# Patient Record
Sex: FEMALE | Race: WHITE | NOT HISPANIC OR LATINO | Employment: OTHER | URBAN - METROPOLITAN AREA
[De-identification: names, ages, dates, MRNs, and addresses within clinical notes are randomized per-mention and may not be internally consistent; named-entity substitution may affect disease eponyms.]

---

## 2017-01-03 ENCOUNTER — LAB (OUTPATIENT)
Dept: LAB | Facility: HOSPITAL | Age: 77
End: 2017-01-03

## 2017-01-03 DIAGNOSIS — E83.51 HYPOCALCEMIA: ICD-10-CM

## 2017-01-03 LAB — CALCIUM SPEC-SCNC: 8.9 MG/DL (ref 8.6–10.5)

## 2017-01-03 PROCEDURE — 82310 ASSAY OF CALCIUM: CPT

## 2017-01-03 PROCEDURE — 36415 COLL VENOUS BLD VENIPUNCTURE: CPT

## 2017-01-04 ENCOUNTER — TELEPHONE (OUTPATIENT)
Dept: SURGERY | Facility: CLINIC | Age: 77
End: 2017-01-04

## 2017-01-04 NOTE — TELEPHONE ENCOUNTER
Patient informed to continue 1 calcium tab PO daily x 2 weeks then discontinue. I instructed her on signs of hypocalcemia. She will call with any signs of such. She reports her incision looks great. She denies any signs or symptoms of infection at her incision.    Solange Piedra, TAYO

## 2017-01-04 NOTE — TELEPHONE ENCOUNTER
----- Message from Tasneem Montelongo MD sent at 1/4/2017 12:54 PM EST -----  Continue present calcium for 2 more weeks, then dc.  No need to have labs repeated from our standpoint.  ----- Message -----     From: TAYO Zafar     Sent: 1/4/2017   9:31 AM       To: Tasneem Montelongo MD    Calcium is 8.9 on 1 os-jada tab daily. Let me know what you would recommend.

## 2017-01-30 ENCOUNTER — TELEPHONE (OUTPATIENT)
Dept: ENDOCRINOLOGY | Age: 77
End: 2017-01-30

## 2017-01-30 DIAGNOSIS — E55.9 VITAMIN D DEFICIENCY: ICD-10-CM

## 2017-01-30 DIAGNOSIS — E21.3 HYPERPARATHYROIDISM (HCC): ICD-10-CM

## 2017-01-30 DIAGNOSIS — E03.9 HYPOTHYROIDISM, UNSPECIFIED TYPE: Primary | ICD-10-CM

## 2017-01-30 NOTE — TELEPHONE ENCOUNTER
----- Message from Reagan Beltran MD sent at 1/30/2017  9:44 AM EST -----  -Placed orders for her laboratory evaluation between now and Thursday for her to come and have it done so we can figure out the reason for her cramps  ----- Message -----     From: Tuyet Mondragon MA     Sent: 1/27/2017   8:49 AM       To: Reagan Beltran MD    Patient called stating that she had surgery last Friday on her thyroid with Dr. Montelongo and is now experiencing charley horses and her hand is cramping up. She thought it might have been a calcium problem but  stated it was probably her potassium and she needed to consult you. Please advise.     LVM for patient to call back and schedule a lab appt.

## 2017-02-02 ENCOUNTER — RESULTS ENCOUNTER (OUTPATIENT)
Dept: ENDOCRINOLOGY | Age: 77
End: 2017-02-02

## 2017-02-02 DIAGNOSIS — E21.3 HYPERPARATHYROIDISM (HCC): ICD-10-CM

## 2017-02-02 DIAGNOSIS — E03.9 HYPOTHYROIDISM, UNSPECIFIED TYPE: ICD-10-CM

## 2017-02-02 DIAGNOSIS — E55.9 VITAMIN D DEFICIENCY: ICD-10-CM

## 2017-02-07 ENCOUNTER — LAB (OUTPATIENT)
Dept: ENDOCRINOLOGY | Age: 77
End: 2017-02-07

## 2017-02-07 DIAGNOSIS — E03.9 HYPOTHYROIDISM, UNSPECIFIED TYPE: ICD-10-CM

## 2017-02-07 DIAGNOSIS — E78.2 MIXED HYPERLIPIDEMIA: ICD-10-CM

## 2017-02-07 DIAGNOSIS — E11.9 TYPE 2 DIABETES MELLITUS WITHOUT COMPLICATION, WITH LONG-TERM CURRENT USE OF INSULIN (HCC): ICD-10-CM

## 2017-02-07 DIAGNOSIS — E04.1 SOLITARY THYROID NODULE: ICD-10-CM

## 2017-02-07 DIAGNOSIS — Z79.4 TYPE 2 DIABETES MELLITUS WITHOUT COMPLICATION, WITH LONG-TERM CURRENT USE OF INSULIN (HCC): ICD-10-CM

## 2017-02-07 DIAGNOSIS — E21.3 HYPERPARATHYROIDISM (HCC): ICD-10-CM

## 2017-02-07 DIAGNOSIS — E55.9 VITAMIN D DEFICIENCY: ICD-10-CM

## 2017-02-07 DIAGNOSIS — N18.30 ANEMIA IN STAGE 3 CHRONIC KIDNEY DISEASE (HCC): ICD-10-CM

## 2017-02-07 DIAGNOSIS — E55.9 VITAMIN D DEFICIENCY: Primary | ICD-10-CM

## 2017-02-07 DIAGNOSIS — I10 ESSENTIAL HYPERTENSION: ICD-10-CM

## 2017-02-07 DIAGNOSIS — D63.1 ANEMIA IN STAGE 3 CHRONIC KIDNEY DISEASE (HCC): ICD-10-CM

## 2017-02-08 LAB — 25(OH)D3+25(OH)D2 SERPL-MCNC: 33.5 NG/ML (ref 30–100)

## 2017-02-14 LAB
ALBUMIN SERPL-MCNC: 3.5 G/DL (ref 3.5–5.2)
ALBUMIN/GLOB SERPL: 1.3 G/DL
ALP SERPL-CCNC: 91 U/L (ref 39–117)
ALT SERPL-CCNC: 9 U/L (ref 1–33)
AST SERPL-CCNC: 10 U/L (ref 1–32)
BILIRUB SERPL-MCNC: 0.2 MG/DL (ref 0.1–1.2)
BUN SERPL-MCNC: 18 MG/DL (ref 8–23)
BUN/CREAT SERPL: 17.6 (ref 7–25)
C PEPTIDE SERPL-MCNC: 10.1 NG/ML (ref 1.1–4.4)
CA-I SERPL ISE-MCNC: 5.3 MG/DL (ref 4.5–5.6)
CALCIUM SERPL-MCNC: 9.6 MG/DL (ref 8.6–10.5)
CHLORIDE SERPL-SCNC: 99 MMOL/L (ref 98–107)
CHOLEST SERPL-MCNC: 334 MG/DL (ref 0–200)
CO2 SERPL-SCNC: 29.5 MMOL/L (ref 22–29)
CREAT SERPL-MCNC: 1.02 MG/DL (ref 0.57–1)
GLOBULIN SER CALC-MCNC: 2.7 GM/DL
GLUCOSE SERPL-MCNC: 216 MG/DL (ref 65–99)
HBA1C MFR BLD: 6.45 % (ref 4.8–5.6)
HDLC SERPL-MCNC: 63 MG/DL (ref 40–60)
LDLC SERPL CALC-MCNC: 243 MG/DL (ref 0–100)
MICROALBUMIN UR-MCNC: 1260.2 UG/ML
PHOSPHATE SERPL-MCNC: 4.2 MG/DL (ref 2.5–4.5)
POTASSIUM SERPL-SCNC: 4.4 MMOL/L (ref 3.5–5.2)
PROT SERPL-MCNC: 6.2 G/DL (ref 6–8.5)
PTH-INTACT SERPL-MCNC: 51 PG/ML (ref 15–65)
SODIUM SERPL-SCNC: 141 MMOL/L (ref 136–145)
T3FREE SERPL-MCNC: 1.9 PG/ML (ref 2–4.4)
T4 FREE SERPL-MCNC: 1.26 NG/DL (ref 0.93–1.7)
T4 SERPL-MCNC: 6.68 MCG/DL (ref 4.5–11.7)
THYROGLOB AB SERPL-ACNC: 10.3 IU/ML (ref 0–0.9)
THYROGLOB SERPL-MCNC: 6.7 NG/ML
TRIGL SERPL-MCNC: 139 MG/DL (ref 0–150)
TSH SERPL DL<=0.005 MIU/L-ACNC: 3.76 MIU/ML (ref 0.27–4.2)
URATE SERPL-MCNC: 6 MG/DL (ref 2.4–5.7)
VLDLC SERPL CALC-MCNC: 27.8 MG/DL (ref 5–40)

## 2017-02-16 ENCOUNTER — OFFICE VISIT (OUTPATIENT)
Dept: FAMILY MEDICINE CLINIC | Facility: CLINIC | Age: 77
End: 2017-02-16

## 2017-02-16 VITALS
RESPIRATION RATE: 16 BRPM | SYSTOLIC BLOOD PRESSURE: 180 MMHG | TEMPERATURE: 98.2 F | DIASTOLIC BLOOD PRESSURE: 70 MMHG | BODY MASS INDEX: 31.66 KG/M2 | HEIGHT: 66 IN | WEIGHT: 197 LBS | HEART RATE: 67 BPM

## 2017-02-16 DIAGNOSIS — E78.49 OTHER HYPERLIPIDEMIA: ICD-10-CM

## 2017-02-16 DIAGNOSIS — R01.1 CARDIAC MURMUR: ICD-10-CM

## 2017-02-16 DIAGNOSIS — M1A.9XX0 CHRONIC GOUT WITHOUT TOPHUS, UNSPECIFIED CAUSE, UNSPECIFIED SITE: Chronic | ICD-10-CM

## 2017-02-16 DIAGNOSIS — I10 ESSENTIAL HYPERTENSION: Primary | ICD-10-CM

## 2017-02-16 DIAGNOSIS — E78.2 MIXED HYPERLIPIDEMIA: ICD-10-CM

## 2017-02-16 PROCEDURE — 99214 OFFICE O/P EST MOD 30 MIN: CPT | Performed by: FAMILY MEDICINE

## 2017-02-16 RX ORDER — ALLOPURINOL 100 MG/1
100 TABLET ORAL DAILY
Qty: 30 TABLET | Refills: 5 | Status: SHIPPED | OUTPATIENT
Start: 2017-02-16 | End: 2017-02-21 | Stop reason: SDUPTHER

## 2017-02-16 RX ORDER — EZETIMIBE 10 MG/1
10 TABLET ORAL DAILY
Qty: 30 TABLET | Refills: 5 | Status: SHIPPED | OUTPATIENT
Start: 2017-02-16 | End: 2017-08-30 | Stop reason: SDUPTHER

## 2017-02-16 RX ORDER — AMLODIPINE BESYLATE 10 MG/1
10 TABLET ORAL DAILY
Qty: 30 TABLET | Refills: 5 | Status: SHIPPED | OUTPATIENT
Start: 2017-02-16 | End: 2017-08-30 | Stop reason: SDUPTHER

## 2017-02-16 RX ORDER — IRBESARTAN 300 MG/1
300 TABLET ORAL NIGHTLY
Qty: 30 TABLET | Refills: 5 | Status: SHIPPED | OUTPATIENT
Start: 2017-02-16 | End: 2017-02-21 | Stop reason: SDUPTHER

## 2017-02-16 NOTE — PROGRESS NOTES
"Chief Complaint   Patient presents with   • Hypertension   • Gout   • Hyperlipidemia       Subjective    This patient presents the office for medicine refill.  She had labs done per endocrinology and they have been reviewed during today's office visit.  Gout is controlled on current therapy.  Blood pressure is above goal and we will increase amlodipine to 10 mg daily.  She will continue her irbesartan.  Lipids are not controlled.  She has been not fully compliant with Zetia.  I encouraged her to take it daily.  She has been statin intolerant previously. Stress levels are high but she would not like medication treatment at this time.   Review of Systems   Constitutional: Negative for fatigue.   Cardiovascular: Negative for chest pain.   Psychiatric/Behavioral:        Home stress       Objective   Visit Vitals   • /70 (BP Location: Right arm, Patient Position: Sitting, Cuff Size: Adult)   • Pulse 67   • Temp 98.2 °F (36.8 °C) (Oral)   • Resp 16   • Ht 66\" (167.6 cm)   • Wt 197 lb (89.4 kg)   • BMI 31.8 kg/m2     Body mass index is 31.8 kg/(m^2).  Physical Exam   Constitutional: She is cooperative. No distress.   Eyes: Conjunctivae and lids are normal.   Neck: Carotid bruit is not present. No tracheal deviation present.   Cardiovascular: Normal rate and regular rhythm.    Murmur heard.   Systolic murmur is present with a grade of 2/6   Pulmonary/Chest: Effort normal and breath sounds normal.   Neurological: She is alert. She is not disoriented.   Skin: Skin is warm and dry.   Psychiatric: She has a normal mood and affect. Her speech is normal and behavior is normal.   Vitals reviewed.      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 HOLD PER MD MAY-12/1     • carBAMazepine (CARBATROL) 300 MG 12 hr capsule Take 1 capsule by mouth 2 (two) times a day. For 30 days 60 capsule 5   • ferrous sulfate 325 (65 FE) MG tablet Take 1 tablet by mouth daily.     • glucose blood (JOSÉ MIGUEL CONTOUR NEXT TEST) test strip Checking BG twice a day 200 each 2   • Insulin Glargine (LANTUS SOLOSTAR) 100 UNIT/ML solution pen-injector Inject 50 Units under the skin daily. 5 pen 5   • levothyroxine (SYNTHROID, LEVOTHROID) 125 MCG tablet Take 1 tablet (125 mcg total) by mouth daily. 90 tablet 3   • metFORMIN (GLUCOPHAGE) 1000 MG tablet Take 1 tablet (1,000 mg total) by mouth 2 (two) times a day. With morning and evening meals 60 tablet 5   • montelukast (SINGULAIR) 10 MG tablet 10 mg daily.     • saxagliptin (ONGLYZA) 5 MG tablet Take 1 tablet by mouth Daily. 30 tablet 11   • vitamin B-12 (CYANOCOBALAMIN) 1000 MCG tablet Take 1 tablet by mouth daily.     • vitamin D (ERGOCALCIFEROL) 21715 UNITS capsule capsule Take 1 capsule (50,000 Units total) by mouth once a week. 13 capsule 3   • allopurinol (ZYLOPRIM) 100 MG tablet Take 1 tablet by mouth Daily for 180 days. 30 tablet 5   • amLODIPine (NORVASC) 10 MG tablet Take 1 tablet by mouth Daily for 180 days. 30 tablet 5   • ezetimibe (ZETIA) 10 MG tablet Take 1 tablet by mouth Daily for 180 days. 30 tablet 5   • furosemide (LASIX) 40 MG tablet Take 1 tablet by mouth daily for 180 days. 30 tablet 5   • irbesartan (AVAPRO) 300 MG tablet Take 1 tablet by mouth Every Night for 180 days. 30 tablet 5   • [DISCONTINUED] allopurinol (ZYLOPRIM) 100 MG tablet Take 1 tablet by mouth daily for 180 days. 30 tablet 5   • [DISCONTINUED] amLODIPine (NORVASC) 5 MG tablet Take 1 tablet by mouth daily for 180 days. 30 tablet 5    • [DISCONTINUED] ezetimibe (ZETIA) 10 MG tablet Take 1 tablet by mouth daily for 180 days. (Patient taking differently: Take 10 mg by mouth As Needed.) 30 tablet 5   • [DISCONTINUED] irbesartan (AVAPRO) 300 MG tablet Take 1 tablet by mouth every night for 180 days. 30 tablet 5   • [DISCONTINUED] traMADol-acetaminophen (ULTRACET) 37.5-325 MG per tablet Take 1 tablet by mouth Every 8 (Eight) Hours As Needed for moderate pain (4-6) for up to 20 doses. 20 tablet 0     No facility-administered encounter medications on file as of 2/16/2017.        No orders of the defined types were placed in this encounter.      Continue with current treatment plan.         F/U in 6 weeks to recheck blood pressure

## 2017-02-21 ENCOUNTER — OFFICE VISIT (OUTPATIENT)
Dept: ENDOCRINOLOGY | Age: 77
End: 2017-02-21

## 2017-02-21 VITALS
BODY MASS INDEX: 31.79 KG/M2 | DIASTOLIC BLOOD PRESSURE: 92 MMHG | RESPIRATION RATE: 16 BRPM | SYSTOLIC BLOOD PRESSURE: 142 MMHG | HEIGHT: 66 IN | WEIGHT: 197.8 LBS

## 2017-02-21 DIAGNOSIS — I65.29 CAROTID ATHEROSCLEROSIS, UNSPECIFIED LATERALITY: ICD-10-CM

## 2017-02-21 DIAGNOSIS — E03.9 HYPOTHYROIDISM, UNSPECIFIED TYPE: ICD-10-CM

## 2017-02-21 DIAGNOSIS — E55.9 VITAMIN D DEFICIENCY: ICD-10-CM

## 2017-02-21 DIAGNOSIS — E11.9 TYPE 2 DIABETES MELLITUS WITHOUT COMPLICATION, WITH LONG-TERM CURRENT USE OF INSULIN (HCC): Primary | ICD-10-CM

## 2017-02-21 DIAGNOSIS — E78.2 MIXED HYPERLIPIDEMIA: ICD-10-CM

## 2017-02-21 DIAGNOSIS — I10 ESSENTIAL HYPERTENSION: ICD-10-CM

## 2017-02-21 DIAGNOSIS — R80.9 PROTEINURIA: ICD-10-CM

## 2017-02-21 DIAGNOSIS — Z79.4 TYPE 2 DIABETES MELLITUS WITHOUT COMPLICATION, WITH LONG-TERM CURRENT USE OF INSULIN (HCC): Primary | ICD-10-CM

## 2017-02-21 PROCEDURE — 99214 OFFICE O/P EST MOD 30 MIN: CPT | Performed by: INTERNAL MEDICINE

## 2017-02-21 RX ORDER — EVOLOCUMAB 420 MG/3.5
420 KIT SUBCUTANEOUS
Qty: 3 CARTRIDGE | Refills: 3 | Status: SHIPPED | OUTPATIENT
Start: 2017-02-21 | End: 2017-02-22 | Stop reason: SDUPTHER

## 2017-02-21 RX ORDER — ERGOCALCIFEROL 1.25 MG/1
50000 CAPSULE ORAL WEEKLY
Qty: 13 CAPSULE | Refills: 3 | Status: SHIPPED | OUTPATIENT
Start: 2017-02-21 | End: 2017-07-18 | Stop reason: SDUPTHER

## 2017-02-21 RX ORDER — ALLOPURINOL 100 MG/1
100 TABLET ORAL DAILY
Qty: 30 TABLET | Refills: 5 | Status: SHIPPED | OUTPATIENT
Start: 2017-02-21 | End: 2017-08-30 | Stop reason: SDUPTHER

## 2017-02-21 RX ORDER — IRBESARTAN 300 MG/1
300 TABLET ORAL NIGHTLY
Qty: 30 TABLET | Refills: 5 | Status: SHIPPED | OUTPATIENT
Start: 2017-02-21 | End: 2017-08-25 | Stop reason: SDUPTHER

## 2017-02-21 RX ORDER — LEVOTHYROXINE SODIUM 0.12 MG/1
125 TABLET ORAL DAILY
Qty: 90 TABLET | Refills: 3 | Status: SHIPPED | OUTPATIENT
Start: 2017-02-21 | End: 2018-03-19 | Stop reason: SDUPTHER

## 2017-02-21 NOTE — PROGRESS NOTES
Subjective   Ange Johnson is a 76 y.o. female seen for follow up for DM2, goiter, hyperparathyroidism, hypothyroidism, vit d deficiency, HTN, hyperlipidemia, lab review. Patient had a right thyroidectomy 12/08/2016. She is under a lot of stress and having problems with her BP. She is checking BG twice a day. Her BG is running 110-130.     History of Present Illness this is a 76-year-old female known patient with type II diabetes hypothyroidism and hypertension and dyslipidemia as well as vitamin D deficiency.  Over the course of last the 6 months she has had no significant health problems for which to go to emergency room or hospital.    Current Outpatient Prescriptions:   •  allopurinol (ZYLOPRIM) 100 MG tablet, Take 1 tablet by mouth Daily for 180 days., Disp: 30 tablet, Rfl: 5  •  amLODIPine (NORVASC) 10 MG tablet, Take 1 tablet by mouth Daily for 180 days., Disp: 30 tablet, Rfl: 5  •  aspirin 81 MG EC tablet, Take 1 tablet by mouth Daily. HOLD PER MD MAY-12/1, Disp: , Rfl:   •  carBAMazepine (CARBATROL) 300 MG 12 hr capsule, Take 1 capsule by mouth 2 (two) times a day. For 30 days, Disp: 60 capsule, Rfl: 5  •  ezetimibe (ZETIA) 10 MG tablet, Take 1 tablet by mouth Daily for 180 days., Disp: 30 tablet, Rfl: 5  •  ferrous sulfate 325 (65 FE) MG tablet, Take 1 tablet by mouth daily., Disp: , Rfl:   •  glucose blood (JOSÉ MIGUEL CONTOUR NEXT TEST) test strip, Checking BG twice a day, Disp: 200 each, Rfl: 2  •  Insulin Glargine (LANTUS SOLOSTAR) 100 UNIT/ML solution pen-injector, Inject 50 Units under the skin daily., Disp: 5 pen, Rfl: 5  •  irbesartan (AVAPRO) 300 MG tablet, Take 1 tablet by mouth Every Night for 180 days., Disp: 30 tablet, Rfl: 5  •  levothyroxine (SYNTHROID, LEVOTHROID) 125 MCG tablet, Take 1 tablet (125 mcg total) by mouth daily., Disp: 90 tablet, Rfl: 3  •  metFORMIN (GLUCOPHAGE) 1000 MG tablet, Take 1 tablet (1,000 mg total) by mouth 2 (two) times a day. With morning and evening meals, Disp: 60  "tablet, Rfl: 5  •  montelukast (SINGULAIR) 10 MG tablet, 10 mg daily., Disp: , Rfl:   •  saxagliptin (ONGLYZA) 5 MG tablet, Take 1 tablet by mouth Daily., Disp: 30 tablet, Rfl: 11  •  vitamin B-12 (CYANOCOBALAMIN) 1000 MCG tablet, Take 1 tablet by mouth daily., Disp: , Rfl:   •  vitamin D (ERGOCALCIFEROL) 48650 UNITS capsule capsule, Take 1 capsule (50,000 Units total) by mouth once a week., Disp: 13 capsule, Rfl: 3  •  furosemide (LASIX) 40 MG tablet, Take 1 tablet by mouth daily for 180 days., Disp: 30 tablet, Rfl: 5  Allergies   Allergen Reactions   • Codeine Nausea Only   • Hydrocodone-Acetaminophen Nausea Only and Other (See Comments)     Percocet and Vicodin; vertigo   • Meperidine Nausea Only   • Morphine And Related Nausea Only   • Oxycodone-Acetaminophen Nausea Only   • Oxycodone-Aspirin Nausea Only and Other (See Comments)     vertigo   • Penicillins Hives   • Sulfa Antibiotics Hives     Visit Vitals   • /92   • Resp 16   • Ht 66\" (167.6 cm)   • Wt 197 lb 12.8 oz (89.7 kg)   • BMI 31.93 kg/m2   .      The following portions of the patient's history were reviewed and updated as appropriate: allergies, current medications, past family history, past medical history, past social history, past surgical history and problem list.    Review of Systems   Constitutional: Negative.    HENT: Negative.    Eyes: Negative.    Respiratory: Negative.    Cardiovascular: Negative.    Gastrointestinal: Negative.    Endocrine: Negative.    Genitourinary: Negative.    Musculoskeletal: Negative.    Skin: Negative.    Allergic/Immunologic: Negative.    Neurological: Negative.    Hematological: Negative.    Psychiatric/Behavioral: Negative.        Objective   Physical Exam   Constitutional: She is oriented to person, place, and time. She appears well-developed and well-nourished. No distress.   HENT:   Head: Normocephalic and atraumatic.   Right Ear: External ear normal.   Left Ear: External ear normal.   Nose: Nose normal. "   Mouth/Throat: Oropharynx is clear and moist. No oropharyngeal exudate.   Eyes: Conjunctivae and EOM are normal. Pupils are equal, round, and reactive to light. Right eye exhibits no discharge. Left eye exhibits no discharge. No scleral icterus.   Neck: Normal range of motion. Neck supple. No JVD present. No tracheal deviation present. No thyromegaly present.   Healed the scar of the parathyroidectomy in lower neck.   Cardiovascular: Normal rate, regular rhythm, normal heart sounds and intact distal pulses.  Exam reveals no gallop and no friction rub.    No murmur heard.  Pulmonary/Chest: Effort normal and breath sounds normal. No stridor. No respiratory distress. She has no wheezes. She has no rales. She exhibits no tenderness.   Abdominal: Soft. Bowel sounds are normal. She exhibits no distension and no mass. There is no tenderness. There is no rebound and no guarding. No hernia.   Musculoskeletal: Normal range of motion. She exhibits no edema, tenderness or deformity.   Lymphadenopathy:     She has no cervical adenopathy.   Neurological: She is alert and oriented to person, place, and time. She has normal reflexes. She displays normal reflexes. No cranial nerve deficit. She exhibits normal muscle tone. Coordination normal.   Skin: Skin is warm and dry. No rash noted. She is not diaphoretic. No erythema. No pallor.   Psychiatric: She has a normal mood and affect. Her behavior is normal. Judgment and thought content normal.   Nursing note and vitals reviewed.     Results for orders placed or performed in visit on 02/07/17   T3, Free   Result Value Ref Range    T3, Free 1.9 (L) 2.0 - 4.4 pg/mL   T4 & TSH ([facility])   Result Value Ref Range    TSH 3.760 0.270 - 4.200 mIU/mL    T4, Total 6.68 4.50 - 11.70 mcg/dL   T4, Free   Result Value Ref Range    Free T4 1.26 0.93 - 1.70 ng/dL   Thyroglobulin With Anti-TG   Result Value Ref Range    Thyroglobulin Ab 10.3 (H) 0.0 - 0.9 IU/mL   Uric Acid   Result Value Ref Range     Uric Acid 6.0 (H) 2.4 - 5.7 mg/dL   Comprehensive Metabolic Panel   Result Value Ref Range    Glucose 216 (H) 65 - 99 mg/dL    BUN 18 8 - 23 mg/dL    Creatinine 1.02 (H) 0.57 - 1.00 mg/dL    eGFR Non African Am 53 (L) >60 mL/min/1.73    eGFR African Am 64 >60 mL/min/1.73    BUN/Creatinine Ratio 17.6 7.0 - 25.0    Sodium 141 136 - 145 mmol/L    Potassium 4.4 3.5 - 5.2 mmol/L    Chloride 99 98 - 107 mmol/L    Total CO2 29.5 (H) 22.0 - 29.0 mmol/L    Calcium 9.6 8.6 - 10.5 mg/dL    Total Protein 6.2 6.0 - 8.5 g/dL    Albumin 3.50 3.50 - 5.20 g/dL    Globulin 2.7 gm/dL    A/G Ratio 1.3 g/dL    Total Bilirubin 0.2 0.1 - 1.2 mg/dL    Alkaline Phosphatase 91 39 - 117 U/L    AST (SGOT) 10 1 - 32 U/L    ALT (SGPT) 9 1 - 33 U/L   C-Peptide   Result Value Ref Range    C-Peptide 10.1 (H) 1.1 - 4.4 ng/mL   Hemoglobin A1c   Result Value Ref Range    Hemoglobin A1C 6.45 (H) 4.80 - 5.60 %   Lipid Panel   Result Value Ref Range    Total Cholesterol 334 (H) 0 - 200 mg/dL    Triglycerides 139 0 - 150 mg/dL    HDL Cholesterol 63 (H) 40 - 60 mg/dL    VLDL Cholesterol 27.8 5 - 40 mg/dL    LDL Cholesterol  243 (H) 0 - 100 mg/dL   Calcium, Ionized   Result Value Ref Range    Ionized Calcium 5.3 4.5 - 5.6 mg/dL   Phosphorus   Result Value Ref Range    Phosphorus 4.2 2.5 - 4.5 mg/dL   PTH, Intact   Result Value Ref Range    PTH, Intact 51 15 - 65 pg/mL   Vitamin D 25 Hydroxy   Result Value Ref Range    25 Hydroxy, Vitamin D 33.5 30.0 - 100.0 ng/mL   MicroAlbumin, Urine, Random   Result Value Ref Range    Microalbumin, Urine 1260.2 Not Estab. ug/mL   Thyroglobulin By TERRIE   Result Value Ref Range    Thyroglobulin (TG-TERRIE) 6.7 ng/mL             Assessment/Plan   Diagnoses and all orders for this visit:    Type 2 diabetes mellitus without complication, with long-term current use of insulin  -     T3, Free; Future  -     T4 & TSH ([facility]); Future  -     T4, Free; Future  -     Thyroglobulin With Anti-TG; Future  -     Uric Acid; Future  -      Vitamin D 25 Hydroxy; Future  -     Comprehensive Metabolic Panel; Future  -     C-Peptide; Future  -     Lipid Panel; Future  -     Hemoglobin A1c; Future  -     MicroAlbumin, Urine, Random; Future  -     vitamin D (ERGOCALCIFEROL) 85126 UNITS capsule capsule; Take 1 capsule by mouth 1 (One) Time Per Week.  -     Insulin Glargine (LANTUS SOLOSTAR) 100 UNIT/ML injection pen; Inject 50 Units under the skin Daily.  -     levothyroxine (SYNTHROID, LEVOTHROID) 125 MCG tablet; Take 1 tablet by mouth Daily.  -     metFORMIN (GLUCOPHAGE) 1000 MG tablet; Take 1 tablet by mouth 2 (Two) Times a Day. With morning and evening meals    Essential hypertension  -     T3, Free; Future  -     T4 & TSH ([facility]); Future  -     T4, Free; Future  -     Thyroglobulin With Anti-TG; Future  -     Uric Acid; Future  -     Vitamin D 25 Hydroxy; Future  -     Comprehensive Metabolic Panel; Future  -     C-Peptide; Future  -     Lipid Panel; Future  -     Hemoglobin A1c; Future  -     MicroAlbumin, Urine, Random; Future  -     vitamin D (ERGOCALCIFEROL) 14644 UNITS capsule capsule; Take 1 capsule by mouth 1 (One) Time Per Week.  -     allopurinol (ZYLOPRIM) 100 MG tablet; Take 1 tablet by mouth Daily for 180 days.  -     Insulin Glargine (LANTUS SOLOSTAR) 100 UNIT/ML injection pen; Inject 50 Units under the skin Daily.  -     irbesartan (AVAPRO) 300 MG tablet; Take 1 tablet by mouth Every Night for 180 days.  -     levothyroxine (SYNTHROID, LEVOTHROID) 125 MCG tablet; Take 1 tablet by mouth Daily.  -     metFORMIN (GLUCOPHAGE) 1000 MG tablet; Take 1 tablet by mouth 2 (Two) Times a Day. With morning and evening meals    Mixed hyperlipidemia  -     T3, Free; Future  -     T4 & TSH ([facility]); Future  -     T4, Free; Future  -     Thyroglobulin With Anti-TG; Future  -     Uric Acid; Future  -     Vitamin D 25 Hydroxy; Future  -     Comprehensive Metabolic Panel; Future  -     C-Peptide; Future  -     Lipid Panel; Future  -     Hemoglobin A1c;  Future  -     MicroAlbumin, Urine, Random; Future  -     vitamin D (ERGOCALCIFEROL) 73002 UNITS capsule capsule; Take 1 capsule by mouth 1 (One) Time Per Week.  -     Insulin Glargine (LANTUS SOLOSTAR) 100 UNIT/ML injection pen; Inject 50 Units under the skin Daily.  -     levothyroxine (SYNTHROID, LEVOTHROID) 125 MCG tablet; Take 1 tablet by mouth Daily.  -     metFORMIN (GLUCOPHAGE) 1000 MG tablet; Take 1 tablet by mouth 2 (Two) Times a Day. With morning and evening meals    Carotid atherosclerosis, unspecified laterality  -     T3, Free; Future  -     T4 & TSH ([facility]); Future  -     T4, Free; Future  -     Thyroglobulin With Anti-TG; Future  -     Uric Acid; Future  -     Vitamin D 25 Hydroxy; Future  -     Comprehensive Metabolic Panel; Future  -     C-Peptide; Future  -     Lipid Panel; Future  -     Hemoglobin A1c; Future  -     MicroAlbumin, Urine, Random; Future    Hypothyroidism, unspecified type  -     T3, Free; Future  -     T4 & TSH ([facility]); Future  -     T4, Free; Future  -     Thyroglobulin With Anti-TG; Future  -     Uric Acid; Future  -     Vitamin D 25 Hydroxy; Future  -     Comprehensive Metabolic Panel; Future  -     C-Peptide; Future  -     Lipid Panel; Future  -     Hemoglobin A1c; Future  -     MicroAlbumin, Urine, Random; Future    Vitamin D deficiency  -     T3, Free; Future  -     T4 & TSH ([facility]); Future  -     T4, Free; Future  -     Thyroglobulin With Anti-TG; Future  -     Uric Acid; Future  -     Vitamin D 25 Hydroxy; Future  -     Comprehensive Metabolic Panel; Future  -     C-Peptide; Future  -     Lipid Panel; Future  -     Hemoglobin A1c; Future  -     MicroAlbumin, Urine, Random; Future  -     vitamin D (ERGOCALCIFEROL) 99356 UNITS capsule capsule; Take 1 capsule by mouth 1 (One) Time Per Week.  -     Insulin Glargine (LANTUS SOLOSTAR) 100 UNIT/ML injection pen; Inject 50 Units under the skin Daily.  -     levothyroxine (SYNTHROID, LEVOTHROID) 125 MCG tablet; Take 1 tablet by  mouth Daily.  -     metFORMIN (GLUCOPHAGE) 1000 MG tablet; Take 1 tablet by mouth 2 (Two) Times a Day. With morning and evening meals    Proteinuria  -     vitamin D (ERGOCALCIFEROL) 72608 UNITS capsule capsule; Take 1 capsule by mouth 1 (One) Time Per Week.  -     Insulin Glargine (LANTUS SOLOSTAR) 100 UNIT/ML injection pen; Inject 50 Units under the skin Daily.  -     levothyroxine (SYNTHROID, LEVOTHROID) 125 MCG tablet; Take 1 tablet by mouth Daily.  -     metFORMIN (GLUCOPHAGE) 1000 MG tablet; Take 1 tablet by mouth 2 (Two) Times a Day. With morning and evening meals    Other orders  -     SAXagliptin (ONGLYZA) 5 MG tablet; Take 1 tablet by mouth Daily.  -     REPATHA PUSHTRONEX SYSTEM 420 MG/3.5ML solution cartridge; Inject 420 mg under the skin Every 30 (Thirty) Days.               His summary I saw and examined this 76-year-old female for above-mentioned problems.  I reviewed her laboratory evaluation of 02/07/2017 and provided her a hard copy of it.  Aside from the fact that she has exceedingly high levels of total cholesterol and LDL she is clinically and metabolically stable.  For we will go ahead and continue all her current prescriptions however we will go ahead and is start her on Repatha 420 mg once monthly.  She will see Ms. Adalgisa Hernandez in 4 months and myself in 8 months or sooner if needed with laboratory evaluation prior to each office visit.

## 2017-02-22 RX ORDER — EVOLOCUMAB 420 MG/3.5
420 KIT SUBCUTANEOUS
Qty: 3 CARTRIDGE | Refills: 3 | Status: SHIPPED | OUTPATIENT
Start: 2017-02-22 | End: 2017-04-12 | Stop reason: SDUPTHER

## 2017-03-15 ENCOUNTER — TELEPHONE (OUTPATIENT)
Dept: ENDOCRINOLOGY | Age: 77
End: 2017-03-15

## 2017-03-21 ENCOUNTER — TELEPHONE (OUTPATIENT)
Dept: ENDOCRINOLOGY | Age: 77
End: 2017-03-21

## 2017-03-30 ENCOUNTER — OFFICE VISIT (OUTPATIENT)
Dept: FAMILY MEDICINE CLINIC | Facility: CLINIC | Age: 77
End: 2017-03-30

## 2017-03-30 VITALS
RESPIRATION RATE: 16 BRPM | HEART RATE: 68 BPM | SYSTOLIC BLOOD PRESSURE: 180 MMHG | HEIGHT: 66 IN | WEIGHT: 197 LBS | TEMPERATURE: 98.3 F | BODY MASS INDEX: 31.66 KG/M2 | DIASTOLIC BLOOD PRESSURE: 77 MMHG

## 2017-03-30 DIAGNOSIS — R60.1 GENERALIZED EDEMA: ICD-10-CM

## 2017-03-30 DIAGNOSIS — I10 ESSENTIAL HYPERTENSION: Primary | ICD-10-CM

## 2017-03-30 PROCEDURE — 99213 OFFICE O/P EST LOW 20 MIN: CPT | Performed by: FAMILY MEDICINE

## 2017-03-30 RX ORDER — FUROSEMIDE 40 MG/1
40 TABLET ORAL DAILY
Qty: 30 TABLET | Refills: 4 | Status: SHIPPED | OUTPATIENT
Start: 2017-03-30 | End: 2017-07-18 | Stop reason: DRUGHIGH

## 2017-03-30 RX ORDER — CLONIDINE HYDROCHLORIDE 0.1 MG/1
0.1 TABLET ORAL 2 TIMES DAILY
Qty: 60 TABLET | Refills: 4 | Status: SHIPPED | OUTPATIENT
Start: 2017-03-30 | End: 2017-08-30 | Stop reason: SDUPTHER

## 2017-03-30 NOTE — PROGRESS NOTES
"Chief Complaint   Patient presents with   • Hypertension       Subjective   Patient returns the office to recheck her arterial hypertension.  Blood pressure still above goal.  She is tolerating amlodipine.  We will add clonidine to her current regimen.  Her daughter is a nurse and she will obtain 14 blood pressure readings and send me those results through a my chart message.    Review of Systems   Constitutional: Negative for fatigue.   Cardiovascular: Negative for chest pain.       Objective   /77  Pulse 68  Temp 98.3 °F (36.8 °C) (Oral)   Resp 16  Ht 66\" (167.6 cm)  Wt 197 lb (89.4 kg)  BMI 31.8 kg/m2  Body mass index is 31.8 kg/(m^2).  Physical Exam   Constitutional: She is cooperative. No distress.   Eyes: Conjunctivae and lids are normal.   Neck: Carotid bruit is not present. No tracheal deviation present.   Cardiovascular: Normal rate and regular rhythm.    Murmur heard.   Systolic murmur is present with a grade of 2/6   Pulmonary/Chest: Effort normal and breath sounds normal.   Neurological: She is alert. She is not disoriented.   Skin: Skin is warm and dry.   Psychiatric: She has a normal mood and affect. Her speech is normal and behavior is normal.   Vitals reviewed.      Assessment/Plan     Problem List Items Addressed This Visit        Cardiovascular and Mediastinum    Hypertension - Primary    Relevant Medications    CloNIDine (CATAPRES) 0.1 MG tablet    furosemide (LASIX) 40 MG tablet       Other    Edema    Relevant Medications    furosemide (LASIX) 40 MG tablet          Outpatient Encounter Prescriptions as of 3/30/2017   Medication Sig Dispense Refill   • allopurinol (ZYLOPRIM) 100 MG tablet Take 1 tablet by mouth Daily for 180 days. 30 tablet 5   • amLODIPine (NORVASC) 10 MG tablet Take 1 tablet by mouth Daily for 180 days. 30 tablet 5   • aspirin 81 MG EC tablet Take 1 tablet by mouth Daily. HOLD PER MD MAY-12/1     • carBAMazepine (CARBATROL) 300 MG 12 hr capsule Take 1 capsule by " mouth 2 (two) times a day. For 30 days 60 capsule 5   • ezetimibe (ZETIA) 10 MG tablet Take 1 tablet by mouth Daily for 180 days. 30 tablet 5   • ferrous sulfate 325 (65 FE) MG tablet Take 1 tablet by mouth daily.     • glucose blood (JOSÉ MIGUEL CONTOUR NEXT TEST) test strip Checking BG twice a day 200 each 2   • Insulin Glargine (LANTUS SOLOSTAR) 100 UNIT/ML injection pen Inject 50 Units under the skin Daily. 5 pen 5   • irbesartan (AVAPRO) 300 MG tablet Take 1 tablet by mouth Every Night for 180 days. 30 tablet 5   • levothyroxine (SYNTHROID, LEVOTHROID) 125 MCG tablet Take 1 tablet by mouth Daily. 90 tablet 3   • metFORMIN (GLUCOPHAGE) 1000 MG tablet Take 1 tablet by mouth 2 (Two) Times a Day. With morning and evening meals 60 tablet 5   • montelukast (SINGULAIR) 10 MG tablet 10 mg daily.     • REPATHA PUSHTRONEX SYSTEM 420 MG/3.5ML solution cartridge Inject 420 mg under the skin Every 30 (Thirty) Days. 3 cartridge 3   • SAXagliptin (ONGLYZA) 5 MG tablet Take 1 tablet by mouth Daily. 30 tablet 11   • vitamin B-12 (CYANOCOBALAMIN) 1000 MCG tablet Take 1 tablet by mouth daily.     • vitamin D (ERGOCALCIFEROL) 01220 UNITS capsule capsule Take 1 capsule by mouth 1 (One) Time Per Week. 13 capsule 3   • CloNIDine (CATAPRES) 0.1 MG tablet Take 1 tablet by mouth 2 (Two) Times a Day for 150 days. 60 tablet 4   • furosemide (LASIX) 40 MG tablet Take 1 tablet by mouth Daily for 150 days. 30 tablet 4   • [DISCONTINUED] furosemide (LASIX) 40 MG tablet Take 1 tablet by mouth daily for 180 days. 30 tablet 5     No facility-administered encounter medications on file as of 3/30/2017.        No orders of the defined types were placed in this encounter.      Continue with current treatment plan.         F/U in 5 months

## 2017-04-12 RX ORDER — EVOLOCUMAB 420 MG/3.5
420 KIT SUBCUTANEOUS
Qty: 3 CARTRIDGE | Refills: 3 | Status: SHIPPED | OUTPATIENT
Start: 2017-04-12 | End: 2017-07-18

## 2017-06-14 ENCOUNTER — RESULTS ENCOUNTER (OUTPATIENT)
Dept: ENDOCRINOLOGY | Age: 77
End: 2017-06-14

## 2017-06-14 DIAGNOSIS — I65.29 CAROTID ATHEROSCLEROSIS, UNSPECIFIED LATERALITY: ICD-10-CM

## 2017-06-14 DIAGNOSIS — I10 ESSENTIAL HYPERTENSION: ICD-10-CM

## 2017-06-14 DIAGNOSIS — E03.9 HYPOTHYROIDISM, UNSPECIFIED TYPE: ICD-10-CM

## 2017-06-14 DIAGNOSIS — Z79.4 TYPE 2 DIABETES MELLITUS WITHOUT COMPLICATION, WITH LONG-TERM CURRENT USE OF INSULIN (HCC): ICD-10-CM

## 2017-06-14 DIAGNOSIS — E11.9 TYPE 2 DIABETES MELLITUS WITHOUT COMPLICATION, WITH LONG-TERM CURRENT USE OF INSULIN (HCC): ICD-10-CM

## 2017-06-14 DIAGNOSIS — E78.2 MIXED HYPERLIPIDEMIA: ICD-10-CM

## 2017-06-14 DIAGNOSIS — E55.9 VITAMIN D DEFICIENCY: ICD-10-CM

## 2017-06-30 ENCOUNTER — TELEPHONE (OUTPATIENT)
Dept: ENDOCRINOLOGY | Age: 77
End: 2017-06-30

## 2017-07-03 DIAGNOSIS — Z79.4 TYPE 2 DIABETES MELLITUS WITHOUT COMPLICATION, WITH LONG-TERM CURRENT USE OF INSULIN (HCC): ICD-10-CM

## 2017-07-03 DIAGNOSIS — E04.1 SOLITARY THYROID NODULE: Primary | ICD-10-CM

## 2017-07-03 DIAGNOSIS — E21.3 HYPERPARATHYROIDISM (HCC): ICD-10-CM

## 2017-07-03 DIAGNOSIS — E03.9 HYPOTHYROIDISM, UNSPECIFIED TYPE: ICD-10-CM

## 2017-07-03 DIAGNOSIS — E55.9 VITAMIN D DEFICIENCY: ICD-10-CM

## 2017-07-03 DIAGNOSIS — E11.9 TYPE 2 DIABETES MELLITUS WITHOUT COMPLICATION, WITH LONG-TERM CURRENT USE OF INSULIN (HCC): ICD-10-CM

## 2017-07-03 DIAGNOSIS — M1A.9XX0 CHRONIC GOUT WITHOUT TOPHUS, UNSPECIFIED CAUSE, UNSPECIFIED SITE: Chronic | ICD-10-CM

## 2017-07-03 DIAGNOSIS — E78.2 MIXED HYPERLIPIDEMIA: ICD-10-CM

## 2017-07-11 ENCOUNTER — LAB (OUTPATIENT)
Dept: ENDOCRINOLOGY | Age: 77
End: 2017-07-11

## 2017-07-11 DIAGNOSIS — E11.9 TYPE 2 DIABETES MELLITUS WITHOUT COMPLICATION, WITH LONG-TERM CURRENT USE OF INSULIN (HCC): ICD-10-CM

## 2017-07-11 DIAGNOSIS — E04.1 SOLITARY THYROID NODULE: ICD-10-CM

## 2017-07-11 DIAGNOSIS — M1A.9XX0 CHRONIC GOUT WITHOUT TOPHUS, UNSPECIFIED CAUSE, UNSPECIFIED SITE: Chronic | ICD-10-CM

## 2017-07-11 DIAGNOSIS — E21.3 HYPERPARATHYROIDISM (HCC): ICD-10-CM

## 2017-07-11 DIAGNOSIS — E78.2 MIXED HYPERLIPIDEMIA: ICD-10-CM

## 2017-07-11 DIAGNOSIS — E55.9 VITAMIN D DEFICIENCY: ICD-10-CM

## 2017-07-11 DIAGNOSIS — E03.9 HYPOTHYROIDISM, UNSPECIFIED TYPE: ICD-10-CM

## 2017-07-11 DIAGNOSIS — Z79.4 TYPE 2 DIABETES MELLITUS WITHOUT COMPLICATION, WITH LONG-TERM CURRENT USE OF INSULIN (HCC): ICD-10-CM

## 2017-07-18 ENCOUNTER — OFFICE VISIT (OUTPATIENT)
Dept: ENDOCRINOLOGY | Age: 77
End: 2017-07-18

## 2017-07-18 VITALS
WEIGHT: 198.4 LBS | BODY MASS INDEX: 31.88 KG/M2 | SYSTOLIC BLOOD PRESSURE: 130 MMHG | DIASTOLIC BLOOD PRESSURE: 68 MMHG | HEIGHT: 66 IN

## 2017-07-18 DIAGNOSIS — E11.9 TYPE 2 DIABETES MELLITUS WITHOUT COMPLICATION, WITH LONG-TERM CURRENT USE OF INSULIN (HCC): ICD-10-CM

## 2017-07-18 DIAGNOSIS — R80.9 PROTEINURIA: ICD-10-CM

## 2017-07-18 DIAGNOSIS — E21.3 HYPERPARATHYROIDISM (HCC): Primary | ICD-10-CM

## 2017-07-18 DIAGNOSIS — I10 ESSENTIAL HYPERTENSION: ICD-10-CM

## 2017-07-18 DIAGNOSIS — E03.9 HYPOTHYROIDISM, UNSPECIFIED TYPE: ICD-10-CM

## 2017-07-18 DIAGNOSIS — Z79.4 TYPE 2 DIABETES MELLITUS WITHOUT COMPLICATION, WITH LONG-TERM CURRENT USE OF INSULIN (HCC): ICD-10-CM

## 2017-07-18 DIAGNOSIS — E55.9 VITAMIN D DEFICIENCY: ICD-10-CM

## 2017-07-18 DIAGNOSIS — E78.2 MIXED HYPERLIPIDEMIA: ICD-10-CM

## 2017-07-18 PROCEDURE — 99214 OFFICE O/P EST MOD 30 MIN: CPT | Performed by: NURSE PRACTITIONER

## 2017-07-18 RX ORDER — ERGOCALCIFEROL 1.25 MG/1
CAPSULE ORAL
Qty: 24 CAPSULE | Refills: 3 | Status: SHIPPED | OUTPATIENT
Start: 2017-07-18 | End: 2017-11-18 | Stop reason: SDUPTHER

## 2017-07-18 RX ORDER — POTASSIUM CHLORIDE 750 MG/1
10 CAPSULE, EXTENDED RELEASE ORAL DAILY
COMMUNITY
Start: 2017-07-04

## 2017-07-18 RX ORDER — FUROSEMIDE 80 MG
80 TABLET ORAL AS NEEDED
COMMUNITY
Start: 2017-07-04 | End: 2018-12-20 | Stop reason: ALTCHOICE

## 2017-07-18 NOTE — PROGRESS NOTES
"Subjective   Ange Johnson is a 76 y.o. female is here today for follow-up.  Chief Complaint   Patient presents with   • Diabetes     recent labs, testing BG 3 times daily, pt did not bring meter   • Hypothyroidism     pt is not taking repatha due to cost. Patient is asking for onglyza samples.    • hyperparathyroidism   • Hyperlipidemia   • hyperuricemia   • Vitamin D Deficiency     /68  Ht 66\" (167.6 cm)  Wt 198 lb 6.4 oz (90 kg)  BMI 32.02 kg/m2  Current Outpatient Prescriptions on File Prior to Visit   Medication Sig   • allopurinol (ZYLOPRIM) 100 MG tablet Take 1 tablet by mouth Daily for 180 days.   • amLODIPine (NORVASC) 10 MG tablet Take 1 tablet by mouth Daily for 180 days.   • aspirin 81 MG EC tablet Take 1 tablet by mouth Daily. HOLD PER MD INSTR-12/1   • carBAMazepine (CARBATROL) 300 MG 12 hr capsule Take 1 capsule by mouth 2 (two) times a day. For 30 days   • CloNIDine (CATAPRES) 0.1 MG tablet Take 1 tablet by mouth 2 (Two) Times a Day for 150 days.   • ezetimibe (ZETIA) 10 MG tablet Take 1 tablet by mouth Daily for 180 days.   • ferrous sulfate 325 (65 FE) MG tablet Take 1 tablet by mouth daily.   • glucose blood (JOSÉ MIGUEL CONTOUR NEXT TEST) test strip Checking BG twice a day   • Insulin Glargine (LANTUS SOLOSTAR) 100 UNIT/ML injection pen Inject 50 Units under the skin Daily.   • irbesartan (AVAPRO) 300 MG tablet Take 1 tablet by mouth Every Night for 180 days.   • levothyroxine (SYNTHROID, LEVOTHROID) 125 MCG tablet Take 1 tablet by mouth Daily.   • metFORMIN (GLUCOPHAGE) 1000 MG tablet Take 1 tablet by mouth 2 (Two) Times a Day. With morning and evening meals   • montelukast (SINGULAIR) 10 MG tablet 10 mg daily.   • SAXagliptin (ONGLYZA) 5 MG tablet Take 1 tablet by mouth Daily.   • vitamin B-12 (CYANOCOBALAMIN) 1000 MCG tablet Take 1 tablet by mouth daily.   • [DISCONTINUED] vitamin D (ERGOCALCIFEROL) 29912 UNITS capsule capsule Take 1 capsule by mouth 1 (One) Time Per Week.   • " [DISCONTINUED] furosemide (LASIX) 40 MG tablet Take 1 tablet by mouth Daily for 150 days.   • [DISCONTINUED] REPATHA PUSHTRONEX SYSTEM 420 MG/3.5ML solution cartridge Inject 420 mg under the skin Every 30 (Thirty) Days.     No current facility-administered medications on file prior to visit.      Family History   Problem Relation Age of Onset   • Diabetes Sister    • Hypertension Sister    • Thyroid disease Sister    • Diabetes Brother    • Hypertension Brother    • Kidney disease Brother    • Cervical cancer Mother      Social History   Substance Use Topics   • Smoking status: Never Smoker   • Smokeless tobacco: None   • Alcohol use No     Allergies   Allergen Reactions   • Codeine Nausea Only   • Hydrocodone-Acetaminophen Nausea Only and Other (See Comments)     Percocet and Vicodin; vertigo   • Meperidine Nausea Only   • Morphine And Related Nausea Only   • Oxycodone-Acetaminophen Nausea Only   • Oxycodone-Acetaminophen Nausea Only and Other (See Comments)     dilzziness   • Oxycodone-Aspirin Nausea Only and Other (See Comments)     vertigo   • Penicillins Hives   • Sulfa Antibiotics Hives         History of Present Illness  Encounter Diagnoses   Name Primary?   • Essential hypertension    • Vitamin D deficiency    • Type 2 diabetes mellitus without complication, with long-term current use of insulin    • Proteinuria    • Mixed hyperlipidemia    • Hyperparathyroidism Yes   • Hypothyroidism, unspecified type      This is a 76-year-old female patient here today for routine follow-up visit for the above-mentioned problems.  She had recent labs which were reviewed and she was provided a copy.  Her parathyroid hormone is 71 was noted and discussed with Dr. Beltran.  Previously was 51.  She has had a parathyroidectomy as well as a partial thyroidectomy by Dr. Motnelongo last December.  Her thyroid nodule resection was nonmalignant.  She is taking her medications as prescribed.  She is complaining of cost of repatha so was  not able to start therapy.  She is requesting samples of any medications that we can provide her with today due to cost.  She states she is currently in a doughnut hole.  She is dealing with some stress in her family because her son-in-law is dying that she is helping her daughter.  The following portions of the patient's history were reviewed and updated as appropriate: allergies, current medications, past family history, past medical history, past social history, past surgical history and problem list.    Review of Systems   Constitutional: Negative for fatigue.   HENT: Negative for trouble swallowing.    Eyes: Negative for visual disturbance.   Respiratory: Negative for shortness of breath.    Cardiovascular: Negative for leg swelling.   Endocrine: Negative for polyphagia.   Skin: Negative for wound.   Neurological: Negative for numbness.       Objective   Physical Exam   Constitutional: She is oriented to person, place, and time. She appears well-developed and well-nourished. No distress.   HENT:   Head: Normocephalic and atraumatic.   Right Ear: External ear normal.   Left Ear: External ear normal.   Nose: Nose normal.   Mouth/Throat: Oropharynx is clear and moist. No oropharyngeal exudate.   Eyes: EOM are normal. Pupils are equal, round, and reactive to light. Right eye exhibits no discharge. Left eye exhibits no discharge.   Neck: Trachea normal, normal range of motion and full passive range of motion without pain. Neck supple. No tracheal tenderness present. Carotid bruit is not present. No tracheal deviation, no edema and no erythema present. No thyroid mass and no thyromegaly present.   Cardiovascular: Normal rate, regular rhythm, normal heart sounds and intact distal pulses.  Exam reveals no gallop and no friction rub.    No murmur heard.  Pulmonary/Chest: Effort normal and breath sounds normal. No stridor. No respiratory distress. She has no wheezes. She has no rales.   Abdominal: Soft. Bowel sounds are  normal. She exhibits no distension.   Musculoskeletal: Normal range of motion. She exhibits no edema or deformity.   Lymphadenopathy:     She has no cervical adenopathy.   Neurological: She is alert and oriented to person, place, and time.   Skin: Skin is warm and dry. No rash noted. She is not diaphoretic. No erythema. No pallor.   Psychiatric: She has a normal mood and affect. Her behavior is normal. Judgment and thought content normal.   Nursing note and vitals reviewed.      Results for orders placed or performed in visit on 02/07/17   T3, Free   Result Value Ref Range    T3, Free 1.9 (L) 2.0 - 4.4 pg/mL   T4 & TSH ([facility])   Result Value Ref Range    TSH 3.760 0.270 - 4.200 mIU/mL    T4, Total 6.68 4.50 - 11.70 mcg/dL   T4, Free   Result Value Ref Range    Free T4 1.26 0.93 - 1.70 ng/dL   Thyroglobulin With Anti-TG   Result Value Ref Range    Thyroglobulin Ab 10.3 (H) 0.0 - 0.9 IU/mL   Uric Acid   Result Value Ref Range    Uric Acid 6.0 (H) 2.4 - 5.7 mg/dL   Comprehensive Metabolic Panel   Result Value Ref Range    Glucose 216 (H) 65 - 99 mg/dL    BUN 18 8 - 23 mg/dL    Creatinine 1.02 (H) 0.57 - 1.00 mg/dL    eGFR Non African Am 53 (L) >60 mL/min/1.73    eGFR African Am 64 >60 mL/min/1.73    BUN/Creatinine Ratio 17.6 7.0 - 25.0    Sodium 141 136 - 145 mmol/L    Potassium 4.4 3.5 - 5.2 mmol/L    Chloride 99 98 - 107 mmol/L    Total CO2 29.5 (H) 22.0 - 29.0 mmol/L    Calcium 9.6 8.6 - 10.5 mg/dL    Total Protein 6.2 6.0 - 8.5 g/dL    Albumin 3.50 3.50 - 5.20 g/dL    Globulin 2.7 gm/dL    A/G Ratio 1.3 g/dL    Total Bilirubin 0.2 0.1 - 1.2 mg/dL    Alkaline Phosphatase 91 39 - 117 U/L    AST (SGOT) 10 1 - 32 U/L    ALT (SGPT) 9 1 - 33 U/L   C-Peptide   Result Value Ref Range    C-Peptide 10.1 (H) 1.1 - 4.4 ng/mL   Hemoglobin A1c   Result Value Ref Range    Hemoglobin A1C 6.45 (H) 4.80 - 5.60 %   Lipid Panel   Result Value Ref Range    Total Cholesterol 334 (H) 0 - 200 mg/dL    Triglycerides 139 0 - 150 mg/dL     HDL Cholesterol 63 (H) 40 - 60 mg/dL    VLDL Cholesterol 27.8 5 - 40 mg/dL    LDL Cholesterol  243 (H) 0 - 100 mg/dL   Calcium, Ionized   Result Value Ref Range    Ionized Calcium 5.3 4.5 - 5.6 mg/dL   Phosphorus   Result Value Ref Range    Phosphorus 4.2 2.5 - 4.5 mg/dL   PTH, Intact   Result Value Ref Range    PTH, Intact 51 15 - 65 pg/mL   Vitamin D 25 Hydroxy   Result Value Ref Range    25 Hydroxy, Vitamin D 33.5 30.0 - 100.0 ng/mL   MicroAlbumin, Urine, Random   Result Value Ref Range    Microalbumin, Urine 1260.2 Not Estab. ug/mL   Thyroglobulin By TERRIE   Result Value Ref Range    Thyroglobulin (TG-TERRIE) 6.7 ng/mL       Assessment/Plan   Ange was seen today for diabetes, hypothyroidism, hyperparathyroidism, hyperlipidemia, hyperuricemia and vitamin d deficiency.    Diagnoses and all orders for this visit:    Hyperparathyroidism    Essential hypertension  -     vitamin D (ERGOCALCIFEROL) 22678 UNITS capsule capsule; Take 1 capsule twice weekly    Vitamin D deficiency  -     vitamin D (ERGOCALCIFEROL) 70892 UNITS capsule capsule; Take 1 capsule twice weekly    Type 2 diabetes mellitus without complication, with long-term current use of insulin  -     vitamin D (ERGOCALCIFEROL) 91579 UNITS capsule capsule; Take 1 capsule twice weekly    Proteinuria  -     vitamin D (ERGOCALCIFEROL) 29841 UNITS capsule capsule; Take 1 capsule twice weekly    Mixed hyperlipidemia  -     vitamin D (ERGOCALCIFEROL) 19250 UNITS capsule capsule; Take 1 capsule twice weekly    Hypothyroidism, unspecified type        In summary, patient was seen and examined.  She will continue all her current medications as prescribed.  I discussed with Dr. Beltran her parathyroid hormone B and 71.  Were going to increase her vitamin D to 1 capsule twice weekly to see if this helps improve her parathyroid hormone.  Her calcium level is in acceptable range.  She will follow-up in 6 months with labs prior.  She was provided samples of Onglyza as well as  her basal insulin at today's visit.  She is to contact the office should she have any questions or concerns prior to her next visit.

## 2017-07-21 LAB
25(OH)D3+25(OH)D2 SERPL-MCNC: 34.8 NG/ML (ref 30–100)
ALBUMIN SERPL-MCNC: 3.9 G/DL (ref 3.5–5.2)
ALBUMIN/GLOB SERPL: 1.3 G/DL
ALP SERPL-CCNC: 86 U/L (ref 39–117)
ALT SERPL-CCNC: 13 U/L (ref 1–33)
AST SERPL-CCNC: 12 U/L (ref 1–32)
BILIRUB SERPL-MCNC: 0.2 MG/DL (ref 0.1–1.2)
BUN SERPL-MCNC: 18 MG/DL (ref 8–23)
BUN/CREAT SERPL: 14.5 (ref 7–25)
C PEPTIDE SERPL-MCNC: 10.2 NG/ML (ref 1.1–4.4)
CA-I SERPL ISE-MCNC: 5.1 MG/DL (ref 4.5–5.6)
CALCIUM SERPL-MCNC: 9.5 MG/DL (ref 8.6–10.5)
CHLORIDE SERPL-SCNC: 98 MMOL/L (ref 98–107)
CHOLEST SERPL-MCNC: 227 MG/DL (ref 0–200)
CO2 SERPL-SCNC: 30.6 MMOL/L (ref 22–29)
CREAT SERPL-MCNC: 1.24 MG/DL (ref 0.57–1)
FT4I SERPL CALC-MCNC: 2.4 (ref 1.2–4.9)
GLOBULIN SER CALC-MCNC: 2.9 GM/DL
GLUCOSE SERPL-MCNC: 188 MG/DL (ref 65–99)
HBA1C MFR BLD: 6.9 % (ref 4.8–5.6)
HDLC SERPL-MCNC: 56 MG/DL (ref 40–60)
LDLC SERPL CALC-MCNC: 134 MG/DL (ref 0–100)
MAGNESIUM SERPL-MCNC: 2 MG/DL (ref 1.6–2.4)
MICROALBUMIN UR-MCNC: 601.6 UG/ML
PHOSPHATE SERPL-MCNC: 3.7 MG/DL (ref 2.5–4.5)
POTASSIUM SERPL-SCNC: 4.4 MMOL/L (ref 3.5–5.2)
PROT SERPL-MCNC: 6.8 G/DL (ref 6–8.5)
PTH-INTACT SERPL-MCNC: 71 PG/ML (ref 15–65)
SODIUM SERPL-SCNC: 142 MMOL/L (ref 136–145)
T3FREE SERPL-MCNC: 2.1 PG/ML (ref 2–4.4)
T3RU NFR SERPL: 34 % (ref 24–39)
T4 FREE SERPL-MCNC: 1.45 NG/DL (ref 0.93–1.7)
T4 SERPL-MCNC: 7.2 UG/DL (ref 4.5–12)
THYROGLOB AB SERPL-ACNC: 3 IU/ML
THYROGLOB SERPL-MCNC: 11 NG/ML
THYROGLOB SERPL-MCNC: ABNORMAL NG/ML
TRIGL SERPL-MCNC: 184 MG/DL (ref 0–150)
TSH SERPL DL<=0.005 MIU/L-ACNC: 1.82 UIU/ML (ref 0.45–4.5)
URATE SERPL-MCNC: 7.6 MG/DL (ref 2.4–5.7)
VLDLC SERPL CALC-MCNC: 36.8 MG/DL (ref 5–40)

## 2017-07-26 DIAGNOSIS — Z79.4 TYPE 2 DIABETES MELLITUS WITHOUT COMPLICATION, WITH LONG-TERM CURRENT USE OF INSULIN (HCC): Primary | ICD-10-CM

## 2017-07-26 DIAGNOSIS — E11.9 TYPE 2 DIABETES MELLITUS WITHOUT COMPLICATION, WITH LONG-TERM CURRENT USE OF INSULIN (HCC): Primary | ICD-10-CM

## 2017-08-23 DIAGNOSIS — E11.9 TYPE 2 DIABETES MELLITUS WITHOUT COMPLICATION, WITH LONG-TERM CURRENT USE OF INSULIN (HCC): ICD-10-CM

## 2017-08-23 DIAGNOSIS — E55.9 VITAMIN D DEFICIENCY: ICD-10-CM

## 2017-08-23 DIAGNOSIS — E78.2 MIXED HYPERLIPIDEMIA: ICD-10-CM

## 2017-08-23 DIAGNOSIS — R80.9 PROTEINURIA: ICD-10-CM

## 2017-08-23 DIAGNOSIS — I10 ESSENTIAL HYPERTENSION: ICD-10-CM

## 2017-08-23 DIAGNOSIS — Z79.4 TYPE 2 DIABETES MELLITUS WITHOUT COMPLICATION, WITH LONG-TERM CURRENT USE OF INSULIN (HCC): ICD-10-CM

## 2017-08-24 DIAGNOSIS — I10 ESSENTIAL HYPERTENSION: ICD-10-CM

## 2017-08-25 RX ORDER — IRBESARTAN 300 MG/1
TABLET ORAL
Qty: 30 TABLET | Refills: 4 | OUTPATIENT
Start: 2017-08-25

## 2017-08-25 RX ORDER — IRBESARTAN 300 MG/1
300 TABLET ORAL NIGHTLY
Qty: 30 TABLET | Refills: 0 | Status: SHIPPED | OUTPATIENT
Start: 2017-08-25 | End: 2018-03-23 | Stop reason: SDUPTHER

## 2017-08-30 DIAGNOSIS — M1A.9XX0 CHRONIC GOUT WITHOUT TOPHUS, UNSPECIFIED CAUSE, UNSPECIFIED SITE: Chronic | ICD-10-CM

## 2017-08-30 DIAGNOSIS — E11.9 TYPE 2 DIABETES MELLITUS WITHOUT COMPLICATION, WITH LONG-TERM CURRENT USE OF INSULIN (HCC): ICD-10-CM

## 2017-08-30 DIAGNOSIS — E78.2 MIXED HYPERLIPIDEMIA: ICD-10-CM

## 2017-08-30 DIAGNOSIS — E03.9 HYPOTHYROIDISM, UNSPECIFIED TYPE: ICD-10-CM

## 2017-08-30 DIAGNOSIS — Z79.4 TYPE 2 DIABETES MELLITUS WITHOUT COMPLICATION, WITH LONG-TERM CURRENT USE OF INSULIN (HCC): ICD-10-CM

## 2017-08-30 DIAGNOSIS — M79.10 MYALGIA: ICD-10-CM

## 2017-08-30 DIAGNOSIS — E55.9 VITAMIN D DEFICIENCY: ICD-10-CM

## 2017-08-30 DIAGNOSIS — E78.49 OTHER HYPERLIPIDEMIA: ICD-10-CM

## 2017-08-30 DIAGNOSIS — I10 ESSENTIAL HYPERTENSION: Primary | ICD-10-CM

## 2017-08-30 RX ORDER — EZETIMIBE 10 MG/1
10 TABLET ORAL DAILY
Qty: 30 TABLET | Refills: 0 | Status: SHIPPED | OUTPATIENT
Start: 2017-08-30 | End: 2017-10-13 | Stop reason: SDUPTHER

## 2017-08-30 RX ORDER — AMLODIPINE BESYLATE 10 MG/1
10 TABLET ORAL DAILY
Qty: 30 TABLET | Refills: 0 | Status: SHIPPED | OUTPATIENT
Start: 2017-08-30 | End: 2017-09-21 | Stop reason: SDUPTHER

## 2017-08-30 RX ORDER — CARBAMAZEPINE 300 MG/1
300 CAPSULE, EXTENDED RELEASE ORAL 2 TIMES DAILY
Qty: 60 CAPSULE | Refills: 0 | Status: SHIPPED | OUTPATIENT
Start: 2017-08-30 | End: 2017-10-19 | Stop reason: SDUPTHER

## 2017-08-30 RX ORDER — CLONIDINE HYDROCHLORIDE 0.1 MG/1
0.1 TABLET ORAL 2 TIMES DAILY
Qty: 60 TABLET | Refills: 0 | Status: SHIPPED | OUTPATIENT
Start: 2017-08-30 | End: 2017-09-21

## 2017-08-30 RX ORDER — ALLOPURINOL 100 MG/1
100 TABLET ORAL DAILY
Qty: 30 TABLET | Refills: 0 | Status: SHIPPED | OUTPATIENT
Start: 2017-08-30 | End: 2017-09-21 | Stop reason: SDUPTHER

## 2017-09-02 DIAGNOSIS — I10 ESSENTIAL HYPERTENSION: ICD-10-CM

## 2017-09-05 RX ORDER — CLONIDINE HYDROCHLORIDE 0.1 MG/1
TABLET ORAL
Qty: 60 TABLET | Refills: 3 | OUTPATIENT
Start: 2017-09-05

## 2017-09-21 ENCOUNTER — OFFICE VISIT (OUTPATIENT)
Dept: FAMILY MEDICINE CLINIC | Facility: CLINIC | Age: 77
End: 2017-09-21

## 2017-09-21 VITALS
SYSTOLIC BLOOD PRESSURE: 205 MMHG | HEIGHT: 66 IN | TEMPERATURE: 97.7 F | WEIGHT: 200 LBS | DIASTOLIC BLOOD PRESSURE: 82 MMHG | BODY MASS INDEX: 32.14 KG/M2 | RESPIRATION RATE: 16 BRPM | HEART RATE: 72 BPM

## 2017-09-21 DIAGNOSIS — Z23 IMMUNIZATION DUE: Primary | ICD-10-CM

## 2017-09-21 DIAGNOSIS — I10 ESSENTIAL HYPERTENSION: ICD-10-CM

## 2017-09-21 DIAGNOSIS — M1A.9XX0 CHRONIC GOUT WITHOUT TOPHUS, UNSPECIFIED CAUSE, UNSPECIFIED SITE: Chronic | ICD-10-CM

## 2017-09-21 PROCEDURE — G0008 ADMIN INFLUENZA VIRUS VAC: HCPCS | Performed by: FAMILY MEDICINE

## 2017-09-21 PROCEDURE — 90686 IIV4 VACC NO PRSV 0.5 ML IM: CPT | Performed by: FAMILY MEDICINE

## 2017-09-21 PROCEDURE — 99214 OFFICE O/P EST MOD 30 MIN: CPT | Performed by: FAMILY MEDICINE

## 2017-09-21 RX ORDER — CLONIDINE HYDROCHLORIDE 0.1 MG/1
0.1 TABLET ORAL 2 TIMES DAILY
Qty: 60 TABLET | Refills: 1 | Status: SHIPPED | OUTPATIENT
Start: 2017-09-21 | End: 2017-10-19 | Stop reason: SDUPTHER

## 2017-09-21 RX ORDER — ALLOPURINOL 100 MG/1
100 TABLET ORAL DAILY
Qty: 30 TABLET | Refills: 1 | Status: SHIPPED | OUTPATIENT
Start: 2017-09-21 | End: 2017-10-19 | Stop reason: SDUPTHER

## 2017-09-21 RX ORDER — AMLODIPINE BESYLATE 10 MG/1
10 TABLET ORAL DAILY
Qty: 30 TABLET | Refills: 1 | Status: SHIPPED | OUTPATIENT
Start: 2017-09-21 | End: 2017-10-19 | Stop reason: SDUPTHER

## 2017-09-21 RX ORDER — HYDRALAZINE HYDROCHLORIDE 50 MG/1
TABLET, FILM COATED ORAL
COMMUNITY
Start: 2017-09-12 | End: 2019-05-30 | Stop reason: SDUPTHER

## 2017-09-21 NOTE — PROGRESS NOTES
"Chief Complaint   Patient presents with   • Hyperlipidemia   • Gout       Subjective   This patient presents the office for medicine refill.  Since last visit her blood pressure has elevated.  She was seeing her nephrologist and he stopped clonidine.  We will resume that medication with short-term follow-up. No recent gout attacks  I have reviewed and updated her medications, medical history and problem list during today's office visit.        Social History   Substance Use Topics   • Smoking status: Never Smoker   • Smokeless tobacco: Never Used   • Alcohol use No       Review of Systems   Constitutional: Negative for fatigue.   Cardiovascular: Negative for chest pain.       Objective   BP (!) 205/82  Pulse 72  Temp 97.7 °F (36.5 °C) (Oral)   Resp 16  Ht 66\" (167.6 cm)  Wt 200 lb (90.7 kg)  BMI 32.28 kg/m2  Body mass index is 32.28 kg/(m^2).  Physical Exam   Constitutional: She is cooperative. No distress.   Eyes: Conjunctivae and lids are normal.   Neck: Carotid bruit is not present. No tracheal deviation present.   Cardiovascular: Normal rate, regular rhythm and normal heart sounds.    No murmur heard.  Pulmonary/Chest: Effort normal and breath sounds normal.   Neurological: She is alert. She is not disoriented.   Skin: Skin is warm and dry.   Psychiatric: She has a normal mood and affect. Her speech is normal and behavior is normal.   Vitals reviewed.          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 30 tablet 1   • amLODIPine (NORVASC) 10 MG tablet Take 1 tablet by mouth Daily for 60 days. 30 tablet 1   • aspirin 81 MG EC tablet Take 1 tablet by mouth Daily. HOLD PER MD MAY-12/1     • carBAMazepine (CARBATROL) 300 MG 12 hr capsule Take 1 capsule by mouth 2 (Two) Times a Day. For 30 days 60 capsule 0   • ezetimibe (ZETIA) 10 MG tablet Take 1 tablet by mouth Daily. 30 tablet 0   • ferrous sulfate 325 (65 FE) MG tablet Take 1 tablet by mouth daily.     • glucose blood (JOSÉ MIGUEL CONTOUR NEXT TEST) test strip Checking BG twice a day 200 each 2   • hydrALAZINE (APRESOLINE) 50 MG tablet      • Insulin Glargine (LANTUS SOLOSTAR) 100 UNIT/ML injection pen Inject 50 Units under the skin Daily. 5 pen 5   • irbesartan (AVAPRO) 300 MG tablet Take 1 tablet by mouth Every Night for 180 days. 30 tablet 0   • levothyroxine (SYNTHROID, LEVOTHROID) 125 MCG tablet Take 1 tablet by mouth Daily. 90 tablet 3   • montelukast (SINGULAIR) 10 MG tablet 10 mg daily.     • potassium chloride (MICRO-K) 10 MEQ CR capsule Take 10 mEq by mouth Daily.     • SAXagliptin (ONGLYZA) 5 MG tablet Take 1 tablet by mouth Daily. 30 tablet 11   • vitamin B-12 (CYANOCOBALAMIN) 1000 MCG tablet Take 1 tablet by mouth daily.     • vitamin D (ERGOCALCIFEROL) 64349 UNITS capsule capsule Take 1 capsule twice weekly 24 capsule 3   • [DISCONTINUED] allopurinol (ZYLOPRIM) 100 MG tablet Take 1 tablet by mouth Daily. 30 tablet 0   • [DISCONTINUED] amLODIPine (NORVASC) 10 MG tablet Take 1 tablet by mouth Daily. 30 tablet 0   • CloNIDine (CATAPRES) 0.1 MG tablet Take 1 tablet by mouth 2 (Two) Times a Day. 60 tablet 1   • furosemide (LASIX) 80 MG tablet Take 80 mg by mouth 2 (Two) Times a Day. Take 1/2 tablets daily     • metFORMIN (GLUCOPHAGE) 1000 MG tablet Take 1 tablet by mouth 2 (Two) Times a  Day. With morning and evening meals (Patient taking differently: Take 500 mg by mouth 2 (Two) Times a Day. With morning and evening meals) 180 tablet 2   • [DISCONTINUED] CloNIDine (CATAPRES) 0.1 MG tablet Take 1 tablet by mouth 2 (Two) Times a Day. (Patient taking differently: Take 0.1 mg by mouth 3 (Three) Times a Day.) 60 tablet 0     No facility-administered encounter medications on file as of 9/21/2017.        Orders Placed This Encounter   Procedures   • Flu Vaccine Quad PF 3YR+ (FLUARIX/FLUZONE [id number])       Continue with current treatment plan.         F/U in one month

## 2017-10-13 DIAGNOSIS — E78.49 OTHER HYPERLIPIDEMIA: ICD-10-CM

## 2017-10-13 DIAGNOSIS — E78.2 MIXED HYPERLIPIDEMIA: ICD-10-CM

## 2017-10-13 RX ORDER — EZETIMIBE 10 MG/1
TABLET ORAL
Qty: 30 TABLET | Refills: 0 | Status: SHIPPED | OUTPATIENT
Start: 2017-10-13 | End: 2017-10-13 | Stop reason: SDUPTHER

## 2017-10-13 RX ORDER — EZETIMIBE 10 MG/1
10 TABLET ORAL DAILY
Qty: 30 TABLET | Refills: 1 | Status: SHIPPED | OUTPATIENT
Start: 2017-10-13 | End: 2017-10-19 | Stop reason: SDUPTHER

## 2017-10-19 ENCOUNTER — OFFICE VISIT (OUTPATIENT)
Dept: FAMILY MEDICINE CLINIC | Facility: CLINIC | Age: 77
End: 2017-10-19

## 2017-10-19 VITALS
HEIGHT: 66 IN | DIASTOLIC BLOOD PRESSURE: 74 MMHG | TEMPERATURE: 98.6 F | WEIGHT: 201 LBS | SYSTOLIC BLOOD PRESSURE: 140 MMHG | HEART RATE: 65 BPM | OXYGEN SATURATION: 98 % | BODY MASS INDEX: 32.3 KG/M2 | RESPIRATION RATE: 16 BRPM

## 2017-10-19 DIAGNOSIS — I10 ESSENTIAL HYPERTENSION: Primary | ICD-10-CM

## 2017-10-19 DIAGNOSIS — M79.10 MYALGIA: ICD-10-CM

## 2017-10-19 DIAGNOSIS — M10.9 GOUT WITHOUT TOPHUS: Chronic | ICD-10-CM

## 2017-10-19 DIAGNOSIS — E78.2 MIXED HYPERLIPIDEMIA: ICD-10-CM

## 2017-10-19 DIAGNOSIS — M1A.9XX0 CHRONIC GOUT WITHOUT TOPHUS, UNSPECIFIED CAUSE, UNSPECIFIED SITE: Chronic | ICD-10-CM

## 2017-10-19 DIAGNOSIS — E78.49 OTHER HYPERLIPIDEMIA: ICD-10-CM

## 2017-10-19 PROCEDURE — 99213 OFFICE O/P EST LOW 20 MIN: CPT | Performed by: FAMILY MEDICINE

## 2017-10-19 RX ORDER — CLONIDINE HYDROCHLORIDE 0.1 MG/1
0.1 TABLET ORAL 2 TIMES DAILY
Qty: 60 TABLET | Refills: 5 | Status: SHIPPED | OUTPATIENT
Start: 2017-10-19 | End: 2018-04-19 | Stop reason: SDUPTHER

## 2017-10-19 RX ORDER — ALLOPURINOL 100 MG/1
100 TABLET ORAL DAILY
Qty: 30 TABLET | Refills: 5 | Status: SHIPPED | OUTPATIENT
Start: 2017-10-19 | End: 2017-11-14

## 2017-10-19 RX ORDER — EZETIMIBE 10 MG/1
10 TABLET ORAL DAILY
Qty: 30 TABLET | Refills: 5 | Status: SHIPPED | OUTPATIENT
Start: 2017-10-19 | End: 2018-04-19 | Stop reason: SDUPTHER

## 2017-10-19 RX ORDER — CARBAMAZEPINE 300 MG/1
300 CAPSULE, EXTENDED RELEASE ORAL 2 TIMES DAILY
Qty: 60 CAPSULE | Refills: 5 | Status: SHIPPED | OUTPATIENT
Start: 2017-10-19 | End: 2018-04-19 | Stop reason: SDUPTHER

## 2017-10-19 RX ORDER — AMLODIPINE BESYLATE 10 MG/1
10 TABLET ORAL DAILY
Qty: 30 TABLET | Refills: 5 | Status: SHIPPED | OUTPATIENT
Start: 2017-10-19 | End: 2018-04-19 | Stop reason: SDUPTHER

## 2017-10-19 NOTE — PROGRESS NOTES
"Chief Complaint   Patient presents with   • Hypertension       Subjective     Ange presents to the office today to refill her medications. No medication side effects are reported. BP is controlled with addition of clonidine.   Other medical conditions are stable include gout and hyperlipidemia.    I have reviewed and updated her medications, medical history and problem list during today's office visit.        Social History   Substance Use Topics   • Smoking status: Never Smoker   • Smokeless tobacco: Never Used   • Alcohol use No       Review of Systems   Constitutional: Negative for fatigue.   Cardiovascular: Negative for chest pain.       Objective   /74 (BP Location: Left arm, Patient Position: Sitting, Cuff Size: Large Adult)  Pulse 65  Temp 98.6 °F (37 °C) (Oral)   Resp 16  Ht 66\" (167.6 cm)  Wt 201 lb (91.2 kg)  SpO2 98%  BMI 32.44 kg/m2  Body mass index is 32.44 kg/(m^2).  Physical Exam   Constitutional: She is cooperative. No distress.   Eyes: Conjunctivae and lids are normal.   Neck: Carotid bruit is not present. No tracheal deviation present.   Cardiovascular: Normal rate and regular rhythm.    Murmur heard.  Pulmonary/Chest: Effort normal and breath sounds normal.   Neurological: She is alert. She is not disoriented.   Skin: Skin is warm and dry.   Psychiatric: She has a normal mood and affect. Her speech is normal and behavior is normal.   Vitals reviewed.      Data Reviewed:        Assessment/Plan     Problem List Items Addressed This Visit        Cardiovascular and Mediastinum    Hypertension - Primary    Relevant Medications    allopurinol (ZYLOPRIM) 100 MG tablet    amLODIPine (NORVASC) 10 MG tablet    CloNIDine (CATAPRES) 0.1 MG tablet    Other Relevant Orders    Comprehensive metabolic panel    CBC and Differential    TSH    Hyperlipidemia    Relevant Medications    ezetimibe (ZETIA) 10 MG tablet    Other Relevant Orders    Lipid Panel With LDL/HDL Ratio       Nervous and Auditory "    Myalgia    Relevant Medications    carBAMazepine (CARBATROL) 300 MG 12 hr capsule       Other    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 30 tablet 5   • amLODIPine (NORVASC) 10 MG tablet Take 1 tablet by mouth Daily for 180 days. 30 tablet 5   • aspirin 81 MG EC tablet Take 1 tablet by mouth Daily. HOLD PER MD INSTR-12/1     • carBAMazepine (CARBATROL) 300 MG 12 hr capsule Take 1 capsule by mouth 2 (Two) Times a Day for 180 days. 60 capsule 5   • CloNIDine (CATAPRES) 0.1 MG tablet Take 1 tablet by mouth 2 (Two) Times a Day for 180 days. 60 tablet 5   • ezetimibe (ZETIA) 10 MG tablet Take 1 tablet by mouth Daily for 180 days. 30 tablet 5   • ferrous sulfate 325 (65 FE) MG tablet Take 1 tablet by mouth daily.     • furosemide (LASIX) 80 MG tablet Take 80 mg by mouth 2 (Two) Times a Day. Take 1/2 tablets daily     • glucose blood (JOSÉ MIGUEL CONTOUR NEXT TEST) test strip Checking BG twice a day 200 each 2   • hydrALAZINE (APRESOLINE) 50 MG tablet      • Insulin Glargine (LANTUS SOLOSTAR) 100 UNIT/ML injection pen Inject 50 Units under the skin Daily. 5 pen 5   • irbesartan (AVAPRO) 300 MG tablet Take 1 tablet by mouth Every Night for 180 days. 30 tablet 0   • levothyroxine (SYNTHROID, LEVOTHROID) 125 MCG tablet Take 1 tablet by mouth Daily. 90 tablet 3   • metFORMIN (GLUCOPHAGE) 1000 MG tablet Take 1 tablet by mouth 2 (Two) Times a Day. With morning and evening meals (Patient taking differently: Take 500 mg by mouth 2 (Two) Times a Day. With morning and evening meals) 180 tablet 2   •  montelukast (SINGULAIR) 10 MG tablet 10 mg daily.     • potassium chloride (MICRO-K) 10 MEQ CR capsule Take 10 mEq by mouth Daily.     • SAXagliptin (ONGLYZA) 5 MG tablet Take 1 tablet by mouth Daily. 30 tablet 11   • vitamin B-12 (CYANOCOBALAMIN) 1000 MCG tablet Take 1 tablet by mouth daily.     • vitamin D (ERGOCALCIFEROL) 36365 UNITS capsule capsule Take 1 capsule twice weekly 24 capsule 3   • [DISCONTINUED] allopurinol (ZYLOPRIM) 100 MG tablet Take 1 tablet by mouth Daily for 60 days. 30 tablet 1   • [DISCONTINUED] amLODIPine (NORVASC) 10 MG tablet Take 1 tablet by mouth Daily for 60 days. 30 tablet 1   • [DISCONTINUED] carBAMazepine (CARBATROL) 300 MG 12 hr capsule Take 1 capsule by mouth 2 (Two) Times a Day. For 30 days 60 capsule 0   • [DISCONTINUED] CloNIDine (CATAPRES) 0.1 MG tablet Take 1 tablet by mouth 2 (Two) Times a Day. 60 tablet 1   • [DISCONTINUED] ezetimibe (ZETIA) 10 MG tablet Take 1 tablet by mouth Daily. 30 tablet 1     No facility-administered encounter medications on file as of 10/19/2017.        Orders Placed This Encounter   Procedures   • Comprehensive metabolic panel     Standing Status:   Future     Standing Expiration Date:   7/16/2018   • Lipid Panel With LDL/HDL Ratio     Standing Status:   Future     Standing Expiration Date:   7/16/2018   • TSH     Standing Status:   Future     Standing Expiration Date:   7/16/2018   • Uric Acid     Standing Status:   Future     Standing Expiration Date:   7/16/2018   • CBC and Differential     Standing Status:   Future     Standing Expiration Date:   7/16/2018     Order Specific Question:   Manual Differential     Answer:   No       Continue with current treatment plan.         F/U in 6 months

## 2017-10-31 ENCOUNTER — TRANSCRIBE ORDERS (OUTPATIENT)
Dept: ADMINISTRATIVE | Facility: HOSPITAL | Age: 77
End: 2017-10-31

## 2017-10-31 DIAGNOSIS — Z12.31 VISIT FOR SCREENING MAMMOGRAM: Primary | ICD-10-CM

## 2017-11-07 ENCOUNTER — LAB (OUTPATIENT)
Dept: ENDOCRINOLOGY | Age: 77
End: 2017-11-07

## 2017-11-07 DIAGNOSIS — I65.29 CAROTID ATHEROSCLEROSIS, UNSPECIFIED LATERALITY: ICD-10-CM

## 2017-11-07 DIAGNOSIS — E55.9 VITAMIN D DEFICIENCY: ICD-10-CM

## 2017-11-07 DIAGNOSIS — I10 ESSENTIAL HYPERTENSION: ICD-10-CM

## 2017-11-07 DIAGNOSIS — E78.2 MIXED HYPERLIPIDEMIA: ICD-10-CM

## 2017-11-07 DIAGNOSIS — E11.9 TYPE 2 DIABETES MELLITUS WITHOUT COMPLICATION, WITH LONG-TERM CURRENT USE OF INSULIN (HCC): ICD-10-CM

## 2017-11-07 DIAGNOSIS — E03.9 HYPOTHYROIDISM, UNSPECIFIED TYPE: ICD-10-CM

## 2017-11-07 DIAGNOSIS — Z79.4 TYPE 2 DIABETES MELLITUS WITHOUT COMPLICATION, WITH LONG-TERM CURRENT USE OF INSULIN (HCC): ICD-10-CM

## 2017-11-14 ENCOUNTER — OFFICE VISIT (OUTPATIENT)
Dept: ENDOCRINOLOGY | Age: 77
End: 2017-11-14

## 2017-11-14 VITALS
SYSTOLIC BLOOD PRESSURE: 138 MMHG | BODY MASS INDEX: 32.83 KG/M2 | WEIGHT: 203.4 LBS | RESPIRATION RATE: 16 BRPM | DIASTOLIC BLOOD PRESSURE: 80 MMHG

## 2017-11-14 DIAGNOSIS — I10 ESSENTIAL HYPERTENSION: ICD-10-CM

## 2017-11-14 DIAGNOSIS — E03.9 HYPOTHYROIDISM, UNSPECIFIED TYPE: ICD-10-CM

## 2017-11-14 DIAGNOSIS — M1A.9XX0 CHRONIC GOUT WITHOUT TOPHUS, UNSPECIFIED CAUSE, UNSPECIFIED SITE: Chronic | ICD-10-CM

## 2017-11-14 DIAGNOSIS — E78.2 MIXED HYPERLIPIDEMIA: ICD-10-CM

## 2017-11-14 DIAGNOSIS — Z79.4 TYPE 2 DIABETES MELLITUS WITHOUT COMPLICATION, WITH LONG-TERM CURRENT USE OF INSULIN (HCC): Primary | ICD-10-CM

## 2017-11-14 DIAGNOSIS — E11.9 TYPE 2 DIABETES MELLITUS WITHOUT COMPLICATION, WITH LONG-TERM CURRENT USE OF INSULIN (HCC): Primary | ICD-10-CM

## 2017-11-14 DIAGNOSIS — E55.9 VITAMIN D DEFICIENCY: ICD-10-CM

## 2017-11-14 DIAGNOSIS — M10.9 GOUT WITHOUT TOPHUS: Chronic | ICD-10-CM

## 2017-11-14 LAB
25(OH)D3+25(OH)D2 SERPL-MCNC: 29.9 NG/ML (ref 30–100)
ALBUMIN SERPL-MCNC: 4 G/DL (ref 3.5–5.2)
ALBUMIN/GLOB SERPL: 1.4 G/DL
ALP SERPL-CCNC: 91 U/L (ref 39–117)
ALT SERPL-CCNC: 11 U/L (ref 1–33)
AST SERPL-CCNC: 11 U/L (ref 1–32)
BILIRUB SERPL-MCNC: 0.2 MG/DL (ref 0.1–1.2)
BUN SERPL-MCNC: 18 MG/DL (ref 8–23)
BUN/CREAT SERPL: 11.8 (ref 7–25)
C PEPTIDE SERPL-MCNC: 8.1 NG/ML (ref 1.1–4.4)
CALCIUM SERPL-MCNC: 9.2 MG/DL (ref 8.6–10.5)
CHLORIDE SERPL-SCNC: 102 MMOL/L (ref 98–107)
CHOLEST SERPL-MCNC: 267 MG/DL (ref 0–200)
CO2 SERPL-SCNC: 26.5 MMOL/L (ref 22–29)
CREAT SERPL-MCNC: 1.53 MG/DL (ref 0.57–1)
GFR SERPLBLD CREATININE-BSD FMLA CKD-EPI: 33 ML/MIN/1.73
GFR SERPLBLD CREATININE-BSD FMLA CKD-EPI: 40 ML/MIN/1.73
GLOBULIN SER CALC-MCNC: 2.8 GM/DL
GLUCOSE SERPL-MCNC: 129 MG/DL (ref 65–99)
HBA1C MFR BLD: 7.22 % (ref 4.8–5.6)
HDLC SERPL-MCNC: 68 MG/DL (ref 40–60)
LDLC SERPL CALC-MCNC: 167 MG/DL (ref 0–100)
MICROALBUMIN UR-MCNC: 1062.7 UG/ML
POTASSIUM SERPL-SCNC: 4.3 MMOL/L (ref 3.5–5.2)
PROT SERPL-MCNC: 6.8 G/DL (ref 6–8.5)
SODIUM SERPL-SCNC: 143 MMOL/L (ref 136–145)
T3FREE SERPL-MCNC: 2.1 PG/ML (ref 2–4.4)
T4 FREE SERPL-MCNC: 1.33 NG/DL (ref 0.93–1.7)
T4 SERPL-MCNC: 6.6 MCG/DL (ref 4.5–11.7)
THYROGLOB AB SERPL-ACNC: 2.6 IU/ML (ref 0–0.9)
THYROGLOB SERPL-MCNC: 9.1 NG/ML
TRIGL SERPL-MCNC: 160 MG/DL (ref 0–150)
TSH SERPL DL<=0.005 MIU/L-ACNC: 3.61 MIU/ML (ref 0.27–4.2)
URATE SERPL-MCNC: 8.1 MG/DL (ref 2.4–5.7)
VLDLC SERPL CALC-MCNC: 32 MG/DL (ref 5–40)

## 2017-11-14 PROCEDURE — 99214 OFFICE O/P EST MOD 30 MIN: CPT | Performed by: INTERNAL MEDICINE

## 2017-11-14 RX ORDER — ALLOPURINOL 100 MG/1
100 TABLET ORAL 2 TIMES DAILY
Qty: 60 TABLET | Refills: 5 | Status: SHIPPED | OUTPATIENT
Start: 2017-11-14 | End: 2018-04-19 | Stop reason: SDUPTHER

## 2017-11-14 RX ORDER — PITAVASTATIN CALCIUM 4.18 MG/1
TABLET, FILM COATED ORAL
Qty: 30 TABLET | Refills: 11 | Status: SHIPPED | OUTPATIENT
Start: 2017-11-14 | End: 2018-06-20 | Stop reason: SDUPTHER

## 2017-11-14 NOTE — PROGRESS NOTES
Subjective   Ange Johnson is a 77 y.o. female seen for follow up for DM2, hypothyroidism, vit d deficiency, hyperlipidemia, HTN, hyperparathyroidism, lab review. Patient is checking BG twice a day. She states that her son in law passed away 09/01/2017. She denies any problems or concerns.    History of Present Illness this is a 77-year-old female known patient with type II diabetes hypertension and dyslipidemia as well as hypothyroidism and hyperuricemia.  Over the course of the last 4 months she has had no significant health problems for which to go to the emergency room or hospital.    /80  Resp 16  Wt 203 lb 6.4 oz (92.3 kg)  BMI 32.83 kg/m2     Allergies   Allergen Reactions   • Codeine Nausea Only   • Hydrocodone-Acetaminophen Nausea Only and Other (See Comments)     Percocet and Vicodin; vertigo   • Meperidine Nausea Only   • Morphine And Related Nausea Only   • Oxycodone-Acetaminophen Nausea Only   • Oxycodone-Acetaminophen Nausea Only and Other (See Comments)     dilzziness   • Oxycodone-Aspirin Nausea Only and Other (See Comments)     vertigo   • Penicillins Hives   • Sulfa Antibiotics Hives       Current Outpatient Prescriptions:   •  allopurinol (ZYLOPRIM) 100 MG tablet, Take 1 tablet by mouth Daily for 180 days., Disp: 30 tablet, Rfl: 5  •  amLODIPine (NORVASC) 10 MG tablet, Take 1 tablet by mouth Daily for 180 days., Disp: 30 tablet, Rfl: 5  •  aspirin 81 MG EC tablet, Take 1 tablet by mouth Daily. HOLD PER MD INSTR-12/1, Disp: , Rfl:   •  carBAMazepine (CARBATROL) 300 MG 12 hr capsule, Take 1 capsule by mouth 2 (Two) Times a Day for 180 days., Disp: 60 capsule, Rfl: 5  •  CloNIDine (CATAPRES) 0.1 MG tablet, Take 1 tablet by mouth 2 (Two) Times a Day for 180 days., Disp: 60 tablet, Rfl: 5  •  ezetimibe (ZETIA) 10 MG tablet, Take 1 tablet by mouth Daily for 180 days., Disp: 30 tablet, Rfl: 5  •  ferrous sulfate 325 (65 FE) MG tablet, Take 1 tablet by mouth daily., Disp: , Rfl:   •  furosemide  (LASIX) 80 MG tablet, Take 80 mg by mouth 2 (Two) Times a Day. Take 1/2 tablets daily, Disp: , Rfl:   •  glucose blood (JOSÉ MIGUEL CONTOUR NEXT TEST) test strip, Checking BG twice a day, Disp: 200 each, Rfl: 2  •  hydrALAZINE (APRESOLINE) 50 MG tablet, , Disp: , Rfl:   •  Insulin Glargine (LANTUS SOLOSTAR) 100 UNIT/ML injection pen, Inject 50 Units under the skin Daily., Disp: 5 pen, Rfl: 5  •  irbesartan (AVAPRO) 300 MG tablet, Take 1 tablet by mouth Every Night for 180 days., Disp: 30 tablet, Rfl: 0  •  levothyroxine (SYNTHROID, LEVOTHROID) 125 MCG tablet, Take 1 tablet by mouth Daily., Disp: 90 tablet, Rfl: 3  •  metFORMIN (GLUCOPHAGE) 1000 MG tablet, Take 1 tablet by mouth 2 (Two) Times a Day. With morning and evening meals (Patient taking differently: Take 500 mg by mouth 2 (Two) Times a Day. With morning and evening meals), Disp: 180 tablet, Rfl: 2  •  montelukast (SINGULAIR) 10 MG tablet, 10 mg daily., Disp: , Rfl:   •  potassium chloride (MICRO-K) 10 MEQ CR capsule, Take 10 mEq by mouth Daily., Disp: , Rfl:   •  SAXagliptin (ONGLYZA) 5 MG tablet, Take 1 tablet by mouth Daily., Disp: 30 tablet, Rfl: 11  •  vitamin B-12 (CYANOCOBALAMIN) 1000 MCG tablet, Take 1 tablet by mouth daily., Disp: , Rfl:   •  vitamin D (ERGOCALCIFEROL) 30465 UNITS capsule capsule, Take 1 capsule twice weekly, Disp: 24 capsule, Rfl: 3      The following portions of the patient's history were reviewed and updated as appropriate: allergies, current medications, past family history, past medical history, past social history, past surgical history and problem list.    Review of Systems   Constitutional: Negative.    HENT: Negative.    Eyes: Negative.    Respiratory: Negative.    Cardiovascular: Negative.    Gastrointestinal: Negative.    Endocrine: Negative.    Genitourinary: Negative.    Musculoskeletal: Negative.    Skin: Negative.    Allergic/Immunologic: Negative.    Neurological: Negative.    Hematological: Negative.     Psychiatric/Behavioral: Negative.        Objective   Physical Exam   Constitutional: She is oriented to person, place, and time. She appears well-developed and well-nourished. No distress.   HENT:   Head: Normocephalic and atraumatic.   Right Ear: External ear normal.   Left Ear: External ear normal.   Nose: Nose normal.   Mouth/Throat: Oropharynx is clear and moist. No oropharyngeal exudate.   Eyes: Conjunctivae and EOM are normal. Pupils are equal, round, and reactive to light. Right eye exhibits no discharge. Left eye exhibits no discharge. No scleral icterus.   Neck: Normal range of motion. Neck supple. No JVD present. No tracheal deviation present. No thyromegaly present.   Healed the scar of the parathyroidectomy in lower neck.   Cardiovascular: Normal rate, regular rhythm, normal heart sounds and intact distal pulses.  Exam reveals no gallop and no friction rub.    No murmur heard.  Pulmonary/Chest: Effort normal and breath sounds normal. No stridor. No respiratory distress. She has no wheezes. She has no rales. She exhibits no tenderness.   Abdominal: Soft. Bowel sounds are normal. She exhibits no distension and no mass. There is no tenderness. There is no rebound and no guarding. No hernia.   Musculoskeletal: Normal range of motion. She exhibits no edema, tenderness or deformity.   Lymphadenopathy:     She has no cervical adenopathy.   Neurological: She is alert and oriented to person, place, and time. She has normal reflexes. She displays normal reflexes. No cranial nerve deficit. She exhibits normal muscle tone. Coordination normal.   Skin: Skin is warm and dry. No rash noted. She is not diaphoretic. No erythema. No pallor.   Psychiatric: She has a normal mood and affect. Her behavior is normal. Judgment and thought content normal.   Nursing note and vitals reviewed.    Results for orders placed or performed in visit on 07/11/17   Comprehensive Metabolic Panel   Result Value Ref Range    Glucose 188 (H)  65 - 99 mg/dL    BUN 18 8 - 23 mg/dL    Creatinine 1.24 (H) 0.57 - 1.00 mg/dL    eGFR Non African Am 42 (L) >60 mL/min/1.73    eGFR African Am 51 (L) >60 mL/min/1.73    BUN/Creatinine Ratio 14.5 7.0 - 25.0    Sodium 142 136 - 145 mmol/L    Potassium 4.4 3.5 - 5.2 mmol/L    Chloride 98 98 - 107 mmol/L    Total CO2 30.6 (H) 22.0 - 29.0 mmol/L    Calcium 9.5 8.6 - 10.5 mg/dL    Total Protein 6.8 6.0 - 8.5 g/dL    Albumin 3.90 3.50 - 5.20 g/dL    Globulin 2.9 gm/dL    A/G Ratio 1.3 g/dL    Total Bilirubin 0.2 0.1 - 1.2 mg/dL    Alkaline Phosphatase 86 39 - 117 U/L    AST (SGOT) 12 1 - 32 U/L    ALT (SGPT) 13 1 - 33 U/L   Lipid Panel   Result Value Ref Range    Total Cholesterol 227 (H) 0 - 200 mg/dL    Triglycerides 184 (H) 0 - 150 mg/dL    HDL Cholesterol 56 40 - 60 mg/dL    VLDL Cholesterol 36.8 5 - 40 mg/dL    LDL Cholesterol  134 (H) 0 - 100 mg/dL   Vitamin D 25 Hydroxy   Result Value Ref Range    25 Hydroxy, Vitamin D 34.8 30.0 - 100.0 ng/mL   Hemoglobin A1c   Result Value Ref Range    Hemoglobin A1C 6.90 (H) 4.80 - 5.60 %   C-Peptide   Result Value Ref Range    C-Peptide 10.2 (H) 1.1 - 4.4 ng/mL   Calcium, Ionized   Result Value Ref Range    Ionized Calcium 5.1 4.5 - 5.6 mg/dL   Phosphorus   Result Value Ref Range    Phosphorus 3.7 2.5 - 4.5 mg/dL   Magnesium   Result Value Ref Range    Magnesium 2.0 1.6 - 2.4 mg/dL   PTH, Intact   Result Value Ref Range    PTH, Intact 71 (H) 15 - 65 pg/mL   T3, Free   Result Value Ref Range    T3, Free 2.1 2.0 - 4.4 pg/mL   T4, Free   Result Value Ref Range    Free T4 1.45 0.93 - 1.70 ng/dL   Thyroid Panel With TSH   Result Value Ref Range    TSH 1.820 0.450 - 4.500 uIU/mL    T4, Total 7.2 4.5 - 12.0 ug/dL    T3 Uptake 34 24 - 39 %    Free Thyroxine Index 2.4 1.2 - 4.9   Comprehensive Thyroglobulin   Result Value Ref Range    Thyroglobulin Ab 3.0 (H) IU/mL    Thyroglobulin Comment ng/mL    Thyroglobulin (TG-TERRIE) 11 ng/mL   Uric Acid   Result Value Ref Range    Uric Acid 7.6 (H)  2.4 - 5.7 mg/dL   MicroAlbumin, Urine, Random   Result Value Ref Range    Microalbumin, Urine 601.6 Not Estab. ug/mL         Assessment/Plan   Diagnoses and all orders for this visit:    Type 2 diabetes mellitus without complication, with long-term current use of insulin  -     T3, Free; Future  -     T4 & TSH ([facility]); Future  -     T4, Free; Future  -     Uric Acid; Future  -     Vitamin D 25 Hydroxy; Future  -     Comprehensive Metabolic Panel; Future  -     C-Peptide; Future  -     Hemoglobin A1c; Future  -     Lipid Panel; Future  -     MicroAlbumin, Urine, Random - Urine, Clean Catch; Future  -     Calcium, Ionized; Future  -     Phosphorus; Future  -     PTH, Intact; Future    Hypothyroidism, unspecified type  -     T3, Free; Future  -     T4 & TSH ([facility]); Future  -     T4, Free; Future  -     Uric Acid; Future  -     Vitamin D 25 Hydroxy; Future  -     Comprehensive Metabolic Panel; Future  -     C-Peptide; Future  -     Hemoglobin A1c; Future  -     Lipid Panel; Future  -     MicroAlbumin, Urine, Random - Urine, Clean Catch; Future  -     Calcium, Ionized; Future  -     Phosphorus; Future  -     PTH, Intact; Future    Essential hypertension  -     T3, Free; Future  -     T4 & TSH ([facility]); Future  -     T4, Free; Future  -     Uric Acid; Future  -     Vitamin D 25 Hydroxy; Future  -     Comprehensive Metabolic Panel; Future  -     C-Peptide; Future  -     Hemoglobin A1c; Future  -     Lipid Panel; Future  -     MicroAlbumin, Urine, Random - Urine, Clean Catch; Future  -     Calcium, Ionized; Future  -     Phosphorus; Future  -     PTH, Intact; Future    Mixed hyperlipidemia  -     T3, Free; Future  -     T4 & TSH ([facility]); Future  -     T4, Free; Future  -     Uric Acid; Future  -     Vitamin D 25 Hydroxy; Future  -     Comprehensive Metabolic Panel; Future  -     C-Peptide; Future  -     Hemoglobin A1c; Future  -     Lipid Panel; Future  -     MicroAlbumin, Urine, Random - Urine, Clean Catch;  Future  -     Calcium, Ionized; Future  -     Phosphorus; Future  -     PTH, Intact; Future    Vitamin D deficiency  -     T3, Free; Future  -     T4 & TSH ([facility]); Future  -     T4, Free; Future  -     Uric Acid; Future  -     Vitamin D 25 Hydroxy; Future  -     Comprehensive Metabolic Panel; Future  -     C-Peptide; Future  -     Hemoglobin A1c; Future  -     Lipid Panel; Future  -     MicroAlbumin, Urine, Random - Urine, Clean Catch; Future  -     Calcium, Ionized; Future  -     Phosphorus; Future  -     PTH, Intact; Future    Gout without tophus  -     T3, Free; Future  -     T4 & TSH ([facility]); Future  -     T4, Free; Future  -     Uric Acid; Future  -     Vitamin D 25 Hydroxy; Future  -     Comprehensive Metabolic Panel; Future  -     C-Peptide; Future  -     Hemoglobin A1c; Future  -     Lipid Panel; Future  -     MicroAlbumin, Urine, Random - Urine, Clean Catch; Future  -     Calcium, Ionized; Future  -     Phosphorus; Future  -     PTH, Intact; Future    Chronic gout without tophus, unspecified cause, unspecified site    Other orders  -     LIVALO 4 MG tablet; 1 tablet at bedtime by mouth  -     allopurinol (ZYLOPRIM) 100 MG tablet; Take 1 tablet by mouth 2 (Two) Times a Day.               In summary I saw and examined this 77-year-old female for above-mentioned problems.  I reviewed his laboratory evaluation of 11/07/2017 and provided her with a hard copy of it.  She is in general and overall clinically and metabolically stable however her cholesterol and her LDL levels are elevated.  She has not been able to afford Repatha because of very high cost.  However I have provided her with some samples of Livalo 4 mg to be taken daily.  Her uric acid is is still high and therefore I am increasing her allopurinol to 200 mg daily.  She will continue rest of her prescriptions and will see Ms. Adalgisayvette Hernandez in 4 months or sooner if needed with laboratory evaluation prior to each office visit.

## 2017-11-18 DIAGNOSIS — Z79.4 TYPE 2 DIABETES MELLITUS WITHOUT COMPLICATION, WITH LONG-TERM CURRENT USE OF INSULIN (HCC): ICD-10-CM

## 2017-11-18 DIAGNOSIS — R80.9 PROTEINURIA, UNSPECIFIED TYPE: ICD-10-CM

## 2017-11-18 DIAGNOSIS — E11.9 TYPE 2 DIABETES MELLITUS WITHOUT COMPLICATION, WITH LONG-TERM CURRENT USE OF INSULIN (HCC): ICD-10-CM

## 2017-11-18 DIAGNOSIS — E78.2 MIXED HYPERLIPIDEMIA: ICD-10-CM

## 2017-11-18 DIAGNOSIS — I10 ESSENTIAL HYPERTENSION: ICD-10-CM

## 2017-11-18 DIAGNOSIS — E55.9 VITAMIN D DEFICIENCY: ICD-10-CM

## 2017-11-18 RX ORDER — ERGOCALCIFEROL 1.25 MG/1
CAPSULE ORAL
Qty: 39 CAPSULE | Refills: 3 | Status: SHIPPED | OUTPATIENT
Start: 2017-11-18 | End: 2018-04-02

## 2017-12-27 ENCOUNTER — APPOINTMENT (OUTPATIENT)
Dept: MAMMOGRAPHY | Facility: HOSPITAL | Age: 77
End: 2017-12-27
Attending: SPECIALIST

## 2018-01-10 ENCOUNTER — HOSPITAL ENCOUNTER (OUTPATIENT)
Dept: MAMMOGRAPHY | Facility: HOSPITAL | Age: 78
Discharge: HOME | End: 2018-01-10
Attending: SPECIALIST | Admitting: SPECIALIST

## 2018-01-10 DIAGNOSIS — Z12.31 VISIT FOR SCREENING MAMMOGRAM: ICD-10-CM

## 2018-01-10 PROCEDURE — 77067 SCR MAMMO BI INCL CAD: CPT

## 2018-02-22 DIAGNOSIS — Z79.4 TYPE 2 DIABETES MELLITUS WITHOUT COMPLICATION, WITH LONG-TERM CURRENT USE OF INSULIN (HCC): ICD-10-CM

## 2018-02-22 DIAGNOSIS — I10 ESSENTIAL HYPERTENSION: ICD-10-CM

## 2018-02-22 DIAGNOSIS — E55.9 VITAMIN D DEFICIENCY: ICD-10-CM

## 2018-02-22 DIAGNOSIS — E11.9 TYPE 2 DIABETES MELLITUS WITHOUT COMPLICATION, WITH LONG-TERM CURRENT USE OF INSULIN (HCC): ICD-10-CM

## 2018-02-22 DIAGNOSIS — E78.2 MIXED HYPERLIPIDEMIA: ICD-10-CM

## 2018-02-22 DIAGNOSIS — R80.9 PROTEINURIA: ICD-10-CM

## 2018-02-23 RX ORDER — INSULIN GLARGINE 100 [IU]/ML
INJECTION, SOLUTION SUBCUTANEOUS
Qty: 15 PEN | Refills: 1 | Status: SHIPPED | OUTPATIENT
Start: 2018-02-23 | End: 2018-03-28 | Stop reason: SDUPTHER

## 2018-03-05 ENCOUNTER — RESULTS ENCOUNTER (OUTPATIENT)
Dept: ENDOCRINOLOGY | Age: 78
End: 2018-03-05

## 2018-03-05 DIAGNOSIS — M10.9 GOUT WITHOUT TOPHUS: Chronic | ICD-10-CM

## 2018-03-05 DIAGNOSIS — E78.2 MIXED HYPERLIPIDEMIA: ICD-10-CM

## 2018-03-05 DIAGNOSIS — E11.9 TYPE 2 DIABETES MELLITUS WITHOUT COMPLICATION, WITH LONG-TERM CURRENT USE OF INSULIN (HCC): ICD-10-CM

## 2018-03-05 DIAGNOSIS — E55.9 VITAMIN D DEFICIENCY: ICD-10-CM

## 2018-03-05 DIAGNOSIS — E03.9 HYPOTHYROIDISM, UNSPECIFIED TYPE: ICD-10-CM

## 2018-03-05 DIAGNOSIS — I10 ESSENTIAL HYPERTENSION: ICD-10-CM

## 2018-03-05 DIAGNOSIS — Z79.4 TYPE 2 DIABETES MELLITUS WITHOUT COMPLICATION, WITH LONG-TERM CURRENT USE OF INSULIN (HCC): ICD-10-CM

## 2018-03-18 ENCOUNTER — RESULTS ENCOUNTER (OUTPATIENT)
Dept: FAMILY MEDICINE CLINIC | Facility: CLINIC | Age: 78
End: 2018-03-18

## 2018-03-18 DIAGNOSIS — E78.49 OTHER HYPERLIPIDEMIA: ICD-10-CM

## 2018-03-18 DIAGNOSIS — M10.9 GOUT WITHOUT TOPHUS: Chronic | ICD-10-CM

## 2018-03-18 DIAGNOSIS — I10 ESSENTIAL HYPERTENSION: ICD-10-CM

## 2018-03-19 DIAGNOSIS — E55.9 VITAMIN D DEFICIENCY: ICD-10-CM

## 2018-03-19 DIAGNOSIS — I10 ESSENTIAL HYPERTENSION: ICD-10-CM

## 2018-03-19 DIAGNOSIS — R80.9 PROTEINURIA: ICD-10-CM

## 2018-03-19 DIAGNOSIS — Z79.4 TYPE 2 DIABETES MELLITUS WITHOUT COMPLICATION, WITH LONG-TERM CURRENT USE OF INSULIN (HCC): ICD-10-CM

## 2018-03-19 DIAGNOSIS — E78.2 MIXED HYPERLIPIDEMIA: ICD-10-CM

## 2018-03-19 DIAGNOSIS — E11.9 TYPE 2 DIABETES MELLITUS WITHOUT COMPLICATION, WITH LONG-TERM CURRENT USE OF INSULIN (HCC): ICD-10-CM

## 2018-03-19 RX ORDER — LEVOTHYROXINE SODIUM 0.12 MG/1
TABLET ORAL
Qty: 90 TABLET | Refills: 2 | Status: SHIPPED | OUTPATIENT
Start: 2018-03-19 | End: 2018-04-02

## 2018-03-23 DIAGNOSIS — I10 ESSENTIAL HYPERTENSION: ICD-10-CM

## 2018-03-26 RX ORDER — IRBESARTAN 300 MG/1
TABLET ORAL
Qty: 30 TABLET | Refills: 0 | Status: SHIPPED | OUTPATIENT
Start: 2018-03-26 | End: 2018-04-19 | Stop reason: SDUPTHER

## 2018-03-28 ENCOUNTER — TELEPHONE (OUTPATIENT)
Dept: ENDOCRINOLOGY | Age: 78
End: 2018-03-28

## 2018-03-28 DIAGNOSIS — I10 ESSENTIAL HYPERTENSION: ICD-10-CM

## 2018-03-28 DIAGNOSIS — E55.9 VITAMIN D DEFICIENCY: ICD-10-CM

## 2018-03-28 DIAGNOSIS — Z79.4 TYPE 2 DIABETES MELLITUS WITHOUT COMPLICATION, WITH LONG-TERM CURRENT USE OF INSULIN (HCC): ICD-10-CM

## 2018-03-28 DIAGNOSIS — E11.9 TYPE 2 DIABETES MELLITUS WITHOUT COMPLICATION, WITH LONG-TERM CURRENT USE OF INSULIN (HCC): ICD-10-CM

## 2018-03-28 DIAGNOSIS — E78.2 MIXED HYPERLIPIDEMIA: ICD-10-CM

## 2018-03-28 RX ORDER — NATEGLINIDE 60 MG/1
60 TABLET ORAL
Qty: 90 TABLET | Refills: 11 | Status: SHIPPED | OUTPATIENT
Start: 2018-03-28 | End: 2018-06-20 | Stop reason: SDUPTHER

## 2018-03-28 NOTE — TELEPHONE ENCOUNTER
Spoke to patient's daughter and gave medication adjustments that were made by Dr. Beltran. Patient's daughter stated that she understood directions.

## 2018-03-30 ENCOUNTER — OFFICE VISIT (OUTPATIENT)
Dept: ENDOCRINOLOGY | Age: 78
End: 2018-03-30

## 2018-03-30 VITALS
WEIGHT: 204.8 LBS | DIASTOLIC BLOOD PRESSURE: 72 MMHG | BODY MASS INDEX: 32.92 KG/M2 | HEIGHT: 66 IN | SYSTOLIC BLOOD PRESSURE: 144 MMHG

## 2018-03-30 DIAGNOSIS — E04.1 SOLITARY THYROID NODULE: ICD-10-CM

## 2018-03-30 DIAGNOSIS — E03.9 HYPOTHYROIDISM, UNSPECIFIED TYPE: ICD-10-CM

## 2018-03-30 DIAGNOSIS — Z79.4 TYPE 2 DIABETES MELLITUS WITHOUT COMPLICATION, WITH LONG-TERM CURRENT USE OF INSULIN (HCC): Primary | ICD-10-CM

## 2018-03-30 DIAGNOSIS — E21.3 HYPERPARATHYROIDISM (HCC): ICD-10-CM

## 2018-03-30 DIAGNOSIS — I10 ESSENTIAL HYPERTENSION: ICD-10-CM

## 2018-03-30 DIAGNOSIS — E55.9 VITAMIN D DEFICIENCY: ICD-10-CM

## 2018-03-30 DIAGNOSIS — E11.9 TYPE 2 DIABETES MELLITUS WITHOUT COMPLICATION, WITH LONG-TERM CURRENT USE OF INSULIN (HCC): Primary | ICD-10-CM

## 2018-03-30 DIAGNOSIS — E78.2 MIXED HYPERLIPIDEMIA: ICD-10-CM

## 2018-03-30 PROCEDURE — 99214 OFFICE O/P EST MOD 30 MIN: CPT | Performed by: NURSE PRACTITIONER

## 2018-03-30 RX ORDER — CHLORTHALIDONE 25 MG/1
TABLET ORAL
COMMUNITY
Start: 2018-03-07 | End: 2020-01-20 | Stop reason: ALTCHOICE

## 2018-03-30 NOTE — PROGRESS NOTES
"Subjective   Ange Johnson is a 77 y.o. female is here today for follow-up.  Chief Complaint   Patient presents with   • Diabetes     no recent labs, pt tests BG 1x, pt did not bring meter   • Hypothyroidism     Dr. Caruso took her off of the metformin and her sugars are getting high   • Hyperlipidemia   • Hypertension   • Vitamin D Deficiency     /72   Ht 167.6 cm (66\")   Wt 92.9 kg (204 lb 12.8 oz)   BMI 33.06 kg/m²   Current Outpatient Prescriptions on File Prior to Visit   Medication Sig   • allopurinol (ZYLOPRIM) 100 MG tablet Take 1 tablet by mouth 2 (Two) Times a Day. (Patient taking differently: Take 100 mg by mouth Daily.)   • amLODIPine (NORVASC) 10 MG tablet Take 1 tablet by mouth Daily for 180 days.   • aspirin 81 MG EC tablet Take 1 tablet by mouth Daily. HOLD PER MD MAY-12/1   • carBAMazepine (CARBATROL) 300 MG 12 hr capsule Take 1 capsule by mouth 2 (Two) Times a Day for 180 days.   • CloNIDine (CATAPRES) 0.1 MG tablet Take 1 tablet by mouth 2 (Two) Times a Day for 180 days.   • ezetimibe (ZETIA) 10 MG tablet Take 1 tablet by mouth Daily for 180 days.   • ferrous sulfate 325 (65 FE) MG tablet Take 1 tablet by mouth daily.   • furosemide (LASIX) 80 MG tablet Take 80 mg by mouth As Needed.   • glucose blood (JOSÉ MIGUEL CONTOUR NEXT TEST) test strip Checking BG twice a day   • hydrALAZINE (APRESOLINE) 50 MG tablet    • Insulin Glargine (LANTUS SOLOSTAR) 100 UNIT/ML injection pen 75 units Q AM and every 3 days if fasting blood glucose is greater than 120 as 2 units up to 100 unit the day (Patient taking differently: 60 units Q AM and every 3 days if fasting blood glucose is greater than 120 as 2 units up to 100 unit the day)   • irbesartan (AVAPRO) 300 MG tablet TAKE ONE TABLET BY MOUTH ONCE NIGHTLY   • levothyroxine (SYNTHROID, LEVOTHROID) 125 MCG tablet TAKE ONE TABLET BY MOUTH DAILY   • LIVALO 4 MG tablet 1 tablet at bedtime by mouth   • montelukast (SINGULAIR) 10 MG tablet 10 mg daily.   • " nateglinide (STARLIX) 60 MG tablet Take 1 tablet by mouth 3 (Three) Times a Day Before Meals.   • potassium chloride (MICRO-K) 10 MEQ CR capsule Take 10 mEq by mouth Daily.   • SAXagliptin (ONGLYZA) 5 MG tablet Take 1 tablet by mouth Daily.   • vitamin B-12 (CYANOCOBALAMIN) 1000 MCG tablet Take 1 tablet by mouth daily.   • vitamin D (ERGOCALCIFEROL) 04304 units capsule capsule Take 1 capsule 3 times weekly     No current facility-administered medications on file prior to visit.      Family History   Problem Relation Age of Onset   • Diabetes Sister    • Hypertension Sister    • Thyroid disease Sister    • Diabetes Brother    • Hypertension Brother    • Kidney disease Brother    • Cervical cancer Mother      Social History   Substance Use Topics   • Smoking status: Never Smoker   • Smokeless tobacco: Never Used   • Alcohol use No     Allergies   Allergen Reactions   • Codeine Nausea Only   • Hydrocodone-Acetaminophen Nausea Only and Other (See Comments)     Percocet and Vicodin; vertigo   • Meperidine Nausea Only   • Morphine And Related Nausea Only   • Oxycodone-Acetaminophen Nausea Only   • Oxycodone-Acetaminophen Nausea Only and Other (See Comments)     dilzziness   • Oxycodone-Aspirin Nausea Only and Other (See Comments)     vertigo   • Penicillins Hives   • Sulfa Antibiotics Hives         History of Present Illness  Encounter Diagnoses   Name Primary?   • Vitamin D deficiency    • Mixed hyperlipidemia    • Essential hypertension    • Hyperparathyroidism    • Hypothyroidism, unspecified type    • Solitary thyroid nodule    • Type 2 diabetes mellitus without complication, with long-term current use of insulin Yes   This is a 77-year-old female patient here today for an acute follow up visit due to uncontrolled type 2 diabetes.  She did not bring her blood glucose meter to today's visit.  She is complaining that Dr. Caruso took her off metformin and since that her blood sugars have been stay consistently too high.   She states his highest blood sugar reading has been 560 mg/dL.  She typically is in the 200 range.  She is accompanied to today's visit by her daughter.  She does have increased thirst, increased urination, and increased fatigue.  She has had no hypoglycemic events.  She was recently started on nateglinide as of yesterday that was prescribed by Dr. Beltran.  She also had her insulin increased to 75 units however she was not comfortable with increasing it that much so would only increase it to 60 units from 50.  We discussed keeping our office informed of blood sugar readings so that we can make further adjustments.  She has not had recent labs done and will have labs done at today's visit.      The following portions of the patient's history were reviewed and updated as appropriate: allergies, current medications, past family history, past medical history, past social history, past surgical history and problem list.    Review of Systems   Constitutional: Negative for fatigue.   HENT: Negative for trouble swallowing.    Eyes: Negative for visual disturbance.   Respiratory: Negative for shortness of breath.    Cardiovascular: Negative for leg swelling.   Endocrine: Negative for polyuria.   Skin: Negative for wound.   Neurological: Negative for numbness.       Objective   Physical Exam   Constitutional: She is oriented to person, place, and time. She appears well-developed and well-nourished. No distress.   HENT:   Head: Normocephalic and atraumatic.   Right Ear: External ear normal.   Left Ear: External ear normal.   Nose: Nose normal.   Mouth/Throat: Oropharynx is clear and moist. No oropharyngeal exudate.   Eyes: EOM are normal. Pupils are equal, round, and reactive to light. Right eye exhibits no discharge. Left eye exhibits no discharge.   Neck: Trachea normal, normal range of motion and full passive range of motion without pain. Neck supple. No tracheal tenderness present. Carotid bruit is not present. No tracheal  deviation, no edema and no erythema present. No thyroid mass and no thyromegaly present.   Cardiovascular: Normal rate, regular rhythm, normal heart sounds and intact distal pulses.  Exam reveals no gallop and no friction rub.    No murmur heard.  Pulmonary/Chest: Effort normal and breath sounds normal. No stridor. No respiratory distress. She has no wheezes. She has no rales.   Abdominal: Soft. Bowel sounds are normal. She exhibits no distension.   Musculoskeletal: Normal range of motion. She exhibits no edema or deformity.   Lymphadenopathy:     She has no cervical adenopathy.   Neurological: She is alert and oriented to person, place, and time.   Skin: Skin is warm and dry. No rash noted. She is not diaphoretic. No erythema. No pallor.   Psychiatric: She has a normal mood and affect. Her behavior is normal. Judgment and thought content normal.   Nursing note and vitals reviewed.    Results for orders placed or performed in visit on 07/11/17   Comprehensive Metabolic Panel   Result Value Ref Range    Glucose 188 (H) 65 - 99 mg/dL    BUN 18 8 - 23 mg/dL    Creatinine 1.24 (H) 0.57 - 1.00 mg/dL    eGFR Non African Am 42 (L) >60 mL/min/1.73    eGFR African Am 51 (L) >60 mL/min/1.73    BUN/Creatinine Ratio 14.5 7.0 - 25.0    Sodium 142 136 - 145 mmol/L    Potassium 4.4 3.5 - 5.2 mmol/L    Chloride 98 98 - 107 mmol/L    Total CO2 30.6 (H) 22.0 - 29.0 mmol/L    Calcium 9.5 8.6 - 10.5 mg/dL    Total Protein 6.8 6.0 - 8.5 g/dL    Albumin 3.90 3.50 - 5.20 g/dL    Globulin 2.9 gm/dL    A/G Ratio 1.3 g/dL    Total Bilirubin 0.2 0.1 - 1.2 mg/dL    Alkaline Phosphatase 86 39 - 117 U/L    AST (SGOT) 12 1 - 32 U/L    ALT (SGPT) 13 1 - 33 U/L   Lipid Panel   Result Value Ref Range    Total Cholesterol 227 (H) 0 - 200 mg/dL    Triglycerides 184 (H) 0 - 150 mg/dL    HDL Cholesterol 56 40 - 60 mg/dL    VLDL Cholesterol 36.8 5 - 40 mg/dL    LDL Cholesterol  134 (H) 0 - 100 mg/dL   Vitamin D 25 Hydroxy   Result Value Ref Range    25  Hydroxy, Vitamin D 34.8 30.0 - 100.0 ng/mL   Hemoglobin A1c   Result Value Ref Range    Hemoglobin A1C 6.90 (H) 4.80 - 5.60 %   C-Peptide   Result Value Ref Range    C-Peptide 10.2 (H) 1.1 - 4.4 ng/mL   Calcium, Ionized   Result Value Ref Range    Ionized Calcium 5.1 4.5 - 5.6 mg/dL   Phosphorus   Result Value Ref Range    Phosphorus 3.7 2.5 - 4.5 mg/dL   Magnesium   Result Value Ref Range    Magnesium 2.0 1.6 - 2.4 mg/dL   PTH, Intact   Result Value Ref Range    PTH, Intact 71 (H) 15 - 65 pg/mL   T3, Free   Result Value Ref Range    T3, Free 2.1 2.0 - 4.4 pg/mL   T4, Free   Result Value Ref Range    Free T4 1.45 0.93 - 1.70 ng/dL   Thyroid Panel With TSH   Result Value Ref Range    TSH 1.820 0.450 - 4.500 uIU/mL    T4, Total 7.2 4.5 - 12.0 ug/dL    T3 Uptake 34 24 - 39 %    Free Thyroxine Index 2.4 1.2 - 4.9   Comprehensive Thyroglobulin   Result Value Ref Range    Thyroglobulin Ab 3.0 (H) IU/mL    Thyroglobulin Comment ng/mL    Thyroglobulin (TG-TERRIE) 11 ng/mL   Uric Acid   Result Value Ref Range    Uric Acid 7.6 (H) 2.4 - 5.7 mg/dL   MicroAlbumin, Urine, Random   Result Value Ref Range    Microalbumin, Urine 601.6 Not Estab. ug/mL         Assessment/Plan   Ange was seen today for diabetes, hypothyroidism, hyperlipidemia, hypertension and vitamin d deficiency.    Diagnoses and all orders for this visit:    Type 2 diabetes mellitus without complication, with long-term current use of insulin    Vitamin D deficiency    Mixed hyperlipidemia    Essential hypertension    Hyperparathyroidism    Hypothyroidism, unspecified type    Solitary thyroid nodule    In summary, patient was seen and examined.  She had recent medication changes as a result of uncontrolled type 2 diabetes.  Her blood sugars have been as high as over 500.  She has been started on Starlix 60 mg prior to each meal as of yesterday.  She feels more comfortable with only increase in her Lantus insulin to 60 units but we did discuss titrating it by 2  units every 3 days for blood sugars not at goal of 120 mg/dL.  Due to her renal insufficiency of decreased Onglyza to 2.5 mg.  She was given a sample of Humalog insulin in the event that we need to treat acute high blood sugars.  She is to the insulin in refrigerator to have on hand if needed.  She will follow-up in June email blood sugars   Continue starlix 60 mg prior to each meal. Skip dose if you skip meal  Keep humalog in refrig  Titrate lantus by 2 units every 3 days for bs's greater than 120. Start at 60 units  Decrease onglyza to 2.5 mg due to renal insufficiency

## 2018-03-30 NOTE — PATIENT INSTRUCTIONS
email blood sugars   Continue starlix 60 mg prior to each meal. Skip dose if you skip meal  Keep humalog in refrig  Titrate lantus by 2 units every 3 days for bs's greater than 120. Start at 60 units  Decrease onglyza to 2.5 mg due to renal insufficiency

## 2018-03-31 LAB
25(OH)D3+25(OH)D2 SERPL-MCNC: >100 NG/ML (ref 30–100)
ALBUMIN SERPL-MCNC: 3.8 G/DL (ref 3.5–5.2)
ALBUMIN/GLOB SERPL: 1.4 G/DL
ALP SERPL-CCNC: 113 U/L (ref 39–117)
ALT SERPL-CCNC: 13 U/L (ref 1–33)
AST SERPL-CCNC: 11 U/L (ref 1–32)
BILIRUB SERPL-MCNC: <0.2 MG/DL (ref 0.1–1.2)
BUN SERPL-MCNC: 26 MG/DL (ref 8–23)
BUN/CREAT SERPL: 18.1 (ref 7–25)
C PEPTIDE SERPL-MCNC: 7.3 NG/ML (ref 1.1–4.4)
CALCIUM SERPL-MCNC: 9 MG/DL (ref 8.6–10.5)
CHLORIDE SERPL-SCNC: 93 MMOL/L (ref 98–107)
CHOLEST SERPL-MCNC: 192 MG/DL (ref 0–200)
CO2 SERPL-SCNC: 31.1 MMOL/L (ref 22–29)
CREAT SERPL-MCNC: 1.44 MG/DL (ref 0.57–1)
FT4I SERPL CALC-MCNC: 1.8 (ref 1.2–4.9)
GFR SERPLBLD CREATININE-BSD FMLA CKD-EPI: 35 ML/MIN/1.73
GFR SERPLBLD CREATININE-BSD FMLA CKD-EPI: 43 ML/MIN/1.73
GLOBULIN SER CALC-MCNC: 2.8 GM/DL
GLUCOSE SERPL-MCNC: 350 MG/DL (ref 65–99)
HBA1C MFR BLD: 12.9 % (ref 4.8–5.6)
HDLC SERPL-MCNC: 59 MG/DL (ref 40–60)
INTERPRETATION: NORMAL
LDLC SERPL CALC-MCNC: 53 MG/DL (ref 0–100)
Lab: NORMAL
POTASSIUM SERPL-SCNC: 4.9 MMOL/L (ref 3.5–5.2)
PROT SERPL-MCNC: 6.6 G/DL (ref 6–8.5)
SODIUM SERPL-SCNC: 135 MMOL/L (ref 136–145)
T3FREE SERPL-MCNC: 2 PG/ML (ref 2–4.4)
T3RU NFR SERPL: 31 % (ref 24–39)
T4 FREE SERPL-MCNC: 1.18 NG/DL (ref 0.93–1.7)
T4 SERPL-MCNC: 5.9 UG/DL (ref 4.5–12)
TRIGL SERPL-MCNC: 399 MG/DL (ref 0–150)
TSH SERPL DL<=0.005 MIU/L-ACNC: 6.2 UIU/ML (ref 0.45–4.5)
VLDLC SERPL CALC-MCNC: 79.8 MG/DL (ref 5–40)

## 2018-04-02 ENCOUNTER — TELEPHONE (OUTPATIENT)
Dept: ENDOCRINOLOGY | Age: 78
End: 2018-04-02

## 2018-04-02 DIAGNOSIS — E78.2 MIXED HYPERLIPIDEMIA: ICD-10-CM

## 2018-04-02 DIAGNOSIS — E55.9 VITAMIN D DEFICIENCY: ICD-10-CM

## 2018-04-02 DIAGNOSIS — I10 ESSENTIAL HYPERTENSION: ICD-10-CM

## 2018-04-02 DIAGNOSIS — E11.9 TYPE 2 DIABETES MELLITUS WITHOUT COMPLICATION, WITH LONG-TERM CURRENT USE OF INSULIN (HCC): ICD-10-CM

## 2018-04-02 DIAGNOSIS — Z79.4 TYPE 2 DIABETES MELLITUS WITHOUT COMPLICATION, WITH LONG-TERM CURRENT USE OF INSULIN (HCC): ICD-10-CM

## 2018-04-02 RX ORDER — LEVOTHYROXINE SODIUM 0.15 MG/1
150 TABLET ORAL DAILY
Qty: 30 TABLET | Refills: 5 | Status: SHIPPED | OUTPATIENT
Start: 2018-04-02 | End: 2018-06-20 | Stop reason: SDUPTHER

## 2018-04-02 RX ORDER — ICOSAPENT ETHYL 1000 MG/1
2 CAPSULE ORAL 2 TIMES DAILY WITH MEALS
Qty: 120 CAPSULE | Refills: 5 | Status: SHIPPED | OUTPATIENT
Start: 2018-04-02 | End: 2018-04-19 | Stop reason: SDUPTHER

## 2018-04-02 NOTE — TELEPHONE ENCOUNTER
Patients daughter called and left message to call her @ [phone]. I spoke with her and informed her of the med changes and let ehr know to call the office if Ange's sugars do not improve so Adalgisa can make further changes.

## 2018-04-02 NOTE — TELEPHONE ENCOUNTER
----- Message from TAYO Larsen sent at 4/2/2018  1:23 PM EDT -----  Let pt know about med changes and stop vitamin d.       Spoke to patient and informed her of the medication changes. She asked that I contact her daughter Daphne @ [phone]. Daphne did not answer so I left a message to give me a call @ [phone]

## 2018-04-19 ENCOUNTER — OFFICE VISIT (OUTPATIENT)
Dept: FAMILY MEDICINE CLINIC | Facility: CLINIC | Age: 78
End: 2018-04-19

## 2018-04-19 VITALS
WEIGHT: 205 LBS | TEMPERATURE: 97.4 F | SYSTOLIC BLOOD PRESSURE: 138 MMHG | RESPIRATION RATE: 16 BRPM | DIASTOLIC BLOOD PRESSURE: 60 MMHG | HEART RATE: 64 BPM | HEIGHT: 66 IN | BODY MASS INDEX: 32.95 KG/M2

## 2018-04-19 DIAGNOSIS — M10.9 GOUT WITHOUT TOPHUS: ICD-10-CM

## 2018-04-19 DIAGNOSIS — I10 ESSENTIAL HYPERTENSION: Primary | ICD-10-CM

## 2018-04-19 DIAGNOSIS — E78.2 MIXED HYPERLIPIDEMIA: ICD-10-CM

## 2018-04-19 DIAGNOSIS — M24.20 DISORDER OF MUSCLE, LIGAMENT, AND FASCIA: ICD-10-CM

## 2018-04-19 DIAGNOSIS — E78.49 OTHER HYPERLIPIDEMIA: ICD-10-CM

## 2018-04-19 DIAGNOSIS — M62.9 DISORDER OF MUSCLE, LIGAMENT, AND FASCIA: ICD-10-CM

## 2018-04-19 DIAGNOSIS — M79.10 MYALGIA: ICD-10-CM

## 2018-04-19 PROBLEM — N18.30 STAGE 3 CHRONIC KIDNEY DISEASE (HCC): Status: ACTIVE | Noted: 2018-04-19

## 2018-04-19 PROCEDURE — 99214 OFFICE O/P EST MOD 30 MIN: CPT | Performed by: FAMILY MEDICINE

## 2018-04-19 RX ORDER — ALLOPURINOL 100 MG/1
100 TABLET ORAL DAILY
Qty: 30 TABLET | Refills: 5 | Status: SHIPPED | OUTPATIENT
Start: 2018-04-19 | End: 2018-10-18 | Stop reason: SDUPTHER

## 2018-04-19 RX ORDER — EZETIMIBE 10 MG/1
10 TABLET ORAL DAILY
Qty: 30 TABLET | Refills: 5 | Status: SHIPPED | OUTPATIENT
Start: 2018-04-19 | End: 2018-10-18 | Stop reason: SDUPTHER

## 2018-04-19 RX ORDER — CARBAMAZEPINE 300 MG/1
300 CAPSULE, EXTENDED RELEASE ORAL EVERY 12 HOURS SCHEDULED
Qty: 60 CAPSULE | Refills: 5 | Status: SHIPPED | OUTPATIENT
Start: 2018-04-19 | End: 2018-10-18 | Stop reason: SDUPTHER

## 2018-04-19 RX ORDER — CLONIDINE HYDROCHLORIDE 0.1 MG/1
0.1 TABLET ORAL EVERY 12 HOURS SCHEDULED
Qty: 60 TABLET | Refills: 5 | Status: SHIPPED | OUTPATIENT
Start: 2018-04-19 | End: 2018-10-18 | Stop reason: SDUPTHER

## 2018-04-19 RX ORDER — IRBESARTAN 300 MG/1
300 TABLET ORAL NIGHTLY
Qty: 30 TABLET | Refills: 5 | Status: SHIPPED | OUTPATIENT
Start: 2018-04-19 | End: 2018-10-18 | Stop reason: SDUPTHER

## 2018-04-19 RX ORDER — ICOSAPENT ETHYL 1000 MG/1
2 CAPSULE ORAL 2 TIMES DAILY WITH MEALS
Qty: 120 CAPSULE | Refills: 5 | Status: SHIPPED | OUTPATIENT
Start: 2018-04-19 | End: 2018-10-18 | Stop reason: SDUPTHER

## 2018-04-19 RX ORDER — AMLODIPINE BESYLATE 10 MG/1
10 TABLET ORAL DAILY
Qty: 30 TABLET | Refills: 5 | Status: SHIPPED | OUTPATIENT
Start: 2018-04-19 | End: 2018-10-18 | Stop reason: SDUPTHER

## 2018-04-19 NOTE — PROGRESS NOTES
"Chief Complaint   Patient presents with   • Hypertension   • Hyperlipidemia   • URI       Subjective   Ange Johnson presents to the office today to refill her medications and review recent labs. No medication side effects are reported.  Blood pressure is controlled.  Gout is stable with current therapy.  No recent gout attacks.  She continues to take her carbamazepine for her fibromyalgia and that condition is stable.  She also uses that medicine for type 2 diabetes mellitus with polyneuropathy.  She remains under the care of endocrinology for her thyroid and diabetic care.  She remains under the care of nephrology for her chronic kidney disease stage III.  That condition is stable.  She also has an upcoming appointment with her pulmonologist for yearly refill of singulair.     I have reviewed and updated her medications, medical history and problem list during today's office visit.     Social History   Substance Use Topics   • Smoking status: Never Smoker   • Smokeless tobacco: Never Used   • Alcohol use No     Review of Systems   Constitutional: Negative for fever.   Respiratory: Positive for cough and wheezing.         Recovering from recent bronchitis, mucous is clear     Objective   /60 (BP Location: Right arm, Patient Position: Sitting, Cuff Size: Large Adult)   Pulse 64   Temp 97.4 °F (36.3 °C) (Oral)   Resp 16   Ht 167.6 cm (66\")   Wt 93 kg (205 lb)   BMI 33.09 kg/m²   Body mass index is 33.09 kg/m².  Physical Exam   Constitutional: She is oriented to person, place, and time. She appears well-developed. No distress.   Some coarse coughing in office   Eyes: Conjunctivae and lids are normal.   Neck: Carotid bruit is not present.   Cardiovascular: Normal rate, regular rhythm and normal heart sounds.    Pulmonary/Chest: Effort normal. She has wheezes. She has rhonchi.   Neurological: She is alert and oriented to person, place, and time.   Skin: Skin is warm and dry.   Psychiatric: She has a normal " mood and affect. Her behavior is normal.   Vitals reviewed.      Data Reviewed:         Lab Results   Component Value Date     (H) 03/30/2018    BUN 26 (H) 03/30/2018    CREATININE 1.44 (H) 03/30/2018    EGFRIFNONA 35 (L) 03/30/2018    EGFRIFAFRI 43 (L) 03/30/2018     (L) 03/30/2018    K 4.9 03/30/2018    CL 93 (L) 03/30/2018    CALCIUM 9.0 03/30/2018    PROTENTOTREF 6.6 03/30/2018    ALBUMIN 3.80 03/30/2018    LABGLOBREF 2.8 03/30/2018    BILITOT <0.2 03/30/2018    ALKPHOS 113 03/30/2018    AST 11 03/30/2018    ALT 13 03/30/2018     CBC w/ diff:   Lab Results   Component Value Date    WBC 7.31 12/20/2016    RBC 3.80 (L) 12/20/2016    HGB 11.7 12/20/2016    HCT 37.9 12/20/2016    MCV 99.7 (H) 12/20/2016    MCH 30.8 12/20/2016    MCHC 30.9 (L) 12/20/2016    RDW 12.5 12/20/2016     12/20/2016    NEUTRORELPCT 72.4 12/20/2016    AUTOIGPER 0.3 12/20/2016    LYMPHORELPCT 18.6 (L) 12/20/2016    MONORELPCT 7.9 12/20/2016    EOSRELPCT 0.4 (L) 12/20/2016    BASORELPCT 0.4 12/20/2016     LIPID PANEL:  Lab Results   Component Value Date    CHLPL 192 03/30/2018    TRIG 399 (H) 03/30/2018    HDL 59 03/30/2018    VLDL 79.8 (H) 03/30/2018    LDL 53 03/30/2018     TSH:  Lab Results   Component Value Date    TSH 6.200 (H) 03/30/2018       Assessment/Plan   Problem List Items Addressed This Visit        Cardiovascular and Mediastinum    Essential hypertension - Primary    Relevant Medications    amLODIPine (NORVASC) 10 MG tablet    CloNIDine (CATAPRES) 0.1 MG tablet    irbesartan (AVAPRO) 300 MG tablet    Mixed hyperlipidemia    Relevant Medications    ezetimibe (ZETIA) 10 MG tablet    icosapent ethyl (VASCEPA) 1 g capsule capsule       Nervous and Auditory    RESOLVED: Myalgia    Relevant Medications    carBAMazepine (CARBATROL) 300 MG 12 hr capsule       Musculoskeletal and Integument    Disorder of muscle, ligament, and fascia       Other    Gout without tophus    Relevant Medications    allopurinol (ZYLOPRIM)  100 MG tablet      Other Visit Diagnoses    None.       F/U in 6 months for regular visit and Medicare Wellness Visit       No orders of the defined types were placed in this encounter.

## 2018-05-01 DIAGNOSIS — E55.9 VITAMIN D DEFICIENCY: ICD-10-CM

## 2018-05-01 DIAGNOSIS — E78.2 MIXED HYPERLIPIDEMIA: ICD-10-CM

## 2018-05-01 DIAGNOSIS — I10 ESSENTIAL HYPERTENSION: ICD-10-CM

## 2018-05-01 DIAGNOSIS — E11.9 TYPE 2 DIABETES MELLITUS WITHOUT COMPLICATION, WITH LONG-TERM CURRENT USE OF INSULIN (HCC): ICD-10-CM

## 2018-05-01 DIAGNOSIS — Z79.4 TYPE 2 DIABETES MELLITUS WITHOUT COMPLICATION, WITH LONG-TERM CURRENT USE OF INSULIN (HCC): ICD-10-CM

## 2018-05-02 ENCOUNTER — TELEPHONE (OUTPATIENT)
Dept: ENDOCRINOLOGY | Age: 78
End: 2018-05-02

## 2018-05-02 NOTE — TELEPHONE ENCOUNTER
----- Message from Christine Rainey sent at 5/1/2018  2:19 PM EDT -----  Contact: PATIENT DRISS / MAGNUS BECKMAN   PATIENT IS CALLING FOR SAMPLES     MEDICAITON:  Insulin Glargine (LANTUS SOLOSTAR) 100 UNIT/ML injection pen  OR TRESIBA ;   icosapent ethyl (VASCEPA) 1 g capsule capsule ;   SAXagliptin (ONGLYZA) 2.5 MG tablet        MESSAGE:   MRS BECKMAN WAS CALLING IN REGARDS TO GETTING SAMPLES OF THE ABOVE MEDICATION.  SHE IS STATING SHE CALLED 04/30/2018 IN REGARDS TO THIS AS WELL. SHE IS REQUESTING A CALL BACK     CALL BACK: [phone]      Patient samples have been put up, pt has been contacted.

## 2018-06-01 DIAGNOSIS — Z79.4 TYPE 2 DIABETES MELLITUS WITH DIABETIC POLYNEUROPATHY, WITH LONG-TERM CURRENT USE OF INSULIN (HCC): Primary | ICD-10-CM

## 2018-06-01 DIAGNOSIS — E21.3 HYPERPARATHYROIDISM (HCC): ICD-10-CM

## 2018-06-01 DIAGNOSIS — R60.1 GENERALIZED EDEMA: ICD-10-CM

## 2018-06-01 DIAGNOSIS — E11.42 TYPE 2 DIABETES MELLITUS WITH DIABETIC POLYNEUROPATHY, WITH LONG-TERM CURRENT USE OF INSULIN (HCC): Primary | ICD-10-CM

## 2018-06-01 DIAGNOSIS — E55.9 VITAMIN D DEFICIENCY: ICD-10-CM

## 2018-06-01 DIAGNOSIS — E89.0 POSTOPERATIVE HYPOTHYROIDISM: ICD-10-CM

## 2018-06-06 ENCOUNTER — LAB (OUTPATIENT)
Dept: ENDOCRINOLOGY | Age: 78
End: 2018-06-06

## 2018-06-06 DIAGNOSIS — E89.0 POSTOPERATIVE HYPOTHYROIDISM: ICD-10-CM

## 2018-06-06 DIAGNOSIS — E11.9 TYPE 2 DIABETES MELLITUS WITHOUT COMPLICATION, WITH LONG-TERM CURRENT USE OF INSULIN (HCC): ICD-10-CM

## 2018-06-06 DIAGNOSIS — Z79.4 TYPE 2 DIABETES MELLITUS WITHOUT COMPLICATION, WITH LONG-TERM CURRENT USE OF INSULIN (HCC): ICD-10-CM

## 2018-06-06 DIAGNOSIS — E55.9 VITAMIN D DEFICIENCY: ICD-10-CM

## 2018-06-06 DIAGNOSIS — Z79.4 TYPE 2 DIABETES MELLITUS WITH DIABETIC POLYNEUROPATHY, WITH LONG-TERM CURRENT USE OF INSULIN (HCC): ICD-10-CM

## 2018-06-06 DIAGNOSIS — E78.2 MIXED HYPERLIPIDEMIA: ICD-10-CM

## 2018-06-06 DIAGNOSIS — R60.1 GENERALIZED EDEMA: ICD-10-CM

## 2018-06-06 DIAGNOSIS — E11.42 TYPE 2 DIABETES MELLITUS WITH DIABETIC POLYNEUROPATHY, WITH LONG-TERM CURRENT USE OF INSULIN (HCC): ICD-10-CM

## 2018-06-06 DIAGNOSIS — E21.3 HYPERPARATHYROIDISM (HCC): ICD-10-CM

## 2018-06-06 DIAGNOSIS — M10.9 GOUT WITHOUT TOPHUS: Chronic | ICD-10-CM

## 2018-06-06 DIAGNOSIS — E03.9 HYPOTHYROIDISM, UNSPECIFIED TYPE: ICD-10-CM

## 2018-06-06 DIAGNOSIS — I10 ESSENTIAL HYPERTENSION: ICD-10-CM

## 2018-06-11 LAB
25(OH)D3+25(OH)D2 SERPL-MCNC: 44.6 NG/ML (ref 30–100)
ALBUMIN SERPL-MCNC: 3.8 G/DL (ref 3.5–5.2)
ALBUMIN/GLOB SERPL: 1.3 G/DL
ALP SERPL-CCNC: 98 U/L (ref 39–117)
ALT SERPL-CCNC: 10 U/L (ref 1–33)
AST SERPL-CCNC: 10 U/L (ref 1–32)
BILIRUB SERPL-MCNC: 0.3 MG/DL (ref 0.1–1.2)
BUN SERPL-MCNC: 22 MG/DL (ref 8–23)
BUN/CREAT SERPL: 12.9 (ref 7–25)
C PEPTIDE SERPL-MCNC: 5.8 NG/ML (ref 1.1–4.4)
CA-I SERPL ISE-MCNC: 5.3 MG/DL (ref 4.5–5.6)
CALCIUM SERPL-MCNC: 9.2 MG/DL (ref 8.6–10.5)
CHLORIDE SERPL-SCNC: 93 MMOL/L (ref 98–107)
CHOLEST SERPL-MCNC: 151 MG/DL (ref 0–200)
CO2 SERPL-SCNC: 28.7 MMOL/L (ref 22–29)
CREAT SERPL-MCNC: 1.7 MG/DL (ref 0.57–1)
FT4I SERPL CALC-MCNC: 2.3 (ref 1.2–4.9)
GFR SERPLBLD CREATININE-BSD FMLA CKD-EPI: 29 ML/MIN/1.73
GFR SERPLBLD CREATININE-BSD FMLA CKD-EPI: 35 ML/MIN/1.73
GLOBULIN SER CALC-MCNC: 2.9 GM/DL
GLUCOSE SERPL-MCNC: 119 MG/DL (ref 65–99)
HBA1C MFR BLD: 10.37 % (ref 4.8–5.6)
HDLC SERPL-MCNC: 66 MG/DL (ref 40–60)
INTERPRETATION: NORMAL
LDLC SERPL CALC-MCNC: 66 MG/DL (ref 0–100)
Lab: NORMAL
MICROALBUMIN UR-MCNC: 366.3 UG/ML
PHOSPHATE SERPL-MCNC: 4.1 MG/DL (ref 2.5–4.5)
POTASSIUM SERPL-SCNC: 4 MMOL/L (ref 3.5–5.2)
PROT SERPL-MCNC: 6.7 G/DL (ref 6–8.5)
PTH-INTACT SERPL-MCNC: 71 PG/ML (ref 15–65)
SODIUM SERPL-SCNC: 137 MMOL/L (ref 136–145)
T3FREE SERPL-MCNC: 1.9 PG/ML (ref 2–4.4)
T3RU NFR SERPL: 33 % (ref 24–39)
T4 FREE SERPL-MCNC: 1.33 NG/DL (ref 0.93–1.7)
T4 SERPL-MCNC: 7.1 UG/DL (ref 4.5–12)
THYROGLOB AB SERPL-ACNC: 4.3 IU/ML
THYROGLOB SERPL-MCNC: 44 NG/ML
THYROGLOB SERPL-MCNC: ABNORMAL NG/ML
TRIGL SERPL-MCNC: 96 MG/DL (ref 0–150)
TSH SERPL DL<=0.005 MIU/L-ACNC: 1.91 UIU/ML (ref 0.45–4.5)
VLDLC SERPL CALC-MCNC: 19.2 MG/DL (ref 5–40)

## 2018-06-20 ENCOUNTER — OFFICE VISIT (OUTPATIENT)
Dept: ENDOCRINOLOGY | Age: 78
End: 2018-06-20

## 2018-06-20 VITALS
DIASTOLIC BLOOD PRESSURE: 58 MMHG | HEIGHT: 66 IN | BODY MASS INDEX: 33.59 KG/M2 | WEIGHT: 209 LBS | SYSTOLIC BLOOD PRESSURE: 140 MMHG

## 2018-06-20 DIAGNOSIS — E11.9 TYPE 2 DIABETES MELLITUS WITHOUT COMPLICATION, WITH LONG-TERM CURRENT USE OF INSULIN (HCC): ICD-10-CM

## 2018-06-20 DIAGNOSIS — I10 ESSENTIAL HYPERTENSION: ICD-10-CM

## 2018-06-20 DIAGNOSIS — Z79.4 TYPE 2 DIABETES MELLITUS WITH DIABETIC POLYNEUROPATHY, WITH LONG-TERM CURRENT USE OF INSULIN (HCC): Primary | ICD-10-CM

## 2018-06-20 DIAGNOSIS — Z79.4 TYPE 2 DIABETES MELLITUS WITHOUT COMPLICATION, WITH LONG-TERM CURRENT USE OF INSULIN (HCC): ICD-10-CM

## 2018-06-20 DIAGNOSIS — E89.0 POSTOPERATIVE HYPOTHYROIDISM: ICD-10-CM

## 2018-06-20 DIAGNOSIS — E78.2 MIXED HYPERLIPIDEMIA: ICD-10-CM

## 2018-06-20 DIAGNOSIS — E11.42 TYPE 2 DIABETES MELLITUS WITH DIABETIC POLYNEUROPATHY, WITH LONG-TERM CURRENT USE OF INSULIN (HCC): Primary | ICD-10-CM

## 2018-06-20 DIAGNOSIS — E55.9 VITAMIN D DEFICIENCY: ICD-10-CM

## 2018-06-20 DIAGNOSIS — E21.3 HYPERPARATHYROIDISM (HCC): ICD-10-CM

## 2018-06-20 DIAGNOSIS — E04.1 SOLITARY THYROID NODULE: ICD-10-CM

## 2018-06-20 PROCEDURE — 99214 OFFICE O/P EST MOD 30 MIN: CPT | Performed by: NURSE PRACTITIONER

## 2018-06-20 RX ORDER — NATEGLINIDE 60 MG/1
60 TABLET ORAL
Qty: 90 TABLET | Refills: 5 | Status: SHIPPED | OUTPATIENT
Start: 2018-06-20 | End: 2018-12-20 | Stop reason: SDUPTHER

## 2018-06-20 RX ORDER — PITAVASTATIN CALCIUM 4.18 MG/1
TABLET, FILM COATED ORAL
Qty: 30 TABLET | Refills: 5 | Status: SHIPPED | OUTPATIENT
Start: 2018-06-20 | End: 2019-06-27 | Stop reason: SDUPTHER

## 2018-06-20 RX ORDER — LEVOTHYROXINE SODIUM 0.15 MG/1
150 TABLET ORAL DAILY
Qty: 30 TABLET | Refills: 5 | Status: SHIPPED | OUTPATIENT
Start: 2018-06-20 | End: 2018-12-20 | Stop reason: SDUPTHER

## 2018-06-20 NOTE — PROGRESS NOTES
"Stacey Johnson is a 77 y.o. female is here today for follow-up.  Chief Complaint   Patient presents with   • Diabetes     recent labs, pt tests BG 1x daily, pt brought BG log   • Hypothyroidism   • Hypertension   • Hyperlipidemia   • Vitamin D Deficiency   • HYPERPARATHYROID     /58   Ht 167.6 cm (66\")   Wt 94.8 kg (209 lb)   BMI 33.73 kg/m²   Current Outpatient Prescriptions on File Prior to Visit   Medication Sig   • allopurinol (ZYLOPRIM) 100 MG tablet Take 1 tablet by mouth Daily for 180 days.   • amLODIPine (NORVASC) 10 MG tablet Take 1 tablet by mouth Daily for 180 days.   • aspirin 81 MG EC tablet Take 1 tablet by mouth Daily. HOLD PER MD MAY-12/1   • carBAMazepine (CARBATROL) 300 MG 12 hr capsule Take 1 capsule by mouth Every 12 (Twelve) Hours for 180 days.   • chlorthalidone (HYGROTON) 25 MG tablet    • CloNIDine (CATAPRES) 0.1 MG tablet Take 1 tablet by mouth Every 12 (Twelve) Hours for 180 days.   • ezetimibe (ZETIA) 10 MG tablet Take 1 tablet by mouth Daily for 180 days.   • ferrous sulfate 325 (65 FE) MG tablet Take 1 tablet by mouth daily.   • furosemide (LASIX) 80 MG tablet Take 80 mg by mouth As Needed.   • glucose blood (JOSÉ MIGUEL CONTOUR NEXT TEST) test strip Checking BG twice a day   • hydrALAZINE (APRESOLINE) 50 MG tablet    • icosapent ethyl (VASCEPA) 1 g capsule capsule Take 2 g by mouth 2 (Two) Times a Day With Meals.   • Insulin Glargine (LANTUS SOLOSTAR) 100 UNIT/ML injection pen 75 units Q AM and every 3 days if fasting blood glucose is greater than 120 as 2 units up to 100 unit the day   • Insulin Pen Needle (BD PEN NEEDLE ISELA U/F) 32G X 4 MM misc Use to inject 1 time daily   • irbesartan (AVAPRO) 300 MG tablet Take 1 tablet by mouth Every Night for 180 days.   • levothyroxine (SYNTHROID) 150 MCG tablet Take 1 tablet by mouth Daily.   • LIVALO 4 MG tablet 1 tablet at bedtime by mouth   • montelukast (SINGULAIR) 10 MG tablet 10 mg daily.   • nateglinide (STARLIX) 60 MG " tablet Take 1 tablet by mouth 3 (Three) Times a Day Before Meals.   • potassium chloride (MICRO-K) 10 MEQ CR capsule Take 10 mEq by mouth Daily.   • SAXagliptin (ONGLYZA) 2.5 MG tablet Take 1 tablet by mouth Daily.   • vitamin B-12 (CYANOCOBALAMIN) 1000 MCG tablet Take 1 tablet by mouth daily.     No current facility-administered medications on file prior to visit.      Family History   Problem Relation Age of Onset   • Diabetes Sister    • Hypertension Sister    • Thyroid disease Sister    • Diabetes Brother    • Hypertension Brother    • Kidney disease Brother    • Cervical cancer Mother      Social History   Substance Use Topics   • Smoking status: Never Smoker   • Smokeless tobacco: Never Used   • Alcohol use No     Allergies   Allergen Reactions   • Codeine Nausea Only   • Hydrocodone-Acetaminophen Nausea Only and Other (See Comments)     Percocet and Vicodin; vertigo   • Meperidine Nausea Only   • Morphine And Related Nausea Only   • Oxycodone-Acetaminophen Nausea Only   • Oxycodone-Acetaminophen Nausea Only and Other (See Comments)     dilzziness   • Oxycodone-Aspirin Nausea Only and Other (See Comments)     vertigo   • Penicillins Hives   • Sulfa Antibiotics Hives         History of Present Illness  Encounter Diagnoses   Name Primary?   • Essential hypertension    • Mixed hyperlipidemia    • Vitamin D deficiency    • Hyperparathyroidism    • Postoperative hypothyroidism    • Solitary thyroid nodule    • Type 2 diabetes mellitus with diabetic polyneuropathy, with long-term current use of insulin Yes   77-year-old female patient here today for routine follow-up visit.  She is being seen for the above-mentioned problems.  She had recent labs which were reviewed and she was provided a copy.  She is requesting samples of her medications due to cost.  She has limited on what medications we can prescribe due to her insurance and her out-of-pocket cost.  She states she does feel a lot better since her blood sugars  are in better range.  She is checking her blood sugars daily in the morning with a range of 116-250.  She states she does feel better since her A1c has come down.  We discussed increasing her insulin to improve her overall glycemic control rather than change other medications.  She's got an eye exam 2 in August.  She states her vision was improve the last time she was seen by her ophthalmologist    The following portions of the patient's history were reviewed and updated as appropriate: allergies, current medications, past family history, past medical history, past social history, past surgical history and problem list.    Review of Systems   Constitutional: Negative for fatigue.   HENT: Negative for trouble swallowing.    Eyes: Negative for visual disturbance.   Respiratory: Negative for shortness of breath.    Cardiovascular: Negative for leg swelling.   Endocrine: Negative for polyuria.   Skin: Negative for wound.   Neurological: Negative for numbness.       Objective   Physical Exam   Constitutional: She is oriented to person, place, and time. She appears well-developed and well-nourished. No distress.   HENT:   Head: Normocephalic and atraumatic.   Right Ear: External ear normal.   Left Ear: External ear normal.   Nose: Nose normal.   Mouth/Throat: Oropharynx is clear and moist. No oropharyngeal exudate.   Eyes: EOM are normal. Pupils are equal, round, and reactive to light. Right eye exhibits no discharge. Left eye exhibits no discharge.   Neck: Trachea normal, normal range of motion and full passive range of motion without pain. Neck supple. No tracheal tenderness present. Carotid bruit is not present. No tracheal deviation, no edema and no erythema present. No thyroid mass and no thyromegaly present.   Cardiovascular: Normal rate, regular rhythm, normal heart sounds and intact distal pulses.  Exam reveals no gallop and no friction rub.    No murmur heard.  Pulmonary/Chest: Effort normal and breath sounds  normal. No stridor. No respiratory distress. She has no wheezes. She has no rales.   Abdominal: Soft. Bowel sounds are normal. She exhibits no distension.   Musculoskeletal: Normal range of motion. She exhibits no edema or deformity.   Lymphadenopathy:     She has no cervical adenopathy.   Neurological: She is alert and oriented to person, place, and time.   Skin: Skin is warm and dry. No rash noted. She is not diaphoretic. No erythema. No pallor.   Psychiatric: She has a normal mood and affect. Her behavior is normal. Judgment and thought content normal.   Nursing note and vitals reviewed.    Results for orders placed or performed in visit on 06/06/18   Calcium, Ionized   Result Value Ref Range    Ionized Calcium 5.3 4.5 - 5.6 mg/dL   Comprehensive Metabolic Panel   Result Value Ref Range    Glucose 119 (H) 65 - 99 mg/dL    BUN 22 8 - 23 mg/dL    Creatinine 1.70 (H) 0.57 - 1.00 mg/dL    eGFR Non African Am 29 (L) >60 mL/min/1.73    eGFR African Am 35 (L) >60 mL/min/1.73    BUN/Creatinine Ratio 12.9 7.0 - 25.0    Sodium 137 136 - 145 mmol/L    Potassium 4.0 3.5 - 5.2 mmol/L    Chloride 93 (L) 98 - 107 mmol/L    Total CO2 28.7 22.0 - 29.0 mmol/L    Calcium 9.2 8.6 - 10.5 mg/dL    Total Protein 6.7 6.0 - 8.5 g/dL    Albumin 3.80 3.50 - 5.20 g/dL    Globulin 2.9 gm/dL    A/G Ratio 1.3 g/dL    Total Bilirubin 0.3 0.1 - 1.2 mg/dL    Alkaline Phosphatase 98 39 - 117 U/L    AST (SGOT) 10 1 - 32 U/L    ALT (SGPT) 10 1 - 33 U/L   Comprehensive Thyroglobulin   Result Value Ref Range    Thyroglobulin Ab 4.3 (H) IU/mL    Thyroglobulin Comment ng/mL    Thyroglobulin (TG-TERRIE) 44 (H) ng/mL   C-Peptide   Result Value Ref Range    C-Peptide 5.8 (H) 1.1 - 4.4 ng/mL   Hemoglobin A1c   Result Value Ref Range    Hemoglobin A1C 10.37 (H) 4.80 - 5.60 %   Lipid Panel   Result Value Ref Range    Total Cholesterol 151 0 - 200 mg/dL    Triglycerides 96 0 - 150 mg/dL    HDL Cholesterol 66 (H) 40 - 60 mg/dL    VLDL Cholesterol 19.2 5 - 40 mg/dL     LDL Cholesterol  66 0 - 100 mg/dL   Phosphorus   Result Value Ref Range    Phosphorus 4.1 2.5 - 4.5 mg/dL   T3, Free   Result Value Ref Range    T3, Free 1.9 (L) 2.0 - 4.4 pg/mL   T4, Free   Result Value Ref Range    Free T4 1.33 0.93 - 1.70 ng/dL   Thyroid Panel With TSH   Result Value Ref Range    TSH 1.910 0.450 - 4.500 uIU/mL    T4, Total 7.1 4.5 - 12.0 ug/dL    T3 Uptake 33 24 - 39 %    Free Thyroxine Index 2.3 1.2 - 4.9   Vitamin D 25 Hydroxy   Result Value Ref Range    25 Hydroxy, Vitamin D 44.6 30.0 - 100.0 ng/ml   PTH, Intact   Result Value Ref Range    PTH, Intact 71 (H) 15 - 65 pg/mL   MicroAlbumin, Urine, Random   Result Value Ref Range    Microalbumin, Urine 366.3 Not Estab. ug/mL   Cardiovascular Risk Assessment   Result Value Ref Range    Interpretation Note    Diabetes Patient Education   Result Value Ref Range    PDF Image Not applicable          Assessment/Plan   Problems Addressed this Visit        Cardiovascular and Mediastinum    Essential hypertension    Mixed hyperlipidemia       Digestive    Vitamin D deficiency       Endocrine    Type 2 diabetes mellitus with diabetic polyneuropathy, with long-term current use of insulin - Primary    Postoperative hypothyroidism    Hyperparathyroidism    Solitary thyroid nodule          In summary, patient was seen and examined.  Her blood sugars were reviewed and she has had no hypoglycemic events.  She's had no reason which to go the emergency room.  Her glycemic control has improved significantly since her last visit however it is not at goal of less than 7.  Her A1c has come down over 2 points since her last check.  Her recent labs were reviewed and she was provided a copy.  We discussed options for therapy for diabetes management.  She is limited on finances so instead we will increase her Lantus insulin to 80 units.  She was given samples today of insulin and vascepa.  Metabolically she has improved.  She will follow-up with myself or Dr. Beltran her  next visit with labs

## 2018-09-12 ENCOUNTER — OFFICE VISIT (OUTPATIENT)
Dept: RETAIL CLINIC | Facility: CLINIC | Age: 78
End: 2018-09-12

## 2018-09-12 DIAGNOSIS — Z23 NEEDS FLU SHOT: Primary | ICD-10-CM

## 2018-09-20 ENCOUNTER — TELEPHONE (OUTPATIENT)
Dept: ENDOCRINOLOGY | Age: 78
End: 2018-09-20

## 2018-09-20 DIAGNOSIS — E78.2 MIXED HYPERLIPIDEMIA: ICD-10-CM

## 2018-09-20 DIAGNOSIS — I10 ESSENTIAL HYPERTENSION: ICD-10-CM

## 2018-09-20 DIAGNOSIS — E55.9 VITAMIN D DEFICIENCY: ICD-10-CM

## 2018-09-20 DIAGNOSIS — Z79.4 TYPE 2 DIABETES MELLITUS WITHOUT COMPLICATION, WITH LONG-TERM CURRENT USE OF INSULIN (HCC): ICD-10-CM

## 2018-09-20 DIAGNOSIS — E11.9 TYPE 2 DIABETES MELLITUS WITHOUT COMPLICATION, WITH LONG-TERM CURRENT USE OF INSULIN (HCC): ICD-10-CM

## 2018-09-20 NOTE — TELEPHONE ENCOUNTER
Spoke to pt's daughter and let her know that we did not have samples, I did send rx to [facility].     ----- Message from Saundra Cox sent at 9/20/2018  9:28 AM EDT -----  Contact: Daphne - daughter  Patient daughter called to get a refill Insulin Glargine (LANTUS SOLOSTAR) 100 UNIT/ML injection pen called into [address] AT [facility] - [phone]  - [phone] (Phone)  [phone] (Fax). Patient will be out of tomorrow.    Patient daughter wants to know if we have any samples.

## 2018-10-11 ENCOUNTER — TELEPHONE (OUTPATIENT)
Dept: ENDOCRINOLOGY | Age: 78
End: 2018-10-11

## 2018-10-11 NOTE — TELEPHONE ENCOUNTER
Spoke to the patients daughter and informed her samples were ready to be picked up. She expressed understanding.

## 2018-10-18 ENCOUNTER — OFFICE VISIT (OUTPATIENT)
Dept: FAMILY MEDICINE CLINIC | Facility: CLINIC | Age: 78
End: 2018-10-18

## 2018-10-18 VITALS
BODY MASS INDEX: 33.91 KG/M2 | RESPIRATION RATE: 16 BRPM | TEMPERATURE: 97.6 F | DIASTOLIC BLOOD PRESSURE: 69 MMHG | SYSTOLIC BLOOD PRESSURE: 138 MMHG | HEART RATE: 60 BPM | HEIGHT: 66 IN | WEIGHT: 211 LBS

## 2018-10-18 DIAGNOSIS — E78.2 MIXED HYPERLIPIDEMIA: ICD-10-CM

## 2018-10-18 DIAGNOSIS — I10 ESSENTIAL HYPERTENSION: ICD-10-CM

## 2018-10-18 DIAGNOSIS — E11.42 TYPE 2 DIABETES MELLITUS WITH DIABETIC POLYNEUROPATHY, WITH LONG-TERM CURRENT USE OF INSULIN (HCC): ICD-10-CM

## 2018-10-18 DIAGNOSIS — Z00.00 MEDICARE ANNUAL WELLNESS VISIT, SUBSEQUENT: Primary | ICD-10-CM

## 2018-10-18 DIAGNOSIS — E66.09 CLASS 1 OBESITY DUE TO EXCESS CALORIES WITH SERIOUS COMORBIDITY AND BODY MASS INDEX (BMI) OF 34.0 TO 34.9 IN ADULT: ICD-10-CM

## 2018-10-18 DIAGNOSIS — Z78.0 MENOPAUSE: ICD-10-CM

## 2018-10-18 DIAGNOSIS — M10.9 GOUT WITHOUT TOPHUS: ICD-10-CM

## 2018-10-18 DIAGNOSIS — M79.10 MYALGIA: ICD-10-CM

## 2018-10-18 DIAGNOSIS — Z79.4 TYPE 2 DIABETES MELLITUS WITH DIABETIC POLYNEUROPATHY, WITH LONG-TERM CURRENT USE OF INSULIN (HCC): ICD-10-CM

## 2018-10-18 PROCEDURE — G0439 PPPS, SUBSEQ VISIT: HCPCS | Performed by: FAMILY MEDICINE

## 2018-10-18 PROCEDURE — 99214 OFFICE O/P EST MOD 30 MIN: CPT | Performed by: FAMILY MEDICINE

## 2018-10-18 RX ORDER — CARBAMAZEPINE 300 MG/1
300 CAPSULE, EXTENDED RELEASE ORAL EVERY 12 HOURS SCHEDULED
Qty: 60 CAPSULE | Refills: 5 | Status: SHIPPED | OUTPATIENT
Start: 2018-10-18 | End: 2019-04-29 | Stop reason: SDUPTHER

## 2018-10-18 RX ORDER — IRBESARTAN 300 MG/1
300 TABLET ORAL NIGHTLY
Qty: 30 TABLET | Refills: 5 | Status: SHIPPED | OUTPATIENT
Start: 2018-10-18 | End: 2019-03-21 | Stop reason: RX

## 2018-10-18 RX ORDER — CLONIDINE HYDROCHLORIDE 0.1 MG/1
0.1 TABLET ORAL EVERY 12 HOURS SCHEDULED
Qty: 60 TABLET | Refills: 5 | Status: SHIPPED | OUTPATIENT
Start: 2018-10-18 | End: 2019-04-29 | Stop reason: SDUPTHER

## 2018-10-18 RX ORDER — EZETIMIBE 10 MG/1
10 TABLET ORAL DAILY
Qty: 30 TABLET | Refills: 5 | Status: SHIPPED | OUTPATIENT
Start: 2018-10-18 | End: 2019-04-29 | Stop reason: SDUPTHER

## 2018-10-18 RX ORDER — ALLOPURINOL 100 MG/1
100 TABLET ORAL DAILY
Qty: 30 TABLET | Refills: 5 | Status: SHIPPED | OUTPATIENT
Start: 2018-10-18 | End: 2019-04-29 | Stop reason: SDUPTHER

## 2018-10-18 RX ORDER — AMLODIPINE BESYLATE 10 MG/1
10 TABLET ORAL DAILY
Qty: 30 TABLET | Refills: 5 | Status: SHIPPED | OUTPATIENT
Start: 2018-10-18 | End: 2019-04-29 | Stop reason: SDUPTHER

## 2018-10-18 RX ORDER — ICOSAPENT ETHYL 1000 MG/1
2 CAPSULE ORAL 2 TIMES DAILY WITH MEALS
Qty: 120 CAPSULE | Refills: 5 | Status: SHIPPED | OUTPATIENT
Start: 2018-10-18 | End: 2019-04-29 | Stop reason: SDUPTHER

## 2018-10-18 NOTE — ASSESSMENT & PLAN NOTE
Obesity is unchanged.  Discussed the patient's BMI.  The BMI is above average; BMI management plan is completed.  Diet interventions: moderate (500 kCal/d) deficit diet.

## 2018-10-18 NOTE — PROGRESS NOTES
QUICK REFERENCE INFORMATION:  The ABCs of the Annual Wellness Visit    Subsequent Medicare Wellness Visit    HEALTH RISK ASSESSMENT    1940    Recent Hospitalizations:  No hospitalization(s) within the last year..        Current Medical Providers:  Patient Care Team:  Quinn Washington MD as PCP - General (Family Medicine)  Quinn Washington MD as PCP - Claims Attributed  Steve Simons Jr., MD as Consulting Physician (Hematology and Oncology)  Hugh Caruso MD as Consulting Physician (Nephrology)  Adalgisa Hernandez APRN as Nurse Practitioner (Endocrinology)  Reagan Beltran MD as Consulting Physician (Endocrinology)  Aguilar Goldman MD as Consulting Physician (Pulmonary Disease)  Mil Sr MD as Consulting Physician (Ophthalmology)  Tasneem Montelongo MD as Surgeon (General Surgery)  Victor Hugo Ng MD as Consulting Physician (Cardiology)        Smoking Status:  History   Smoking Status   • Never Smoker   Smokeless Tobacco   • Never Used       Alcohol Consumption:  History   Alcohol Use No       Depression Screen:   PHQ-2/PHQ-9 Depression Screening 10/18/2018   Little interest or pleasure in doing things 0   Feeling down, depressed, or hopeless 0   Total Score 0       Health Habits and Functional and Cognitive Screening:  Functional & Cognitive Status 10/18/2018   Do you have difficulty preparing food and eating? No   Do you have difficulty bathing yourself, getting dressed or grooming yourself? No   Do you have difficulty using the toilet? No   Do you have difficulty moving around from place to place? No   Do you have trouble with steps or getting out of a bed or a chair? No   In the past year have you fallen or experienced a near fall? No   Current Diet Well Balanced Diet   Dental Exam Up to date   Eye Exam Up to date   Exercise (times per week) 7 times per week   Current Exercise Activities Include Walking   Do you need help using the phone?  No   Are you deaf or do you have serious difficulty hearing?   No   Do you need help with transportation? No   Do you need help shopping? No   Do you need help preparing meals?  No   Do you need help with housework?  No   Do you need help with laundry? No   Do you need help taking your medications? No   Do you need help managing money? No   Do you ever drive or ride in a car without wearing a seat belt? No   Have you felt unusual stress, anger or loneliness in the last month? No   Who do you live with? Child   If you need help, do you have trouble finding someone available to you? No   Do you have difficulty concentrating, remembering or making decisions? No           Does the patient have evidence of cognitive impairment? No    Aspirin use counseling: Start ASA 81 mg daily       Recent Lab Results:  CMP:  Lab Results   Component Value Date     (H) 06/06/2018    BUN 22 06/06/2018    CREATININE 1.70 (H) 06/06/2018    EGFRIFNONA 29 (L) 06/06/2018    EGFRIFAFRI 35 (L) 06/06/2018    BCR 12.9 06/06/2018     06/06/2018    K 4.0 06/06/2018    CO2 28.7 06/06/2018    CALCIUM 9.2 06/06/2018    PROTENTOTREF 6.7 06/06/2018    ALBUMIN 3.80 06/06/2018    LABGLOBREF 2.9 06/06/2018    LABIL2 1.3 06/06/2018    BILITOT 0.3 06/06/2018    ALKPHOS 98 06/06/2018    AST 10 06/06/2018    ALT 10 06/06/2018     Lipid Panel:  Lab Results   Component Value Date    TRIG 96 06/06/2018    HDL 66 (H) 06/06/2018    VLDL 19.2 06/06/2018     HbA1c:  Lab Results   Component Value Date    HGBA1C 10.37 (H) 06/06/2018       Visual Acuity:  No exam data present    Age-appropriate Screening Schedule:  Refer to the list below for future screening recommendations based on patient's age, sex and/or medical conditions. Orders for these recommended tests are listed in the plan section. The patient has been provided with a written plan.    Health Maintenance   Topic Date Due   • TDAP/TD VACCINES (1 - Tdap) 10/08/1959   • DXA SCAN  10/13/2018   • ZOSTER VACCINE (2 of 3) 10/25/2019 (Originally 8/17/2010)   •  HEMOGLOBIN A1C  12/06/2018   • LIPID PANEL  06/06/2019   • URINE MICROALBUMIN  06/06/2019   • MAMMOGRAM  01/10/2020   • INFLUENZA VACCINE  Completed   • PNEUMOCOCCAL VACCINES (65+ LOW/MEDIUM RISK)  Completed        Chief Complaint   Patient presents with   • Medicare Wellness   • Hypertension   • Hypothyroidism   • Hyperlipidemia   • Labs under Media 09/12/2018       Subjective   History of Present Illness    Ange Johnson is a 78 y.o. female who presents for an Subsequent Wellness Visit.her arterial blood pressure is controlled on current therapy.  Gout is stable. She continues to take carbamazepine for her myalgia and that condition is stable.  She also takes that for her type 2 diabetes with polyneuropathy. Her most recent hemoglobin A1c is not controlled.  New medicines were added and she is due to see endocrinologist in December with repeat labs.  Lipids will also be re-assessed at that visit.  Medications for her lipids have been renewed.  Overall she feels fine.  She does remain under the care of nephrology for her kidney disorder.  That condition is nonprogressive.  Recent eye exam was favorable.    The following portions of the patient's history were reviewed and updated as appropriate: allergies, current medications, past family history, past medical history, past social history, past surgical history and problem list.    Outpatient Medications Prior to Visit   Medication Sig Dispense Refill   • aspirin 81 MG EC tablet Take 1 tablet by mouth Daily. HOLD PER MD MAY-12/1     • chlorthalidone (HYGROTON) 25 MG tablet      • ferrous sulfate 325 (65 FE) MG tablet Take 1 tablet by mouth daily.     • furosemide (LASIX) 80 MG tablet Take 80 mg by mouth As Needed.     • glucose blood (JOSÉ MIGUEL CONTOUR NEXT TEST) test strip Checking BG twice a day 200 each 1   • hydrALAZINE (APRESOLINE) 50 MG tablet      • Insulin Glargine (LANTUS SOLOSTAR) 100 UNIT/ML injection pen 80 units daily 30 mL 0   • Insulin Pen Needle (BD  PEN NEEDLE ISELA U/F) 32G X 4 MM misc Use to inject 1 time daily 100 each 1   • levothyroxine (SYNTHROID) 150 MCG tablet Take 1 tablet by mouth Daily. 30 tablet 5   • LIVALO 4 MG tablet 1 tablet at bedtime by mouth 30 tablet 5   • montelukast (SINGULAIR) 10 MG tablet 10 mg daily.     • nateglinide (STARLIX) 60 MG tablet Take 1 tablet by mouth 3 (Three) Times a Day Before Meals. 90 tablet 5   • potassium chloride (MICRO-K) 10 MEQ CR capsule Take 10 mEq by mouth Daily.     • SAXagliptin (ONGLYZA) 2.5 MG tablet Take 1 tablet by mouth Daily. 30 tablet 5   • vitamin B-12 (CYANOCOBALAMIN) 1000 MCG tablet Take 1 tablet by mouth daily.     • icosapent ethyl (VASCEPA) 1 g capsule capsule Take 2 g by mouth 2 (Two) Times a Day With Meals. 120 capsule 5   • allopurinol (ZYLOPRIM) 100 MG tablet Take 1 tablet by mouth Daily for 180 days. 30 tablet 5   • amLODIPine (NORVASC) 10 MG tablet Take 1 tablet by mouth Daily for 180 days. 30 tablet 5   • carBAMazepine (CARBATROL) 300 MG 12 hr capsule Take 1 capsule by mouth Every 12 (Twelve) Hours for 180 days. 60 capsule 5   • CloNIDine (CATAPRES) 0.1 MG tablet Take 1 tablet by mouth Every 12 (Twelve) Hours for 180 days. 60 tablet 5   • ezetimibe (ZETIA) 10 MG tablet Take 1 tablet by mouth Daily for 180 days. 30 tablet 5   • irbesartan (AVAPRO) 300 MG tablet Take 1 tablet by mouth Every Night for 180 days. 30 tablet 5     No facility-administered medications prior to visit.        Patient Active Problem List   Diagnosis   • Asthma   • Type 2 diabetes mellitus with diabetic polyneuropathy, with long-term current use of insulin (CMS/HCC)   • Edema   • Essential hypertension   • Mixed hyperlipidemia   • Disorder of muscle, ligament, and fascia   • Proteinuria   • Postoperative hypothyroidism   • Hyperparathyroidism (CMS/HCC)   • Vitamin D deficiency   • Gout without tophus   • Solitary thyroid nodule   • Anemia in stage 3 chronic kidney disease (CMS/HCC)   • Cardiac murmur   • Stage 3 chronic  "kidney disease (CMS/[facility])   • Class 1 obesity due to excess calories with serious comorbidity and body mass index (BMI) of 34.0 to 34.9 in adult       Advance Care Planning:  has an advance directive - a copy HAS NOT been provided. Have asked the patient to send this to us to add to record.    Identification of Risk Factors:  Risk factors include: weight , unhealthy diet, cardiovascular risk, increased fall risk, chronic pain and incontinence.    Review of Systems   Constitutional: Negative for fatigue.   Cardiovascular: Negative for chest pain.       Compared to one year ago, the patient feels her physical health is the same.  Compared to one year ago, the patient feels her mental health is the same.    Objective     Physical Exam   Constitutional: She is cooperative. No distress.   Eyes: Conjunctivae and lids are normal.   Neck: Carotid bruit is not present. No tracheal deviation present.   Cardiovascular: Normal rate and regular rhythm.    Murmur heard.   Systolic murmur is present with a grade of 1/6   Pulmonary/Chest: Effort normal and breath sounds normal.   Neurological: She is alert. She is not disoriented.   Skin: Skin is warm and dry.   Psychiatric: She has a normal mood and affect. Her speech is normal and behavior is normal.   Vitals reviewed.      Vitals:    10/18/18 1034   BP: 138/69   Pulse: 60   Resp: 16   Temp: 97.6 °F (36.4 °C)   TempSrc: Oral   Weight: 95.7 kg (211 lb)   Height: 167.6 cm (66\")   PainSc: 0-No pain       Patient's Body mass index is 34.06 kg/m². BMI is above normal parameters. Recommendations include: educational material, exercise counseling and nutrition counseling.      Assessment/Plan   Patient Self-Management and Personalized Health Advice  The patient has been provided with information about: diet, exercise, weight management, prevention of cardiac or vascular disease and fall prevention and preventive services including:   · Bone densitometry screening, Fall Risk assessment " done, Influenza vaccine, TdaP vaccine, shingrix.    Problem List Items Addressed This Visit     Essential hypertension     Hypertension is unchanged.  Continue current treatment regimen.  Blood pressure will be reassessed at the next regular appointment.         Relevant Medications    amLODIPine (NORVASC) 10 MG tablet    CloNIDine (CATAPRES) 0.1 MG tablet    irbesartan (AVAPRO) 300 MG tablet    Gout without tophus     The current medical regimen is effective;  continue present plan and medications.           Relevant Medications    allopurinol (ZYLOPRIM) 100 MG tablet    Class 1 obesity due to excess calories with serious comorbidity and body mass index (BMI) of 34.0 to 34.9 in adult     Obesity is unchanged.  Discussed the patient's BMI.  The BMI is above average; BMI management plan is completed.  Diet interventions: moderate (500 kCal/d) deficit diet.         Type 2 diabetes mellitus with diabetic polyneuropathy, with long-term current use of insulin (CMS/[facility])     Diabetes is not fully controlled. Further treatment as outlined by endocrinology         Mixed hyperlipidemia     Lipid abnormalities are unchanged.  Pharmacotherapy as ordered.  Lipids will be reassessed in 6 months.         Relevant Medications    icosapent ethyl (VASCEPA) 1 g capsule capsule    ezetimibe (ZETIA) 10 MG tablet    RESOLVED: Myalgia    Relevant Medications    carBAMazepine (CARBATROL) 300 MG 12 hr capsule      Other Visit Diagnoses     Medicare annual wellness visit, subsequent    -  Primary    fall prevention, diet, exercise, immunization information shared with patient.  She will bring in copy of advance directive for rescanning.    Menopause        Relevant Orders    DEXA Bone Density Axial            Orders Placed This Encounter   Procedures   • DEXA Bone Density Axial     Standing Status:   Future     Order Specific Question:   Reason for Exam:     Answer:   screen for osteoporosis, menopausal       Outpatient Encounter  Prescriptions as of 10/18/2018   Medication Sig Dispense Refill   • aspirin 81 MG EC tablet Take 1 tablet by mouth Daily. HOLD PER MD MAY-12/1     • chlorthalidone (HYGROTON) 25 MG tablet      • ferrous sulfate 325 (65 FE) MG tablet Take 1 tablet by mouth daily.     • furosemide (LASIX) 80 MG tablet Take 80 mg by mouth As Needed.     • glucose blood (JOSÉ MIGUEL CONTOUR NEXT TEST) test strip Checking BG twice a day 200 each 1   • hydrALAZINE (APRESOLINE) 50 MG tablet      • icosapent ethyl (VASCEPA) 1 g capsule capsule Take 2 g by mouth 2 (Two) Times a Day With Meals. 120 capsule 5   • Insulin Glargine (LANTUS SOLOSTAR) 100 UNIT/ML injection pen 80 units daily 30 mL 0   • Insulin Pen Needle (BD PEN NEEDLE ISELA U/F) 32G X 4 MM misc Use to inject 1 time daily 100 each 1   • levothyroxine (SYNTHROID) 150 MCG tablet Take 1 tablet by mouth Daily. 30 tablet 5   • LIVALO 4 MG tablet 1 tablet at bedtime by mouth 30 tablet 5   • montelukast (SINGULAIR) 10 MG tablet 10 mg daily.     • nateglinide (STARLIX) 60 MG tablet Take 1 tablet by mouth 3 (Three) Times a Day Before Meals. 90 tablet 5   • potassium chloride (MICRO-K) 10 MEQ CR capsule Take 10 mEq by mouth Daily.     • SAXagliptin (ONGLYZA) 2.5 MG tablet Take 1 tablet by mouth Daily. 30 tablet 5   • vitamin B-12 (CYANOCOBALAMIN) 1000 MCG tablet Take 1 tablet by mouth daily.     • [DISCONTINUED] icosapent ethyl (VASCEPA) 1 g capsule capsule Take 2 g by mouth 2 (Two) Times a Day With Meals. 120 capsule 5   • allopurinol (ZYLOPRIM) 100 MG tablet Take 1 tablet by mouth Daily for 180 days. 30 tablet 5   • amLODIPine (NORVASC) 10 MG tablet Take 1 tablet by mouth Daily for 180 days. 30 tablet 5   • carBAMazepine (CARBATROL) 300 MG 12 hr capsule Take 1 capsule by mouth Every 12 (Twelve) Hours for 180 days. 60 capsule 5   • CloNIDine (CATAPRES) 0.1 MG tablet Take 1 tablet by mouth Every 12 (Twelve) Hours for 180 days. 60 tablet 5   • ezetimibe (ZETIA) 10 MG tablet Take 1 tablet by  mouth Daily for 180 days. 30 tablet 5   • irbesartan (AVAPRO) 300 MG tablet Take 1 tablet by mouth Every Night for 180 days. 30 tablet 5   • [DISCONTINUED] allopurinol (ZYLOPRIM) 100 MG tablet Take 1 tablet by mouth Daily for 180 days. 30 tablet 5   • [DISCONTINUED] amLODIPine (NORVASC) 10 MG tablet Take 1 tablet by mouth Daily for 180 days. 30 tablet 5   • [DISCONTINUED] carBAMazepine (CARBATROL) 300 MG 12 hr capsule Take 1 capsule by mouth Every 12 (Twelve) Hours for 180 days. 60 capsule 5   • [DISCONTINUED] CloNIDine (CATAPRES) 0.1 MG tablet Take 1 tablet by mouth Every 12 (Twelve) Hours for 180 days. 60 tablet 5   • [DISCONTINUED] ezetimibe (ZETIA) 10 MG tablet Take 1 tablet by mouth Daily for 180 days. 30 tablet 5   • [DISCONTINUED] irbesartan (AVAPRO) 300 MG tablet Take 1 tablet by mouth Every Night for 180 days. 30 tablet 5     No facility-administered encounter medications on file as of 10/18/2018.        Reviewed use of high risk medication in the elderly: no  Reviewed for potential of harmful drug interactions in the elderly: no    Follow Up:  Return in about 6 months (around 4/18/2019) for Recheck.     An After Visit Summary and PPPS with all of these plans were given to the patient.

## 2018-10-18 NOTE — PATIENT INSTRUCTIONS
Check on insurance coverage and cost for Shingrix (newest shingles vaccine) and get the immunization at your local pharmacy. It is more effective than the old Zostavax vaccine and is recommended even if you have had the Zostavax vaccine in the past. For more information, please look at the website below:    https://www.cdc.gov/vaccines/vpd/shingles/public/shingrix/index.html    Check on insurance coverage and cost for adacel Tdap(tetanus plus whooping cough vaccine) and get the immunization at your local pharmacy    Medicare Wellness  Personal Prevention Plan of Service     Date of Office Visit:  10/18/2018  Encounter Provider:  Quinn Washington MD  Place of Service:  [facility]  Patient Name: Ange Johnson  :  1940    As part of the Medicare Wellness portion of your visit today, we are providing you with this personalized preventive plan of services (PPPS). This plan is based upon recommendations of the [place] Preventive Services Task Force (USPSTF) and the Advisory Committee on Immunization Practices (ACIP).    This lists the preventive care services that should be considered, and provides dates of when you are due. Items listed as completed are up-to-date and do not require any further intervention.    Health Maintenance   Topic Date Due   • TDAP/TD VACCINES (1 - Tdap) 10/08/1959   • MEDICARE ANNUAL WELLNESS  2016   • DXA SCAN  10/13/2018   • ZOSTER VACCINE (2 of 3) 10/25/2019 (Originally 2010)   • HEMOGLOBIN A1C  2018   • LIPID PANEL  2019   • URINE MICROALBUMIN  2019   • MAMMOGRAM  01/10/2020   • INFLUENZA VACCINE  Completed   • PNEUMOCOCCAL VACCINES (65+ LOW/MEDIUM RISK)  Completed       Orders Placed This Encounter   Procedures   • DEXA Bone Density Axial     Standing Status:   Future     Order Specific Question:   Reason for Exam:     Answer:   screen for osteoporosis, menopausal       Return in about 6 months (around 2019) for  Recheck.        Fall Prevention in the Home  Falls can cause injuries and can affect people from all age groups. There are many simple things that you can do to make your home safe and to help prevent falls.  What can I do on the outside of my home?  · Regularly repair the edges of walkways and driveways and fix any cracks.  · Remove high doorway thresholds.  · Trim any shrubbery on the main path into your home.  · Use bright outdoor lighting.  · Clear walkways of debris and clutter, including tools and rocks.  · Regularly check that handrails are securely fastened and in good repair. Both sides of any steps should have handrails.  · Install guardrails along the edges of any raised decks or porches.  · Have leaves, snow, and ice cleared regularly.  · Use sand or salt on walkways during winter months.  · In the garage, clean up any spills right away, including grease or oil spills.  What can I do in the bathroom?  · Use night lights.  · Install grab bars by the toilet and in the tub and shower. Do not use towel bars as grab bars.  · Use non-skid mats or decals on the floor of the tub or shower.  · If you need to sit down while you are in the shower, use a plastic, non-slip stool.  · Keep the floor dry. Immediately clean up any water that spills on the floor.  · Remove soap buildup in the tub or shower on a regular basis.  · Attach bath mats securely with double-sided non-slip rug tape.  · Remove throw rugs and other tripping hazards from the floor.  What can I do in the bedroom?  · Use night lights.  · Make sure that a bedside light is easy to reach.  · Do not use oversized bedding that drapes onto the floor.  · Have a firm chair that has side arms to use for getting dressed.  · Remove throw rugs and other tripping hazards from the floor.  What can I do in the kitchen?  · Clean up any spills right away.  · Avoid walking on wet floors.  · Place frequently used items in easy-to-reach places.  · If you need to reach for  something above you, use a sturdy step stool that has a grab bar.  · Keep electrical cables out of the way.  · Do not use floor polish or wax that makes floors slippery. If you have to use wax, make sure that it is non-skid floor wax.  · Remove throw rugs and other tripping hazards from the floor.  What can I do in the stairways?  · Do not leave any items on the stairs.  · Make sure that there are handrails on both sides of the stairs. Fix handrails that are broken or loose. Make sure that handrails are as long as the stairways.  · Check any carpeting to make sure that it is firmly attached to the stairs. Fix any carpet that is loose or worn.  · Avoid having throw rugs at the top or bottom of stairways, or secure the rugs with carpet tape to prevent them from moving.  · Make sure that you have a light switch at the top of the stairs and the bottom of the stairs. If you do not have them, have them installed.  What are some other fall prevention tips?  · Wear closed-toe shoes that fit well and support your feet. Wear shoes that have rubber soles or low heels.  · When you use a stepladder, make sure that it is completely opened and that the sides are firmly locked. Have someone hold the ladder while you are using it. Do not climb a closed stepladder.  · Add color or contrast paint or tape to grab bars and handrails in your home. Place contrasting color strips on the first and last steps.  · Use mobility aids as needed, such as canes, walkers, scooters, and crutches.  · Turn on lights if it is dark. Replace any light bulbs that burn out.  · Set up furniture so that there are clear paths. Keep the furniture in the same spot.  · Fix any uneven floor surfaces.  · Choose a carpet design that does not hide the edge of steps of a stairway.  · Be aware of any and all pets.  · Review your medicines with your healthcare provider. Some medicines can cause dizziness or changes in blood pressure, which increase your risk of  falling.  Talk with your health care provider about other ways that you can decrease your risk of falls. This may include working with a physical therapist or  to improve your strength, balance, and endurance.  This information is not intended to replace advice given to you by your health care provider. Make sure you discuss any questions you have with your health care provider.  Document Released: 12/08/2003 Document Revised: 05/16/2017 Document Reviewed: 01/22/2016  [employer] Interactive Patient Education © 2018 [employer]          Obesity, Adult  Obesity is the condition of having too much total body fat. Being overweight or obese means that your weight is greater than what is considered healthy for your body size. Obesity is determined by a measurement called BMI. BMI is an estimate of body fat and is calculated from height and weight. For adults, a BMI of 30 or higher is considered obese.  Obesity can eventually lead to other health concerns and major illnesses, including:  · Stroke.  · Coronary artery disease (CAD).  · Type 2 diabetes.  · Some types of cancer, including cancers of the colon, breast, uterus, and gallbladder.  · Osteoarthritis.  · High blood pressure (hypertension).  · High cholesterol.  · Sleep apnea.  · Gallbladder stones.  · Infertility problems.    What are the causes?  The main cause of obesity is taking in (consuming) more calories than your body uses for energy. Other factors that contribute to this condition may include:  · Being born with genes that make you more likely to become obese.  · Having a medical condition that causes obesity. These conditions include:  ? Hypothyroidism.  ? Polycystic ovarian syndrome (PCOS).  ? Binge-eating disorder.  ? Cushing syndrome.  · Taking certain medicines, such as steroids, antidepressants, and seizure medicines.  · Not being physically active (sedentary lifestyle).  · Living where there are limited places to exercise safely or buy healthy  foods.  · Not getting enough sleep.    What increases the risk?  The following factors may increase your risk of this condition:  · Having a family history of obesity.  · Being a woman of -American descent.  · Being a man of  descent.    What are the signs or symptoms?  Having excessive body fat is the main symptom of this condition.  How is this diagnosed?  This condition may be diagnosed based on:  · Your symptoms.  · Your medical history.  · A physical exam. Your health care provider may measure:  ? Your BMI. If you are an adult with a BMI between 25 and less than 30, you are considered overweight. If you are an adult with a BMI of 30 or higher, you are considered obese.  ? The distances around your hips and your waist (circumferences). These may be compared to each other to help diagnose your condition.  ? Your skinfold thickness. Your health care provider may gently pinch a fold of your skin and measure it.    How is this treated?  Treatment for this condition often includes changing your lifestyle. Treatment may include some or all of the following:  · Dietary changes. Work with your health care provider and a dietitian to set a weight-loss goal that is healthy and reasonable for you. Dietary changes may include eating:  ? Smaller portions. A portion size is the amount of a particular food that is healthy for you to eat at one time. This varies from person to person.  ? Low-calorie or low-fat options.  ? More whole grains, fruits, and vegetables.  · Regular physical activity. This may include aerobic activity (cardio) and strength training.  · Medicine to help you lose weight. Your health care provider may prescribe medicine if you are unable to lose 1 pound a week after 6 weeks of eating more healthily and doing more physical activity.  · Surgery. Surgical options may include gastric banding and gastric bypass. Surgery may be done if:  ? Other treatments have not helped to improve your  condition.  ? You have a BMI of 40 or higher.  ? You have life-threatening health problems related to obesity.    Follow these instructions at home:    Eating and drinking    · Follow recommendations from your health care provider about what you eat and drink. Your health care provider may advise you to:  ? Limit fast foods, sweets, and processed snack foods.  ? Choose low-fat options, such as low-fat milk instead of whole milk.  ? Eat 5 or more servings of fruits or vegetables every day.  ? Eat at home more often. This gives you more control over what you eat.  ? Choose healthy foods when you eat out.  ? Learn what a healthy portion size is.  ? Keep low-fat snacks on hand.  ? Avoid sugary drinks, such as soda, fruit juice, iced tea sweetened with sugar, and flavored milk.  ? Eat a healthy breakfast.  · Drink enough water to keep your urine clear or pale yellow.  · Do not go without eating for long periods of time (do not fast) or follow a fad diet. Fasting and fad diets can be unhealthy and even dangerous.  Physical Activity  · Exercise regularly, as told by your health care provider. Ask your health care provider what types of exercise are safe for you and how often you should exercise.  · Warm up and stretch before being active.  · Cool down and stretch after being active.  · Rest between periods of activity.  Lifestyle  · Limit the time that you spend in front of your TV, computer, or video game system.  · Find ways to reward yourself that do not involve food.  · Limit alcohol intake to no more than 1 drink a day for nonpregnant women and 2 drinks a day for men. One drink equals 12 oz of beer, 5 oz of wine, or 1½ oz of hard liquor.  General instructions  · Keep a weight loss journal to keep track of the food you eat and how much you exercise you get.  · Take over-the-counter and prescription medicines only as told by your health care provider.  · Take vitamins and supplements only as told by your health care  provider.  · Consider joining a support group. Your health care provider may be able to recommend a support group.  · Keep all follow-up visits as told by your health care provider. This is important.  Contact a health care provider if:  · You are unable to meet your weight loss goal after 6 weeks of dietary and lifestyle changes.  This information is not intended to replace advice given to you by your health care provider. Make sure you discuss any questions you have with your health care provider.  Document Released: 01/25/2006 Document Revised: 05/22/2017 Document Reviewed: 10/05/2016  [employer] Interactive Patient Education © 2018 [employer]      Exercising to Lose Weight  Exercising can help you to lose weight. In order to lose weight through exercise, you need to do vigorous-intensity exercise. You can tell that you are exercising with vigorous intensity if you are breathing very hard and fast and cannot hold a conversation while exercising.  Moderate-intensity exercise helps to maintain your current weight. You can tell that you are exercising at a moderate level if you have a higher heart rate and faster breathing, but you are still able to hold a conversation.  How often should I exercise?  Choose an activity that you enjoy and set realistic goals. Your health care provider can help you to make an activity plan that works for you. Exercise regularly as directed by your health care provider. This may include:  · Doing resistance training twice each week, such as:  ? Push-ups.  ? Sit-ups.  ? Lifting weights.  ? Using resistance bands.  · Doing a given intensity of exercise for a given amount of time. Choose from these options:  ? 150 minutes of moderate-intensity exercise every week.  ? 75 minutes of vigorous-intensity exercise every week.  ? A mix of moderate-intensity and vigorous-intensity exercise every week.    Children, pregnant women, people who are out of shape, people who are overweight, and older  adults may need to consult a health care provider for individual recommendations. If you have any sort of medical condition, be sure to consult your health care provider before starting a new exercise program.  What are some activities that can help me to lose weight?  · Walking at a rate of at least 4.5 miles an hour.  · Jogging or running at a rate of 5 miles per hour.  · Biking at a rate of at least 10 miles per hour.  · Lap swimming.  · Roller-skating or in-line skating.  · Cross-country skiing.  · Vigorous competitive sports, such as football, basketball, and soccer.  · Jumping rope.  · Aerobic dancing.  How can I be more active in my day-to-day activities?  · Use the stairs instead of the elevator.  · Take a walk during your lunch break.  · If you drive, park your car farther away from work or school.  · If you take public transportation, get off one stop early and walk the rest of the way.  · Make all of your phone calls while standing up and walking around.  · Get up, stretch, and walk around every 30 minutes throughout the day.  What guidelines should I follow while exercising?  · Do not exercise so much that you hurt yourself, feel dizzy, or get very short of breath.  · Consult your health care provider prior to starting a new exercise program.  · Wear comfortable clothes and shoes with good support.  · Drink plenty of water while you exercise to prevent dehydration or heat stroke. Body water is lost during exercise and must be replaced.  · Work out until you breathe faster and your heart beats faster.  This information is not intended to replace advice given to you by your health care provider. Make sure you discuss any questions you have with your health care provider.  Document Released: 01/20/2012 Document Revised: 05/25/2017 Document Reviewed: 05/21/2015  [employer] Interactive Patient Education © 2018 [employer]      Calorie Counting for Weight Loss  Calories are units of energy. Your body needs a  certain amount of calories from food to keep you going throughout the day. When you eat more calories than your body needs, your body stores the extra calories as fat. When you eat fewer calories than your body needs, your body burns fat to get the energy it needs.  Calorie counting means keeping track of how many calories you eat and drink each day. Calorie counting can be helpful if you need to lose weight. If you make sure to eat fewer calories than your body needs, you should lose weight. Ask your health care provider what a healthy weight is for you.  For calorie counting to work, you will need to eat the right number of calories in a day in order to lose a healthy amount of weight per week. A dietitian can help you determine how many calories you need in a day and will give you suggestions on how to reach your calorie goal.  · A healthy amount of weight to lose per week is usually 1-2 lb (0.5-0.9 kg). This usually means that your daily calorie intake should be reduced by 500-750 calories.  · Eating 1,200 - 1,500 calories per day can help most women lose weight.  · Eating 1,500 - 1,800 calories per day can help most men lose weight.    What do I need to know about calorie counting?  In order to meet your daily calorie goal, you will need to:  · Find out how many calories are in each food you would like to eat. Try to do this before you eat.  · Decide how much of the food you plan to eat.  · Write down what you ate and how many calories it had. Doing this is called keeping a food log.    To successfully lose weight, it is important to balance calorie counting with a healthy lifestyle that includes regular activity. Aim for 150 minutes of moderate exercise (such as walking) or 75 minutes of vigorous exercise (such as running) each week.  Where do I find calorie information?    The number of calories in a food can be found on a Nutrition Facts label. If a food does not have a Nutrition Facts label, try to look up  the calories online or ask your dietitian for help.  Remember that calories are listed per serving. If you choose to have more than one serving of a food, you will have to multiply the calories per serving by the amount of servings you plan to eat. For example, the label on a package of bread might say that a serving size is 1 slice and that there are 90 calories in a serving. If you eat 1 slice, you will have eaten 90 calories. If you eat 2 slices, you will have eaten 180 calories.  How do I keep a food log?  Immediately after each meal, record the following information in your food log:  · What you ate. Don't forget to include toppings, sauces, and other extras on the food.  · How much you ate. This can be measured in cups, ounces, or number of items.  · How many calories each food and drink had.  · The total number of calories in the meal.    Keep your food log near you, such as in a small notebook in your pocket, or use a mobile liborio or website. Some programs will calculate calories for you and show you how many calories you have left for the day to meet your goal.  What are some calorie counting tips?  · Use your calories on foods and drinks that will fill you up and not leave you hungry:  ? Some examples of foods that fill you up are nuts and nut butters, vegetables, lean proteins, and high-fiber foods like whole grains. High-fiber foods are foods with more than 5 g fiber per serving.  ? Drinks such as sodas, specialty coffee drinks, alcohol, and juices have a lot of calories, yet do not fill you up.  · Eat nutritious foods and avoid empty calories. Empty calories are calories you get from foods or beverages that do not have many vitamins or protein, such as candy, sweets, and soda. It is better to have a nutritious high-calorie food (such as an avocado) than a food with few nutrients (such as a bag of chips).  · Know how many calories are in the foods you eat most often. This will help you calculate calorie  "counts faster.  · Pay attention to calories in drinks. Low-calorie drinks include water and unsweetened drinks.  · Pay attention to nutrition labels for \"low fat\" or \"fat free\" foods. These foods sometimes have the same amount of calories or more calories than the full fat versions. They also often have added sugar, starch, or salt, to make up for flavor that was removed with the fat.  · Find a way of tracking calories that works for you. Get creative. Try different apps or programs if writing down calories does not work for you.  What are some portion control tips?  · Know how many calories are in a serving. This will help you know how many servings of a certain food you can have.  · Use a measuring cup to measure serving sizes. You could also try weighing out portions on a kitchen scale. With time, you will be able to estimate serving sizes for some foods.  · Take some time to put servings of different foods on your favorite plates, bowls, and cups so you know what a serving looks like.  · Try not to eat straight from a bag or box. Doing this can lead to overeating. Put the amount you would like to eat in a cup or on a plate to make sure you are eating the right portion.  · Use smaller plates, glasses, and bowls to prevent overeating.  · Try not to multitask (for example, watch TV or use your computer) while eating. If it is time to eat, sit down at a table and enjoy your food. This will help you to know when you are full. It will also help you to be aware of what you are eating and how much you are eating.  What are tips for following this plan?  Reading food labels  · Check the calorie count compared to the serving size. The serving size may be smaller than what you are used to eating.  · Check the source of the calories. Make sure the food you are eating is high in vitamins and protein and low in saturated and trans fats.  Shopping  · Read nutrition labels while you shop. This will help you make healthy " decisions before you decide to purchase your food.  · Make a grocery list and stick to it.  Cooking  · Try to cook your favorite foods in a healthier way. For example, try baking instead of frying.  · Use low-fat dairy products.  Meal planning  · Use more fruits and vegetables. Half of your plate should be fruits and vegetables.  · Include lean proteins like poultry and fish.  How do I count calories when eating out?  · Ask for smaller portion sizes.  · Consider sharing an entree and sides instead of getting your own entree.  · If you get your own entree, eat only half. Ask for a box at the beginning of your meal and put the rest of your entree in it so you are not tempted to eat it.  · If calories are listed on the menu, choose the lower calorie options.  · Choose dishes that include vegetables, fruits, whole grains, low-fat dairy products, and lean protein.  · Choose items that are boiled, broiled, grilled, or steamed. Stay away from items that are buttered, battered, fried, or served with cream sauce. Items labeled “crispy” are usually fried, unless stated otherwise.  · Choose water, low-fat milk, unsweetened iced tea, or other drinks without added sugar. If you want an alcoholic beverage, choose a lower calorie option such as a glass of wine or light beer.  · Ask for dressings, sauces, and syrups on the side. These are usually high in calories, so you should limit the amount you eat.  · If you want a salad, choose a garden salad and ask for grilled meats. Avoid extra toppings like [place], cheese, or fried items. Ask for the dressing on the side, or ask for olive oil and vinegar or lemon to use as dressing.  · Estimate how many servings of a food you are given. For example, a serving of cooked rice is ½ cup or about the size of half a baseball. Knowing serving sizes will help you be aware of how much food you are eating at restaurants. The list below tells you how big or small some common portion sizes are based  on everyday objects:  ? 1 oz--4 stacked dice.  ? 3 oz--1 deck of cards.  ? 1 tsp--1 die.  ? 1 Tbsp--½ a ping-pong ball.  ? 2 Tbsp--1 ping-pong ball.  ? ½ cup--½ baseball.  ? 1 cup--1 baseball.  Summary  · Calorie counting means keeping track of how many calories you eat and drink each day. If you eat fewer calories than your body needs, you should lose weight.  · A healthy amount of weight to lose per week is usually 1-2 lb (0.5-0.9 kg). This usually means reducing your daily calorie intake by 500-750 calories.  · The number of calories in a food can be found on a Nutrition Facts label. If a food does not have a Nutrition Facts label, try to look up the calories online or ask your dietitian for help.  · Use your calories on foods and drinks that will fill you up, and not on foods and drinks that will leave you hungry.  · Use smaller plates, glasses, and bowls to prevent overeating.  This information is not intended to replace advice given to you by your health care provider. Make sure you discuss any questions you have with your health care provider.  Document Released: 12/18/2006 Document Revised: 11/17/2017 Document Reviewed: 11/17/2017  [employer] Interactive Patient Education © 2018 [employer]

## 2018-11-09 ENCOUNTER — HOSPITAL ENCOUNTER (OUTPATIENT)
Dept: BONE DENSITY | Facility: HOSPITAL | Age: 78
Discharge: HOME | End: 2018-11-09
Attending: FAMILY MEDICINE | Admitting: FAMILY MEDICINE

## 2018-11-09 DIAGNOSIS — Z78.0 MENOPAUSE: ICD-10-CM

## 2018-11-09 PROCEDURE — 77080 DXA BONE DENSITY AXIAL: CPT

## 2018-12-06 ENCOUNTER — LAB (OUTPATIENT)
Dept: ENDOCRINOLOGY | Age: 78
End: 2018-12-06

## 2018-12-06 DIAGNOSIS — E89.0 POSTOPERATIVE HYPOTHYROIDISM: ICD-10-CM

## 2018-12-06 DIAGNOSIS — E11.42 TYPE 2 DIABETES MELLITUS WITH DIABETIC POLYNEUROPATHY, WITH LONG-TERM CURRENT USE OF INSULIN (HCC): ICD-10-CM

## 2018-12-06 DIAGNOSIS — E21.3 HYPERPARATHYROIDISM (HCC): ICD-10-CM

## 2018-12-06 DIAGNOSIS — Z79.4 TYPE 2 DIABETES MELLITUS WITH DIABETIC POLYNEUROPATHY, WITH LONG-TERM CURRENT USE OF INSULIN (HCC): ICD-10-CM

## 2018-12-06 DIAGNOSIS — E55.9 VITAMIN D DEFICIENCY: Primary | ICD-10-CM

## 2018-12-06 DIAGNOSIS — E55.9 VITAMIN D DEFICIENCY: ICD-10-CM

## 2018-12-07 ENCOUNTER — TRANSCRIBE ORDERS (OUTPATIENT)
Dept: ADMINISTRATIVE | Facility: HOSPITAL | Age: 78
End: 2018-12-07

## 2018-12-07 DIAGNOSIS — Z12.31 VISIT FOR SCREENING MAMMOGRAM: Primary | ICD-10-CM

## 2018-12-12 LAB
ALBUMIN SERPL-MCNC: 4.2 G/DL (ref 3.5–5.2)
ALBUMIN/GLOB SERPL: 1.6 G/DL
ALP SERPL-CCNC: 97 U/L (ref 39–117)
ALT SERPL-CCNC: 8 U/L (ref 1–33)
AST SERPL-CCNC: 11 U/L (ref 1–32)
BILIRUB SERPL-MCNC: 0.3 MG/DL (ref 0.1–1.2)
BUN SERPL-MCNC: 24 MG/DL (ref 8–23)
BUN/CREAT SERPL: 14.5 (ref 7–25)
C PEPTIDE SERPL-MCNC: 5.4 NG/ML (ref 1.1–4.4)
CA-I SERPL ISE-MCNC: 5.1 MG/DL (ref 4.5–5.6)
CALCIUM SERPL-MCNC: 9.5 MG/DL (ref 8.6–10.5)
CHLORIDE SERPL-SCNC: 98 MMOL/L (ref 98–107)
CHOLEST SERPL-MCNC: 181 MG/DL (ref 0–200)
CO2 SERPL-SCNC: 28.4 MMOL/L (ref 22–29)
CREAT SERPL-MCNC: 1.66 MG/DL (ref 0.57–1)
FT4I SERPL CALC-MCNC: 2.6 (ref 1.2–4.9)
GLOBULIN SER CALC-MCNC: 2.7 GM/DL
GLUCOSE SERPL-MCNC: 249 MG/DL (ref 65–99)
HBA1C MFR BLD: 9.96 % (ref 4.8–5.6)
HDLC SERPL-MCNC: 64 MG/DL (ref 40–60)
INTERPRETATION: NORMAL
LDLC SERPL CALC-MCNC: 92 MG/DL (ref 0–100)
Lab: NORMAL
MICROALBUMIN UR-MCNC: 573.2 UG/ML
PHOSPHATE SERPL-MCNC: 3.4 MG/DL (ref 2.5–4.5)
POTASSIUM SERPL-SCNC: 3.9 MMOL/L (ref 3.5–5.2)
PROT SERPL-MCNC: 6.9 G/DL (ref 6–8.5)
PTH-INTACT SERPL-MCNC: 80 PG/ML (ref 15–65)
SODIUM SERPL-SCNC: 139 MMOL/L (ref 136–145)
T3FREE SERPL-MCNC: 2.2 PG/ML (ref 2–4.4)
T3RU NFR SERPL: 35 % (ref 24–39)
T4 FREE SERPL-MCNC: 1.36 NG/DL (ref 0.93–1.7)
T4 SERPL-MCNC: 7.4 UG/DL (ref 4.5–12)
THYROGLOB AB SERPL-ACNC: 3.1 IU/ML
THYROGLOB SERPL-MCNC: 3.6 NG/ML
THYROGLOB SERPL-MCNC: ABNORMAL NG/ML
TRIGL SERPL-MCNC: 127 MG/DL (ref 0–150)
TSH SERPL DL<=0.005 MIU/L-ACNC: 4.22 UIU/ML (ref 0.45–4.5)
VLDLC SERPL CALC-MCNC: 25.4 MG/DL (ref 5–40)

## 2018-12-20 ENCOUNTER — OFFICE VISIT (OUTPATIENT)
Dept: ENDOCRINOLOGY | Age: 78
End: 2018-12-20

## 2018-12-20 VITALS
HEIGHT: 66 IN | WEIGHT: 212 LBS | DIASTOLIC BLOOD PRESSURE: 72 MMHG | BODY MASS INDEX: 34.07 KG/M2 | SYSTOLIC BLOOD PRESSURE: 152 MMHG

## 2018-12-20 DIAGNOSIS — E55.9 VITAMIN D DEFICIENCY: ICD-10-CM

## 2018-12-20 DIAGNOSIS — Z79.4 TYPE 2 DIABETES MELLITUS WITH DIABETIC POLYNEUROPATHY, WITH LONG-TERM CURRENT USE OF INSULIN (HCC): Primary | ICD-10-CM

## 2018-12-20 DIAGNOSIS — I10 ESSENTIAL HYPERTENSION: ICD-10-CM

## 2018-12-20 DIAGNOSIS — E78.2 MIXED HYPERLIPIDEMIA: ICD-10-CM

## 2018-12-20 DIAGNOSIS — E04.1 SOLITARY THYROID NODULE: ICD-10-CM

## 2018-12-20 DIAGNOSIS — E21.3 HYPERPARATHYROIDISM (HCC): ICD-10-CM

## 2018-12-20 DIAGNOSIS — E11.42 TYPE 2 DIABETES MELLITUS WITH DIABETIC POLYNEUROPATHY, WITH LONG-TERM CURRENT USE OF INSULIN (HCC): Primary | ICD-10-CM

## 2018-12-20 DIAGNOSIS — E89.0 POSTOPERATIVE HYPOTHYROIDISM: ICD-10-CM

## 2018-12-20 PROCEDURE — 99214 OFFICE O/P EST MOD 30 MIN: CPT | Performed by: NURSE PRACTITIONER

## 2018-12-20 RX ORDER — NATEGLINIDE 120 MG/1
120 TABLET ORAL
Qty: 90 TABLET | Refills: 5 | Status: SHIPPED | OUTPATIENT
Start: 2018-12-20 | End: 2019-11-22 | Stop reason: SDUPTHER

## 2018-12-20 RX ORDER — LEVOTHYROXINE SODIUM 0.15 MG/1
150 TABLET ORAL DAILY
Qty: 30 TABLET | Refills: 5 | Status: SHIPPED | OUTPATIENT
Start: 2018-12-20 | End: 2019-06-25 | Stop reason: SDUPTHER

## 2018-12-20 NOTE — PROGRESS NOTES
"Subjective   Ange Johnson is a 78 y.o. female is here today for follow-up.  Chief Complaint   Patient presents with   • Diabetes     Recent labs, pt tests BG 1-2x daily, pt did not bring meter   • Hypothyroidism     pt believes medications listed are correct   • Hypertension   • Hyperlipidemia   • Vitamin D Deficiency   • Hyperparathyroidism     /72   Ht 167.6 cm (66\")   Wt 96.2 kg (212 lb)   BMI 34.22 kg/m²   Current Outpatient Medications on File Prior to Visit   Medication Sig   • allopurinol (ZYLOPRIM) 100 MG tablet Take 1 tablet by mouth Daily for 180 days.   • amLODIPine (NORVASC) 10 MG tablet Take 1 tablet by mouth Daily for 180 days.   • aspirin 81 MG EC tablet Take 1 tablet by mouth Daily. HOLD PER MD MAY-12/1   • carBAMazepine (CARBATROL) 300 MG 12 hr capsule Take 1 capsule by mouth Every 12 (Twelve) Hours for 180 days.   • chlorthalidone (HYGROTON) 25 MG tablet    • CloNIDine (CATAPRES) 0.1 MG tablet Take 1 tablet by mouth Every 12 (Twelve) Hours for 180 days.   • ezetimibe (ZETIA) 10 MG tablet Take 1 tablet by mouth Daily for 180 days.   • ferrous sulfate 325 (65 FE) MG tablet Take 1 tablet by mouth daily.   • glucose blood (JOSÉ MIGUEL CONTOUR NEXT TEST) test strip Checking BG twice a day   • hydrALAZINE (APRESOLINE) 50 MG tablet    • icosapent ethyl (VASCEPA) 1 g capsule capsule Take 2 g by mouth 2 (Two) Times a Day With Meals.   • Insulin Glargine (LANTUS SOLOSTAR) 100 UNIT/ML injection pen 80 units daily   • Insulin Pen Needle (BD PEN NEEDLE ISELA U/F) 32G X 4 MM misc Use to inject 1 time daily   • irbesartan (AVAPRO) 300 MG tablet Take 1 tablet by mouth Every Night for 180 days.   • levoFLOXacin (LEVAQUIN) 500 MG tablet Take 1 tablet by mouth Daily.   • levothyroxine (SYNTHROID) 150 MCG tablet Take 1 tablet by mouth Daily.   • LIVALO 4 MG tablet 1 tablet at bedtime by mouth   • montelukast (SINGULAIR) 10 MG tablet 10 mg daily.   • nateglinide (STARLIX) 60 MG tablet Take 1 tablet by mouth 3 " (Three) Times a Day Before Meals.   • potassium chloride (MICRO-K) 10 MEQ CR capsule Take 10 mEq by mouth Daily.   • predniSONE (DELTASONE) 20 MG tablet Take 1 tablet by mouth 2 (Two) Times a Day.   • SAXagliptin (ONGLYZA) 2.5 MG tablet Take 1 tablet by mouth Daily.   • vitamin B-12 (CYANOCOBALAMIN) 1000 MCG tablet Take 1 tablet by mouth daily.   • [DISCONTINUED] furosemide (LASIX) 80 MG tablet Take 80 mg by mouth As Needed.     No current facility-administered medications on file prior to visit.      Family History   Problem Relation Age of Onset   • Diabetes Sister    • Diabetes Brother    • Hypertension Brother    • Kidney disease Brother    • Cervical cancer Mother    • Heart disease Sister    • Neuropathy Sister    • Diabetes Sister      Social History     Tobacco Use   • Smoking status: Never Smoker   • Smokeless tobacco: Never Used   Substance Use Topics   • Alcohol use: No   • Drug use: No     Allergies   Allergen Reactions   • Codeine Nausea Only   • Hydrocodone-Acetaminophen Nausea Only and Other (See Comments)     Percocet and Vicodin; vertigo   • Meperidine Nausea Only and Nausea And Vomiting   • Morphine And Related Nausea Only   • Oxycodone-Acetaminophen Nausea Only   • Oxycodone-Acetaminophen Nausea Only and Other (See Comments)     dilzziness   • Oxycodone-Aspirin Nausea Only and Other (See Comments)     vertigo   • Penicillins Hives   • Sulfa Antibiotics Hives         History of Present Illness  Encounter Diagnoses   Name Primary?   • Essential hypertension    • Mixed hyperlipidemia    • Hyperparathyroidism (CMS/HCC)    • Vitamin D deficiency    • Postoperative hypothyroidism    • Solitary thyroid nodule    • Type 2 diabetes mellitus with diabetic polyneuropathy, with long-term current use of insulin (CMS/HCC) Yes   78-year-old female patient here today for routine follow-up visit.  She is being seen for the above-mentioned problems.  She is taking her medications as prescribed.  We discussed  increasing nateglinide due to hemoglobin A1c is not at goal.  She denies any symptomatic hypoglycemic reactions.  She states her lowest blood sugar has been 89 and her highest is been over 300.  She is trying to sell her house is under a lot of stress.  She recently moved in with her daughter.  She is requesting samples and refills of her medications at today's visit.  The following portions of the patient's history were reviewed and updated as appropriate: allergies, current medications, past family history, past medical history, past social history, past surgical history and problem list.    Review of Systems   Constitutional: Negative for fatigue.   HENT: Negative for trouble swallowing.    Eyes: Negative for visual disturbance.   Respiratory: Negative for shortness of breath.    Cardiovascular: Negative for leg swelling.   Endocrine: Negative for polyuria.   Skin: Negative for wound.   Neurological: Negative for numbness.       Objective   Physical Exam   Constitutional: She is oriented to person, place, and time. She appears well-developed and well-nourished. No distress.   HENT:   Head: Normocephalic and atraumatic.   Right Ear: External ear normal.   Left Ear: External ear normal.   Nose: Nose normal.   Mouth/Throat: Oropharynx is clear and moist. No oropharyngeal exudate.   Eyes: EOM are normal. Pupils are equal, round, and reactive to light. Right eye exhibits no discharge. Left eye exhibits no discharge.   Neck: Trachea normal, normal range of motion and full passive range of motion without pain. Neck supple. No tracheal tenderness present. Carotid bruit is not present. No tracheal deviation, no edema and no erythema present. No thyroid mass and no thyromegaly present.   Cardiovascular: Normal rate, regular rhythm, normal heart sounds and intact distal pulses. Exam reveals no gallop and no friction rub.   No murmur heard.  Pulmonary/Chest: Effort normal and breath sounds normal. No stridor. No respiratory  distress. She has no wheezes. She has no rales.   Abdominal: Soft. Bowel sounds are normal. She exhibits no distension.   Musculoskeletal: Normal range of motion. She exhibits no edema or deformity.   Lymphadenopathy:     She has no cervical adenopathy.   Neurological: She is alert and oriented to person, place, and time.   Skin: Skin is warm and dry. No rash noted. She is not diaphoretic. No erythema. No pallor.   Psychiatric: She has a normal mood and affect. Her behavior is normal. Judgment and thought content normal.   Nursing note and vitals reviewed.    Results for orders placed or performed in visit on 12/06/18   Thyroid Panel With TSH   Result Value Ref Range    TSH 4.220 0.450 - 4.500 uIU/mL    T4, Total 7.4 4.5 - 12.0 ug/dL    T3 Uptake 35 24 - 39 %    Free Thyroxine Index 2.6 1.2 - 4.9   T4, Free   Result Value Ref Range    Free T4 1.36 0.93 - 1.70 ng/dL   T3, Free   Result Value Ref Range    T3, Free 2.2 2.0 - 4.4 pg/mL   PTH, Intact   Result Value Ref Range    PTH, Intact 80 (H) 15 - 65 pg/mL   Phosphorus   Result Value Ref Range    Phosphorus 3.4 2.5 - 4.5 mg/dL   MicroAlbumin, Urine, Random - Urine, Clean Catch   Result Value Ref Range    Microalbumin, Urine 573.2 Not Estab. ug/mL   Lipid Panel   Result Value Ref Range    Total Cholesterol 181 0 - 200 mg/dL    Triglycerides 127 0 - 150 mg/dL    HDL Cholesterol 64 (H) 40 - 60 mg/dL    VLDL Cholesterol 25.4 5 - 40 mg/dL    LDL Cholesterol  92 0 - 100 mg/dL   Hemoglobin A1c   Result Value Ref Range    Hemoglobin A1C 9.96 (H) 4.80 - 5.60 %   C-Peptide   Result Value Ref Range    C-Peptide 5.4 (H) 1.1 - 4.4 ng/mL   Comprehensive Thyroglobulin   Result Value Ref Range    Thyroglobulin Ab 3.1 (H) IU/mL    Thyroglobulin Comment ng/mL    Thyroglobulin (TG-TERRIE) 3.6 ng/mL   Comprehensive Metabolic Panel   Result Value Ref Range    Glucose 249 (H) 65 - 99 mg/dL    BUN 24 (H) 8 - 23 mg/dL    Creatinine 1.66 (H) 0.57 - 1.00 mg/dL    eGFR Non African Am 30 (L) >60  mL/min/1.73    eGFR African Am 36 (L) >60 mL/min/1.73    BUN/Creatinine Ratio 14.5 7.0 - 25.0    Sodium 139 136 - 145 mmol/L    Potassium 3.9 3.5 - 5.2 mmol/L    Chloride 98 98 - 107 mmol/L    Total CO2 28.4 22.0 - 29.0 mmol/L    Calcium 9.5 8.6 - 10.5 mg/dL    Total Protein 6.9 6.0 - 8.5 g/dL    Albumin 4.20 3.50 - 5.20 g/dL    Globulin 2.7 gm/dL    A/G Ratio 1.6 g/dL    Total Bilirubin 0.3 0.1 - 1.2 mg/dL    Alkaline Phosphatase 97 39 - 117 U/L    AST (SGOT) 11 1 - 32 U/L    ALT (SGPT) 8 1 - 33 U/L   Calcium, Ionized   Result Value Ref Range    Ionized Calcium 5.1 4.5 - 5.6 mg/dL   Cardiovascular Risk Assessment   Result Value Ref Range    Interpretation Note    Diabetes Patient Education   Result Value Ref Range    PDF Image Not applicable          Assessment/Plan   Problems Addressed this Visit        Cardiovascular and Mediastinum    Essential hypertension    Mixed hyperlipidemia       Digestive    Vitamin D deficiency       Endocrine    Type 2 diabetes mellitus with diabetic polyneuropathy, with long-term current use of insulin (CMS/[facility]) - Primary    Postoperative hypothyroidism    Hyperparathyroidism (CMS/[facility])    Solitary thyroid nodule      In summary, patient was seen and examined.  Metabolic issues stable and her hemoglobin A1c has improved however it is not at goal of less than 7.  We discussed increasing her nateglinide 220 mg prior to each meal.  I've asked that she bring her blood glucose meter with her to each visit.  She monitors her blood sugars closely and states her lowest blood sugar has been 89 and the highest over 300.  I've advised her to contact the office if her blood sugars failed to improve.  She will follow-up with me in 4 months with labs prior.  She has a history of having a parathyroidectomy.  Her calcium level is normal.  Her parathyroid hormone remains elevated.  Increase nateglinide to 120 mg prior to each meal  continue all other meds the same  Contact office if blood sugars fail  to improve or if you have low blood sugars

## 2019-01-06 ENCOUNTER — RESULTS ENCOUNTER (OUTPATIENT)
Dept: ENDOCRINOLOGY | Age: 79
End: 2019-01-06

## 2019-01-06 DIAGNOSIS — E55.9 VITAMIN D DEFICIENCY: ICD-10-CM

## 2019-01-06 DIAGNOSIS — E89.0 POSTOPERATIVE HYPOTHYROIDISM: ICD-10-CM

## 2019-01-06 DIAGNOSIS — E21.3 HYPERPARATHYROIDISM (HCC): ICD-10-CM

## 2019-01-06 DIAGNOSIS — E11.42 TYPE 2 DIABETES MELLITUS WITH DIABETIC POLYNEUROPATHY, WITH LONG-TERM CURRENT USE OF INSULIN (HCC): ICD-10-CM

## 2019-01-06 DIAGNOSIS — Z79.4 TYPE 2 DIABETES MELLITUS WITH DIABETIC POLYNEUROPATHY, WITH LONG-TERM CURRENT USE OF INSULIN (HCC): ICD-10-CM

## 2019-02-05 ENCOUNTER — TELEPHONE (OUTPATIENT)
Dept: ENDOCRINOLOGY | Age: 79
End: 2019-02-05

## 2019-02-05 NOTE — TELEPHONE ENCOUNTER
Pt has been called and informed we have samples for her    ----- Message from Mckenzie Harper sent at 2/4/2019  2:03 PM EST -----  lantus and toujeo    Looking for samples for both or one of them    Please call when ready to    [phone]

## 2019-02-19 ENCOUNTER — HOSPITAL ENCOUNTER (OUTPATIENT)
Dept: MAMMOGRAPHY | Facility: HOSPITAL | Age: 79
Discharge: HOME | End: 2019-02-19
Attending: SPECIALIST | Admitting: SPECIALIST

## 2019-02-19 DIAGNOSIS — Z12.31 VISIT FOR SCREENING MAMMOGRAM: ICD-10-CM

## 2019-02-19 PROCEDURE — 77067 SCR MAMMO BI INCL CAD: CPT

## 2019-02-19 PROCEDURE — 77063 BREAST TOMOSYNTHESIS BI: CPT

## 2019-03-12 ENCOUNTER — OFFICE VISIT (OUTPATIENT)
Dept: FAMILY MEDICINE CLINIC | Facility: CLINIC | Age: 79
End: 2019-03-12

## 2019-03-12 VITALS
RESPIRATION RATE: 20 BRPM | WEIGHT: 213 LBS | HEART RATE: 68 BPM | OXYGEN SATURATION: 98 % | SYSTOLIC BLOOD PRESSURE: 140 MMHG | BODY MASS INDEX: 34.23 KG/M2 | HEIGHT: 66 IN | DIASTOLIC BLOOD PRESSURE: 64 MMHG | TEMPERATURE: 97.8 F

## 2019-03-12 DIAGNOSIS — N64.59 INVERTED NIPPLE: Primary | ICD-10-CM

## 2019-03-12 PROCEDURE — 99213 OFFICE O/P EST LOW 20 MIN: CPT | Performed by: NURSE PRACTITIONER

## 2019-03-12 NOTE — PROGRESS NOTES
Subjective   Ange Johnson is a 78 y.o. female.     History of Present Illness   Patient presents to office to discuss new problem of inverted left nipple.  Patient noticed it on 3/6/19.  She has never had issue with this in the past.  She had screening mammogram last month that was normal.  She was not having issues at that time. She denies pain. Reports having a little itching in the area that improved with using lotion. She has not noticed any masses or tenderness.  Denies nipple discharge.   The following portions of the patient's history were reviewed and updated as appropriate: allergies, current medications, past family history, past medical history, past social history, past surgical history and problem list.    Review of Systems   Constitutional: Negative for unexpected weight change.   Skin: Negative for color change, pallor, rash and wound.       Objective   Physical Exam   Constitutional: She is oriented to person, place, and time. She appears well-developed and well-nourished.   Pulmonary/Chest: Effort normal. Right breast exhibits no inverted nipple, no mass, no nipple discharge, no skin change and no tenderness. Left breast exhibits inverted nipple. Left breast exhibits no mass, no nipple discharge, no skin change and no tenderness. Breasts are symmetrical. There is no breast swelling.   Genitourinary: No breast tenderness, discharge or bleeding.   Neurological: She is alert and oriented to person, place, and time.   Psychiatric: She has a normal mood and affect. Judgment normal.   Nursing note and vitals reviewed.      Assessment/Plan   Problems Addressed this Visit     None      Visit Diagnoses     Inverted nipple    -  Primary    Relevant Orders    Mammo diagnostic left w CAD    US breast left complete

## 2019-03-19 ENCOUNTER — HOSPITAL ENCOUNTER (OUTPATIENT)
Dept: ULTRASOUND IMAGING | Facility: HOSPITAL | Age: 79
Discharge: HOME | End: 2019-03-19
Admitting: NURSE PRACTITIONER

## 2019-03-19 ENCOUNTER — HOSPITAL ENCOUNTER (OUTPATIENT)
Dept: MAMMOGRAPHY | Facility: HOSPITAL | Age: 79
End: 2019-03-19

## 2019-03-19 PROCEDURE — 76642 ULTRASOUND BREAST LIMITED: CPT

## 2019-03-21 ENCOUNTER — TELEPHONE (OUTPATIENT)
Dept: FAMILY MEDICINE CLINIC | Facility: CLINIC | Age: 79
End: 2019-03-21

## 2019-03-22 RX ORDER — TELMISARTAN 80 MG/1
80 TABLET ORAL DAILY
Qty: 30 TABLET | Refills: 1 | Status: SHIPPED | OUTPATIENT
Start: 2019-03-22 | End: 2019-04-29 | Stop reason: SDUPTHER

## 2019-04-01 ENCOUNTER — TELEPHONE (OUTPATIENT)
Dept: ENDOCRINOLOGY | Age: 79
End: 2019-04-01

## 2019-04-01 NOTE — TELEPHONE ENCOUNTER
Called lynnette and let her know samples were ready    ----- Message from Cande Rodgers MA sent at 4/1/2019  3:26 PM EDT -----  Contact: LYNNETTE (daughter)  Pt daughter called wanting to know if pt can get samples of the following  ONGLYZA  2.5MG  LANTUS OR TOUJEO  VASCEPA 1GM

## 2019-04-29 ENCOUNTER — OFFICE VISIT (OUTPATIENT)
Dept: FAMILY MEDICINE CLINIC | Facility: CLINIC | Age: 79
End: 2019-04-29

## 2019-04-29 VITALS
DIASTOLIC BLOOD PRESSURE: 78 MMHG | WEIGHT: 215 LBS | HEIGHT: 66 IN | SYSTOLIC BLOOD PRESSURE: 180 MMHG | BODY MASS INDEX: 34.55 KG/M2 | TEMPERATURE: 97.3 F | RESPIRATION RATE: 16 BRPM | HEART RATE: 77 BPM

## 2019-04-29 DIAGNOSIS — M62.9 DISORDER OF MUSCLE, LIGAMENT, AND FASCIA: ICD-10-CM

## 2019-04-29 DIAGNOSIS — E78.2 MIXED HYPERLIPIDEMIA: ICD-10-CM

## 2019-04-29 DIAGNOSIS — M24.20 DISORDER OF MUSCLE, LIGAMENT, AND FASCIA: ICD-10-CM

## 2019-04-29 DIAGNOSIS — I10 ESSENTIAL HYPERTENSION: Primary | ICD-10-CM

## 2019-04-29 DIAGNOSIS — M10.9 GOUT WITHOUT TOPHUS: ICD-10-CM

## 2019-04-29 PROCEDURE — 99214 OFFICE O/P EST MOD 30 MIN: CPT | Performed by: FAMILY MEDICINE

## 2019-04-29 RX ORDER — CARBAMAZEPINE 300 MG/1
300 CAPSULE, EXTENDED RELEASE ORAL EVERY 12 HOURS SCHEDULED
Qty: 60 CAPSULE | Refills: 5 | Status: SHIPPED | OUTPATIENT
Start: 2019-04-29 | End: 2019-04-29

## 2019-04-29 RX ORDER — AMLODIPINE BESYLATE 10 MG/1
10 TABLET ORAL DAILY
Qty: 30 TABLET | Refills: 5 | Status: SHIPPED | OUTPATIENT
Start: 2019-04-29 | End: 2019-11-25 | Stop reason: ALTCHOICE

## 2019-04-29 RX ORDER — TELMISARTAN 80 MG/1
80 TABLET ORAL DAILY
Qty: 30 TABLET | Refills: 5 | Status: SHIPPED | OUTPATIENT
Start: 2019-04-29 | End: 2019-11-25 | Stop reason: ALTCHOICE

## 2019-04-29 RX ORDER — ALLOPURINOL 100 MG/1
100 TABLET ORAL DAILY
Qty: 30 TABLET | Refills: 5 | Status: SHIPPED | OUTPATIENT
Start: 2019-04-29 | End: 2019-11-25 | Stop reason: ALTCHOICE

## 2019-04-29 RX ORDER — EZETIMIBE 10 MG/1
10 TABLET ORAL DAILY
Qty: 30 TABLET | Refills: 5 | Status: SHIPPED | OUTPATIENT
Start: 2019-04-29 | End: 2019-11-25 | Stop reason: SDUPTHER

## 2019-04-29 RX ORDER — ICOSAPENT ETHYL 1000 MG/1
2 CAPSULE ORAL 2 TIMES DAILY WITH MEALS
Qty: 120 CAPSULE | Refills: 5 | Status: SHIPPED | OUTPATIENT
Start: 2019-04-29 | End: 2019-05-30 | Stop reason: SDUPTHER

## 2019-04-29 RX ORDER — CLONIDINE HYDROCHLORIDE 0.2 MG/1
0.2 TABLET ORAL EVERY 12 HOURS SCHEDULED
Qty: 60 TABLET | Refills: 5 | Status: SHIPPED | OUTPATIENT
Start: 2019-04-29 | End: 2019-11-25 | Stop reason: SDUPTHER

## 2019-04-29 RX ORDER — CARBAMAZEPINE 300 MG/1
300 CAPSULE, EXTENDED RELEASE ORAL EVERY 12 HOURS SCHEDULED
Qty: 60 CAPSULE | Refills: 5 | Status: SHIPPED | OUTPATIENT
Start: 2019-04-29 | End: 2019-11-25 | Stop reason: SDUPTHER

## 2019-04-29 NOTE — PROGRESS NOTES
"Chief Complaint   Patient presents with   • Hypertension   • Gout       Subjective     Ange Johnson presents to the office today to refill her medications. No medication side effects are reported.  Today her systolic blood pressure is elevated.  She is tolerating the new medicine and has been on it for 1 week now.  Recent labs been have been reviewed.  She remains under the care of endocrinology for her diabetes and also her thyroid.  She also remains under the care of nephrology for her chronic kidney disease.  No recent gout attacks.  The medication for fibromyalgia has been effective.  Overall she feels well.    I have reviewed and updated her medications, medical history and problem list during today's office visit.     Patient Care Team:  Quinn Washington MD as PCP - General (Family Medicine)  Hugh Caruso MD as PCP - Claims Attributed  Steve Simons Jr., MD as Consulting Physician (Hematology and Oncology)  Hugh Caruso MD as Consulting Physician (Nephrology)  Adalgisa Hernandez APRN as Nurse Practitioner (Endocrinology)  Reagan Beltran MD as Consulting Physician (Endocrinology)  Aguilar Goldman MD as Consulting Physician (Pulmonary Disease)  Mil Sr MD as Consulting Physician (Ophthalmology)  Tasneem Montelongo MD as Surgeon (General Surgery)  Victor Hugo Ng MD as Consulting Physician (Cardiology)    Social History     Tobacco Use   • Smoking status: Never Smoker   • Smokeless tobacco: Never Used   Substance Use Topics   • Alcohol use: No       Review of Systems   Constitutional: Positive for fatigue.   Cardiovascular: Negative for chest pain.   Musculoskeletal: Positive for myalgias.   Neurological: Negative for headaches.       Objective     /78 (BP Location: Right arm, Patient Position: Sitting, Cuff Size: Large Adult)   Pulse 77   Temp 97.3 °F (36.3 °C) (Oral)   Resp 16   Ht 167.6 cm (66\")   Wt 97.5 kg (215 lb)   BMI 34.70 kg/m²     Body mass index is 34.7 kg/m².    Physical Exam "   Constitutional: She is oriented to person, place, and time. She appears well-developed. No distress.   Eyes: Conjunctivae and lids are normal.   Neck: Carotid bruit is not present.   Cardiovascular: Normal rate and regular rhythm.   Murmur heard.  Pulmonary/Chest: Effort normal and breath sounds normal.   Neurological: She is alert and oriented to person, place, and time.   Skin: Skin is warm and dry.   Psychiatric: She has a normal mood and affect. Her behavior is normal.   Vitals reviewed.      Data Reviewed:                    Assessment/Plan     Diagnoses and all orders for this visit:    1. Essential hypertension (Primary)  Assessment & Plan:  Hypertension is worsening.  Medication changes per orders.  Blood pressure will be reassessed in 4 weeks.    Orders:  -     CloNIDine (CATAPRES) 0.2 MG tablet; Take 1 tablet by mouth Every 12 (Twelve) Hours for 180 days.  Dispense: 60 tablet; Refill: 5  -     amLODIPine (NORVASC) 10 MG tablet; Take 1 tablet by mouth Daily for 180 days.  Dispense: 30 tablet; Refill: 5  -     telmisartan (MICARDIS) 80 MG tablet; Take 1 tablet by mouth Daily for 180 days.  Dispense: 30 tablet; Refill: 5    2. Gout without tophus  Assessment & Plan:  The current medical regimen is effective;  continue present plan and medications.      Orders:  -     allopurinol (ZYLOPRIM) 100 MG tablet; Take 1 tablet by mouth Daily for 180 days.  Dispense: 30 tablet; Refill: 5    3. Mixed hyperlipidemia  Assessment & Plan:  Lipid abnormalities are unchanged.  Pharmacotherapy as ordered.  Lipids will be reassessed in 6 months.    Orders:  -     ezetimibe (ZETIA) 10 MG tablet; Take 1 tablet by mouth Daily for 180 days.  Dispense: 30 tablet; Refill: 5  -     icosapent ethyl (VASCEPA) 1 g capsule capsule; Take 2 g by mouth 2 (Two) Times a Day With Meals.  Dispense: 120 capsule; Refill: 5    4. Disorder of muscle, ligament, and fascia  Assessment & Plan:  The current medical regimen is effective;  continue  present plan and medications.      Orders:  -     carBAMazepine (CARBATROL) 300 MG 12 hr capsule; Take 1 capsule by mouth Every 12 (Twelve) Hours for 180 days.  Dispense: 60 capsule; Refill: 5    Other orders  -     Discontinue: carBAMazepine (CARBATROL) 300 MG 12 hr capsule; Take 1 capsule by mouth Every 12 (Twelve) Hours for 180 days.  Dispense: 60 capsule; Refill: 5      Return in about 1 month (around 5/29/2019) for BP Check.

## 2019-05-07 DIAGNOSIS — Z79.4 TYPE 2 DIABETES MELLITUS WITH DIABETIC POLYNEUROPATHY, WITH LONG-TERM CURRENT USE OF INSULIN (HCC): ICD-10-CM

## 2019-05-07 DIAGNOSIS — E11.42 TYPE 2 DIABETES MELLITUS WITH DIABETIC POLYNEUROPATHY, WITH LONG-TERM CURRENT USE OF INSULIN (HCC): ICD-10-CM

## 2019-05-07 DIAGNOSIS — E11.9 TYPE 2 DIABETES MELLITUS WITHOUT COMPLICATION, WITH LONG-TERM CURRENT USE OF INSULIN (HCC): ICD-10-CM

## 2019-05-07 DIAGNOSIS — E89.0 POSTOPERATIVE HYPOTHYROIDISM: ICD-10-CM

## 2019-05-07 DIAGNOSIS — E21.3 HYPERPARATHYROIDISM (HCC): ICD-10-CM

## 2019-05-07 DIAGNOSIS — Z79.4 TYPE 2 DIABETES MELLITUS WITHOUT COMPLICATION, WITH LONG-TERM CURRENT USE OF INSULIN (HCC): ICD-10-CM

## 2019-05-07 DIAGNOSIS — M1A.9XX0 CHRONIC GOUT WITHOUT TOPHUS, UNSPECIFIED CAUSE, UNSPECIFIED SITE: ICD-10-CM

## 2019-05-07 DIAGNOSIS — E55.9 VITAMIN D DEFICIENCY: Primary | ICD-10-CM

## 2019-05-09 ENCOUNTER — LAB (OUTPATIENT)
Dept: ENDOCRINOLOGY | Age: 79
End: 2019-05-09

## 2019-05-09 DIAGNOSIS — E11.42 TYPE 2 DIABETES MELLITUS WITH DIABETIC POLYNEUROPATHY, WITH LONG-TERM CURRENT USE OF INSULIN (HCC): ICD-10-CM

## 2019-05-09 DIAGNOSIS — Z79.4 TYPE 2 DIABETES MELLITUS WITH DIABETIC POLYNEUROPATHY, WITH LONG-TERM CURRENT USE OF INSULIN (HCC): ICD-10-CM

## 2019-05-09 DIAGNOSIS — E21.3 HYPERPARATHYROIDISM (HCC): ICD-10-CM

## 2019-05-09 DIAGNOSIS — E89.0 POSTOPERATIVE HYPOTHYROIDISM: ICD-10-CM

## 2019-05-09 DIAGNOSIS — E55.9 VITAMIN D DEFICIENCY: ICD-10-CM

## 2019-05-09 DIAGNOSIS — E11.9 TYPE 2 DIABETES MELLITUS WITHOUT COMPLICATION, WITH LONG-TERM CURRENT USE OF INSULIN (HCC): ICD-10-CM

## 2019-05-09 DIAGNOSIS — Z79.4 TYPE 2 DIABETES MELLITUS WITHOUT COMPLICATION, WITH LONG-TERM CURRENT USE OF INSULIN (HCC): ICD-10-CM

## 2019-05-09 DIAGNOSIS — M1A.9XX0 CHRONIC GOUT WITHOUT TOPHUS, UNSPECIFIED CAUSE, UNSPECIFIED SITE: ICD-10-CM

## 2019-05-17 LAB
25(OH)D3+25(OH)D2 SERPL-MCNC: 18.9 NG/ML (ref 30–100)
ALBUMIN SERPL-MCNC: 3.7 G/DL (ref 3.5–5.2)
ALBUMIN/GLOB SERPL: 1.3 G/DL
ALP SERPL-CCNC: 104 U/L (ref 39–117)
ALT SERPL-CCNC: 10 U/L (ref 1–33)
AST SERPL-CCNC: 10 U/L (ref 1–32)
BILIRUB SERPL-MCNC: 0.2 MG/DL (ref 0.2–1.2)
BUN SERPL-MCNC: 24 MG/DL (ref 8–23)
BUN/CREAT SERPL: 14.8 (ref 7–25)
C PEPTIDE SERPL-MCNC: 5.4 NG/ML (ref 1.1–4.4)
CA-I SERPL ISE-MCNC: 5.2 MG/DL (ref 4.5–5.6)
CALCIUM SERPL-MCNC: 9.6 MG/DL (ref 8.6–10.5)
CHLORIDE SERPL-SCNC: 94 MMOL/L (ref 98–107)
CHOLEST SERPL-MCNC: 165 MG/DL (ref 0–200)
CO2 SERPL-SCNC: 26.2 MMOL/L (ref 22–29)
CREAT SERPL-MCNC: 1.62 MG/DL (ref 0.57–1)
FT4I SERPL CALC-MCNC: 2.2 (ref 1.2–4.9)
GLOBULIN SER CALC-MCNC: 2.8 GM/DL
GLUCOSE SERPL-MCNC: 320 MG/DL (ref 65–99)
HBA1C MFR BLD: 10.1 % (ref 4.8–5.6)
HDLC SERPL-MCNC: 60 MG/DL (ref 40–60)
INTERPRETATION: NORMAL
LDLC SERPL CALC-MCNC: 72 MG/DL (ref 0–100)
Lab: NORMAL
MICROALBUMIN UR-MCNC: 431.2 UG/ML
PHOSPHATE SERPL-MCNC: 3.7 MG/DL (ref 2.5–4.5)
POTASSIUM SERPL-SCNC: 4.4 MMOL/L (ref 3.5–5.2)
PROT SERPL-MCNC: 6.5 G/DL (ref 6–8.5)
PTH-INTACT SERPL-MCNC: 65 PG/ML (ref 15–65)
SODIUM SERPL-SCNC: 136 MMOL/L (ref 136–145)
T3FREE SERPL-MCNC: 2.1 PG/ML (ref 2–4.4)
T3RU NFR SERPL: 30 % (ref 24–39)
T4 FREE SERPL-MCNC: 1.27 NG/DL (ref 0.93–1.7)
T4 SERPL-MCNC: 7.2 UG/DL (ref 4.5–12)
THYROGLOB AB SERPL-ACNC: 2.7 IU/ML
THYROGLOB SERPL-MCNC: <2 NG/ML
THYROGLOB SERPL-MCNC: ABNORMAL NG/ML
TRIGL SERPL-MCNC: 163 MG/DL (ref 0–150)
TSH SERPL DL<=0.005 MIU/L-ACNC: 2.37 UIU/ML (ref 0.45–4.5)
URATE SERPL-MCNC: 7.2 MG/DL (ref 2.4–5.7)
VLDLC SERPL CALC-MCNC: 32.6 MG/DL

## 2019-05-23 ENCOUNTER — TELEPHONE (OUTPATIENT)
Dept: ENDOCRINOLOGY | Age: 79
End: 2019-05-23

## 2019-05-23 ENCOUNTER — OFFICE VISIT (OUTPATIENT)
Dept: ENDOCRINOLOGY | Age: 79
End: 2019-05-23

## 2019-05-23 VITALS
DIASTOLIC BLOOD PRESSURE: 80 MMHG | WEIGHT: 215 LBS | SYSTOLIC BLOOD PRESSURE: 134 MMHG | BODY MASS INDEX: 34.55 KG/M2 | HEIGHT: 66 IN

## 2019-05-23 DIAGNOSIS — E21.3 HYPERPARATHYROIDISM (HCC): ICD-10-CM

## 2019-05-23 DIAGNOSIS — E04.1 SOLITARY THYROID NODULE: ICD-10-CM

## 2019-05-23 DIAGNOSIS — E55.9 VITAMIN D DEFICIENCY DISEASE: ICD-10-CM

## 2019-05-23 DIAGNOSIS — E89.0 POSTOPERATIVE HYPOTHYROIDISM: Primary | ICD-10-CM

## 2019-05-23 DIAGNOSIS — I10 ESSENTIAL HYPERTENSION: ICD-10-CM

## 2019-05-23 DIAGNOSIS — R80.9 PROTEINURIA, UNSPECIFIED TYPE: ICD-10-CM

## 2019-05-23 PROCEDURE — 99214 OFFICE O/P EST MOD 30 MIN: CPT | Performed by: NURSE PRACTITIONER

## 2019-05-23 RX ORDER — ERGOCALCIFEROL 1.25 MG/1
CAPSULE ORAL
Qty: 12 CAPSULE | Refills: 1 | Status: SHIPPED | OUTPATIENT
Start: 2019-05-23 | End: 2019-11-21 | Stop reason: SDUPTHER

## 2019-05-23 NOTE — PROGRESS NOTES
"Subjective   Ange Johnson is a 78 y.o. female is here today for follow-up.  Chief Complaint   Patient presents with   • Diabetes     recent labs, testing BG 2 times, pt did not bring meter   • Hyperlipidemia   • Hypertension   • Hypothyroidism   • hyperparathyroidism   • Vitamin D Deficiency     /80   Ht 167.6 cm (66\")   Wt 97.5 kg (215 lb)   BMI 34.70 kg/m²   Current Outpatient Medications on File Prior to Visit   Medication Sig   • allopurinol (ZYLOPRIM) 100 MG tablet Take 1 tablet by mouth Daily for 180 days.   • amLODIPine (NORVASC) 10 MG tablet Take 1 tablet by mouth Daily for 180 days.   • aspirin 81 MG EC tablet Take 1 tablet by mouth Daily. HOLD PER MD INSTR-12/1   • carBAMazepine (CARBATROL) 300 MG 12 hr capsule Take 1 capsule by mouth Every 12 (Twelve) Hours for 180 days.   • chlorthalidone (HYGROTON) 25 MG tablet    • CloNIDine (CATAPRES) 0.2 MG tablet Take 1 tablet by mouth Every 12 (Twelve) Hours for 180 days.   • ezetimibe (ZETIA) 10 MG tablet Take 1 tablet by mouth Daily for 180 days.   • ferrous sulfate 325 (65 FE) MG tablet Take 1 tablet by mouth daily.   • glucose blood (JOSÉ MIGUEL CONTOUR NEXT TEST) test strip Checking BG twice a day   • hydrALAZINE (APRESOLINE) 50 MG tablet    • icosapent ethyl (VASCEPA) 1 g capsule capsule Take 2 g by mouth 2 (Two) Times a Day With Meals.   • Insulin Pen Needle (BD PEN NEEDLE ISELA U/F) 32G X 4 MM misc Use to inject 1 time daily   • levothyroxine (SYNTHROID) 150 MCG tablet Take 1 tablet by mouth Daily.   • LIVALO 4 MG tablet 1 tablet at bedtime by mouth   • montelukast (SINGULAIR) 10 MG tablet 10 mg daily.   • nateglinide (STARLIX) 120 MG tablet Take 1 tablet by mouth 3 (Three) Times a Day Before Meals.   • potassium chloride (MICRO-K) 10 MEQ CR capsule Take 10 mEq by mouth Daily.   • SAXagliptin (ONGLYZA) 2.5 MG tablet Take 1 tablet by mouth Daily.   • telmisartan (MICARDIS) 80 MG tablet Take 1 tablet by mouth Daily for 180 days.   • vitamin B-12 " (CYANOCOBALAMIN) 1000 MCG tablet Take 1 tablet by mouth daily.   • [DISCONTINUED] Insulin Glargine (LANTUS SOLOSTAR) 100 UNIT/ML injection pen 80 units daily     No current facility-administered medications on file prior to visit.      Family History   Problem Relation Age of Onset   • Diabetes Sister    • Diabetes Brother    • Hypertension Brother    • Kidney disease Brother    • Cervical cancer Mother    • Heart disease Sister    • Neuropathy Sister    • Diabetes Sister      Social History     Tobacco Use   • Smoking status: Never Smoker   • Smokeless tobacco: Never Used   Substance Use Topics   • Alcohol use: No   • Drug use: No     Allergies   Allergen Reactions   • Codeine Nausea Only   • Hydrocodone-Acetaminophen Nausea Only and Other (See Comments)     Percocet and Vicodin; vertigo   • Meperidine Nausea Only and Nausea And Vomiting   • Morphine And Related Nausea Only   • Oxycodone-Acetaminophen Nausea Only   • Oxycodone-Acetaminophen Nausea Only and Other (See Comments)     dilzziness   • Oxycodone-Aspirin Nausea Only and Other (See Comments)     vertigo   • Penicillins Hives   • Sulfa Antibiotics Hives         History of Present Illness  Encounter Diagnoses   Name Primary?   • Essential hypertension    • Hyperparathyroidism (CMS/HCC)    • Postoperative hypothyroidism Yes   • Solitary thyroid nodule    • Proteinuria, unspecified type    • Vitamin D deficiency disease      78-year-old male patient here today for routine follow-up visit.  She has been seen for the above-mentioned problems.  Her medication list was reviewed and updated.  She is checking her blood sugars twice daily.  She did not bring her blood glucose meter.  She denies any hypoglycemic events.  She does have symptoms of hyperglycemia with dry mouth.  She has been advised that to improve this she would need to improve her glycemic control.  Her hemoglobin A1c has gone up since the last visit and is now greater than 10.  Her medication list was  reviewed and updated.  She is taking nateglinide daily and consistently as prescribed.  She follows with a nephrologist.  She has hyperparathyroidism and is noted to be vitamin D deficient.  The following portions of the patient's history were reviewed and updated as appropriate: allergies, current medications, past family history, past medical history, past social history, past surgical history and problem list.    Review of Systems   Constitutional: Negative for fatigue.   HENT: Negative for trouble swallowing.    Eyes: Negative for visual disturbance.   Cardiovascular: Negative for leg swelling.   Endocrine: Negative for polyuria.   Skin: Negative for wound.   Neurological: Negative for numbness.       Objective   Physical Exam   Constitutional: She is oriented to person, place, and time. She appears well-developed and well-nourished. No distress.   HENT:   Head: Normocephalic and atraumatic.   Right Ear: External ear normal.   Left Ear: External ear normal.   Nose: Nose normal.   Mouth/Throat: Oropharynx is clear and moist. No oropharyngeal exudate.   Eyes: EOM are normal. Pupils are equal, round, and reactive to light. Right eye exhibits no discharge. Left eye exhibits no discharge.   Neck: Trachea normal, normal range of motion and full passive range of motion without pain. Neck supple. No tracheal tenderness present. Carotid bruit is not present. No tracheal deviation, no edema and no erythema present. No thyroid mass and no thyromegaly present.   Cardiovascular: Normal rate, regular rhythm, normal heart sounds and intact distal pulses. Exam reveals no gallop and no friction rub.   No murmur heard.  Pulmonary/Chest: Effort normal and breath sounds normal. No stridor. No respiratory distress. She has no wheezes. She has no rales.   Abdominal: Soft. Bowel sounds are normal. She exhibits no distension.   Musculoskeletal: Normal range of motion. She exhibits no edema or deformity.   Lymphadenopathy:     She has  no cervical adenopathy.   Neurological: She is alert and oriented to person, place, and time.   Skin: Skin is warm and dry. No rash noted. She is not diaphoretic. No erythema. No pallor.   Psychiatric: She has a normal mood and affect. Her behavior is normal. Judgment and thought content normal.   Nursing note and vitals reviewed.    Results for orders placed or performed in visit on 05/09/19   PTH, Intact   Result Value Ref Range    PTH, Intact 65 15 - 65 pg/mL   Uric Acid   Result Value Ref Range    Uric Acid 7.2 (H) 2.4 - 5.7 mg/dL   Phosphorus   Result Value Ref Range    Phosphorus 3.7 2.5 - 4.5 mg/dL   Calcium, Ionized   Result Value Ref Range    Ionized Calcium 5.2 4.5 - 5.6 mg/dL   Comprehensive Thyroglobulin   Result Value Ref Range    Thyroglobulin Ab 2.7 (H) IU/mL    Thyroglobulin Comment ng/mL    Thyroglobulin (TG-TERRIE) <2.0 ng/mL   Thyroid Panel With TSH   Result Value Ref Range    TSH 2.370 0.450 - 4.500 uIU/mL    T4, Total 7.2 4.5 - 12.0 ug/dL    T3 Uptake 30 24 - 39 %    Free Thyroxine Index 2.2 1.2 - 4.9   T4, Free   Result Value Ref Range    Free T4 1.27 0.93 - 1.70 ng/dL   T3, Free   Result Value Ref Range    T3, Free 2.1 2.0 - 4.4 pg/mL   C-Peptide   Result Value Ref Range    C-Peptide 5.4 (H) 1.1 - 4.4 ng/mL   Hemoglobin A1c   Result Value Ref Range    Hemoglobin A1C 10.10 (H) 4.80 - 5.60 %   Vitamin D 25 Hydroxy   Result Value Ref Range    25 Hydroxy, Vitamin D 18.9 (L) 30.0 - 100.0 ng/ml   Lipid Panel   Result Value Ref Range    Total Cholesterol 165 0 - 200 mg/dL    Triglycerides 163 (H) 0 - 150 mg/dL    HDL Cholesterol 60 40 - 60 mg/dL    VLDL Cholesterol 32.6 mg/dL    LDL Cholesterol  72 0 - 100 mg/dL   Comprehensive Metabolic Panel   Result Value Ref Range    Glucose 320 (H) 65 - 99 mg/dL    BUN 24 (H) 8 - 23 mg/dL    Creatinine 1.62 (H) 0.57 - 1.00 mg/dL    eGFR Non African Am 31 (L) >60 mL/min/1.73    eGFR African Am 37 (L) >60 mL/min/1.73    BUN/Creatinine Ratio 14.8 7.0 - 25.0    Sodium  136 136 - 145 mmol/L    Potassium 4.4 3.5 - 5.2 mmol/L    Chloride 94 (L) 98 - 107 mmol/L    Total CO2 26.2 22.0 - 29.0 mmol/L    Calcium 9.6 8.6 - 10.5 mg/dL    Total Protein 6.5 6.0 - 8.5 g/dL    Albumin 3.70 3.50 - 5.20 g/dL    Globulin 2.8 gm/dL    A/G Ratio 1.3 g/dL    Total Bilirubin 0.2 0.2 - 1.2 mg/dL    Alkaline Phosphatase 104 39 - 117 U/L    AST (SGOT) 10 1 - 32 U/L    ALT (SGPT) 10 1 - 33 U/L   MicroAlbumin, Urine, Random -   Result Value Ref Range    Microalbumin, Urine 431.2 Not Estab. ug/mL   Cardiovascular Risk Assessment   Result Value Ref Range    Interpretation Note    Diabetes Patient Education   Result Value Ref Range    PDF Image Not applicable          Assessment/Plan   Problems Addressed this Visit        Cardiovascular and Mediastinum    Essential hypertension       Endocrine    Postoperative hypothyroidism - Primary    Hyperparathyroidism (CMS/HCC)    Solitary thyroid nodule       Genitourinary    Proteinuria      Other Visit Diagnoses     Vitamin D deficiency disease            In summary, Patient was seen and examined.  She has hyperparathyroid and vitamin D deficient.  She has been prescribed vitamin D 50,000 units once weekly.  She has been given samples of Toujeo with instructions to increase to 90 units.  She is also been educated to increase by 2 units every 4 days of her morning blood sugars are staying greater than 110 mg/dL however she is not possibly comfortable with titrating her own insulin so I have asked that she contact the office with her blood sugar readings.  She does have some trace edema in her lower extremities and can wear compression socks to aid in that.  Her blood sugars were not available at today's visit.  She is currently checking her blood sugars twice daily.  Her blood pressure is in satisfactory range.  She will follow-up in 6 labs.  She has been encouraged to contact the office should she have any questions or concerns prior to her next visit  Stop  lantus  Start 90 units of toujeo max once daily in morning and we can increase this by 2 units every 4 days if morning blood sugars are staying greater than 110 mg/dl . If not comfortable adjusting send office the blood sugars so we can adjust  Wear compression socks for ankle edema  Start vit d 50 k once weekly for vit d deficiency  Thyroid ultrasound to be scheduled

## 2019-05-23 NOTE — TELEPHONE ENCOUNTER
Spoke with pt's daughter and informed her of thyroid u/s and adding vit d. She expressed understanding    ----- Message from TAYO Larsen sent at 5/23/2019 11:27 AM EDT -----  I want pt to have a thyroid u/s also. I put orders in so when you call her on vit d let her know about that also

## 2019-05-23 NOTE — PATIENT INSTRUCTIONS
Stop lantus  Start 90 units of toujeo max once daily in morning and we can increase this by 2 units every 4 days if morning blood sugars are staying greater than 110 mg/dl . If not comfortable adjusting send office the blood sugars so we can adjust  Wear compression socks for ankle edema  Start vit d 50 k once weekly for vit d deficiency

## 2019-05-23 NOTE — TELEPHONE ENCOUNTER
I have called and confirmed rx for pharmacy.    ----- Message from Tati Adhikari sent at 5/23/2019 11:51 AM EDT -----  Contact: Ivana - Pharmacy danita Heath with [facility] ph. [phone], fax [phone] asked for call back regards to script for Toujeo instructions are for regular Toujeo and the code is for Toujeo Max.

## 2019-05-30 ENCOUNTER — OFFICE VISIT (OUTPATIENT)
Dept: FAMILY MEDICINE CLINIC | Facility: CLINIC | Age: 79
End: 2019-05-30

## 2019-05-30 VITALS
HEIGHT: 66 IN | DIASTOLIC BLOOD PRESSURE: 74 MMHG | BODY MASS INDEX: 34.23 KG/M2 | SYSTOLIC BLOOD PRESSURE: 168 MMHG | RESPIRATION RATE: 16 BRPM | HEART RATE: 64 BPM | WEIGHT: 213 LBS | OXYGEN SATURATION: 99 % | TEMPERATURE: 97.8 F

## 2019-05-30 DIAGNOSIS — I10 ESSENTIAL HYPERTENSION: Primary | ICD-10-CM

## 2019-05-30 DIAGNOSIS — E78.2 MIXED HYPERLIPIDEMIA: ICD-10-CM

## 2019-05-30 DIAGNOSIS — N18.30 STAGE 3 CHRONIC KIDNEY DISEASE (HCC): ICD-10-CM

## 2019-05-30 PROCEDURE — 99214 OFFICE O/P EST MOD 30 MIN: CPT | Performed by: FAMILY MEDICINE

## 2019-05-30 RX ORDER — ICOSAPENT ETHYL 1000 MG/1
2 CAPSULE ORAL 2 TIMES DAILY WITH MEALS
Qty: 120 CAPSULE | Refills: 4 | Status: SHIPPED | OUTPATIENT
Start: 2019-05-30 | End: 2019-11-25 | Stop reason: SDUPTHER

## 2019-05-30 RX ORDER — HYDRALAZINE HYDROCHLORIDE 50 MG/1
50 TABLET, FILM COATED ORAL 3 TIMES DAILY
Qty: 90 TABLET | Refills: 1 | Status: SHIPPED | OUTPATIENT
Start: 2019-05-30 | End: 2019-11-25 | Stop reason: SDUPTHER

## 2019-05-30 NOTE — ASSESSMENT & PLAN NOTE
Lipid abnormalities are unchanged.  Pharmacotherapy as ordered.  Lipids will be reassessed in 5 months.

## 2019-05-30 NOTE — ASSESSMENT & PLAN NOTE
Hypertension is worsening.  Medication changes per orders.  Will increase hydralazine to 50 mg 3 times daily.  Note has been sent to the nephrologist regarding this change.  She has appointment with nephrologist next month.  Blood pressure will be reassessed at the next regular appointment.

## 2019-05-30 NOTE — PROGRESS NOTES
"Chief Complaint   Patient presents with   • Hypertension     1month follow up        Subjective     Ange Johnson presents to the office today to refill her medications. No medication side effects are reported.  Today her blood pressure is elevated.  We will make adjustments with regular follow-up.  Lipids are stable.  She is having problems of mild edema of the ankles.  No gout attacks. Labs reviewed.   I have reviewed and updated her medications, medical history and problem list during today's office visit.     Patient Care Team:  Quinn Washington MD as PCP - General (Family Medicine)  Hugh Caruso MD as PCP - Claims Attributed  Steve Simons Jr., MD as Consulting Physician (Hematology and Oncology)  Hugh Caruso MD as Consulting Physician (Nephrology)  Adalgisa Hernandez APRN as Nurse Practitioner (Endocrinology)  Reagan Beltran MD as Consulting Physician (Endocrinology)  Aguilar Goldman MD as Consulting Physician (Pulmonary Disease)  Mil Sr MD as Consulting Physician (Ophthalmology)  Tasneem Montelongo MD as Surgeon (General Surgery)  Victor Hugo Ng MD as Consulting Physician (Cardiology)    Social History     Tobacco Use   • Smoking status: Never Smoker   • Smokeless tobacco: Never Used   Substance Use Topics   • Alcohol use: No       Review of Systems   Constitutional: Negative for fatigue.   Cardiovascular: Positive for leg swelling.       Objective     /74   Pulse 64   Temp 97.8 °F (36.6 °C) (Oral)   Resp 16   Ht 167.6 cm (66\")   Wt 96.6 kg (213 lb)   SpO2 99%   BMI 34.38 kg/m²     Body mass index is 34.38 kg/m².    Physical Exam   Constitutional: She is oriented to person, place, and time. She appears well-developed. No distress.   Eyes: Conjunctivae and lids are normal.   Neck: Carotid bruit is not present.   Cardiovascular: Normal rate, regular rhythm and normal heart sounds.   Pulmonary/Chest: Effort normal and breath sounds normal.   Neurological: She is alert and oriented to " person, place, and time.   Skin: Skin is warm and dry.   Psychiatric: She has a normal mood and affect. Her behavior is normal.   Vitals reviewed.      Data Reviewed:             Lab Results   Component Value Date     (H) 05/09/2019    BUN 24 (H) 05/09/2019    CREATININE 1.62 (H) 05/09/2019    EGFRIFNONA 31 (L) 05/09/2019    EGFRIFAFRI 37 (L) 05/09/2019     05/09/2019    K 4.4 05/09/2019    CL 94 (L) 05/09/2019    CALCIUM 9.6 05/09/2019    ALBUMIN 3.70 05/09/2019    BILITOT 0.2 05/09/2019    ALKPHOS 104 05/09/2019    AST 10 05/09/2019    ALT 10 05/09/2019    CHLPL 165 05/09/2019    TRIG 163 (H) 05/09/2019    HDL 60 05/09/2019    VLDL 32.6 05/09/2019    LDL 72 05/09/2019    MICROALBUR 431.2 05/09/2019    TSH 2.370 05/09/2019    FREET4 1.27 05/09/2019    THNL12HH 18.9 (L) 05/09/2019    URICACID 7.2 (H) 05/09/2019        Assessment/Plan     Diagnoses and all orders for this visit:    1. Essential hypertension (Primary)  Assessment & Plan:  Hypertension is worsening.  Medication changes per orders.  Will increase hydralazine to 50 mg 3 times daily.  Note has been sent to the nephrologist regarding this change.  She has appointment with nephrologist next month.  Blood pressure will be reassessed at the next regular appointment.    Orders:  -     hydrALAZINE (APRESOLINE) 50 MG tablet; Take 1 tablet by mouth 3 (Three) Times a Day for 60 days.  Dispense: 90 tablet; Refill: 1    2. Stage 3 chronic kidney disease (CMS/HCC)  -     hydrALAZINE (APRESOLINE) 50 MG tablet; Take 1 tablet by mouth 3 (Three) Times a Day for 60 days.  Dispense: 90 tablet; Refill: 1    3. Mixed hyperlipidemia  Assessment & Plan:  Lipid abnormalities are unchanged.  Pharmacotherapy as ordered.  Lipids will be reassessed in 5 months.    Orders:  -     icosapent ethyl (VASCEPA) 1 g capsule capsule; Take 2 g by mouth 2 (Two) Times a Day With Meals for 150 days.  Dispense: 120 capsule; Refill: 4      Return in about 5 months (around  10/24/2019).

## 2019-06-11 ENCOUNTER — APPOINTMENT (OUTPATIENT)
Dept: ULTRASOUND IMAGING | Facility: HOSPITAL | Age: 79
End: 2019-06-11

## 2019-06-26 RX ORDER — LEVOTHYROXINE SODIUM 0.15 MG/1
TABLET ORAL
Qty: 30 TABLET | Refills: 4 | Status: SHIPPED | OUTPATIENT
Start: 2019-06-26 | End: 2019-11-26 | Stop reason: SDUPTHER

## 2019-06-28 RX ORDER — PITAVASTATIN CALCIUM 4.18 MG/1
TABLET, FILM COATED ORAL
Qty: 30 TABLET | Refills: 4 | Status: SHIPPED | OUTPATIENT
Start: 2019-06-28 | End: 2019-12-05 | Stop reason: SDUPTHER

## 2019-07-06 DIAGNOSIS — E11.9 TYPE 2 DIABETES MELLITUS WITHOUT COMPLICATION, WITH LONG-TERM CURRENT USE OF INSULIN (HCC): ICD-10-CM

## 2019-07-06 DIAGNOSIS — Z79.4 TYPE 2 DIABETES MELLITUS WITHOUT COMPLICATION, WITH LONG-TERM CURRENT USE OF INSULIN (HCC): ICD-10-CM

## 2019-07-08 RX ORDER — PERPHENAZINE 16 MG/1
TABLET, FILM COATED ORAL
Qty: 100 EACH | Refills: 0 | Status: SHIPPED | OUTPATIENT
Start: 2019-07-08 | End: 2019-09-26 | Stop reason: SDUPTHER

## 2019-07-16 ENCOUNTER — HOSPITAL ENCOUNTER (OUTPATIENT)
Dept: ULTRASOUND IMAGING | Facility: HOSPITAL | Age: 79
Discharge: HOME | End: 2019-07-16
Admitting: NURSE PRACTITIONER

## 2019-07-16 DIAGNOSIS — E04.1 SOLITARY THYROID NODULE: ICD-10-CM

## 2019-07-16 DIAGNOSIS — E89.0 POSTOPERATIVE HYPOTHYROIDISM: ICD-10-CM

## 2019-07-16 DIAGNOSIS — R80.9 PROTEINURIA, UNSPECIFIED TYPE: ICD-10-CM

## 2019-07-16 DIAGNOSIS — I10 ESSENTIAL HYPERTENSION: ICD-10-CM

## 2019-07-16 DIAGNOSIS — E21.3 HYPERPARATHYROIDISM (HCC): ICD-10-CM

## 2019-07-16 DIAGNOSIS — E55.9 VITAMIN D DEFICIENCY DISEASE: ICD-10-CM

## 2019-07-16 PROCEDURE — 76536 US EXAM OF HEAD AND NECK: CPT

## 2019-09-11 RX ORDER — SAXAGLIPTIN 2.5 MG/1
TABLET, FILM COATED ORAL
Qty: 90 TABLET | Refills: 4 | Status: CANCELLED | OUTPATIENT
Start: 2019-09-11

## 2019-09-26 DIAGNOSIS — Z79.4 TYPE 2 DIABETES MELLITUS WITHOUT COMPLICATION, WITH LONG-TERM CURRENT USE OF INSULIN (HCC): ICD-10-CM

## 2019-09-26 DIAGNOSIS — E11.9 TYPE 2 DIABETES MELLITUS WITHOUT COMPLICATION, WITH LONG-TERM CURRENT USE OF INSULIN (HCC): ICD-10-CM

## 2019-09-26 RX ORDER — PERPHENAZINE 16 MG/1
TABLET, FILM COATED ORAL
Qty: 200 EACH | Refills: 0 | Status: SHIPPED | OUTPATIENT
Start: 2019-09-26 | End: 2020-03-02

## 2019-10-16 ENCOUNTER — OFFICE VISIT (OUTPATIENT)
Dept: RETAIL CLINIC | Facility: CLINIC | Age: 79
End: 2019-10-16

## 2019-10-16 DIAGNOSIS — Z23 NEEDS FLU SHOT: Primary | ICD-10-CM

## 2019-10-16 PROCEDURE — 90653 IIV ADJUVANT VACCINE IM: CPT | Performed by: NURSE PRACTITIONER

## 2019-10-16 PROCEDURE — G0008 ADMIN INFLUENZA VIRUS VAC: HCPCS | Performed by: NURSE PRACTITIONER

## 2019-11-12 DIAGNOSIS — E21.3 HYPERPARATHYROIDISM (HCC): ICD-10-CM

## 2019-11-12 DIAGNOSIS — E78.2 MIXED HYPERLIPIDEMIA: Primary | ICD-10-CM

## 2019-11-12 DIAGNOSIS — E11.42 TYPE 2 DIABETES MELLITUS WITH DIABETIC POLYNEUROPATHY, WITH LONG-TERM CURRENT USE OF INSULIN (HCC): ICD-10-CM

## 2019-11-12 DIAGNOSIS — E55.9 VITAMIN D DEFICIENCY: ICD-10-CM

## 2019-11-12 DIAGNOSIS — Z79.4 TYPE 2 DIABETES MELLITUS WITH DIABETIC POLYNEUROPATHY, WITH LONG-TERM CURRENT USE OF INSULIN (HCC): ICD-10-CM

## 2019-11-12 DIAGNOSIS — E04.1 SOLITARY THYROID NODULE: ICD-10-CM

## 2019-11-13 ENCOUNTER — LAB (OUTPATIENT)
Dept: ENDOCRINOLOGY | Age: 79
End: 2019-11-13

## 2019-11-13 DIAGNOSIS — E55.9 VITAMIN D DEFICIENCY: ICD-10-CM

## 2019-11-13 DIAGNOSIS — E21.3 HYPERPARATHYROIDISM (HCC): ICD-10-CM

## 2019-11-13 DIAGNOSIS — E78.2 MIXED HYPERLIPIDEMIA: ICD-10-CM

## 2019-11-13 DIAGNOSIS — Z79.4 TYPE 2 DIABETES MELLITUS WITH DIABETIC POLYNEUROPATHY, WITH LONG-TERM CURRENT USE OF INSULIN (HCC): ICD-10-CM

## 2019-11-13 DIAGNOSIS — E04.1 SOLITARY THYROID NODULE: ICD-10-CM

## 2019-11-13 DIAGNOSIS — E11.42 TYPE 2 DIABETES MELLITUS WITH DIABETIC POLYNEUROPATHY, WITH LONG-TERM CURRENT USE OF INSULIN (HCC): ICD-10-CM

## 2019-11-19 LAB
25(OH)D3+25(OH)D2 SERPL-MCNC: 32.8 NG/ML (ref 30–100)
ALBUMIN SERPL-MCNC: 4.1 G/DL (ref 3.5–5.2)
ALBUMIN/GLOB SERPL: 1.5 G/DL
ALP SERPL-CCNC: 97 U/L (ref 39–117)
ALT SERPL-CCNC: 10 U/L (ref 1–33)
AST SERPL-CCNC: 10 U/L (ref 1–32)
BILIRUB SERPL-MCNC: 0.2 MG/DL (ref 0.2–1.2)
BUN SERPL-MCNC: 30 MG/DL (ref 8–23)
BUN/CREAT SERPL: 17 (ref 7–25)
C PEPTIDE SERPL-MCNC: 6.6 NG/ML (ref 1.1–4.4)
CA-I SERPL ISE-MCNC: 5.3 MG/DL (ref 4.5–5.6)
CALCIUM SERPL-MCNC: 9.3 MG/DL (ref 8.6–10.5)
CHLORIDE SERPL-SCNC: 98 MMOL/L (ref 98–107)
CHOLEST SERPL-MCNC: 188 MG/DL (ref 0–200)
CO2 SERPL-SCNC: 29 MMOL/L (ref 22–29)
CREAT SERPL-MCNC: 1.76 MG/DL (ref 0.57–1)
FT4I SERPL CALC-MCNC: 2.1 (ref 1.2–4.9)
GLOBULIN SER CALC-MCNC: 2.8 GM/DL
GLUCOSE SERPL-MCNC: 203 MG/DL (ref 65–99)
HBA1C MFR BLD: 9.5 % (ref 4.8–5.6)
HDLC SERPL-MCNC: 65 MG/DL (ref 40–60)
INTERPRETATION: NORMAL
LDLC SERPL CALC-MCNC: 95 MG/DL (ref 0–100)
Lab: NORMAL
MICROALBUMIN UR-MCNC: 1540.1 UG/ML
PHOSPHATE SERPL-MCNC: 3.3 MG/DL (ref 2.5–4.5)
POTASSIUM SERPL-SCNC: 3.8 MMOL/L (ref 3.5–5.2)
PROT SERPL-MCNC: 6.9 G/DL (ref 6–8.5)
PTH-INTACT SERPL-MCNC: 86 PG/ML (ref 15–65)
SODIUM SERPL-SCNC: 141 MMOL/L (ref 136–145)
T3FREE SERPL-MCNC: 1.7 PG/ML (ref 2–4.4)
T3RU NFR SERPL: 31 % (ref 24–39)
T4 FREE SERPL-MCNC: 1.09 NG/DL (ref 0.93–1.7)
T4 SERPL-MCNC: 6.7 UG/DL (ref 4.5–12)
THYROGLOB AB SERPL-ACNC: 3 IU/ML (ref 0–0.9)
THYROGLOB SERPL-MCNC: 12 NG/ML
TRIGL SERPL-MCNC: 138 MG/DL (ref 0–150)
TSH SERPL DL<=0.005 MIU/L-ACNC: 6.36 UIU/ML (ref 0.45–4.5)
URATE SERPL-MCNC: 7.3 MG/DL (ref 2.4–5.7)
VLDLC SERPL CALC-MCNC: 27.6 MG/DL

## 2019-11-21 DIAGNOSIS — I10 ESSENTIAL HYPERTENSION: ICD-10-CM

## 2019-11-21 DIAGNOSIS — E78.2 MIXED HYPERLIPIDEMIA: ICD-10-CM

## 2019-11-21 RX ORDER — ICOSAPENT ETHYL 1000 MG/1
CAPSULE ORAL
Qty: 120 CAPSULE | Refills: 3 | OUTPATIENT
Start: 2019-11-21

## 2019-11-21 RX ORDER — CLONIDINE HYDROCHLORIDE 0.2 MG/1
TABLET ORAL
Qty: 180 TABLET | Refills: 4 | OUTPATIENT
Start: 2019-11-21

## 2019-11-21 RX ORDER — AMLODIPINE BESYLATE 10 MG/1
TABLET ORAL
Qty: 30 TABLET | Refills: 4 | OUTPATIENT
Start: 2019-11-21

## 2019-11-21 RX ORDER — ERGOCALCIFEROL 1.25 MG/1
CAPSULE ORAL
Qty: 12 CAPSULE | Refills: 0 | Status: SHIPPED | OUTPATIENT
Start: 2019-11-21 | End: 2020-03-16

## 2019-11-22 RX ORDER — NATEGLINIDE 120 MG/1
TABLET ORAL
Qty: 90 TABLET | Refills: 4 | Status: SHIPPED | OUTPATIENT
Start: 2019-11-22 | End: 2020-02-18 | Stop reason: HOSPADM

## 2019-11-25 ENCOUNTER — OFFICE VISIT (OUTPATIENT)
Dept: FAMILY MEDICINE CLINIC | Facility: CLINIC | Age: 79
End: 2019-11-25

## 2019-11-25 VITALS
SYSTOLIC BLOOD PRESSURE: 194 MMHG | BODY MASS INDEX: 35.52 KG/M2 | OXYGEN SATURATION: 98 % | WEIGHT: 221 LBS | HEART RATE: 67 BPM | RESPIRATION RATE: 16 BRPM | HEIGHT: 66 IN | DIASTOLIC BLOOD PRESSURE: 81 MMHG

## 2019-11-25 DIAGNOSIS — M62.9 DISORDER OF MUSCLE, LIGAMENT, AND FASCIA: ICD-10-CM

## 2019-11-25 DIAGNOSIS — E78.2 MIXED HYPERLIPIDEMIA: ICD-10-CM

## 2019-11-25 DIAGNOSIS — M10.9 GOUT WITHOUT TOPHUS: ICD-10-CM

## 2019-11-25 DIAGNOSIS — I10 ESSENTIAL HYPERTENSION: Primary | ICD-10-CM

## 2019-11-25 DIAGNOSIS — N18.30 STAGE 3 CHRONIC KIDNEY DISEASE (HCC): ICD-10-CM

## 2019-11-25 DIAGNOSIS — M24.20 DISORDER OF MUSCLE, LIGAMENT, AND FASCIA: ICD-10-CM

## 2019-11-25 PROCEDURE — 99214 OFFICE O/P EST MOD 30 MIN: CPT | Performed by: FAMILY MEDICINE

## 2019-11-25 RX ORDER — FEBUXOSTAT 40 MG/1
40 TABLET, FILM COATED ORAL DAILY
Qty: 90 TABLET | Refills: 1 | Status: SHIPPED | OUTPATIENT
Start: 2019-11-25 | End: 2020-04-20 | Stop reason: SDUPTHER

## 2019-11-25 RX ORDER — EZETIMIBE 10 MG/1
10 TABLET ORAL DAILY
Qty: 90 TABLET | Refills: 1 | Status: SHIPPED | OUTPATIENT
Start: 2019-11-25 | End: 2020-02-18 | Stop reason: HOSPADM

## 2019-11-25 RX ORDER — HYDRALAZINE HYDROCHLORIDE 50 MG/1
50 TABLET, FILM COATED ORAL 3 TIMES DAILY
Qty: 270 TABLET | Refills: 1 | Status: ON HOLD | OUTPATIENT
Start: 2019-11-25 | End: 2020-02-18 | Stop reason: SDUPTHER

## 2019-11-25 RX ORDER — CLONIDINE HYDROCHLORIDE 0.2 MG/1
0.2 TABLET ORAL EVERY 12 HOURS SCHEDULED
Qty: 180 TABLET | Refills: 1 | Status: ON HOLD | OUTPATIENT
Start: 2019-11-25 | End: 2020-02-18 | Stop reason: SDUPTHER

## 2019-11-25 RX ORDER — ICOSAPENT ETHYL 1000 MG/1
2 CAPSULE ORAL 2 TIMES DAILY WITH MEALS
Qty: 360 CAPSULE | Refills: 1 | Status: SHIPPED | OUTPATIENT
Start: 2019-11-25 | End: 2020-02-18 | Stop reason: HOSPADM

## 2019-11-25 RX ORDER — CARBAMAZEPINE 300 MG/1
300 CAPSULE, EXTENDED RELEASE ORAL EVERY 12 HOURS SCHEDULED
Qty: 180 CAPSULE | Refills: 1 | Status: SHIPPED | OUTPATIENT
Start: 2019-11-25 | End: 2020-04-20 | Stop reason: SDUPTHER

## 2019-11-25 RX ORDER — AMLODIPINE BESYLATE 10 MG/1
10 TABLET ORAL DAILY
Qty: 90 TABLET | Refills: 1 | Status: SHIPPED | OUTPATIENT
Start: 2019-11-25 | End: 2020-01-20 | Stop reason: ALTCHOICE

## 2019-11-25 RX ORDER — OLMESARTAN MEDOXOMIL 40 MG/1
40 TABLET ORAL DAILY
Qty: 90 TABLET | Refills: 1 | Status: SHIPPED | OUTPATIENT
Start: 2019-11-25 | End: 2020-01-20 | Stop reason: ALTCHOICE

## 2019-11-25 NOTE — ASSESSMENT & PLAN NOTE
Hypertension is worsening.  Medication changes per orders.  Blood pressure will be reassessed at the next regular appointment.

## 2019-11-25 NOTE — PROGRESS NOTES
"   Subjective       Chief Complaint   Patient presents with   • Hypertension     6 mo follow up / med refill    • Hyperlipidemia         History of Present Illness   Ange Johnson is a 79 y.o. female who presents to the office today for medication refill.  Blood pressure not controlled.  We will change therapy to olmesartan along with other medications.  Recent labs have shown interval worsening of kidney function.  She has follow-up appointment with specialty in the near future.  Diabetes not fully controlled.  Once again she will follow-up with endocrinology in the near future.  Has been some interval improvement in long-term hemoglobin A1c but it is still far from controlled.  Lipids stable.  Gout is not controlled with allopurinol.  Will change from allopurinol to Uloric due to lack of control and also better kidney safety with Uloric.  Pros and cons of medicines discussed.  Her daughter Henna is here in the office today and states that patient has had 2-3 episodes over the past 6 months of some nighttime delusional episodes.    I have reviewed and updated her medications, medical history and problem list during today's office visit.     Social History     Tobacco Use   • Smoking status: Never Smoker   • Smokeless tobacco: Never Used   Substance Use Topics   • Alcohol use: No       Review of Systems   Constitutional: Negative for fatigue.   Cardiovascular: Negative for chest pain.         Physical Examination:   Objective   BP (!) 194/81   Pulse 67   Resp 16   Ht 167.6 cm (66\")   Wt 100 kg (221 lb)   SpO2 98%   BMI 35.67 kg/m²     Body mass index is 35.67 kg/m².    Physical Exam   Constitutional: She is oriented to person, place, and time. She appears well-developed. No distress.   Eyes: Conjunctivae and lids are normal.   Neck: Carotid bruit is not present.   Cardiovascular: Normal rate, regular rhythm and normal heart sounds.   Pulmonary/Chest: Effort normal and breath sounds normal.   Neurological: " She is alert and oriented to person, place, and time.   Skin: Skin is warm and dry.   Psychiatric: She has a normal mood and affect. Her behavior is normal.   Vitals reviewed.       Data Reviewed:              Lab Results   Component Value Date     (H) 11/13/2019    BUN 30 (H) 11/13/2019    CREATININE 1.76 (H) 11/13/2019    EGFRIFNONA 28 (L) 11/13/2019    EGFRIFAFRI 34 (L) 11/13/2019     11/13/2019    K 3.8 11/13/2019    CL 98 11/13/2019    CALCIUM 9.3 11/13/2019    ALBUMIN 4.10 11/13/2019    BILITOT 0.2 11/13/2019    ALKPHOS 97 11/13/2019    AST 10 11/13/2019    ALT 10 11/13/2019    CHLPL 188 11/13/2019    TRIG 138 11/13/2019    HDL 65 (H) 11/13/2019    VLDL 27.6 11/13/2019    LDL 95 11/13/2019    MICROALBUR 1,540.1 11/13/2019    TSH 6.360 (H) 11/13/2019    FREET4 1.09 11/13/2019    NLPV53QR 32.8 11/13/2019    URICACID 7.3 (H) 11/13/2019          Assessment/Plan:   Assessment/Plan   Diagnoses and all orders for this visit:    1. Essential hypertension (Primary)  Assessment & Plan:  Hypertension is worsening.  Medication changes per orders.  Blood pressure will be reassessed at the next regular appointment.    Orders:  -     hydrALAZINE (APRESOLINE) 50 MG tablet; Take 1 tablet by mouth 3 (Three) Times a Day for 180 days.  Dispense: 270 tablet; Refill: 1  -     cloNIDine (CATAPRES) 0.2 MG tablet; Take 1 tablet by mouth Every 12 (Twelve) Hours for 180 days.  Dispense: 180 tablet; Refill: 1  -     olmesartan (BENICAR) 40 MG tablet; Take 1 tablet by mouth Daily for 180 days.  Dispense: 90 tablet; Refill: 1  -     amLODIPine (NORVASC) 10 MG tablet; Take 1 tablet by mouth Daily for 180 days.  Dispense: 90 tablet; Refill: 1    2. Mixed hyperlipidemia  -     icosapent ethyl (VASCEPA) 1 g capsule capsule; Take 2 g by mouth 2 (Two) Times a Day With Meals for 180 days.  Dispense: 360 capsule; Refill: 1  -     ezetimibe (ZETIA) 10 MG tablet; Take 1 tablet by mouth Daily for 180 days.  Dispense: 90 tablet; Refill:  1    3. Stage 3 chronic kidney disease (CMS/HCC)  -     hydrALAZINE (APRESOLINE) 50 MG tablet; Take 1 tablet by mouth 3 (Three) Times a Day for 180 days.  Dispense: 270 tablet; Refill: 1    4. Disorder of muscle, ligament, and fascia  -     carBAMazepine (CARBATROL) 300 MG 12 hr capsule; Take 1 capsule by mouth Every 12 (Twelve) Hours for 180 days.  Dispense: 180 capsule; Refill: 1    5. Gout without tophus  Assessment & Plan:  Change therapy to Uloric.    Orders:  -     febuxostat (ULORIC) 40 MG tablet; Take 1 tablet by mouth Daily for 180 days.  Dispense: 90 tablet; Refill: 1    Patient Instructions         Follow up:   Return in about 2 months (around 1/25/2020) for Recheck.

## 2019-11-26 DIAGNOSIS — I10 ESSENTIAL HYPERTENSION: ICD-10-CM

## 2019-11-26 RX ORDER — CLONIDINE HYDROCHLORIDE 0.2 MG/1
TABLET ORAL
Qty: 180 TABLET | Refills: 4 | OUTPATIENT
Start: 2019-11-26

## 2019-11-26 RX ORDER — LEVOTHYROXINE SODIUM 0.15 MG/1
TABLET ORAL
Qty: 90 TABLET | Refills: 3 | Status: SHIPPED | OUTPATIENT
Start: 2019-11-26 | End: 2019-11-27

## 2019-11-27 ENCOUNTER — OFFICE VISIT (OUTPATIENT)
Dept: ENDOCRINOLOGY | Age: 79
End: 2019-11-27

## 2019-11-27 VITALS
SYSTOLIC BLOOD PRESSURE: 146 MMHG | DIASTOLIC BLOOD PRESSURE: 78 MMHG | WEIGHT: 221.4 LBS | BODY MASS INDEX: 35.58 KG/M2 | HEIGHT: 66 IN

## 2019-11-27 DIAGNOSIS — E79.0 HYPERURICEMIA: ICD-10-CM

## 2019-11-27 DIAGNOSIS — E21.3 HYPERPARATHYROIDISM (HCC): ICD-10-CM

## 2019-11-27 DIAGNOSIS — I10 ESSENTIAL HYPERTENSION: ICD-10-CM

## 2019-11-27 DIAGNOSIS — E89.0 POSTOPERATIVE HYPOTHYROIDISM: ICD-10-CM

## 2019-11-27 DIAGNOSIS — Z79.4 TYPE 2 DIABETES MELLITUS WITH DIABETIC POLYNEUROPATHY, WITH LONG-TERM CURRENT USE OF INSULIN (HCC): Primary | ICD-10-CM

## 2019-11-27 DIAGNOSIS — E55.9 VITAMIN D DEFICIENCY: ICD-10-CM

## 2019-11-27 DIAGNOSIS — E11.42 TYPE 2 DIABETES MELLITUS WITH DIABETIC POLYNEUROPATHY, WITH LONG-TERM CURRENT USE OF INSULIN (HCC): Primary | ICD-10-CM

## 2019-11-27 PROCEDURE — 99214 OFFICE O/P EST MOD 30 MIN: CPT | Performed by: NURSE PRACTITIONER

## 2019-11-27 RX ORDER — PROCHLORPERAZINE 25 MG/1
1 SUPPOSITORY RECTAL DAILY
Qty: 1 EACH | Refills: 2 | Status: ON HOLD | OUTPATIENT
Start: 2019-11-27 | End: 2020-02-09

## 2019-11-27 RX ORDER — LEVOTHYROXINE SODIUM 175 MCG
175 TABLET ORAL DAILY
Qty: 90 TABLET | Refills: 2 | Status: SHIPPED | OUTPATIENT
Start: 2019-11-27 | End: 2020-03-23

## 2019-11-27 RX ORDER — PROCHLORPERAZINE 25 MG/1
SUPPOSITORY RECTAL
Qty: 3 EACH | Refills: 11 | Status: ON HOLD | OUTPATIENT
Start: 2019-11-27 | End: 2020-02-09

## 2019-11-27 RX ORDER — INSULIN LISPRO 100 [IU]/ML
5 INJECTION, SOLUTION INTRAVENOUS; SUBCUTANEOUS
Qty: 15 ML | Refills: 2 | Status: SHIPPED | OUTPATIENT
Start: 2019-11-27 | End: 2021-10-28 | Stop reason: SDUPTHER

## 2019-11-27 RX ORDER — PROCHLORPERAZINE 25 MG/1
1 SUPPOSITORY RECTAL DAILY
Qty: 1 DEVICE | Refills: 0 | Status: ON HOLD | OUTPATIENT
Start: 2019-11-27 | End: 2020-02-09

## 2019-11-27 NOTE — PROGRESS NOTES
"Subjective   Ange Johnson is a 79 y.o. female is here today for follow-up.  Chief Complaint   Patient presents with   • Diabetes     recent labs, pt is testing once daily, pt did not bring meter   • Hyperlipidemia   • Hypertension   • Hypothyroidism   • Vitamin D Deficiency   • hyperparathyroidism     /78   Ht 167.6 cm (65.98\")   Wt 100 kg (221 lb 6.4 oz)   BMI 35.75 kg/m²   Current Outpatient Medications on File Prior to Visit   Medication Sig   • amLODIPine (NORVASC) 10 MG tablet Take 1 tablet by mouth Daily for 180 days.   • aspirin 81 MG EC tablet Take 1 tablet by mouth Daily. HOLD PER MD INSTR-12/1   • carBAMazepine (CARBATROL) 300 MG 12 hr capsule Take 1 capsule by mouth Every 12 (Twelve) Hours for 180 days.   • chlorthalidone (HYGROTON) 25 MG tablet    • cloNIDine (CATAPRES) 0.2 MG tablet Take 1 tablet by mouth Every 12 (Twelve) Hours for 180 days.   • CONTOUR NEXT TEST test strip USE ONE STRIP TO TEST TWICE A DAY   • ezetimibe (ZETIA) 10 MG tablet Take 1 tablet by mouth Daily for 180 days.   • febuxostat (ULORIC) 40 MG tablet Take 1 tablet by mouth Daily for 180 days.   • ferrous sulfate 325 (65 FE) MG tablet Take 1 tablet by mouth daily.   • hydrALAZINE (APRESOLINE) 50 MG tablet Take 1 tablet by mouth 3 (Three) Times a Day for 180 days.   • icosapent ethyl (VASCEPA) 1 g capsule capsule Take 2 g by mouth 2 (Two) Times a Day With Meals for 180 days.   • Insulin Glargine (TOUJEO MAX SOLOSTAR) 300 UNIT/ML solution pen-injector Inject 100 Units under the skin into the appropriate area as directed Daily.   • LIVALO 4 MG tablet TAKE ONE TABLET BY MOUTH EVERY NIGHT AT BEDTIME   • montelukast (SINGULAIR) 10 MG tablet 10 mg daily.   • nateglinide (STARLIX) 120 MG tablet TAKE ONE TABLET BY MOUTH THREE TIMES A DAY BEFORE MEALS   • olmesartan (BENICAR) 40 MG tablet Take 1 tablet by mouth Daily for 180 days.   • potassium chloride (MICRO-K) 10 MEQ CR capsule Take 10 mEq by mouth Daily.   • SAXagliptin " (ONGLYZA) 2.5 MG tablet Take 1 tablet by mouth Daily.   • vitamin B-12 (CYANOCOBALAMIN) 1000 MCG tablet Take 1 tablet by mouth daily.   • vitamin D (ERGOCALCIFEROL) 1.25 MG (13904 UT) capsule capsule TAKE ONE CAPSULE BY MOUTH ONCE WEEKLY   • [DISCONTINUED] Insulin Pen Needle (BD PEN NEEDLE ISELA U/F) 32G X 4 MM misc Use to inject 1 time daily   • [DISCONTINUED] levothyroxine (SYNTHROID, LEVOTHROID) 150 MCG tablet TAKE ONE TABLET BY MOUTH DAILY     No current facility-administered medications on file prior to visit.      Family History   Problem Relation Age of Onset   • Diabetes Sister    • Diabetes Brother    • Hypertension Brother    • Kidney disease Brother    • Cervical cancer Mother    • Heart disease Sister    • Neuropathy Sister    • Diabetes Sister      Social History     Tobacco Use   • Smoking status: Never Smoker   • Smokeless tobacco: Never Used   Substance Use Topics   • Alcohol use: No   • Drug use: No     Allergies   Allergen Reactions   • Codeine Nausea Only   • Hydrocodone-Acetaminophen Nausea Only and Other (See Comments)     Percocet and Vicodin; vertigo   • Meperidine Nausea Only and Nausea And Vomiting   • Morphine And Related Nausea Only   • Oxycodone-Acetaminophen Nausea Only   • Oxycodone-Acetaminophen Nausea Only and Other (See Comments)     dilzziness   • Oxycodone-Aspirin Nausea Only and Other (See Comments)     vertigo   • Penicillins Hives   • Sulfa Antibiotics Hives         History of Present Illness   Encounter Diagnoses   Name Primary?   • Hyperparathyroidism (CMS/HCC)    • Hyperuricemia    • Vitamin D deficiency    • Essential hypertension    • Postoperative hypothyroidism    • Type 2 diabetes mellitus with diabetic polyneuropathy, with long-term current use of insulin (CMS/HCC) Yes     79-year-old female patient here today for follow-up visit.  She has been seen for the above-mentioned problems.  She was recently seen by her primary care provider and started on another medication  olmesartan for hypertension.  Her blood pressure has improved since that office visit.  She did not bring her blood glucose meter to today's visit.  She states that her blood glucose readings have been good so she has decreased the amount of Toujeo insulin she was taking.  She is supposed to be taking 100 units daily but if her blood sugars in good range she is only been taking 90 units.  We discussed being consistent with her basal insulin in order to improve her overall glycemic control.  Her A1c is not at goal of less than 8.  Her blood sugars have been as high as in the 200s according to patient.  We discussed increasing how she is checking her blood sugars to 4 times daily and also getting a continuous glucose monitoring sensor.  She is allergic to sulfa so is not a candidate for sulfonylurea.  She has renal insufficiency so is not a candidate for SGLT2 or metformin.  She is currently on Onglyza and Toujeo.  We discussed adding mealtime insulin Humalog 5 units prior to each meal.  She also will be sent a continuous glucose monitoring sensor the Dexcom 6 to [facility].  I spoke with her daughter on the phone to let her know of all the medication changes we made.  She has hypothyroid and her Synthroid has been increased to 175 mcg.  She was given samples today of Synthroid, Humalog, and Toujeo.  With the addition of mealtime insulin patient has been advised that she needs to check her blood sugars 4 times daily.  She is currently checking them twice daily and did not bring her blood glucose meter to today's visit.  She is at risk for hypoglycemia on insulin.      The following portions of the patient's history were reviewed and updated as appropriate: allergies, current medications, past family history, past medical history, past social history, past surgical history and problem list.    Review of Systems   Constitutional: Negative.    HENT: Negative.    Eyes: Negative.    Respiratory: Negative.    Cardiovascular:  Negative.    Gastrointestinal: Negative.    Endocrine: Negative.    Genitourinary: Negative.    Musculoskeletal: Negative.    Skin: Negative.    Allergic/Immunologic: Negative.    Neurological: Negative.    Hematological: Negative.    Psychiatric/Behavioral: Negative.        Objective   Physical Exam   Constitutional: She is oriented to person, place, and time. She appears well-developed and well-nourished. No distress.   HENT:   Head: Normocephalic and atraumatic.   Right Ear: External ear normal.   Left Ear: External ear normal.   Nose: Nose normal.   Mouth/Throat: Oropharynx is clear and moist. No oropharyngeal exudate.   Eyes: EOM are normal. Pupils are equal, round, and reactive to light. Right eye exhibits no discharge. Left eye exhibits no discharge.   Neck: Trachea normal, normal range of motion and full passive range of motion without pain. Neck supple. No tracheal tenderness present. Carotid bruit is not present. No tracheal deviation, no edema and no erythema present. No thyroid mass and no thyromegaly present.   Cardiovascular: Normal rate, regular rhythm and intact distal pulses. Exam reveals no gallop and no friction rub.   Murmur heard.  Pulmonary/Chest: Effort normal and breath sounds normal. No stridor. No respiratory distress. She has no wheezes. She has no rales.   Abdominal: Soft. Bowel sounds are normal. She exhibits no distension.   Musculoskeletal: Normal range of motion. She exhibits no edema or deformity.   Lymphadenopathy:     She has no cervical adenopathy.   Neurological: She is alert and oriented to person, place, and time.   Skin: Skin is warm and dry. No rash noted. She is not diaphoretic. No erythema. No pallor.   Psychiatric: She has a normal mood and affect. Her behavior is normal. Judgment and thought content normal.   Nursing note and vitals reviewed.      Assessment/Plan   Problems Addressed this Visit        Cardiovascular and Mediastinum    Essential hypertension        Digestive    Vitamin D deficiency       Endocrine    Type 2 diabetes mellitus with diabetic polyneuropathy, with long-term current use of insulin (CMS/[facility]) - Primary    Relevant Medications    Insulin Lispro, 1 Unit Dial, (HUMALOG KWIKPEN) 100 UNIT/ML solution pen-injector    Postoperative hypothyroidism    Relevant Medications    SYNTHROID 175 MCG tablet    Hyperparathyroidism (CMS/HCC)       Other    RESOLVED: Hyperuricemia        Patient was seen and examined.  Her medications have been changed as listed below.  She will start mealtime insulin.  Her daughter has been informed of her medication changes.  Have increased her Synthroid to 175 mcg daily.  A continuous glucose monitoring sensor Dexcom G6 will be sent to [facility] for prior authorization.  She needs to monitor blood sugars closely.  We need to improve her overall glycemic control to at least an A1c of less than 8.  She does not live alone and lives with her daughter currently.  They are to contact the office via my chart should they have any questions or concerns    Any blood sugar less than 70 please let office know  Be sure to take toujeo 100 units daily and be consistent with dose  humalog 5 units with meal 3 times daily if you skip meal please skip dose  Goal for morning bs's is less than 120 mg/dl  Goal for premeal blood sugar is less than 100 mg/dl  dexcom rx will be sent to [facility]  Check bs's morning and before each meal  If unsure to take mealtime insulin just hold that dose and send me a [place] message. She is safer being too high on blood sugars than too low I just don't want her too high all the time   Bring meter each visit  Increase synthroid to 175 mcg daily on empty stomach  Continue to take toujeo 100 units daily

## 2019-11-27 NOTE — PATIENT INSTRUCTIONS
Any blood sugar less than 70 please let office know  Be sure to take toujeo 100 units daily and be consistent with dose  humalog 5 units with meal 3 times daily if you skip meal please skip dose  Goal for morning bs's is less than 120 mg/dl  Goal for premeal blood sugar is less than 100 mg/dl  dexcom rx will be sent to [facility] per pt request and for prior auth with insurance    Check bs's morning and before each meal  If unsure to take mealtime insulin just hold that dose and send me a BISON message. She is safer being too high on blood sugars than too low I just don't want her too high all the time   Bring meter each visit  Increase synthroid to 175 mcg brandon on empty stomach  Continue to take toujeo 100 units daily

## 2019-12-05 RX ORDER — PITAVASTATIN CALCIUM 4.18 MG/1
TABLET, FILM COATED ORAL
Qty: 90 TABLET | Refills: 0 | Status: SHIPPED | OUTPATIENT
Start: 2019-12-05 | End: 2020-02-18 | Stop reason: HOSPADM

## 2019-12-26 NOTE — TELEPHONE ENCOUNTER
Spoke with [facility] pharmacy  They stated patient had no pending prescription

## 2020-01-03 ENCOUNTER — TRANSCRIBE ORDERS (OUTPATIENT)
Dept: ADMINISTRATIVE | Facility: HOSPITAL | Age: 80
End: 2020-01-03

## 2020-01-03 DIAGNOSIS — Z12.31 VISIT FOR SCREENING MAMMOGRAM: Primary | ICD-10-CM

## 2020-01-20 ENCOUNTER — OFFICE VISIT (OUTPATIENT)
Dept: FAMILY MEDICINE CLINIC | Facility: CLINIC | Age: 80
End: 2020-01-20

## 2020-01-20 VITALS
HEIGHT: 66 IN | WEIGHT: 230 LBS | TEMPERATURE: 98.6 F | BODY MASS INDEX: 36.96 KG/M2 | OXYGEN SATURATION: 98 % | HEART RATE: 71 BPM | SYSTOLIC BLOOD PRESSURE: 178 MMHG | DIASTOLIC BLOOD PRESSURE: 70 MMHG | RESPIRATION RATE: 16 BRPM

## 2020-01-20 DIAGNOSIS — E78.2 MIXED HYPERLIPIDEMIA: ICD-10-CM

## 2020-01-20 DIAGNOSIS — E11.42 TYPE 2 DIABETES MELLITUS WITH DIABETIC POLYNEUROPATHY, WITH LONG-TERM CURRENT USE OF INSULIN (HCC): ICD-10-CM

## 2020-01-20 DIAGNOSIS — R12 HEARTBURN: ICD-10-CM

## 2020-01-20 DIAGNOSIS — R06.02 EXERTIONAL SHORTNESS OF BREATH: ICD-10-CM

## 2020-01-20 DIAGNOSIS — Z79.4 TYPE 2 DIABETES MELLITUS WITH DIABETIC POLYNEUROPATHY, WITH LONG-TERM CURRENT USE OF INSULIN (HCC): ICD-10-CM

## 2020-01-20 DIAGNOSIS — I10 ESSENTIAL HYPERTENSION: Primary | ICD-10-CM

## 2020-01-20 PROCEDURE — 93000 ELECTROCARDIOGRAM COMPLETE: CPT | Performed by: FAMILY MEDICINE

## 2020-01-20 PROCEDURE — 99215 OFFICE O/P EST HI 40 MIN: CPT | Performed by: FAMILY MEDICINE

## 2020-01-20 RX ORDER — OLMESARTAN MEDOXOMIL, AMLODIPINE AND HYDROCHLOROTHIAZIDE TABLET 40/10/25 MG 40; 10; 25 MG/1; MG/1; MG/1
1 TABLET ORAL DAILY
Qty: 90 TABLET | Refills: 0 | Status: SHIPPED | OUTPATIENT
Start: 2020-01-20 | End: 2020-02-18 | Stop reason: HOSPADM

## 2020-01-20 NOTE — ASSESSMENT & PLAN NOTE
Diabetes is improving with treatment.   Continue current treatment regimen.  Diabetes will be reassessed At our next visit..

## 2020-01-20 NOTE — ASSESSMENT & PLAN NOTE
Hypertension is worsening.  Medication changes per orders.  Will take amlodipine, chlorthalidone and olmesartan and switch to Tribenzor.  Blood pressure will be reassessed at the next regular appointment.  Suspect blood pressure to be rechecked by cardiologist in the near future.

## 2020-01-20 NOTE — ASSESSMENT & PLAN NOTE
Exertional dyspnea is a new and worsening problem.  See discussion on heartburn.  Referral to cardiology

## 2020-01-20 NOTE — ASSESSMENT & PLAN NOTE
Heartburn is a new condition and not always associated with eating.  Will need to rule out cardiovascular cause.  This condition is intermittent and patient advised to go to emergency room if her symptoms become more regular or worsen before she sees the cardiologist.

## 2020-01-20 NOTE — PROGRESS NOTES
"   Subjective       Chief Complaint   Patient presents with   • Hypertension     follow up    • Med Management         HPI:       Ange Johnson is a 79 y.o. female who presents to the office today to recheck blood pressure.  She is not fully controlled today.  She has had a cold over the past week and is taking mucus ER as well as Delsym product.  This may be a contributor.  She also has had some unexplained intermittent heartburn issues.  One time she had heartburn issues after walking from her car to an event at the [place] [employer].  The walk was about 1 block in length.  She has had some increased exertional dyspnea.  Currently she is not having symptoms.  She states she does not feel quite right.  She has increased fatigue.    I have reviewed and updated her medications, medical history and problem list during today's office visit.     Social History     Tobacco Use   • Smoking status: Never Smoker   • Smokeless tobacco: Never Used   Substance Use Topics   • Alcohol use: No       Review of Systems   Constitutional: Positive for fatigue.   Respiratory: Positive for shortness of breath.    Cardiovascular: Negative for chest pain.   Gastrointestinal: Positive for indigestion. Negative for nausea.   Neurological: Negative for dizziness and headache.   All other systems reviewed and are negative.        PE:   Objective   /70 (BP Location: Right arm, Patient Position: Sitting, Cuff Size: Large Adult)   Pulse 71   Temp 98.6 °F (37 °C) (Oral)   Resp 16   Ht 167.6 cm (65.98\")   Wt 104 kg (230 lb)   SpO2 98%   BMI 37.15 kg/m²     Body mass index is 37.15 kg/m².    Physical Exam   Constitutional: She is oriented to person, place, and time. She appears well-developed. No distress.   HENT:   Mouth/Throat: Uvula is midline, oropharynx is clear and moist and mucous membranes are normal.   Eyes: Conjunctivae and lids are normal.   Neck: Carotid bruit is not present.   Cardiovascular: Normal rate, " regular rhythm and normal heart sounds.   Pulmonary/Chest: Effort normal and breath sounds normal.   Neurological: She is alert and oriented to person, place, and time.   Skin: Skin is warm and dry.   Psychiatric: She has a normal mood and affect. Her behavior is normal.   Vitals reviewed.       Data Reviewed:        ECG 12 Lead  Date/Time: 1/20/2020 4:08 PM  Performed by: Quinn Washington MD  Authorized by: Quinn Washington MD   Comparison: not compared with previous ECG   Previous ECG: no previous ECG available  Rhythm: sinus rhythm  Conduction: non-specific intraventricular conduction delay  T inversion: I and aVL    Clinical impression: abnormal EKG                         A/P:     Assessment/Plan   Diagnoses and all orders for this visit:    1. Essential hypertension (Primary)  Assessment & Plan:  Hypertension is worsening.  Medication changes per orders.  Will take amlodipine, chlorthalidone and olmesartan and switch to Tribenzor.  Blood pressure will be reassessed at the next regular appointment.  Suspect blood pressure to be rechecked by cardiologist in the near future.    Orders:  -     Ambulatory Referral to Cardiology  -     Olmesartan-amLODIPine-HCTZ 40-10-25 MG tablet; Take 1 tablet by mouth Daily for 90 days.  Dispense: 90 tablet; Refill: 0    2. Exertional shortness of breath  Assessment & Plan:  Exertional dyspnea is a new and worsening problem.  See discussion on heartburn.  Referral to cardiology    Orders:  -     ECG 12 Lead  -     Ambulatory Referral to Cardiology    3. Heartburn  Assessment & Plan:  Heartburn is a new condition and not always associated with eating.  Will need to rule out cardiovascular cause.  This condition is intermittent and patient advised to go to emergency room if her symptoms become more regular or worsen before she sees the cardiologist.    Orders:  -     ECG 12 Lead  -     Ambulatory Referral to Cardiology    4. Type 2 diabetes mellitus with diabetic polyneuropathy, with  long-term current use of insulin (CMS/[facility])  Assessment & Plan:  Diabetes is improving with treatment.   Continue current treatment regimen.  Diabetes will be reassessed At our next visit..    Orders:  -     Ambulatory Referral to Cardiology    5. Mixed hyperlipidemia  Assessment & Plan:  Lipid abnormalities are unchanged.  Pharmacotherapy as ordered.  Lipids will be reassessed in 3 months.    Orders:  -     Ambulatory Referral to Cardiology    Patient Instructions           Follow up:    Return in about 3 months (around 4/20/2020), or if symptoms worsen or fail to improve, for Recheck.

## 2020-01-22 ENCOUNTER — OFFICE VISIT (OUTPATIENT)
Dept: CARDIOLOGY | Facility: CLINIC | Age: 80
End: 2020-01-22

## 2020-01-22 VITALS
DIASTOLIC BLOOD PRESSURE: 70 MMHG | OXYGEN SATURATION: 96 % | HEIGHT: 65 IN | WEIGHT: 222.6 LBS | SYSTOLIC BLOOD PRESSURE: 174 MMHG | BODY MASS INDEX: 37.09 KG/M2 | HEART RATE: 63 BPM

## 2020-01-22 DIAGNOSIS — R06.09 DYSPNEA ON EXERTION: ICD-10-CM

## 2020-01-22 DIAGNOSIS — E11.42 TYPE 2 DIABETES MELLITUS WITH DIABETIC POLYNEUROPATHY, WITH LONG-TERM CURRENT USE OF INSULIN (HCC): ICD-10-CM

## 2020-01-22 DIAGNOSIS — I10 ESSENTIAL HYPERTENSION: ICD-10-CM

## 2020-01-22 DIAGNOSIS — N18.30 STAGE 3 CHRONIC KIDNEY DISEASE (HCC): ICD-10-CM

## 2020-01-22 DIAGNOSIS — E66.9 OBESITY (BMI 30-39.9): ICD-10-CM

## 2020-01-22 DIAGNOSIS — Z79.4 TYPE 2 DIABETES MELLITUS WITH DIABETIC POLYNEUROPATHY, WITH LONG-TERM CURRENT USE OF INSULIN (HCC): ICD-10-CM

## 2020-01-22 DIAGNOSIS — I87.2 CHRONIC VENOUS INSUFFICIENCY: ICD-10-CM

## 2020-01-22 DIAGNOSIS — R94.31 ABNORMAL EKG: ICD-10-CM

## 2020-01-22 DIAGNOSIS — R07.89 CHEST DISCOMFORT: Primary | ICD-10-CM

## 2020-01-22 PROCEDURE — 99204 OFFICE O/P NEW MOD 45 MIN: CPT | Performed by: INTERNAL MEDICINE

## 2020-01-22 NOTE — PROGRESS NOTES
"Date of Office Visit: 20  Encounter Provider: Victor Hugo Ng MD  Place of Service: [facility] CARDIOLOGY  Patient Name: Ange Johnson  :1940    Chief Complaint   Patient presents with   • Abnormal ECG   • Shortness of Breath   :     HPI:     Ms. Johnson is 79 y.o. and presents today at the request of Dr Washington for chest discomfort, dyspnea, and an abnormal EKG.     I saw her in 2016 for preoperative risk assessment prior to thyroid surgery.  She reported chronic exertional dyspnea at that time.  She had a soft systolic murmur.  An echo showed normal LVSF and aortic sclerosis without stenosis.  I did not feel that she needed stress testing as her dyspnea was stable and long-standing.  Her EKG showed diffuse nonspecific ST abnormalities which were old.    She presents now with poor blood pressure control, weight gain, increasing fatigue, increasing dyspnea, and \"heartburn.\"  She denies orthopnea or PND.  Her leg swelling is stable. She denies any shortness of breath at rest.  She describes a \"burning\" feeling in her chest that mostly occurs after drinking ice water and carbonated beverages, but she has noted it as well after walking.  It always resolves with antacids.  She does feel that her exertional dyspnea is progressively worsening.     Her blood pressure has been getting worse as well.  Dr Washington just adjusted her medications, but she hasn't started this new regimen yet, as she wanted to finish out the week with her prepared pills in her organizer (she was afraid she would get confused if she changed mid-week).    She sees Dr Caruso for her CKD.    Past Medical History:   Diagnosis Date   • Anemia in stage 3 chronic kidney disease (CMS/HCC) 2016   • Arthritis    • Asthma    • Bone spur of acromioclavicular joint    • Carotid atherosclerosis     <50% bilaterally   • Chronic venous insufficiency    • CKD (chronic kidney disease) stage 3, GFR 30-59 ml/min " (CMS/[facility])    • Essential hypertension    • Fibromyalgia    • Gout    • Grief reaction       2010 after 15 foot fall off ladder   • H/O cataract    • Heel spur    • History of tendinitis    • Hyperlipidemia    • Hyperparathyroidism (CMS/HCC)    • Hyperthyroidism    • Hypothyroidism, acquired    • Non-toxic goiter    • Pneumonia    • Proteinuria    • RA (rheumatoid arthritis) (CMS/HCC)    • Type 2 diabetes mellitus with neurological manifestations (CMS/HCC)    • Vitamin D deficiency        Past Surgical History:   Procedure Laterality Date   • BREAST EXCISIONAL BIOPSY Left     Dr. Ybarra benign   • BREAST EXCISIONAL BIOPSY Bilateral     benign   • CATARACT EXTRACTION Left 2010   • CATARACT EXTRACTION Right 2010   • HYSTERECTOMY      Dr. Quinn Peña   • OOPHORECTOMY      late 40's   • THYROIDECTOMY Right 2016    Procedure: right PARATHYROID EXCISION OF NODULE and right thyroid lobectomy and bilateral exploration.;  Surgeon: Tasneem Montelongo MD;  Location[place] [facility] [place] [facility] [place]  Service:        Social History     Socioeconomic History   • Marital status:      Spouse name: Not on file   • Number of children: 4   • Years of education: Not on file   • Highest education level: Not on file   Occupational History   • Occupation: Retired   Tobacco Use   • Smoking status: Never Smoker   • Smokeless tobacco: Never Used   Substance and Sexual Activity   • Alcohol use: No   • Drug use: No   • Sexual activity: Defer   Social History Narrative    4 children; one child .     Caffeine Use: decaf.        Family History   Problem Relation Age of Onset   • Diabetes Sister    • Diabetes Brother    • Hypertension Brother    • Kidney disease Brother    • Cervical cancer Mother    • Heart disease Sister    • Neuropathy Sister    • Diabetes Sister        Review of Systems   Constitution: Positive for malaise/fatigue and weight gain.   Cardiovascular: Positive for dyspnea on exertion and leg  swelling.   Respiratory: Positive for shortness of breath.    Endocrine: Positive for cold intolerance.   Musculoskeletal: Positive for joint pain and myalgias.   Gastrointestinal: Positive for heartburn.   Neurological: Positive for excessive daytime sleepiness.   All other systems reviewed and are negative.      Allergies   Allergen Reactions   • Codeine Nausea Only   • Hydrocodone-Acetaminophen Nausea Only and Other (See Comments)     Percocet and Vicodin; vertigo   • Meperidine Nausea Only and Nausea And Vomiting   • Morphine And Related Nausea Only   • Oxycodone-Acetaminophen Nausea Only   • Oxycodone-Acetaminophen Nausea Only and Other (See Comments)     dilzziness   • Oxycodone-Aspirin Nausea Only and Other (See Comments)     vertigo   • Penicillins Hives   • Shrimp Rash   • Sulfa Antibiotics Hives         Current Outpatient Medications:   •  carBAMazepine (CARBATROL) 300 MG 12 hr capsule, Take 1 capsule by mouth Every 12 (Twelve) Hours for 180 days., Disp: 180 capsule, Rfl: 1  •  cloNIDine (CATAPRES) 0.2 MG tablet, Take 1 tablet by mouth Every 12 (Twelve) Hours for 180 days., Disp: 180 tablet, Rfl: 1  •  Continuous Blood Gluc  (DEXCOM G6 ) device, 1 each Daily., Disp: 1 Device, Rfl: 0  •  Continuous Blood Gluc Sensor (DEXCOM G6 SENSOR), Every 10 (Ten) Days., Disp: 3 each, Rfl: 11  •  Continuous Blood Gluc Transmit (DEXCOM G6 TRANSMITTER) misc, 1 each Daily., Disp: 1 each, Rfl: 2  •  CONTOUR NEXT TEST test strip, USE ONE STRIP TO TEST TWICE A DAY, Disp: 200 each, Rfl: 0  •  ezetimibe (ZETIA) 10 MG tablet, Take 1 tablet by mouth Daily for 180 days., Disp: 90 tablet, Rfl: 1  •  febuxostat (ULORIC) 40 MG tablet, Take 1 tablet by mouth Daily for 180 days., Disp: 90 tablet, Rfl: 1  •  ferrous sulfate 325 (65 FE) MG tablet, Take 1 tablet by mouth daily., Disp: , Rfl:   •  hydrALAZINE (APRESOLINE) 50 MG tablet, Take 1 tablet by mouth 3 (Three) Times a Day for 180 days., Disp: 270 tablet, Rfl: 1  •   "icosapent ethyl (VASCEPA) 1 g capsule capsule, Take 2 g by mouth 2 (Two) Times a Day With Meals for 180 days., Disp: 360 capsule, Rfl: 1  •  Insulin Glargine (TOUJEO MAX SOLOSTAR) 300 UNIT/ML solution pen-injector, Inject 100 Units under the skin into the appropriate area as directed Daily., Disp: 4 pen, Rfl: 5  •  Insulin Lispro, 1 Unit Dial, (HUMALOG KWIKPEN) 100 UNIT/ML solution pen-injector, Inject 5 Units under the skin into the appropriate area as directed 3 (Three) Times a Day With Meals., Disp: 15 mL, Rfl: 2  •  Insulin Pen Needle (BD PEN NEEDLE ISELA U/F) 32G X 4 MM misc, Use to inject 4 time daily, Disp: 200 each, Rfl: 5  •  LIVALO 4 MG tablet, TAKE ONE TABLET BY MOUTH EVERY NIGHT AT BEDTIME, Disp: 90 tablet, Rfl: 0  •  montelukast (SINGULAIR) 10 MG tablet, 10 mg daily., Disp: , Rfl:   •  nateglinide (STARLIX) 120 MG tablet, TAKE ONE TABLET BY MOUTH THREE TIMES A DAY BEFORE MEALS, Disp: 90 tablet, Rfl: 4  •  Olmesartan-amLODIPine-HCTZ 40-10-25 MG tablet, Take 1 tablet by mouth Daily for 90 days., Disp: 90 tablet, Rfl: 0  •  potassium chloride (MICRO-K) 10 MEQ CR capsule, Take 10 mEq by mouth Daily., Disp: , Rfl:   •  SAXagliptin (ONGLYZA) 2.5 MG tablet, Take 1 tablet by mouth Daily., Disp: 90 tablet, Rfl: 0  •  SYNTHROID 175 MCG tablet, Take 1 tablet by mouth Daily., Disp: 90 tablet, Rfl: 2  •  vitamin B-12 (CYANOCOBALAMIN) 1000 MCG tablet, Take 1 tablet by mouth daily., Disp: , Rfl:   •  vitamin D (ERGOCALCIFEROL) 1.25 MG (83427 UT) capsule capsule, TAKE ONE CAPSULE BY MOUTH ONCE WEEKLY, Disp: 12 capsule, Rfl: 0  •  aspirin 81 MG EC tablet, Take 1 tablet by mouth Daily. HOLD PER MD MAY-12/1, Disp: , Rfl:       Objective:     Vitals:    01/22/20 0922   BP: 174/70   BP Location: Left arm   Pulse: 63   SpO2: 96%   Weight: 101 kg (222 lb 9.6 oz)   Height: 165.1 cm (65\")     Body mass index is 37.04 kg/m².    Physical Exam   Constitutional: She is oriented to person, place, and time.   Obese   HENT:   Head: " Normocephalic.   Nose: Nose normal.   Mouth/Throat: Oropharynx is clear and moist.   Eyes: Pupils are equal, round, and reactive to light. Conjunctivae and EOM are normal.   Neck: Normal range of motion. No JVD present.   Cardiovascular: Normal rate, regular rhythm, normal heart sounds and intact distal pulses.   No murmur heard.  Pulmonary/Chest: Effort normal.   Abdominal: Soft. There is no tenderness.   Obesity limits abdominal exam   Musculoskeletal: Normal range of motion. She exhibits edema (mild doughiness of the legs without pitting).   Neurological: She is alert and oriented to person, place, and time. No cranial nerve deficit.   Skin: Skin is warm and dry. No rash noted.   Psychiatric: She has a normal mood and affect. Her behavior is normal. Judgment and thought content normal.   Vitals reviewed.      Procedures     EKG --   I have personally reviewed EKG on 01/20/2020 and my interpretation of the tracing is as follows: NSR, diffuse nonspecific ST/T abnormality, no change from 10/10/2016        Assessment:       Diagnosis Plan   1. Chest discomfort     2. Dyspnea on exertion     3. Abnormal EKG     4. Essential hypertension     5. Type 2 diabetes mellitus with diabetic polyneuropathy, with long-term current use of insulin (CMS/[facility])     6. Stage 3 chronic kidney disease (CMS/[facility])     7. Chronic venous insufficiency     8. Obesity (BMI 30-39.9)            Plan:       Ms. Johnson has very poorly controlled hypertension, significant chronic renal insufficiency, diabetes, obesity, and chronic leg swelling due to obesity and chronic venous insufficiency.  She has always had exertional dyspnea but it is worsening.  Thankfully, it is not associated with orthopnea.  She also reports chest discomfort that has typical and atypical features.  She describes it as heartburn, but it does sometimes occur with exertion, as well as with drinking fluids.    I have recommended an echocardiogram as well as a perfusion stress  test.  I have recommended a renal artery duplex since she has poor blood pressure control despite being on numerous medications and because she has chronic renal insufficiency.  While stenting renal artery stenosis has fallen out of favor, it may still have some role in this patient if it is present.  I will not adjust her BP medications at this time as a change was just made and she plans to start the new medication this weekend.  Also, given her CKD, I would want Dr Caruso's assistance in making any changes.    She is at very high risk of obstructive CAD; if her stress is abnormal she would need to be admitted for hydration and renal consultation prior to coronary angiography.     Sincerely,       Victor Hugo Ng MD

## 2020-01-23 RX ORDER — SAXAGLIPTIN 2.5 MG/1
TABLET, FILM COATED ORAL
Qty: 90 TABLET | Refills: 0 | Status: SHIPPED | OUTPATIENT
Start: 2020-01-23 | End: 2020-02-18 | Stop reason: HOSPADM

## 2020-02-03 ENCOUNTER — HOSPITAL ENCOUNTER (OUTPATIENT)
Dept: CARDIOLOGY | Facility: HOSPITAL | Age: 80
Discharge: HOME | End: 2020-02-03
Admitting: INTERNAL MEDICINE

## 2020-02-03 DIAGNOSIS — I10 ESSENTIAL HYPERTENSION: ICD-10-CM

## 2020-02-03 DIAGNOSIS — N18.30 STAGE 3 CHRONIC KIDNEY DISEASE (HCC): ICD-10-CM

## 2020-02-03 LAB
BH CV ECHO MEAS - DIST REN A EDV LEFT: -18.9 CM/SEC
BH CV ECHO MEAS - DIST REN A PSV LEFT: -70.5 CM/SEC
BH CV ECHO MEAS - DIST REN A RI LEFT: 0.73
BH CV ECHO MEAS - MID REN A EDV LEFT: -12.9 CM/SEC
BH CV ECHO MEAS - MID REN A PSV LEFT: -62.8 CM/SEC
BH CV ECHO MEAS - MID REN A RI LEFT: 0.79
BH CV ECHO MEAS - PROX REN A EDV LEFT: -18.1 CM/SEC
BH CV ECHO MEAS - PROX REN A PSV LEFT: -77.4 CM/SEC
BH CV ECHO MEAS - PROX REN A RI LEFT: 0.77
BH CV VAS KIDNEY HEIGHT LEFT: 5.1 CM
BH CV VAS RENAL AORTIC MID PSV: 99 CM/S
BH CV XLRA MEAS - KID L LEFT: 13 CM
BH CV XLRA MEAS DIST REN A EDV RIGHT: -15.4 CM/SEC
BH CV XLRA MEAS DIST REN A PSV RIGHT: 110.1 CM/SEC
BH CV XLRA MEAS DIST REN A RI RIGHT: 1.1
BH CV XLRA MEAS KID H RIGHT: 4.5 CM
BH CV XLRA MEAS KID L RIGHT: 11.8 CM
BH CV XLRA MEAS MID REN A EDV RIGHT: -14.6 CM/SEC
BH CV XLRA MEAS MID REN A PSV RIGHT: -74.8 CM/SEC
BH CV XLRA MEAS MID REN A RI RIGHT: 0.8
BH CV XLRA MEAS PROX REN A EDV RIGHT: -17.2 CM/SEC
BH CV XLRA MEAS PROX REN A PSV RIGHT: -73.1 CM/SEC
BH CV XLRA MEAS PROX REN A RI RIGHT: 0.76
BH CV XLRA MEAS RAR LEFT: 0.78
BH CV XLRA MEAS RAR RIGHT: 1.1

## 2020-02-03 PROCEDURE — 93975 VASCULAR STUDY: CPT | Performed by: INTERNAL MEDICINE

## 2020-02-03 PROCEDURE — 93975 VASCULAR STUDY: CPT

## 2020-02-06 ENCOUNTER — HOSPITAL ENCOUNTER (OUTPATIENT)
Dept: CARDIOLOGY | Facility: HOSPITAL | Age: 80
Discharge: HOME | End: 2020-02-06

## 2020-02-06 ENCOUNTER — HOSPITAL ENCOUNTER (OUTPATIENT)
Dept: CARDIOLOGY | Facility: HOSPITAL | Age: 80
Discharge: HOME | End: 2020-02-06
Admitting: INTERNAL MEDICINE

## 2020-02-06 VITALS
HEART RATE: 73 BPM | SYSTOLIC BLOOD PRESSURE: 170 MMHG | BODY MASS INDEX: 36.99 KG/M2 | DIASTOLIC BLOOD PRESSURE: 70 MMHG | OXYGEN SATURATION: 95 % | WEIGHT: 222 LBS | HEIGHT: 65 IN

## 2020-02-06 VITALS — HEIGHT: 65 IN | WEIGHT: 222.66 LBS | BODY MASS INDEX: 37.1 KG/M2

## 2020-02-06 DIAGNOSIS — R94.31 ABNORMAL EKG: ICD-10-CM

## 2020-02-06 DIAGNOSIS — R07.89 CHEST DISCOMFORT: ICD-10-CM

## 2020-02-06 DIAGNOSIS — R06.09 DYSPNEA ON EXERTION: ICD-10-CM

## 2020-02-06 DIAGNOSIS — I20.8 STABLE ANGINA (HCC): Primary | ICD-10-CM

## 2020-02-06 LAB
AORTIC ARCH: 2.8 CM
AORTIC ROOT ANNULUS: 2 CM
ASCENDING AORTA: 3.3 CM
BH CV ECHO MEAS - ACS: 1.8 CM
BH CV ECHO MEAS - AO MAX PG (FULL): 7.2 MMHG
BH CV ECHO MEAS - AO MAX PG: 11.2 MMHG
BH CV ECHO MEAS - AO MEAN PG (FULL): 3.2 MMHG
BH CV ECHO MEAS - AO MEAN PG: 5.7 MMHG
BH CV ECHO MEAS - AO ROOT AREA (BSA CORRECTED): 1.5
BH CV ECHO MEAS - AO ROOT AREA: 7.8 CM^2
BH CV ECHO MEAS - AO ROOT DIAM: 3.2 CM
BH CV ECHO MEAS - AO V2 MAX: 167.4 CM/SEC
BH CV ECHO MEAS - AO V2 MEAN: 110.6 CM/SEC
BH CV ECHO MEAS - AO V2 VTI: 35.3 CM
BH CV ECHO MEAS - ASC AORTA: 3.3 CM
BH CV ECHO MEAS - AVA(I,A): 2.1 CM^2
BH CV ECHO MEAS - AVA(I,D): 2.1 CM^2
BH CV ECHO MEAS - AVA(V,A): 1.9 CM^2
BH CV ECHO MEAS - AVA(V,D): 1.9 CM^2
BH CV ECHO MEAS - BSA(HAYCOCK): 2.2 M^2
BH CV ECHO MEAS - BSA: 2.1 M^2
BH CV ECHO MEAS - BZI_BMI: 36.9 KILOGRAMS/M^2
BH CV ECHO MEAS - BZI_METRIC_HEIGHT: 165.1 CM
BH CV ECHO MEAS - BZI_METRIC_WEIGHT: 100.7 KG
BH CV ECHO MEAS - EDV(MOD-SP2): 74 ML
BH CV ECHO MEAS - EDV(MOD-SP4): 35 ML
BH CV ECHO MEAS - EDV(TEICH): 138.1 ML
BH CV ECHO MEAS - EF(CUBED): 63 %
BH CV ECHO MEAS - EF(MOD-BP): 66 %
BH CV ECHO MEAS - EF(MOD-SP2): 67.6 %
BH CV ECHO MEAS - EF(MOD-SP4): 62.9 %
BH CV ECHO MEAS - EF(TEICH): 54.1 %
BH CV ECHO MEAS - ESV(MOD-SP2): 24 ML
BH CV ECHO MEAS - ESV(MOD-SP4): 13 ML
BH CV ECHO MEAS - ESV(TEICH): 63.4 ML
BH CV ECHO MEAS - FS: 28.2 %
BH CV ECHO MEAS - IVS/LVPW: 0.92
BH CV ECHO MEAS - IVSD: 1.1 CM
BH CV ECHO MEAS - LAT PEAK E' VEL: 7 CM/SEC
BH CV ECHO MEAS - LV DIASTOLIC VOL/BSA (35-75): 16.9 ML/M^2
BH CV ECHO MEAS - LV MASS(C)D: 246.9 GRAMS
BH CV ECHO MEAS - LV MASS(C)DI: 119.4 GRAMS/M^2
BH CV ECHO MEAS - LV MAX PG: 4 MMHG
BH CV ECHO MEAS - LV MEAN PG: 2.5 MMHG
BH CV ECHO MEAS - LV SYSTOLIC VOL/BSA (12-30): 6.3 ML/M^2
BH CV ECHO MEAS - LV V1 MAX: 99.9 CM/SEC
BH CV ECHO MEAS - LV V1 MEAN: 73.7 CM/SEC
BH CV ECHO MEAS - LV V1 VTI: 22.6 CM
BH CV ECHO MEAS - LVIDD: 5.3 CM
BH CV ECHO MEAS - LVIDS: 3.8 CM
BH CV ECHO MEAS - LVLD AP2: 7.3 CM
BH CV ECHO MEAS - LVLD AP4: 6.3 CM
BH CV ECHO MEAS - LVLS AP2: 6.4 CM
BH CV ECHO MEAS - LVLS AP4: 5.7 CM
BH CV ECHO MEAS - LVOT AREA (M): 3.1 CM^2
BH CV ECHO MEAS - LVOT AREA: 3.2 CM^2
BH CV ECHO MEAS - LVOT DIAM: 2 CM
BH CV ECHO MEAS - LVPWD: 1.2 CM
BH CV ECHO MEAS - MED PEAK E' VEL: 4 CM/SEC
BH CV ECHO MEAS - MV A DUR: 0.1 SEC
BH CV ECHO MEAS - MV A MAX VEL: 112.4 CM/SEC
BH CV ECHO MEAS - MV DEC SLOPE: 509.4 CM/SEC^2
BH CV ECHO MEAS - MV DEC TIME: 0.16 SEC
BH CV ECHO MEAS - MV E MAX VEL: 90.3 CM/SEC
BH CV ECHO MEAS - MV E/A: 0.8
BH CV ECHO MEAS - MV MAX PG: 4.8 MMHG
BH CV ECHO MEAS - MV MEAN PG: 2.1 MMHG
BH CV ECHO MEAS - MV P1/2T MAX VEL: 104.1 CM/SEC
BH CV ECHO MEAS - MV P1/2T: 59.8 MSEC
BH CV ECHO MEAS - MV V2 MAX: 110.1 CM/SEC
BH CV ECHO MEAS - MV V2 MEAN: 66.6 CM/SEC
BH CV ECHO MEAS - MV V2 VTI: 38.7 CM
BH CV ECHO MEAS - MVA P1/2T LCG: 2.1 CM^2
BH CV ECHO MEAS - MVA(P1/2T): 3.7 CM^2
BH CV ECHO MEAS - MVA(VTI): 1.9 CM^2
BH CV ECHO MEAS - PA ACC TIME: 0.08 SEC
BH CV ECHO MEAS - PA MAX PG (FULL): 3.8 MMHG
BH CV ECHO MEAS - PA MAX PG: 8.2 MMHG
BH CV ECHO MEAS - PA PR(ACCEL): 44.5 MMHG
BH CV ECHO MEAS - PA V2 MAX: 143.3 CM/SEC
BH CV ECHO MEAS - PULM A REVS DUR: 0.1 SEC
BH CV ECHO MEAS - PULM A REVS VEL: 25.2 CM/SEC
BH CV ECHO MEAS - PULM DIAS VEL: 39.5 CM/SEC
BH CV ECHO MEAS - PULM S/D: 1.3
BH CV ECHO MEAS - PULM SYS VEL: 51.9 CM/SEC
BH CV ECHO MEAS - PVA(V,A): 2.9 CM^2
BH CV ECHO MEAS - PVA(V,D): 2.9 CM^2
BH CV ECHO MEAS - QP/QS: 1
BH CV ECHO MEAS - RAP SYSTOLE: 3 MMHG
BH CV ECHO MEAS - RV MAX PG: 4.4 MMHG
BH CV ECHO MEAS - RV MEAN PG: 2.5 MMHG
BH CV ECHO MEAS - RV V1 MAX: 105.1 CM/SEC
BH CV ECHO MEAS - RV V1 MEAN: 76.3 CM/SEC
BH CV ECHO MEAS - RV V1 VTI: 18.5 CM
BH CV ECHO MEAS - RVOT AREA: 4 CM^2
BH CV ECHO MEAS - RVOT DIAM: 2.3 CM
BH CV ECHO MEAS - RVSP: 38 MMHG
BH CV ECHO MEAS - SI(AO): 133.9 ML/M^2
BH CV ECHO MEAS - SI(CUBED): 46.6 ML/M^2
BH CV ECHO MEAS - SI(LVOT): 35.5 ML/M^2
BH CV ECHO MEAS - SI(MOD-SP2): 24.2 ML/M^2
BH CV ECHO MEAS - SI(MOD-SP4): 10.6 ML/M^2
BH CV ECHO MEAS - SI(TEICH): 36.1 ML/M^2
BH CV ECHO MEAS - SUP REN AO DIAM: 1.6 CM
BH CV ECHO MEAS - SV(AO): 276.8 ML
BH CV ECHO MEAS - SV(CUBED): 96.3 ML
BH CV ECHO MEAS - SV(LVOT): 73.3 ML
BH CV ECHO MEAS - SV(MOD-SP2): 50 ML
BH CV ECHO MEAS - SV(MOD-SP4): 22 ML
BH CV ECHO MEAS - SV(RVOT): 73.9 ML
BH CV ECHO MEAS - SV(TEICH): 74.7 ML
BH CV ECHO MEAS - TAPSE (>1.6): 2.5 CM2
BH CV ECHO MEAS - TR MAX VEL: 297.5 CM/SEC
BH CV ECHO MEASUREMENTS AVERAGE E/E' RATIO: 16.42
BH CV NUCLEAR PRIOR STUDY: 2
BH CV STRESS BP STAGE 1: NORMAL
BH CV STRESS COMMENTS STAGE 1: NORMAL
BH CV STRESS DOSE REGADENOSON STAGE 1: 0.4
BH CV STRESS DURATION MIN STAGE 1: 0
BH CV STRESS DURATION SEC STAGE 1: 10
BH CV STRESS HR STAGE 1: 73
BH CV STRESS PROTOCOL 1: NORMAL
BH CV STRESS RECOVERY BP: NORMAL MMHG
BH CV STRESS RECOVERY HR: 77 BPM
BH CV STRESS STAGE 1: 1
BH CV VAS BP RIGHT ARM: NORMAL MMHG
BH CV XLRA - RV BASE: 2.9 CM
BH CV XLRA - RV LENGTH: 6.3 CM
BH CV XLRA - RV MID: 3.2 CM
BH CV XLRA - TDI S': 12 CM/SEC
LEFT ATRIUM VOLUME INDEX: 26 ML/M2
LV EF NUC BP: 66 %
MAXIMAL PREDICTED HEART RATE: 140 BPM
MAXIMAL PREDICTED HEART RATE: 140 BPM
PERCENT MAX PREDICTED HR: 52.14 %
SINUS: 3.3 CM
STJ: 3.3 CM
STRESS BASELINE BP: NORMAL MMHG
STRESS BASELINE HR: 66 BPM
STRESS PERCENT HR: 61 %
STRESS POST EXERCISE DUR SEC: 10 SEC
STRESS POST PEAK BP: NORMAL MMHG
STRESS POST PEAK HR: 73 BPM
STRESS TARGET HR: 119 BPM
STRESS TARGET HR: 119 BPM

## 2020-02-06 PROCEDURE — 78492 MYOCRD IMG PET MLT RST&STRS: CPT

## 2020-02-06 PROCEDURE — 93016 CV STRESS TEST SUPVJ ONLY: CPT | Performed by: INTERNAL MEDICINE

## 2020-02-06 PROCEDURE — 93306 TTE W/DOPPLER COMPLETE: CPT

## 2020-02-06 PROCEDURE — 25010000002 PERFLUTREN (DEFINITY) 8.476 MG IN SODIUM CHLORIDE (PF) 0.9 % 10 ML INJECTION: Performed by: INTERNAL MEDICINE

## 2020-02-06 PROCEDURE — 93306 TTE W/DOPPLER COMPLETE: CPT | Performed by: INTERNAL MEDICINE

## 2020-02-06 PROCEDURE — 93018 CV STRESS TEST I&R ONLY: CPT | Performed by: INTERNAL MEDICINE

## 2020-02-06 PROCEDURE — 78492 MYOCRD IMG PET MLT RST&STRS: CPT | Performed by: INTERNAL MEDICINE

## 2020-02-06 PROCEDURE — 25010000002 REGADENOSON 0.4 MG/5ML SOLUTION: Performed by: INTERNAL MEDICINE

## 2020-02-06 PROCEDURE — 93017 CV STRESS TEST TRACING ONLY: CPT

## 2020-02-06 PROCEDURE — 0 RUBIDIUM CHLORIDE: Performed by: INTERNAL MEDICINE

## 2020-02-06 PROCEDURE — A9555 RB82 RUBIDIUM: HCPCS | Performed by: INTERNAL MEDICINE

## 2020-02-06 RX ORDER — ATENOLOL 25 MG/1
12.5 TABLET ORAL DAILY
Qty: 30 TABLET | Refills: 11 | Status: SHIPPED | OUTPATIENT
Start: 2020-02-06 | End: 2020-02-18 | Stop reason: HOSPADM

## 2020-02-06 RX ADMIN — PERFLUTREN 2 ML: 6.52 INJECTION, SUSPENSION INTRAVENOUS at 11:18

## 2020-02-06 RX ADMIN — RUBIDIUM CHLORIDE RB-82 1 DOSE: 150 INJECTION, SOLUTION INTRAVENOUS at 11:55

## 2020-02-06 RX ADMIN — RUBIDIUM CHLORIDE RB-82 1 DOSE: 150 INJECTION, SOLUTION INTRAVENOUS at 12:05

## 2020-02-06 RX ADMIN — REGADENOSON 0.4 MG: 0.08 INJECTION, SOLUTION INTRAVENOUS at 12:05

## 2020-02-07 ENCOUNTER — HOSPITAL ENCOUNTER (OUTPATIENT)
Facility: HOSPITAL | Age: 80
Setting detail: HOSPITAL OUTPATIENT SURGERY
End: 2020-02-07
Attending: INTERNAL MEDICINE | Admitting: INTERNAL MEDICINE

## 2020-02-07 DIAGNOSIS — I20.8 STABLE ANGINA (HCC): ICD-10-CM

## 2020-02-07 PROBLEM — I20.89 STABLE ANGINA: Status: ACTIVE | Noted: 2020-02-07

## 2020-02-09 ENCOUNTER — HOSPITAL ENCOUNTER (INPATIENT)
Facility: HOSPITAL | Age: 80
LOS: 8 days | Discharge: HOME HEALTH | End: 2020-02-18
Attending: INTERNAL MEDICINE | Admitting: INTERNAL MEDICINE

## 2020-02-09 DIAGNOSIS — I20.8 STABLE ANGINA (HCC): ICD-10-CM

## 2020-02-09 DIAGNOSIS — N18.30 STAGE 3 CHRONIC KIDNEY DISEASE (HCC): ICD-10-CM

## 2020-02-09 DIAGNOSIS — I25.118 CORONARY ARTERY DISEASE OF NATIVE HEART WITH STABLE ANGINA PECTORIS, UNSPECIFIED VESSEL OR LESION TYPE (HCC): Primary | ICD-10-CM

## 2020-02-09 DIAGNOSIS — I48.0 PAROXYSMAL ATRIAL FIBRILLATION (HCC): ICD-10-CM

## 2020-02-09 DIAGNOSIS — I10 ESSENTIAL HYPERTENSION: ICD-10-CM

## 2020-02-09 DIAGNOSIS — Z95.1 S/P CABG (CORONARY ARTERY BYPASS GRAFT): ICD-10-CM

## 2020-02-09 LAB
ANION GAP SERPL CALCULATED.3IONS-SCNC: 14.2 MMOL/L (ref 5–15)
BASOPHILS # BLD AUTO: 0.05 10*3/MM3 (ref 0–0.2)
BASOPHILS NFR BLD AUTO: 0.5 % (ref 0–1.5)
BUN BLD-MCNC: 38 MG/DL (ref 8–23)
BUN/CREAT SERPL: 18.1 (ref 7–25)
CALCIUM SPEC-SCNC: 8.8 MG/DL (ref 8.6–10.5)
CHLORIDE SERPL-SCNC: 101 MMOL/L (ref 98–107)
CO2 SERPL-SCNC: 26.8 MMOL/L (ref 22–29)
CREAT BLD-MCNC: 2.1 MG/DL (ref 0.57–1)
DEPRECATED RDW RBC AUTO: 42.1 FL (ref 37–54)
EOSINOPHIL # BLD AUTO: 0.14 10*3/MM3 (ref 0–0.4)
EOSINOPHIL NFR BLD AUTO: 1.4 % (ref 0.3–6.2)
ERYTHROCYTE [DISTWIDTH] IN BLOOD BY AUTOMATED COUNT: 12.1 % (ref 12.3–15.4)
GFR SERPL CREATININE-BSD FRML MDRD: 23 ML/MIN/1.73
GLUCOSE BLD-MCNC: 188 MG/DL (ref 65–99)
GLUCOSE BLDC GLUCOMTR-MCNC: 220 MG/DL (ref 70–130)
HCT VFR BLD AUTO: 34.1 % (ref 34–46.6)
HGB BLD-MCNC: 11.3 G/DL (ref 12–15.9)
IMM GRANULOCYTES # BLD AUTO: 0.06 10*3/MM3 (ref 0–0.05)
IMM GRANULOCYTES NFR BLD AUTO: 0.6 % (ref 0–0.5)
LYMPHOCYTES # BLD AUTO: 2.08 10*3/MM3 (ref 0.7–3.1)
LYMPHOCYTES NFR BLD AUTO: 20.4 % (ref 19.6–45.3)
MCH RBC QN AUTO: 31.8 PG (ref 26.6–33)
MCHC RBC AUTO-ENTMCNC: 33.1 G/DL (ref 31.5–35.7)
MCV RBC AUTO: 96.1 FL (ref 79–97)
MONOCYTES # BLD AUTO: 1.04 10*3/MM3 (ref 0.1–0.9)
MONOCYTES NFR BLD AUTO: 10.2 % (ref 5–12)
NEUTROPHILS # BLD AUTO: 6.82 10*3/MM3 (ref 1.7–7)
NEUTROPHILS NFR BLD AUTO: 66.9 % (ref 42.7–76)
NRBC BLD AUTO-RTO: 0 /100 WBC (ref 0–0.2)
PLATELET # BLD AUTO: 240 10*3/MM3 (ref 140–450)
PMV BLD AUTO: 10.2 FL (ref 6–12)
POTASSIUM BLD-SCNC: 3.7 MMOL/L (ref 3.5–5.2)
RBC # BLD AUTO: 3.55 10*6/MM3 (ref 3.77–5.28)
SODIUM BLD-SCNC: 142 MMOL/L (ref 136–145)
WBC NRBC COR # BLD: 10.19 10*3/MM3 (ref 3.4–10.8)

## 2020-02-09 PROCEDURE — 82962 GLUCOSE BLOOD TEST: CPT

## 2020-02-09 PROCEDURE — 80048 BASIC METABOLIC PNL TOTAL CA: CPT | Performed by: INTERNAL MEDICINE

## 2020-02-09 PROCEDURE — G0378 HOSPITAL OBSERVATION PER HR: HCPCS

## 2020-02-09 PROCEDURE — 85025 COMPLETE CBC W/AUTO DIFF WBC: CPT | Performed by: INTERNAL MEDICINE

## 2020-02-09 PROCEDURE — 99220 PR INITIAL OBSERVATION CARE/DAY 70 MINUTES: CPT | Performed by: INTERNAL MEDICINE

## 2020-02-09 PROCEDURE — 63710000001 INSULIN GLARGINE PER 5 UNITS: Performed by: INTERNAL MEDICINE

## 2020-02-09 PROCEDURE — 25010000002 ENOXAPARIN PER 10 MG: Performed by: INTERNAL MEDICINE

## 2020-02-09 RX ORDER — ONDANSETRON 2 MG/ML
4 INJECTION INTRAMUSCULAR; INTRAVENOUS EVERY 6 HOURS PRN
Status: DISCONTINUED | OUTPATIENT
Start: 2020-02-09 | End: 2020-02-12

## 2020-02-09 RX ORDER — SODIUM CHLORIDE 9 MG/ML
75 INJECTION, SOLUTION INTRAVENOUS CONTINUOUS
Status: DISCONTINUED | OUTPATIENT
Start: 2020-02-10 | End: 2020-02-09

## 2020-02-09 RX ORDER — INSULIN GLARGINE 100 [IU]/ML
50 INJECTION, SOLUTION SUBCUTANEOUS ONCE
Status: COMPLETED | OUTPATIENT
Start: 2020-02-09 | End: 2020-02-09

## 2020-02-09 RX ORDER — MONTELUKAST SODIUM 10 MG/1
10 TABLET ORAL DAILY
Status: DISCONTINUED | OUTPATIENT
Start: 2020-02-09 | End: 2020-02-12

## 2020-02-09 RX ORDER — NITROGLYCERIN 0.4 MG/1
0.4 TABLET SUBLINGUAL
Status: DISCONTINUED | OUTPATIENT
Start: 2020-02-09 | End: 2020-02-12

## 2020-02-09 RX ORDER — DIPHENHYDRAMINE HCL 25 MG
25 CAPSULE ORAL NIGHTLY PRN
COMMUNITY
End: 2020-02-18 | Stop reason: HOSPADM

## 2020-02-09 RX ORDER — CLONIDINE HYDROCHLORIDE 0.1 MG/1
0.2 TABLET ORAL EVERY 12 HOURS SCHEDULED
Status: DISCONTINUED | OUTPATIENT
Start: 2020-02-09 | End: 2020-02-12

## 2020-02-09 RX ORDER — ACETAMINOPHEN 325 MG/1
650 TABLET ORAL EVERY 4 HOURS PRN
Status: DISCONTINUED | OUTPATIENT
Start: 2020-02-09 | End: 2020-02-12

## 2020-02-09 RX ORDER — POTASSIUM CHLORIDE 750 MG/1
10 CAPSULE, EXTENDED RELEASE ORAL DAILY
Status: DISCONTINUED | OUTPATIENT
Start: 2020-02-09 | End: 2020-02-12

## 2020-02-09 RX ORDER — ASPIRIN 81 MG/1
81 TABLET, CHEWABLE ORAL DAILY
Status: DISCONTINUED | OUTPATIENT
Start: 2020-02-09 | End: 2020-02-12

## 2020-02-09 RX ORDER — SODIUM CHLORIDE 9 MG/ML
100 INJECTION, SOLUTION INTRAVENOUS CONTINUOUS
Status: DISCONTINUED | OUTPATIENT
Start: 2020-02-09 | End: 2020-02-09

## 2020-02-09 RX ORDER — HYDRALAZINE HYDROCHLORIDE 50 MG/1
100 TABLET, FILM COATED ORAL 3 TIMES DAILY
Status: DISCONTINUED | OUTPATIENT
Start: 2020-02-09 | End: 2020-02-12

## 2020-02-09 RX ORDER — HYDRALAZINE HYDROCHLORIDE 50 MG/1
50 TABLET, FILM COATED ORAL 3 TIMES DAILY
Status: DISCONTINUED | OUTPATIENT
Start: 2020-02-09 | End: 2020-02-09

## 2020-02-09 RX ORDER — DIPHENHYDRAMINE HCL 25 MG
25 CAPSULE ORAL NIGHTLY PRN
Status: DISCONTINUED | OUTPATIENT
Start: 2020-02-09 | End: 2020-02-12

## 2020-02-09 RX ORDER — LEVOTHYROXINE SODIUM 175 UG/1
175 TABLET ORAL DAILY
Status: DISCONTINUED | OUTPATIENT
Start: 2020-02-09 | End: 2020-02-12

## 2020-02-09 RX ORDER — REPAGLINIDE 2 MG/1
2 TABLET ORAL
Status: DISCONTINUED | OUTPATIENT
Start: 2020-02-09 | End: 2020-02-11

## 2020-02-09 RX ORDER — ATENOLOL 25 MG/1
12.5 TABLET ORAL DAILY
Status: DISCONTINUED | OUTPATIENT
Start: 2020-02-09 | End: 2020-02-12

## 2020-02-09 RX ORDER — CETIRIZINE HYDROCHLORIDE 10 MG/1
10 TABLET ORAL DAILY
Status: DISCONTINUED | OUTPATIENT
Start: 2020-02-09 | End: 2020-02-12

## 2020-02-09 RX ORDER — FEBUXOSTAT 40 MG/1
40 TABLET, FILM COATED ORAL DAILY
Status: DISCONTINUED | OUTPATIENT
Start: 2020-02-09 | End: 2020-02-12

## 2020-02-09 RX ORDER — CETIRIZINE HYDROCHLORIDE 10 MG/1
10 TABLET ORAL DAILY
COMMUNITY
End: 2020-02-18 | Stop reason: HOSPADM

## 2020-02-09 RX ORDER — INSULIN LISPRO 100 [IU]/ML
5 INJECTION, SOLUTION INTRAVENOUS; SUBCUTANEOUS
Status: DISCONTINUED | OUTPATIENT
Start: 2020-02-09 | End: 2020-02-09 | Stop reason: CLARIF

## 2020-02-09 RX ORDER — CARBAMAZEPINE 300 MG/1
300 CAPSULE, EXTENDED RELEASE ORAL EVERY 12 HOURS SCHEDULED
Status: DISCONTINUED | OUTPATIENT
Start: 2020-02-09 | End: 2020-02-12

## 2020-02-09 RX ORDER — FERROUS SULFATE 325(65) MG
325 TABLET ORAL DAILY
Status: DISCONTINUED | OUTPATIENT
Start: 2020-02-09 | End: 2020-02-12

## 2020-02-09 RX ORDER — SODIUM CHLORIDE 0.9 % (FLUSH) 0.9 %
10 SYRINGE (ML) INJECTION AS NEEDED
Status: DISCONTINUED | OUTPATIENT
Start: 2020-02-09 | End: 2020-02-12

## 2020-02-09 RX ORDER — SODIUM CHLORIDE 9 MG/ML
100 INJECTION, SOLUTION INTRAVENOUS CONTINUOUS
Status: DISCONTINUED | OUTPATIENT
Start: 2020-02-09 | End: 2020-02-11

## 2020-02-09 RX ADMIN — ENOXAPARIN SODIUM 30 MG: 30 INJECTION SUBCUTANEOUS at 21:07

## 2020-02-09 RX ADMIN — SODIUM CHLORIDE 100 ML/HR: 9 INJECTION, SOLUTION INTRAVENOUS at 19:58

## 2020-02-09 RX ADMIN — CARBAMAZEPINE 300 MG: 300 CAPSULE, EXTENDED RELEASE ORAL at 21:07

## 2020-02-09 RX ADMIN — CLONIDINE HYDROCHLORIDE 0.2 MG: 0.1 TABLET ORAL at 21:07

## 2020-02-09 RX ADMIN — INSULIN GLARGINE 50 UNITS: 100 INJECTION, SOLUTION SUBCUTANEOUS at 21:14

## 2020-02-09 RX ADMIN — HYDRALAZINE HYDROCHLORIDE 100 MG: 50 TABLET, FILM COATED ORAL at 21:07

## 2020-02-09 NOTE — PLAN OF CARE
Pt direct admit for cardiac cath tomorrow. Hydrating prior to cath to protect kidneys. NSR, RA, SBP elevated 160's. Med admission complete. Waiting for orders.

## 2020-02-09 NOTE — H&P
"Date of Hospital Visit: 02/10/20  Encounter Provider: Victor Hugo Ng MD  Place of Service: [facility] CARDIOLOGY  Patient Name: Ange Johnson  :1940  Referral Provider: Victor Hugo Ng MD    Chief complaint: chest pain, abnormal stress, CKD    History of Present Illness  Ms. Johnson is 78 yo woman who I recently evaluated in the office at the request of Dr Washington for chest discomfort, dyspnea, and an abnormal EKG.      I saw her in 2016 for preoperative risk assessment prior to thyroid surgery.  She reported chronic exertional dyspnea at that time.  She had a soft systolic murmur.  An echo showed normal LVSF and aortic sclerosis without stenosis.  I did not feel that she needed stress testing as her dyspnea was stable and long-standing.  Her EKG showed diffuse nonspecific ST abnormalities which were old.    She presents now with poor blood pressure control, weight gain, increasing fatigue, increasing dyspnea, and \"heartburn.\"  She denies orthopnea or PND.  Her leg swelling is stable. She denies any shortness of breath at rest.  She describes a \"burning\" feeling in her chest that mostly occurs after drinking ice water and carbonated beverages, but she has noted it as well after walking.  It always resolves with antacids.  She does feel that her exertional dyspnea is progressively worsening.      Her blood pressure has been getting worse as well. She sees Dr Caruso for her CKD.    I ordered an echo, which was relatively normal for age, and a perfusion stress, which was very abnormal, with anterior ischemia (a large amount).  Due to her CKD, I have admitted her for IVF before coronary angiography tomorrow.     Past Medical History:   Diagnosis Date   • Anemia in stage 3 chronic kidney disease (CMS/[facility]) 2016   • Arthritis    • Asthma    • Bone spur of acromioclavicular joint    • Carotid atherosclerosis     <50% bilaterally   • Chronic venous insufficiency    • CKD (chronic " kidney disease) stage 3, GFR 30-59 ml/min (CMS/HCC)    • Essential hypertension    • Gout    • Grief reaction       2010 after 15 foot fall off ladder   • H/O cataract    • Heel spur    • History of tendinitis    • Hyperlipidemia    • Hyperparathyroidism (CMS/HCC)    • Hyperthyroidism    • Hypothyroidism, acquired    • Non-toxic goiter    • Pneumonia    • Proteinuria    • Type 2 diabetes mellitus with neurological manifestations (CMS/HCC)    • Vitamin D deficiency        Past Surgical History:   Procedure Laterality Date   • BREAST EXCISIONAL BIOPSY Left     Dr. Ybarra benign   • BREAST EXCISIONAL BIOPSY Bilateral     benign   • CATARACT EXTRACTION Left 2010   • CATARACT EXTRACTION Right 2010   • HYSTERECTOMY      Dr. Quinn Peña   • OOPHORECTOMY      late 40's   • THYROIDECTOMY Right 2016    Procedure: right PARATHYROID EXCISION OF NODULE and right thyroid lobectomy and bilateral exploration.;  Surgeon: Tasneem Montelongo MD;  [place] [facility] [place] [facility] [place]:        Prior to Admission medications    Medication Sig Start Date End Date Taking? Authorizing Provider   atenolol (TENORMIN) 25 MG tablet Take 0.5 tablets by mouth Daily. 20  Yes Victor Hugo Ng MD   carBAMazepine (CARBATROL) 300 MG 12 hr capsule Take 1 capsule by mouth Every 12 (Twelve) Hours for 180 days. 19 Yes Quinn Washington MD   cetirizine (zyrTEC) 10 MG tablet Take 10 mg by mouth Daily.   Yes ProviderFredy MD   cloNIDine (CATAPRES) 0.2 MG tablet Take 1 tablet by mouth Every 12 (Twelve) Hours for 180 days. 19 Yes Quinn Washington MD   CONTOUR NEXT TEST test strip USE ONE STRIP TO TEST TWICE A DAY 19  Yes Adalgisa Hernandez APRN   diphenhydrAMINE (BENADRYL) 25 mg capsule Take 25 mg by mouth At Night As Needed for Itching or Sleep.   Yes Fredy Sy MD   ezetimibe (ZETIA) 10 MG tablet Take 1 tablet by mouth Daily for 180 days. 19 Yes Quinn Washington MD    febuxostat (ULORIC) 40 MG tablet Take 1 tablet by mouth Daily for 180 days. 11/25/19 5/23/20 Yes Quinn Washington MD   ferrous sulfate 325 (65 FE) MG tablet Take 1 tablet by mouth daily. 6/11/15  Yes Fredy Sy MD   hydrALAZINE (APRESOLINE) 50 MG tablet Take 1 tablet by mouth 3 (Three) Times a Day for 180 days. 11/25/19 5/23/20 Yes Quinn Washington MD   icosapent ethyl (VASCEPA) 1 g capsule capsule Take 2 g by mouth 2 (Two) Times a Day With Meals for 180 days. 11/25/19 5/23/20 Yes Quinn Washington MD   Insulin Glargine (TOUJEO MAX SOLOSTAR) 300 UNIT/ML solution pen-injector Inject 100 Units under the skin into the appropriate area as directed Daily. 5/23/19  Yes Adalgisa Hernandez APRN   Insulin Lispro, 1 Unit Dial, (HUMALOG KWIKPEN) 100 UNIT/ML solution pen-injector Inject 5 Units under the skin into the appropriate area as directed 3 (Three) Times a Day With Meals. 11/27/19  Yes Adalgisa Hernandez APRN   Insulin Pen Needle (BD PEN NEEDLE ISELA U/F) 32G X 4 MM misc Use to inject 4 time daily 11/27/19  Yes Adalgisa Hernandez APRN   LIVALO 4 MG tablet TAKE ONE TABLET BY MOUTH EVERY NIGHT AT BEDTIME 12/5/19  Yes Adalgisa Hernandez APRN   montelukast (SINGULAIR) 10 MG tablet 10 mg daily. 12/13/15  Yes Fredy Sy MD   nateglinide (STARLIX) 120 MG tablet TAKE ONE TABLET BY MOUTH THREE TIMES A DAY BEFORE MEALS 11/22/19  Yes Adalgisa Hernandez APRN   Olmesartan-amLODIPine-HCTZ 40-10-25 MG tablet Take 1 tablet by mouth Daily for 90 days. 1/20/20 4/19/20 Yes Quinn Washington MD   ONGLYZA 2.5 MG tablet TAKE ONE TABLET BY MOUTH DAILY 1/23/20  Yes Adalgisa Hernandez APRN   potassium chloride (MICRO-K) 10 MEQ CR capsule Take 10 mEq by mouth Daily. 7/4/17  Yes Fredy Sy MD   SYNTHROID 175 MCG tablet Take 1 tablet by mouth Daily. 11/27/19  Yes Adalgisa Hernandez APRN   vitamin B-12 (CYANOCOBALAMIN) 1000 MCG tablet Take 1 tablet by mouth daily. 11/6/14  Yes Provider, MD Fredy   vitamin D (ERGOCALCIFEROL) 1.25 MG (89457  UT) capsule capsule TAKE ONE CAPSULE BY MOUTH ONCE WEEKLY 19  Yes Adalgisa Hernandez APRN   aspirin 81 MG EC tablet Take 1 tablet by mouth Daily. HOLD PER MD MAY-12/1 12/15/16 2/9/20  Tasneem Montelongo MD   Continuous Blood Gluc  (DEXCOM G6 ) device 1 each Daily. 19  Adalgisa Hernandez APRN   Continuous Blood Gluc Sensor (DEXCOM G6 SENSOR) Every 10 (Ten) Days. 19  Adalgisa Hernandez APRN   Continuous Blood Gluc Transmit (DEXCOM G6 TRANSMITTER) misc 1 each Daily. 19  Adalgisa Hernandez APRN       Social History     Socioeconomic History   • Marital status:      Spouse name: Not on file   • Number of children: 4   • Years of education: Not on file   • Highest education level: Not on file   Occupational History   • Occupation: Retired   Tobacco Use   • Smoking status: Never Smoker   • Smokeless tobacco: Never Used   Substance and Sexual Activity   • Alcohol use: No   • Drug use: No   • Sexual activity: Defer   Social History Narrative    4 children; one child .     Caffeine Use: decaf.        Family History   Problem Relation Age of Onset   • Diabetes Sister    • Diabetes Brother    • Hypertension Brother    • Kidney disease Brother    • Cervical cancer Mother    • Heart disease Sister    • Neuropathy Sister    • Diabetes Sister        Review of Systems   Respiratory: Positive for chest tightness.    Cardiovascular: Positive for chest pain.   All other systems reviewed and are negative.       Objective:     Vitals:    02/10/20 0929 02/10/20 0934 02/10/20 0939 02/10/20 0939   BP:       BP Location:       Patient Position:       Pulse:       Resp: 20 20 18    Temp:       TempSrc:       SpO2:    (!) 87%   Weight:       Height:         Body mass index is 36.44 kg/m².    Last Weight and Admission Weight        20  1651   Weight: 99.3 kg (219 lb)           Intake/Output Summary (Last 24 hours) at 2/10/2020 0941  Last data filed at 2020 2107  Gross per  24 hour   Intake 240 ml   Output --   Net 240 ml         Physical Exam   Constitutional: She is oriented to person, place, and time.   obese   HENT:   Head: Normocephalic.   Nose: Nose normal.   Mouth/Throat: Oropharynx is clear and moist.   Eyes: Pupils are equal, round, and reactive to light. Conjunctivae and EOM are normal.   Neck: Normal range of motion. No JVD present.   Cardiovascular: Normal rate, regular rhythm, normal heart sounds and intact distal pulses.   Pulmonary/Chest: Effort normal and breath sounds normal.   Abdominal: Soft. There is no tenderness.   Cannot feel organs or aorta   Musculoskeletal: Normal range of motion. She exhibits no edema.   Neurological: She is alert and oriented to person, place, and time. No cranial nerve deficit.   Skin: Skin is warm and dry. No erythema.   Psychiatric: She has a normal mood and affect. Her behavior is normal. Judgment and thought content normal.   Vitals reviewed.              Lab Review:                Results from last 7 days   Lab Units 02/10/20  0443   SODIUM mmol/L 140   POTASSIUM mmol/L 3.7   CHLORIDE mmol/L 103   CO2 mmol/L 25.1   BUN mg/dL 34*   CREATININE mg/dL 1.85*   GLUCOSE mg/dL 185*   CALCIUM mg/dL 8.4*         Results from last 7 days   Lab Units 02/09/20  1715   WBC 10*3/mm3 10.19   HEMOGLOBIN g/dL 11.3*   HEMATOCRIT % 34.1   PLATELETS 10*3/mm3 240                           Assessment/Plan:         Stable angina (CMS/HCC)    Type 2 diabetes mellitus with diabetic polyneuropathy, with long-term current use of insulin (CMS/[facility])    Stage 3 chronic kidney disease (CMS/HCC)    We have brought her in for IVF and nephrology consultation prior to coronary angiography (without LV-gram).   Her Cr was up a bit upon arrival but after IVF, she's back at her baseline.  Appreciate Dr Urbano's attention to patient. Discussed with patient's daughters.

## 2020-02-10 ENCOUNTER — APPOINTMENT (OUTPATIENT)
Dept: CARDIOLOGY | Facility: HOSPITAL | Age: 80
End: 2020-02-10

## 2020-02-10 ENCOUNTER — APPOINTMENT (OUTPATIENT)
Dept: GENERAL RADIOLOGY | Facility: HOSPITAL | Age: 80
End: 2020-02-10

## 2020-02-10 PROBLEM — I25.118 CORONARY ARTERY DISEASE OF NATIVE HEART WITH STABLE ANGINA PECTORIS (HCC): Status: ACTIVE | Noted: 2020-02-07

## 2020-02-10 LAB
ALBUMIN SERPL-MCNC: 3 G/DL (ref 3.5–5.2)
ALBUMIN SERPL-MCNC: 3.2 G/DL (ref 3.5–5.2)
ALBUMIN/GLOB SERPL: 1.1 G/DL
ALBUMIN/GLOB SERPL: 1.2 G/DL
ALP SERPL-CCNC: 67 U/L (ref 39–117)
ALP SERPL-CCNC: 68 U/L (ref 39–117)
ALT SERPL W P-5'-P-CCNC: 10 U/L (ref 1–33)
ALT SERPL W P-5'-P-CCNC: 13 U/L (ref 1–33)
ANION GAP SERPL CALCULATED.3IONS-SCNC: 11 MMOL/L (ref 5–15)
ANION GAP SERPL CALCULATED.3IONS-SCNC: 11.9 MMOL/L (ref 5–15)
APTT PPP: 33.2 SECONDS (ref 22.7–35.4)
ARTERIAL PATENCY WRIST A: POSITIVE
AST SERPL-CCNC: 10 U/L (ref 1–32)
AST SERPL-CCNC: 9 U/L (ref 1–32)
ATMOSPHERIC PRESS: 752.3 MMHG
BACTERIA UR QL AUTO: ABNORMAL /HPF
BASE EXCESS BLDA CALC-SCNC: 1.2 MMOL/L (ref 0–2)
BASOPHILS # BLD AUTO: 0.02 10*3/MM3 (ref 0–0.2)
BASOPHILS NFR BLD AUTO: 0.2 % (ref 0–1.5)
BDY SITE: ABNORMAL
BH CV XLRA MEAS - DIST GSV CALF DIST LEFT: 0.31 CM
BH CV XLRA MEAS - DIST GSV CALF DIST RIGHT: 0.3 CM
BH CV XLRA MEAS - DIST GSV THIGH DIST LEFT: 0.48 CM
BH CV XLRA MEAS - DIST GSV THIGH DIST RIGHT: 0.4 CM
BH CV XLRA MEAS - GSV ANKLE DIST LEFT: 0.29 CM
BH CV XLRA MEAS - GSV ANKLE DIST RIGHT: 0.31 CM
BH CV XLRA MEAS - GSV KNEE DIST LEFT: 0.49 CM
BH CV XLRA MEAS - GSV KNEE DIST RIGHT: 0.35 CM
BH CV XLRA MEAS - GSV ORIGIN DIST LEFT: 0.67 CM
BH CV XLRA MEAS - GSV ORIGIN DIST RIGHT: 0.67 CM
BH CV XLRA MEAS - MID GSV CALF LEFT: 0.31 CM
BH CV XLRA MEAS - MID GSV CALF RIGHT: 0.31 CM
BH CV XLRA MEAS - MID GSV THIGH  LEFT: 0.62 CM
BH CV XLRA MEAS - MID GSV THIGH  RIGHT: 0.6 CM
BH CV XLRA MEAS - PROX GSV CALF DIST LEFT: 0.51 CM
BH CV XLRA MEAS - PROX GSV CALF DIST RIGHT: 0.45 CM
BH CV XLRA MEAS - PROX GSV THIGH  LEFT: 0.76 CM
BH CV XLRA MEAS - PROX GSV THIGH  RIGHT: 0.64 CM
BH CV XLRA MEAS LEFT CCA RATIO VEL: -92.5 CM/SEC
BH CV XLRA MEAS LEFT DIST CCA EDV: -16.1 CM/SEC
BH CV XLRA MEAS LEFT DIST CCA PSV: -92.5 CM/SEC
BH CV XLRA MEAS LEFT DIST ICA EDV: 14.1 CM/SEC
BH CV XLRA MEAS LEFT DIST ICA PSV: 53.3 CM/SEC
BH CV XLRA MEAS LEFT ICA RATIO VEL: 120 CM/SEC
BH CV XLRA MEAS LEFT ICA/CCA RATIO: -1.3
BH CV XLRA MEAS LEFT MID ICA EDV: -11.6 CM/SEC
BH CV XLRA MEAS LEFT MID ICA PSV: -60.4 CM/SEC
BH CV XLRA MEAS LEFT PROX CCA EDV: 11.9 CM/SEC
BH CV XLRA MEAS LEFT PROX CCA PSV: 89 CM/SEC
BH CV XLRA MEAS LEFT PROX ECA EDV: -14.5 CM/SEC
BH CV XLRA MEAS LEFT PROX ECA PSV: -161.7 CM/SEC
BH CV XLRA MEAS LEFT PROX ICA EDV: 15.5 CM/SEC
BH CV XLRA MEAS LEFT PROX ICA PSV: 120.1 CM/SEC
BH CV XLRA MEAS LEFT PROX SCLA PSV: 130.7 CM/SEC
BH CV XLRA MEAS LEFT VERTEBRAL A EDV: -9 CM/SEC
BH CV XLRA MEAS LEFT VERTEBRAL A PSV: -47.8 CM/SEC
BH CV XLRA MEAS RIGHT CCA RATIO VEL: 80.1 CM/SEC
BH CV XLRA MEAS RIGHT DIST CCA EDV: 13.7 CM/SEC
BH CV XLRA MEAS RIGHT DIST CCA PSV: 80.1 CM/SEC
BH CV XLRA MEAS RIGHT DIST ICA EDV: -20.5 CM/SEC
BH CV XLRA MEAS RIGHT DIST ICA PSV: -109 CM/SEC
BH CV XLRA MEAS RIGHT ICA RATIO VEL: -109 CM/SEC
BH CV XLRA MEAS RIGHT ICA/CCA RATIO: -1.4
BH CV XLRA MEAS RIGHT MID ICA EDV: 19.9 CM/SEC
BH CV XLRA MEAS RIGHT MID ICA PSV: 102.5 CM/SEC
BH CV XLRA MEAS RIGHT PROX CCA EDV: 13 CM/SEC
BH CV XLRA MEAS RIGHT PROX CCA PSV: 83.2 CM/SEC
BH CV XLRA MEAS RIGHT PROX ECA EDV: -12.1 CM/SEC
BH CV XLRA MEAS RIGHT PROX ECA PSV: -152.6 CM/SEC
BH CV XLRA MEAS RIGHT PROX ICA EDV: -13.3 CM/SEC
BH CV XLRA MEAS RIGHT PROX ICA PSV: -95.6 CM/SEC
BH CV XLRA MEAS RIGHT PROX SCLA PSV: 133.6 CM/SEC
BH CV XLRA MEAS RIGHT VERTEBRAL A EDV: -9.7 CM/SEC
BH CV XLRA MEAS RIGHT VERTEBRAL A PSV: -41.7 CM/SEC
BILIRUB SERPL-MCNC: 0.2 MG/DL (ref 0.2–1.2)
BILIRUB SERPL-MCNC: <0.2 MG/DL (ref 0.2–1.2)
BILIRUB UR QL STRIP: NEGATIVE
BUN BLD-MCNC: 31 MG/DL (ref 8–23)
BUN BLD-MCNC: 34 MG/DL (ref 8–23)
BUN/CREAT SERPL: 18.4 (ref 7–25)
BUN/CREAT SERPL: 19.4 (ref 7–25)
CALCIUM SPEC-SCNC: 7.6 MG/DL (ref 8.6–10.5)
CALCIUM SPEC-SCNC: 8.4 MG/DL (ref 8.6–10.5)
CHLORIDE SERPL-SCNC: 103 MMOL/L (ref 98–107)
CHLORIDE SERPL-SCNC: 106 MMOL/L (ref 98–107)
CHOLEST SERPL-MCNC: 131 MG/DL (ref 0–200)
CLARITY UR: CLEAR
CLOSE TME COLL+ADP + EPINEP PNL BLD: 77 %
CO2 SERPL-SCNC: 23 MMOL/L (ref 22–29)
CO2 SERPL-SCNC: 25.1 MMOL/L (ref 22–29)
COLOR UR: YELLOW
CREAT BLD-MCNC: 1.6 MG/DL (ref 0.57–1)
CREAT BLD-MCNC: 1.85 MG/DL (ref 0.57–1)
DEPRECATED RDW RBC AUTO: 41.8 FL (ref 37–54)
EOSINOPHIL # BLD AUTO: 0.03 10*3/MM3 (ref 0–0.4)
EOSINOPHIL NFR BLD AUTO: 0.3 % (ref 0.3–6.2)
ERYTHROCYTE [DISTWIDTH] IN BLOOD BY AUTOMATED COUNT: 12.1 % (ref 12.3–15.4)
GFR SERPL CREATININE-BSD FRML MDRD: 26 ML/MIN/1.73
GFR SERPL CREATININE-BSD FRML MDRD: 31 ML/MIN/1.73
GLOBULIN UR ELPH-MCNC: 2.6 GM/DL
GLOBULIN UR ELPH-MCNC: 2.8 GM/DL
GLUCOSE BLD-MCNC: 185 MG/DL (ref 65–99)
GLUCOSE BLD-MCNC: 83 MG/DL (ref 65–99)
GLUCOSE BLDC GLUCOMTR-MCNC: 242 MG/DL (ref 70–130)
GLUCOSE BLDC GLUCOMTR-MCNC: 332 MG/DL (ref 70–130)
GLUCOSE BLDC GLUCOMTR-MCNC: 87 MG/DL (ref 70–130)
GLUCOSE UR STRIP-MCNC: ABNORMAL MG/DL
HBA1C MFR BLD: 6.81 % (ref 4.8–5.6)
HCO3 BLDA-SCNC: 26.5 MMOL/L (ref 22–28)
HCT VFR BLD AUTO: 29.7 % (ref 34–46.6)
HDLC SERPL-MCNC: 49 MG/DL (ref 40–60)
HGB BLD-MCNC: 10 G/DL (ref 12–15.9)
HGB UR QL STRIP.AUTO: NEGATIVE
HYALINE CASTS UR QL AUTO: ABNORMAL /LPF
IMM GRANULOCYTES # BLD AUTO: 0.03 10*3/MM3 (ref 0–0.05)
IMM GRANULOCYTES NFR BLD AUTO: 0.3 % (ref 0–0.5)
INR PPP: 1.03 (ref 0.9–1.1)
KETONES UR QL STRIP: NEGATIVE
LDLC SERPL CALC-MCNC: 68 MG/DL (ref 0–100)
LDLC/HDLC SERPL: 1.39 {RATIO}
LEFT ARM BP: NORMAL MMHG
LEUKOCYTE ESTERASE UR QL STRIP.AUTO: NEGATIVE
LYMPHOCYTES # BLD AUTO: 0.86 10*3/MM3 (ref 0.7–3.1)
LYMPHOCYTES NFR BLD AUTO: 9.9 % (ref 19.6–45.3)
MCH RBC QN AUTO: 32.1 PG (ref 26.6–33)
MCHC RBC AUTO-ENTMCNC: 33.7 G/DL (ref 31.5–35.7)
MCV RBC AUTO: 95.2 FL (ref 79–97)
MODALITY: ABNORMAL
MONOCYTES # BLD AUTO: 0.23 10*3/MM3 (ref 0.1–0.9)
MONOCYTES NFR BLD AUTO: 2.6 % (ref 5–12)
NEUTROPHILS # BLD AUTO: 7.56 10*3/MM3 (ref 1.7–7)
NEUTROPHILS NFR BLD AUTO: 86.7 % (ref 42.7–76)
NITRITE UR QL STRIP: NEGATIVE
NRBC BLD AUTO-RTO: 0 /100 WBC (ref 0–0.2)
NT-PROBNP SERPL-MCNC: 631.9 PG/ML (ref 5–1800)
PCO2 BLDA: 43.8 MM HG (ref 35–45)
PH BLDA: 7.39 PH UNITS (ref 7.35–7.45)
PH UR STRIP.AUTO: 7 [PH] (ref 5–8)
PLATELET # BLD AUTO: 184 10*3/MM3 (ref 140–450)
PMV BLD AUTO: 9.8 FL (ref 6–12)
PO2 BLDA: 66.4 MM HG (ref 80–100)
POTASSIUM BLD-SCNC: 3.5 MMOL/L (ref 3.5–5.2)
POTASSIUM BLD-SCNC: 3.7 MMOL/L (ref 3.5–5.2)
PROT SERPL-MCNC: 5.6 G/DL (ref 6–8.5)
PROT SERPL-MCNC: 6 G/DL (ref 6–8.5)
PROT UR QL STRIP: ABNORMAL
PROTHROMBIN TIME: 13.2 SECONDS (ref 11.7–14.2)
RBC # BLD AUTO: 3.12 10*6/MM3 (ref 3.77–5.28)
RBC # UR: ABNORMAL /HPF
REF LAB TEST METHOD: ABNORMAL
SAO2 % BLDCOA: 92.5 % (ref 92–99)
SODIUM BLD-SCNC: 140 MMOL/L (ref 136–145)
SODIUM BLD-SCNC: 140 MMOL/L (ref 136–145)
SP GR UR STRIP: 1.02 (ref 1–1.03)
SQUAMOUS #/AREA URNS HPF: ABNORMAL /HPF
TOTAL RATE: 20 BREATHS/MINUTE
TRIGL SERPL-MCNC: 70 MG/DL (ref 0–150)
UROBILINOGEN UR QL STRIP: ABNORMAL
VLDLC SERPL-MCNC: 14 MG/DL (ref 5–40)
WBC NRBC COR # BLD: 8.73 10*3/MM3 (ref 3.4–10.8)
WBC UR QL AUTO: ABNORMAL /HPF

## 2020-02-10 PROCEDURE — 25010000002 HEPARIN (PORCINE) PER 1000 UNITS: Performed by: INTERNAL MEDICINE

## 2020-02-10 PROCEDURE — 25010000002 MIDAZOLAM PER 1 MG: Performed by: INTERNAL MEDICINE

## 2020-02-10 PROCEDURE — 63710000001 INSULIN LISPRO (HUMAN) PER 5 UNITS: Performed by: INTERNAL MEDICINE

## 2020-02-10 PROCEDURE — 80061 LIPID PANEL: CPT | Performed by: NURSE PRACTITIONER

## 2020-02-10 PROCEDURE — 93880 EXTRACRANIAL BILAT STUDY: CPT

## 2020-02-10 PROCEDURE — B2011ZZ PLAIN RADIOGRAPHY OF MULTIPLE CORONARY ARTERIES USING LOW OSMOLAR CONTRAST: ICD-10-PCS | Performed by: INTERNAL MEDICINE

## 2020-02-10 PROCEDURE — C1769 GUIDE WIRE: HCPCS | Performed by: INTERNAL MEDICINE

## 2020-02-10 PROCEDURE — 25010000002 METHYLPREDNISOLONE PER 125 MG: Performed by: INTERNAL MEDICINE

## 2020-02-10 PROCEDURE — 36600 WITHDRAWAL OF ARTERIAL BLOOD: CPT

## 2020-02-10 PROCEDURE — 93970 EXTREMITY STUDY: CPT

## 2020-02-10 PROCEDURE — 4A023N7 MEASUREMENT OF CARDIAC SAMPLING AND PRESSURE, LEFT HEART, PERCUTANEOUS APPROACH: ICD-10-PCS | Performed by: INTERNAL MEDICINE

## 2020-02-10 PROCEDURE — 81001 URINALYSIS AUTO W/SCOPE: CPT | Performed by: NURSE PRACTITIONER

## 2020-02-10 PROCEDURE — 93458 L HRT ARTERY/VENTRICLE ANGIO: CPT | Performed by: INTERNAL MEDICINE

## 2020-02-10 PROCEDURE — C1894 INTRO/SHEATH, NON-LASER: HCPCS | Performed by: INTERNAL MEDICINE

## 2020-02-10 PROCEDURE — 82962 GLUCOSE BLOOD TEST: CPT

## 2020-02-10 PROCEDURE — 85576 BLOOD PLATELET AGGREGATION: CPT | Performed by: NURSE PRACTITIONER

## 2020-02-10 PROCEDURE — 25010000002 FENTANYL CITRATE (PF) 100 MCG/2ML SOLUTION: Performed by: INTERNAL MEDICINE

## 2020-02-10 PROCEDURE — 63710000001 DIPHENHYDRAMINE PER 50 MG: Performed by: INTERNAL MEDICINE

## 2020-02-10 PROCEDURE — 80053 COMPREHEN METABOLIC PANEL: CPT | Performed by: INTERNAL MEDICINE

## 2020-02-10 PROCEDURE — 85730 THROMBOPLASTIN TIME PARTIAL: CPT | Performed by: NURSE PRACTITIONER

## 2020-02-10 PROCEDURE — 85610 PROTHROMBIN TIME: CPT | Performed by: NURSE PRACTITIONER

## 2020-02-10 PROCEDURE — 83880 ASSAY OF NATRIURETIC PEPTIDE: CPT | Performed by: NURSE PRACTITIONER

## 2020-02-10 PROCEDURE — 85025 COMPLETE CBC W/AUTO DIFF WBC: CPT | Performed by: NURSE PRACTITIONER

## 2020-02-10 PROCEDURE — 71046 X-RAY EXAM CHEST 2 VIEWS: CPT

## 2020-02-10 PROCEDURE — 82803 BLOOD GASES ANY COMBINATION: CPT

## 2020-02-10 PROCEDURE — 87086 URINE CULTURE/COLONY COUNT: CPT | Performed by: NURSE PRACTITIONER

## 2020-02-10 PROCEDURE — 0 IOPAMIDOL PER 1 ML: Performed by: INTERNAL MEDICINE

## 2020-02-10 PROCEDURE — 83036 HEMOGLOBIN GLYCOSYLATED A1C: CPT | Performed by: NURSE PRACTITIONER

## 2020-02-10 PROCEDURE — 80053 COMPREHEN METABOLIC PANEL: CPT | Performed by: NURSE PRACTITIONER

## 2020-02-10 RX ORDER — METHYLPREDNISOLONE SODIUM SUCCINATE 125 MG/2ML
100 INJECTION, POWDER, LYOPHILIZED, FOR SOLUTION INTRAMUSCULAR; INTRAVENOUS ONCE
Status: COMPLETED | OUTPATIENT
Start: 2020-02-10 | End: 2020-02-10

## 2020-02-10 RX ORDER — LIDOCAINE HYDROCHLORIDE 20 MG/ML
INJECTION, SOLUTION INFILTRATION; PERINEURAL AS NEEDED
Status: DISCONTINUED | OUTPATIENT
Start: 2020-02-10 | End: 2020-02-10 | Stop reason: HOSPADM

## 2020-02-10 RX ORDER — BISACODYL 10 MG
10 SUPPOSITORY, RECTAL RECTAL DAILY PRN
Status: DISCONTINUED | OUTPATIENT
Start: 2020-02-10 | End: 2020-02-12

## 2020-02-10 RX ORDER — FENTANYL CITRATE 50 UG/ML
INJECTION, SOLUTION INTRAMUSCULAR; INTRAVENOUS AS NEEDED
Status: DISCONTINUED | OUTPATIENT
Start: 2020-02-10 | End: 2020-02-10 | Stop reason: HOSPADM

## 2020-02-10 RX ORDER — BISACODYL 5 MG/1
5 TABLET, DELAYED RELEASE ORAL DAILY PRN
Status: DISCONTINUED | OUTPATIENT
Start: 2020-02-10 | End: 2020-02-12

## 2020-02-10 RX ORDER — MIDAZOLAM HYDROCHLORIDE 1 MG/ML
INJECTION INTRAMUSCULAR; INTRAVENOUS AS NEEDED
Status: DISCONTINUED | OUTPATIENT
Start: 2020-02-10 | End: 2020-02-10 | Stop reason: HOSPADM

## 2020-02-10 RX ORDER — SODIUM CHLORIDE 9 MG/ML
1 INJECTION, SOLUTION INTRAVENOUS CONTINUOUS
Status: ACTIVE | OUTPATIENT
Start: 2020-02-10 | End: 2020-02-10

## 2020-02-10 RX ORDER — SENNA AND DOCUSATE SODIUM 50; 8.6 MG/1; MG/1
2 TABLET, FILM COATED ORAL 2 TIMES DAILY
Status: DISCONTINUED | OUTPATIENT
Start: 2020-02-10 | End: 2020-02-12

## 2020-02-10 RX ORDER — DIPHENHYDRAMINE HCL 50 MG
50 CAPSULE ORAL ONCE
Status: COMPLETED | OUTPATIENT
Start: 2020-02-10 | End: 2020-02-10

## 2020-02-10 RX ADMIN — HYDRALAZINE HYDROCHLORIDE 100 MG: 50 TABLET, FILM COATED ORAL at 15:49

## 2020-02-10 RX ADMIN — FERROUS SULFATE TAB 325 MG (65 MG ELEMENTAL FE) 325 MG: 325 (65 FE) TAB at 08:44

## 2020-02-10 RX ADMIN — INSULIN LISPRO 5 UNITS: 100 INJECTION, SOLUTION INTRAVENOUS; SUBCUTANEOUS at 17:44

## 2020-02-10 RX ADMIN — METHYLPREDNISOLONE SODIUM SUCCINATE 100 MG: 125 INJECTION, POWDER, FOR SOLUTION INTRAMUSCULAR; INTRAVENOUS at 08:53

## 2020-02-10 RX ADMIN — CARBAMAZEPINE 300 MG: 300 CAPSULE, EXTENDED RELEASE ORAL at 08:44

## 2020-02-10 RX ADMIN — ACETAMINOPHEN 650 MG: 325 TABLET, FILM COATED ORAL at 21:16

## 2020-02-10 RX ADMIN — FEBUXOSTAT 40 MG: 40 TABLET, FILM COATED ORAL at 08:44

## 2020-02-10 RX ADMIN — POTASSIUM CHLORIDE 10 MEQ: 750 CAPSULE, EXTENDED RELEASE ORAL at 08:44

## 2020-02-10 RX ADMIN — CETIRIZINE HYDROCHLORIDE 10 MG: 10 TABLET, FILM COATED ORAL at 08:44

## 2020-02-10 RX ADMIN — HYDRALAZINE HYDROCHLORIDE 100 MG: 50 TABLET, FILM COATED ORAL at 08:44

## 2020-02-10 RX ADMIN — DIPHENHYDRAMINE HYDROCHLORIDE 50 MG: 25 CAPSULE ORAL at 08:52

## 2020-02-10 RX ADMIN — LINAGLIPTIN 5 MG: 5 TABLET, FILM COATED ORAL at 08:44

## 2020-02-10 RX ADMIN — ASPIRIN 81 MG: 81 TABLET, CHEWABLE ORAL at 08:44

## 2020-02-10 RX ADMIN — REPAGLINIDE 2 MG: 2 TABLET ORAL at 17:52

## 2020-02-10 RX ADMIN — DIPHENHYDRAMINE HYDROCHLORIDE 25 MG: 25 CAPSULE ORAL at 00:14

## 2020-02-10 RX ADMIN — SODIUM CHLORIDE 100 ML/HR: 9 INJECTION, SOLUTION INTRAVENOUS at 05:43

## 2020-02-10 RX ADMIN — CARBAMAZEPINE 300 MG: 300 CAPSULE, EXTENDED RELEASE ORAL at 21:07

## 2020-02-10 RX ADMIN — CLONIDINE HYDROCHLORIDE 0.2 MG: 0.1 TABLET ORAL at 21:08

## 2020-02-10 RX ADMIN — HYDRALAZINE HYDROCHLORIDE 100 MG: 50 TABLET, FILM COATED ORAL at 21:07

## 2020-02-10 RX ADMIN — MONTELUKAST SODIUM 10 MG: 10 TABLET, FILM COATED ORAL at 08:44

## 2020-02-10 RX ADMIN — LEVOTHYROXINE SODIUM 175 MCG: 175 TABLET ORAL at 08:44

## 2020-02-10 NOTE — CONSULTS
Referring Provider: Victor Hugo Ng MD   Reason for Consultation: NUHA    Subjective     Chief complaint No chief complaint on file.      History of present illness:          78 Y/O white female with a past medical history of hypertension, diabetes mellitus type 2 and chronic kidney disease stage III who follows Dr. Jhaveri.  Patient has been having increasing shortness of air and chest tightness and her last stress test has been positive.  Patient has been admitted for cardiac catheter tomorrow morning we were consulted to help with management of her kidney dysfunction.    Past Medical History:   Diagnosis Date   • Anemia in stage 3 chronic kidney disease (CMS/HCC) 2016   • Arthritis    • Asthma    • Bone spur of acromioclavicular joint    • Carotid atherosclerosis     <50% bilaterally   • Chronic venous insufficiency    • CKD (chronic kidney disease) stage 3, GFR 30-59 ml/min (CMS/[facility])    • Essential hypertension    • Gout    • Grief reaction       2010 after 15 foot fall off ladder   • H/O cataract    • Heel spur    • History of tendinitis    • Hyperlipidemia    • Hyperparathyroidism (CMS/[facility])    • Hyperthyroidism    • Hypothyroidism, acquired    • Non-toxic goiter    • Pneumonia    • Proteinuria    • Type 2 diabetes mellitus with neurological manifestations (CMS/[facility])    • Vitamin D deficiency      Past Surgical History:   Procedure Laterality Date   • BREAST EXCISIONAL BIOPSY Left     Dr. Ybarra benign   • BREAST EXCISIONAL BIOPSY Bilateral     benign   • CATARACT EXTRACTION Left 2010   • CATARACT EXTRACTION Right 2010   • HYSTERECTOMY      Dr. Quinn Peña   • OOPHORECTOMY      late 40's   • THYROIDECTOMY Right 2016    Procedure: right PARATHYROID EXCISION OF NODULE and right thyroid lobectomy and bilateral exploration.;  Surgeon: Tasneem Montelongo MD;  Location: [facility];  Service:      Family History   Problem Relation Age of Onset   • Diabetes Sister    •  Diabetes Brother    • Hypertension Brother    • Kidney disease Brother    • Cervical cancer Mother    • Heart disease Sister    • Neuropathy Sister    • Diabetes Sister      Social History     Tobacco Use   • Smoking status: Never Smoker   • Smokeless tobacco: Never Used   Substance Use Topics   • Alcohol use: No   • Drug use: No     Medications Prior to Admission   Medication Sig Dispense Refill Last Dose   • atenolol (TENORMIN) 25 MG tablet Take 0.5 tablets by mouth Daily. 30 tablet 11 2/9/2020 at Unknown time   • carBAMazepine (CARBATROL) 300 MG 12 hr capsule Take 1 capsule by mouth Every 12 (Twelve) Hours for 180 days. 180 capsule 1 2/9/2020 at Unknown time   • cetirizine (zyrTEC) 10 MG tablet Take 10 mg by mouth Daily.   2/9/2020 at Unknown time   • cloNIDine (CATAPRES) 0.2 MG tablet Take 1 tablet by mouth Every 12 (Twelve) Hours for 180 days. 180 tablet 1 2/9/2020 at Unknown time   • CONTOUR NEXT TEST test strip USE ONE STRIP TO TEST TWICE A  each 0 2/9/2020 at Unknown time   • diphenhydrAMINE (BENADRYL) 25 mg capsule Take 25 mg by mouth At Night As Needed for Itching or Sleep.   2/8/2020 at Unknown time   • ezetimibe (ZETIA) 10 MG tablet Take 1 tablet by mouth Daily for 180 days. 90 tablet 1 2/9/2020 at Unknown time   • febuxostat (ULORIC) 40 MG tablet Take 1 tablet by mouth Daily for 180 days. 90 tablet 1 2/9/2020 at Unknown time   • ferrous sulfate 325 (65 FE) MG tablet Take 1 tablet by mouth daily.   2/9/2020 at Unknown time   • hydrALAZINE (APRESOLINE) 50 MG tablet Take 1 tablet by mouth 3 (Three) Times a Day for 180 days. 270 tablet 1 2/9/2020 at Unknown time   • icosapent ethyl (VASCEPA) 1 g capsule capsule Take 2 g by mouth 2 (Two) Times a Day With Meals for 180 days. 360 capsule 1 2/9/2020 at Unknown time   • Insulin Glargine (TOUJEO MAX SOLOSTAR) 300 UNIT/ML solution pen-injector Inject 100 Units under the skin into the appropriate area as directed Daily. 4 pen 5 2/9/2020 at Unknown time   •  "Insulin Lispro, 1 Unit Dial, (HUMALOG KWIKPEN) 100 UNIT/ML solution pen-injector Inject 5 Units under the skin into the appropriate area as directed 3 (Three) Times a Day With Meals. 15 mL 2 2/9/2020 at Unknown time   • Insulin Pen Needle (BD PEN NEEDLE ISELA U/F) 32G X 4 MM misc Use to inject 4 time daily 200 each 5 2/9/2020 at Unknown time   • LIVALO 4 MG tablet TAKE ONE TABLET BY MOUTH EVERY NIGHT AT BEDTIME 90 tablet 0 2/8/2020 at Unknown time   • montelukast (SINGULAIR) 10 MG tablet 10 mg daily.   2/9/2020 at Unknown time   • nateglinide (STARLIX) 120 MG tablet TAKE ONE TABLET BY MOUTH THREE TIMES A DAY BEFORE MEALS 90 tablet 4 2/9/2020 at Unknown time   • Olmesartan-amLODIPine-HCTZ 40-10-25 MG tablet Take 1 tablet by mouth Daily for 90 days. 90 tablet 0 2/9/2020 at Unknown time   • ONGLYZA 2.5 MG tablet TAKE ONE TABLET BY MOUTH DAILY 90 tablet 0 2/9/2020 at Unknown time   • potassium chloride (MICRO-K) 10 MEQ CR capsule Take 10 mEq by mouth Daily.   2/9/2020 at Unknown time   • SYNTHROID 175 MCG tablet Take 1 tablet by mouth Daily. 90 tablet 2 2/9/2020 at Unknown time   • vitamin B-12 (CYANOCOBALAMIN) 1000 MCG tablet Take 1 tablet by mouth daily.   2/9/2020 at Unknown time   • vitamin D (ERGOCALCIFEROL) 1.25 MG (14500 UT) capsule capsule TAKE ONE CAPSULE BY MOUTH ONCE WEEKLY 12 capsule 0 Past Week at Unknown time     Allergies:  Codeine; Hydrocodone-acetaminophen; Meperidine; Morphine and related; Oxycodone-acetaminophen; Oxycodone-acetaminophen; Oxycodone-aspirin; Nickel; Penicillins; Shrimp; and Sulfa antibiotics    Review of Systems  Pertinent items are noted in HPI.    Objective     Vital Signs  Temp:  [98 °F (36.7 °C)] 98 °F (36.7 °C)  Heart Rate:  [52] 52  Resp:  [18] 18  BP: (168)/(72) 168/72    Flowsheet Rows      First Filed Value   Admission Height  165.1 cm (65\") Documented at 02/09/2020 1646   Admission Weight  99.3 kg (219 lb) Documented at 02/09/2020 1646           No intake/output data " recorded.  No intake/output data recorded.  No intake or output data in the 24 hours ending 02/09/20 1920    Physical Exam:     General Appearance:    Alert, cooperative, in no acute distress   Head:    Normocephalic, without obvious abnormality, atraumatic   Eyes:            Lids and lashes normal, conjunctivae and sclerae normal, no   icterus, no pallor, corneas clear, PERRLA   Ears:    Ears appear intact with no abnormalities noted   Throat:   No oral lesions, no thrush, oral mucosa moist   Neck:   No adenopathy, supple, trachea midline, no thyromegaly, no     carotid bruit, no JVD   Back:     No kyphosis present, no scoliosis present, no skin lesions,       erythema or scars, no tenderness to percussion or                   palpation,   range of motion normal   Lungs:     Clear to auscultation,respirations regular, even and                   unlabored    Heart:    Regular rhythm and normal rate, normal S1 and S2, no            murmur, no gallop, no rub, no click   Breast Exam:    Deferred   Abdomen:     Normal bowel sounds, no masses, no organomegaly, soft        non-tender, non-distended, no guarding, no rebound                 tenderness   Genitalia:    Deferred   Extremities:   Moves all extremities well, + edema, no cyanosis, no              redness   Pulses:   Pulses palpable and equal bilaterally   Skin:   No bleeding, bruising or rash   Lymph nodes:   No palpable adenopathy   Neurologic:   Cranial nerves 2 - 12 grossly intact, sensation intact, DTR        present and equal bilaterally       Results Review:  Results from last 7 days   Lab Units 02/09/20  1715   SODIUM mmol/L 142   POTASSIUM mmol/L 3.7   CHLORIDE mmol/L 101   CO2 mmol/L 26.8   BUN mg/dL 38*   CREATININE mg/dL 2.10*   CALCIUM mg/dL 8.8   GLUCOSE mg/dL 188*       Estimated Creatinine Clearance: 24.9 mL/min (A) (by C-G formula based on SCr of 2.1 mg/dL (H)).          Results from last 7 days   Lab Units 02/09/20  1715   WBC 10*3/mm3 10.19    HEMOGLOBIN g/dL 11.3*   PLATELETS 10*3/mm3 240             Active Medications    aspirin 81 mg Oral Daily   atenolol 12.5 mg Oral Daily   carBAMazepine 300 mg Oral Q12H   cetirizine 10 mg Oral Daily   cloNIDine 0.2 mg Oral Q12H   enoxaparin 30 mg Subcutaneous Q24H   febuxostat 40 mg Oral Daily   ferrous sulfate 325 mg Oral Daily   hydrALAZINE 50 mg Oral TID   insulin glargine 50 Units Subcutaneous Once   insulin lispro 5 Units Subcutaneous TID With Meals   levothyroxine 175 mcg Oral Daily   linagliptin 5 mg Oral Daily   montelukast 10 mg Oral Daily   potassium chloride 10 mEq Oral Daily   repaglinide 2 mg Oral TID With Meals       [START ON 2/10/2020] sodium chloride 75 mL/hr       Assessment/Plan       -NUHA on Chronic kidney disease stage III: Creatinine is slightly above her baseline 1.7 mg/dL.  I would hold, diuretic and other nephrotoxic medications.  Start her on gentle hydration.  Discussed with her risk of contrast-induced nephropathy.  Will obtain labs in the morning for further clarification of her kidney function.  Patient agreed to proceed with a cardiac cath.    -Chronic kidney disease stage III: Follows Dr. Caruso  -Coronary artery disease and positive stress test: For cardiac catheter tomorrow if kidney function stable  -Hypertension: Hydralazine and hold nephrotoxic medications  -Diabetes mellitus type 2            Dyana Urbano MD  02/09/20  7:20 PM

## 2020-02-10 NOTE — PROGRESS NOTES
NEPHROLOGY PROGRESS NOTE    PATIENT IDENTIFICATION:   Name:  Ange Johnson      MRN:  [id number]     79 y.o.  female             Reason for visit: NUHA    SUBJECTIVE:   And examined.  No shortness of air or chest pain.  Was heading to the cardiac cath.  OBJECTIVE:  Vitals:    02/09/20 1651 02/09/20 2005 02/10/20 0009 02/10/20 0700   BP:  165/68 155/67 160/68   BP Location:  Left arm Left arm Left arm   Patient Position:  Sitting Lying Lying   Pulse:  55  54   Resp:  18 18 18   Temp:  98 °F (36.7 °C) 97.5 °F (36.4 °C) 97.9 °F (36.6 °C)   TempSrc:  Oral Oral Oral   SpO2:    95%   Weight: 99.3 kg (219 lb)      Height:               Body mass index is 36.44 kg/m².    Intake/Output Summary (Last 24 hours) at 2/10/2020 0813  Last data filed at 2/9/2020 2107  Gross per 24 hour   Intake 240 ml   Output --   Net 240 ml         Exam:  GEN:  No distress, appears stated age  EYES:   Anicteric sclera  ENT:    External ears/nose normal, MM are moist  NECK:  No adenopathy, JVP none  LUNGS: Normal chest on inspection; not labored  CV:  Normal S1S2, without murmur  ABD:  Non-tender, non-distended, no hepatosplenomegaly, +BS  EXT:  No edema; no cyanosis; clubbing    Scheduled meds:    aspirin 81 mg Oral Daily   atenolol 12.5 mg Oral Daily   carBAMazepine 300 mg Oral Q12H   cetirizine 10 mg Oral Daily   cloNIDine 0.2 mg Oral Q12H   enoxaparin 30 mg Subcutaneous Q24H   febuxostat 40 mg Oral Daily   ferrous sulfate 325 mg Oral Daily   hydrALAZINE 100 mg Oral TID   insulin lispro 5 Units Subcutaneous TID With Meals   levothyroxine 175 mcg Oral Daily   linagliptin 5 mg Oral Daily   montelukast 10 mg Oral Daily   potassium chloride 10 mEq Oral Daily   repaglinide 2 mg Oral TID With Meals     IV meds:                        sodium chloride 100 mL/hr Last Rate: 100 mL/hr (02/10/20 0543)       Data Review:    Results from last 7 days   Lab Units 02/10/20  0443 02/09/20  1715   SODIUM mmol/L 140 142   POTASSIUM mmol/L 3.7 3.7   CHLORIDE  mmol/L 103 101   CO2 mmol/L 25.1 26.8   BUN mg/dL 34* 38*   CREATININE mg/dL 1.85* 2.10*   CALCIUM mg/dL 8.4* 8.8   BILIRUBIN mg/dL <0.2*  --    ALK PHOS U/L 68  --    ALT (SGPT) U/L 10  --    AST (SGOT) U/L 9  --    GLUCOSE mg/dL 185* 188*       Estimated Creatinine Clearance: 28.3 mL/min (A) (by C-G formula based on SCr of 1.85 mg/dL (H)).          Results from last 7 days   Lab Units 02/09/20  1715   WBC 10*3/mm3 10.19   HEMOGLOBIN g/dL 11.3*   PLATELETS 10*3/mm3 240                   ASSESSMENT:     Stable angina (CMS/HCC)        -NUHA on Chronic kidney disease stage III: Creatinine is slightly above her baseline 1.7 mg/dL.  I would hold, diuretic and other nephrotoxic medications.     -Chronic kidney disease stage III: Follows Dr. Caruso  -Coronary artery disease and positive stress test: For cardiac catheter tomorrow if kidney function stable  -Hypertension: Hydralazine and hold nephrotoxic medications  -Diabetes mellitus type 2    PLAN:      Continue IV fluids for 6 hours post cardiac cath.  Continue to hold oral and diuretic for now  Surveillance labs    Dyana Urbano MD  2/10/2020    8:13 AM

## 2020-02-10 NOTE — PLAN OF CARE
Problem: Patient Care Overview  Goal: Plan of Care Review  Outcome: Ongoing (interventions implemented as appropriate)  Flowsheets (Taken 2/10/2020 0602)  Progress: no change  Outcome Summary: HR in 50's with low 48 this am, -160's/60's, , no c/o CP, NPO for cardiac cath today, daughter at bedside  Goal: Individualization and Mutuality  Outcome: Ongoing (interventions implemented as appropriate)  Goal: Discharge Needs Assessment  Outcome: Ongoing (interventions implemented as appropriate)  Goal: Interprofessional Rounds/Family Conf  Outcome: Ongoing (interventions implemented as appropriate)     Problem: Cardiac: ACS (Acute Coronary Syndrome) (Adult)  Goal: Signs and Symptoms of Listed Potential Problems Will be Absent, Minimized or Managed (Cardiac: ACS)  Outcome: Ongoing (interventions implemented as appropriate)     Problem: Cardiac Catheterization (Diagnostic/Interventional) (Adult)  Goal: Signs and Symptoms of Listed Potential Problems Will be Absent, Minimized or Managed (Cardiac Catheterization)  Outcome: Ongoing (interventions implemented as appropriate)  Goal: Anesthesia/Sedation Recovery  Outcome: Ongoing (interventions implemented as appropriate)

## 2020-02-10 NOTE — PROGRESS NOTES
Cath noted - critical LM disease.  Will require CABG.  Discussed with Braeden Hickman, Sundeep, and with patient's family.  Would wait at least 24 hours given CKD.

## 2020-02-10 NOTE — PLAN OF CARE
Pt R radial cath site, CDI, soft. Gauze/tag in place. No interventions. 1st Case with Dr. Rai Wednesday. SBP elevated, meds given per MAR. SB, RA, A/Ox4. Being worked up for sx. Will continue to monitor closely.

## 2020-02-10 NOTE — CONSULTS
"  Patient Care Team:  Quinn Washington MD as PCP - General (Family Medicine)  Hugh Caruso MD as PCP - Claims Attributed  Steve Simons Jr., MD as Consulting Physician (Hematology and Oncology)  Hugh Caruso MD as Consulting Physician (Nephrology)  Adalgisa Hernandez APRN as Nurse Practitioner (Endocrinology)  Reagan Beltran MD as Consulting Physician (Endocrinology)  Aguilar Goldman MD as Consulting Physician (Pulmonary Disease)  Mil Sr MD as Consulting Physician (Ophthalmology)  Tasneem Montelongo MD as Surgeon (General Surgery)  Victor Hugo Ng MD as Consulting Physician (Cardiology)    Chief complaint: chest pain  Subjective     History of Present Illness  Ms. Johnson is a pleasant 79 year old female we have been asked to see in consultation after a cardiac catheterization revealed severe multivessel CAD with left main stenosis.  She states that she has been experiencing pain which she describes as \"heart burn\" for the past few months and has noticed progressively worsening dyspnea.      Review of Systems   Constitutional: Positive for activity change and fatigue.   HENT: Negative.    Eyes: Negative.    Respiratory: Positive for shortness of breath.    Cardiovascular: Positive for chest pain.   Gastrointestinal: Negative.    Endocrine: Negative.    Genitourinary: Negative.    Musculoskeletal: Negative.    Skin: Negative.    Allergic/Immunologic: Negative.    Neurological: Negative.    Hematological: Negative.    Psychiatric/Behavioral: Negative.         Past Medical History:   Diagnosis Date   • Anemia in stage 3 chronic kidney disease (CMS/[facility]) 2016   • Arthritis    • Asthma    • Bone spur of acromioclavicular joint    • Carotid atherosclerosis     <50% bilaterally   • Chronic venous insufficiency    • CKD (chronic kidney disease) stage 3, GFR 30-59 ml/min (CMS/[facility])    • Essential hypertension    • Gout    • Grief reaction       2010 after 15 foot fall off ladder   • H/O cataract    • " Heel spur    • History of tendinitis    • Hyperlipidemia    • Hyperparathyroidism (CMS/HCC)    • Hyperthyroidism    • Hypothyroidism, acquired    • Non-toxic goiter    • Pneumonia    • Proteinuria    • Type 2 diabetes mellitus with neurological manifestations (CMS/HCC)    • Vitamin D deficiency      Past Surgical History:   Procedure Laterality Date   • BREAST EXCISIONAL BIOPSY Left 1965    Dr. Ybarra benign   • BREAST EXCISIONAL BIOPSY Bilateral 1965    benign   • CATARACT EXTRACTION Left 02/03/2010   • CATARACT EXTRACTION Right 04/21/2010   • HYSTERECTOMY  1988    Dr. Quinn Peña   • OOPHORECTOMY      late 40's   • THYROIDECTOMY Right 12/8/2016    Procedure: right PARATHYROID EXCISION OF NODULE and right thyroid lobectomy and bilateral exploration.;  Surgeon: Tasneem Montelongo MD;  [place] [facility] [place] [facility]  Service:      Family History   Problem Relation Age of Onset   • Diabetes Sister    • Diabetes Brother    • Hypertension Brother    • Kidney disease Brother    • Cervical cancer Mother    • Heart disease Sister    • Neuropathy Sister    • Diabetes Sister      Social History     Tobacco Use   • Smoking status: Never Smoker   • Smokeless tobacco: Never Used   Substance Use Topics   • Alcohol use: No   • Drug use: No     Medications Prior to Admission   Medication Sig Dispense Refill Last Dose   • atenolol (TENORMIN) 25 MG tablet Take 0.5 tablets by mouth Daily. 30 tablet 11 2/9/2020 at Unknown time   • carBAMazepine (CARBATROL) 300 MG 12 hr capsule Take 1 capsule by mouth Every 12 (Twelve) Hours for 180 days. 180 capsule 1 2/9/2020 at Unknown time   • cetirizine (zyrTEC) 10 MG tablet Take 10 mg by mouth Daily.   2/9/2020 at Unknown time   • cloNIDine (CATAPRES) 0.2 MG tablet Take 1 tablet by mouth Every 12 (Twelve) Hours for 180 days. 180 tablet 1 2/9/2020 at Unknown time   • CONTOUR NEXT TEST test strip USE ONE STRIP TO TEST TWICE A  each 0 2/9/2020 at Unknown time   • diphenhydrAMINE (BENADRYL) 25 mg  capsule Take 25 mg by mouth At Night As Needed for Itching or Sleep.   2/8/2020 at Unknown time   • ezetimibe (ZETIA) 10 MG tablet Take 1 tablet by mouth Daily for 180 days. 90 tablet 1 2/9/2020 at Unknown time   • febuxostat (ULORIC) 40 MG tablet Take 1 tablet by mouth Daily for 180 days. 90 tablet 1 2/9/2020 at Unknown time   • ferrous sulfate 325 (65 FE) MG tablet Take 1 tablet by mouth daily.   2/9/2020 at Unknown time   • hydrALAZINE (APRESOLINE) 50 MG tablet Take 1 tablet by mouth 3 (Three) Times a Day for 180 days. 270 tablet 1 2/9/2020 at Unknown time   • icosapent ethyl (VASCEPA) 1 g capsule capsule Take 2 g by mouth 2 (Two) Times a Day With Meals for 180 days. 360 capsule 1 2/9/2020 at Unknown time   • Insulin Glargine (TOUJEO MAX SOLOSTAR) 300 UNIT/ML solution pen-injector Inject 100 Units under the skin into the appropriate area as directed Daily. 4 pen 5 2/9/2020 at Unknown time   • Insulin Lispro, 1 Unit Dial, (HUMALOG KWIKPEN) 100 UNIT/ML solution pen-injector Inject 5 Units under the skin into the appropriate area as directed 3 (Three) Times a Day With Meals. 15 mL 2 2/9/2020 at Unknown time   • Insulin Pen Needle (BD PEN NEEDLE ISELA U/F) 32G X 4 MM misc Use to inject 4 time daily 200 each 5 2/9/2020 at Unknown time   • LIVALO 4 MG tablet TAKE ONE TABLET BY MOUTH EVERY NIGHT AT BEDTIME 90 tablet 0 2/8/2020 at Unknown time   • montelukast (SINGULAIR) 10 MG tablet 10 mg daily.   2/9/2020 at Unknown time   • nateglinide (STARLIX) 120 MG tablet TAKE ONE TABLET BY MOUTH THREE TIMES A DAY BEFORE MEALS 90 tablet 4 2/9/2020 at Unknown time   • Olmesartan-amLODIPine-HCTZ 40-10-25 MG tablet Take 1 tablet by mouth Daily for 90 days. 90 tablet 0 2/9/2020 at Unknown time   • ONGLYZA 2.5 MG tablet TAKE ONE TABLET BY MOUTH DAILY 90 tablet 0 2/9/2020 at Unknown time   • potassium chloride (MICRO-K) 10 MEQ CR capsule Take 10 mEq by mouth Daily.   2/9/2020 at Unknown time   • SYNTHROID 175 MCG tablet Take 1 tablet by  "mouth Daily. 90 tablet 2 2/9/2020 at Unknown time   • vitamin B-12 (CYANOCOBALAMIN) 1000 MCG tablet Take 1 tablet by mouth daily.   2/9/2020 at Unknown time   • vitamin D (ERGOCALCIFEROL) 1.25 MG (62238 UT) capsule capsule TAKE ONE CAPSULE BY MOUTH ONCE WEEKLY 12 capsule 0 Past Week at Unknown time       [MAR Hold] aspirin 81 mg Oral Daily   atenolol 12.5 mg Oral Daily   carBAMazepine 300 mg Oral Q12H   [MAR Hold] cetirizine 10 mg Oral Daily   cloNIDine 0.2 mg Oral Q12H   [MAR Hold] enoxaparin 30 mg Subcutaneous Q24H   [MAR Hold] febuxostat 40 mg Oral Daily   [MAR Hold] ferrous sulfate 325 mg Oral Daily   hydrALAZINE 100 mg Oral TID   [MAR Hold] insulin lispro 5 Units Subcutaneous TID With Meals   [MAR Hold] levothyroxine 175 mcg Oral Daily   [MAR Hold] linagliptin 5 mg Oral Daily   [MAR Hold] montelukast 10 mg Oral Daily   potassium chloride 10 mEq Oral Daily   [MAR Hold] repaglinide 2 mg Oral TID With Meals     Allergies:  Codeine; Hydrocodone-acetaminophen; Meperidine; Morphine and related; Oxycodone-acetaminophen; Oxycodone-acetaminophen; Oxycodone-aspirin; Nickel; Penicillins; Shrimp; and Sulfa antibiotics    Objective      Vital Signs  Temp:  [97.5 °F (36.4 °C)-98 °F (36.7 °C)] 97.9 °F (36.6 °C)  Heart Rate:  [52-55] 54  Resp:  [18-20] 18  BP: (155-168)/(67-72) 160/68    Flowsheet Rows      First Filed Value   Admission Height  165.1 cm (65\") Documented at 02/09/2020 1646   Admission Weight  99.3 kg (219 lb) Documented at 02/09/2020 1646        165.1 cm (65\")    Physical Exam   Constitutional: She is oriented to person, place, and time. She appears well-developed and well-nourished. No distress.   HENT:   Head: Normocephalic and atraumatic.   Nose: Nose normal.   Mouth/Throat: Oropharynx is clear and moist. No oropharyngeal exudate.   Eyes: Pupils are equal, round, and reactive to light. Conjunctivae and EOM are normal. No scleral icterus.   Neck: Normal range of motion. Neck supple. No JVD present. "   Cardiovascular: Normal rate and regular rhythm.   Pulmonary/Chest: Effort normal and breath sounds normal. No respiratory distress.   Abdominal: Soft. Bowel sounds are normal. She exhibits no distension.   Musculoskeletal: Normal range of motion. She exhibits no edema.   Neurological: She is alert and oriented to person, place, and time. No cranial nerve deficit.   Skin: Skin is warm and dry. She is not diaphoretic. No erythema.   Psychiatric: She has a normal mood and affect. Her behavior is normal. Judgment and thought content normal.       Results Review:   Lab Results (last 24 hours)     Procedure Component Value Units Date/Time    Comprehensive Metabolic Panel [[id number]]  (Abnormal) Collected:  02/10/20 0443    Specimen:  Blood Updated:  02/10/20 0531     Glucose 185 mg/dL      BUN 34 mg/dL      Creatinine 1.85 mg/dL      Sodium 140 mmol/L      Potassium 3.7 mmol/L      Chloride 103 mmol/L      CO2 25.1 mmol/L      Calcium 8.4 mg/dL      Total Protein 6.0 g/dL      Albumin 3.20 g/dL      ALT (SGPT) 10 U/L      AST (SGOT) 9 U/L      Alkaline Phosphatase 68 U/L      Total Bilirubin <0.2 mg/dL      eGFR Non African Amer 26 mL/min/1.73      Globulin 2.8 gm/dL      A/G Ratio 1.1 g/dL      BUN/Creatinine Ratio 18.4     Anion Gap 11.9 mmol/L     Narrative:       GFR Normal >60  Chronic Kidney Disease <60  Kidney Failure <15      POC Glucose Once [[id number]]  (Abnormal) Collected:  02/09/20 2056    Specimen:  Blood Updated:  02/09/20 2101     Glucose 220 mg/dL     Basic Metabolic Panel [[id number]]  (Abnormal) Collected:  02/09/20 1715    Specimen:  Blood Updated:  02/09/20 1747     Glucose 188 mg/dL      BUN 38 mg/dL      Creatinine 2.10 mg/dL      Sodium 142 mmol/L      Potassium 3.7 mmol/L      Chloride 101 mmol/L      CO2 26.8 mmol/L      Calcium 8.8 mg/dL      eGFR Non African Amer 23 mL/min/1.73      BUN/Creatinine Ratio 18.1     Anion Gap 14.2 mmol/L     Narrative:       GFR Normal >60  Chronic Kidney Disease  <60  Kidney Failure <15      CBC Auto Differential [[id number]]  (Abnormal) Collected:  02/09/20 1715    Specimen:  Blood Updated:  02/09/20 1728     WBC 10.19 10*3/mm3      RBC 3.55 10*6/mm3      Hemoglobin 11.3 g/dL      Hematocrit 34.1 %      MCV 96.1 fL      MCH 31.8 pg      MCHC 33.1 g/dL      RDW 12.1 %      RDW-SD 42.1 fl      MPV 10.2 fL      Platelets 240 10*3/mm3      Neutrophil % 66.9 %      Lymphocyte % 20.4 %      Monocyte % 10.2 %      Eosinophil % 1.4 %      Basophil % 0.5 %      Immature Grans % 0.6 %      Neutrophils, Absolute 6.82 10*3/mm3      Lymphocytes, Absolute 2.08 10*3/mm3      Monocytes, Absolute 1.04 10*3/mm3      Eosinophils, Absolute 0.14 10*3/mm3      Basophils, Absolute 0.05 10*3/mm3      Immature Grans, Absolute 0.06 10*3/mm3      nRBC 0.0 /100 WBC               Assessment/Plan       Type 2 diabetes mellitus with diabetic polyneuropathy, with long-term current use of insulin (CMS/[facility])    Stage 3 chronic kidney disease (CMS/[facility])    Stable angina (CMS/[facility])      Assessment & Plan    -Severe CAD with left main stenosis  -Diabetes type 2  -CKD stage III, baseline 1.6-1.8  -hypertension  -hypothyroidism- on synthroid  -hyperlipidemia   *all new to this examiner    Dr. Rai reviewed films and recommends cardiac surgery revascularization   Will order preoperative studies   With her renal function and receiving dye during the cath we will wait until Wednesday    Thank you for the opportunity to participate in this patient's care.    TAYO Welch  02/10/20  9:48 AM

## 2020-02-11 ENCOUNTER — ANESTHESIA EVENT (OUTPATIENT)
Dept: PERIOP | Facility: HOSPITAL | Age: 80
End: 2020-02-11

## 2020-02-11 LAB
ABO GROUP BLD: NORMAL
ALBUMIN SERPL-MCNC: 3.3 G/DL (ref 3.5–5.2)
ALBUMIN/GLOB SERPL: 1.2 G/DL
ALP SERPL-CCNC: 73 U/L (ref 39–117)
ALT SERPL W P-5'-P-CCNC: 11 U/L (ref 1–33)
ANION GAP SERPL CALCULATED.3IONS-SCNC: 11.8 MMOL/L (ref 5–15)
AST SERPL-CCNC: 11 U/L (ref 1–32)
BILIRUB SERPL-MCNC: <0.2 MG/DL (ref 0.2–1.2)
BLD GP AB SCN SERPL QL: NEGATIVE
BUN BLD-MCNC: 34 MG/DL (ref 8–23)
BUN/CREAT SERPL: 20.2 (ref 7–25)
CALCIUM SPEC-SCNC: 9 MG/DL (ref 8.6–10.5)
CHLORIDE SERPL-SCNC: 103 MMOL/L (ref 98–107)
CO2 SERPL-SCNC: 24.2 MMOL/L (ref 22–29)
CREAT BLD-MCNC: 1.68 MG/DL (ref 0.57–1)
DEPRECATED RDW RBC AUTO: 40.7 FL (ref 37–54)
ERYTHROCYTE [DISTWIDTH] IN BLOOD BY AUTOMATED COUNT: 11.9 % (ref 12.3–15.4)
GFR SERPL CREATININE-BSD FRML MDRD: 29 ML/MIN/1.73
GLOBULIN UR ELPH-MCNC: 2.8 GM/DL
GLUCOSE BLD-MCNC: 186 MG/DL (ref 65–99)
GLUCOSE BLDC GLUCOMTR-MCNC: 118 MG/DL (ref 70–130)
GLUCOSE BLDC GLUCOMTR-MCNC: 121 MG/DL (ref 70–130)
GLUCOSE BLDC GLUCOMTR-MCNC: 167 MG/DL (ref 70–130)
GLUCOSE BLDC GLUCOMTR-MCNC: 196 MG/DL (ref 70–130)
GLUCOSE BLDC GLUCOMTR-MCNC: 275 MG/DL (ref 70–130)
HCT VFR BLD AUTO: 30.6 % (ref 34–46.6)
HGB BLD-MCNC: 10.6 G/DL (ref 12–15.9)
INR PPP: 1.01 (ref 0.9–1.1)
MCH RBC QN AUTO: 32.3 PG (ref 26.6–33)
MCHC RBC AUTO-ENTMCNC: 34.6 G/DL (ref 31.5–35.7)
MCV RBC AUTO: 93.3 FL (ref 79–97)
PLATELET # BLD AUTO: 218 10*3/MM3 (ref 140–450)
PMV BLD AUTO: 10.2 FL (ref 6–12)
POTASSIUM BLD-SCNC: 3.5 MMOL/L (ref 3.5–5.2)
PROT SERPL-MCNC: 6.1 G/DL (ref 6–8.5)
PROTHROMBIN TIME: 13 SECONDS (ref 11.7–14.2)
RBC # BLD AUTO: 3.28 10*6/MM3 (ref 3.77–5.28)
RH BLD: POSITIVE
SODIUM BLD-SCNC: 139 MMOL/L (ref 136–145)
T&S EXPIRATION DATE: NORMAL
WBC NRBC COR # BLD: 9.55 10*3/MM3 (ref 3.4–10.8)

## 2020-02-11 PROCEDURE — 86900 BLOOD TYPING SEROLOGIC ABO: CPT | Performed by: NURSE PRACTITIONER

## 2020-02-11 PROCEDURE — 93005 ELECTROCARDIOGRAM TRACING: CPT | Performed by: THORACIC SURGERY (CARDIOTHORACIC VASCULAR SURGERY)

## 2020-02-11 PROCEDURE — 93010 ELECTROCARDIOGRAM REPORT: CPT | Performed by: INTERNAL MEDICINE

## 2020-02-11 PROCEDURE — 85610 PROTHROMBIN TIME: CPT | Performed by: NURSE PRACTITIONER

## 2020-02-11 PROCEDURE — 63710000001 INSULIN LISPRO (HUMAN) PER 5 UNITS: Performed by: INTERNAL MEDICINE

## 2020-02-11 PROCEDURE — 86923 COMPATIBILITY TEST ELECTRIC: CPT

## 2020-02-11 PROCEDURE — 99223 1ST HOSP IP/OBS HIGH 75: CPT | Performed by: INTERNAL MEDICINE

## 2020-02-11 PROCEDURE — 86850 RBC ANTIBODY SCREEN: CPT | Performed by: NURSE PRACTITIONER

## 2020-02-11 PROCEDURE — 86901 BLOOD TYPING SEROLOGIC RH(D): CPT

## 2020-02-11 PROCEDURE — 86900 BLOOD TYPING SEROLOGIC ABO: CPT

## 2020-02-11 PROCEDURE — 85027 COMPLETE CBC AUTOMATED: CPT | Performed by: NURSE PRACTITIONER

## 2020-02-11 PROCEDURE — 99232 SBSQ HOSP IP/OBS MODERATE 35: CPT | Performed by: NURSE PRACTITIONER

## 2020-02-11 PROCEDURE — 80053 COMPREHEN METABOLIC PANEL: CPT | Performed by: NURSE PRACTITIONER

## 2020-02-11 PROCEDURE — 86901 BLOOD TYPING SEROLOGIC RH(D): CPT | Performed by: NURSE PRACTITIONER

## 2020-02-11 PROCEDURE — 63710000001 INSULIN GLARGINE PER 5 UNITS: Performed by: NURSE PRACTITIONER

## 2020-02-11 PROCEDURE — 63710000001 INSULIN LISPRO (HUMAN) PER 5 UNITS: Performed by: HOSPITALIST

## 2020-02-11 PROCEDURE — 82962 GLUCOSE BLOOD TEST: CPT

## 2020-02-11 RX ORDER — CHLORHEXIDINE GLUCONATE 500 MG/1
1 CLOTH TOPICAL EVERY 12 HOURS
Status: DISCONTINUED | OUTPATIENT
Start: 2020-02-11 | End: 2020-02-12

## 2020-02-11 RX ORDER — AMLODIPINE BESYLATE 5 MG/1
5 TABLET ORAL
Status: DISCONTINUED | OUTPATIENT
Start: 2020-02-11 | End: 2020-02-12

## 2020-02-11 RX ORDER — INSULIN GLARGINE 100 [IU]/ML
100 INJECTION, SOLUTION SUBCUTANEOUS NIGHTLY
Status: DISCONTINUED | OUTPATIENT
Start: 2020-02-11 | End: 2020-02-11

## 2020-02-11 RX ORDER — CHLORHEXIDINE GLUCONATE 0.12 MG/ML
15 RINSE ORAL EVERY 12 HOURS SCHEDULED
Status: COMPLETED | OUTPATIENT
Start: 2020-02-11 | End: 2020-02-12

## 2020-02-11 RX ORDER — FAMOTIDINE 20 MG/1
20 TABLET, FILM COATED ORAL 2 TIMES DAILY
Status: DISCONTINUED | OUTPATIENT
Start: 2020-02-11 | End: 2020-02-12

## 2020-02-11 RX ADMIN — INSULIN LISPRO 2 UNITS: 100 INJECTION, SOLUTION INTRAVENOUS; SUBCUTANEOUS at 16:40

## 2020-02-11 RX ADMIN — LEVOTHYROXINE SODIUM 175 MCG: 175 TABLET ORAL at 09:57

## 2020-02-11 RX ADMIN — CLONIDINE HYDROCHLORIDE 0.2 MG: 0.1 TABLET ORAL at 20:48

## 2020-02-11 RX ADMIN — CLONIDINE HYDROCHLORIDE 0.2 MG: 0.1 TABLET ORAL at 09:53

## 2020-02-11 RX ADMIN — INSULIN LISPRO 5 UNITS: 100 INJECTION, SOLUTION INTRAVENOUS; SUBCUTANEOUS at 07:08

## 2020-02-11 RX ADMIN — MONTELUKAST SODIUM 10 MG: 10 TABLET, FILM COATED ORAL at 09:51

## 2020-02-11 RX ADMIN — FAMOTIDINE 20 MG: 20 TABLET, FILM COATED ORAL at 21:24

## 2020-02-11 RX ADMIN — HYDRALAZINE HYDROCHLORIDE 100 MG: 50 TABLET, FILM COATED ORAL at 16:39

## 2020-02-11 RX ADMIN — POTASSIUM CHLORIDE 10 MEQ: 750 CAPSULE, EXTENDED RELEASE ORAL at 09:53

## 2020-02-11 RX ADMIN — ASPIRIN 81 MG: 81 TABLET, CHEWABLE ORAL at 09:52

## 2020-02-11 RX ADMIN — INSULIN GLARGINE 50 UNITS: 100 INJECTION, SOLUTION SUBCUTANEOUS at 00:30

## 2020-02-11 RX ADMIN — AMLODIPINE BESYLATE 5 MG: 5 TABLET ORAL at 21:23

## 2020-02-11 RX ADMIN — MUPIROCIN 1 APPLICATION: 20 OINTMENT TOPICAL at 21:24

## 2020-02-11 RX ADMIN — LINAGLIPTIN 5 MG: 5 TABLET, FILM COATED ORAL at 09:53

## 2020-02-11 RX ADMIN — FEBUXOSTAT 40 MG: 40 TABLET, FILM COATED ORAL at 09:51

## 2020-02-11 RX ADMIN — ACETAMINOPHEN 650 MG: 325 TABLET, FILM COATED ORAL at 01:16

## 2020-02-11 RX ADMIN — SENNOSIDES AND DOCUSATE SODIUM 1 TABLET: 8.6; 5 TABLET ORAL at 20:48

## 2020-02-11 RX ADMIN — ATENOLOL 12.5 MG: 25 TABLET ORAL at 09:52

## 2020-02-11 RX ADMIN — CETIRIZINE HYDROCHLORIDE 10 MG: 10 TABLET, FILM COATED ORAL at 09:53

## 2020-02-11 RX ADMIN — REPAGLINIDE 2 MG: 2 TABLET ORAL at 07:08

## 2020-02-11 RX ADMIN — FERROUS SULFATE TAB 325 MG (65 MG ELEMENTAL FE) 325 MG: 325 (65 FE) TAB at 09:52

## 2020-02-11 RX ADMIN — INSULIN LISPRO 7 UNITS: 100 INJECTION, SOLUTION INTRAVENOUS; SUBCUTANEOUS at 16:39

## 2020-02-11 RX ADMIN — CHLORHEXIDINE GLUCONATE 15 ML: 1.2 RINSE ORAL at 21:24

## 2020-02-11 RX ADMIN — CARBAMAZEPINE 300 MG: 300 CAPSULE, EXTENDED RELEASE ORAL at 20:48

## 2020-02-11 RX ADMIN — REPAGLINIDE 2 MG: 2 TABLET ORAL at 12:47

## 2020-02-11 RX ADMIN — METOPROLOL TARTRATE 5 MG: 5 INJECTION, SOLUTION INTRAVENOUS at 04:58

## 2020-02-11 RX ADMIN — HYDRALAZINE HYDROCHLORIDE 100 MG: 50 TABLET, FILM COATED ORAL at 09:53

## 2020-02-11 RX ADMIN — CARBAMAZEPINE 300 MG: 300 CAPSULE, EXTENDED RELEASE ORAL at 09:53

## 2020-02-11 RX ADMIN — HYDRALAZINE HYDROCHLORIDE 100 MG: 50 TABLET, FILM COATED ORAL at 20:48

## 2020-02-11 NOTE — PROGRESS NOTES
" LOS: 1 day   Patient Care Team:  Quinn Washington MD as PCP - General (Family Medicine)  Hugh Caruso MD as PCP - Claims Attributed  Steve Simons Jr., MD as Consulting Physician (Hematology and Oncology)  Hugh Caruso MD as Consulting Physician (Nephrology)  Adalgisa Hernandez APRN as Nurse Practitioner (Endocrinology)  Reagan Beltran MD as Consulting Physician (Endocrinology)  Aguialr Goldman MD as Consulting Physician (Pulmonary Disease)  Mil Sr MD as Consulting Physician (Ophthalmology)  Tasneem Montelongo MD as Surgeon (General Surgery)  Victor Hugo Ng MD as Consulting Physician (Cardiology)    Chief Complaint: CAD    Subjective:  Symptoms:  No shortness of breath or chest pain.    Diet:  No nausea.    Activity level: Normal.          Vital Signs  Temp:  [98 °F (36.7 °C)-99 °F (37.2 °C)] 98.1 °F (36.7 °C)  Heart Rate:  [47-78] 48  Resp:  [16-18] 18  BP: (146-184)/(62-87) 153/64  Body mass index is 36.44 kg/m².    Intake/Output Summary (Last 24 hours) at 2/11/2020 1034  Last data filed at 2/11/2020 0758  Gross per 24 hour   Intake 810 ml   Output --   Net 810 ml     I/O this shift:  In: 210 [P.O.:210]  Out: -           02/09/20  1646 02/09/20  1651   Weight: 99.3 kg (219 lb) 99.3 kg (219 lb)         Objective:  General Appearance:  In no acute distress.    Vital signs: (most recent): Blood pressure 153/64, pulse (!) 48, temperature 98.1 °F (36.7 °C), temperature source Oral, resp. rate 18, height 165.1 cm (65\"), weight 99.3 kg (219 lb), SpO2 100 %, not currently breastfeeding.  Vital signs are normal.    Output: Producing urine and producing stool.    HEENT: Normal HEENT exam.    Lungs:  Normal effort and normal respiratory rate.    Heart: Bradycardia.    Abdomen: Abdomen is soft.  Bowel sounds are normal.               Results Review:        WBC WBC   Date Value Ref Range Status   02/11/2020 9.55 3.40 - 10.80 10*3/mm3 Final   02/10/2020 8.73 3.40 - 10.80 10*3/mm3 Final   02/09/2020 10.19 3.40 - " 10.80 10*3/mm3 Final      HGB Hemoglobin   Date Value Ref Range Status   02/11/2020 10.6 (L) 12.0 - 15.9 g/dL Final   02/10/2020 10.0 (L) 12.0 - 15.9 g/dL Final   02/09/2020 11.3 (L) 12.0 - 15.9 g/dL Final      HCT Hematocrit   Date Value Ref Range Status   02/11/2020 30.6 (L) 34.0 - 46.6 % Final   02/10/2020 29.7 (L) 34.0 - 46.6 % Final   02/09/2020 34.1 34.0 - 46.6 % Final      Platelets Platelets   Date Value Ref Range Status   02/11/2020 218 140 - 450 10*3/mm3 Final   02/10/2020 184 140 - 450 10*3/mm3 Final   02/09/2020 240 140 - 450 10*3/mm3 Final        PT/INR:    Protime   Date Value Ref Range Status   02/11/2020 13.0 11.7 - 14.2 Seconds Final   02/10/2020 13.2 11.7 - 14.2 Seconds Final   /  INR   Date Value Ref Range Status   02/11/2020 1.01 0.90 - 1.10 Final   02/10/2020 1.03 0.90 - 1.10 Final       Sodium Sodium   Date Value Ref Range Status   02/11/2020 139 136 - 145 mmol/L Final   02/10/2020 140 136 - 145 mmol/L Final   02/10/2020 140 136 - 145 mmol/L Final   02/09/2020 142 136 - 145 mmol/L Final      Potassium Potassium   Date Value Ref Range Status   02/11/2020 3.5 3.5 - 5.2 mmol/L Final   02/10/2020 3.5 3.5 - 5.2 mmol/L Final   02/10/2020 3.7 3.5 - 5.2 mmol/L Final   02/09/2020 3.7 3.5 - 5.2 mmol/L Final      Chloride Chloride   Date Value Ref Range Status   02/11/2020 103 98 - 107 mmol/L Final   02/10/2020 106 98 - 107 mmol/L Final   02/10/2020 103 98 - 107 mmol/L Final   02/09/2020 101 98 - 107 mmol/L Final      Bicarbonate CO2   Date Value Ref Range Status   02/11/2020 24.2 22.0 - 29.0 mmol/L Final   02/10/2020 23.0 22.0 - 29.0 mmol/L Final   02/10/2020 25.1 22.0 - 29.0 mmol/L Final   02/09/2020 26.8 22.0 - 29.0 mmol/L Final      BUN BUN   Date Value Ref Range Status   02/11/2020 34 (H) 8 - 23 mg/dL Final   02/10/2020 31 (H) 8 - 23 mg/dL Final   02/10/2020 34 (H) 8 - 23 mg/dL Final   02/09/2020 38 (H) 8 - 23 mg/dL Final      Creatinine Creatinine   Date Value Ref Range Status   02/11/2020 1.68 (H)  0.57 - 1.00 mg/dL Final   02/10/2020 1.60 (H) 0.57 - 1.00 mg/dL Final   02/10/2020 1.85 (H) 0.57 - 1.00 mg/dL Final   02/09/2020 2.10 (H) 0.57 - 1.00 mg/dL Final      Calcium Calcium   Date Value Ref Range Status   02/11/2020 9.0 8.6 - 10.5 mg/dL Final   02/10/2020 7.6 (L) 8.6 - 10.5 mg/dL Final   02/10/2020 8.4 (L) 8.6 - 10.5 mg/dL Final   02/09/2020 8.8 8.6 - 10.5 mg/dL Final      Magnesium No results found for: MG         amLODIPine 5 mg Oral Q24H   aspirin 81 mg Oral Daily   atenolol 12.5 mg Oral Daily   carBAMazepine 300 mg Oral Q12H   cetirizine 10 mg Oral Daily   chlorhexidine 15 mL Mouth/Throat Q12H   Chlorhexidine Gluconate Cloth 1 application Topical Q12H   cloNIDine 0.2 mg Oral Q12H   febuxostat 40 mg Oral Daily   ferrous sulfate 325 mg Oral Daily   hydrALAZINE 100 mg Oral TID   insulin glargine 100 Units Subcutaneous Nightly   insulin lispro 5 Units Subcutaneous TID With Meals   levothyroxine 175 mcg Oral Daily   linagliptin 5 mg Oral Daily   [START ON 2/12/2020] metoprolol tartrate 12.5 mg Oral On Call to OR   montelukast 10 mg Oral Daily   mupirocin 1 application Each Nare Q12H   potassium chloride 10 mEq Oral Daily   repaglinide 2 mg Oral TID With Meals   senna-docusate sodium 2 tablet Oral BID   [START ON 2/12/2020] vancomycin 15 mg/kg Intravenous On Call to OR       sodium chloride 100 mL/hr Last Rate: Stopped (02/10/20 1600)           Patient Active Problem List   Diagnosis Code   • Asthma J45.909   • Type 2 diabetes mellitus with diabetic polyneuropathy, with long-term current use of insulin (CMS/[facility]) E11.42, Z79.4   • Essential hypertension I10   • Mixed hyperlipidemia E78.2   • Disorder of muscle, ligament, and fascia M62.9   • Proteinuria R80.9   • Postoperative hypothyroidism E89.0   • Hyperparathyroidism (CMS/[facility]) E21.3   • Vitamin D deficiency E55.9   • 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   • Solitary thyroid nodule E04.1   • Anemia in stage 3 chronic kidney disease (CMS/HCC) N18.3, D63.1   • Stage 3  chronic kidney disease (CMS/HCC) N18.3   • Class 1 obesity due to excess calories with serious comorbidity and body mass index (BMI) of 34.0 to 34.9 in adult E66.09, Z68.34   • Heartburn R12   • Stable angina (CMS/[facility]) I20.8   • 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 pectoris (CMS/[facility]) I25.118       Assessment & Plan    -Severe CAD with left main stenosis  -Diabetes type 2  -CKD stage III, baseline 1.6-1.8  -hypertension  -hypothyroidism- on synthroid  -hyperlipidemia     Creatinine back to baseline 1.68  Adequate vein  Carotid duplex without stenosis  Plans for OR tomorrow with Dr. Cecelia Peters, APRN  02/11/20  10:34 AM

## 2020-02-11 NOTE — PROGRESS NOTES
Discharge Planning Assessment  [facility]     Patient Name: Ange Johnson  MRN: [id number]  Today's Date: 2/11/2020    Admit Date: 2/9/2020    Discharge Needs Assessment     Row Name 02/11/20 1402       Living Environment    Lives With  child(zaynab), adult    Name(s) of Who Lives With Patient  Daughter Daphne Dasilva [phone]    Current Living Arrangements  home/apartment/condo    Potentially Unsafe Housing Conditions  other (see comments) No concerns    Primary Care Provided by  self    Provides Primary Care For  no one    Family Caregiver if Needed  child(zaynab), adult    Quality of Family Relationships  helpful;involved;supportive    Able to Return to Prior Arrangements  yes       Resource/Environmental Concerns    Resource/Environmental Concerns  home accessibility    Home Accessibility Concerns  stairs to enter home    Transportation Concerns  car, none       Transition Planning    Patient/Family Anticipates Transition to  home with help/services    Patient/Family Anticipated Services at Transition  home health care    Transportation Anticipated  family or friend will provide       Discharge Needs Assessment    Readmission Within the Last 30 Days  no previous admission in last 30 days    Concerns to be Addressed  discharge planning    Equipment Currently Used at Home  none    Anticipated Changes Related to Illness  none    Equipment Needed After Discharge  none    Outpatient/Agency/Support Group Needs  homecare agency    Discharge Facility/Level of Care Needs  home with home health    Provided post acute provider list?  Yes    Post Acute Provider List  Nursing Home;Home Health    Post Acute Provider Quality & Resource List  N/A    Delivered To  Patient    Method of Delivery  In person    N/A Quality & Resource List Comment  Daughter Daphne Dasilva stated she wanted to use [facility] if HH was needed    Patient's Choice of Community Agency(s)  [facility]    Current Discharge Risk  physical  impairment        Discharge Plan     Row Name 02/11/20 1405       Plan    Plan  Discharge home with [facility]; denies needs    Patient/Family in Agreement with Plan  yes    Plan Comments  CCP met with patient at bedside. Patient's daughter Daphne Dasilva ([phone]) stayed present and answered questions with the patient's permission. CCP role explained and discharge planning discussed. Face sheet verified and IMM noted. Patient's PCP is Dr. Quinn Washington. Patient lives with her daughter Daphne Dasilva in a single level home with 3 steps at the entrance. Patient does not use DME at home and is independent with her ADLs. Patient denies past home health or subacute rehab stays. Patient plans to discharge home with home health. CCP provided HH/SNF list; patient's daughter stated they would use [facility]. CCP placed referral in [facility]. Patient confirms pharmacy is [facility] on [address] and declines meds to beds. CCP will follow to assist with discharge home with [facility]. Daniela Devine [facility]        Destination      Coordination has not been started for this encounter.      Durable Medical Equipment      Coordination has not been started for this encounter.      Dialysis/Infusion      Coordination has not been started for this encounter.      Home Medical Care      Service Provider Request Status Selected Services Address Phone Number Fax Number    [facility] [place] Pending - Request Sent N/A [phone] [address] [phone]      Therapy      Coordination has not been started for this encounter.      Community Resources      Coordination has not been started for this encounter.          Demographic Summary     Row Name 02/11/20 1401       General Information    Admission Type  inpatient    Arrived From  home    Required Notices Provided  Important Message from Medicare    Referral Source  admission list    Reason for Consult   discharge planning    Preferred Language  English     Used During This Interaction  no       Contact Information    Permission Granted to Share Info With  family/designee Daughter Daphne Dasilva [phone]        Functional Status     Row Name 02/11/20 1401       Functional Status    Usual Activity Tolerance  good    Current Activity Tolerance  good       Functional Status, IADL    Medications  independent    Meal Preparation  independent    Housekeeping  independent    Laundry  independent    Shopping  independent       Mental Status    General Appearance WDL  WDL       Mental Status Summary    Recent Changes in Mental Status/Cognitive Functioning  no changes        Psychosocial    No documentation.       Abuse/Neglect    No documentation.       Legal    No documentation.       Substance Abuse    No documentation.       Patient Forms    No documentation.           Daniela Devine

## 2020-02-11 NOTE — PROGRESS NOTES
"    Patient Name: Ange Johnson  :1940  79 y.o.      Patient Care Team:  Quinn Washington MD as PCP - General (Family Medicine)  Hugh Caruso MD as PCP - Claims Attributed  Steve Simons Jr., MD as Consulting Physician (Hematology and Oncology)  Hugh Caruso MD as Consulting Physician (Nephrology)  Adalgisa Hernandez APRN as Nurse Practitioner (Endocrinology)  Reagan Beltran MD as Consulting Physician (Endocrinology)  Aguilar Goldman MD as Consulting Physician (Pulmonary Disease)  Mil Sr MD as Consulting Physician (Ophthalmology)  Tasneem Montelongo MD as Surgeon (General Surgery)  Victor Hugo Ng MD as Consulting Physician (Cardiology)    Chief Complaint: follow up coronary artery disease    Interval History: she is feeling well. She is sitting up in the chair. She denies chest pain, dyspnea.       Objective   Vital Signs  Temp:  [98 °F (36.7 °C)-99 °F (37.2 °C)] 98.1 °F (36.7 °C)  Heart Rate:  [47-78] 48  Resp:  [16-18] 18  BP: (146-184)/(62-87) 153/64    Intake/Output Summary (Last 24 hours) at 2020 1008  Last data filed at 2020 0758  Gross per 24 hour   Intake 810 ml   Output --   Net 810 ml     Flowsheet Rows      First Filed Value   Admission Height  165.1 cm (65\") Documented at 2020 1646   Admission Weight  99.3 kg (219 lb) Documented at 2020 1646          Physical Exam:   General Appearance:    Alert, cooperative, in no acute distress   Lungs:     Clear to auscultation.  Normal respiratory effort and rate.      Heart:    Regular rhythm and normal rate, normal S1 and S2, no murmurs, gallops or rubs.     Chest Wall:    No abnormalities observed   Abdomen:     Soft, nontender, positive bowel sounds.     Extremities:   no cyanosis, clubbing or edema.  No marked joint deformities.  Adequate musculoskeletal strength.    Right radial cath site, soft, no hematoma. Pulse intact.      Results Review:    Results from last 7 days   Lab Units 20  0438   SODIUM mmol/L 139 "   POTASSIUM mmol/L 3.5   CHLORIDE mmol/L 103   CO2 mmol/L 24.2   BUN mg/dL 34*   CREATININE mg/dL 1.68*   GLUCOSE mg/dL 186*   CALCIUM mg/dL 9.0         Results from last 7 days   Lab Units 02/11/20  0438   WBC 10*3/mm3 9.55   HEMOGLOBIN g/dL 10.6*   HEMATOCRIT % 30.6*   PLATELETS 10*3/mm3 218     Results from last 7 days   Lab Units 02/11/20  0438 02/10/20  1058   INR  1.01 1.03   APTT seconds  --  33.2         Results from last 7 days   Lab Units 02/10/20  1058   CHOLESTEROL mg/dL 131   TRIGLYCERIDES mg/dL 70   HDL CHOL mg/dL 49   LDL CHOL mg/dL 68               Medication Review:     aspirin 81 mg Oral Daily   atenolol 12.5 mg Oral Daily   carBAMazepine 300 mg Oral Q12H   cetirizine 10 mg Oral Daily   cloNIDine 0.2 mg Oral Q12H   febuxostat 40 mg Oral Daily   ferrous sulfate 325 mg Oral Daily   hydrALAZINE 100 mg Oral TID   insulin glargine 100 Units Subcutaneous Nightly   insulin lispro 5 Units Subcutaneous TID With Meals   levothyroxine 175 mcg Oral Daily   linagliptin 5 mg Oral Daily   montelukast 10 mg Oral Daily   potassium chloride 10 mEq Oral Daily   repaglinide 2 mg Oral TID With Meals   senna-docusate sodium 2 tablet Oral BID          sodium chloride 100 mL/hr Last Rate: Stopped (02/10/20 1600)       Assessment/Plan   1. Multivessel coronary artery disease with critical left main stenosis - planning for bypass grafting tomorrow   2. HTN - significantly elevated despite being on 3 antihypertensives. Unable to increase beta blocker given baseline bradycardia. Normal renal artery doppler 2/3/20.  She is on a combination pill with ARB + amlodipine. Will resume amlodipine alone and follow BP.   3. Chronic kidney disease - relatively stable following angiogram. nephology is following.   4. 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 Hgb A1c 6.81. We will ask internal medicine to follow along.     TAYO Cardoza  [facility]  02/11/20  10:08 AM

## 2020-02-11 NOTE — DISCHARGE PLACEMENT REQUEST
"Kerry Johnson (79 y.o. Female)     Date of Birth Social Security Number Address Home Phone MRN    1940  [id number] [facility] [address] [phone] [id number]    Hindu Marital Status          Scientology        Admission Date Admission Type Admitting Provider Attending Provider Department, Room/Bed    2/9/20 Elective Victor Hugo Ng MD Kemp, Jamie D, MD [address] CVI, 2206/1    Discharge Date Discharge Disposition Discharge Destination                       Attending Provider:  Victor Hugo Ng MD    Allergies:  Codeine, Hydrocodone-acetaminophen, Meperidine, Morphine And Related, Oxycodone-acetaminophen, Oxycodone-acetaminophen, Oxycodone-aspirin, Nickel, Penicillins, Shrimp, Sulfa Antibiotics    Isolation:  None   Infection:  None   Code Status:  CPR    Ht:  165.1 cm (65\")   Wt:  99.3 kg (219 lb)    Admission Cmt:  None   Principal Problem:  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 pectoris (CMS/Formerly [facility]) [I25.118] More...                 Active Insurance as of 2/9/2020     Primary Coverage     Payor Plan Insurance Group Employer/Plan Group    MEDICARE MEDICARE A & B      Payor Plan Address Payor Plan Phone Number Payor Plan Fax Number Effective Dates    [address] [phone]  6/1/2003 - None Entered    [facility] [id number]       Subscriber Name Subscriber Birth Date Member ID       KERRY JOHNSON 1940 [id number]           Secondary Coverage     Payor Plan Insurance Group Employer/Plan Group    [facility] SUPP [id number]     Payor Plan Address Payor Plan Phone Number Payor Plan Fax Number Effective Dates    [address]   12/1/2016 - None Entered    [facility] [id number]       Subscriber Name Subscriber Birth Date Member ID       KERRY JOHNSON 1940 [id number]                 Emergency Contacts      (Rel.) Home Phone Work Phone Mobile Phone    Daphne Dasilva (Daughter) [phone] -- --              "

## 2020-02-11 NOTE — PROGRESS NOTES
"   LOS: 1 day    Patient Care Team:  Quinn Washington MD as PCP - General (Family Medicine)  Hugh Caruso MD as PCP - Claims Attributed  Steve Simons Jr., MD as Consulting Physician (Hematology and Oncology)  Hugh Caruso MD as Consulting Physician (Nephrology)  Adalgisa Hernandez APRN as Nurse Practitioner (Endocrinology)  Reagan Beltran MD as Consulting Physician (Endocrinology)  Aguilar Goldman MD as Consulting Physician (Pulmonary Disease)  Mil Sr MD as Consulting Physician (Ophthalmology)  Tasneem Montelongo MD as Surgeon (General Surgery)  Victor Hugo Ng MD as Consulting Physician (Cardiology)    Chief Complaint:  No chief complaint on file.    Follow UP CKD3  Subjective     Interval History:     Feels fine. Not soa. Eating. Urinating. Bowels moving. Not weighed today. Not soa.     Review of Systems:   See above.    Objective     Vital Signs  Temp:  [98.1 °F (36.7 °C)-99 °F (37.2 °C)] 98.1 °F (36.7 °C)  Heart Rate:  [47-78] 48  Resp:  [16-18] 18  BP: (146-184)/(62-87) 153/64    Flowsheet Rows      First Filed Value   Admission Height  165.1 cm (65\") Documented at 02/09/2020 1646   Admission Weight  99.3 kg (219 lb) Documented at 02/09/2020 1646          I/O this shift:  In: 420 [P.O.:420]  Out: -   I/O last 3 completed shifts:  In: 840 [P.O.:840]  Out: -     Intake/Output Summary (Last 24 hours) at 2/11/2020 1443  Last data filed at 2/11/2020 1158  Gross per 24 hour   Intake 1020 ml   Output --   Net 1020 ml       Physical Exam:  Elderly female. Sitting in chair.  Conversant  Oral mucosa moist  Neck no jvd  Heart RRR no s3 or rub  Lungs clear to auscultation  Abd + bs soft, nontender  Ext no edema  SKin no rash  Neuro oriented x 3, moves all 4 ext.       Results Review:    Results from last 7 days   Lab Units 02/11/20  0438 02/10/20  1058 02/10/20  0443   SODIUM mmol/L 139 140 140   POTASSIUM mmol/L 3.5 3.5 3.7   CHLORIDE mmol/L 103 106 103   CO2 mmol/L 24.2 23.0 25.1   BUN mg/dL 34* 31* 34* "   CREATININE mg/dL 1.68* 1.60* 1.85*   CALCIUM mg/dL 9.0 7.6* 8.4*   BILIRUBIN mg/dL <0.2* 0.2 <0.2*   ALK PHOS U/L 73 67 68   ALT (SGPT) U/L 11 13 10   AST (SGOT) U/L 11 10 9   GLUCOSE mg/dL 186* 83 185*       Estimated Creatinine Clearance: 31.2 mL/min (A) (by C-G formula based on SCr of 1.68 mg/dL (H)).                Results from last 7 days   Lab Units 02/11/20  0438 02/10/20  1058 02/09/20  1715   WBC 10*3/mm3 9.55 8.73 10.19   HEMOGLOBIN g/dL 10.6* 10.0* 11.3*   PLATELETS 10*3/mm3 218 184 240       Results from last 7 days   Lab Units 02/11/20  0438 02/10/20  1058   INR  1.01 1.03         Imaging Results (Last 24 Hours)     ** No results found for the last 24 hours. **          amLODIPine 5 mg Oral Q24H   aspirin 81 mg Oral Daily   atenolol 12.5 mg Oral Daily   carBAMazepine 300 mg Oral Q12H   cetirizine 10 mg Oral Daily   chlorhexidine 15 mL Mouth/Throat Q12H   Chlorhexidine Gluconate Cloth 1 application Topical Q12H   cloNIDine 0.2 mg Oral Q12H   febuxostat 40 mg Oral Daily   ferrous sulfate 325 mg Oral Daily   hydrALAZINE 100 mg Oral TID   insulin glargine 100 Units Subcutaneous Nightly   insulin lispro 5 Units Subcutaneous TID With Meals   levothyroxine 175 mcg Oral Daily   linagliptin 5 mg Oral Daily   [START ON 2/12/2020] metoprolol tartrate 12.5 mg Oral On Call to OR   montelukast 10 mg Oral Daily   mupirocin 1 application Each Nare Q12H   potassium chloride 10 mEq Oral Daily   repaglinide 2 mg Oral TID With Meals   senna-docusate sodium 2 tablet Oral BID   [START ON 2/12/2020] vancomycin 15 mg/kg Intravenous On Call to OR       sodium chloride 100 mL/hr Last Rate: Stopped (02/10/20 1600)       Medication Review:   Current Facility-Administered Medications   Medication Dose Route Frequency Provider Last Rate Last Dose   • acetaminophen (TYLENOL) tablet 650 mg  650 mg Oral Q4H PRN Licandro, Emerson F, MD   650 mg at 02/11/20 0116   • amLODIPine (NORVASC) tablet 5 mg  5 mg Oral Q24H Sonia Daugherty,  APRN       • aspirin chewable tablet 81 mg  81 mg Oral Daily Emerson Deras MD   81 mg at 02/11/20 0952   • atenolol (TENORMIN) tablet 12.5 mg  12.5 mg Oral Daily Emerson Deras MD   12.5 mg at 02/11/20 0952   • senna-docusate sodium (SENOKOT-S) 8.6-50 MG tablet 2 tablet  2 tablet Oral BID Emerson Deras MD   Stopped at 02/11/20 0957    And   • polyethylene glycol 3350 powder (packet)  17 g Oral Daily PRN Emerson Deras MD        And   • bisacodyl (DULCOLAX) EC tablet 5 mg  5 mg Oral Daily PRN Emerson Deras MD        And   • bisacodyl (DULCOLAX) suppository 10 mg  10 mg Rectal Daily PRN Emerson Deras MD       • carBAMazepine (CARBATROL) 12 hr capsule 300 mg  300 mg Oral Q12H Emerson Deras MD   300 mg at 02/11/20 0953   • cetirizine (zyrTEC) tablet 10 mg  10 mg Oral Daily Emerson Deras MD   10 mg at 02/11/20 0953   • chlorhexidine (PERIDEX) 0.12 % solution 15 mL  15 mL Mouth/Throat Q12H Miladys Ramirez APRN       • Chlorhexidine Gluconate Cloth 2 % pads 1 application  1 application Topical Q12H Miladys Ramirez APRN       • cloNIDine (CATAPRES) tablet 0.2 mg  0.2 mg Oral Q12H Emerson Deras MD   0.2 mg at 02/11/20 0953   • diphenhydrAMINE (BENADRYL) capsule 25 mg  25 mg Oral Nightly PRN Emerson Deras MD   25 mg at 02/10/20 0014   • febuxostat (ULORIC) tablet 40 mg  40 mg Oral Daily Emerson Deras MD   40 mg at 02/11/20 0951   • ferrous sulfate tablet 325 mg  325 mg Oral Daily Emerson Deras MD   325 mg at 02/11/20 0952   • hydrALAZINE (APRESOLINE) tablet 100 mg  100 mg Oral TID Emerson Deras MD   100 mg at 02/11/20 0953   • insulin glargine (LANTUS) injection 100 Units  100 Units Subcutaneous Nightly Sonia Daugherty APRN   50 Units at 02/11/20 0030   • insulin lispro (humaLOG) injection 5 Units  5 Units Subcutaneous TID With Meals Emerson Deras MD   5 Units at 02/11/20 0708   • levothyroxine (SYNTHROID, LEVOTHROID) tablet  175 mcg  175 mcg Oral Daily Emerson Deras MD   175 mcg at 02/11/20 0957   • linagliptin (TRADJENTA) tablet 5 mg  5 mg Oral Daily Emerson Deras MD   5 mg at 02/11/20 0953   • metoprolol tartrate (LOPRESSOR) injection 5 mg  5 mg Intravenous Q6H PRN Sonia Daugherty APRN   5 mg at 02/11/20 0458   • [START ON 2/12/2020] metoprolol tartrate (LOPRESSOR) tablet 12.5 mg  12.5 mg Oral On Call to OR Miladys Ramirez APRN       • montelukast (SINGULAIR) tablet 10 mg  10 mg Oral Daily Emerson Deras MD   10 mg at 02/11/20 0951   • mupirocin (BACTROBAN) 2 % nasal ointment 1 application  1 application Each Nare Q12H Miladys Ramirez APRN       • nitroglycerin (NITROSTAT) SL tablet 0.4 mg  0.4 mg Sublingual Q5 Min PRN Emerson Deras MD       • ondansetron (ZOFRAN) injection 4 mg  4 mg Intravenous Q6H PRN Emerson Deras MD       • potassium chloride (MICRO-K) CR capsule 10 mEq  10 mEq Oral Daily Emerson Deras MD   10 mEq at 02/11/20 0953   • repaglinide (PRANDIN) tablet 2 mg  2 mg Oral TID With Meals Emerson Deras MD   2 mg at 02/11/20 1247   • sodium chloride 0.9 % flush 10 mL  10 mL Intravenous PRN Emerson Deras MD       • sodium chloride 0.9 % infusion  100 mL/hr Intravenous Continuous Emerson Deras MD   Stopped at 02/10/20 1600   • [START ON 2/12/2020] vancomycin 1500 mg/500 mL 0.9% NS IVPB ([facility])  15 mg/kg Intravenous On Call to OR Miladys Ramirez APRN           Assessment/Plan   1. CKD 3.  Stable after cardiac cath yesterday.  Plan for OR tomorrow. Euvolemic.   2. CAD. Critical left main stenosis, LAD disease 90%. CABG tomorrow. Diastolic heart failure. Compensated.   3. DM2  4. HTN.  Norvasc added back this am.  Off ARB.  Bradycardic on combination of beta blocker and clonidine .  5. Hypothyroid on replacement .   DW daughter and patient.     Christine Hernandez MD  02/11/20  2:43 PM

## 2020-02-11 NOTE — CONSULTS
Internal medicine consult    Referring physician  Dr. Espinal    Chief complaint  Coronary artery disease    Reason for consult  Follow medical problem specially diabetes mellitus    History of present illness  79-year-old white female with history of diabetes mellitus hypertension hyperlipidemia and chronic kidney disease stage III admitted to cardiology service with chest discomfort shortness of breath and abnormal EKG and she has been work-up with a stress Cardiolite followed by cardiac catheterization which revealed severe two-vessel coronary artery disease including left main stenosis and further evaluated by cardiothoracic surgery and going for CABG in the morning and I am asked to follow the patient for medical problem specially diabetes as it has been poorly controlled.  Patient awake and alert no specific complaint family at bedside.  Patient does have polyuria polydipsia but denies any fever chills cough nausea vomiting diarrhea.       Past Medical History:   Diagnosis Date   • Anemia in stage 3 chronic kidney disease (CMS/HCC) 2016   • Arthritis     • Asthma     • Bone spur of acromioclavicular joint     • Carotid atherosclerosis       <50% bilaterally   • Chronic venous insufficiency     • CKD (chronic kidney disease) stage 3, GFR 30-59 ml/min (CMS/[facility])     • Essential hypertension     • Gout     • Grief reaction         2010 after 15 foot fall off ladder   • H/O cataract     • Heel spur     • History of tendinitis     • Hyperlipidemia     • Hyperparathyroidism (CMS/[facility])     • Hyperthyroidism     • Hypothyroidism, acquired     • Non-toxic goiter     • Pneumonia     • Proteinuria     • Type 2 diabetes mellitus with neurological manifestations (CMS/[facility])     • Vitamin D deficiency           Surgical History         Past Surgical History:   Procedure Laterality Date   • BREAST EXCISIONAL BIOPSY Left      Dr. Ybarra benign   • BREAST EXCISIONAL BIOPSY Bilateral      benign   •  "CATARACT EXTRACTION Left 02/03/2010   • CATARACT EXTRACTION Right 04/21/2010   • HYSTERECTOMY   1988     Dr. Quinn Peña   • OOPHORECTOMY         late 40's   • THYROIDECTOMY Right 12/8/2016     Procedure: right PARATHYROID EXCISION OF NODULE and right thyroid lobectomy and bilateral exploration.;  Surgeon: Tasneem Montelongo MD;  Location: [facility] OR [facility] [place];  Service:                Family History   Problem Relation Age of Onset   • Diabetes Sister     • Diabetes Brother     • Hypertension Brother     • Kidney disease Brother     • Cervical cancer Mother     • Heart disease Sister     • Neuropathy Sister     • Diabetes Sister        Social History           Tobacco Use   • Smoking status: Never Smoker   • Smokeless tobacco: Never Used   Substance Use Topics   • Alcohol use: No   • Drug use: No      Allergies:  Codeine; Hydrocodone-acetaminophen; Meperidine; Morphine and related; Oxycodone-acetaminophen; Oxycodone-acetaminophen; Oxycodone-aspirin; Nickel; Penicillins; Shrimp; and Sulfa antibiotics  Home medications reviewed    Review of Systems  Pertinent items are noted in HPI.    Physical Exam:              ..Blood pressure 148/64, pulse (!) 45, temperature 97.6 °F (36.4 °C), temperature source Oral, resp. rate 16, height 165.1 cm (65\"), weight 99.3 kg (219 lb), SpO2 100 %, not currently breastfeeding.    General Appearance:    Alert, cooperative, in no acute distress   Head:    Normocephalic, without obvious abnormality, atraumatic   Eyes:            Lids and lashes normal, conjunctivae and sclerae normal, no   icterus, no pallor, corneas clear, PERRLA   Ears:    Ears appear intact with no abnormalities noted   Throat:   No oral lesions, no thrush, oral mucosa moist   Neck:   No adenopathy, supple, trachea midline, no thyromegaly, no     carotid bruit, no JVD   Back:     No kyphosis present, no scoliosis present, no skin lesions,       erythema or scars, no tenderness to percussion or                   palpation,   range " of motion normal   Lungs:     Clear to auscultation,respirations regular, even and                   unlabored    Heart:    Regular rhythm and normal rate, normal S1 and S2, no            murmur, no gallop, no rub, no click   Breast Exam:    Deferred   Abdomen:     Normal bowel sounds, no masses, no organomegaly, soft        non-tender, non-distended, no guarding, no rebound                 tenderness   Genitalia:    Deferred   Extremities:   Moves all extremities well, + edema, no cyanosis, no              redness   Pulses:   Pulses palpable and equal bilaterally   Skin:   No bleeding, bruising or rash   Lymph nodes:   No palpable adenopathy   Neurologic:   Cranial nerves 2 - 12 grossly intact, sensation intact, DTR        present and equal bilaterally      LABS  Lab Results (last 24 hours)     Procedure Component Value Units Date/Time    POC Glucose Once [[id number]]  (Abnormal) Collected:  02/11/20 1540    Specimen:  Blood Updated:  02/11/20 1541     Glucose 196 mg/dL     POC Glucose Once [[id number]]  (Normal) Collected:  02/11/20 1035    Specimen:  Blood Updated:  02/11/20 1037     Glucose 121 mg/dL     POC Glucose Once [[id number]]  (Abnormal) Collected:  02/11/20 0553    Specimen:  Blood Updated:  02/11/20 0555     Glucose 167 mg/dL     Comprehensive Metabolic Panel [[id number]]  (Abnormal) Collected:  02/11/20 0438    Specimen:  Blood Updated:  02/11/20 0532     Glucose 186 mg/dL      BUN 34 mg/dL      Creatinine 1.68 mg/dL      Sodium 139 mmol/L      Potassium 3.5 mmol/L      Chloride 103 mmol/L      CO2 24.2 mmol/L      Calcium 9.0 mg/dL      Total Protein 6.1 g/dL      Albumin 3.30 g/dL      ALT (SGPT) 11 U/L      AST (SGOT) 11 U/L      Alkaline Phosphatase 73 U/L      Total Bilirubin <0.2 mg/dL      eGFR Non African Amer 29 mL/min/1.73      Globulin 2.8 gm/dL      A/G Ratio 1.2 g/dL      BUN/Creatinine Ratio 20.2     Anion Gap 11.8 mmol/L     Narrative:       GFR Normal >60  Chronic Kidney Disease  <60  Kidney Failure <15      Protime-INR [[id number]]  (Normal) Collected:  02/11/20 0438    Specimen:  Blood Updated:  02/11/20 0524     Protime 13.0 Seconds      INR 1.01    CBC (No Diff) [[id number]]  (Abnormal) Collected:  02/11/20 0438    Specimen:  Blood Updated:  02/11/20 0514     WBC 9.55 10*3/mm3      RBC 3.28 10*6/mm3      Hemoglobin 10.6 g/dL      Hematocrit 30.6 %      MCV 93.3 fL      MCH 32.3 pg      MCHC 34.6 g/dL      RDW 11.9 %      RDW-SD 40.7 fl      MPV 10.2 fL      Platelets 218 10*3/mm3     POC Glucose Once [[id number]]  (Abnormal) Collected:  02/10/20 2358    Specimen:  Blood Updated:  02/11/20 0000     Glucose 275 mg/dL     Urinalysis, Microscopic Only - Urine, Clean Catch [[id number]]  (Abnormal) Collected:  02/10/20 2242    Specimen:  Urine, Clean Catch Updated:  02/10/20 2317     RBC, UA 0-2 /HPF      WBC, UA 6-12 /HPF      Bacteria, UA None Seen /HPF      Squamous Epithelial Cells, UA 0-2 /HPF      Hyaline Casts, UA 0-2 /LPF      Methodology Automated Microscopy    Urine Culture - Urine, Urine, Clean Catch [[id number]] Collected:  02/10/20 2242    Specimen:  Urine, Clean Catch Updated:  02/10/20 2317    Urinalysis With Culture If Indicated - Urine, Clean Catch [[id number]]  (Abnormal) Collected:  02/10/20 2242    Specimen:  Urine, Clean Catch Updated:  02/10/20 2316     Color, UA Yellow     Appearance, UA Clear     pH, UA 7.0     Specific Gravity, UA 1.019     Glucose, UA >=1000 mg/dL (3+)     Ketones, UA Negative     Bilirubin, UA Negative     Blood, UA Negative     Protein, UA >=300 mg/dL (3+)     Leuk Esterase, UA Negative     Nitrite, UA Negative     Urobilinogen, UA 0.2 E.U./dL    POC Glucose Once [[id number]]  (Abnormal) Collected:  02/10/20 2049    Specimen:  Blood Updated:  02/10/20 2050     Glucose 332 mg/dL     POC Glucose Once [[id number]]  (Abnormal) Collected:  02/10/20 1742    Specimen:  Blood Updated:  02/10/20 1743     Glucose 242 mg/dL         Imaging Results (Last 24 Hours)      ** No results found for the last 24 hours. **        ECG 12 Lead        HEART RATE= 64  bpm  RR Interval= 940  ms  MT Interval= 193  ms  P Horizontal Axis=   deg  P Front Axis= 47  deg  QRSD Interval= 106  ms  QT Interval= 422  ms  QRS Axis= 55  deg  T Wave Axis= 162  deg  - ABNORMAL ECG -  Sinus rhythm  Non-specific STT wave change  NO PRIOR TRACING AVAILABLE FOR COMPARISON             Current Facility-Administered Medications:   •  acetaminophen (TYLENOL) tablet 650 mg, 650 mg, Oral, Q4H PRN, Emerson Deras MD, 650 mg at 02/11/20 0116  •  amLODIPine (NORVASC) tablet 5 mg, 5 mg, Oral, Q24H, Sonia Daugherty APRN  •  aspirin chewable tablet 81 mg, 81 mg, Oral, Daily, Emerson Deras MD, 81 mg at 02/11/20 0952  •  atenolol (TENORMIN) tablet 12.5 mg, 12.5 mg, Oral, Daily, Emerson Deras MD, 12.5 mg at 02/11/20 0952  •  senna-docusate sodium (SENOKOT-S) 8.6-50 MG tablet 2 tablet, 2 tablet, Oral, BID, Stopped at 02/11/20 0957 **AND** polyethylene glycol 3350 powder (packet), 17 g, Oral, Daily PRN **AND** bisacodyl (DULCOLAX) EC tablet 5 mg, 5 mg, Oral, Daily PRN **AND** bisacodyl (DULCOLAX) suppository 10 mg, 10 mg, Rectal, Daily PRN, Emerson Deras MD  •  carBAMazepine (CARBATROL) 12 hr capsule 300 mg, 300 mg, Oral, Q12H, Emerson Deras MD, 300 mg at 02/11/20 0953  •  cetirizine (zyrTEC) tablet 10 mg, 10 mg, Oral, Daily, Emerson Deras MD, 10 mg at 02/11/20 0953  •  chlorhexidine (PERIDEX) 0.12 % solution 15 mL, 15 mL, Mouth/Throat, Q12H, Miladys Ramirez APRN  •  Chlorhexidine Gluconate Cloth 2 % pads 1 application, 1 application, Topical, Q12H, Miladys Ramirez APRN  •  cloNIDine (CATAPRES) tablet 0.2 mg, 0.2 mg, Oral, Q12H, Emerson Deras MD, 0.2 mg at 02/11/20 0953  •  diphenhydrAMINE (BENADRYL) capsule 25 mg, 25 mg, Oral, Nightly PRN, Emerson Deras MD, 25 mg at 02/10/20 0014  •  febuxostat (ULORIC) tablet 40 mg, 40 mg, Oral, Daily, Emerson Deras MD, 40 mg  at 02/11/20 0951  •  ferrous sulfate tablet 325 mg, 325 mg, Oral, Daily, Emerson Deras MD, 325 mg at 02/11/20 0952  •  hydrALAZINE (APRESOLINE) tablet 100 mg, 100 mg, Oral, TID, Emerson Deras MD, 100 mg at 02/11/20 0953  •  insulin glargine (LANTUS) injection 100 Units, 100 Units, Subcutaneous, Nightly, Sonia Daugherty APRN, 50 Units at 02/11/20 0030  •  insulin lispro (humaLOG) injection 5 Units, 5 Units, Subcutaneous, TID With Meals, Emerson Deras MD, 5 Units at 02/11/20 0708  •  levothyroxine (SYNTHROID, LEVOTHROID) tablet 175 mcg, 175 mcg, Oral, Daily, Emerson Deras MD, 175 mcg at 02/11/20 0957  •  linagliptin (TRADJENTA) tablet 5 mg, 5 mg, Oral, Daily, Emerson Deras MD, 5 mg at 02/11/20 0953  •  metoprolol tartrate (LOPRESSOR) injection 5 mg, 5 mg, Intravenous, Q6H PRN, Sonia Daugherty APRN, 5 mg at 02/11/20 0458  •  [START ON 2/12/2020] metoprolol tartrate (LOPRESSOR) tablet 12.5 mg, 12.5 mg, Oral, On Call to OR, Miladys Ramirez APRN  •  montelukast (SINGULAIR) tablet 10 mg, 10 mg, Oral, Daily, Emerson Deras MD, 10 mg at 02/11/20 0951  •  mupirocin (BACTROBAN) 2 % nasal ointment 1 application, 1 application, Each Nare, Q12H, Miladys Ramirez APRN  •  nitroglycerin (NITROSTAT) SL tablet 0.4 mg, 0.4 mg, Sublingual, Q5 Min PRN, Emerson Deras MD  •  ondansetron (ZOFRAN) injection 4 mg, 4 mg, Intravenous, Q6H PRN, Emerson Deras MD  •  potassium chloride (MICRO-K) CR capsule 10 mEq, 10 mEq, Oral, Daily, Emerson Deras MD, 10 mEq at 02/11/20 0953  •  repaglinide (PRANDIN) tablet 2 mg, 2 mg, Oral, TID With Meals, Emerson Deras MD, 2 mg at 02/11/20 1247  •  sodium chloride 0.9 % flush 10 mL, 10 mL, Intravenous, PRN, Emerson Deras MD  •  [START ON 2/12/2020] vancomycin 1500 mg/500 mL 0.9% NS IVPB ([facility]), 15 mg/kg, Intravenous, On Call to OR, Miladys Ramirez APRN     ASSESSMENT  Severe coronary artery disease going for CABG in a.m.  Poor  control diabetes mellitus  Hypertension  Hyperlipidemia  Chronic kidney disease stage III  Chronic anemia  Gastroesophageal reflux disease    PLAN  Agree with current care  Will adjust insulin dose  Continue sliding scale insulin  Diabetic education  Check hemoglobin A1c TSH lipid profile  Repeat labs in the morning  Stress ulcer DVT prophylaxis  Supportive care  Discussed with family  We will follow with Dr. Ng further recommendation current hospital course    JANE DEMARCO MD

## 2020-02-12 ENCOUNTER — ANCILLARY PROCEDURE (OUTPATIENT)
Dept: PERIOP | Facility: HOSPITAL | Age: 80
End: 2020-02-12

## 2020-02-12 ENCOUNTER — APPOINTMENT (OUTPATIENT)
Dept: GENERAL RADIOLOGY | Facility: HOSPITAL | Age: 80
End: 2020-02-12

## 2020-02-12 ENCOUNTER — ANESTHESIA (OUTPATIENT)
Dept: PERIOP | Facility: HOSPITAL | Age: 80
End: 2020-02-12

## 2020-02-12 LAB
ACT BLD: 120 SECONDS (ref 82–152)
ACT BLD: 345 SECONDS (ref 82–152)
ACT BLD: 367 SECONDS (ref 82–152)
ACT BLD: 417 SECONDS (ref 82–152)
ACT BLD: 98 SECONDS (ref 82–152)
ALBUMIN SERPL-MCNC: 3.2 G/DL (ref 3.5–5.2)
ALBUMIN SERPL-MCNC: 3.2 G/DL (ref 3.5–5.2)
ALBUMIN SERPL-MCNC: 3.7 G/DL (ref 3.5–5.2)
ALBUMIN/GLOB SERPL: 1.2 G/DL
ALP SERPL-CCNC: 66 U/L (ref 39–117)
ALT SERPL W P-5'-P-CCNC: 11 U/L (ref 1–33)
ANION GAP SERPL CALCULATED.3IONS-SCNC: 11.2 MMOL/L (ref 5–15)
ANION GAP SERPL CALCULATED.3IONS-SCNC: 11.5 MMOL/L (ref 5–15)
ANION GAP SERPL CALCULATED.3IONS-SCNC: 12.3 MMOL/L (ref 5–15)
APTT PPP: 26.4 SECONDS (ref 22.7–35.4)
ARTERIAL PATENCY WRIST A: POSITIVE
ARTERIAL PATENCY WRIST A: POSITIVE
AST SERPL-CCNC: 10 U/L (ref 1–32)
ATMOSPHERIC PRESS: 746.2 MMHG
ATMOSPHERIC PRESS: 750.6 MMHG
BACTERIA SPEC AEROBE CULT: NORMAL
BASE EXCESS BLDA CALC-SCNC: -1.6 MMOL/L (ref 0–2)
BASE EXCESS BLDA CALC-SCNC: -1.7 MMOL/L (ref 0–2)
BASOPHILS # BLD AUTO: 0.05 10*3/MM3 (ref 0–0.2)
BASOPHILS # BLD AUTO: 0.06 10*3/MM3 (ref 0–0.2)
BASOPHILS NFR BLD AUTO: 0.2 % (ref 0–1.5)
BASOPHILS NFR BLD AUTO: 0.6 % (ref 0–1.5)
BDY SITE: ABNORMAL
BDY SITE: ABNORMAL
BILIRUB SERPL-MCNC: <0.2 MG/DL (ref 0.2–1.2)
BUN BLD-MCNC: 34 MG/DL (ref 8–23)
BUN BLD-MCNC: 34 MG/DL (ref 8–23)
BUN BLD-MCNC: 40 MG/DL (ref 8–23)
BUN/CREAT SERPL: 19.1 (ref 7–25)
BUN/CREAT SERPL: 19.5 (ref 7–25)
BUN/CREAT SERPL: 21.9 (ref 7–25)
CA-I BLD-MCNC: 5 MG/DL (ref 4.6–5.4)
CA-I SERPL ISE-MCNC: 1.25 MMOL/L (ref 1.15–1.35)
CALCIUM SPEC-SCNC: 7.8 MG/DL (ref 8.6–10.5)
CALCIUM SPEC-SCNC: 7.9 MG/DL (ref 8.6–10.5)
CALCIUM SPEC-SCNC: 8.7 MG/DL (ref 8.6–10.5)
CHLORIDE SERPL-SCNC: 103 MMOL/L (ref 98–107)
CHLORIDE SERPL-SCNC: 104 MMOL/L (ref 98–107)
CHLORIDE SERPL-SCNC: 106 MMOL/L (ref 98–107)
CHOLEST SERPL-MCNC: 153 MG/DL (ref 0–200)
CO2 SERPL-SCNC: 20.8 MMOL/L (ref 22–29)
CO2 SERPL-SCNC: 22.5 MMOL/L (ref 22–29)
CO2 SERPL-SCNC: 25.7 MMOL/L (ref 22–29)
CREAT BLD-MCNC: 1.74 MG/DL (ref 0.57–1)
CREAT BLD-MCNC: 1.78 MG/DL (ref 0.57–1)
CREAT BLD-MCNC: 1.83 MG/DL (ref 0.57–1)
DEPRECATED RDW RBC AUTO: 40.6 FL (ref 37–54)
DEPRECATED RDW RBC AUTO: 41 FL (ref 37–54)
DEPRECATED RDW RBC AUTO: 44.1 FL (ref 37–54)
EOSINOPHIL # BLD AUTO: 0.1 10*3/MM3 (ref 0–0.4)
EOSINOPHIL # BLD AUTO: 0.12 10*3/MM3 (ref 0–0.4)
EOSINOPHIL NFR BLD AUTO: 0.4 % (ref 0.3–6.2)
EOSINOPHIL NFR BLD AUTO: 1.2 % (ref 0.3–6.2)
ERYTHROCYTE [DISTWIDTH] IN BLOOD BY AUTOMATED COUNT: 12 % (ref 12.3–15.4)
ERYTHROCYTE [DISTWIDTH] IN BLOOD BY AUTOMATED COUNT: 12.1 % (ref 12.3–15.4)
ERYTHROCYTE [DISTWIDTH] IN BLOOD BY AUTOMATED COUNT: 12.4 % (ref 12.3–15.4)
FIBRINOGEN PPP-MCNC: 325 MG/DL (ref 219–464)
GFR SERPL CREATININE-BSD FRML MDRD: 27 ML/MIN/1.73
GFR SERPL CREATININE-BSD FRML MDRD: 27 ML/MIN/1.73
GFR SERPL CREATININE-BSD FRML MDRD: 28 ML/MIN/1.73
GLOBULIN UR ELPH-MCNC: 2.6 GM/DL
GLUCOSE BLD-MCNC: 104 MG/DL (ref 65–99)
GLUCOSE BLD-MCNC: 147 MG/DL (ref 65–99)
GLUCOSE BLD-MCNC: 63 MG/DL (ref 65–99)
GLUCOSE BLDC GLUCOMTR-MCNC: 111 MG/DL (ref 70–130)
GLUCOSE BLDC GLUCOMTR-MCNC: 112 MG/DL (ref 70–130)
GLUCOSE BLDC GLUCOMTR-MCNC: 112 MG/DL (ref 70–130)
GLUCOSE BLDC GLUCOMTR-MCNC: 113 MG/DL (ref 70–130)
GLUCOSE BLDC GLUCOMTR-MCNC: 135 MG/DL (ref 70–130)
GLUCOSE BLDC GLUCOMTR-MCNC: 148 MG/DL (ref 70–130)
GLUCOSE BLDC GLUCOMTR-MCNC: 148 MG/DL (ref 70–130)
GLUCOSE BLDC GLUCOMTR-MCNC: 150 MG/DL (ref 70–130)
GLUCOSE BLDC GLUCOMTR-MCNC: 67 MG/DL (ref 70–130)
GLUCOSE BLDC GLUCOMTR-MCNC: 88 MG/DL (ref 70–130)
HCO3 BLDA-SCNC: 24.1 MMOL/L (ref 22–28)
HCO3 BLDA-SCNC: 24.4 MMOL/L (ref 22–28)
HCT VFR BLD AUTO: 24.4 % (ref 34–46.6)
HCT VFR BLD AUTO: 27 % (ref 34–46.6)
HCT VFR BLD AUTO: 29.7 % (ref 34–46.6)
HDLC SERPL-MCNC: 55 MG/DL (ref 40–60)
HGB BLD-MCNC: 8.1 G/DL (ref 12–15.9)
HGB BLD-MCNC: 9.3 G/DL (ref 12–15.9)
HGB BLD-MCNC: 9.9 G/DL (ref 12–15.9)
HOROWITZ INDEX BLD+IHG-RTO: 100 %
HOROWITZ INDEX BLD+IHG-RTO: 30 %
IMM GRANULOCYTES # BLD AUTO: 0.04 10*3/MM3 (ref 0–0.05)
IMM GRANULOCYTES # BLD AUTO: 0.15 10*3/MM3 (ref 0–0.05)
IMM GRANULOCYTES NFR BLD AUTO: 0.4 % (ref 0–0.5)
IMM GRANULOCYTES NFR BLD AUTO: 0.6 % (ref 0–0.5)
INR PPP: 1.27 (ref 0.9–1.1)
LDLC SERPL CALC-MCNC: 71 MG/DL (ref 0–100)
LDLC/HDLC SERPL: 1.28 {RATIO}
LYMPHOCYTES # BLD AUTO: 1.39 10*3/MM3 (ref 0.7–3.1)
LYMPHOCYTES # BLD AUTO: 2.28 10*3/MM3 (ref 0.7–3.1)
LYMPHOCYTES NFR BLD AUTO: 23.1 % (ref 19.6–45.3)
LYMPHOCYTES NFR BLD AUTO: 5.9 % (ref 19.6–45.3)
MAGNESIUM SERPL-MCNC: 1.9 MG/DL (ref 1.6–2.4)
MAGNESIUM SERPL-MCNC: 1.9 MG/DL (ref 1.6–2.4)
MCH RBC QN AUTO: 30.9 PG (ref 26.6–33)
MCH RBC QN AUTO: 32.1 PG (ref 26.6–33)
MCH RBC QN AUTO: 32.2 PG (ref 26.6–33)
MCHC RBC AUTO-ENTMCNC: 33.2 G/DL (ref 31.5–35.7)
MCHC RBC AUTO-ENTMCNC: 33.3 G/DL (ref 31.5–35.7)
MCHC RBC AUTO-ENTMCNC: 34.4 G/DL (ref 31.5–35.7)
MCV RBC AUTO: 92.8 FL (ref 79–97)
MCV RBC AUTO: 93.4 FL (ref 79–97)
MCV RBC AUTO: 96.8 FL (ref 79–97)
MODALITY: ABNORMAL
MODALITY: ABNORMAL
MONOCYTES # BLD AUTO: 0.97 10*3/MM3 (ref 0.1–0.9)
MONOCYTES # BLD AUTO: 1.59 10*3/MM3 (ref 0.1–0.9)
MONOCYTES NFR BLD AUTO: 6.7 % (ref 5–12)
MONOCYTES NFR BLD AUTO: 9.8 % (ref 5–12)
NEUTROPHILS # BLD AUTO: 20.32 10*3/MM3 (ref 1.7–7)
NEUTROPHILS # BLD AUTO: 6.38 10*3/MM3 (ref 1.7–7)
NEUTROPHILS NFR BLD AUTO: 64.9 % (ref 42.7–76)
NEUTROPHILS NFR BLD AUTO: 86.2 % (ref 42.7–76)
NRBC BLD AUTO-RTO: 0 /100 WBC (ref 0–0.2)
NRBC BLD AUTO-RTO: 0 /100 WBC (ref 0–0.2)
NT-PROBNP SERPL-MCNC: 1218 PG/ML (ref 5–1800)
O2 A-A PPRESDIFF RESPIRATORY: 0.6 MMHG
O2 A-A PPRESDIFF RESPIRATORY: 0.6 MMHG
PCO2 BLDA: 44.8 MM HG (ref 35–45)
PCO2 BLDA: 46.8 MM HG (ref 35–45)
PEEP RESPIRATORY: 5 CM[H2O]
PEEP RESPIRATORY: 7.5 CM[H2O]
PH BLDA: 7.33 PH UNITS (ref 7.35–7.45)
PH BLDA: 7.34 PH UNITS (ref 7.35–7.45)
PHOSPHATE SERPL-MCNC: 3.3 MG/DL (ref 2.5–4.5)
PHOSPHATE SERPL-MCNC: 3.5 MG/DL (ref 2.5–4.5)
PHOSPHATE SERPL-MCNC: 3.7 MG/DL (ref 2.5–4.5)
PLATELET # BLD AUTO: 108 10*3/MM3 (ref 140–450)
PLATELET # BLD AUTO: 137 10*3/MM3 (ref 140–450)
PLATELET # BLD AUTO: 208 10*3/MM3 (ref 140–450)
PMV BLD AUTO: 9.6 FL (ref 6–12)
PMV BLD AUTO: 9.8 FL (ref 6–12)
PMV BLD AUTO: 9.9 FL (ref 6–12)
PO2 BLDA: 103.1 MM HG (ref 80–100)
PO2 BLDA: 410.8 MM HG (ref 80–100)
POTASSIUM BLD-SCNC: 3.5 MMOL/L (ref 3.5–5.2)
POTASSIUM BLD-SCNC: 3.8 MMOL/L (ref 3.5–5.2)
POTASSIUM BLD-SCNC: 3.9 MMOL/L (ref 3.5–5.2)
PROT SERPL-MCNC: 5.8 G/DL (ref 6–8.5)
PROTHROMBIN TIME: 15.6 SECONDS (ref 11.7–14.2)
PSV: 7 CMH2O
RBC # BLD AUTO: 2.52 10*6/MM3 (ref 3.77–5.28)
RBC # BLD AUTO: 2.89 10*6/MM3 (ref 3.77–5.28)
RBC # BLD AUTO: 3.2 10*6/MM3 (ref 3.77–5.28)
SAO2 % BLDCOA: 100 % (ref 92–99)
SAO2 % BLDCOA: 97.4 % (ref 92–99)
SET MECH RESP RATE: 20
SODIUM BLD-SCNC: 135 MMOL/L (ref 136–145)
SODIUM BLD-SCNC: 140 MMOL/L (ref 136–145)
SODIUM BLD-SCNC: 142 MMOL/L (ref 136–145)
TOTAL RATE: 20 BREATHS/MINUTE
TOTAL RATE: 21 BREATHS/MINUTE
TRIGL SERPL-MCNC: 137 MG/DL (ref 0–150)
TSH SERPL DL<=0.05 MIU/L-ACNC: 2.35 UIU/ML (ref 0.27–4.2)
VANCOMYCIN SERPL-MCNC: 13.7 MCG/ML (ref 5–40)
VENTILATOR MODE: ABNORMAL
VENTILATOR MODE: AC
VLDLC SERPL-MCNC: 27.4 MG/DL (ref 5–40)
VT ON VENT VENT: 480 ML
WBC NRBC COR # BLD: 14.89 10*3/MM3 (ref 3.4–10.8)
WBC NRBC COR # BLD: 23.6 10*3/MM3 (ref 3.4–10.8)
WBC NRBC COR # BLD: 9.85 10*3/MM3 (ref 3.4–10.8)

## 2020-02-12 PROCEDURE — 93318 ECHO TRANSESOPHAGEAL INTRAOP: CPT | Performed by: ANESTHESIOLOGY

## 2020-02-12 PROCEDURE — 85014 HEMATOCRIT: CPT

## 2020-02-12 PROCEDURE — 85025 COMPLETE CBC W/AUTO DIFF WBC: CPT | Performed by: THORACIC SURGERY (CARDIOTHORACIC VASCULAR SURGERY)

## 2020-02-12 PROCEDURE — 06BQ4ZZ EXCISION OF LEFT SAPHENOUS VEIN, PERCUTANEOUS ENDOSCOPIC APPROACH: ICD-10-PCS | Performed by: THORACIC SURGERY (CARDIOTHORACIC VASCULAR SURGERY)

## 2020-02-12 PROCEDURE — 25010000002 VANCOMYCIN 1 G RECONSTITUTED SOLUTION

## 2020-02-12 PROCEDURE — 85730 THROMBOPLASTIN TIME PARTIAL: CPT | Performed by: THORACIC SURGERY (CARDIOTHORACIC VASCULAR SURGERY)

## 2020-02-12 PROCEDURE — C1713 ANCHOR/SCREW BN/BN,TIS/BN: HCPCS | Performed by: THORACIC SURGERY (CARDIOTHORACIC VASCULAR SURGERY)

## 2020-02-12 PROCEDURE — 021309W BYPASS CORONARY ARTERY, FOUR OR MORE ARTERIES FROM AORTA WITH AUTOLOGOUS VENOUS TISSUE, OPEN APPROACH: ICD-10-PCS | Performed by: THORACIC SURGERY (CARDIOTHORACIC VASCULAR SURGERY)

## 2020-02-12 PROCEDURE — 85027 COMPLETE CBC AUTOMATED: CPT | Performed by: THORACIC SURGERY (CARDIOTHORACIC VASCULAR SURGERY)

## 2020-02-12 PROCEDURE — 25010000002 ONDANSETRON PER 1 MG: Performed by: THORACIC SURGERY (CARDIOTHORACIC VASCULAR SURGERY)

## 2020-02-12 PROCEDURE — 80053 COMPREHEN METABOLIC PANEL: CPT | Performed by: HOSPITALIST

## 2020-02-12 PROCEDURE — 33521 CABG ARTERY-VEIN FOUR: CPT | Performed by: THORACIC SURGERY (CARDIOTHORACIC VASCULAR SURGERY)

## 2020-02-12 PROCEDURE — 25010000002 HEPARIN (PORCINE) PER 1000 UNITS

## 2020-02-12 PROCEDURE — 33533 CABG ARTERIAL SINGLE: CPT | Performed by: THORACIC SURGERY (CARDIOTHORACIC VASCULAR SURGERY)

## 2020-02-12 PROCEDURE — C9290 INJ, BUPIVACAINE LIPOSOME: HCPCS | Performed by: THORACIC SURGERY (CARDIOTHORACIC VASCULAR SURGERY)

## 2020-02-12 PROCEDURE — 85384 FIBRINOGEN ACTIVITY: CPT | Performed by: THORACIC SURGERY (CARDIOTHORACIC VASCULAR SURGERY)

## 2020-02-12 PROCEDURE — 25010000002 PROTAMINE SULFATE PER 10 MG: Performed by: ANESTHESIOLOGY

## 2020-02-12 PROCEDURE — 25010000003 PROTAMINE SULFATE PER 10 MG: Performed by: THORACIC SURGERY (CARDIOTHORACIC VASCULAR SURGERY)

## 2020-02-12 PROCEDURE — 83880 ASSAY OF NATRIURETIC PEPTIDE: CPT | Performed by: HOSPITALIST

## 2020-02-12 PROCEDURE — 82803 BLOOD GASES ANY COMBINATION: CPT

## 2020-02-12 PROCEDURE — 94799 UNLISTED PULMONARY SVC/PX: CPT

## 2020-02-12 PROCEDURE — 02100Z9 BYPASS CORONARY ARTERY, ONE ARTERY FROM LEFT INTERNAL MAMMARY, OPEN APPROACH: ICD-10-PCS | Performed by: THORACIC SURGERY (CARDIOTHORACIC VASCULAR SURGERY)

## 2020-02-12 PROCEDURE — P9041 ALBUMIN (HUMAN),5%, 50ML: HCPCS | Performed by: THORACIC SURGERY (CARDIOTHORACIC VASCULAR SURGERY)

## 2020-02-12 PROCEDURE — 63710000001 INSULIN REGULAR HUMAN PER 5 UNITS: Performed by: ANESTHESIOLOGY

## 2020-02-12 PROCEDURE — 82962 GLUCOSE BLOOD TEST: CPT

## 2020-02-12 PROCEDURE — 25010000002 MAGNESIUM SULFATE IN D5W 1G/100ML (PREMIX) 1-5 GM/100ML-% SOLUTION: Performed by: INTERNAL MEDICINE

## 2020-02-12 PROCEDURE — 80069 RENAL FUNCTION PANEL: CPT | Performed by: THORACIC SURGERY (CARDIOTHORACIC VASCULAR SURGERY)

## 2020-02-12 PROCEDURE — 84443 ASSAY THYROID STIM HORMONE: CPT | Performed by: HOSPITALIST

## 2020-02-12 PROCEDURE — C1729 CATH, DRAINAGE: HCPCS | Performed by: THORACIC SURGERY (CARDIOTHORACIC VASCULAR SURGERY)

## 2020-02-12 PROCEDURE — 85025 COMPLETE CBC W/AUTO DIFF WBC: CPT | Performed by: HOSPITALIST

## 2020-02-12 PROCEDURE — 85018 HEMOGLOBIN: CPT

## 2020-02-12 PROCEDURE — 85610 PROTHROMBIN TIME: CPT | Performed by: THORACIC SURGERY (CARDIOTHORACIC VASCULAR SURGERY)

## 2020-02-12 PROCEDURE — 33508 ENDOSCOPIC VEIN HARVEST: CPT | Performed by: PHYSICIAN ASSISTANT

## 2020-02-12 PROCEDURE — 33508 ENDOSCOPIC VEIN HARVEST: CPT | Performed by: THORACIC SURGERY (CARDIOTHORACIC VASCULAR SURGERY)

## 2020-02-12 PROCEDURE — 25010000002 ALBUMIN HUMAN 5% PER 50 ML: Performed by: THORACIC SURGERY (CARDIOTHORACIC VASCULAR SURGERY)

## 2020-02-12 PROCEDURE — 25010000002 VANCOMYCIN: Performed by: NURSE PRACTITIONER

## 2020-02-12 PROCEDURE — 25010000002 PHENYLEPHRINE PER 1 ML: Performed by: ANESTHESIOLOGY

## 2020-02-12 PROCEDURE — 25010000002 ONDANSETRON PER 1 MG: Performed by: ANESTHESIOLOGY

## 2020-02-12 PROCEDURE — 71045 X-RAY EXAM CHEST 1 VIEW: CPT

## 2020-02-12 PROCEDURE — 25010000002 PAPAVERINE PER 60 MG: Performed by: THORACIC SURGERY (CARDIOTHORACIC VASCULAR SURGERY)

## 2020-02-12 PROCEDURE — 25010000002 HEPARIN (PORCINE) PER 1000 UNITS: Performed by: THORACIC SURGERY (CARDIOTHORACIC VASCULAR SURGERY)

## 2020-02-12 PROCEDURE — 99233 SBSQ HOSP IP/OBS HIGH 50: CPT | Performed by: INTERNAL MEDICINE

## 2020-02-12 PROCEDURE — 80202 ASSAY OF VANCOMYCIN: CPT | Performed by: NURSE PRACTITIONER

## 2020-02-12 PROCEDURE — 25010000002 HEPARIN (PORCINE) PER 1000 UNITS: Performed by: ANESTHESIOLOGY

## 2020-02-12 PROCEDURE — 83735 ASSAY OF MAGNESIUM: CPT | Performed by: THORACIC SURGERY (CARDIOTHORACIC VASCULAR SURGERY)

## 2020-02-12 PROCEDURE — 82947 ASSAY GLUCOSE BLOOD QUANT: CPT

## 2020-02-12 PROCEDURE — A4648 IMPLANTABLE TISSUE MARKER: HCPCS | Performed by: THORACIC SURGERY (CARDIOTHORACIC VASCULAR SURGERY)

## 2020-02-12 PROCEDURE — 85347 COAGULATION TIME ACTIVATED: CPT

## 2020-02-12 PROCEDURE — 25010000003 BUPIVACAINE LIPOSOME 1.3 % SUSPENSION: Performed by: THORACIC SURGERY (CARDIOTHORACIC VASCULAR SURGERY)

## 2020-02-12 PROCEDURE — B24BZZ4 ULTRASONOGRAPHY OF HEART WITH AORTA, TRANSESOPHAGEAL: ICD-10-PCS | Performed by: THORACIC SURGERY (CARDIOTHORACIC VASCULAR SURGERY)

## 2020-02-12 PROCEDURE — 25010000002 PROPOFOL 10 MG/ML EMULSION: Performed by: ANESTHESIOLOGY

## 2020-02-12 PROCEDURE — 25010000002 ALBUMIN HUMAN 25% PER 50 ML

## 2020-02-12 PROCEDURE — C1751 CATH, INF, PER/CENT/MIDLINE: HCPCS | Performed by: ANESTHESIOLOGY

## 2020-02-12 PROCEDURE — 25010000002 MIDAZOLAM PER 1 MG: Performed by: ANESTHESIOLOGY

## 2020-02-12 PROCEDURE — 5A1221Z PERFORMANCE OF CARDIAC OUTPUT, CONTINUOUS: ICD-10-PCS | Performed by: THORACIC SURGERY (CARDIOTHORACIC VASCULAR SURGERY)

## 2020-02-12 PROCEDURE — 33533 CABG ARTERIAL SINGLE: CPT | Performed by: PHYSICIAN ASSISTANT

## 2020-02-12 PROCEDURE — 94002 VENT MGMT INPAT INIT DAY: CPT

## 2020-02-12 PROCEDURE — 80061 LIPID PANEL: CPT | Performed by: HOSPITALIST

## 2020-02-12 PROCEDURE — 33521 CABG ARTERY-VEIN FOUR: CPT | Performed by: PHYSICIAN ASSISTANT

## 2020-02-12 PROCEDURE — 82330 ASSAY OF CALCIUM: CPT | Performed by: THORACIC SURGERY (CARDIOTHORACIC VASCULAR SURGERY)

## 2020-02-12 PROCEDURE — 25010000002 VANCOMYCIN 10 G RECONSTITUTED SOLUTION: Performed by: NURSE PRACTITIONER

## 2020-02-12 PROCEDURE — 84100 ASSAY OF PHOSPHORUS: CPT | Performed by: INTERNAL MEDICINE

## 2020-02-12 PROCEDURE — 25010000002 FENTANYL CITRATE (PF) 100 MCG/2ML SOLUTION: Performed by: ANESTHESIOLOGY

## 2020-02-12 PROCEDURE — P9047 ALBUMIN (HUMAN), 25%, 50ML: HCPCS

## 2020-02-12 DEVICE — DISK-SHAPED STYLE, SILICONE (1 PER STERILE PKG)
Type: IMPLANTABLE DEVICE | Site: HEART | Status: FUNCTIONAL
Brand: SCANLAN® RADIOMARK® GRAFT MARKERS

## 2020-02-12 DEVICE — SUT FW 5 W .5 CIR CUT NDL 48M AR7211: Type: IMPLANTABLE DEVICE | Site: STERNUM | Status: FUNCTIONAL

## 2020-02-12 DEVICE — STERN FIX SYS ZIPFIX PK/5: Type: IMPLANTABLE DEVICE | Site: STERNUM | Status: FUNCTIONAL

## 2020-02-12 DEVICE — SUT FW #2 W/TPR NDL 1/2 CIR 38IN 97CM 26.5MM BLU: Type: IMPLANTABLE DEVICE | Site: STERNUM | Status: FUNCTIONAL

## 2020-02-12 RX ORDER — SODIUM CHLORIDE 9 MG/ML
30 INJECTION, SOLUTION INTRAVENOUS CONTINUOUS PRN
Status: DISCONTINUED | OUTPATIENT
Start: 2020-02-12 | End: 2020-02-18 | Stop reason: HOSPADM

## 2020-02-12 RX ORDER — PANTOPRAZOLE SODIUM 40 MG/1
40 TABLET, DELAYED RELEASE ORAL DAILY
Status: DISCONTINUED | OUTPATIENT
Start: 2020-02-13 | End: 2020-02-18 | Stop reason: HOSPADM

## 2020-02-12 RX ORDER — POTASSIUM CHLORIDE 7.45 MG/ML
10 INJECTION INTRAVENOUS
Status: DISCONTINUED | OUTPATIENT
Start: 2020-02-12 | End: 2020-02-18 | Stop reason: HOSPADM

## 2020-02-12 RX ORDER — DOPAMINE HYDROCHLORIDE 160 MG/100ML
2-20 INJECTION, SOLUTION INTRAVENOUS CONTINUOUS PRN
Status: DISCONTINUED | OUTPATIENT
Start: 2020-02-12 | End: 2020-02-13

## 2020-02-12 RX ORDER — LEVOTHYROXINE SODIUM 175 UG/1
175 TABLET ORAL
Status: DISCONTINUED | OUTPATIENT
Start: 2020-02-13 | End: 2020-02-18 | Stop reason: HOSPADM

## 2020-02-12 RX ORDER — NOREPINEPHRINE BIT/0.9 % NACL 8 MG/250ML
.02-.3 INFUSION BOTTLE (ML) INTRAVENOUS CONTINUOUS PRN
Status: DISCONTINUED | OUTPATIENT
Start: 2020-02-12 | End: 2020-02-13

## 2020-02-12 RX ORDER — PROMETHAZINE HYDROCHLORIDE 25 MG/ML
12.5 INJECTION, SOLUTION INTRAMUSCULAR; INTRAVENOUS EVERY 6 HOURS PRN
Status: DISCONTINUED | OUTPATIENT
Start: 2020-02-12 | End: 2020-02-18 | Stop reason: HOSPADM

## 2020-02-12 RX ORDER — POTASSIUM CHLORIDE 29.8 MG/ML
20 INJECTION INTRAVENOUS
Status: DISCONTINUED | OUTPATIENT
Start: 2020-02-12 | End: 2020-02-18 | Stop reason: HOSPADM

## 2020-02-12 RX ORDER — CHLORHEXIDINE GLUCONATE 0.12 MG/ML
15 RINSE ORAL EVERY 12 HOURS
Status: DISCONTINUED | OUTPATIENT
Start: 2020-02-12 | End: 2020-02-14

## 2020-02-12 RX ORDER — CYCLOBENZAPRINE HCL 10 MG
10 TABLET ORAL EVERY 8 HOURS PRN
Status: DISCONTINUED | OUTPATIENT
Start: 2020-02-13 | End: 2020-02-18 | Stop reason: HOSPADM

## 2020-02-12 RX ORDER — MORPHINE SULFATE 2 MG/ML
4 INJECTION, SOLUTION INTRAMUSCULAR; INTRAVENOUS
Status: DISCONTINUED | OUTPATIENT
Start: 2020-02-12 | End: 2020-02-18 | Stop reason: HOSPADM

## 2020-02-12 RX ORDER — OXYCODONE HYDROCHLORIDE 5 MG/1
10 TABLET ORAL EVERY 4 HOURS PRN
Status: DISCONTINUED | OUTPATIENT
Start: 2020-02-12 | End: 2020-02-18 | Stop reason: HOSPADM

## 2020-02-12 RX ORDER — ATORVASTATIN CALCIUM 20 MG/1
40 TABLET, FILM COATED ORAL NIGHTLY
Status: DISCONTINUED | OUTPATIENT
Start: 2020-02-12 | End: 2020-02-18 | Stop reason: HOSPADM

## 2020-02-12 RX ORDER — ACETAMINOPHEN 160 MG/5ML
650 SOLUTION ORAL EVERY 4 HOURS
Status: DISCONTINUED | OUTPATIENT
Start: 2020-02-12 | End: 2020-02-13

## 2020-02-12 RX ORDER — NITROGLYCERIN 20 MG/100ML
5-200 INJECTION INTRAVENOUS
Status: DISCONTINUED | OUTPATIENT
Start: 2020-02-12 | End: 2020-02-13

## 2020-02-12 RX ORDER — ONDANSETRON 2 MG/ML
INJECTION INTRAMUSCULAR; INTRAVENOUS AS NEEDED
Status: DISCONTINUED | OUTPATIENT
Start: 2020-02-12 | End: 2020-02-12 | Stop reason: SURG

## 2020-02-12 RX ORDER — FENTANYL CITRATE 50 UG/ML
INJECTION, SOLUTION INTRAMUSCULAR; INTRAVENOUS AS NEEDED
Status: DISCONTINUED | OUTPATIENT
Start: 2020-02-12 | End: 2020-02-12 | Stop reason: SURG

## 2020-02-12 RX ORDER — HEPARIN SODIUM 1000 [USP'U]/ML
INJECTION, SOLUTION INTRAVENOUS; SUBCUTANEOUS AS NEEDED
Status: DISCONTINUED | OUTPATIENT
Start: 2020-02-12 | End: 2020-02-12 | Stop reason: SURG

## 2020-02-12 RX ORDER — MILRINONE LACTATE 0.2 MG/ML
.25-.75 INJECTION, SOLUTION INTRAVENOUS CONTINUOUS PRN
Status: DISCONTINUED | OUTPATIENT
Start: 2020-02-12 | End: 2020-02-13

## 2020-02-12 RX ORDER — HEPARIN SODIUM 5000 [USP'U]/ML
INJECTION, SOLUTION INTRAVENOUS; SUBCUTANEOUS AS NEEDED
Status: DISCONTINUED | OUTPATIENT
Start: 2020-02-12 | End: 2020-02-12 | Stop reason: HOSPADM

## 2020-02-12 RX ORDER — FUROSEMIDE 10 MG/ML
40 INJECTION INTRAMUSCULAR; INTRAVENOUS EVERY 6 HOURS PRN
Status: DISCONTINUED | OUTPATIENT
Start: 2020-02-12 | End: 2020-02-18 | Stop reason: HOSPADM

## 2020-02-12 RX ORDER — MAGNESIUM SULFATE 1 G/100ML
1 INJECTION INTRAVENOUS EVERY 8 HOURS
Status: DISCONTINUED | OUTPATIENT
Start: 2020-02-12 | End: 2020-02-12

## 2020-02-12 RX ORDER — METOCLOPRAMIDE HYDROCHLORIDE 5 MG/ML
5 INJECTION INTRAMUSCULAR; INTRAVENOUS EVERY 8 HOURS
Status: DISPENSED | OUTPATIENT
Start: 2020-02-12 | End: 2020-02-13

## 2020-02-12 RX ORDER — METOCLOPRAMIDE HYDROCHLORIDE 5 MG/ML
5 INJECTION INTRAMUSCULAR; INTRAVENOUS EVERY 6 HOURS
Status: DISCONTINUED | OUTPATIENT
Start: 2020-02-12 | End: 2020-02-12

## 2020-02-12 RX ORDER — NITROGLYCERIN 5 MG/ML
INJECTION, SOLUTION INTRAVENOUS AS NEEDED
Status: DISCONTINUED | OUTPATIENT
Start: 2020-02-12 | End: 2020-02-12 | Stop reason: SURG

## 2020-02-12 RX ORDER — MIDAZOLAM HYDROCHLORIDE 1 MG/ML
2 INJECTION INTRAMUSCULAR; INTRAVENOUS
Status: DISCONTINUED | OUTPATIENT
Start: 2020-02-12 | End: 2020-02-13

## 2020-02-12 RX ORDER — HYDROCODONE BITARTRATE AND ACETAMINOPHEN 5; 325 MG/1; MG/1
2 TABLET ORAL EVERY 4 HOURS PRN
Status: DISCONTINUED | OUTPATIENT
Start: 2020-02-12 | End: 2020-02-18 | Stop reason: HOSPADM

## 2020-02-12 RX ORDER — PROTAMINE SULFATE 10 MG/ML
INJECTION, SOLUTION INTRAVENOUS AS NEEDED
Status: DISCONTINUED | OUTPATIENT
Start: 2020-02-12 | End: 2020-02-12 | Stop reason: HOSPADM

## 2020-02-12 RX ORDER — EPHEDRINE SULFATE 50 MG/ML
INJECTION, SOLUTION INTRAVENOUS AS NEEDED
Status: DISCONTINUED | OUTPATIENT
Start: 2020-02-12 | End: 2020-02-12 | Stop reason: SURG

## 2020-02-12 RX ORDER — ASPIRIN 81 MG/1
81 TABLET ORAL DAILY
Status: DISCONTINUED | OUTPATIENT
Start: 2020-02-13 | End: 2020-02-18 | Stop reason: HOSPADM

## 2020-02-12 RX ORDER — SODIUM CHLORIDE 9 MG/ML
INJECTION, SOLUTION INTRAVENOUS CONTINUOUS PRN
Status: DISCONTINUED | OUTPATIENT
Start: 2020-02-12 | End: 2020-02-12 | Stop reason: SURG

## 2020-02-12 RX ORDER — ACETAMINOPHEN 650 MG/1
650 SUPPOSITORY RECTAL EVERY 4 HOURS
Status: DISCONTINUED | OUTPATIENT
Start: 2020-02-12 | End: 2020-02-13

## 2020-02-12 RX ORDER — MEPERIDINE HYDROCHLORIDE 25 MG/ML
25 INJECTION INTRAMUSCULAR; INTRAVENOUS; SUBCUTANEOUS EVERY 4 HOURS PRN
Status: DISCONTINUED | OUTPATIENT
Start: 2020-02-12 | End: 2020-02-12

## 2020-02-12 RX ORDER — ACETAMINOPHEN 325 MG/1
650 TABLET ORAL EVERY 4 HOURS
Status: DISCONTINUED | OUTPATIENT
Start: 2020-02-12 | End: 2020-02-13

## 2020-02-12 RX ORDER — PAPAVERINE HYDROCHLORIDE 30 MG/ML
INJECTION INTRAMUSCULAR; INTRAVENOUS AS NEEDED
Status: DISCONTINUED | OUTPATIENT
Start: 2020-02-12 | End: 2020-02-12 | Stop reason: HOSPADM

## 2020-02-12 RX ORDER — ACETAMINOPHEN 325 MG/1
650 TABLET ORAL EVERY 4 HOURS PRN
Status: DISCONTINUED | OUTPATIENT
Start: 2020-02-13 | End: 2020-02-18 | Stop reason: HOSPADM

## 2020-02-12 RX ORDER — ALBUMIN, HUMAN INJ 5% 5 %
1500 SOLUTION INTRAVENOUS AS NEEDED
Status: DISPENSED | OUTPATIENT
Start: 2020-02-12 | End: 2020-02-13

## 2020-02-12 RX ORDER — ONDANSETRON 2 MG/ML
4 INJECTION INTRAMUSCULAR; INTRAVENOUS EVERY 6 HOURS PRN
Status: DISCONTINUED | OUTPATIENT
Start: 2020-02-12 | End: 2020-02-18 | Stop reason: HOSPADM

## 2020-02-12 RX ORDER — DEXTROSE MONOHYDRATE 25 G/50ML
INJECTION, SOLUTION INTRAVENOUS
Status: COMPLETED
Start: 2020-02-12 | End: 2020-02-12

## 2020-02-12 RX ORDER — CARBAMAZEPINE 300 MG/1
300 CAPSULE, EXTENDED RELEASE ORAL EVERY 12 HOURS SCHEDULED
Status: DISCONTINUED | OUTPATIENT
Start: 2020-02-12 | End: 2020-02-18 | Stop reason: HOSPADM

## 2020-02-12 RX ORDER — ROCURONIUM BROMIDE 10 MG/ML
INJECTION, SOLUTION INTRAVENOUS AS NEEDED
Status: DISCONTINUED | OUTPATIENT
Start: 2020-02-12 | End: 2020-02-12 | Stop reason: SURG

## 2020-02-12 RX ORDER — POTASSIUM CHLORIDE 750 MG/1
40 CAPSULE, EXTENDED RELEASE ORAL AS NEEDED
Status: DISCONTINUED | OUTPATIENT
Start: 2020-02-12 | End: 2020-02-18 | Stop reason: HOSPADM

## 2020-02-12 RX ORDER — PROPOFOL 10 MG/ML
VIAL (ML) INTRAVENOUS AS NEEDED
Status: DISCONTINUED | OUTPATIENT
Start: 2020-02-12 | End: 2020-02-12 | Stop reason: SURG

## 2020-02-12 RX ORDER — ACETAMINOPHEN 160 MG/5ML
650 SOLUTION ORAL EVERY 4 HOURS PRN
Status: DISCONTINUED | OUTPATIENT
Start: 2020-02-13 | End: 2020-02-18 | Stop reason: HOSPADM

## 2020-02-12 RX ORDER — MORPHINE SULFATE 2 MG/ML
1 INJECTION, SOLUTION INTRAMUSCULAR; INTRAVENOUS EVERY 4 HOURS PRN
Status: DISCONTINUED | OUTPATIENT
Start: 2020-02-12 | End: 2020-02-18 | Stop reason: HOSPADM

## 2020-02-12 RX ORDER — ACETAMINOPHEN 10 MG/ML
1000 INJECTION, SOLUTION INTRAVENOUS ONCE
Status: COMPLETED | OUTPATIENT
Start: 2020-02-12 | End: 2020-02-12

## 2020-02-12 RX ORDER — SODIUM CHLORIDE 9 MG/ML
9 INJECTION, SOLUTION INTRAVENOUS CONTINUOUS
Status: DISCONTINUED | OUTPATIENT
Start: 2020-02-12 | End: 2020-02-18 | Stop reason: HOSPADM

## 2020-02-12 RX ORDER — MIDAZOLAM HYDROCHLORIDE 1 MG/ML
INJECTION INTRAMUSCULAR; INTRAVENOUS AS NEEDED
Status: DISCONTINUED | OUTPATIENT
Start: 2020-02-12 | End: 2020-02-12 | Stop reason: SURG

## 2020-02-12 RX ORDER — SODIUM CHLORIDE, SODIUM LACTATE, POTASSIUM CHLORIDE, CALCIUM CHLORIDE 600; 310; 30; 20 MG/100ML; MG/100ML; MG/100ML; MG/100ML
INJECTION, SOLUTION INTRAVENOUS CONTINUOUS PRN
Status: DISCONTINUED | OUTPATIENT
Start: 2020-02-12 | End: 2020-02-12 | Stop reason: SURG

## 2020-02-12 RX ORDER — DEXTROSE MONOHYDRATE 25 G/50ML
50 INJECTION, SOLUTION INTRAVENOUS
Status: DISCONTINUED | OUTPATIENT
Start: 2020-02-12 | End: 2020-02-12

## 2020-02-12 RX ORDER — PROPOFOL 10 MG/ML
VIAL (ML) INTRAVENOUS CONTINUOUS PRN
Status: DISCONTINUED | OUTPATIENT
Start: 2020-02-12 | End: 2020-02-12 | Stop reason: SURG

## 2020-02-12 RX ORDER — METOCLOPRAMIDE HYDROCHLORIDE 5 MG/ML
10 INJECTION INTRAMUSCULAR; INTRAVENOUS EVERY 6 HOURS
Status: DISCONTINUED | OUTPATIENT
Start: 2020-02-12 | End: 2020-02-12

## 2020-02-12 RX ORDER — PROTAMINE SULFATE 10 MG/ML
INJECTION, SOLUTION INTRAVENOUS AS NEEDED
Status: DISCONTINUED | OUTPATIENT
Start: 2020-02-12 | End: 2020-02-12 | Stop reason: SURG

## 2020-02-12 RX ORDER — ALPRAZOLAM 0.25 MG/1
0.25 TABLET ORAL EVERY 8 HOURS PRN
Status: DISCONTINUED | OUTPATIENT
Start: 2020-02-12 | End: 2020-02-18 | Stop reason: HOSPADM

## 2020-02-12 RX ORDER — SODIUM CHLORIDE 0.9 % (FLUSH) 0.9 %
30 SYRINGE (ML) INJECTION ONCE AS NEEDED
Status: DISCONTINUED | OUTPATIENT
Start: 2020-02-12 | End: 2020-02-18 | Stop reason: HOSPADM

## 2020-02-12 RX ORDER — POTASSIUM CHLORIDE 1.5 G/1.77G
40 POWDER, FOR SOLUTION ORAL AS NEEDED
Status: DISCONTINUED | OUTPATIENT
Start: 2020-02-12 | End: 2020-02-18 | Stop reason: HOSPADM

## 2020-02-12 RX ORDER — PROMETHAZINE HYDROCHLORIDE 25 MG/1
12.5 TABLET ORAL EVERY 6 HOURS PRN
Status: DISCONTINUED | OUTPATIENT
Start: 2020-02-12 | End: 2020-02-18 | Stop reason: HOSPADM

## 2020-02-12 RX ORDER — AMINOCAPROIC ACID 250 MG/ML
INJECTION, SOLUTION INTRAVENOUS AS NEEDED
Status: DISCONTINUED | OUTPATIENT
Start: 2020-02-12 | End: 2020-02-12 | Stop reason: SURG

## 2020-02-12 RX ORDER — ACETAMINOPHEN 650 MG/1
650 SUPPOSITORY RECTAL EVERY 4 HOURS PRN
Status: DISCONTINUED | OUTPATIENT
Start: 2020-02-13 | End: 2020-02-18 | Stop reason: HOSPADM

## 2020-02-12 RX ORDER — MAGNESIUM SULFATE 1 G/100ML
1 INJECTION INTRAVENOUS ONCE
Status: COMPLETED | OUTPATIENT
Start: 2020-02-12 | End: 2020-02-12

## 2020-02-12 RX ORDER — NALOXONE HCL 0.4 MG/ML
0.4 VIAL (ML) INJECTION
Status: DISCONTINUED | OUTPATIENT
Start: 2020-02-12 | End: 2020-02-18 | Stop reason: HOSPADM

## 2020-02-12 RX ORDER — METOCLOPRAMIDE HYDROCHLORIDE 5 MG/ML
5 INJECTION INTRAMUSCULAR; INTRAVENOUS EVERY 8 HOURS
Status: DISCONTINUED | OUTPATIENT
Start: 2020-02-12 | End: 2020-02-12

## 2020-02-12 RX ADMIN — ATORVASTATIN CALCIUM 40 MG: 20 TABLET, FILM COATED ORAL at 20:08

## 2020-02-12 RX ADMIN — AMINOCAPROIC ACID 5 G: 250 INJECTION, SOLUTION INTRAVENOUS at 08:04

## 2020-02-12 RX ADMIN — FENTANYL CITRATE 250 MCG: 50 INJECTION, SOLUTION INTRAMUSCULAR; INTRAVENOUS at 08:10

## 2020-02-12 RX ADMIN — PROPOFOL 25 MCG/KG/MIN: 10 INJECTION, EMULSION INTRAVENOUS at 07:38

## 2020-02-12 RX ADMIN — PROTAMINE SULFATE 300 MG: 10 INJECTION, SOLUTION INTRAVENOUS at 11:10

## 2020-02-12 RX ADMIN — SODIUM CHLORIDE 2.5 MG/HR: 9 INJECTION, SOLUTION INTRAVENOUS at 11:47

## 2020-02-12 RX ADMIN — CHLORHEXIDINE GLUCONATE 15 ML: 1.2 RINSE ORAL at 20:11

## 2020-02-12 RX ADMIN — CARBAMAZEPINE 300 MG: 300 CAPSULE, EXTENDED RELEASE ORAL at 20:08

## 2020-02-12 RX ADMIN — VANCOMYCIN HYDROCHLORIDE 1500 MG: 10 INJECTION, POWDER, LYOPHILIZED, FOR SOLUTION INTRAVENOUS at 23:22

## 2020-02-12 RX ADMIN — SODIUM CHLORIDE: 9 INJECTION, SOLUTION INTRAVENOUS at 07:00

## 2020-02-12 RX ADMIN — PHENYLEPHRINE HYDROCHLORIDE 0.5 MCG/KG/MIN: 10 INJECTION, SOLUTION INTRAMUSCULAR; INTRAVENOUS; SUBCUTANEOUS at 07:30

## 2020-02-12 RX ADMIN — ALBUMIN HUMAN 250 ML: 0.05 INJECTION, SOLUTION INTRAVENOUS at 23:20

## 2020-02-12 RX ADMIN — NITROGLYCERIN 80 MCG: 5 INJECTION, SOLUTION INTRAVENOUS at 11:24

## 2020-02-12 RX ADMIN — MUPIROCIN 1 APPLICATION: 20 OINTMENT TOPICAL at 06:05

## 2020-02-12 RX ADMIN — DEXMEDETOMIDINE HYDROCHLORIDE 0.5 MCG/KG/HR: 100 INJECTION, SOLUTION, CONCENTRATE INTRAVENOUS at 15:22

## 2020-02-12 RX ADMIN — ALBUMIN HUMAN 250 ML: 0.05 INJECTION, SOLUTION INTRAVENOUS at 14:40

## 2020-02-12 RX ADMIN — PROPOFOL 100 MG: 10 INJECTION, EMULSION INTRAVENOUS at 07:30

## 2020-02-12 RX ADMIN — HYDROCODONE BITARTRATE AND ACETAMINOPHEN 2 TABLET: 5; 325 TABLET ORAL at 19:54

## 2020-02-12 RX ADMIN — MAGNESIUM SULFATE 1 G: 1 INJECTION INTRAVENOUS at 17:37

## 2020-02-12 RX ADMIN — ACETAMINOPHEN 1000 MG: 10 INJECTION, SOLUTION INTRAVENOUS at 16:05

## 2020-02-12 RX ADMIN — NITROGLYCERIN 80 MCG: 5 INJECTION, SOLUTION INTRAVENOUS at 11:18

## 2020-02-12 RX ADMIN — ACETAMINOPHEN 650 MG: 325 TABLET, FILM COATED ORAL at 19:33

## 2020-02-12 RX ADMIN — FENTANYL CITRATE 250 MCG: 50 INJECTION, SOLUTION INTRAMUSCULAR; INTRAVENOUS at 09:43

## 2020-02-12 RX ADMIN — MUPIROCIN 1 APPLICATION: 20 OINTMENT TOPICAL at 20:10

## 2020-02-12 RX ADMIN — ROCURONIUM BROMIDE 70 MG: 10 INJECTION INTRAVENOUS at 07:30

## 2020-02-12 RX ADMIN — FENTANYL CITRATE 250 MCG: 50 INJECTION, SOLUTION INTRAMUSCULAR; INTRAVENOUS at 07:30

## 2020-02-12 RX ADMIN — ROCURONIUM BROMIDE 30 MG: 10 INJECTION INTRAVENOUS at 08:05

## 2020-02-12 RX ADMIN — SODIUM CHLORIDE 2 UNITS/HR: 900 INJECTION, SOLUTION INTRAVENOUS at 11:35

## 2020-02-12 RX ADMIN — DEXTROSE MONOHYDRATE 25 ML: 25 INJECTION, SOLUTION INTRAVENOUS at 05:59

## 2020-02-12 RX ADMIN — SODIUM CHLORIDE, POTASSIUM CHLORIDE, SODIUM LACTATE AND CALCIUM CHLORIDE: 600; 310; 30; 20 INJECTION, SOLUTION INTRAVENOUS at 07:00

## 2020-02-12 RX ADMIN — MIDAZOLAM 1 MG: 1 INJECTION INTRAMUSCULAR; INTRAVENOUS at 07:00

## 2020-02-12 RX ADMIN — MIDAZOLAM 1 MG: 1 INJECTION INTRAMUSCULAR; INTRAVENOUS at 07:10

## 2020-02-12 RX ADMIN — ONDANSETRON HYDROCHLORIDE 4 MG: 2 SOLUTION INTRAMUSCULAR; INTRAVENOUS at 19:30

## 2020-02-12 RX ADMIN — METOPROLOL TARTRATE 12.5 MG: 25 TABLET ORAL at 06:39

## 2020-02-12 RX ADMIN — FENTANYL CITRATE 250 MCG: 50 INJECTION, SOLUTION INTRAMUSCULAR; INTRAVENOUS at 11:28

## 2020-02-12 RX ADMIN — ALBUMIN HUMAN 250 ML: 0.05 INJECTION, SOLUTION INTRAVENOUS at 15:34

## 2020-02-12 RX ADMIN — DEXTROSE MONOHYDRATE 50 ML: 25 INJECTION, SOLUTION INTRAVENOUS at 23:20

## 2020-02-12 RX ADMIN — HEPARIN SODIUM 5000 UNITS: 1000 INJECTION, SOLUTION INTRAVENOUS; SUBCUTANEOUS at 09:42

## 2020-02-12 RX ADMIN — VANCOMYCIN HYDROCHLORIDE 1500 MG: 10 INJECTION, POWDER, LYOPHILIZED, FOR SOLUTION INTRAVENOUS at 07:26

## 2020-02-12 RX ADMIN — NITROGLYCERIN 80 MCG: 5 INJECTION, SOLUTION INTRAVENOUS at 11:15

## 2020-02-12 RX ADMIN — CHLORHEXIDINE GLUCONATE 15 ML: 1.2 RINSE ORAL at 06:05

## 2020-02-12 RX ADMIN — HEPARIN SODIUM 30000 UNITS: 1000 INJECTION, SOLUTION INTRAVENOUS; SUBCUTANEOUS at 09:30

## 2020-02-12 RX ADMIN — NITROGLYCERIN 80 MCG: 5 INJECTION, SOLUTION INTRAVENOUS at 11:21

## 2020-02-12 RX ADMIN — AMINOCAPROIC ACID 5 G: 250 INJECTION, SOLUTION INTRAVENOUS at 11:27

## 2020-02-12 RX ADMIN — EPHEDRINE SULFATE 7.5 MG: 50 INJECTION INTRAVENOUS at 08:24

## 2020-02-12 RX ADMIN — ONDANSETRON HYDROCHLORIDE 4 MG: 2 SOLUTION INTRAMUSCULAR; INTRAVENOUS at 12:19

## 2020-02-12 RX ADMIN — ROCURONIUM BROMIDE 50 MG: 10 INJECTION INTRAVENOUS at 09:43

## 2020-02-12 RX ADMIN — NITROGLYCERIN 200 MCG: 5 INJECTION, SOLUTION INTRAVENOUS at 10:19

## 2020-02-12 NOTE — ANESTHESIA PROCEDURE NOTES
Arterial Line      Patient reassessed immediately prior to procedure    Patient location during procedure: OR   Line placed for hemodynamic monitoring and ABGs/Labs/ISTAT.  Performed By   Anesthesiologist: Oscar Loco MD  Preanesthetic Checklist  Completed: patient identified, site marked, surgical consent, pre-op evaluation, timeout performed, IV checked, risks and benefits discussed and monitors and equipment checked  Arterial Line Prep   Sterile Tech: cap, gloves, sterile barriers and mask  Prep: ChloraPrep  Patient monitoring: EKG, continuous pulse oximetry and blood pressure monitoring  Arterial Line Procedure   Laterality:left  Location:  radial artery  Catheter size: 20 G   Guidance: palpation technique  Number of attempts: 1  Successful placement: yes  Post Assessment   Dressing Type: wrist guard applied, secured with tape and occlusive dressing applied.   Complications no  Circ/Move/Sens Assessment: normal and unchanged.   Patient Tolerance: patient tolerated the procedure well with no apparent complications

## 2020-02-12 NOTE — CONSULTS
79 y.o.  Patient Care Team:  Quinn Washington MD as PCP - General (Family Medicine)  Hugh Caruso MD as PCP - Claims Attributed  Steve Simons Jr., MD as Consulting Physician (Hematology and Oncology)  Hugh Caruso MD as Consulting Physician (Nephrology)  Adalgisa Hernandez APRN as Nurse Practitioner (Endocrinology)  Reagan Beltran MD as Consulting Physician (Endocrinology)  Aguilar Goldman MD as Consulting Physician (Pulmonary Disease)  Mil Sr MD as Consulting Physician (Ophthalmology)  Tasneem Montelongo MD as Surgeon (General Surgery)  Victor Hugo Ng MD as Consulting Physician (Cardiology)      No chief complaint on file.  Uncontrolled type 2 diabetes mellitus and preoperative and postoperative management    HPI   Patient is a 79-year-old white female with a history of uncontrolled type 2 diabetes mellitus with complications admitted to the hospital for shortness of breath and chest discomfort with an abnormal EKG    Patient has a large gathering of family at the bedside during my evaluation which includes daughter who is a nurse    Patient was evaluated in the primary care and transferred to cardiology and patient was admitted from the cardiology office.  Patient had cardiac cath and was noted to have significant coronary artery disease and is being prepared for CABG in the morning  I was consulted for further managing patient's diabetes during the hospitalization    Patient reported that she has been a diabetic for several years and has been on Lantus 100 units at night and Humalog 5 units before each meal for the past several months  Patient is also on Starlix 120 mg 3 times daily and Onglyza 5 mg daily  She reports that her blood sugars have been fluctuating significantly over the past several months  Her recent hemoglobin A1c is apparently in the 12% range  Uncontrolled type 2 diabetes mellitus with nephropathy  Patient has elevated creatinine with chronic kidney disease and sees Dr. Caruso as  outpatient  Uncontrolled type 2 diabetes mellitus with significant peripheral neuropathy  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 status post laser treatments and Avastin therapy    Patient reports generally compliance with insulin regimen  Her hemoglobin A1c used to be in the 6% range apparently within the past 1 year but recently it was 12% but on admission her hemoglobin A1c was 6.8% patient is also moderately anemic  Patient has a history of postoperative hypothyroidism currently on Synthroid 175 mcg daily.    Patient received a dose of Solu-Medrol prior to cardiac cath yesterday and her blood sugars went up to 300 range    Past Medical History:   Diagnosis Date   • Anemia in stage 3 chronic kidney disease (CMS/[facility]) 2016   • Arthritis    • Asthma    • Bone spur of acromioclavicular joint    • Carotid atherosclerosis     <50% bilaterally   • Chronic venous insufficiency    • CKD (chronic kidney disease) stage 3, GFR 30-59 ml/min (CMS/[facility])    • Essential hypertension    • Gout    • Grief reaction       2010 after 15 foot fall off ladder   • H/O cataract    • Heel spur    • History of tendinitis    • Hyperlipidemia    • Hyperparathyroidism (CMS/[facility])    • Hyperthyroidism    • Hypothyroidism, acquired    • Non-toxic goiter    • Pneumonia    • Proteinuria    • Type 2 diabetes mellitus with neurological manifestations (CMS/[facility])    • Vitamin D deficiency        Family History   Problem Relation Age of Onset   • Diabetes Sister    • Diabetes Brother    • Hypertension Brother    • Kidney disease Brother    • Cervical cancer Mother    • Heart disease Sister    • Neuropathy Sister    • Diabetes Sister        Social History     Socioeconomic History   • Marital status:      Spouse name: Not on file   • Number of children: 4   • Years of education: Not on file   • Highest education level: Not on file   Occupational History   • Occupation: Retired   Tobacco Use   • Smoking status:  Never Smoker   • Smokeless tobacco: Never Used   Substance and Sexual Activity   • Alcohol use: No   • Drug use: No   • Sexual activity: Defer   Social History Narrative    4 children; one child .     Caffeine Use: decaf.        Allergies   Allergen Reactions   • Codeine Nausea Only   • Hydrocodone-Acetaminophen Nausea Only and Other (See Comments)     Percocet and Vicodin; vertigo   • Meperidine Nausea Only and Nausea And Vomiting   • Morphine And Related Nausea Only   • Oxycodone-Acetaminophen Nausea Only   • Oxycodone-Acetaminophen Nausea Only and Other (See Comments)     dilzziness   • Oxycodone-Aspirin Nausea Only and Other (See Comments)     vertigo   • Nickel Rash   • Penicillins Hives   • Shrimp Rash   • Sulfa Antibiotics Hives         Current Facility-Administered Medications:   •  acetaminophen (TYLENOL) tablet 650 mg, 650 mg, Oral, Q4H PRN, Emerson Deras MD, 650 mg at 20 0116  •  amLODIPine (NORVASC) tablet 5 mg, 5 mg, Oral, Q24H, Sonia Daugherty APRN  •  aspirin chewable tablet 81 mg, 81 mg, Oral, Daily, Emerson Deras MD, 81 mg at 20 0952  •  atenolol (TENORMIN) tablet 12.5 mg, 12.5 mg, Oral, Daily, Emerson Deras MD, 12.5 mg at 20 0952  •  senna-docusate sodium (SENOKOT-S) 8.6-50 MG tablet 2 tablet, 2 tablet, Oral, BID, Stopped at 20 0957 **AND** polyethylene glycol 3350 powder (packet), 17 g, Oral, Daily PRN **AND** bisacodyl (DULCOLAX) EC tablet 5 mg, 5 mg, Oral, Daily PRN **AND** bisacodyl (DULCOLAX) suppository 10 mg, 10 mg, Rectal, Daily PRN, Emerson Deras MD  •  carBAMazepine (CARBATROL) 12 hr capsule 300 mg, 300 mg, Oral, Q12H, Emerson Deras MD, 300 mg at 20 0953  •  cetirizine (zyrTEC) tablet 10 mg, 10 mg, Oral, Daily, Emerson Deras MD, 10 mg at 20 0953  •  chlorhexidine (PERIDEX) 0.12 % solution 15 mL, 15 mL, Mouth/Throat, Q12H, Miladys Ramirez APRN  •  Chlorhexidine Gluconate Cloth 2 % pads 1 application,  1 application, Topical, Q12H, Miladys Ramirez APRN  •  cloNIDine (CATAPRES) tablet 0.2 mg, 0.2 mg, Oral, Q12H, Emerson Deras MD, 0.2 mg at 02/11/20 0953  •  diphenhydrAMINE (BENADRYL) capsule 25 mg, 25 mg, Oral, Nightly PRN, Emerson Deras MD, 25 mg at 02/10/20 0014  •  famotidine (PEPCID) tablet 20 mg, 20 mg, Oral, BID, Raul Quesada MD  •  febuxostat (ULORIC) tablet 40 mg, 40 mg, Oral, Daily, Emerson Deras MD, 40 mg at 02/11/20 0951  •  ferrous sulfate tablet 325 mg, 325 mg, Oral, Daily, Emerson Deras MD, 325 mg at 02/11/20 0952  •  hydrALAZINE (APRESOLINE) tablet 100 mg, 100 mg, Oral, TID, Emerson Deras MD, 100 mg at 02/11/20 1639  •  insulin lispro (humaLOG) injection 3-5 Units, 3-5 Units, Subcutaneous, 4x Daily AC & at Bedtime, Jayy Garrido MD  •  levothyroxine (SYNTHROID, LEVOTHROID) tablet 175 mcg, 175 mcg, Oral, Daily, Emerson Deras MD, 175 mcg at 02/11/20 0957  •  metoprolol tartrate (LOPRESSOR) injection 5 mg, 5 mg, Intravenous, Q6H PRN, Sonia Daugherty APRN, 5 mg at 02/11/20 0458  •  [START ON 2/12/2020] metoprolol tartrate (LOPRESSOR) tablet 12.5 mg, 12.5 mg, Oral, On Call to OR, Miladys Ramirez APRN  •  montelukast (SINGULAIR) tablet 10 mg, 10 mg, Oral, Daily, Emerson Deras MD, 10 mg at 02/11/20 0951  •  mupirocin (BACTROBAN) 2 % nasal ointment 1 application, 1 application, Each Nare, Q12H, Miladys Ramirez APRN  •  nitroglycerin (NITROSTAT) SL tablet 0.4 mg, 0.4 mg, Sublingual, Q5 Min PRN, Emerson Deras MD  •  ondansetron (ZOFRAN) injection 4 mg, 4 mg, Intravenous, Q6H PRN, Emerson Deras MD  •  potassium chloride (MICRO-K) CR capsule 10 mEq, 10 mEq, Oral, Daily, Emerson Deras MD, 10 mEq at 02/11/20 0953  •  sodium chloride 0.9 % flush 10 mL, 10 mL, Intravenous, PRN, Emerson Deras MD  •  [START ON 2/12/2020] vancomycin 1500 mg/500 mL 0.9% NS IVPB ([facility]), 15 mg/kg, Intravenous, On Call to OR, Miladys Ramirez,  APRN         Review of Systems   Constitutional: Positive for fatigue.   Eyes: Positive for visual disturbance.   Respiratory: Positive for shortness of breath.    Cardiovascular: Negative.    Gastrointestinal: Positive for abdominal distention. Negative for abdominal pain.   Endocrine: Negative.    Skin: Negative.    Neurological: Positive for dizziness and numbness.   Psychiatric/Behavioral: Negative.    All other systems reviewed and are negative.    Objective     Vital Signs  Temp:  [97.6 °F (36.4 °C)-99 °F (37.2 °C)] 97.9 °F (36.6 °C)  Heart Rate:  [45-78] 52  Resp:  [16-18] 16  BP: (146-184)/(62-85) 160/66    Physical Exam  Physical Exam   Constitutional: She is oriented to person, place, and time.   Eyes: Pupils are equal, round, and reactive to light. EOM are normal.   No circumorbital puffiness   Neck: Normal range of motion. Neck supple. No thyromegaly present.   Cardiovascular: Normal rate, regular rhythm, normal heart sounds and intact distal pulses.   Pulmonary/Chest: Effort normal and breath sounds normal.   Abdominal: Soft. Bowel sounds are normal. She exhibits no distension. There is no tenderness.   Musculoskeletal: Normal range of motion. She exhibits no edema.   Neurological: She is alert and oriented to person, place, and time.   Skin: Skin is warm and dry.   Negative for acanthosis and vitiligo or purple striae   Psychiatric: She has a normal mood and affect. Her behavior is normal.   Nursing note and vitals reviewed.       Medical record review  Summary  Cardiology notes reviewed  Cardiothoracic surgery notes reviewed  Patient has severe coronary artery disease and is awaiting CABG in the morning  Nephrology consultation was obtained prior to current cardiac cath due to elevated creatinine and patient received IV fluids to stabilize a creatinine level      Results Review:    I reviewed the patient's new clinical results.  Glucose   Date/Time Value Ref Range Status   02/11/2020 1540 196 (H) 70 -  130 mg/dL Final   02/11/2020 1035 121 70 - 130 mg/dL Final   02/11/2020 0553 167 (H) 70 - 130 mg/dL Final   02/10/2020 2358 275 (H) 70 - 130 mg/dL Final   02/10/2020 2049 332 (H) 70 - 130 mg/dL Final   02/10/2020 1742 242 (H) 70 - 130 mg/dL Final   02/10/2020 1047 87 70 - 130 mg/dL Final   02/09/2020 2056 220 (H) 70 - 130 mg/dL Final     Lab Results (last 72 hours)     Procedure Component Value Units Date/Time    POC Glucose Once [[id number]]  (Abnormal) Collected:  02/11/20 1540    Specimen:  Blood Updated:  02/11/20 1541     Glucose 196 mg/dL     POC Glucose Once [[id number]]  (Normal) Collected:  02/11/20 1035    Specimen:  Blood Updated:  02/11/20 1037     Glucose 121 mg/dL     POC Glucose Once [[id number]]  (Abnormal) Collected:  02/11/20 0553    Specimen:  Blood Updated:  02/11/20 0555     Glucose 167 mg/dL     Comprehensive Metabolic Panel [[id number]]  (Abnormal) Collected:  02/11/20 0438    Specimen:  Blood Updated:  02/11/20 0532     Glucose 186 mg/dL      BUN 34 mg/dL      Creatinine 1.68 mg/dL      Sodium 139 mmol/L      Potassium 3.5 mmol/L      Chloride 103 mmol/L      CO2 24.2 mmol/L      Calcium 9.0 mg/dL      Total Protein 6.1 g/dL      Albumin 3.30 g/dL      ALT (SGPT) 11 U/L      AST (SGOT) 11 U/L      Alkaline Phosphatase 73 U/L      Total Bilirubin <0.2 mg/dL      eGFR Non African Amer 29 mL/min/1.73      Globulin 2.8 gm/dL      A/G Ratio 1.2 g/dL      BUN/Creatinine Ratio 20.2     Anion Gap 11.8 mmol/L     Narrative:       GFR Normal >60  Chronic Kidney Disease <60  Kidney Failure <15      Protime-INR [[id number]]  (Normal) Collected:  02/11/20 0438    Specimen:  Blood Updated:  02/11/20 0524     Protime 13.0 Seconds      INR 1.01    CBC (No Diff) [[id number]]  (Abnormal) Collected:  02/11/20 0438    Specimen:  Blood Updated:  02/11/20 0514     WBC 9.55 10*3/mm3      RBC 3.28 10*6/mm3      Hemoglobin 10.6 g/dL      Hematocrit 30.6 %      MCV 93.3 fL      MCH 32.3 pg      MCHC 34.6 g/dL      RDW 11.9  %      RDW-SD 40.7 fl      MPV 10.2 fL      Platelets 218 10*3/mm3     POC Glucose Once [[id number]]  (Abnormal) Collected:  02/10/20 2358    Specimen:  Blood Updated:  02/11/20 0000     Glucose 275 mg/dL     Urinalysis, Microscopic Only - Urine, Clean Catch [[id number]]  (Abnormal) Collected:  02/10/20 2242    Specimen:  Urine, Clean Catch Updated:  02/10/20 2317     RBC, UA 0-2 /HPF      WBC, UA 6-12 /HPF      Bacteria, UA None Seen /HPF      Squamous Epithelial Cells, UA 0-2 /HPF      Hyaline Casts, UA 0-2 /LPF      Methodology Automated Microscopy    Urine Culture - Urine, Urine, Clean Catch [[id number]] Collected:  02/10/20 2242    Specimen:  Urine, Clean Catch Updated:  02/10/20 2317    Urinalysis With Culture If Indicated - Urine, Clean Catch [[id number]]  (Abnormal) Collected:  02/10/20 2242    Specimen:  Urine, Clean Catch Updated:  02/10/20 2316     Color, UA Yellow     Appearance, UA Clear     pH, UA 7.0     Specific Gravity, UA 1.019     Glucose, UA >=1000 mg/dL (3+)     Ketones, UA Negative     Bilirubin, UA Negative     Blood, UA Negative     Protein, UA >=300 mg/dL (3+)     Leuk Esterase, UA Negative     Nitrite, UA Negative     Urobilinogen, UA 0.2 E.U./dL    POC Glucose Once [[id number]]  (Abnormal) Collected:  02/10/20 2049    Specimen:  Blood Updated:  02/10/20 2050     Glucose 332 mg/dL     POC Glucose Once [[id number]]  (Abnormal) Collected:  02/10/20 1742    Specimen:  Blood Updated:  02/10/20 1743     Glucose 242 mg/dL     Blood Gas, Arterial [[id number]]  (Abnormal) Collected:  02/10/20 1154    Specimen:  Arterial Blood Updated:  02/10/20 1209     Site Arterial: left radial     Stone's Test Positive     pH, Arterial 7.389 pH units      pCO2, Arterial 43.8 mm Hg      pO2, Arterial 66.4 mm Hg      HCO3, Arterial 26.5 mmol/L      Base Excess, Arterial 1.2 mmol/L      O2 Saturation Calculated 92.5 %      Barometric Pressure for Blood Gas 752.3 mmHg      Modality Room Air     Rate 20 Breaths/minute      BNP [[id number]]  (Normal) Collected:  02/10/20 1058    Specimen:  Blood Updated:  02/10/20 1138     proBNP 631.9 pg/mL     Narrative:       Among patients with dyspnea, NT-proBNP is highly sensitive for the detection of acute congestive heart failure. In addition NT-proBNP of <300 pg/ml effectively rules out acute congestive heart failure with 99% negative predictive value.    Results may be falsely decreased if patient taking Biotin.      Comprehensive Metabolic Panel [[id number]]  (Abnormal) Collected:  02/10/20 1058    Specimen:  Blood Updated:  02/10/20 1138     Glucose 83 mg/dL      BUN 31 mg/dL      Creatinine 1.60 mg/dL      Sodium 140 mmol/L      Potassium 3.5 mmol/L      Chloride 106 mmol/L      CO2 23.0 mmol/L      Calcium 7.6 mg/dL      Total Protein 5.6 g/dL      Albumin 3.00 g/dL      ALT (SGPT) 13 U/L      AST (SGOT) 10 U/L      Alkaline Phosphatase 67 U/L      Total Bilirubin 0.2 mg/dL      eGFR Non African Amer 31 mL/min/1.73      Globulin 2.6 gm/dL      A/G Ratio 1.2 g/dL      BUN/Creatinine Ratio 19.4     Anion Gap 11.0 mmol/L     Narrative:       GFR Normal >60  Chronic Kidney Disease <60  Kidney Failure <15      Lipid Panel [[id number]] Collected:  02/10/20 1058    Specimen:  Blood Updated:  02/10/20 1135     Total Cholesterol 131 mg/dL      Triglycerides 70 mg/dL      HDL Cholesterol 49 mg/dL      LDL Cholesterol  68 mg/dL      VLDL Cholesterol 14 mg/dL      LDL/HDL Ratio 1.39    Narrative:       Cholesterol Reference Ranges  (U.S. Department of Health and Human Services ATP III Classifications)    Desirable          <200 mg/dL  Borderline High    200-239 mg/dL  High Risk          >240 mg/dL      Triglyceride Reference Ranges  (U.S. Department of Health and Human Services ATP III Classifications)    Normal           <150 mg/dL  Borderline High  150-199 mg/dL  High             200-499 mg/dL  Very High        >500 mg/dL    HDL Reference Ranges  (U.S. Department of Health and Human Services ATP III  Classifcations)    Low     <40 mg/dl (major risk factor for CHD)  High    >60 mg/dl ('negative' risk factor for CHD)        LDL Reference Ranges  (U.S. Department of Health and Human Services ATP III Classifcations)    Optimal          <100 mg/dL  Near Optimal     100-129 mg/dL  Borderline High  130-159 mg/dL  High             160-189 mg/dL  Very High        >189 mg/dL    Hemoglobin A1c [[id number]]  (Abnormal) Collected:  02/10/20 1058    Specimen:  Blood Updated:  02/10/20 1123     Hemoglobin A1C 6.81 %     Narrative:       Hemoglobin A1C Ranges:    Increased Risk for Diabetes  5.7% to 6.4%  Diabetes                     >= 6.5%  Diabetic Goal                < 7.0%    Protime-INR [[id number]]  (Normal) Collected:  02/10/20 1058    Specimen:  Blood Updated:  02/10/20 1121     Protime 13.2 Seconds      INR 1.03    aPTT [[id number]]  (Normal) Collected:  02/10/20 1058    Specimen:  Blood Updated:  02/10/20 1121     PTT 33.2 seconds     Platelet Function ADP [[id number]] Collected:  02/10/20 1058    Specimen:  Blood Updated:  02/10/20 1113     ADP Aggregation, % Platelet 77 %     CBC & Differential [[id number]] Collected:  02/10/20 1058    Specimen:  Blood Updated:  02/10/20 1113    Narrative:       The following orders were created for panel order CBC & Differential.  Procedure                               Abnormality         Status                     ---------                               -----------         ------                     CBC Auto Differential[[id number]]        Abnormal            Final result                 Please view results for these tests on the individual orders.    CBC Auto Differential [[id number]]  (Abnormal) Collected:  02/10/20 1058    Specimen:  Blood Updated:  02/10/20 1113     WBC 8.73 10*3/mm3      RBC 3.12 10*6/mm3      Hemoglobin 10.0 g/dL      Hematocrit 29.7 %      MCV 95.2 fL      MCH 32.1 pg      MCHC 33.7 g/dL      RDW 12.1 %      RDW-SD 41.8 fl      MPV 9.8 fL      Platelets 184  10*3/mm3      Neutrophil % 86.7 %      Lymphocyte % 9.9 %      Monocyte % 2.6 %      Eosinophil % 0.3 %      Basophil % 0.2 %      Immature Grans % 0.3 %      Neutrophils, Absolute 7.56 10*3/mm3      Lymphocytes, Absolute 0.86 10*3/mm3      Monocytes, Absolute 0.23 10*3/mm3      Eosinophils, Absolute 0.03 10*3/mm3      Basophils, Absolute 0.02 10*3/mm3      Immature Grans, Absolute 0.03 10*3/mm3      nRBC 0.0 /100 WBC     POC Glucose Once [[id number]]  (Normal) Collected:  02/10/20 1047    Specimen:  Blood Updated:  02/10/20 1048     Glucose 87 mg/dL     Comprehensive Metabolic Panel [[id number]]  (Abnormal) Collected:  02/10/20 0443    Specimen:  Blood Updated:  02/10/20 0531     Glucose 185 mg/dL      BUN 34 mg/dL      Creatinine 1.85 mg/dL      Sodium 140 mmol/L      Potassium 3.7 mmol/L      Chloride 103 mmol/L      CO2 25.1 mmol/L      Calcium 8.4 mg/dL      Total Protein 6.0 g/dL      Albumin 3.20 g/dL      ALT (SGPT) 10 U/L      AST (SGOT) 9 U/L      Alkaline Phosphatase 68 U/L      Total Bilirubin <0.2 mg/dL      eGFR Non African Amer 26 mL/min/1.73      Globulin 2.8 gm/dL      A/G Ratio 1.1 g/dL      BUN/Creatinine Ratio 18.4     Anion Gap 11.9 mmol/L     Narrative:       GFR Normal >60  Chronic Kidney Disease <60  Kidney Failure <15      POC Glucose Once [[id number]]  (Abnormal) Collected:  02/09/20 2056    Specimen:  Blood Updated:  02/09/20 2101     Glucose 220 mg/dL     Basic Metabolic Panel [[id number]]  (Abnormal) Collected:  02/09/20 1715    Specimen:  Blood Updated:  02/09/20 1747     Glucose 188 mg/dL      BUN 38 mg/dL      Creatinine 2.10 mg/dL      Sodium 142 mmol/L      Potassium 3.7 mmol/L      Chloride 101 mmol/L      CO2 26.8 mmol/L      Calcium 8.8 mg/dL      eGFR Non African Amer 23 mL/min/1.73      BUN/Creatinine Ratio 18.1     Anion Gap 14.2 mmol/L     Narrative:       GFR Normal >60  Chronic Kidney Disease <60  Kidney Failure <15      CBC Auto Differential [[id number]]  (Abnormal) Collected:   02/09/20 1715    Specimen:  Blood Updated:  02/09/20 1728     WBC 10.19 10*3/mm3      RBC 3.55 10*6/mm3      Hemoglobin 11.3 g/dL      Hematocrit 34.1 %      MCV 96.1 fL      MCH 31.8 pg      MCHC 33.1 g/dL      RDW 12.1 %      RDW-SD 42.1 fl      MPV 10.2 fL      Platelets 240 10*3/mm3      Neutrophil % 66.9 %      Lymphocyte % 20.4 %      Monocyte % 10.2 %      Eosinophil % 1.4 %      Basophil % 0.5 %      Immature Grans % 0.6 %      Neutrophils, Absolute 6.82 10*3/mm3      Lymphocytes, Absolute 2.08 10*3/mm3      Monocytes, Absolute 1.04 10*3/mm3      Eosinophils, Absolute 0.14 10*3/mm3      Basophils, Absolute 0.05 10*3/mm3      Immature Grans, Absolute 0.06 10*3/mm3      nRBC 0.0 /100 WBC           Imaging Results (Last 72 Hours)     Procedure Component Value Units Date/Time    XR Chest PA & Lateral [[id number]] Collected:  02/10/20 1355     Updated:  02/10/20 1401    Narrative:       XR CHEST PA AND LATERAL-     HISTORY: Female who is 79 years-old,  preoperative evaluation     TECHNIQUE: Frontal and lateral views of the chest     COMPARISON: None available     FINDINGS: Heart size is borderline. Aorta is calcified. Pulmonary  vasculature is unremarkable. No focal pulmonary consolidation, pleural  effusion, or pneumothorax. No acute osseous process.       Impression:       No evidence for acute pulmonary process. Borderline heart  size. Follow-up as clinical indications persist.     This report was finalized on 2/10/2020 1:58 PM by Dr. Brian Olsen M.D.         Thyroid ultrasound from July 2019 reviewed  Patient is status post right hemithyroidectomy  Left lobe appears normal    Assessment/Plan     Patient Active Problem List    Diagnosis   • *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 pectoris (CMS/HCC) [I25.118]   • Stable angina (CMS/HCC) [I20.8]   • Heartburn [R12]   • Class 1 obesity due to excess calories with serious comorbidity and body mass index (BMI) of 34.0 to 34.9 in adult  [E66.09, Z68.34]   • Stage 3 chronic kidney disease (CMS/HCC) [N18.3]   • Anemia in stage 3 chronic kidney disease (CMS/HCC) [N18.3, D63.1]   • Solitary thyroid nodule [E04.1]   • Hyperparathyroidism (CMS/[facility]) [E21.3]   • Vitamin D deficiency [E55.9]   • 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]   • Asthma [J45.909]   • Type 2 diabetes mellitus with diabetic polyneuropathy, with long-term current use of insulin (CMS/[facility]) [E11.42, Z79.4]   • Essential hypertension [I10]   • Mixed hyperlipidemia [E78.2]   • Disorder of muscle, ligament, and fascia [M62.9]   • Proteinuria [R80.9]   • Postoperative hypothyroidism [E89.0]     Uncontrolled type 2 diabetes mellitus  Uncontrolled type 2 diabetes mellitus with neuropathy  Uncontrolled type 2 diabetes mellitus with nephropathy  Chronic kidney disease  Postoperative hypothyroidism patient had partial right hemithyroidectomy 2016  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 after dinner today  Her blood sugars are mostly in the 100 range  I will discontinue Lantus  I would also discontinue the mealtime insulin  We will discontinue the correction scale insulin 0-7 Humalog    I will start her on 3 to 5 units of Humalog scale overnight  Most likely patient will be on insulin drip in the OR managed by anesthesia and possibly will continue for some time postop  After patient is clinically stable her insulin drip will be discontinued and transitioned to subcutaneous insulin most likely Lantus twice daily and Humalog  "correction scale until she starts eating at which point she may be on bolus/mealtime insulin as well  I discussed my plan with the patient and family at the bedside all verbalized understanding    We will continue to monitor the Accu-Cheks and adjust insulin during this hospitalization    Jayy Garrido MD FACE.  02/11/20  8:44 PM        EMR Dragon / transcription disclaimer:     \"Dictated utilizing Dragon dictation\".   "

## 2020-02-12 NOTE — OP NOTE
[facility] CARDIAC SURGERY OP NOTE    Preop Diagnosis: Severe coronary artery disease with left main stenosis, critical.  Chronic kidney disease stage III.  Morbid obesity.  Osteoporosis.  Nickel allergy.    Postop Diagnosis: Same with moderate TR.    Indications: This patient was admitted with critical left main stenosis.  Her creatinine was elevated and we waited 48 hours before surgery.  She has a nickel allergy so we cannot use stainless steel wires which are 17% nickel.  The STS Risk and all risks and alternatives were discussed with the patient and family. Operation was advisable to prolong life and relieve symptoms.They understand and wish to proceed.    Procedure: CABG x5.  Skeletonized LIMA to mid LAD.  Vein graft to PDA.  Vein graft to OM1, OM 2 and D1.  Temporary cardiopulmonary bypass.  Antegrade and retrograde cold blood cardioplegia with warm reperfusion.  Neurologic monitoring.  Transesophageal echo.  Endoscopic vein harvest left greater saphenous vein.  Bilateral intercostal nerve blocks with Exparel.  Sternal closure with the zip fix plastic sternal closure system.    Surgeon: Anthony Rai MD    Assistant: Lindsey Santa PA-C.    Anesthesia: GET    Findings : She was ischemic in the beginning PA pressures were in the high 40s.  This improved post bypass.  The PDA was 1.5 mm.  OM1 and OM 2 were 1.5 mm.  The diagonal branch was 1.8 mill meters and the LAD was 1.8 mm.  The mammary was a nice 2.5 mm vessel with excellent blood flow.  The vein was large in the thigh but we had to use it since both legs were the same.  The vein was harvested from the left leg.  The lower leg vein was 4.5 mm in the upper was 5 mm.    Operative Procedure: A primary median sternotomy was made while the left greater saphenous vein was harvested with the endoscope.  The JEFE was dissected off the left chest wall with a harmonic scalpel was skeletonized.  Cardiopulmonary bypass was  established for 79 minutes drifting 34 °C at appropriate flow rates.  The aorta was crossclamped for 60 minutes and we gave a liter of antegrade cold blood cardioplegia then 1.5 L of retrograde cold blood cardioplegia repeating doses every 10 to 15 minutes to good effect.  4 veins were anastomosed the ascending aorta with 6-0 Prolene and marked with plastic washers because of the nickel allergy.  The veins were then sewn to the PDA and the 2 marginal branches with 6 oh or 7-0 Prolene.  This was a large vein and we were very careful to make sure that things were hemostatic.  The last vein was sewn to the diagonal branch with 7-0 Prolene.  The mammary was sewn to the LAD with 7-0 Prolene.  A warm dose of retrograde cardioplegia was given and then with strong suction on the aortic needle vent the cross-clamp was released.  The patient was rewarmed and complete de-airing was performed.  Cardiopulmonary bypass was weaned and discontinued.  Decannulation was affected and the usual devices were placed.  4 chest tubes were inserted and we applied vancomycin platelet rich plasma.  Protamine was administered to reverse the heparin and hemostasis was obtained.  We closed the sternum with permanent suture and also the zip fix system in the main body of the sternum.  The closure was satisfactory.  4 chest tubes have been inserted.  The fascia, soft tissues and skin were closed as usual.  The sponge, needle and instrument counts were noted to be correct.  All the grafts lay nicely and all the anastomoses were hemostatic.    Complications: None    Tubes: 4    Epicardial Wires: 3    Blood Loss: Minimal    Aortic cross-clamp time: 60 min    CPB time: 79 min     Specimen: None    Condition: Good.      Patient Care Team:  Quinn Washington MD as PCP - General (Family Medicine)  Hugh Caruso MD as PCP - Claims Attributed  Steve Simons Jr., MD as Consulting Physician (Hematology and Oncology)  Hugh Caruso MD as Consulting Physician  (Nephrology)  Adalgisa Hernandez APRN as Nurse Practitioner (Endocrinology)  Reagan Beltran MD as Consulting Physician (Endocrinology)  Aguilar Goldman MD as Consulting Physician (Pulmonary Disease)  Mil Sr MD as Consulting Physician (Ophthalmology)  Tasneem Montelongo MD as Surgeon (General Surgery)  Victor Hugo Ng MD as Consulting Physician (Cardiology)        Anthony Rai MD  2/12/2020  12:30 PM

## 2020-02-12 NOTE — PROGRESS NOTES
"Pharmacokinetic Evaluation - Vancomycin    Ange Johnson is a 79 y.o. female on vancomycin pharmacy to dose.  MRN: [id number]  : 1940    Day of therapy: 1  Indication: surgical prophylaxis  Pharmacy dosing per protocol-- Original order per TAYO Mccracken  Goal random level 10-20 mcg/ml    Current dose: 1500mg q24h  Other antimicrobials: none    Blood pressure 90/56, pulse 80, temperature 97.6 °F (36.4 °C), temperature source Oral, resp. rate 20, height 165.1 cm (65\"), weight 98.3 kg (216 lb 12.8 oz), SpO2 98 %, not currently breastfeeding.  Results from last 7 days   Lab Units 20  1312 20  0429 20  0438   CREATININE mg/dL 1.74* 1.83* 1.68*     Estimated Creatinine Clearance: 29.9 mL/min (A) (by C-G formula based on SCr of 1.74 mg/dL (H)).  Results from last 7 days   Lab Units 20  1312 20  0429 20  0438   WBC 10*3/mm3 23.60* 9.85 9.55   HEMOGLOBIN g/dL 9.3* 9.9* 10.6*   HEMATOCRIT % 27.0* 29.7* 30.6*   PLATELETS 10*3/mm3 137* 208 218     Cultures/ labs/ radiology:     Vancomycin dosing hx (include troughs if drawn):   0726: 1500mg x1    Assessment:  Pt with CKD3 and underwent CABG x5 today. At risk for accumulation of vancomycin due to age, CKD, and cardiac cath on , major surgery today.     Plan:  1) Changed vancomycin to intermittent dosing per protocol. Discontinued scheduled dosing.  2) Check random level this evening to evaluate. Will plan to re-dose with 10-15 mg/kg when level<20 mcg/ml.   Encourage hydration as allowed by MD to prevent toxic accumulation. Monitor for signs and symptoms of toxicity or intolerance including rash, increase in Scr or decrease in UOP.   Pharmacy will continue to follow and adjust as needed.    Marily Guerrero, [facility]    "

## 2020-02-12 NOTE — ANESTHESIA PROCEDURE NOTES
Airway  Urgency: elective    Date/Time: 2/12/2020 7:32 AM  Airway not difficult    General Information and Staff    Patient location during procedure: OR  Anesthesiologist: Oscar Loco MD    Indications and Patient Condition  Indications for airway management: airway protection    Preoxygenated: yes  MILS maintained throughout  Mask difficulty assessment: 2 - vent by mask + OA or adjuvant +/- NMBA    Final Airway Details  Final airway type: endotracheal airway      Successful airway: ETT  Cuffed: yes   Successful intubation technique: direct laryngoscopy  Facilitating devices/methods: anterior pressure/BURP  Endotracheal tube insertion site: oral  Blade: [place]  Blade size: 3  ETT size (mm): 7.0  Cormack-Lehane Classification: grade IIa - partial view of glottis  Placement verified by: chest auscultation and capnometry   Measured from: lips  ETT/EBT  to lips (cm): 22  Number of attempts at approach: 1  Assessment: lips, teeth, and gum same as pre-op and atraumatic intubation

## 2020-02-12 NOTE — PLAN OF CARE
Problem: Patient Care Overview  Goal: Plan of Care Review  Flowsheets (Taken 2/12/2020 0743)  Progress: improving  Plan of Care Reviewed With: patient  Outcome Summary: NPO since midnight for first case with Dr. Rai today.   BG 67 this morning, pt stated feeling dizzy, 25ml D50 given this morning with recheck at 150. Symptoms resolved. SB on the monitor. VSS.

## 2020-02-12 NOTE — ANESTHESIA PROCEDURE NOTES
Central Line      Patient reassessed immediately prior to procedure    Patient location during procedure: OR  Indications: central pressure monitoring and vascular access  Staff  Anesthesiologist: Oscar Loco MD  Preanesthetic Checklist  Completed: patient identified, site marked, surgical consent, pre-op evaluation, timeout performed, IV checked, risks and benefits discussed and monitors and equipment checked  Central Line Prep  Sterile Tech:gloves, cap, gown, mask and sterile barriers  Prep: chloraprep  Patient monitoring: blood pressure monitoring, continuous pulse oximetry and EKG  Central Line Procedure  Laterality:right  Location:internal jugular  Catheter Type:MAC  Catheter Size:9 Fr  Guidance:ultrasound guided  PROCEDURE NOTE/ULTRASOUND INTERPRETATION.  Using ultrasound guidance the potential vascular sites for insertion of the catheter were visualized to determine the patency of the vessel to be used for vascular access.  After selecting the appropriate site for insertion, the needle was visualized under ultrasound being inserted into the internal jugular vein, followed by ultrasound confirmation of wire and catheter placement. There were no abnormalities seen on ultrasound; an image was taken; and the patient tolerated the procedure with no complications. Images: still images not obtained  Assessment  Post procedure:biopatch applied, line sutured and occlusive dressing applied  Assessement:blood return through all ports, free fluid flow and chest x-ray ordered  Complications:no  Patient Tolerance:patient tolerated the procedure well with no apparent complications

## 2020-02-12 NOTE — ANESTHESIA PROCEDURE NOTES
Procedure Performed: Emergent/Open-Heart Anesthesia KERWIN     Start Time:        End Time:      Preanesthesia Checklist:  Patient identified, IV assessed, risks and benefits discussed, monitors and equipment assessed, procedure being performed at surgeon's request and anesthesia consent obtained.    General Procedure Information  Diagnostic Indications for Echo:  assessment of ascending aorta and assessment of surgical repair  Physician Requesting Echo: Jr Anthony Rai MD  Location performed:  OR  Intubated  Bite block not placed  Heart visualized  Probe Insertion:  Easy  Probe Type:  Multiplane  Modalities:  Color flow mapping, continuous wave Doppler and pulse wave Doppler    Echocardiographic and Doppler Measurements    Ventricles    Right Ventricle:  Cavity size normal.  Hypertrophy not present.  Thrombus not present.  Global function normal.    Left Ventricle:  Cavity size dilated.  Hypertrophy present.  Thrombus not present.  Global Function normal.  Ejection Fraction 58%.      Ventricular Regional Function:  1- Basal Anteroseptal:  normal  2- Basal Anterior:  normal  3- Basal Anterolateral:  normal  4- Basal Inferolateral:  normal  5- Basal Inferior:  hypokinetic  6- Basal Inferoseptal:  hypokinetic  7- Mid Anteroseptal:  normal  8- Mid Anterior:  normal  9- Mid Anterolateral:  normal  10- Mid Inferolateral:  normal  11- Mid Inferior:  hypokinetic  12- Mid Inferoseptal:  hypokinetic  13- Apical Anterior:  normal  14- Apical Lateral:  normal  15- Apical Inferior:  normal  16- Apical Septal:  normal  17- [place]:  normal      Valves    Aortic Valve:  Annulus normal.  Stenosis not present.  Regurgitation absent.  Leaflets thickened.  Leaflet motions normal.      Mitral Valve:  Annulus normal.  Stenosis not present.  Regurgitation mild.  Leaflets normal.  Leaflet motions normal.      Tricuspid Valve:  Stenosis not present.  Regurgitation moderate.  Leaflets normal.  Leaflet motions normal.    Pulmonic  Valve:  Stenosis not present.  Regurgitation absent.          Aorta    Ascending Aorta:  Size normal.  Diameter 3.4 cm.  Dissection not present.  Plaque thickness less than 3 mm.  Mobile plaque not present.    Aortic Arch:  Size normal.  Dissection not present.  Plaque thickness greater than 3 mm.  Mobile plaque not present.    Descending Aorta:  Size normal.  Dissection not present.  Plaque thickness less than 3 mm.  Mobile plaque not present.          Atria    Right Atrium:  Size dilated.  Spontaneous echo contrast not present.  Thrombus not present.  Tumor not present.  Device not present.      Left Atrium:  Size dilated.  Spontaneous echo contrast not present.  Thrombus not present.  Tumor not present.  Device not present.    Left atrial appendage normal.      Septa    Atrial Septum:  Intra-atrial septal morphology lipomatous hypertrophy.      Ventricular Septum:  Intra-ventricular septum morphology normal.      Diastolic Function Measurements:  Diastolic Dysfunction Grade= II  E= ms  A= ms  E/A Ratio=   DT= ms  S/D=  IVRT=    Other Findings  Pericardium:  normal  Pleural Effusion:  none  Pulmonary Arteries:  normal  Pulmonary Venous Flow:  blunted (decreased) systolic flow    Anesthesia Information  Performed Personally  Anesthesiologist:  Oscar Loco MD      Echocardiogram Comments:       Post CABG,  LV EF is 55-60%.  RV function is similar to baseline. TR is moderate and appears a bit severe compared to baseline.  MR is mild.  Aorta is intact.  There is a small pericardial effusion near Rt heart with no compression.

## 2020-02-12 NOTE — ANESTHESIA PROCEDURE NOTES
Galt Junior catheter      Patient reassessed immediately prior to procedure    Patient location during procedure: OR  Indications: central pressure monitoring and vascular access  Staff  Anesthesiologist: Oscar Loco MD  Preanesthetic Checklist  Completed: patient identified, site marked, surgical consent, pre-op evaluation, timeout performed, IV checked, risks and benefits discussed and monitors and equipment checked  Central Line Prep  Sterile Tech:gloves, cap, gown, mask and sterile barriers  Prep: chloraprep  Patient monitoring: blood pressure monitoring, continuous pulse oximetry and EKG  Central Line Procedure  Laterality:right  Location:internal jugular  Catheter Type:Galt-Junior  Catheter Size:7 Fr  Guidance:ultrasound guided  PROCEDURE NOTE/ULTRASOUND INTERPRETATION.  Using ultrasound guidance the potential vascular sites for insertion of the catheter were visualized to determine the patency of the vessel to be used for vascular access.  After selecting the appropriate site for insertion, the needle was visualized under ultrasound being inserted into the internal jugular vein, followed by ultrasound confirmation of wire and catheter placement. There were no abnormalities seen on ultrasound; an image was taken; and the patient tolerated the procedure with no complications. Images: still images obtained, printed/placed on chart  Assessment  Post procedure:biopatch applied, line sutured and occlusive dressing applied  Assessement:blood return through all ports, free fluid flow and chest x-ray ordered  Complications:no  Patient Tolerance:patient tolerated the procedure well with no apparent complications

## 2020-02-12 NOTE — PROGRESS NOTES
"   LOS: 2 days    Patient Care Team:  Quinn Washington MD as PCP - General (Family Medicine)  Hugh Caruso MD as PCP - Claims Attributed  Steve Simons Jr., MD as Consulting Physician (Hematology and Oncology)  Hugh Caruso MD as Consulting Physician (Nephrology)  Adalgisa Hernandez APRN as Nurse Practitioner (Endocrinology)  Reagan Beltran MD as Consulting Physician (Endocrinology)  Aguilar Goldman MD as Consulting Physician (Pulmonary Disease)  Mil Sr MD as Consulting Physician (Ophthalmology)  Tasneem Montelongo MD as Surgeon (General Surgery)  Victor Hugo Ng MD as Consulting Physician (Cardiology)    Chief Complaint:  No chief complaint on file.    Follow UP CKD3  Subjective     Interval History:   In recovery.  SP CABG x 5.  On no pressors.  Precedex, insulin drip.  Paced at 80.  Making urine.  On Fio2 30%.    Review of Systems:   See above.    Objective     Vital Signs  Temp:  [97.6 °F (36.4 °C)-97.9 °F (36.6 °C)] 97.6 °F (36.4 °C)  Heart Rate:  [44-80] 80  Resp:  [16-20] 20  BP: ()/(52-77) 91/54  Arterial Line BP: (105-143)/(56-71) 105/56  FiO2 (%):  [30 %-100 %] 30 %    Flowsheet Rows      First Filed Value   Admission Height  165.1 cm (65\") Documented at 02/09/2020 1646   Admission Weight  99.3 kg (219 lb) Documented at 02/09/2020 1646          I/O this shift:  In: 600 [Blood:600]  Out: 4990 [Urine:1110; Other:3800; Chest Tube:80]  I/O last 3 completed shifts:  In: 900 [P.O.:900]  Out: -     Intake/Output Summary (Last 24 hours) at 2/12/2020 1554  Last data filed at 2/12/2020 1500  Gross per 24 hour   Intake 960 ml   Output 4990 ml   Net -4030 ml       Physical Exam:  Elderly female. Oral ETT, sedated on precedex on vent.   RIJ Cordis, Left radial [place].   Oral mucosa dry  Neck no jvd  Heart RRR no s3 or rub  Lungs clear to auscultation. Mediastinal and pleural tubes  Abd few bs soft, nontender. Pacer wires .  Ext no edema lower ext   Skin no rash  Neuro sedated on vent.    soriano " .      Results Review:    Results from last 7 days   Lab Units 02/12/20  1312 02/12/20  0429 02/11/20  0438 02/10/20  1058   SODIUM mmol/L 135* 142 139 140   POTASSIUM mmol/L 3.8 3.5 3.5 3.5   CHLORIDE mmol/L 103 104 103 106   CO2 mmol/L 20.8* 25.7 24.2 23.0   BUN mg/dL 34* 40* 34* 31*   CREATININE mg/dL 1.74* 1.83* 1.68* 1.60*   CALCIUM mg/dL 7.9* 8.7 9.0 7.6*   BILIRUBIN mg/dL  --  <0.2* <0.2* 0.2   ALK PHOS U/L  --  66 73 67   ALT (SGPT) U/L  --  11 11 13   AST (SGOT) U/L  --  10 11 10   GLUCOSE mg/dL 147* 63* 186* 83       Estimated Creatinine Clearance: 29.9 mL/min (A) (by C-G formula based on SCr of 1.74 mg/dL (H)).    Results from last 7 days   Lab Units 02/12/20  1312 02/12/20  0429   MAGNESIUM mg/dL 1.9  --    PHOSPHORUS mg/dL 3.5 3.3             Results from last 7 days   Lab Units 02/12/20  1312 02/12/20  0429 02/11/20  0438 02/10/20  1058 02/09/20  1715   WBC 10*3/mm3 23.60* 9.85 9.55 8.73 10.19   HEMOGLOBIN g/dL 9.3* 9.9* 10.6* 10.0* 11.3*   PLATELETS 10*3/mm3 137* 208 218 184 240       Results from last 7 days   Lab Units 02/12/20  1312 02/11/20  0438 02/10/20  1058   INR  1.27* 1.01 1.03         Imaging Results (Last 24 Hours)     Procedure Component Value Units Date/Time    XR Chest 1 View [[id number]] Collected:  02/12/20 1356     Updated:  02/12/20 1402    Narrative:       XR CHEST 1 VW-     HISTORY: Female who is 79 years-old,  postoperative evaluation     TECHNIQUE: Frontal view of the chest     COMPARISON: 02/10/2020     FINDINGS: Endotracheal tube tip is 4.0 cm above the [place]. Right IJ  [place] catheter extends to a left lower lobe branch pulmonary artery.  Bilateral chest tubes are seen. Heart size is borderline. Pulmonary  vasculature is unremarkable. Mild likely atelectasis in the lower lungs.  No pneumothorax or pleural effusion. Otherwise stable.       Impression:       Postsurgical changes.     This report was finalized on 2/12/2020 1:59 PM by Dr. Brian Olsen M.D.              acetaminophen 1,000 mg Intravenous Once   acetaminophen 650 mg Oral Q4H   Or      acetaminophen 650 mg Oral Q4H   Or      acetaminophen 650 mg Rectal Q4H   [START ON 2/13/2020] aspirin 81 mg Oral Daily   atorvastatin 40 mg Oral Nightly   carBAMazepine 300 mg Oral Q12H   chlorhexidine 15 mL Mouth/Throat Q12H   [START ON 2/13/2020] enoxaparin 30 mg Subcutaneous Daily   [START ON 2/13/2020] levothyroxine 175 mcg Oral Q AM   magnesium sulfate 1 g Intravenous Once   metoclopramide 5 mg Intravenous Q8H   metoprolol tartrate 12.5 mg Oral Q12H   mupirocin  Each Nare BID   [START ON 2/13/2020] pantoprazole 40 mg Oral Daily   Vancomycin Pharmacy Intermittent Dosing  Does not apply Daily       clevidipine 2-32 mg/hr    dexmedetomidine 0.2-1.5 mcg/kg/hr Last Rate: 0.5 mcg/kg/hr (02/12/20 1522)   DOPamine 2-20 mcg/kg/min    EPINEPHrine 0.02-0.3 mcg/kg/min    insulin 0-50 Units/hr    milrinone 0.25-0.75 mcg/kg/min    niCARdipine 5-15 mg/hr Last Rate: Stopped (02/12/20 1340)   nitroglycerin 5-200 mcg/min    norepinephrine 0.02-0.3 mcg/kg/min    Pharmacy to dose vancomycin     phenylephrine 0.2-3 mcg/kg/min    propofol 5-50 mcg/kg/min Last Rate: Stopped (02/12/20 1524)   sodium chloride 30 mL/hr Last Rate: 30 mL/hr (02/12/20 1329)   sodium chloride 30 mL/hr    vasopressin 0.02-0.1 Units/min        Medication Review:   Current Facility-Administered Medications   Medication Dose Route Frequency Provider Last Rate Last Dose   • acetaminophen (OFIRMEV) injection 1,000 mg  1,000 mg Intravenous Once Jr Anthony Rai MD       • acetaminophen (TYLENOL) tablet 650 mg  650 mg Oral Q4H Jr Anthony Rai MD        Or   • acetaminophen (TYLENOL) 160 MG/5ML solution 650 mg  650 mg Oral Q4H Jr Anthony Rai MD        Or   • acetaminophen (TYLENOL) suppository 650 mg  650 mg Rectal Q4H Jr Anthony Rai MD       • [START ON 2/13/2020] acetaminophen (TYLENOL) tablet 650 mg  650 mg Oral Q4H PRN Jr Anthony Rai MD         Or   • [START ON 2/13/2020] acetaminophen (TYLENOL) 160 MG/5ML solution 650 mg  650 mg Oral Q4H PRN Jr Anthony Rai MD        Or   • [START ON 2/13/2020] acetaminophen (TYLENOL) suppository 650 mg  650 mg Rectal Q4H PRN Jr Anthony Rai MD       • albumin human 5 % solution 1,500 mL  1,500 mL Intravenous PRN Jr Anthony Rai MD   250 mL at 02/12/20 1534   • ALPRAZolam (XANAX) tablet 0.25 mg  0.25 mg Oral Q8H PRN Jr Anthony Rai MD       • [START ON 2/13/2020] aspirin EC tablet 81 mg  81 mg Oral Daily Jr Anthony Rai MD       • atorvastatin (LIPITOR) tablet 40 mg  40 mg Oral Nightly Jr Anthony Rai MD       • carBAMazepine (CARBATROL) 12 hr capsule 300 mg  300 mg Oral Q12H Jr Anthony Rai MD       • chlorhexidine (PERIDEX) 0.12 % solution 15 mL  15 mL Mouth/Throat Q12H Jr Anthony Rai MD       • clevidipine (CLEVIPREX) infusion 0.5 mg/mL  2-32 mg/hr Intravenous Continuous PRN Jr Anthony Rai MD       • [START ON 2/13/2020] cyclobenzaprine (FLEXERIL) tablet 10 mg  10 mg Oral Q8H PRN Jr Anthony Rai MD       • dexmedetomidine (PRECEDEX) 400 mcg/100mL (4 mcg/mL) NS infusion kit  0.2-1.5 mcg/kg/hr Intravenous Titrated Jr Anthony Rai MD 12.3 mL/hr at 02/12/20 1522 0.5 mcg/kg/hr at 02/12/20 1522   • DOPamine 400 mg in 250 mL D5W (1.6 mg/mL) infusion  2-20 mcg/kg/min Intravenous Continuous PRN Jr Anthony Rai MD       • [START ON 2/13/2020] enoxaparin (LOVENOX) syringe 30 mg  30 mg Subcutaneous Daily Jr Anthony Rai MD       • EPINEPHrine 5 mg in 250mL (20 mcg/mL) infusion  0.02-0.3 mcg/kg/min Intravenous Continuous PRN Jr Anthony Rai MD       • furosemide (LASIX) injection 40 mg  40 mg Intravenous Q6H PRN Jr Anthony Rai MD       • HYDROcodone-acetaminophen (NORCO) 5-325 MG per tablet 2 tablet  2 tablet Oral Q4H PRN Jr Anthony Rai MD       • insulin regular (HumuLIN R,NovoLIN R) 100 Units in sodium chloride 0.9 % 100 mL (1  Units/mL) infusion  0-50 Units/hr Intravenous Continuous PRN Jr Anthony Rai MD       • [START ON 2/13/2020] levothyroxine (SYNTHROID, LEVOTHROID) tablet 175 mcg  175 mcg Oral Q AM Jr Anthony Rai MD       • magnesium sulfate in D5W 1g/100mL (PREMIX)  1 g Intravenous Once Christine Hernandez MD       • metoclopramide (REGLAN) injection 5 mg  5 mg Intravenous Q8H Christine Hernandez MD       • metoprolol tartrate (LOPRESSOR) tablet 12.5 mg  12.5 mg Oral Q12H Jr Anthony Rai MD       • midazolam (VERSED) injection 2 mg  2 mg Intravenous Q1H PRN Jr Anthony Rai MD       • milrinone 20 mg/100 mL (0.2 mg/mL) in 5 % dextrose infusion  0.25-0.75 mcg/kg/min Intravenous Continuous PRN Jr Anthony Rai MD       • morphine injection 1 mg  1 mg Intravenous Q4H PRN Jr Anthony Rai MD        And   • naloxone (NARCAN) injection 0.4 mg  0.4 mg Intravenous Q5 Min PRN Jr Anthony Rai MD       • morphine injection 4 mg  4 mg Intravenous Q30 Min PRN Jr Anthony Rai MD       • mupirocin (BACTROBAN) 2 % nasal ointment   Each Nare BID Jr Anthony Rai MD       • niCARdipine (CARDENE) 25 mg in 250 mL NS (0.1 mg/mL) infusion kit  5-15 mg/hr Intravenous Continuous PRN Jr Anthony Rai MD   Stopped at 02/12/20 1340   • nitroglycerin 50 mg/250 mL (0.2 mg/mL) infusion  5-200 mcg/min Intravenous Titrated Jr Anthony Rai MD       • norepinephrine (LEVOPHED) 8 mg/250 mL (32 mcg/mL) in sodium chloride 0.9% infusion (premix)  0.02-0.3 mcg/kg/min Intravenous Continuous PRN Jr Anthony Rai MD       • ondansetron (ZOFRAN) injection 4 mg  4 mg Intravenous Q6H PRN Jr Anthony Rai MD       • oxyCODONE (ROXICODONE) immediate release tablet 10 mg  10 mg Oral Q4H PRN Jr Anthony Rai MD       • [START ON 2/13/2020] pantoprazole (PROTONIX) EC tablet 40 mg  40 mg Oral Daily Jr Anthony Rai MD       • Pharmacy to dose vancomycin   Does not apply Continuous PRN Ashley,  TAYO Hook       • phenylephrine (SENA-SYNEPHRINE) 50 mg in sodium chloride 0.9 % 250 mL (0.2 mg/mL) infusion  0.2-3 mcg/kg/min Intravenous Continuous PRN Jr Anthony Rai MD       • potassium chloride (MICRO-K) CR capsule 40 mEq  40 mEq Oral PRN Jr Anthony Rai MD        Or   • potassium chloride (KLOR-CON) packet 40 mEq  40 mEq Oral PRN Jr Anthony Rai MD       • potassium chloride 10 mEq in 100 mL IVPB  10 mEq Intravenous Q1H PRN Jr Anthony Rai MD        Or   • potassium chloride 10 mEq in 100 mL IVPB  10 mEq Intravenous Q1H PRN Jr Anthony Rai MD       • potassium chloride 20 mEq in 50 mL IVPB  20 mEq Intravenous Q1H PRN Jr Anthony Rai MD       • potassium chloride 20 mEq in 50 mL IVPB  20 mEq Intravenous Q1H PRN Jr Anthony Rai MD       • potassium chloride 20 mEq in 50 mL IVPB  20 mEq Intravenous Q1H PRN Jr Anthony Rai MD       • promethazine (PHENERGAN) tablet 12.5 mg  12.5 mg Oral Q6H PRN Jr Anthony Rai MD        Or   • promethazine (PHENERGAN) injection 12.5 mg  12.5 mg Intravenous Q6H PRN Jr Anthony Rai MD       • propofol (DIPRIVAN) infusion 10 mg/mL 100 mL  5-50 mcg/kg/min Intravenous Continuous PRN Jr Anthony Rai MD   Stopped at 02/12/20 1524   • sodium chloride 0.9 % flush 30 mL  30 mL Intravenous Once PRN Jr Anthony Rai MD       • sodium chloride 0.9 % infusion  30 mL/hr Intravenous Continuous Jr Anthony Rai MD 30 mL/hr at 02/12/20 1329 30 mL/hr at 02/12/20 1329   • sodium chloride 0.9 % infusion  30 mL/hr Intravenous Continuous PRN Jr Anthony Rai MD       • Vancomycin Pharmacy Intermittent Dosing   Does not apply Daily Miladys Ramirez APRN       • Vasopressin (PITRESSIN) 20 Units in sodium chloride 0.9 % 100 mL (0.2 Units/mL) infusion  0.02-0.1 Units/min Intravenous Continuous PRN Jr Anthony Rai MD           Assessment/Plan   1. CKD 3.  Stable so far post op CABG.  Urine output excellent. K 3.8,  replace.  Mg 1.9, 1 G IV.   2. CAD. Critical left main stenosis, LAD disease 90%. CABG x 5.   3. DM2 on insulin drip post op    4. Bradycardia, paced.    5. Hypothyroid on replacement .  6. Leukocytosis likely reactive  7. Anemia, chronic and blood loss. Check iron stores in am.     So far, stable post op.  Watch chemistries closely .  Meds adjusted for renal function .    Christine Hernandez MD  02/12/20  3:54 PM

## 2020-02-12 NOTE — ANESTHESIA PREPROCEDURE EVALUATION
Anesthesia Evaluation     Patient summary reviewed and Nursing notes reviewed   NPO Solid Status: > 8 hours  NPO Liquid Status: > 2 hours           Airway   Mallampati: I  TM distance: >3 FB  Neck ROM: full  No difficulty expected  Dental - normal exam     Pulmonary - normal exam   (-) pneumonia, asthma  Cardiovascular - normal exam  Exercise tolerance: poor (<4 METS)    ECG reviewed  Patient on routine beta blocker    (+) hypertension, CAD, angina, hyperlipidemia,  carotid artery disease      Neuro/Psych  (+) numbness,     GI/Hepatic/Renal/Endo    (+) obesity, morbid obesity,  renal disease CRI, diabetes mellitus type 2 well controlled using insulin,     Musculoskeletal     Abdominal  - normal exam    Bowel sounds: normal.   Substance History      OB/GYN          Other                      Anesthesia Plan    ASA 4     general   (A line, central line, KERWIN and post op ventilation)  intravenous induction     Anesthetic plan, all risks, benefits, and alternatives have been provided, discussed and informed consent has been obtained with: patient.  Use of blood products discussed with patient  Consented to blood products.

## 2020-02-12 NOTE — PROGRESS NOTES
79 y.o.   LOS: 2 days   Patient Care Team:  Quinn Washington MD as PCP - General (Family Medicine)  Hugh Caruso MD as PCP - Claims Attributed  Steve Simons Jr., MD as Consulting Physician (Hematology and Oncology)  Hugh Caruso MD as Consulting Physician (Nephrology)  Adalgisa Hernandez APRN as Nurse Practitioner (Endocrinology)  Reagan Beltran MD as Consulting Physician (Endocrinology)  Aguilar Goldman MD as Consulting Physician (Pulmonary Disease)  Mil Sr MD as Consulting Physician (Ophthalmology)  Tasneem Montelongo MD as Surgeon (General Surgery)  Victor Hugo Ng MD as Consulting Physician (Cardiology)    Chief Complaint:    Uncontrolled type 2 diabetes mellitus and post bypass management    No chief complaint on file.      Subjective     HPI  Patient is currently intubated and sedated  Currently receiving IV insulin and multiple other IV  fluids  Blood sugars overall are in the 100 range  On 1 to 2 units of insulin per hour  Discussed with the family  Had an episode of hypoglycemia this morning and insulin drip was discontinued    Interval History:      Review of Systems:      Review of Systems   Unable to perform ROS: Intubated   All other systems reviewed and are negative.    Objective     Vital Signs   Temp:  [97.6 °F (36.4 °C)-97.9 °F (36.6 °C)] 97.6 °F (36.4 °C)  Heart Rate:  [44-58] 58  Resp:  [16] 16  BP: (142-187)/(61-77) 187/77    Physical Exam:  Physical Exam   Cardiovascular: Normal rate, regular rhythm, normal heart sounds and intact distal pulses.   Pulmonary/Chest: Effort normal and breath sounds normal.   Vent sounds heard   Abdominal: Soft. Bowel sounds are normal.   Neurological:   Unable to examine due to clinical condition   Skin: Skin is warm and dry.   Nursing note and vitals reviewed.  Results Review:     I reviewed the patient's new clinical results.      Glucose   Date/Time Value Ref Range Status   02/12/2020 0429 63 (L) 65 - 99 mg/dL Final   02/11/2020 0438 186 (H) 65 - 99  mg/dL Final   02/10/2020 1058 83 65 - 99 mg/dL Final   02/10/2020 0443 185 (H) 65 - 99 mg/dL Final   02/09/2020 1715 188 (H) 65 - 99 mg/dL Final     Lab Results (last 72 hours)     Procedure Component Value Units Date/Time    POC Activated Clotting Time [[id number]]  (Abnormal) Collected:  02/12/20 1050    Specimen:  Blood Updated:  02/12/20 1154     Activated Clotting Time  417 Seconds      Comment: Serial Number: [id number]:  [id number]       POC Activated Clotting Time [[id number]]  (Abnormal) Collected:  02/12/20 1030    Specimen:  Blood Updated:  02/12/20 1154     Activated Clotting Time  367 Seconds      Comment: Serial Number: [id number]:  [id number]       POC Activated Clotting Time [[id number]]  (Abnormal) Collected:  02/12/20 0958    Specimen:  Blood Updated:  02/12/20 1154     Activated Clotting Time  345 Seconds      Comment: Serial Number: [id number]:  5063       POC Activated Clotting Time [[id number]]  (Normal) Collected:  02/12/20 1118    Specimen:  Blood Updated:  02/12/20 1153     Activated Clotting Time  98 Seconds      Comment: Serial Number: [id number]:  [id number]       POC Activated Clotting Time [[id number]]  (Normal) Collected:  02/12/20 0819    Specimen:  Blood Updated:  02/12/20 1153     Activated Clotting Time  120 Seconds      Comment: Serial Number: [id number]:  [id number]       Urine Culture - Urine, Urine, Clean Catch [[id number]] Collected:  02/10/20 2242    Specimen:  Urine, Clean Catch Updated:  02/12/20 0853     Urine Culture 50,000 CFU/mL Mixed Jose Enrique Isolated    Narrative:       Specimen contains mixed organisms of questionable pathogenicity which indicates contamination with commensal jose enrique.  Further identification is unlikely to provide clinically useful information.  Suggest recollection.    POC Glucose Once [[id number]]  (Abnormal) Collected:  02/12/20 0611    Specimen:  Blood Updated:  02/12/20 0612     Glucose 150 mg/dL     POC Glucose Once [[id number]]  (Abnormal) Collected:  02/12/20  0548    Specimen:  Blood Updated:  02/12/20 0549     Glucose 67 mg/dL     BNP [[id number]]  (Normal) Collected:  02/12/20 0429    Specimen:  Blood Updated:  02/12/20 0536     proBNP 1,218.0 pg/mL     Narrative:       Among patients with dyspnea, NT-proBNP is highly sensitive for the detection of acute congestive heart failure. In addition NT-proBNP of <300 pg/ml effectively rules out acute congestive heart failure with 99% negative predictive value.    Results may be falsely decreased if patient taking Biotin.      TSH [[id number]]  (Normal) Collected:  02/12/20 0429    Specimen:  Blood Updated:  02/12/20 0536     TSH 2.350 uIU/mL     Comprehensive Metabolic Panel [[id number]]  (Abnormal) Collected:  02/12/20 0429    Specimen:  Blood Updated:  02/12/20 0529     Glucose 63 mg/dL      BUN 40 mg/dL      Creatinine 1.83 mg/dL      Sodium 142 mmol/L      Potassium 3.5 mmol/L      Chloride 104 mmol/L      CO2 25.7 mmol/L      Calcium 8.7 mg/dL      Total Protein 5.8 g/dL      Albumin 3.20 g/dL      ALT (SGPT) 11 U/L      AST (SGOT) 10 U/L      Alkaline Phosphatase 66 U/L      Total Bilirubin <0.2 mg/dL      eGFR Non African Amer 27 mL/min/1.73      Globulin 2.6 gm/dL      A/G Ratio 1.2 g/dL      BUN/Creatinine Ratio 21.9     Anion Gap 12.3 mmol/L     Narrative:       GFR Normal >60  Chronic Kidney Disease <60  Kidney Failure <15      Lipid Panel [[id number]] Collected:  02/12/20 0429    Specimen:  Blood Updated:  02/12/20 0526     Total Cholesterol 153 mg/dL      Triglycerides 137 mg/dL      HDL Cholesterol 55 mg/dL      LDL Cholesterol  71 mg/dL      VLDL Cholesterol 27.4 mg/dL      LDL/HDL Ratio 1.28    Narrative:       Cholesterol Reference Ranges  (U.S. Department of Health and Human Services ATP III Classifications)    Desirable          <200 mg/dL  Borderline High    200-239 mg/dL  High Risk          >240 mg/dL      Triglyceride Reference Ranges  (U.S. Department of Health and Human Services ATP III  Classifications)    Normal           <150 mg/dL  Borderline High  150-199 mg/dL  High             200-499 mg/dL  Very High        >500 mg/dL    HDL Reference Ranges  (U.S. Department of Health and Human Services ATP III Classifcations)    Low     <40 mg/dl (major risk factor for CHD)  High    >60 mg/dl ('negative' risk factor for CHD)        LDL Reference Ranges  (U.S. Department of Health and Human Services ATP III Classifcations)    Optimal          <100 mg/dL  Near Optimal     100-129 mg/dL  Borderline High  130-159 mg/dL  High             160-189 mg/dL  Very High        >189 mg/dL    Phosphorus [[id number]]  (Normal) Collected:  02/12/20 0429    Specimen:  Blood Updated:  02/12/20 0526     Phosphorus 3.3 mg/dL     CBC & Differential [[id number]] Collected:  02/12/20 0429    Specimen:  Blood Updated:  02/12/20 0459    Narrative:       The following orders were created for panel order CBC & Differential.  Procedure                               Abnormality         Status                     ---------                               -----------         ------                     CBC Auto Differential[[id number]]        Abnormal            Final result                 Please view results for these tests on the individual orders.    CBC Auto Differential [[id number]]  (Abnormal) Collected:  02/12/20 0429    Specimen:  Blood Updated:  02/12/20 0459     WBC 9.85 10*3/mm3      RBC 3.20 10*6/mm3      Hemoglobin 9.9 g/dL      Hematocrit 29.7 %      MCV 92.8 fL      MCH 30.9 pg      MCHC 33.3 g/dL      RDW 12.0 %      RDW-SD 40.6 fl      MPV 9.9 fL      Platelets 208 10*3/mm3      Neutrophil % 64.9 %      Lymphocyte % 23.1 %      Monocyte % 9.8 %      Eosinophil % 1.2 %      Basophil % 0.6 %      Immature Grans % 0.4 %      Neutrophils, Absolute 6.38 10*3/mm3      Lymphocytes, Absolute 2.28 10*3/mm3      Monocytes, Absolute 0.97 10*3/mm3      Eosinophils, Absolute 0.12 10*3/mm3      Basophils, Absolute 0.06 10*3/mm3       Immature Grans, Absolute 0.04 10*3/mm3      nRBC 0.0 /100 WBC     POC Glucose Once [[id number]]  (Normal) Collected:  02/11/20 2057    Specimen:  Blood Updated:  02/11/20 2059     Glucose 118 mg/dL     POC Glucose Once [[id number]]  (Abnormal) Collected:  02/11/20 1540    Specimen:  Blood Updated:  02/11/20 1541     Glucose 196 mg/dL     POC Glucose Once [[id number]]  (Normal) Collected:  02/11/20 1035    Specimen:  Blood Updated:  02/11/20 1037     Glucose 121 mg/dL     POC Glucose Once [[id number]]  (Abnormal) Collected:  02/11/20 0553    Specimen:  Blood Updated:  02/11/20 0555     Glucose 167 mg/dL     Comprehensive Metabolic Panel [[id number]]  (Abnormal) Collected:  02/11/20 0438    Specimen:  Blood Updated:  02/11/20 0532     Glucose 186 mg/dL      BUN 34 mg/dL      Creatinine 1.68 mg/dL      Sodium 139 mmol/L      Potassium 3.5 mmol/L      Chloride 103 mmol/L      CO2 24.2 mmol/L      Calcium 9.0 mg/dL      Total Protein 6.1 g/dL      Albumin 3.30 g/dL      ALT (SGPT) 11 U/L      AST (SGOT) 11 U/L      Alkaline Phosphatase 73 U/L      Total Bilirubin <0.2 mg/dL      eGFR Non African Amer 29 mL/min/1.73      Globulin 2.8 gm/dL      A/G Ratio 1.2 g/dL      BUN/Creatinine Ratio 20.2     Anion Gap 11.8 mmol/L     Narrative:       GFR Normal >60  Chronic Kidney Disease <60  Kidney Failure <15      Protime-INR [[id number]]  (Normal) Collected:  02/11/20 0438    Specimen:  Blood Updated:  02/11/20 0524     Protime 13.0 Seconds      INR 1.01    CBC (No Diff) [[id number]]  (Abnormal) Collected:  02/11/20 0438    Specimen:  Blood Updated:  02/11/20 0514     WBC 9.55 10*3/mm3      RBC 3.28 10*6/mm3      Hemoglobin 10.6 g/dL      Hematocrit 30.6 %      MCV 93.3 fL      MCH 32.3 pg      MCHC 34.6 g/dL      RDW 11.9 %      RDW-SD 40.7 fl      MPV 10.2 fL      Platelets 218 10*3/mm3     POC Glucose Once [[id number]]  (Abnormal) Collected:  02/10/20 2358    Specimen:  Blood Updated:  02/11/20 0000     Glucose 275 mg/dL      Urinalysis, Microscopic Only - Urine, Clean Catch [[id number]]  (Abnormal) Collected:  02/10/20 2242    Specimen:  Urine, Clean Catch Updated:  02/10/20 2317     RBC, UA 0-2 /HPF      WBC, UA 6-12 /HPF      Bacteria, UA None Seen /HPF      Squamous Epithelial Cells, UA 0-2 /HPF      Hyaline Casts, UA 0-2 /LPF      Methodology Automated Microscopy    Urinalysis With Culture If Indicated - Urine, Clean Catch [[id number]]  (Abnormal) Collected:  02/10/20 2242    Specimen:  Urine, Clean Catch Updated:  02/10/20 2316     Color, UA Yellow     Appearance, UA Clear     pH, UA 7.0     Specific Gravity, UA 1.019     Glucose, UA >=1000 mg/dL (3+)     Ketones, UA Negative     Bilirubin, UA Negative     Blood, UA Negative     Protein, UA >=300 mg/dL (3+)     Leuk Esterase, UA Negative     Nitrite, UA Negative     Urobilinogen, UA 0.2 E.U./dL    POC Glucose Once [[id number]]  (Abnormal) Collected:  02/10/20 2049    Specimen:  Blood Updated:  02/10/20 2050     Glucose 332 mg/dL     POC Glucose Once [[id number]]  (Abnormal) Collected:  02/10/20 1742    Specimen:  Blood Updated:  02/10/20 1743     Glucose 242 mg/dL     Blood Gas, Arterial [[id number]]  (Abnormal) Collected:  02/10/20 1154    Specimen:  Arterial Blood Updated:  02/10/20 1209     Site Arterial: left radial     Stone's Test Positive     pH, Arterial 7.389 pH units      pCO2, Arterial 43.8 mm Hg      pO2, Arterial 66.4 mm Hg      HCO3, Arterial 26.5 mmol/L      Base Excess, Arterial 1.2 mmol/L      O2 Saturation Calculated 92.5 %      Barometric Pressure for Blood Gas 752.3 mmHg      Modality Room Air     Rate 20 Breaths/minute     BNP [[id number]]  (Normal) Collected:  02/10/20 1058    Specimen:  Blood Updated:  02/10/20 1138     proBNP 631.9 pg/mL     Narrative:       Among patients with dyspnea, NT-proBNP is highly sensitive for the detection of acute congestive heart failure. In addition NT-proBNP of <300 pg/ml effectively rules out acute congestive heart failure with 99%  negative predictive value.    Results may be falsely decreased if patient taking Biotin.      Comprehensive Metabolic Panel [[id number]]  (Abnormal) Collected:  02/10/20 1058    Specimen:  Blood Updated:  02/10/20 1138     Glucose 83 mg/dL      BUN 31 mg/dL      Creatinine 1.60 mg/dL      Sodium 140 mmol/L      Potassium 3.5 mmol/L      Chloride 106 mmol/L      CO2 23.0 mmol/L      Calcium 7.6 mg/dL      Total Protein 5.6 g/dL      Albumin 3.00 g/dL      ALT (SGPT) 13 U/L      AST (SGOT) 10 U/L      Alkaline Phosphatase 67 U/L      Total Bilirubin 0.2 mg/dL      eGFR Non African Amer 31 mL/min/1.73      Globulin 2.6 gm/dL      A/G Ratio 1.2 g/dL      BUN/Creatinine Ratio 19.4     Anion Gap 11.0 mmol/L     Narrative:       GFR Normal >60  Chronic Kidney Disease <60  Kidney Failure <15      Lipid Panel [[id number]] Collected:  02/10/20 1058    Specimen:  Blood Updated:  02/10/20 1135     Total Cholesterol 131 mg/dL      Triglycerides 70 mg/dL      HDL Cholesterol 49 mg/dL      LDL Cholesterol  68 mg/dL      VLDL Cholesterol 14 mg/dL      LDL/HDL Ratio 1.39    Narrative:       Cholesterol Reference Ranges  (U.S. Department of Health and Human Services ATP III Classifications)    Desirable          <200 mg/dL  Borderline High    200-239 mg/dL  High Risk          >240 mg/dL      Triglyceride Reference Ranges  (U.S. Department of Health and Human Services ATP III Classifications)    Normal           <150 mg/dL  Borderline High  150-199 mg/dL  High             200-499 mg/dL  Very High        >500 mg/dL    HDL Reference Ranges  (U.S. Department of Health and Human Services ATP III Classifcations)    Low     <40 mg/dl (major risk factor for CHD)  High    >60 mg/dl ('negative' risk factor for CHD)        LDL Reference Ranges  (U.S. Department of Health and Human Services ATP III Classifcations)    Optimal          <100 mg/dL  Near Optimal     100-129 mg/dL  Borderline High  130-159 mg/dL  High             160-189 mg/dL  Very  High        >189 mg/dL    Hemoglobin A1c [[id number]]  (Abnormal) Collected:  02/10/20 1058    Specimen:  Blood Updated:  02/10/20 1123     Hemoglobin A1C 6.81 %     Narrative:       Hemoglobin A1C Ranges:    Increased Risk for Diabetes  5.7% to 6.4%  Diabetes                     >= 6.5%  Diabetic Goal                < 7.0%    Protime-INR [[id number]]  (Normal) Collected:  02/10/20 1058    Specimen:  Blood Updated:  02/10/20 1121     Protime 13.2 Seconds      INR 1.03    aPTT [[id number]]  (Normal) Collected:  02/10/20 1058    Specimen:  Blood Updated:  02/10/20 1121     PTT 33.2 seconds     Platelet Function ADP [[id number]] Collected:  02/10/20 1058    Specimen:  Blood Updated:  02/10/20 1113     ADP Aggregation, % Platelet 77 %     CBC & Differential [[id number]] Collected:  02/10/20 1058    Specimen:  Blood Updated:  02/10/20 1113    Narrative:       The following orders were created for panel order CBC & Differential.  Procedure                               Abnormality         Status                     ---------                               -----------         ------                     CBC Auto Differential[[id number]]        Abnormal            Final result                 Please view results for these tests on the individual orders.    CBC Auto Differential [[id number]]  (Abnormal) Collected:  02/10/20 1058    Specimen:  Blood Updated:  02/10/20 1113     WBC 8.73 10*3/mm3      RBC 3.12 10*6/mm3      Hemoglobin 10.0 g/dL      Hematocrit 29.7 %      MCV 95.2 fL      MCH 32.1 pg      MCHC 33.7 g/dL      RDW 12.1 %      RDW-SD 41.8 fl      MPV 9.8 fL      Platelets 184 10*3/mm3      Neutrophil % 86.7 %      Lymphocyte % 9.9 %      Monocyte % 2.6 %      Eosinophil % 0.3 %      Basophil % 0.2 %      Immature Grans % 0.3 %      Neutrophils, Absolute 7.56 10*3/mm3      Lymphocytes, Absolute 0.86 10*3/mm3      Monocytes, Absolute 0.23 10*3/mm3      Eosinophils, Absolute 0.03 10*3/mm3      Basophils, Absolute 0.02  10*3/mm3      Immature Grans, Absolute 0.03 10*3/mm3      nRBC 0.0 /100 WBC     POC Glucose Once [[id number]]  (Normal) Collected:  02/10/20 1047    Specimen:  Blood Updated:  02/10/20 1048     Glucose 87 mg/dL     Comprehensive Metabolic Panel [[id number]]  (Abnormal) Collected:  02/10/20 0443    Specimen:  Blood Updated:  02/10/20 0531     Glucose 185 mg/dL      BUN 34 mg/dL      Creatinine 1.85 mg/dL      Sodium 140 mmol/L      Potassium 3.7 mmol/L      Chloride 103 mmol/L      CO2 25.1 mmol/L      Calcium 8.4 mg/dL      Total Protein 6.0 g/dL      Albumin 3.20 g/dL      ALT (SGPT) 10 U/L      AST (SGOT) 9 U/L      Alkaline Phosphatase 68 U/L      Total Bilirubin <0.2 mg/dL      eGFR Non African Amer 26 mL/min/1.73      Globulin 2.8 gm/dL      A/G Ratio 1.1 g/dL      BUN/Creatinine Ratio 18.4     Anion Gap 11.9 mmol/L     Narrative:       GFR Normal >60  Chronic Kidney Disease <60  Kidney Failure <15      POC Glucose Once [[id number]]  (Abnormal) Collected:  02/09/20 2056    Specimen:  Blood Updated:  02/09/20 2101     Glucose 220 mg/dL     Basic Metabolic Panel [[id number]]  (Abnormal) Collected:  02/09/20 1715    Specimen:  Blood Updated:  02/09/20 1747     Glucose 188 mg/dL      BUN 38 mg/dL      Creatinine 2.10 mg/dL      Sodium 142 mmol/L      Potassium 3.7 mmol/L      Chloride 101 mmol/L      CO2 26.8 mmol/L      Calcium 8.8 mg/dL      eGFR Non African Amer 23 mL/min/1.73      BUN/Creatinine Ratio 18.1     Anion Gap 14.2 mmol/L     Narrative:       GFR Normal >60  Chronic Kidney Disease <60  Kidney Failure <15      CBC Auto Differential [[id number]]  (Abnormal) Collected:  02/09/20 1715    Specimen:  Blood Updated:  02/09/20 1728     WBC 10.19 10*3/mm3      RBC 3.55 10*6/mm3      Hemoglobin 11.3 g/dL      Hematocrit 34.1 %      MCV 96.1 fL      MCH 31.8 pg      MCHC 33.1 g/dL      RDW 12.1 %      RDW-SD 42.1 fl      MPV 10.2 fL      Platelets 240 10*3/mm3      Neutrophil % 66.9 %      Lymphocyte % 20.4 %       Monocyte % 10.2 %      Eosinophil % 1.4 %      Basophil % 0.5 %      Immature Grans % 0.6 %      Neutrophils, Absolute 6.82 10*3/mm3      Lymphocytes, Absolute 2.08 10*3/mm3      Monocytes, Absolute 1.04 10*3/mm3      Eosinophils, Absolute 0.14 10*3/mm3      Basophils, Absolute 0.05 10*3/mm3      Immature Grans, Absolute 0.06 10*3/mm3      nRBC 0.0 /100 WBC         Imaging Results (Last 72 Hours)     Procedure Component Value Units Date/Time    XR Chest PA & Lateral [[id number]] Collected:  02/10/20 1355     Updated:  02/10/20 1401    Narrative:       XR CHEST PA AND LATERAL-     HISTORY: Female who is 79 years-old,  preoperative evaluation     TECHNIQUE: Frontal and lateral views of the chest     COMPARISON: None available     FINDINGS: Heart size is borderline. Aorta is calcified. Pulmonary  vasculature is unremarkable. No focal pulmonary consolidation, pleural  effusion, or pneumothorax. No acute osseous process.       Impression:       No evidence for acute pulmonary process. Borderline heart  size. Follow-up as clinical indications persist.     This report was finalized on 2/10/2020 1:58 PM by Dr. Brian Olsen M.D.           Medical record review  Summary  Nephrology notes reviewed  CKD stage III output is adequate  So far stable postop CABG  Medication changed to reflect decreasing renal function    Medication Review:       Current Facility-Administered Medications:   •  acetaminophen (TYLENOL) tablet 650 mg, 650 mg, Oral, Q4H **OR** acetaminophen (TYLENOL) 160 MG/5ML solution 650 mg, 650 mg, Oral, Q4H **OR** acetaminophen (TYLENOL) suppository 650 mg, 650 mg, Rectal, Q4H, Jr Anthony Rai MD  •  [START ON 2/13/2020] acetaminophen (TYLENOL) tablet 650 mg, 650 mg, Oral, Q4H PRN **OR** [START ON 2/13/2020] acetaminophen (TYLENOL) 160 MG/5ML solution 650 mg, 650 mg, Oral, Q4H PRN **OR** [START ON 2/13/2020] acetaminophen (TYLENOL) suppository 650 mg, 650 mg, Rectal, Q4H PRN, Jr Anthony Rai MD  •   albumin human 5 % solution 1,500 mL, 1,500 mL, Intravenous, PRN, Jr Anthony Rai MD  •  ALPRAZolam (XANAX) tablet 0.25 mg, 0.25 mg, Oral, Q8H PRN, Jr Anthony Rai MD  •  [START ON 2/13/2020] aspirin EC tablet 81 mg, 81 mg, Oral, Daily, Jr Anthony Rai MD  •  atorvastatin (LIPITOR) tablet 40 mg, 40 mg, Oral, Nightly, Jr Anthony Rai MD  •  carBAMazepine (CARBATROL) 12 hr capsule 300 mg, 300 mg, Oral, Q12H, Jr Anthony Rai MD  •  chlorhexidine (PERIDEX) 0.12 % solution 15 mL, 15 mL, Mouth/Throat, Q12H, Jr Anthony Rai MD  •  clevidipine (CLEVIPREX) infusion 0.5 mg/mL, 2-32 mg/hr, Intravenous, Continuous PRN, Jr Anthony Rai MD  •  [START ON 2/13/2020] cyclobenzaprine (FLEXERIL) tablet 10 mg, 10 mg, Oral, Q8H PRN, Jr Anthony Rai MD  •  dexmedetomidine (PRECEDEX) 400 mcg/100mL (4 mcg/mL) NS infusion kit, 0.2-1.5 mcg/kg/hr, Intravenous, Titrated, Jr Anthony Rai MD  •  DOPamine 400 mg in 250 mL D5W (1.6 mg/mL) infusion, 2-20 mcg/kg/min, Intravenous, Continuous PRN, Jr Anthony Rai MD  •  [START ON 2/13/2020] enoxaparin (LOVENOX) syringe 30 mg, 30 mg, Subcutaneous, Daily, Jr Anthony Rai MD  •  EPINEPHrine 5 mg in 250mL (20 mcg/mL) infusion, 0.02-0.3 mcg/kg/min, Intravenous, Continuous PRN, Jr Anthony Rai MD  •  furosemide (LASIX) injection 40 mg, 40 mg, Intravenous, Q6H PRN, Jr Anthony Rai MD  •  HYDROcodone-acetaminophen (NORCO) 5-325 MG per tablet 2 tablet, 2 tablet, Oral, Q4H PRN, Jr Anthony Rai MD  •  insulin regular (HumuLIN R,NovoLIN R) 100 Units in sodium chloride 0.9 % 100 mL (1 Units/mL) infusion, 0-50 Units/hr, Intravenous, Continuous PRN, Jr Anthony Rai MD  •  [START ON 2/13/2020] levothyroxine (SYNTHROID, LEVOTHROID) tablet 175 mcg, 175 mcg, Oral, Daily, Jr Anthony Rai MD  •  magnesium sulfate in D5W 1g/100mL (PREMIX), 1 g, Intravenous, Q8H, Jr Anthony Rai MD  •  meperidine (DEMEROL) injection 25 mg,  25 mg, Intravenous, Q4H PRN, Jr Anthony Rai MD  •  metoclopramide (REGLAN) injection 10 mg, 10 mg, Intravenous, Q6H, Jr Anthony Rai MD  •  metoprolol tartrate (LOPRESSOR) tablet 12.5 mg, 12.5 mg, Oral, Q12H, Jr Anthony Rai MD  •  midazolam (VERSED) injection 2 mg, 2 mg, Intravenous, Q1H PRN, Jr Anthony Rai MD  •  milrinone 20 mg/100 mL (0.2 mg/mL) in 5 % dextrose infusion, 0.25-0.75 mcg/kg/min, Intravenous, Continuous PRN, Jr Anthony Rai MD  •  morphine injection 1 mg, 1 mg, Intravenous, Q4H PRN **AND** naloxone (NARCAN) injection 0.4 mg, 0.4 mg, Intravenous, Q5 Min PRN, Jr Anthony Rai MD  •  morphine injection 4 mg, 4 mg, Intravenous, Q30 Min PRN, Jr Anthony Rai MD  •  mupirocin (BACTROBAN) 2 % nasal ointment, , Each Nare, BID, Jr Anthony Rai MD  •  niCARdipine (CARDENE) 25 mg in 250 mL NS (0.1 mg/mL) infusion kit, 5-15 mg/hr, Intravenous, Continuous PRN, Jr Anthony Rai MD  •  nitroglycerin 50 mg/250 mL (0.2 mg/mL) infusion, 5-200 mcg/min, Intravenous, Titrated, Jr Anthony Rai MD  •  NON FORMULARY, 1,000 mg, Intravenous, Once, Jr Anthony Rai MD  •  norepinephrine (LEVOPHED) 8 mg/250 mL (32 mcg/mL) in sodium chloride 0.9% infusion (premix), 0.02-0.3 mcg/kg/min, Intravenous, Continuous PRN, Jr Anthony Rai MD  •  ondansetron (ZOFRAN) injection 4 mg, 4 mg, Intravenous, Q6H PRN, Jr Anthony Rai MD  •  oxyCODONE (ROXICODONE) immediate release tablet 10 mg, 10 mg, Oral, Q4H PRN, Jr Anthony Rai MD  •  [START ON 2/13/2020] pantoprazole (PROTONIX) EC tablet 40 mg, 40 mg, Oral, Daily, Jr Anthony Rai MD  •  phenylephrine (SENA-SYNEPHRINE) 50 mg in sodium chloride 0.9 % 250 mL (0.2 mg/mL) infusion, 0.2-3 mcg/kg/min, Intravenous, Continuous PRN, Jr Anthony Rai MD  •  potassium chloride (MICRO-K) CR capsule 40 mEq, 40 mEq, Oral, PRN **OR** potassium chloride (KLOR-CON) packet 40 mEq, 40 mEq, Oral, PRN, Jr Anthony Rai  MD ELIE  •  potassium chloride 10 mEq in 100 mL IVPB, 10 mEq, Intravenous, Q1H PRN **OR** potassium chloride 10 mEq in 100 mL IVPB, 10 mEq, Intravenous, Q1H PRN, Jr Anthony Rai MD  •  potassium chloride 20 mEq in 50 mL IVPB, 20 mEq, Intravenous, Q1H PRN, Jr Anthony Rai MD  •  potassium chloride 20 mEq in 50 mL IVPB, 20 mEq, Intravenous, Q1H PRN, Jr Anthony Rai MD  •  potassium chloride 20 mEq in 50 mL IVPB, 20 mEq, Intravenous, Q1H PRN, Jr Anthony Rai MD  •  promethazine (PHENERGAN) tablet 12.5 mg, 12.5 mg, Oral, Q6H PRN **OR** promethazine (PHENERGAN) injection 12.5 mg, 12.5 mg, Intravenous, Q6H PRN, Jr Anthony Rai MD  •  propofol (DIPRIVAN) infusion 10 mg/mL 100 mL, 5-50 mcg/kg/min, Intravenous, Continuous PRN, Jr Anthony Rai MD  •  sodium chloride 0.9 % flush 30 mL, 30 mL, Intravenous, Once PRN, Jr Anthony Rai MD  •  sodium chloride 0.9 % infusion, 30 mL/hr, Intravenous, Continuous, Jr Anthony Rai MD  •  sodium chloride 0.9 % infusion, 30 mL/hr, Intravenous, Continuous PRN, Jr Anthony Rai MD  •  vancomycin 1500 mg/500 mL 0.9% NS IVPB ([facility]), 15 mg/kg, Intravenous, Q24H, Jr Anthony Rai MD  •  Vasopressin (PITRESSIN) 20 Units in sodium chloride 0.9 % 100 mL (0.2 Units/mL) infusion, 0.02-0.1 Units/min, Intravenous, Continuous PRN, Jr Anthony Rai MD    Facility-Administered Medications Ordered in Other Encounters:   •  aminocaproic acid (AMICAR) injection, , Intravenous, PRN, Oscar Loco MD, 5 g at 02/12/20 1127  •  ePHEDrine injection, , Intravenous, PRN, Oscar Loco MD, 7.5 mg at 02/12/20 0824  •  fentaNYL citrate (PF) (SUBLIMAZE) injection, , Intravenous, PRN, Oscar Loco MD, 250 mcg at 02/12/20 1128  •  heparin (porcine) injection, , Intravenous, PRN, Oscar Loco MD, 5,000 Units at 02/12/20 0942  •  insulin regular (humuLIN R,novoLIN R) 1 Units/mL in sodium chloride 0.9 % 100 mL infusion, , ,  Continuous PRClaudy CARTER Raghavendra, MD, Last Rate: 1 mL/hr at 02/12/20 1218, 1 Units/hr at 02/12/20 1218  •  lactated ringers infusion, , , Continuous PRNClaudy Raghavendra, MD  •  midazolam (VERSED) injection, , Intravenous, PRNClaudy Raghavendra, MD, 1 mg at 02/12/20 0710  •  niCARdipine (CARDENE) 0.1 mg/mL in sodium chloride 0.9 % 260.4 mL infusion, , , Continuous PRNClaudy Raghavendra, MD, Last Rate: 25 mL/hr at 02/12/20 1147, 2.5 mg/hr at 02/12/20 1147  •  nitroglycerin (TRIDIL) injection, , Intravenous, PRNClaudy Raghavendra, MD, 80 mcg at 02/12/20 1124  •  ondansetron (ZOFRAN) injection, , Intravenous, PRNClaudy Raghavendra, MD, 4 mg at 02/12/20 1219  •  phenylephrine (SENA-SYNEPHRINE) 50 mg in sodium chloride 0.9 % 250 mL infusion, , Intravenous, Continuous PRClaudy CARTER Raghavendra, MD, Stopped at 02/12/20 0800  •  Propofol (DIPRIVAN) injection, , Intravenous, PRClaudy CARTER Raghavendra, MD, 100 mg at 02/12/20 0730  •  Propofol (DIPRIVAN) injection, , Intravenous, Continuous PRClaudy CARTER Raghavendra, MD, Last Rate: 14.75 mL/hr at 02/12/20 0738, 25 mcg/kg/min at 02/12/20 0738  •  protamine injection, , Intravenous, PRClaudy CARTER Raghavendra, MD, 300 mg at 02/12/20 1110  •  rocuronium (ZEMURON) injection, , Intravenous, PRClaudy CARTER Raghavendra, MD, 50 mg at 02/12/20 0943  •  sodium chloride 0.9 % infusion, , Intravenous, Continuous PRClaudy CARTER Raghavendra, MD    Assessment/Plan     Patient Active Problem List   Diagnosis   • Asthma   • Type 2 diabetes mellitus with diabetic polyneuropathy, with long-term current use of insulin (CMS/HCC)   • Essential hypertension   • Mixed hyperlipidemia   • Disorder of muscle, ligament, and fascia   • Proteinuria   • Postoperative hypothyroidism   • Hyperparathyroidism (CMS/HCC)   • Vitamin D deficiency   • Gout without tophus   • Solitary thyroid nodule   • Anemia in stage 3 chronic kidney disease (CMS/HCC)   • Stage 3 chronic kidney disease  "(CMS/[facility])   • Class 1 obesity due to excess calories with serious comorbidity and body mass index (BMI) of 34.0 to 34.9 in adult   • Heartburn   • Stable angina (CMS/[facility])   • 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 pectoris (CMS/[facility])     Uncontrolled type 2 diabetes mellitus  Uncontrolled type 2 diabetes mellitus with neuropathy  Uncontrolled type 2 diabetes mellitus with nephropathy  Chronic kidney disease  Postoperative hypothyroidism patient had partial right hemithyroidectomy 2016  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  Status post Avastin injections  Recently steroid use complicating diabetes  Hyperlipidemia associated with diabetes  Coronary artery disease status post cardiac cath and is currently awaiting CABG    Patient is status post CABG  Currently intubated and sedated and on multiple IV fluids and infusions  She is also receiving insulin drip at 1 to 2 units an hour  Discussed with the nurse  Discussed with Dr. Suero    We will continue to monitor the events and then transition her to subcutaneous insulin regimen when the patient is ready clinically          Jayy Garrido MD FACE.  02/12/20  1:09 PM      EMR Dragon / transcription disclaimer:     \"Dictated utilizing Dragon dictation\".   "

## 2020-02-13 ENCOUNTER — APPOINTMENT (OUTPATIENT)
Dept: GENERAL RADIOLOGY | Facility: HOSPITAL | Age: 80
End: 2020-02-13

## 2020-02-13 LAB
ACT BLD: 334 SECONDS (ref 82–152)
ALBUMIN SERPL-MCNC: 3.5 G/DL (ref 3.5–5.2)
ANION GAP SERPL CALCULATED.3IONS-SCNC: 10.5 MMOL/L (ref 5–15)
BASE EXCESS BLDA CALC-SCNC: -2 MMOL/L (ref -5–5)
BASE EXCESS BLDA CALC-SCNC: 0 MMOL/L (ref -5–5)
BASE EXCESS BLDA CALC-SCNC: 1 MMOL/L (ref -5–5)
BASE EXCESS BLDA CALC-SCNC: 2 MMOL/L (ref -5–5)
BASOPHILS # BLD AUTO: 0.01 10*3/MM3 (ref 0–0.2)
BASOPHILS NFR BLD AUTO: 0.1 % (ref 0–1.5)
BUN BLD-MCNC: 33 MG/DL (ref 8–23)
BUN/CREAT SERPL: 19.3 (ref 7–25)
CALCIUM SPEC-SCNC: 8.2 MG/DL (ref 8.6–10.5)
CHLORIDE SERPL-SCNC: 106 MMOL/L (ref 98–107)
CO2 BLDA-SCNC: 25 MMOL/L (ref 24–29)
CO2 BLDA-SCNC: 27 MMOL/L (ref 24–29)
CO2 BLDA-SCNC: 28 MMOL/L (ref 24–29)
CO2 SERPL-SCNC: 22.5 MMOL/L (ref 22–29)
CREAT BLD-MCNC: 1.71 MG/DL (ref 0.57–1)
DEPRECATED RDW RBC AUTO: 42.4 FL (ref 37–54)
EOSINOPHIL # BLD AUTO: 0 10*3/MM3 (ref 0–0.4)
EOSINOPHIL NFR BLD AUTO: 0 % (ref 0.3–6.2)
ERYTHROCYTE [DISTWIDTH] IN BLOOD BY AUTOMATED COUNT: 12.2 % (ref 12.3–15.4)
FERRITIN SERPL-MCNC: 192 NG/ML (ref 13–150)
GFR SERPL CREATININE-BSD FRML MDRD: 29 ML/MIN/1.73
GLUCOSE BLD-MCNC: 137 MG/DL (ref 65–99)
GLUCOSE BLDC GLUCOMTR-MCNC: 101 MG/DL (ref 70–130)
GLUCOSE BLDC GLUCOMTR-MCNC: 117 MG/DL (ref 70–130)
GLUCOSE BLDC GLUCOMTR-MCNC: 127 MG/DL (ref 70–130)
GLUCOSE BLDC GLUCOMTR-MCNC: 140 MG/DL (ref 70–130)
GLUCOSE BLDC GLUCOMTR-MCNC: 142 MG/DL (ref 70–130)
GLUCOSE BLDC GLUCOMTR-MCNC: 146 MG/DL (ref 70–130)
GLUCOSE BLDC GLUCOMTR-MCNC: 147 MG/DL (ref 70–130)
GLUCOSE BLDC GLUCOMTR-MCNC: 148 MG/DL (ref 70–130)
GLUCOSE BLDC GLUCOMTR-MCNC: 165 MG/DL (ref 70–130)
GLUCOSE BLDC GLUCOMTR-MCNC: 174 MG/DL (ref 70–130)
GLUCOSE BLDC GLUCOMTR-MCNC: 175 MG/DL (ref 70–130)
GLUCOSE BLDC GLUCOMTR-MCNC: 178 MG/DL (ref 70–130)
GLUCOSE BLDC GLUCOMTR-MCNC: 179 MG/DL (ref 70–130)
GLUCOSE BLDC GLUCOMTR-MCNC: 84 MG/DL (ref 70–130)
GLUCOSE BLDC GLUCOMTR-MCNC: 91 MG/DL (ref 70–130)
GLUCOSE BLDC GLUCOMTR-MCNC: 99 MG/DL (ref 70–130)
HCO3 BLDA-SCNC: 23.3 MMOL/L (ref 22–26)
HCO3 BLDA-SCNC: 25.5 MMOL/L (ref 22–26)
HCO3 BLDA-SCNC: 25.8 MMOL/L (ref 22–26)
HCO3 BLDA-SCNC: 25.9 MMOL/L (ref 22–26)
HCO3 BLDA-SCNC: 26 MMOL/L (ref 22–26)
HCO3 BLDA-SCNC: 26.3 MMOL/L (ref 22–26)
HCT VFR BLD AUTO: 23.1 % (ref 34–46.6)
HCT VFR BLDA CALC: 22 % (ref 38–51)
HCT VFR BLDA CALC: 23 % (ref 38–51)
HCT VFR BLDA CALC: 23 % (ref 38–51)
HCT VFR BLDA CALC: 24 % (ref 38–51)
HCT VFR BLDA CALC: 25 % (ref 38–51)
HCT VFR BLDA CALC: 27 % (ref 38–51)
HGB BLD-MCNC: 7.6 G/DL (ref 12–15.9)
HGB BLDA-MCNC: 7.5 G/DL (ref 12–17)
HGB BLDA-MCNC: 7.8 G/DL (ref 12–17)
HGB BLDA-MCNC: 7.8 G/DL (ref 12–17)
HGB BLDA-MCNC: 8.2 G/DL (ref 12–17)
HGB BLDA-MCNC: 8.5 G/DL (ref 12–17)
HGB BLDA-MCNC: 9.2 G/DL (ref 12–17)
IMM GRANULOCYTES # BLD AUTO: 0.03 10*3/MM3 (ref 0–0.05)
IMM GRANULOCYTES NFR BLD AUTO: 0.3 % (ref 0–0.5)
INR PPP: 1.19 (ref 0.9–1.1)
IRON 24H UR-MRATE: 18 MCG/DL (ref 37–145)
IRON SATN MFR SERPL: 13 % (ref 20–50)
LYMPHOCYTES # BLD AUTO: 0.49 10*3/MM3 (ref 0.7–3.1)
LYMPHOCYTES NFR BLD AUTO: 5.5 % (ref 19.6–45.3)
MAGNESIUM SERPL-MCNC: 2.2 MG/DL (ref 1.6–2.4)
MCH RBC QN AUTO: 31.3 PG (ref 26.6–33)
MCHC RBC AUTO-ENTMCNC: 32.9 G/DL (ref 31.5–35.7)
MCV RBC AUTO: 95.1 FL (ref 79–97)
MONOCYTES # BLD AUTO: 0.95 10*3/MM3 (ref 0.1–0.9)
MONOCYTES NFR BLD AUTO: 10.7 % (ref 5–12)
NEUTROPHILS # BLD AUTO: 7.42 10*3/MM3 (ref 1.7–7)
NEUTROPHILS NFR BLD AUTO: 83.4 % (ref 42.7–76)
NRBC BLD AUTO-RTO: 0 /100 WBC (ref 0–0.2)
PCO2 BLDA: 38.6 MM HG (ref 35–45)
PCO2 BLDA: 41.6 MM HG (ref 35–45)
PCO2 BLDA: 42.6 MM HG (ref 35–45)
PCO2 BLDA: 42.9 MM HG (ref 35–45)
PCO2 BLDA: 43.7 MM HG (ref 35–45)
PCO2 BLDA: 44.8 MM HG (ref 35–45)
PH BLDA: 7.36 PH UNITS (ref 7.35–7.6)
PH BLDA: 7.37 PH UNITS (ref 7.35–7.6)
PH BLDA: 7.38 PH UNITS (ref 7.35–7.6)
PH BLDA: 7.38 PH UNITS (ref 7.35–7.6)
PH BLDA: 7.4 PH UNITS (ref 7.35–7.6)
PH BLDA: 7.43 PH UNITS (ref 7.35–7.6)
PHOSPHATE SERPL-MCNC: 4.5 MG/DL (ref 2.5–4.5)
PLATELET # BLD AUTO: 107 10*3/MM3 (ref 140–450)
PMV BLD AUTO: 10.4 FL (ref 6–12)
PO2 BLDA: 276 MMHG (ref 80–105)
PO2 BLDA: 42 MMHG (ref 80–105)
PO2 BLDA: 425 MMHG (ref 80–105)
PO2 BLDA: 443 MMHG (ref 80–105)
PO2 BLDA: 460 MMHG (ref 80–105)
PO2 BLDA: 478 MMHG (ref 80–105)
POTASSIUM BLD-SCNC: 3.9 MMOL/L (ref 3.5–5.2)
POTASSIUM BLDA-SCNC: 3.1 MMOL/L (ref 3.5–4.9)
POTASSIUM BLDA-SCNC: 3.7 MMOL/L (ref 3.5–4.9)
POTASSIUM BLDA-SCNC: 3.8 MMOL/L (ref 3.5–4.9)
POTASSIUM BLDA-SCNC: 4.1 MMOL/L (ref 3.5–4.9)
POTASSIUM BLDA-SCNC: 4.2 MMOL/L (ref 3.5–4.9)
POTASSIUM BLDA-SCNC: 4.5 MMOL/L (ref 3.5–4.9)
PROTHROMBIN TIME: 14.8 SECONDS (ref 11.7–14.2)
RBC # BLD AUTO: 2.43 10*6/MM3 (ref 3.77–5.28)
SAO2 % BLDA: 100 % (ref 95–98)
SAO2 % BLDA: 75 % (ref 95–98)
SODIUM BLD-SCNC: 139 MMOL/L (ref 136–145)
TIBC SERPL-MCNC: 137 MCG/DL (ref 298–536)
TRANSFERRIN SERPL-MCNC: 92 MG/DL (ref 200–360)
VANCOMYCIN SERPL-MCNC: 25 MCG/ML (ref 5–40)
WBC NRBC COR # BLD: 8.9 10*3/MM3 (ref 3.4–10.8)

## 2020-02-13 PROCEDURE — 97162 PT EVAL MOD COMPLEX 30 MIN: CPT

## 2020-02-13 PROCEDURE — 25010000002 PROMETHAZINE PER 50 MG: Performed by: THORACIC SURGERY (CARDIOTHORACIC VASCULAR SURGERY)

## 2020-02-13 PROCEDURE — 86900 BLOOD TYPING SEROLOGIC ABO: CPT

## 2020-02-13 PROCEDURE — 85025 COMPLETE CBC W/AUTO DIFF WBC: CPT | Performed by: THORACIC SURGERY (CARDIOTHORACIC VASCULAR SURGERY)

## 2020-02-13 PROCEDURE — 93005 ELECTROCARDIOGRAM TRACING: CPT | Performed by: THORACIC SURGERY (CARDIOTHORACIC VASCULAR SURGERY)

## 2020-02-13 PROCEDURE — P9016 RBC LEUKOCYTES REDUCED: HCPCS

## 2020-02-13 PROCEDURE — 63710000001 INSULIN GLARGINE PER 5 UNITS: Performed by: INTERNAL MEDICINE

## 2020-02-13 PROCEDURE — 25010000002 METOCLOPRAMIDE PER 10 MG: Performed by: INTERNAL MEDICINE

## 2020-02-13 PROCEDURE — 82728 ASSAY OF FERRITIN: CPT | Performed by: INTERNAL MEDICINE

## 2020-02-13 PROCEDURE — 83735 ASSAY OF MAGNESIUM: CPT | Performed by: THORACIC SURGERY (CARDIOTHORACIC VASCULAR SURGERY)

## 2020-02-13 PROCEDURE — 63710000001 INSULIN LISPRO (HUMAN) PER 5 UNITS: Performed by: INTERNAL MEDICINE

## 2020-02-13 PROCEDURE — 71045 X-RAY EXAM CHEST 1 VIEW: CPT

## 2020-02-13 PROCEDURE — 80202 ASSAY OF VANCOMYCIN: CPT | Performed by: NURSE PRACTITIONER

## 2020-02-13 PROCEDURE — 99232 SBSQ HOSP IP/OBS MODERATE 35: CPT | Performed by: INTERNAL MEDICINE

## 2020-02-13 PROCEDURE — 82962 GLUCOSE BLOOD TEST: CPT

## 2020-02-13 PROCEDURE — 83540 ASSAY OF IRON: CPT | Performed by: INTERNAL MEDICINE

## 2020-02-13 PROCEDURE — 36430 TRANSFUSION BLD/BLD COMPNT: CPT

## 2020-02-13 PROCEDURE — 25010000002 ONDANSETRON PER 1 MG: Performed by: THORACIC SURGERY (CARDIOTHORACIC VASCULAR SURGERY)

## 2020-02-13 PROCEDURE — 84466 ASSAY OF TRANSFERRIN: CPT | Performed by: INTERNAL MEDICINE

## 2020-02-13 PROCEDURE — 97110 THERAPEUTIC EXERCISES: CPT

## 2020-02-13 PROCEDURE — 25010000002 ENOXAPARIN PER 10 MG: Performed by: THORACIC SURGERY (CARDIOTHORACIC VASCULAR SURGERY)

## 2020-02-13 PROCEDURE — 80069 RENAL FUNCTION PANEL: CPT | Performed by: THORACIC SURGERY (CARDIOTHORACIC VASCULAR SURGERY)

## 2020-02-13 PROCEDURE — 93010 ELECTROCARDIOGRAM REPORT: CPT | Performed by: INTERNAL MEDICINE

## 2020-02-13 PROCEDURE — 85610 PROTHROMBIN TIME: CPT | Performed by: THORACIC SURGERY (CARDIOTHORACIC VASCULAR SURGERY)

## 2020-02-13 RX ORDER — DEXTROSE MONOHYDRATE 25 G/50ML
25 INJECTION, SOLUTION INTRAVENOUS
Status: DISCONTINUED | OUTPATIENT
Start: 2020-02-13 | End: 2020-02-18 | Stop reason: HOSPADM

## 2020-02-13 RX ORDER — TORSEMIDE 20 MG/1
20 TABLET ORAL DAILY
Status: DISCONTINUED | OUTPATIENT
Start: 2020-02-13 | End: 2020-02-16

## 2020-02-13 RX ORDER — DEXTROSE MONOHYDRATE 25 G/50ML
INJECTION, SOLUTION INTRAVENOUS
Status: COMPLETED
Start: 2020-02-13 | End: 2020-02-13

## 2020-02-13 RX ORDER — INSULIN GLARGINE 100 [IU]/ML
20 INJECTION, SOLUTION SUBCUTANEOUS EVERY 12 HOURS SCHEDULED
Status: DISCONTINUED | OUTPATIENT
Start: 2020-02-13 | End: 2020-02-16

## 2020-02-13 RX ORDER — NICOTINE POLACRILEX 4 MG
15 LOZENGE BUCCAL
Status: DISCONTINUED | OUTPATIENT
Start: 2020-02-13 | End: 2020-02-18 | Stop reason: HOSPADM

## 2020-02-13 RX ORDER — ALUMINA, MAGNESIA, AND SIMETHICONE 2400; 2400; 240 MG/30ML; MG/30ML; MG/30ML
15 SUSPENSION ORAL EVERY 6 HOURS PRN
Status: DISCONTINUED | OUTPATIENT
Start: 2020-02-13 | End: 2020-02-18 | Stop reason: HOSPADM

## 2020-02-13 RX ORDER — TRAMADOL HYDROCHLORIDE 50 MG/1
25 TABLET ORAL EVERY 8 HOURS PRN
Status: DISCONTINUED | OUTPATIENT
Start: 2020-02-13 | End: 2020-02-18 | Stop reason: HOSPADM

## 2020-02-13 RX ORDER — CALCIUM CARBONATE 200(500)MG
2 TABLET,CHEWABLE ORAL 3 TIMES DAILY PRN
Status: DISCONTINUED | OUTPATIENT
Start: 2020-02-13 | End: 2020-02-18 | Stop reason: HOSPADM

## 2020-02-13 RX ADMIN — INSULIN LISPRO 2 UNITS: 100 INJECTION, SOLUTION INTRAVENOUS; SUBCUTANEOUS at 20:31

## 2020-02-13 RX ADMIN — PANTOPRAZOLE SODIUM 40 MG: 40 TABLET, DELAYED RELEASE ORAL at 10:52

## 2020-02-13 RX ADMIN — ASPIRIN 81 MG: 81 TABLET ORAL at 10:50

## 2020-02-13 RX ADMIN — ENOXAPARIN SODIUM 30 MG: 30 INJECTION SUBCUTANEOUS at 16:43

## 2020-02-13 RX ADMIN — CARBAMAZEPINE 300 MG: 300 CAPSULE, EXTENDED RELEASE ORAL at 20:32

## 2020-02-13 RX ADMIN — MUPIROCIN 1 APPLICATION: 20 OINTMENT TOPICAL at 09:34

## 2020-02-13 RX ADMIN — INSULIN GLARGINE 20 UNITS: 100 INJECTION, SOLUTION SUBCUTANEOUS at 20:32

## 2020-02-13 RX ADMIN — ATORVASTATIN CALCIUM 40 MG: 20 TABLET, FILM COATED ORAL at 20:32

## 2020-02-13 RX ADMIN — ONDANSETRON HYDROCHLORIDE 4 MG: 2 SOLUTION INTRAMUSCULAR; INTRAVENOUS at 09:16

## 2020-02-13 RX ADMIN — TORSEMIDE 20 MG: 20 TABLET ORAL at 16:43

## 2020-02-13 RX ADMIN — TRAMADOL HYDROCHLORIDE 25 MG: 50 TABLET ORAL at 07:59

## 2020-02-13 RX ADMIN — CYCLOBENZAPRINE 10 MG: 10 TABLET, FILM COATED ORAL at 00:13

## 2020-02-13 RX ADMIN — INSULIN GLARGINE 20 UNITS: 100 INJECTION, SOLUTION SUBCUTANEOUS at 09:09

## 2020-02-13 RX ADMIN — ACETAMINOPHEN 650 MG: 325 TABLET, FILM COATED ORAL at 20:59

## 2020-02-13 RX ADMIN — DEXTROSE: 50 INJECTION, SOLUTION INTRAVENOUS at 02:15

## 2020-02-13 RX ADMIN — MUPIROCIN 1 APPLICATION: 20 OINTMENT TOPICAL at 20:31

## 2020-02-13 RX ADMIN — METOCLOPRAMIDE 5 MG: 5 INJECTION, SOLUTION INTRAMUSCULAR; INTRAVENOUS at 09:04

## 2020-02-13 RX ADMIN — INSULIN LISPRO 2 UNITS: 100 INJECTION, SOLUTION INTRAVENOUS; SUBCUTANEOUS at 16:43

## 2020-02-13 RX ADMIN — HYDROCODONE BITARTRATE AND ACETAMINOPHEN 2 TABLET: 5; 325 TABLET ORAL at 00:13

## 2020-02-13 RX ADMIN — PROMETHAZINE HYDROCHLORIDE 12.5 MG: 25 INJECTION INTRAMUSCULAR; INTRAVENOUS at 09:48

## 2020-02-13 RX ADMIN — CHLORHEXIDINE GLUCONATE 15 ML: 1.2 RINSE ORAL at 20:32

## 2020-02-13 RX ADMIN — CARBAMAZEPINE 300 MG: 300 CAPSULE, EXTENDED RELEASE ORAL at 10:51

## 2020-02-13 RX ADMIN — ONDANSETRON HYDROCHLORIDE 4 MG: 2 SOLUTION INTRAMUSCULAR; INTRAVENOUS at 00:19

## 2020-02-13 RX ADMIN — CHLORHEXIDINE GLUCONATE 15 ML: 1.2 RINSE ORAL at 10:51

## 2020-02-13 RX ADMIN — TRAMADOL HYDROCHLORIDE 25 MG: 50 TABLET ORAL at 16:43

## 2020-02-13 NOTE — THERAPY EVALUATION
Patient Name: Ange Johnson  : 1940    MRN: [id number]                              Today's Date: 2020       Admit Date: 2020    Visit Dx:     ICD-10-CM ICD-9-CM   1. 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 pectoris, unspecified vessel or lesion type (CMS/[facility]) I25.118 414.01     413.9   2. Stable angina (CMS/HCC) I20.8 413.9     Patient Active Problem List   Diagnosis   • Asthma   • Type 2 diabetes mellitus with diabetic polyneuropathy, with long-term current use of insulin (CMS/HCC)   • Essential hypertension   • Mixed hyperlipidemia   • Disorder of muscle, ligament, and fascia   • Proteinuria   • Postoperative hypothyroidism   • Hyperparathyroidism (CMS/HCC)   • Vitamin D deficiency   • Gout without tophus   • Solitary thyroid nodule   • Anemia in stage 3 chronic kidney disease (CMS/HCC)   • Stage 3 chronic kidney disease (CMS/HCC)   • Class 1 obesity due to excess calories with serious comorbidity and body mass index (BMI) of 34.0 to 34.9 in adult   • Heartburn   • Stable angina (CMS/HCC)   • 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 pectoris (CMS/HCC)     Past Medical History:   Diagnosis Date   • Anemia in stage 3 chronic kidney disease (CMS/HCC) 2016   • Arthritis    • Asthma    • Bone spur of acromioclavicular joint    • Carotid atherosclerosis     <50% bilaterally   • Chronic venous insufficiency    • CKD (chronic kidney disease) stage 3, GFR 30-59 ml/min (CMS/HCC)    • Essential hypertension    • Gout    • Grief reaction       2010 after 15 foot fall off ladder   • H/O cataract    • Heel spur    • History of tendinitis    • Hyperlipidemia    • Hyperparathyroidism (CMS/HCC)    • Hyperthyroidism    • Hypothyroidism, acquired    • Non-toxic goiter    • Pneumonia    • Proteinuria    • Type 2 diabetes mellitus with neurological manifestations (CMS/HCC)    • Vitamin D deficiency      Past Surgical History:   Procedure Laterality Date   •  BREAST EXCISIONAL BIOPSY Left 1965    Dr. Ybarra benign   • BREAST EXCISIONAL BIOPSY Bilateral 1965    benign   • CARDIAC CATHETERIZATION N/A 2/10/2020    Procedure: Left heart cath without LV gram;  Surgeon: Emerson Deras MD;  Location: [facility] CATH INVASIVE LOCATION;  Service: Cardiovascular;  Laterality: N/A;   • CARDIAC CATHETERIZATION N/A 2/10/2020    Procedure: Coronary angiography;  Surgeon: Emerson Deras MD;  [facility];  Service: Cardiovascular;  Laterality: N/A;   • CATARACT EXTRACTION Left 02/03/2010   • CATARACT EXTRACTION Right 04/21/2010   • CORONARY ARTERY BYPASS GRAFT N/A 2/12/2020    Procedure: MIDLINE STERNOTOMY, CORONARY ARTERY BYPASS GRAFTING X  5 USING LEFT INTERNAL MAMMARY ARTERY AND LEFT ENDOSCOPICALLY HARVESTED GREATER SAPHENOUS VEIN, INTRAOP KERWIN, PRP;  Surgeon: Jr Anthony Rai MD;  Location: [facility] MAIN OR;  Service: Cardiothoracic;  Laterality: N/A;   • HYSTERECTOMY  1988    Dr. Quinn Peña   • OOPHORECTOMY      late 40's   • THYROIDECTOMY Right 12/8/2016    Procedure: right PARATHYROID EXCISION OF NODULE and right thyroid lobectomy and bilateral exploration.;  Surgeon: Tasneem Montelongo MD;  Location: [facility] OR [facility];  Service:      General Information     Row Name 02/13/20 0907          PT Evaluation Time/Intention    Document Type  evaluation  -     Mode of Treatment  individual therapy;physical therapy  -     Row Name 02/13/20 0907          General Information    Prior Level of Function  independent:;gait;transfer;bed mobility  -     Existing Precautions/Restrictions  cardiac;fall;sternal  -     Barriers to Rehab  medically complex  -     Row Name 02/13/20 0907          Relationship/Environment    Lives With  child(zaynab), adult  -     Row Name 02/13/20 0907          Resource/Environmental Concerns    Current Living Arrangements  home/apartment/condo  -     Row Name 02/13/20 0907          Cognitive Assessment/Intervention- PT/OT     Orientation Status (Cognition)  oriented to;person  -     Cognitive Assessment/Intervention Comment  pt seems a bit anxious at times, she is worried that she is going to vomit, repeated reassurance and encouragement provided  -     Row Name 02/13/20 0907          Safety Issues, Functional Mobility    Impairments Affecting Function (Mobility)  balance;cognition;endurance/activity tolerance;pain;strength;shortness of breath  -       User Key  (r) = Recorded By, (t) = Taken By, (c) = Cosigned By    Initials Name Provider Type    Lauren Senior, PT Physical Therapist        Mobility     Row Name 02/13/20 0911          Bed Mobility Assessment/Treatment    Bed Mobility Assessment/Treatment  supine-sit;sit-supine  -     Supine-Sit [place] (Bed Mobility)  not tested sitting in chair  -     Sit-Supine [place] (Bed Mobility)  nonverbal cues (demo/gesture);verbal cues;minimum assist (75% patient effort);2 person assist  -     Row Name 02/13/20 0911          Sit-Stand Transfer    Sit-Stand [place] (Transfers)  verbal cues;nonverbal cues (demo/gesture);minimum assist (75% patient effort);2 person assist  -     Assistive Device (Sit-Stand Transfers)  -- HHA  -     Row Name 02/13/20 0911          Gait/Stairs Assessment/Training    [place] Level (Gait)  verbal cues;nonverbal cues (demo/gesture);minimum assist (75% patient effort);2 person assist  -     Assistive Device (Gait)  -- HHA  -     Distance in Feet (Gait)  40  -     Deviations/Abnormal Patterns (Gait)  alisha decreased;gait speed decreased;stride length decreased  -     Bilateral Gait Deviations  forward flexed posture  -     Comment (Gait/Stairs)  repeated cues to stand tall and to keep eyes open  -       User Key  (r) = Recorded By, (t) = Taken By, (c) = Cosigned By    Initials Name Provider Type    Lauren Senior, PT Physical Therapist        Obj/Interventions     Row Name 02/13/20 0913          General  ROM    GENERAL ROM COMMENTS  AROM WFL For age  -     Row Name 02/13/20 0913          MMT (Manual Muscle Testing)    General MMT Comments  generalized weakness noted with functional mobility, B LE grossly 3/5  -     Row Name 02/13/20 0913          Static Standing Balance    Level of [place] (Supported Standing, Static Balance)  minimal assist, 75% patient effort;2 person assist  -     Row Name 02/13/20 0913          Dynamic Standing Balance    Level of [place], Reaches Outside Midline (Standing, Dynamic Balance)  minimal assist, 75% patient effort;2 person assist  -       User Key  (r) = Recorded By, (t) = Taken By, (c) = Cosigned By    Initials Name Provider Type     Lauren Chaney, PT Physical Therapist        Goals/Plan     Row Name 02/13/20 0916          Patient Education Goal (PT)    Activity (Patient Education Goal, PT)  Cardiac level 5  -     Time Frame (Patient Education Goal, PT)  1 week  -       User Key  (r) = Recorded By, (t) = Taken By, (c) = Cosigned By    Initials Name Provider Type     Lauren Chaney, PT Physical Therapist        Clinical Impression     Row Name 02/13/20 0914          Pain Assessment    Additional Documentation  Pain Scale: FACES Pre/Post-Treatment (Group)  -CH     Row Name 02/13/20 0914          Pain Scale: Numbers Pre/Post-Treatment    Pain Location  chest pt also report abdominal pain  -     Pain Intervention(s)  Repositioned  -CH     Row Name 02/13/20 0914          Pain Scale: FACES Pre/Post-Treatment    Pain: FACES Scale, Pretreatment  4-->hurts little more  -     Pain: FACES Scale, Post-Treatment  4-->hurts little more  -CH     Row Name 02/13/20 0914          Plan of Care Review    Plan of Care Reviewed With  patient  -     Outcome Summary  Pt is a 80 yo F who is post-op CABG. Pt presents to PT with impaired functional mobility and gait secondary to generalized weakness, impaired balance, and decreased activity tolerance. Pt may benefit  from skilled PT to address strength, mobility, and gait.  -     Row Name 02/13/20 0914          Physical Therapy Clinical Impression    Criteria for Skilled Interventions Met (PT Clinical Impression)  treatment indicated  -     Rehab Potential (PT Clinical Summary)  good, to achieve stated therapy goals  -     Row Name 02/13/20 0914          Positioning and Restraints    Pre-Treatment Position  sitting in chair/recliner  -CH     Post Treatment Position  bed  -CH     In Bed  supine;with family/caregiver  -       User Key  (r) = Recorded By, (t) = Taken By, (c) = Cosigned By    Initials Name Provider Type    Lauren Senior, PT Physical Therapist        Outcome Measures     Row Name 02/13/20 0917          How much help from another person do you currently need...    Turning from your back to your side while in flat bed without using bedrails?  2  -CH     Moving from lying on back to sitting on the side of a flat bed without bedrails?  2  -CH     Moving to and from a bed to a chair (including a wheelchair)?  3  -CH     Standing up from a chair using your arms (e.g., wheelchair, bedside chair)?  3  -CH     Climbing 3-5 steps with a railing?  1  -CH     To walk in hospital room?  3  -CH     AM-PAC 6 Clicks Score (PT)  14  -     Row Name 02/13/20 0917          Functional Assessment    Outcome Measure Options  AM-PAC 6 Clicks Basic Mobility (PT)  -       User Key  (r) = Recorded By, (t) = Taken By, (c) = Cosigned By    Initials Name Provider Type    Lauren Senior, PT Physical Therapist          PT Recommendation and Plan  Planned Therapy Interventions (PT Eval): balance training, bed mobility training, gait training, home exercise program, patient/family education, strengthening, transfer training  Outcome Summary/Treatment Plan (PT)  Anticipated Discharge Disposition (PT): home with home health  Plan of Care Reviewed With: patient  Outcome Summary: Pt is a 80 yo F who is post-op CABG. Pt presents  to PT with impaired functional mobility and gait secondary to generalized weakness, impaired balance, and decreased activity tolerance. Pt may benefit from skilled PT to address strength, mobility, and gait.     Time Calculation:   PT Charges     Row Name 02/13/20 0918             Time Calculation    Start Time  0835  -      Stop Time  0849  -      Time Calculation (min)  14 min  -      PT Received On  02/13/20  -      PT - Next Appointment  02/14/20  -      PT Goal Re-Cert Due Date  02/20/20  -         Time Calculation- PT    Total Timed Code Minutes- PT  10 minute(s)  -        User Key  (r) = Recorded By, (t) = Taken By, (c) = Cosigned By    Initials Name Provider Type     Lauren Chaney PT Physical Therapist        Therapy Charges for Today     Code Description Service Date Service Provider Modifiers Qty    98194727279 HC PT EVAL MOD COMPLEXITY 2 2/13/2020 Lauren Chaney, PT GP 1    42442880518 HC PT THER PROC EA 15 MIN 2/13/2020 Lauren Chaney, PT GP 1    94729458525 HC PT THER SUPP EA 15 MIN 2/13/2020 Lauren Chaney, PT GP 1          PT G-Codes  Outcome Measure Options: AM-PAC 6 Clicks Basic Mobility (PT)  AM-PAC 6 Clicks Score (PT): 14    Lauren Chaney PT  2/13/2020

## 2020-02-13 NOTE — PROGRESS NOTES
"Pharmacokinetic Consult - Vancomycin Dosing (Follow-up Note)    Ange Johnson is on day 2 pharmacy to dose vancomycin for surgical prophylaxis per [facility] protocol (original order per TAYO Mccracken). Goal trough: 10-20 mcg/mL.    Duration of Therapy: 3 days    Other Antimicrobials: None    Relevant clinical data and objective history reviewed:  79 y.o. female 165.1 cm (65\") 98.3 kg (216 lb 12.8 oz)    Vitals:    02/13/20 0800 02/13/20 0900 02/13/20 1000 02/13/20 1100   BP: 114/60 137/67 92/49 138/63   BP Location:    Right arm   Pulse: 84 84 69 84   Resp:    18   Temp:       TempSrc:       SpO2: 99% 100% 100% 100%     Creatinine   Date Value Ref Range Status   02/13/2020 1.71 (H) 0.57 - 1.00 mg/dL Final   02/12/2020 1.78 (H) 0.57 - 1.00 mg/dL Final   02/12/2020 1.74 (H) 0.57 - 1.00 mg/dL Final     BUN   Date Value Ref Range Status   02/13/2020 33 (H) 8 - 23 mg/dL Final     Estimated Creatinine Clearance: 30.4 mL/min (A) (by C-G formula based on SCr of 1.71 mg/dL (H)).    Lab Results   Component Value Date    WBC 8.90 02/13/2020     Temp Readings from Last 3 Encounters:   02/13/20 97.6 °F (36.4 °C) (Bladder)     Baseline culture/source/susceptibility:  2/10: Ucx-negative    Vancomycin Dosing History:   2/12: Vancomycin 1500 mg (12 mg/kg) pre-operative dose IV once @ 0726 2/12 @ 2133: Random vancomycin level=13.7 mcg/mL (~14 hr level)  2/12: 1500 mg IV once @ 2322 2/13 @ 1158: Random vancomycin level=25 mcg/mL (~12 hr level)    Assessment/Plan    1. Pharmacy is currently pulse dosing vancomycin in this patient due to low renal function. Vancomycin dose was given last night-random level was high at 25 mcg/mL (~12 hr level). Will not re-dose at this time. Will schedule a random level tomorrow morning 2/14 with am labs.     2. Will monitor serum creatinine at least every 48 hours per dosing recommendations. SCr has remained stable today at 1.71.     3. Pharmacy will continue to follow daily while on vancomycin " and adjust as needed.     Alessia Barros, Pharm.D., [facility]  Clinical Staff Pharmacist

## 2020-02-13 NOTE — PROGRESS NOTES
"   LOS: 3 days    Patient Care Team:  Quinn Washington MD as PCP - General (Family Medicine)  Hugh Caruso MD as PCP - Claims Attributed  Steve Simons Jr., MD as Consulting Physician (Hematology and Oncology)  Hugh Caruso MD as Consulting Physician (Nephrology)  Adalgisa Hernandez APRN as Nurse Practitioner (Endocrinology)  Reagan Beltran MD as Consulting Physician (Endocrinology)  Aguilar Goldman MD as Consulting Physician (Pulmonary Disease)  Mil Sr MD as Consulting Physician (Ophthalmology)  Tasneem Montelongo MD as Surgeon (General Surgery)  Victor Hugo Ng MD as Consulting Physician (Cardiology)    Chief Complaint:  No chief complaint on file.    Follow UP CKD3  Subjective     Interval History:     Seen and examined.  Laying down in bed.  Started passing gas.  Family is on bedside.    Review of Systems:   See above.    Objective     Vital Signs  Temp:  [97.6 °F (36.4 °C)] 97.6 °F (36.4 °C)  Heart Rate:  [69-84] 84  Resp:  [16-20] 18  BP: ()/(49-70) 138/63  Arterial Line BP: (103-146)/(52-68) 146/68  FiO2 (%):  [30 %] 30 %    Flowsheet Rows      First Filed Value   Admission Height  165.1 cm (65\") Documented at 02/09/2020 1646   Admission Weight  99.3 kg (219 lb) Documented at 02/09/2020 1646          I/O this shift:  In: -   Out: 290 [Urine:230; Chest Tube:60]  I/O last 3 completed shifts:  In: 2358 [P.O.:120; I.V.:1138; Blood:600; IV Piggyback:500]  Out: 6810 [Urine:2650; Other:3800; Chest Tube:360]    Intake/Output Summary (Last 24 hours) at 2/13/2020 1423  Last data filed at 2/13/2020 0959  Gross per 24 hour   Intake 1638 ml   Output 2210 ml   Net -572 ml       Physical Exam:  Elderly female.  Alert in no acute distress  RIJ Cordis, Left radial Dejah.   Oral mucosa dry  Neck no jvd  Heart RRR no s3 or rub  Lungs clear to auscultation. Mediastinal and pleural tubes  Abd few bs soft, nontender  Ext no edema lower ext   Skin no rash   soriano .      Results Review:    Results from last 7 days "   Lab Units 02/13/20 0314 02/12/20  1633 02/12/20  1312 02/12/20  0429 02/11/20  0438 02/10/20  1058   SODIUM mmol/L 139 140 135* 142 139 140   POTASSIUM mmol/L 3.9 3.9 3.8 3.5 3.5 3.5   CHLORIDE mmol/L 106 106 103 104 103 106   CO2 mmol/L 22.5 22.5 20.8* 25.7 24.2 23.0   BUN mg/dL 33* 34* 34* 40* 34* 31*   CREATININE mg/dL 1.71* 1.78* 1.74* 1.83* 1.68* 1.60*   CALCIUM mg/dL 8.2* 7.8* 7.9* 8.7 9.0 7.6*   BILIRUBIN mg/dL  --   --   --  <0.2* <0.2* 0.2   ALK PHOS U/L  --   --   --  66 73 67   ALT (SGPT) U/L  --   --   --  11 11 13   AST (SGOT) U/L  --   --   --  10 11 10   GLUCOSE mg/dL 137* 104* 147* 63* 186* 83       Estimated Creatinine Clearance: 30.4 mL/min (A) (by C-G formula based on SCr of 1.71 mg/dL (H)).    Results from last 7 days   Lab Units 02/13/20 0314 02/12/20 1633 02/12/20  1312   MAGNESIUM mg/dL 2.2 1.9 1.9   PHOSPHORUS mg/dL 4.5 3.7 3.5             Results from last 7 days   Lab Units 02/13/20 0314 02/12/20  1633 02/12/20  1312 02/12/20  1119 02/12/20  1051  02/12/20  0429 02/11/20  0438   WBC 10*3/mm3 8.90 14.89* 23.60*  --   --   --  9.85 9.55   HEMOGLOBIN g/dL 7.6* 8.1* 9.3*  --   --   --  9.9* 10.6*   HEMOGLOBIN, POC g/dL  --   --   --  8.2* 8.5*   < >  --   --    PLATELETS 10*3/mm3 107* 108* 137*  --   --   --  208 218    < > = values in this interval not displayed.       Results from last 7 days   Lab Units 02/13/20  0314 02/12/20  1312 02/11/20  0438 02/10/20  1058   INR  1.19* 1.27* 1.01 1.03         Imaging Results (Last 24 Hours)     Procedure Component Value Units Date/Time    XR Chest 1 View [[id number]] Collected:  02/13/20 0753     Updated:  02/13/20 0800    Narrative:       XR CHEST 1 VW-     Clinical: Status post open heart surgery     COMPARISON 2/12/2020     FINDINGS: There is a [place] catheter again demonstrated in position.  The end is looped in the pulmonary outflow tract.     There are bilateral chest tubes in place. There is cardiac enlargement  similar to the previous  examination, interval removal of the  endotracheal tube.     There is improved aeration at the left lung base with diminished opacity  at this location. There is improved delineation of the right  hemidiaphragm consistent with decreasing airspace disease at the right  lung base. There is suggestion of perhaps minimal cephalization of the  pulmonary vasculature which could indicate early or mild congestion. No  pneumothorax identified. The remainder is unremarkable.     This report was finalized on 2/13/2020 7:57 AM by Dr. Rogerio Looney M.D.             aspirin 81 mg Oral Daily   atorvastatin 40 mg Oral Nightly   carBAMazepine 300 mg Oral Q12H   chlorhexidine 15 mL Mouth/Throat Q12H   enoxaparin 30 mg Subcutaneous Daily   insulin glargine 20 Units Subcutaneous Q12H   insulin lispro 2-5 Units Subcutaneous 4x Daily AC & at Bedtime   levothyroxine 175 mcg Oral Q AM   metoclopramide 5 mg Intravenous Q8H   metoprolol tartrate 12.5 mg Oral Q12H   mupirocin  Each Nare BID   pantoprazole 40 mg Oral Daily   [START ON 2/14/2020] Vancomycin Pharmacy Intermittent Dosing  Does not apply Daily       insulin 0-50 Units/hr Last Rate: 4.8 Units/hr (02/13/20 0615)   Pharmacy to dose vancomycin     sodium chloride 9 mL/hr Last Rate: 9 mL/hr (02/12/20 1500)   sodium chloride 30 mL/hr        Medication Review:   Current Facility-Administered Medications   Medication Dose Route Frequency Provider Last Rate Last Dose   • acetaminophen (TYLENOL) tablet 650 mg  650 mg Oral Q4H PRN Jr Anthony Rai MD        Or   • acetaminophen (TYLENOL) 160 MG/5ML solution 650 mg  650 mg Oral Q4H PRN Jr Anthony Rai MD        Or   • acetaminophen (TYLENOL) suppository 650 mg  650 mg Rectal Q4H PRN Jr Anthony Rai MD       • ALPRAZolam (XANAX) tablet 0.25 mg  0.25 mg Oral Q8H PRN Jr Anthony Rai MD       • aluminum-magnesium hydroxide-simethicone (MAALOX MAX) 400-400-40 MG/5ML suspension 15 mL  15 mL Oral Q6H PRN Brianne Foss MD        • aspirin EC tablet 81 mg  81 mg Oral Daily Jr Anthony Rai MD   81 mg at 02/13/20 1050   • atorvastatin (LIPITOR) tablet 40 mg  40 mg Oral Nightly Jr Anthony Rai MD   40 mg at 02/12/20 2008   • carBAMazepine (CARBATROL) 12 hr capsule 300 mg  300 mg Oral Q12H Jr Anthony Rai MD   300 mg at 02/13/20 1051   • chlorhexidine (PERIDEX) 0.12 % solution 15 mL  15 mL Mouth/Throat Q12H Jr Anthony Rai MD   15 mL at 02/13/20 1051   • cyclobenzaprine (FLEXERIL) tablet 10 mg  10 mg Oral Q8H PRN Jr Anthony Rai MD   10 mg at 02/13/20 0013   • dextrose (D50W) 25 g/ 50mL Intravenous Solution 25 g  25 g Intravenous Q15 Min PRN Jr Anthony Rai MD       • dextrose (GLUTOSE) oral gel 15 g  15 g Oral Q15 Min PRN Jr Anthony Rai MD       • enoxaparin (LOVENOX) syringe 30 mg  30 mg Subcutaneous Daily Jr Anthony Rai MD       • furosemide (LASIX) injection 40 mg  40 mg Intravenous Q6H PRN Jr Anthony Rai MD       • glucagon (human recombinant) (GLUCAGEN DIAGNOSTIC) injection 1 mg  1 mg Subcutaneous Q15 Min PRN Jr Anthony Rai MD       • HYDROcodone-acetaminophen (NORCO) 5-325 MG per tablet 2 tablet  2 tablet Oral Q4H PRN Jr Anthony Rai MD   2 tablet at 02/13/20 0013   • insulin glargine (LANTUS) injection 20 Units  20 Units Subcutaneous Q12H Jayy Garrido MD   20 Units at 02/13/20 0909   • insulin lispro (humaLOG) injection 2-5 Units  2-5 Units Subcutaneous 4x Daily AC & at Bedtime Jayy Garrido MD       • insulin regular (HumuLIN R,NovoLIN R) 100 Units in sodium chloride 0.9 % 100 mL (1 Units/mL) infusion  0-50 Units/hr Intravenous Continuous PRN Miladys Ramirez APRN 4.8 mL/hr at 02/13/20 0615 4.8 Units/hr at 02/13/20 0615   • levothyroxine (SYNTHROID, LEVOTHROID) tablet 175 mcg  175 mcg Oral Q AM Jr Anthony Rai MD       • metoclopramide (REGLAN) injection 5 mg  5 mg Intravenous Q8H Christine Hernandez MD   5 mg at 02/13/20 0904   •  metoprolol tartrate (LOPRESSOR) tablet 12.5 mg  12.5 mg Oral Q12H Jr Anthony Rai MD       • morphine injection 1 mg  1 mg Intravenous Q4H PRN Jr Anthony Rai MD        And   • naloxone (NARCAN) injection 0.4 mg  0.4 mg Intravenous Q5 Min PRN Jr Anthony Rai MD       • morphine injection 4 mg  4 mg Intravenous Q30 Min PRN Jr Anthony Rai MD       • mupirocin (BACTROBAN) 2 % nasal ointment   Each Nare BID Jr Anthony Rai MD   1 application at 02/13/20 0934   • ondansetron (ZOFRAN) injection 4 mg  4 mg Intravenous Q6H PRN Jr Anthony Rai MD   4 mg at 02/13/20 0916   • oxyCODONE (ROXICODONE) immediate release tablet 10 mg  10 mg Oral Q4H PRN Jr Anthony Rai MD       • pantoprazole (PROTONIX) EC tablet 40 mg  40 mg Oral Daily Jr Anthony Rai MD   40 mg at 02/13/20 1052   • Pharmacy to dose vancomycin   Does not apply Continuous PRN Jr Anthony Rai MD       • potassium chloride (MICRO-K) CR capsule 40 mEq  40 mEq Oral PRN Jr Anthony Rai MD        Or   • potassium chloride (KLOR-CON) packet 40 mEq  40 mEq Oral PRN Jr Anthony Rai MD       • potassium chloride 10 mEq in 100 mL IVPB  10 mEq Intravenous Q1H PRN Jr Anthony Rai MD        Or   • potassium chloride 10 mEq in 100 mL IVPB  10 mEq Intravenous Q1H PRN Jr Anthony Rai MD       • potassium chloride 20 mEq in 50 mL IVPB  20 mEq Intravenous Q1H PRN Jr Anthony Rai MD       • potassium chloride 20 mEq in 50 mL IVPB  20 mEq Intravenous Q1H PRN Jr Anthony Rai MD       • potassium chloride 20 mEq in 50 mL IVPB  20 mEq Intravenous Q1H PRN Jr Anthony Rai MD       • promethazine (PHENERGAN) tablet 12.5 mg  12.5 mg Oral Q6H PRN Jr Anthony Rai MD        Or   • promethazine (PHENERGAN) injection 12.5 mg  12.5 mg Intravenous Q6H PRN Jr Anthony Rai MD   12.5 mg at 02/13/20 0948   • sodium chloride 0.9 % flush 30 mL  30 mL Intravenous Once PRN Jr Anthony Rai MD        • sodium chloride 0.9 % infusion  9 mL/hr Intravenous Continuous Jr Anthony Rai MD 9 mL/hr at 02/12/20 1500 9 mL/hr at 02/12/20 1500   • sodium chloride 0.9 % infusion  30 mL/hr Intravenous Continuous PRN Jr Anthony Rai MD       • traMADol (ULTRAM) tablet 25 mg  25 mg Oral Q8H PRN Jr Anthony Rai MD   25 mg at 02/13/20 0759   • [START ON 2/14/2020] Vancomycin Pharmacy Intermittent Dosing   Does not apply Daily Miladys Ramirez APRN           Assessment/Plan   1. CKD 3.  Stable so far post op CABG. kidney function is stable or better.  No further need of IV fluid.  Will start gentle diuresis.  2. CAD. Critical left main stenosis, LAD disease 90%. CABG x 5.   3. DM2 on insulin drip post op    4. Bradycardia, paced.    5. Hypothyroid on replacement .  6. Leukocytosis likely reactive  7. Anemia, chronic and blood loss.  Iron stores are low.  Will need IV Venofer.      Dyana Urbano MD  02/13/20  2:23 PM

## 2020-02-13 NOTE — PROGRESS NOTES
79 y.o.   LOS: 3 days   Patient Care Team:  Quinn Washington MD as PCP - General (Family Medicine)  Hugh Caruso MD as PCP - Claims Attributed  Steve Simons Jr., MD as Consulting Physician (Hematology and Oncology)  Hugh Caruso MD as Consulting Physician (Nephrology)  Adalgisa Hernandez APRN as Nurse Practitioner (Endocrinology)  Reagan Beltran MD as Consulting Physician (Endocrinology)  Aguilar Goldman MD as Consulting Physician (Pulmonary Disease)  Mil Sr MD as Consulting Physician (Ophthalmology)  Tasneem Montelongo MD as Surgeon (General Surgery)  Victor Hugo Ng MD as Consulting Physician (Cardiology)    Chief Complaint: Uncontrolled type 2 diabetes mellitus and postoperative management    No chief complaint on file.      Subjective     HPI  Patient her sugars are in the  range  Her insulin drip was held briefly this morning due to hypoglycemia  Patient is currently not intubated  She is answering questions  Reports fatigue    Interval History:      Review of Systems:      Review of Systems   Respiratory: Positive for cough, chest tightness and shortness of breath.    Cardiovascular: Negative.    Gastrointestinal: Negative.    All other systems reviewed and are negative.    Objective     Vital Signs   Temp:  [97.6 °F (36.4 °C)] 97.6 °F (36.4 °C)  Heart Rate:  [80-84] 84  Resp:  [16-20] 18  BP: ()/(52-70) 114/60  Arterial Line BP: (103-146)/(52-71) 146/68  FiO2 (%):  [30 %-100 %] 30 %    Physical Exam:  Physical Exam   Cardiovascular: Normal rate, regular rhythm, normal heart sounds and intact distal pulses.   Pulmonary/Chest: Effort normal and breath sounds normal.   Vent sounds heard   Abdominal: Soft. Bowel sounds are normal.   Neurological:   Unable to examine due to clinical condition   Skin: Skin is warm and dry.   Nursing note and vitals reviewed.  Results Review:     I reviewed the patient's new clinical results.      Glucose   Date/Time Value Ref Range Status   02/13/2020 0314  137 (H) 65 - 99 mg/dL Final   02/12/2020 1633 104 (H) 65 - 99 mg/dL Final   02/12/2020 1312 147 (H) 65 - 99 mg/dL Final   02/12/2020 0429 63 (L) 65 - 99 mg/dL Final   02/11/2020 0438 186 (H) 65 - 99 mg/dL Final   02/10/2020 1058 83 65 - 99 mg/dL Final     Lab Results (last 72 hours)     Procedure Component Value Units Date/Time    POC Glucose Once [[id number]]  (Abnormal) Collected:  02/13/20 0804    Specimen:  Blood Updated:  02/13/20 0807     Glucose 148 mg/dL     POC Glucose Once [[id number]]  (Abnormal) Collected:  02/13/20 0641    Specimen:  Blood Updated:  02/13/20 0644     Glucose 175 mg/dL     POC Glucose Once [[id number]]  (Abnormal) Collected:  02/13/20 0441    Specimen:  Blood Updated:  02/13/20 0442     Glucose 147 mg/dL     Ferritin [[id number]]  (Abnormal) Collected:  02/13/20 0314    Specimen:  Blood Updated:  02/13/20 0409     Ferritin 192.00 ng/mL     Narrative:       Results may be falsely decreased if patient taking Biotin.      Renal Function Panel [[id number]]  (Abnormal) Collected:  02/13/20 0314    Specimen:  Blood Updated:  02/13/20 0404     Glucose 137 mg/dL      BUN 33 mg/dL      Creatinine 1.71 mg/dL      Sodium 139 mmol/L      Potassium 3.9 mmol/L      Chloride 106 mmol/L      CO2 22.5 mmol/L      Calcium 8.2 mg/dL      Albumin 3.50 g/dL      Phosphorus 4.5 mg/dL      Anion Gap 10.5 mmol/L      BUN/Creatinine Ratio 19.3     eGFR Non African Amer 29 mL/min/1.73     Narrative:       GFR Normal >60  Chronic Kidney Disease <60  Kidney Failure <15      Iron Profile [[id number]]  (Abnormal) Collected:  02/13/20 0314    Specimen:  Blood Updated:  02/13/20 0404     Iron 18 mcg/dL      Iron Saturation 13 %      Transferrin 92 mg/dL      TIBC 137 mcg/dL     Magnesium [[id number]]  (Normal) Collected:  02/13/20 0314    Specimen:  Blood Updated:  02/13/20 0404     Magnesium 2.2 mg/dL     Protime-INR [[id number]]  (Abnormal) Collected:  02/13/20 0314    Specimen:  Blood Updated:  02/13/20 0345     Protime  14.8 Seconds      INR 1.19    CBC & Differential [[id number]] Collected:  02/13/20 0314    Specimen:  Blood Updated:  02/13/20 0335    Narrative:       The following orders were created for panel order CBC & Differential.  Procedure                               Abnormality         Status                     ---------                               -----------         ------                     CBC Auto Differential[[id number]]        Abnormal            Final result                 Please view results for these tests on the individual orders.    CBC Auto Differential [[id number]]  (Abnormal) Collected:  02/13/20 0314    Specimen:  Blood Updated:  02/13/20 0335     WBC 8.90 10*3/mm3      RBC 2.43 10*6/mm3      Hemoglobin 7.6 g/dL      Hematocrit 23.1 %      MCV 95.1 fL      MCH 31.3 pg      MCHC 32.9 g/dL      RDW 12.2 %      RDW-SD 42.4 fl      MPV 10.4 fL      Platelets 107 10*3/mm3      Neutrophil % 83.4 %      Lymphocyte % 5.5 %      Monocyte % 10.7 %      Eosinophil % 0.0 %      Basophil % 0.1 %      Immature Grans % 0.3 %      Neutrophils, Absolute 7.42 10*3/mm3      Lymphocytes, Absolute 0.49 10*3/mm3      Monocytes, Absolute 0.95 10*3/mm3      Eosinophils, Absolute 0.00 10*3/mm3      Basophils, Absolute 0.01 10*3/mm3      Immature Grans, Absolute 0.03 10*3/mm3      nRBC 0.0 /100 WBC     POC Glucose Once [[id number]]  (Abnormal) Collected:  02/13/20 0308    Specimen:  Blood Updated:  02/13/20 0309     Glucose 140 mg/dL     POC Glucose Once [[id number]]  (Normal) Collected:  02/13/20 0204    Specimen:  Blood Updated:  02/13/20 0206     Glucose 84 mg/dL     POC Glucose Once [[id number]]  (Abnormal) Collected:  02/12/20 2358    Specimen:  Blood Updated:  02/13/20 0000     Glucose 165 mg/dL     POC Glucose Once [[id number]]  (Normal) Collected:  02/12/20 2301    Specimen:  Blood Updated:  02/12/20 2303     Glucose 88 mg/dL     Vancomycin, Random [[id number]]  (Normal) Collected:  02/12/20 2133    Specimen:  Blood Updated:   02/12/20 2224     Vancomycin Random 13.70 mcg/mL     POC Glucose Once [[id number]]  (Normal) Collected:  02/12/20 2018    Specimen:  Blood Updated:  02/12/20 2020     Glucose 111 mg/dL     POC Glucose Once [[id number]]  (Normal) Collected:  02/12/20 1806    Specimen:  Blood Updated:  02/12/20 1808     Glucose 113 mg/dL     Blood Gas, Arterial [[id number]]  (Abnormal) Collected:  02/12/20 1746    Specimen:  Arterial Blood Updated:  02/12/20 1751     Site Arterial Line     Stone's Test Positive     pH, Arterial 7.326 pH units      pCO2, Arterial 46.8 mm Hg      pO2, Arterial 103.1 mm Hg      HCO3, Arterial 24.4 mmol/L      Base Excess, Arterial -1.6 mmol/L      O2 Saturation Calculated 97.4 %      A-a Gradiant 0.6 mmHg      Barometric Pressure for Blood Gas 746.2 mmHg      Modality Adult Vent     FIO2 30 %      Ventilator Mode PS     Rate 21 Breaths/minute      PEEP 5     PSV 7 cmH2O     Renal Function Panel [[id number]]  (Abnormal) Collected:  02/12/20 1633    Specimen:  Blood Updated:  02/12/20 1712     Glucose 104 mg/dL      BUN 34 mg/dL      Creatinine 1.78 mg/dL      Sodium 140 mmol/L      Potassium 3.9 mmol/L      Chloride 106 mmol/L      CO2 22.5 mmol/L      Calcium 7.8 mg/dL      Albumin 3.70 g/dL      Phosphorus 3.7 mg/dL      Anion Gap 11.5 mmol/L      BUN/Creatinine Ratio 19.1     eGFR Non African Amer 27 mL/min/1.73     Narrative:       GFR Normal >60  Chronic Kidney Disease <60  Kidney Failure <15      Magnesium [[id number]]  (Normal) Collected:  02/12/20 1633    Specimen:  Blood Updated:  02/12/20 1712     Magnesium 1.9 mg/dL     POC Glucose Once [[id number]]  (Normal) Collected:  02/12/20 1707    Specimen:  Blood Updated:  02/12/20 1711     Glucose 112 mg/dL     CBC (No Diff) [[id number]]  (Abnormal) Collected:  02/12/20 1633    Specimen:  Blood Updated:  02/12/20 1654     WBC 14.89 10*3/mm3      RBC 2.52 10*6/mm3      Hemoglobin 8.1 g/dL      Hematocrit 24.4 %      MCV 96.8 fL      MCH 32.1 pg      MCHC 33.2  g/dL      RDW 12.4 %      RDW-SD 44.1 fl      MPV 9.8 fL      Platelets 108 10*3/mm3     POC Glucose Once [[id number]]  (Normal) Collected:  02/12/20 1616    Specimen:  Blood Updated:  02/12/20 1619     Glucose 112 mg/dL     POC Glucose Once [[id number]]  (Abnormal) Collected:  02/12/20 1511    Specimen:  Blood Updated:  02/12/20 1522     Glucose 135 mg/dL     POC Glucose Once [[id number]]  (Abnormal) Collected:  02/12/20 1411    Specimen:  Blood Updated:  02/12/20 1422     Glucose 148 mg/dL     Blood Gas, Arterial [[id number]]  (Abnormal) Collected:  02/12/20 1410    Specimen:  Arterial Blood Updated:  02/12/20 1413     Site Arterial Line     Stone's Test Positive     pH, Arterial 7.338 pH units      pCO2, Arterial 44.8 mm Hg      pO2, Arterial 410.8 mm Hg      HCO3, Arterial 24.1 mmol/L      Base Excess, Arterial -1.7 mmol/L      O2 Saturation Calculated 100.0 %      A-a Gradiant 0.6 mmHg      Barometric Pressure for Blood Gas 750.6 mmHg      Modality Adult Vent     FIO2 100 %      Ventilator Mode AC     Set Tidal Volume 480     Set Mech Resp Rate 20     Rate 20 Breaths/minute      PEEP 7.5    Renal Function Panel [[id number]]  (Abnormal) Collected:  02/12/20 1312    Specimen:  Blood Updated:  02/12/20 1400     Glucose 147 mg/dL      BUN 34 mg/dL      Creatinine 1.74 mg/dL      Sodium 135 mmol/L      Potassium 3.8 mmol/L      Chloride 103 mmol/L      CO2 20.8 mmol/L      Calcium 7.9 mg/dL      Albumin 3.20 g/dL      Phosphorus 3.5 mg/dL      Anion Gap 11.2 mmol/L      BUN/Creatinine Ratio 19.5     eGFR Non African Amer 28 mL/min/1.73     Narrative:       GFR Normal >60  Chronic Kidney Disease <60  Kidney Failure <15      Magnesium [[id number]]  (Normal) Collected:  02/12/20 1312    Specimen:  Blood Updated:  02/12/20 1357     Magnesium 1.9 mg/dL     Calcium, Ionized [[id number]]  (Normal) Collected:  02/12/20 1312    Specimen:  Blood Updated:  02/12/20 1353     Ionized Calcium 1.25 mmol/L      Ionized Calcium 5.0 mg/dL      CBC & Differential [[id number]] Collected:  02/12/20 1312    Specimen:  Blood Updated:  02/12/20 1350    Narrative:       The following orders were created for panel order CBC & Differential.  Procedure                               Abnormality         Status                     ---------                               -----------         ------                     CBC Auto Differential[[id number]]        Abnormal            Final result                 Please view results for these tests on the individual orders.    CBC Auto Differential [[id number]]  (Abnormal) Collected:  02/12/20 1312    Specimen:  Blood Updated:  02/12/20 1350     WBC 23.60 10*3/mm3      RBC 2.89 10*6/mm3      Hemoglobin 9.3 g/dL      Hematocrit 27.0 %      MCV 93.4 fL      MCH 32.2 pg      MCHC 34.4 g/dL      RDW 12.1 %      RDW-SD 41.0 fl      MPV 9.6 fL      Platelets 137 10*3/mm3      Neutrophil % 86.2 %      Lymphocyte % 5.9 %      Monocyte % 6.7 %      Eosinophil % 0.4 %      Basophil % 0.2 %      Immature Grans % 0.6 %      Neutrophils, Absolute 20.32 10*3/mm3      Lymphocytes, Absolute 1.39 10*3/mm3      Monocytes, Absolute 1.59 10*3/mm3      Eosinophils, Absolute 0.10 10*3/mm3      Basophils, Absolute 0.05 10*3/mm3      Immature Grans, Absolute 0.15 10*3/mm3      nRBC 0.0 /100 WBC     Protime-INR [[id number]]  (Abnormal) Collected:  02/12/20 1312    Specimen:  Blood Updated:  02/12/20 1344     Protime 15.6 Seconds      INR 1.27    aPTT [[id number]]  (Normal) Collected:  02/12/20 1312    Specimen:  Blood Updated:  02/12/20 1344     PTT 26.4 seconds     Fibrinogen [[id number]]  (Normal) Collected:  02/12/20 1312    Specimen:  Blood Updated:  02/12/20 1344     Fibrinogen 325 mg/dL     POC Glucose Once [[id number]]  (Abnormal) Collected:  02/12/20 1321    Specimen:  Blood Updated:  02/12/20 1331     Glucose 148 mg/dL     POC Activated Clotting Time [[id number]]  (Abnormal) Collected:  02/12/20 1050    Specimen:  Blood Updated:  02/12/20 1154      Activated Clotting Time  417 Seconds      Comment: Serial Number: [id number]:  [id number]       POC Activated Clotting Time [[id number]]  (Abnormal) Collected:  02/12/20 1030    Specimen:  Blood Updated:  02/12/20 1154     Activated Clotting Time  367 Seconds      Comment: Serial Number: [id number]:  [id number]       POC Activated Clotting Time [[id number]]  (Abnormal) Collected:  02/12/20 0958    Specimen:  Blood Updated:  02/12/20 1154     Activated Clotting Time  345 Seconds      Comment: Serial Number: [id number]:  [id number]       POC Activated Clotting Time [[id number]]  (Normal) Collected:  02/12/20 1118    Specimen:  Blood Updated:  02/12/20 1153     Activated Clotting Time  98 Seconds      Comment: Serial Number: [id number]:  [id number]       POC Activated Clotting Time [[id number]]  (Normal) Collected:  02/12/20 0819    Specimen:  Blood Updated:  02/12/20 1153     Activated Clotting Time  120 Seconds      Comment: Serial Number: [id number]:  [id number]       Urine Culture - Urine, Urine, Clean Catch [[id number]] Collected:  02/10/20 2242    Specimen:  Urine, Clean Catch Updated:  02/12/20 0853     Urine Culture 50,000 CFU/mL Mixed Jose Enrique Isolated    Narrative:       Specimen contains mixed organisms of questionable pathogenicity which indicates contamination with commensal jose enrique.  Further identification is unlikely to provide clinically useful information.  Suggest recollection.    POC Glucose Once [[id number]]  (Abnormal) Collected:  02/12/20 0611    Specimen:  Blood Updated:  02/12/20 0612     Glucose 150 mg/dL     POC Glucose Once [[id number]]  (Abnormal) Collected:  02/12/20 0548    Specimen:  Blood Updated:  02/12/20 0549     Glucose 67 mg/dL     BNP [[id number]]  (Normal) Collected:  02/12/20 0429    Specimen:  Blood Updated:  02/12/20 0536     proBNP 1,218.0 pg/mL     Narrative:       Among patients with dyspnea, NT-proBNP is highly sensitive for the detection of acute congestive heart failure. In addition NT-proBNP  of <300 pg/ml effectively rules out acute congestive heart failure with 99% negative predictive value.    Results may be falsely decreased if patient taking Biotin.      TSH [[id number]]  (Normal) Collected:  02/12/20 0429    Specimen:  Blood Updated:  02/12/20 0536     TSH 2.350 uIU/mL     Comprehensive Metabolic Panel [[id number]]  (Abnormal) Collected:  02/12/20 0429    Specimen:  Blood Updated:  02/12/20 0529     Glucose 63 mg/dL      BUN 40 mg/dL      Creatinine 1.83 mg/dL      Sodium 142 mmol/L      Potassium 3.5 mmol/L      Chloride 104 mmol/L      CO2 25.7 mmol/L      Calcium 8.7 mg/dL      Total Protein 5.8 g/dL      Albumin 3.20 g/dL      ALT (SGPT) 11 U/L      AST (SGOT) 10 U/L      Alkaline Phosphatase 66 U/L      Total Bilirubin <0.2 mg/dL      eGFR Non African Amer 27 mL/min/1.73      Globulin 2.6 gm/dL      A/G Ratio 1.2 g/dL      BUN/Creatinine Ratio 21.9     Anion Gap 12.3 mmol/L     Narrative:       GFR Normal >60  Chronic Kidney Disease <60  Kidney Failure <15      Lipid Panel [[id number]] Collected:  02/12/20 0429    Specimen:  Blood Updated:  02/12/20 0526     Total Cholesterol 153 mg/dL      Triglycerides 137 mg/dL      HDL Cholesterol 55 mg/dL      LDL Cholesterol  71 mg/dL      VLDL Cholesterol 27.4 mg/dL      LDL/HDL Ratio 1.28    Narrative:       Cholesterol Reference Ranges  (U.S. Department of Health and Human Services ATP III Classifications)    Desirable          <200 mg/dL  Borderline High    200-239 mg/dL  High Risk          >240 mg/dL      Triglyceride Reference Ranges  (U.S. Department of Health and Human Services ATP III Classifications)    Normal           <150 mg/dL  Borderline High  150-199 mg/dL  High             200-499 mg/dL  Very High        >500 mg/dL    HDL Reference Ranges  (U.S. Department of Health and Human Services ATP III Classifcations)    Low     <40 mg/dl (major risk factor for CHD)  High    >60 mg/dl ('negative' risk factor for CHD)        LDL Reference  Ranges  (U.S. Department of Health and Human Services ATP III Classifcations)    Optimal          <100 mg/dL  Near Optimal     100-129 mg/dL  Borderline High  130-159 mg/dL  High             160-189 mg/dL  Very High        >189 mg/dL    Phosphorus [[id number]]  (Normal) Collected:  02/12/20 0429    Specimen:  Blood Updated:  02/12/20 0526     Phosphorus 3.3 mg/dL     CBC & Differential [[id number]] Collected:  02/12/20 0429    Specimen:  Blood Updated:  02/12/20 0459    Narrative:       The following orders were created for panel order CBC & Differential.  Procedure                               Abnormality         Status                     ---------                               -----------         ------                     CBC Auto Differential[[id number]]        Abnormal            Final result                 Please view results for these tests on the individual orders.    CBC Auto Differential [[id number]]  (Abnormal) Collected:  02/12/20 0429    Specimen:  Blood Updated:  02/12/20 0459     WBC 9.85 10*3/mm3      RBC 3.20 10*6/mm3      Hemoglobin 9.9 g/dL      Hematocrit 29.7 %      MCV 92.8 fL      MCH 30.9 pg      MCHC 33.3 g/dL      RDW 12.0 %      RDW-SD 40.6 fl      MPV 9.9 fL      Platelets 208 10*3/mm3      Neutrophil % 64.9 %      Lymphocyte % 23.1 %      Monocyte % 9.8 %      Eosinophil % 1.2 %      Basophil % 0.6 %      Immature Grans % 0.4 %      Neutrophils, Absolute 6.38 10*3/mm3      Lymphocytes, Absolute 2.28 10*3/mm3      Monocytes, Absolute 0.97 10*3/mm3      Eosinophils, Absolute 0.12 10*3/mm3      Basophils, Absolute 0.06 10*3/mm3      Immature Grans, Absolute 0.04 10*3/mm3      nRBC 0.0 /100 WBC     POC Glucose Once [[id number]]  (Normal) Collected:  02/11/20 2057    Specimen:  Blood Updated:  02/11/20 2059     Glucose 118 mg/dL     POC Glucose Once [[id number]]  (Abnormal) Collected:  02/11/20 1540    Specimen:  Blood Updated:  02/11/20 1541     Glucose 196 mg/dL     POC Glucose Once  [[id number]]  (Normal) Collected:  02/11/20 1035    Specimen:  Blood Updated:  02/11/20 1037     Glucose 121 mg/dL     POC Glucose Once [[id number]]  (Abnormal) Collected:  02/11/20 0553    Specimen:  Blood Updated:  02/11/20 0555     Glucose 167 mg/dL     Comprehensive Metabolic Panel [[id number]]  (Abnormal) Collected:  02/11/20 0438    Specimen:  Blood Updated:  02/11/20 0532     Glucose 186 mg/dL      BUN 34 mg/dL      Creatinine 1.68 mg/dL      Sodium 139 mmol/L      Potassium 3.5 mmol/L      Chloride 103 mmol/L      CO2 24.2 mmol/L      Calcium 9.0 mg/dL      Total Protein 6.1 g/dL      Albumin 3.30 g/dL      ALT (SGPT) 11 U/L      AST (SGOT) 11 U/L      Alkaline Phosphatase 73 U/L      Total Bilirubin <0.2 mg/dL      eGFR Non African Amer 29 mL/min/1.73      Globulin 2.8 gm/dL      A/G Ratio 1.2 g/dL      BUN/Creatinine Ratio 20.2     Anion Gap 11.8 mmol/L     Narrative:       GFR Normal >60  Chronic Kidney Disease <60  Kidney Failure <15      Protime-INR [[id number]]  (Normal) Collected:  02/11/20 0438    Specimen:  Blood Updated:  02/11/20 0524     Protime 13.0 Seconds      INR 1.01    CBC (No Diff) [[id number]]  (Abnormal) Collected:  02/11/20 0438    Specimen:  Blood Updated:  02/11/20 0514     WBC 9.55 10*3/mm3      RBC 3.28 10*6/mm3      Hemoglobin 10.6 g/dL      Hematocrit 30.6 %      MCV 93.3 fL      MCH 32.3 pg      MCHC 34.6 g/dL      RDW 11.9 %      RDW-SD 40.7 fl      MPV 10.2 fL      Platelets 218 10*3/mm3     POC Glucose Once [[id number]]  (Abnormal) Collected:  02/10/20 2358    Specimen:  Blood Updated:  02/11/20 0000     Glucose 275 mg/dL     Urinalysis, Microscopic Only - Urine, Clean Catch [[id number]]  (Abnormal) Collected:  02/10/20 2242    Specimen:  Urine, Clean Catch Updated:  02/10/20 2317     RBC, UA 0-2 /HPF      WBC, UA 6-12 /HPF      Bacteria, UA None Seen /HPF      Squamous Epithelial Cells, UA 0-2 /HPF      Hyaline Casts, UA 0-2 /LPF      Methodology Automated Microscopy    Urinalysis  With Culture If Indicated - Urine, Clean Catch [[id number]]  (Abnormal) Collected:  02/10/20 2242    Specimen:  Urine, Clean Catch Updated:  02/10/20 2316     Color, UA Yellow     Appearance, UA Clear     pH, UA 7.0     Specific Gravity, UA 1.019     Glucose, UA >=1000 mg/dL (3+)     Ketones, UA Negative     Bilirubin, UA Negative     Blood, UA Negative     Protein, UA >=300 mg/dL (3+)     Leuk Esterase, UA Negative     Nitrite, UA Negative     Urobilinogen, UA 0.2 E.U./dL    POC Glucose Once [[id number]]  (Abnormal) Collected:  02/10/20 2049    Specimen:  Blood Updated:  02/10/20 2050     Glucose 332 mg/dL     POC Glucose Once [[id number]]  (Abnormal) Collected:  02/10/20 1742    Specimen:  Blood Updated:  02/10/20 1743     Glucose 242 mg/dL     Blood Gas, Arterial [[id number]]  (Abnormal) Collected:  02/10/20 1154    Specimen:  Arterial Blood Updated:  02/10/20 1209     Site Arterial: left radial     Stone's Test Positive     pH, Arterial 7.389 pH units      pCO2, Arterial 43.8 mm Hg      pO2, Arterial 66.4 mm Hg      HCO3, Arterial 26.5 mmol/L      Base Excess, Arterial 1.2 mmol/L      O2 Saturation Calculated 92.5 %      Barometric Pressure for Blood Gas 752.3 mmHg      Modality Room Air     Rate 20 Breaths/minute     BNP [[id number]]  (Normal) Collected:  02/10/20 1058    Specimen:  Blood Updated:  02/10/20 1138     proBNP 631.9 pg/mL     Narrative:       Among patients with dyspnea, NT-proBNP is highly sensitive for the detection of acute congestive heart failure. In addition NT-proBNP of <300 pg/ml effectively rules out acute congestive heart failure with 99% negative predictive value.    Results may be falsely decreased if patient taking Biotin.      Comprehensive Metabolic Panel [[id number]]  (Abnormal) Collected:  02/10/20 1058    Specimen:  Blood Updated:  02/10/20 1138     Glucose 83 mg/dL      BUN 31 mg/dL      Creatinine 1.60 mg/dL      Sodium 140 mmol/L      Potassium 3.5 mmol/L      Chloride 106 mmol/L       CO2 23.0 mmol/L      Calcium 7.6 mg/dL      Total Protein 5.6 g/dL      Albumin 3.00 g/dL      ALT (SGPT) 13 U/L      AST (SGOT) 10 U/L      Alkaline Phosphatase 67 U/L      Total Bilirubin 0.2 mg/dL      eGFR Non African Amer 31 mL/min/1.73      Globulin 2.6 gm/dL      A/G Ratio 1.2 g/dL      BUN/Creatinine Ratio 19.4     Anion Gap 11.0 mmol/L     Narrative:       GFR Normal >60  Chronic Kidney Disease <60  Kidney Failure <15      Lipid Panel [[id number]] Collected:  02/10/20 1058    Specimen:  Blood Updated:  02/10/20 1135     Total Cholesterol 131 mg/dL      Triglycerides 70 mg/dL      HDL Cholesterol 49 mg/dL      LDL Cholesterol  68 mg/dL      VLDL Cholesterol 14 mg/dL      LDL/HDL Ratio 1.39    Narrative:       Cholesterol Reference Ranges  (U.S. Department of Health and Human Services ATP III Classifications)    Desirable          <200 mg/dL  Borderline High    200-239 mg/dL  High Risk          >240 mg/dL      Triglyceride Reference Ranges  (U.S. Department of Health and Human Services ATP III Classifications)    Normal           <150 mg/dL  Borderline High  150-199 mg/dL  High             200-499 mg/dL  Very High        >500 mg/dL    HDL Reference Ranges  (U.S. Department of Health and Human Services ATP III Classifcations)    Low     <40 mg/dl (major risk factor for CHD)  High    >60 mg/dl ('negative' risk factor for CHD)        LDL Reference Ranges  (U.S. Department of Health and Human Services ATP III Classifcations)    Optimal          <100 mg/dL  Near Optimal     100-129 mg/dL  Borderline High  130-159 mg/dL  High             160-189 mg/dL  Very High        >189 mg/dL    Hemoglobin A1c [[id number]]  (Abnormal) Collected:  02/10/20 1058    Specimen:  Blood Updated:  02/10/20 1123     Hemoglobin A1C 6.81 %     Narrative:       Hemoglobin A1C Ranges:    Increased Risk for Diabetes  5.7% to 6.4%  Diabetes                     >= 6.5%  Diabetic Goal                < 7.0%    Protime-INR [[id number]]  (Normal)  Collected:  02/10/20 1058    Specimen:  Blood Updated:  02/10/20 1121     Protime 13.2 Seconds      INR 1.03    aPTT [[id number]]  (Normal) Collected:  02/10/20 1058    Specimen:  Blood Updated:  02/10/20 1121     PTT 33.2 seconds     Platelet Function ADP [[id number]] Collected:  02/10/20 1058    Specimen:  Blood Updated:  02/10/20 1113     ADP Aggregation, % Platelet 77 %     CBC & Differential [[id number]] Collected:  02/10/20 1058    Specimen:  Blood Updated:  02/10/20 1113    Narrative:       The following orders were created for panel order CBC & Differential.  Procedure                               Abnormality         Status                     ---------                               -----------         ------                     CBC Auto Differential[[id number]]        Abnormal            Final result                 Please view results for these tests on the individual orders.    CBC Auto Differential [[id number]]  (Abnormal) Collected:  02/10/20 1058    Specimen:  Blood Updated:  02/10/20 1113     WBC 8.73 10*3/mm3      RBC 3.12 10*6/mm3      Hemoglobin 10.0 g/dL      Hematocrit 29.7 %      MCV 95.2 fL      MCH 32.1 pg      MCHC 33.7 g/dL      RDW 12.1 %      RDW-SD 41.8 fl      MPV 9.8 fL      Platelets 184 10*3/mm3      Neutrophil % 86.7 %      Lymphocyte % 9.9 %      Monocyte % 2.6 %      Eosinophil % 0.3 %      Basophil % 0.2 %      Immature Grans % 0.3 %      Neutrophils, Absolute 7.56 10*3/mm3      Lymphocytes, Absolute 0.86 10*3/mm3      Monocytes, Absolute 0.23 10*3/mm3      Eosinophils, Absolute 0.03 10*3/mm3      Basophils, Absolute 0.02 10*3/mm3      Immature Grans, Absolute 0.03 10*3/mm3      nRBC 0.0 /100 WBC     POC Glucose Once [[id number]]  (Normal) Collected:  02/10/20 1047    Specimen:  Blood Updated:  02/10/20 1048     Glucose 87 mg/dL         Imaging Results (Last 72 Hours)     Procedure Component Value Units Date/Time    XR Chest 1 View [[id number]] Collected:  02/13/20 0753     Updated:   02/13/20 0800    Narrative:       XR CHEST 1 VW-     Clinical: Status post open heart surgery     COMPARISON 2/12/2020     FINDINGS: There is a [place]-Junior catheter again demonstrated in position.  The end is looped in the pulmonary outflow tract.     There are bilateral chest tubes in place. There is cardiac enlargement  similar to the previous examination, interval removal of the  endotracheal tube.     There is improved aeration at the left lung base with diminished opacity  at this location. There is improved delineation of the right  hemidiaphragm consistent with decreasing airspace disease at the right  lung base. There is suggestion of perhaps minimal cephalization of the  pulmonary vasculature which could indicate early or mild congestion. No  pneumothorax identified. The remainder is unremarkable.     This report was finalized on 2/13/2020 7:57 AM by Dr. Rogerio Looney M.D.       XR Chest 1 View [[id number]] Collected:  02/12/20 1356     Updated:  02/12/20 1402    Narrative:       XR CHEST 1 VW-     HISTORY: Female who is 79 years-old,  postoperative evaluation     TECHNIQUE: Frontal view of the chest     COMPARISON: 02/10/2020     FINDINGS: Endotracheal tube tip is 4.0 cm above the [place]. Right IJ  [place]-Junior catheter extends to a left lower lobe branch pulmonary artery.  Bilateral chest tubes are seen. Heart size is borderline. Pulmonary  vasculature is unremarkable. Mild likely atelectasis in the lower lungs.  No pneumothorax or pleural effusion. Otherwise stable.       Impression:       Postsurgical changes.     This report was finalized on 2/12/2020 1:59 PM by Dr. Brian Olsen M.D.       XR Chest PA & Lateral [[id number]] Collected:  02/10/20 1355     Updated:  02/10/20 1401    Narrative:       XR CHEST PA AND LATERAL-     HISTORY: Female who is 79 years-old,  preoperative evaluation     TECHNIQUE: Frontal and lateral views of the chest     COMPARISON: None available     FINDINGS: Heart size is  borderline. Aorta is calcified. Pulmonary  vasculature is unremarkable. No focal pulmonary consolidation, pleural  effusion, or pneumothorax. No acute osseous process.       Impression:       No evidence for acute pulmonary process. Borderline heart  size. Follow-up as clinical indications persist.     This report was finalized on 2/10/2020 1:58 PM by Dr. Brian Olsen M.D.           Medical record review  Summary  Nephrology notes reviewed  Patient is status post CABG kidney function is stable  Anemia and chronic blood loss and iron stores are low they are planning IV Venofer  Patient may experience Hypoglycemia due to IV infusion and will need to monitor    Medication Review:       Current Facility-Administered Medications:   •  acetaminophen (TYLENOL) tablet 650 mg, 650 mg, Oral, Q4H PRN **OR** acetaminophen (TYLENOL) 160 MG/5ML solution 650 mg, 650 mg, Oral, Q4H PRN **OR** acetaminophen (TYLENOL) suppository 650 mg, 650 mg, Rectal, Q4H PRN, Jr Anthony Rai MD  •  albumin human 5 % solution 1,500 mL, 1,500 mL, Intravenous, PRN, Jr Anthony Rai MD, 250 mL at 02/12/20 2320  •  ALPRAZolam (XANAX) tablet 0.25 mg, 0.25 mg, Oral, Q8H PRN, Jr Anthony Rai MD  •  aspirin EC tablet 81 mg, 81 mg, Oral, Daily, Jr Anthony Rai MD  •  atorvastatin (LIPITOR) tablet 40 mg, 40 mg, Oral, Nightly, Jr Anthony Rai MD, 40 mg at 02/12/20 2008  •  carBAMazepine (CARBATROL) 12 hr capsule 300 mg, 300 mg, Oral, Q12H, Jr Anthony Rai MD, 300 mg at 02/12/20 2008  •  chlorhexidine (PERIDEX) 0.12 % solution 15 mL, 15 mL, Mouth/Throat, Q12H, Jr Anthony Rai MD, 15 mL at 02/12/20 2011  •  clevidipine (CLEVIPREX) infusion 0.5 mg/mL, 2-32 mg/hr, Intravenous, Continuous PRN, Jr Anthony Rai MD  •  cyclobenzaprine (FLEXERIL) tablet 10 mg, 10 mg, Oral, Q8H PRN, Jr Anthony Rai MD, 10 mg at 02/13/20 0013  •  dexmedetomidine (PRECEDEX) 400 mcg/100mL (4 mcg/mL) NS infusion kit, 0.2-1.5  mcg/kg/hr, Intravenous, Titrated, Jr Anthony Rai MD, Stopped at 02/12/20 1557  •  DOPamine 400 mg in 250 mL D5W (1.6 mg/mL) infusion, 2-20 mcg/kg/min, Intravenous, Continuous PRN, Jr Anthony Rai MD  •  enoxaparin (LOVENOX) syringe 30 mg, 30 mg, Subcutaneous, Daily, Jr Anthony Rai MD  •  EPINEPHrine 5 mg in 250mL (20 mcg/mL) infusion, 0.02-0.3 mcg/kg/min, Intravenous, Continuous PRN, Jr Anthony Rai MD  •  furosemide (LASIX) injection 40 mg, 40 mg, Intravenous, Q6H PRN, Jr Anthony Ria MD  •  HYDROcodone-acetaminophen (NORCO) 5-325 MG per tablet 2 tablet, 2 tablet, Oral, Q4H PRN, Jr Anthony Rai MD, 2 tablet at 02/13/20 0013  •  insulin regular (HumuLIN R,NovoLIN R) 100 Units in sodium chloride 0.9 % 100 mL (1 Units/mL) infusion, 0-50 Units/hr, Intravenous, Continuous PRN, Miladys Ramirez APRN, Last Rate: 4.8 mL/hr at 02/13/20 0615, 4.8 Units/hr at 02/13/20 0615  •  levothyroxine (SYNTHROID, LEVOTHROID) tablet 175 mcg, 175 mcg, Oral, Q AM, Jr Anthony Rai MD  •  metoclopramide (REGLAN) injection 5 mg, 5 mg, Intravenous, Q8H, Christine Hernandez MD  •  metoprolol tartrate (LOPRESSOR) tablet 12.5 mg, 12.5 mg, Oral, Q12H, Jr Anthony Rai MD  •  midazolam (VERSED) injection 2 mg, 2 mg, Intravenous, Q1H PRN, Jr Anthony Rai MD  •  milrinone 20 mg/100 mL (0.2 mg/mL) in 5 % dextrose infusion, 0.25-0.75 mcg/kg/min, Intravenous, Continuous PRN, Jr Anthony Rai MD  •  morphine injection 1 mg, 1 mg, Intravenous, Q4H PRN **AND** naloxone (NARCAN) injection 0.4 mg, 0.4 mg, Intravenous, Q5 Min PRN, Jr Anthony Rai MD  •  morphine injection 4 mg, 4 mg, Intravenous, Q30 Min PRN, Jr Anthony Rai MD  •  mupirocin (BACTROBAN) 2 % nasal ointment, , Each Nare, BID, Jr Anthony Rai MD, 1 application at 02/12/20 2010  •  niCARdipine (CARDENE) 25 mg in 250 mL NS (0.1 mg/mL) infusion kit, 5-15 mg/hr, Intravenous, Continuous PRN, Jr Anthony Rai MD,  Stopped at 02/12/20 1340  •  nitroglycerin 50 mg/250 mL (0.2 mg/mL) infusion, 5-200 mcg/min, Intravenous, Titrated, Jr Anthony Rai MD  •  norepinephrine (LEVOPHED) 8 mg/250 mL (32 mcg/mL) in sodium chloride 0.9% infusion (premix), 0.02-0.3 mcg/kg/min, Intravenous, Continuous PRN, Jr Anthony Rai MD  •  ondansetron (ZOFRAN) injection 4 mg, 4 mg, Intravenous, Q6H PRN, Jr Anthony Rai MD, 4 mg at 02/13/20 0019  •  oxyCODONE (ROXICODONE) immediate release tablet 10 mg, 10 mg, Oral, Q4H PRN, Jr Anthony Rai MD  •  pantoprazole (PROTONIX) EC tablet 40 mg, 40 mg, Oral, Daily, Jr Anthony Rai MD  •  Pharmacy to dose vancomycin, , Does not apply, Continuous PRN, Miladys Ramirez, TAYO  •  phenylephrine (SENA-SYNEPHRINE) 50 mg in sodium chloride 0.9 % 250 mL (0.2 mg/mL) infusion, 0.2-3 mcg/kg/min, Intravenous, Continuous PRN, Jr Anthony Rai MD  •  potassium chloride (MICRO-K) CR capsule 40 mEq, 40 mEq, Oral, PRN **OR** potassium chloride (KLOR-CON) packet 40 mEq, 40 mEq, Oral, PRN, Jr Anthony Rai MD  •  potassium chloride 10 mEq in 100 mL IVPB, 10 mEq, Intravenous, Q1H PRN **OR** potassium chloride 10 mEq in 100 mL IVPB, 10 mEq, Intravenous, Q1H PRN, Jr Anthony Rai MD  •  potassium chloride 20 mEq in 50 mL IVPB, 20 mEq, Intravenous, Q1H PRN, Jr Anthony Rai MD  •  potassium chloride 20 mEq in 50 mL IVPB, 20 mEq, Intravenous, Q1H PRN, Jr Anthony Rai MD  •  potassium chloride 20 mEq in 50 mL IVPB, 20 mEq, Intravenous, Q1H PRN, Jr Anthony Rai MD  •  promethazine (PHENERGAN) tablet 12.5 mg, 12.5 mg, Oral, Q6H PRN **OR** promethazine (PHENERGAN) injection 12.5 mg, 12.5 mg, Intravenous, Q6H PRN, Jr Anthony Rai MD  •  propofol (DIPRIVAN) infusion 10 mg/mL 100 mL, 5-50 mcg/kg/min, Intravenous, Continuous PRN, Jr Anthony Rai MD, Stopped at 02/12/20 1524  •  sodium chloride 0.9 % flush 30 mL, 30 mL, Intravenous, Once PRN, Jr Anthony Rai MD  •   sodium chloride 0.9 % infusion, 9 mL/hr, Intravenous, Continuous, Christine Hernandez MD, Last Rate: 9 mL/hr at 02/12/20 1500, 9 mL/hr at 02/12/20 1500  •  sodium chloride 0.9 % infusion, 30 mL/hr, Intravenous, Continuous PRN, Jr Anthony Rai MD  •  traMADol (ULTRAM) tablet 25 mg, 25 mg, Oral, Q8H PRN, Jr Anthony Rai MD, 25 mg at 02/13/20 0759  •  Vancomycin Pharmacy Intermittent Dosing, , Does not apply, Daily, Miladys Ramirez APRN  •  Vasopressin (PITRESSIN) 20 Units in sodium chloride 0.9 % 100 mL (0.2 Units/mL) infusion, 0.02-0.1 Units/min, Intravenous, Continuous PRN, Jr Anthony Rai MD    Assessment/Plan     Patient Active Problem List   Diagnosis   • Asthma   • Type 2 diabetes mellitus with diabetic polyneuropathy, with long-term current use of insulin (CMS/[facility])   • Essential hypertension   • Mixed hyperlipidemia   • Disorder of muscle, ligament, and fascia   • Proteinuria   • Postoperative hypothyroidism   • Hyperparathyroidism (CMS/HCC)   • Vitamin D deficiency   • Gout without tophus   • Solitary thyroid nodule   • Anemia in stage 3 chronic kidney disease (CMS/HCC)   • Stage 3 chronic kidney disease (CMS/HCC)   • Class 1 obesity due to excess calories with serious comorbidity and body mass index (BMI) of 34.0 to 34.9 in adult   • Heartburn   • Stable angina (CMS/HCC)   • 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 pectoris (CMS/HCC)     Uncontrolled type 2 diabetes mellitus  Uncontrolled type 2 diabetes mellitus with neuropathy  Uncontrolled type 2 diabetes mellitus with nephropathy  Chronic kidney disease  Postoperative hypothyroidism patient had partial right hemithyroidectomy 2016  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  Status post Avastin injections  Recently steroid use complicating diabetes  Hyperlipidemia associated with diabetes  Coronary artery disease status post cardiac cath and is currently awaiting CABG in the morning    Patient pressures  "ranging from   She still on insulin drip at this time  May be transitioned sometime today or tomorrow  Discussed with the family briefly  I also discussed with the cardiothoracic surgery team  Patient may move to stepdown unit today    Jayy Garrido MD FACE.  02/13/20  8:32 AM      EMR Dragon / transcription disclaimer:     \"Dictated utilizing Dragon dictation\".   "

## 2020-02-13 NOTE — PROGRESS NOTES
"    Patient Name: Ange Johnson  :1940  79 y.o.      Patient Care Team:  Quinn Washington MD as PCP - General (Family Medicine)  Hugh Caruso MD as PCP - Claims Attributed  Steve Simons Jr., MD as Consulting Physician (Hematology and Oncology)  Hugh Caruso MD as Consulting Physician (Nephrology)  Adalgisa Hernandez APRN as Nurse Practitioner (Endocrinology)  Reagan Beltran MD as Consulting Physician (Endocrinology)  Aguilar Goldman MD as Consulting Physician (Pulmonary Disease)  Mil Sr MD as Consulting Physician (Ophthalmology)  Tasneem Montelongo MD as Surgeon (General Surgery)  Victor Hugo Ng MD as Consulting Physician (Cardiology)    Chief Complaint: s/p CABG    Interval History:    CP this AM  Dyspneic  fatigued    Objective   Vital Signs  Temp:  [97.6 °F (36.4 °C)] 97.6 °F (36.4 °C)  Heart Rate:  [80-84] 84  Resp:  [16-20] 18  BP: ()/(52-70) 114/60  Arterial Line BP: (103-146)/(52-71) 146/68  FiO2 (%):  [30 %-100 %] 30 %    Intake/Output Summary (Last 24 hours) at 2020 0917  Last data filed at 2020 0800  Gross per 24 hour   Intake 2238 ml   Output 6930 ml   Net -4692 ml     Flowsheet Rows      First Filed Value   Admission Height  165.1 cm (65\") Documented at 2020 1646   Admission Weight  99.3 kg (219 lb) Documented at 2020 1646          Physical Exam:   General Appearance:    Alert, cooperative, in no acute distress   Lungs:   Decreased effort    Heart:    Regular rhythm and normal rate, normal S1 and S2, no murmurs, gallops or rubs.  No JVD   Chest Wall:    Sternotomy c/d/i   Abdomen:     Soft, nontender, positive bowel sounds.     Extremities:   no cyanosis, clubbing. Trace b/l edema  No marked joint deformities.  Adequate musculoskeletal strength.       Results Review:    Results from last 7 days   Lab Units 20  0314   SODIUM mmol/L 139   POTASSIUM mmol/L 3.9   CHLORIDE mmol/L 106   CO2 mmol/L 22.5   BUN mg/dL 33*   CREATININE mg/dL 1.71*   GLUCOSE " mg/dL 137*   CALCIUM mg/dL 8.2*         Results from last 7 days   Lab Units 02/13/20  0314   WBC 10*3/mm3 8.90   HEMOGLOBIN g/dL 7.6*   HEMATOCRIT % 23.1*   PLATELETS 10*3/mm3 107*     Results from last 7 days   Lab Units 02/13/20  0314 02/12/20  1312 02/11/20  0438 02/10/20  1058   INR  1.19* 1.27* 1.01 1.03   APTT seconds  --  26.4  --  33.2     Results from last 7 days   Lab Units 02/13/20  0314   MAGNESIUM mg/dL 2.2     Results from last 7 days   Lab Units 02/12/20  0429   CHOLESTEROL mg/dL 153   TRIGLYCERIDES mg/dL 137   HDL CHOL mg/dL 55   LDL CHOL mg/dL 71               Medication Review:     aspirin 81 mg Oral Daily   atorvastatin 40 mg Oral Nightly   carBAMazepine 300 mg Oral Q12H   chlorhexidine 15 mL Mouth/Throat Q12H   enoxaparin 30 mg Subcutaneous Daily   insulin glargine 20 Units Subcutaneous Q12H   insulin lispro 2-5 Units Subcutaneous 4x Daily AC & at Bedtime   levothyroxine 175 mcg Oral Q AM   metoclopramide 5 mg Intravenous Q8H   metoprolol tartrate 12.5 mg Oral Q12H   mupirocin  Each Nare BID   pantoprazole 40 mg Oral Daily   Vancomycin Pharmacy Intermittent Dosing  Does not apply Daily          clevidipine 2-32 mg/hr    dexmedetomidine 0.2-1.5 mcg/kg/hr Last Rate: Stopped (02/12/20 1557)   DOPamine 2-20 mcg/kg/min    EPINEPHrine 0.02-0.3 mcg/kg/min    insulin 0-50 Units/hr Last Rate: 4.8 Units/hr (02/13/20 0615)   milrinone 0.25-0.75 mcg/kg/min    niCARdipine 5-15 mg/hr Last Rate: Stopped (02/12/20 1340)   nitroglycerin 5-200 mcg/min    norepinephrine 0.02-0.3 mcg/kg/min    Pharmacy to dose vancomycin     phenylephrine 0.2-3 mcg/kg/min    propofol 5-50 mcg/kg/min Last Rate: Stopped (02/12/20 1524)   sodium chloride 9 mL/hr Last Rate: 9 mL/hr (02/12/20 1500)   sodium chloride 30 mL/hr    vasopressin 0.02-0.1 Units/min        Assessment/Plan   1. CAD with 90% distal LM stenosis, now s/p CABG: LIMA to LAD, SVG to PDA, SVG to Om1, OM2, D1.   - ASA, Atorva 40. Metoprolol 12.5  - underlying rhythm  earlier was apparently sinus cindy but on my review NSR HR 60    2. Post op anemia - agree with pRBC tx    3. DM - insulin drip    4. CKD- Cr 1.7 this AM. -- stable    5. Diastolic dysfunction/ HFpEF      Brianne Foss MD  [facility]  02/13/20  9:17 AM

## 2020-02-13 NOTE — PROGRESS NOTES
"Pharmacokinetic Consult - Vancomycin Dosing (Follow-up Note)    Ange Johnson is on day 1 pharmacy to dose vancomycin for surgical prophylaxis.  Pharmacy dosing vancomycin per TAYO Mccracken's request.   Goal trough: 15-20 mg/L     elevant clinical data and objective history reviewed:  79 y.o. female 165.1 cm (65\") 98.3 kg (216 lb 12.8 oz)      Creatinine   Date Value Ref Range Status   02/12/2020 1.78 (H) 0.57 - 1.00 mg/dL Final   02/12/2020 1.74 (H) 0.57 - 1.00 mg/dL Final   02/12/2020 1.83 (H) 0.57 - 1.00 mg/dL Final     BUN   Date Value Ref Range Status   02/12/2020 34 (H) 8 - 23 mg/dL Final     Estimated Creatinine Clearance: 29.2 mL/min (A) (by C-G formula based on SCr of 1.78 mg/dL (H)).    Lab Results   Component Value Date    WBC 14.89 (H) 02/12/2020     Temp Readings from Last 3 Encounters:   02/11/20 97.6 °F (36.4 °C)   01/20/20 98.6 °F (37 °C) (Oral)   05/30/19 97.8 °F (36.6 °C) (Oral)        No results found for: LISA  Lab Results   Component Value Date    VANCORANDOM 13.70 02/12/2020       Assessment/Plan  Random vanco level 13 mcg/ml.  Will re-dose with 1500mg (15mg/kg IV x1. Vancomycin level at 1200 2/13. Pharmacy will re-dose based on levels. Pharmacy will continue to follow daily while on vancomycin and adjust as needed.     Lindsey Mckeon [facility]    "

## 2020-02-13 NOTE — PROGRESS NOTES
LOS: 3 days   Patient Care Team:  Quinn Washington MD as PCP - General (Family Medicine)  Hugh Caruso MD as PCP - Claims Attributed  Steve Simons Jr., MD as Consulting Physician (Hematology and Oncology)  Hugh Caruso MD as Consulting Physician (Nephrology)  Adalgisa Hernandez APRN as Nurse Practitioner (Endocrinology)  Reagan Beltran MD as Consulting Physician (Endocrinology)  Aguilar Goldman MD as Consulting Physician (Pulmonary Disease)  Mil Sr MD as Consulting Physician (Ophthalmology)  Tasneem Montelongo MD as Surgeon (General Surgery)  Victor Hugo Ng MD as Consulting Physician (Cardiology)    Chief Complaint: post op    Subjective:  Symptoms:  No shortness of breath or chest pain.    Pain:  She complains of pain that is moderate.  Pain is partially controlled.          Vital Signs  Temp:  [97.6 °F (36.4 °C)] 97.6 °F (36.4 °C)  Heart Rate:  [80-84] 84  Resp:  [16-20] 18  BP: ()/(52-70) 116/62  Arterial Line BP: (103-146)/(52-71) 146/68  FiO2 (%):  [30 %-100 %] 30 %  Body mass index is 36.08 kg/m².    Intake/Output Summary (Last 24 hours) at 2/13/2020 0709  Last data filed at 2/13/2020 0600  Gross per 24 hour   Intake 953 ml   Output 6720 ml   Net -5767 ml     No intake/output data recorded.    Chest tube drainage last 8 hours 140/40        02/09/20  1651 02/11/20  2233 02/12/20  0527   Weight: 99.3 kg (219 lb) 98.7 kg (217 lb 8 oz) 98.3 kg (216 lb 12.8 oz)         Objective    Results Review:        WBC WBC   Date Value Ref Range Status   02/13/2020 8.90 3.40 - 10.80 10*3/mm3 Final   02/12/2020 14.89 (H) 3.40 - 10.80 10*3/mm3 Final   02/12/2020 23.60 (H) 3.40 - 10.80 10*3/mm3 Final   02/12/2020 9.85 3.40 - 10.80 10*3/mm3 Final   02/11/2020 9.55 3.40 - 10.80 10*3/mm3 Final   02/10/2020 8.73 3.40 - 10.80 10*3/mm3 Final      HGB Hemoglobin   Date Value Ref Range Status   02/13/2020 7.6 (L) 12.0 - 15.9 g/dL Final   02/12/2020 8.1 (L) 12.0 - 15.9 g/dL Final   02/12/2020 9.3 (L) 12.0 - 15.9 g/dL  Final   02/12/2020 9.9 (L) 12.0 - 15.9 g/dL Final   02/11/2020 10.6 (L) 12.0 - 15.9 g/dL Final   02/10/2020 10.0 (L) 12.0 - 15.9 g/dL Final      HCT Hematocrit   Date Value Ref Range Status   02/13/2020 23.1 (L) 34.0 - 46.6 % Final   02/12/2020 24.4 (L) 34.0 - 46.6 % Final   02/12/2020 27.0 (L) 34.0 - 46.6 % Final   02/12/2020 29.7 (L) 34.0 - 46.6 % Final   02/11/2020 30.6 (L) 34.0 - 46.6 % Final   02/10/2020 29.7 (L) 34.0 - 46.6 % Final      Platelets Platelets   Date Value Ref Range Status   02/13/2020 107 (L) 140 - 450 10*3/mm3 Final   02/12/2020 108 (L) 140 - 450 10*3/mm3 Final   02/12/2020 137 (L) 140 - 450 10*3/mm3 Final   02/12/2020 208 140 - 450 10*3/mm3 Final   02/11/2020 218 140 - 450 10*3/mm3 Final   02/10/2020 184 140 - 450 10*3/mm3 Final        PT/INR:    Protime   Date Value Ref Range Status   02/13/2020 14.8 (H) 11.7 - 14.2 Seconds Final   02/12/2020 15.6 (H) 11.7 - 14.2 Seconds Final   02/11/2020 13.0 11.7 - 14.2 Seconds Final   02/10/2020 13.2 11.7 - 14.2 Seconds Final   /  INR   Date Value Ref Range Status   02/13/2020 1.19 (H) 0.90 - 1.10 Final   02/12/2020 1.27 (H) 0.90 - 1.10 Final   02/11/2020 1.01 0.90 - 1.10 Final   02/10/2020 1.03 0.90 - 1.10 Final       Sodium Sodium   Date Value Ref Range Status   02/13/2020 139 136 - 145 mmol/L Final   02/12/2020 140 136 - 145 mmol/L Final   02/12/2020 135 (L) 136 - 145 mmol/L Final   02/12/2020 142 136 - 145 mmol/L Final   02/11/2020 139 136 - 145 mmol/L Final   02/10/2020 140 136 - 145 mmol/L Final      Potassium Potassium   Date Value Ref Range Status   02/13/2020 3.9 3.5 - 5.2 mmol/L Final   02/12/2020 3.9 3.5 - 5.2 mmol/L Final   02/12/2020 3.8 3.5 - 5.2 mmol/L Final   02/12/2020 3.5 3.5 - 5.2 mmol/L Final   02/11/2020 3.5 3.5 - 5.2 mmol/L Final   02/10/2020 3.5 3.5 - 5.2 mmol/L Final      Chloride Chloride   Date Value Ref Range Status   02/13/2020 106 98 - 107 mmol/L Final   02/12/2020 106 98 - 107 mmol/L Final   02/12/2020 103 98 - 107 mmol/L  Final   02/12/2020 104 98 - 107 mmol/L Final   02/11/2020 103 98 - 107 mmol/L Final   02/10/2020 106 98 - 107 mmol/L Final      Bicarbonate CO2   Date Value Ref Range Status   02/13/2020 22.5 22.0 - 29.0 mmol/L Final   02/12/2020 22.5 22.0 - 29.0 mmol/L Final   02/12/2020 20.8 (L) 22.0 - 29.0 mmol/L Final   02/12/2020 25.7 22.0 - 29.0 mmol/L Final   02/11/2020 24.2 22.0 - 29.0 mmol/L Final   02/10/2020 23.0 22.0 - 29.0 mmol/L Final      BUN BUN   Date Value Ref Range Status   02/13/2020 33 (H) 8 - 23 mg/dL Final   02/12/2020 34 (H) 8 - 23 mg/dL Final   02/12/2020 34 (H) 8 - 23 mg/dL Final   02/12/2020 40 (H) 8 - 23 mg/dL Final   02/11/2020 34 (H) 8 - 23 mg/dL Final   02/10/2020 31 (H) 8 - 23 mg/dL Final      Creatinine Creatinine   Date Value Ref Range Status   02/13/2020 1.71 (H) 0.57 - 1.00 mg/dL Final   02/12/2020 1.78 (H) 0.57 - 1.00 mg/dL Final   02/12/2020 1.74 (H) 0.57 - 1.00 mg/dL Final   02/12/2020 1.83 (H) 0.57 - 1.00 mg/dL Final   02/11/2020 1.68 (H) 0.57 - 1.00 mg/dL Final   02/10/2020 1.60 (H) 0.57 - 1.00 mg/dL Final      Calcium Calcium   Date Value Ref Range Status   02/13/2020 8.2 (L) 8.6 - 10.5 mg/dL Final   02/12/2020 7.8 (L) 8.6 - 10.5 mg/dL Final   02/12/2020 7.9 (L) 8.6 - 10.5 mg/dL Final   02/12/2020 8.7 8.6 - 10.5 mg/dL Final   02/11/2020 9.0 8.6 - 10.5 mg/dL Final   02/10/2020 7.6 (L) 8.6 - 10.5 mg/dL Final      Magnesium Magnesium   Date Value Ref Range Status   02/13/2020 2.2 1.6 - 2.4 mg/dL Final   02/12/2020 1.9 1.6 - 2.4 mg/dL Final   02/12/2020 1.9 1.6 - 2.4 mg/dL Final            aspirin 81 mg Oral Daily   atorvastatin 40 mg Oral Nightly   carBAMazepine 300 mg Oral Q12H   chlorhexidine 15 mL Mouth/Throat Q12H   enoxaparin 30 mg Subcutaneous Daily   levothyroxine 175 mcg Oral Q AM   metoclopramide 5 mg Intravenous Q8H   metoprolol tartrate 12.5 mg Oral Q12H   mupirocin  Each Nare BID   pantoprazole 40 mg Oral Daily   Vancomycin Pharmacy Intermittent Dosing  Does not apply Daily        clevidipine 2-32 mg/hr    dexmedetomidine 0.2-1.5 mcg/kg/hr Last Rate: Stopped (02/12/20 1557)   DOPamine 2-20 mcg/kg/min    EPINEPHrine 0.02-0.3 mcg/kg/min    insulin 0-50 Units/hr Last Rate: 1.8 Units/hr (02/12/20 1616)   milrinone 0.25-0.75 mcg/kg/min    niCARdipine 5-15 mg/hr Last Rate: Stopped (02/12/20 1340)   nitroglycerin 5-200 mcg/min    norepinephrine 0.02-0.3 mcg/kg/min    Pharmacy to dose vancomycin     phenylephrine 0.2-3 mcg/kg/min    propofol 5-50 mcg/kg/min Last Rate: Stopped (02/12/20 1524)   sodium chloride 9 mL/hr Last Rate: 9 mL/hr (02/12/20 1500)   sodium chloride 30 mL/hr    vasopressin 0.02-0.1 Units/min            Patient Active Problem List   Diagnosis Code   • Asthma J45.909   • Type 2 diabetes mellitus with diabetic polyneuropathy, with long-term current use of insulin (CMS/[facility]) E11.42, Z79.4   • Essential hypertension I10   • Mixed hyperlipidemia E78.2   • Disorder of muscle, ligament, and fascia M62.9   • Proteinuria R80.9   • Postoperative hypothyroidism E89.0   • Hyperparathyroidism (CMS/[facility]) E21.3   • Vitamin D deficiency E55.9   • 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   • Solitary thyroid nodule E04.1   • Anemia in stage 3 chronic kidney disease (CMS/[facility]) N18.3, D63.1   • Stage 3 chronic kidney disease (CMS/[facility]) N18.3   • Class 1 obesity due to excess calories with serious comorbidity and body mass index (BMI) of 34.0 to 34.9 in adult E66.09, Z68.34   • Heartburn R12   • Stable angina (CMS/[facility]) I20.8   • 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 pectoris (CMS/[facility]) I25.118       Assessment & Plan     -Severe CAD with left main stenosis--s/p CABGx5, sternal closure with zip fix--POD#1 [place]  -Diabetes type 2  -CKD stage III, baseline 1.6-1.8  -hypertension  -hypothyroidism- on synthroid  -hyperlipidemia   -post op anemia- expected acute blood loss  -TCP- plt count 107     Creatinine 1.71.  hgb 7.6, transfuse 1unit prbcs  Sinus cindy under pacemaker. Rate 38-40  Pacing  appropriately AAI at 84.  Pain is an issue this morning, no nausea with pain medication, mainly pain in her back   Encourage pulmonary toilet.   Continue routine care.  Transfer to stepdown.    TAYO Welch  02/13/20  7:09 AM

## 2020-02-13 NOTE — PLAN OF CARE
Problem: Patient Care Overview  Goal: Plan of Care Review  Flowsheets (Taken 2/13/2020 0914)  Plan of Care Reviewed With: patient  Outcome Summary: Pt is a 80 yo F who is post-op CABG. Pt presents to PT with impaired functional mobility and gait secondary to generalized weakness, impaired balance, and decreased activity tolerance. Pt may benefit from skilled PT to address strength, mobility, and gait.

## 2020-02-13 NOTE — ANESTHESIA POSTPROCEDURE EVALUATION
"Patient: Ange Johnson    Procedure Summary     Date:  02/12/20 Room / Location:  [facility] OR [address] [place] [address] MAIN OR    Anesthesia Start:  0700 Anesthesia Stop:  1310    Procedure:  MIDLINE STERNOTOMY, CORONARY ARTERY BYPASS GRAFTING X  5 USING LEFT INTERNAL MAMMARY ARTERY AND LEFT ENDOSCOPICALLY HARVESTED GREATER SAPHENOUS VEIN, INTRAOP KERWIN, PRP (N/A Chest) Diagnosis:       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 pectoris, unspecified vessel or lesion type (CMS/HCC)      (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 pectoris, unspecified vessel or lesion type (CMS/HCC) [I25.118])    Surgeon:  Jr Anthony Rai MD Provider:  Oscar Loco MD    Anesthesia Type:  general ASA Status:  4          Anesthesia Type: general    Vitals  Vitals Value Taken Time   /61 2/12/2020  7:00 PM   Temp     Pulse 84 2/12/2020  7:18 PM   Resp 20 2/12/2020  6:35 PM   SpO2 99 % 2/12/2020  7:18 PM   Vitals shown include unvalidated device data.        Post Anesthesia Care and Evaluation    Patient location during evaluation: bedside  Patient participation: complete - patient participated  Level of consciousness: awake and alert  Pain management: adequate  Airway patency: patent  Anesthetic complications: No anesthetic complications    Cardiovascular status: acceptable  Respiratory status: acceptable  Hydration status: acceptable    Comments: /61   Pulse 84   Temp 36.4 °C (97.6 °F) (Oral)   Resp 20   Ht 165.1 cm (65\")   Wt 98.3 kg (216 lb 12.8 oz)   SpO2 100%   BMI 36.08 kg/m²       "

## 2020-02-14 ENCOUNTER — APPOINTMENT (OUTPATIENT)
Dept: GENERAL RADIOLOGY | Facility: HOSPITAL | Age: 80
End: 2020-02-14

## 2020-02-14 LAB
ANION GAP SERPL CALCULATED.3IONS-SCNC: 14.7 MMOL/L (ref 5–15)
BUN BLD-MCNC: 41 MG/DL (ref 8–23)
BUN/CREAT SERPL: 21.2 (ref 7–25)
CALCIUM SPEC-SCNC: 9.1 MG/DL (ref 8.6–10.5)
CHLORIDE SERPL-SCNC: 106 MMOL/L (ref 98–107)
CO2 SERPL-SCNC: 21.3 MMOL/L (ref 22–29)
CREAT BLD-MCNC: 1.93 MG/DL (ref 0.57–1)
DEPRECATED RDW RBC AUTO: 47.6 FL (ref 37–54)
ERYTHROCYTE [DISTWIDTH] IN BLOOD BY AUTOMATED COUNT: 14.1 % (ref 12.3–15.4)
GFR SERPL CREATININE-BSD FRML MDRD: 25 ML/MIN/1.73
GLUCOSE BLD-MCNC: 117 MG/DL (ref 65–99)
GLUCOSE BLDC GLUCOMTR-MCNC: 129 MG/DL (ref 70–130)
GLUCOSE BLDC GLUCOMTR-MCNC: 161 MG/DL (ref 70–130)
GLUCOSE BLDC GLUCOMTR-MCNC: 174 MG/DL (ref 70–130)
GLUCOSE BLDC GLUCOMTR-MCNC: 190 MG/DL (ref 70–130)
HCT VFR BLD AUTO: 28 % (ref 34–46.6)
HGB BLD-MCNC: 9.4 G/DL (ref 12–15.9)
MCH RBC QN AUTO: 31.3 PG (ref 26.6–33)
MCHC RBC AUTO-ENTMCNC: 33.6 G/DL (ref 31.5–35.7)
MCV RBC AUTO: 93.3 FL (ref 79–97)
PLATELET # BLD AUTO: 116 10*3/MM3 (ref 140–450)
PMV BLD AUTO: 11.1 FL (ref 6–12)
POTASSIUM BLD-SCNC: 3.4 MMOL/L (ref 3.5–5.2)
POTASSIUM BLD-SCNC: 4.1 MMOL/L (ref 3.5–5.2)
RBC # BLD AUTO: 3 10*6/MM3 (ref 3.77–5.28)
SODIUM BLD-SCNC: 142 MMOL/L (ref 136–145)
VANCOMYCIN SERPL-MCNC: 19.8 MCG/ML (ref 5–40)
WBC NRBC COR # BLD: 13.39 10*3/MM3 (ref 3.4–10.8)

## 2020-02-14 PROCEDURE — 93010 ELECTROCARDIOGRAM REPORT: CPT | Performed by: INTERNAL MEDICINE

## 2020-02-14 PROCEDURE — 63710000001 INSULIN LISPRO (HUMAN) PER 5 UNITS: Performed by: INTERNAL MEDICINE

## 2020-02-14 PROCEDURE — 97110 THERAPEUTIC EXERCISES: CPT

## 2020-02-14 PROCEDURE — 85027 COMPLETE CBC AUTOMATED: CPT | Performed by: THORACIC SURGERY (CARDIOTHORACIC VASCULAR SURGERY)

## 2020-02-14 PROCEDURE — 80202 ASSAY OF VANCOMYCIN: CPT | Performed by: NURSE PRACTITIONER

## 2020-02-14 PROCEDURE — 82962 GLUCOSE BLOOD TEST: CPT

## 2020-02-14 PROCEDURE — 71045 X-RAY EXAM CHEST 1 VIEW: CPT

## 2020-02-14 PROCEDURE — 99232 SBSQ HOSP IP/OBS MODERATE 35: CPT | Performed by: NURSE PRACTITIONER

## 2020-02-14 PROCEDURE — 25010000002 ENOXAPARIN PER 10 MG: Performed by: THORACIC SURGERY (CARDIOTHORACIC VASCULAR SURGERY)

## 2020-02-14 PROCEDURE — 25010000002 ONDANSETRON PER 1 MG: Performed by: THORACIC SURGERY (CARDIOTHORACIC VASCULAR SURGERY)

## 2020-02-14 PROCEDURE — 80048 BASIC METABOLIC PNL TOTAL CA: CPT | Performed by: THORACIC SURGERY (CARDIOTHORACIC VASCULAR SURGERY)

## 2020-02-14 PROCEDURE — 93005 ELECTROCARDIOGRAM TRACING: CPT | Performed by: THORACIC SURGERY (CARDIOTHORACIC VASCULAR SURGERY)

## 2020-02-14 PROCEDURE — 63710000001 INSULIN GLARGINE PER 5 UNITS: Performed by: INTERNAL MEDICINE

## 2020-02-14 PROCEDURE — 99024 POSTOP FOLLOW-UP VISIT: CPT | Performed by: NURSE PRACTITIONER

## 2020-02-14 PROCEDURE — 84132 ASSAY OF SERUM POTASSIUM: CPT | Performed by: THORACIC SURGERY (CARDIOTHORACIC VASCULAR SURGERY)

## 2020-02-14 PROCEDURE — 93005 ELECTROCARDIOGRAM TRACING: CPT | Performed by: NURSE PRACTITIONER

## 2020-02-14 RX ORDER — SCOLOPAMINE TRANSDERMAL SYSTEM 1 MG/1
1 PATCH, EXTENDED RELEASE TRANSDERMAL
Status: DISCONTINUED | OUTPATIENT
Start: 2020-02-14 | End: 2020-02-18 | Stop reason: HOSPADM

## 2020-02-14 RX ORDER — CLONIDINE HYDROCHLORIDE 0.1 MG/1
0.1 TABLET ORAL EVERY 12 HOURS SCHEDULED
Status: DISCONTINUED | OUTPATIENT
Start: 2020-02-14 | End: 2020-02-18 | Stop reason: HOSPADM

## 2020-02-14 RX ORDER — HYDRALAZINE HYDROCHLORIDE 25 MG/1
25 TABLET, FILM COATED ORAL EVERY 8 HOURS SCHEDULED
Status: DISCONTINUED | OUTPATIENT
Start: 2020-02-14 | End: 2020-02-18 | Stop reason: HOSPADM

## 2020-02-14 RX ADMIN — HYDRALAZINE HYDROCHLORIDE 25 MG: 25 TABLET ORAL at 17:05

## 2020-02-14 RX ADMIN — CHLORHEXIDINE GLUCONATE 15 ML: 1.2 RINSE ORAL at 09:44

## 2020-02-14 RX ADMIN — HYDRALAZINE HYDROCHLORIDE 25 MG: 25 TABLET ORAL at 22:32

## 2020-02-14 RX ADMIN — INSULIN GLARGINE 20 UNITS: 100 INJECTION, SOLUTION SUBCUTANEOUS at 21:19

## 2020-02-14 RX ADMIN — CARBAMAZEPINE 300 MG: 300 CAPSULE, EXTENDED RELEASE ORAL at 21:19

## 2020-02-14 RX ADMIN — MUPIROCIN 1 APPLICATION: 20 OINTMENT TOPICAL at 21:18

## 2020-02-14 RX ADMIN — ASPIRIN 81 MG: 81 TABLET ORAL at 09:44

## 2020-02-14 RX ADMIN — INSULIN LISPRO 2 UNITS: 100 INJECTION, SOLUTION INTRAVENOUS; SUBCUTANEOUS at 17:05

## 2020-02-14 RX ADMIN — INSULIN LISPRO 2 UNITS: 100 INJECTION, SOLUTION INTRAVENOUS; SUBCUTANEOUS at 21:19

## 2020-02-14 RX ADMIN — CLONIDINE HYDROCHLORIDE 0.1 MG: 0.1 TABLET ORAL at 13:01

## 2020-02-14 RX ADMIN — MUPIROCIN 1 APPLICATION: 20 OINTMENT TOPICAL at 09:44

## 2020-02-14 RX ADMIN — SCOPALAMINE 1 PATCH: 1 PATCH, EXTENDED RELEASE TRANSDERMAL at 11:38

## 2020-02-14 RX ADMIN — INSULIN GLARGINE 20 UNITS: 100 INJECTION, SOLUTION SUBCUTANEOUS at 09:46

## 2020-02-14 RX ADMIN — LEVOTHYROXINE SODIUM 175 MCG: 175 TABLET ORAL at 06:33

## 2020-02-14 RX ADMIN — POTASSIUM CHLORIDE 40 MEQ: 750 CAPSULE, EXTENDED RELEASE ORAL at 15:02

## 2020-02-14 RX ADMIN — PANTOPRAZOLE SODIUM 40 MG: 40 TABLET, DELAYED RELEASE ORAL at 09:42

## 2020-02-14 RX ADMIN — CLONIDINE HYDROCHLORIDE 0.1 MG: 0.1 TABLET ORAL at 21:18

## 2020-02-14 RX ADMIN — ATORVASTATIN CALCIUM 40 MG: 20 TABLET, FILM COATED ORAL at 21:18

## 2020-02-14 RX ADMIN — POTASSIUM CHLORIDE 40 MEQ: 750 CAPSULE, EXTENDED RELEASE ORAL at 06:33

## 2020-02-14 RX ADMIN — ONDANSETRON HYDROCHLORIDE 4 MG: 2 SOLUTION INTRAMUSCULAR; INTRAVENOUS at 06:41

## 2020-02-14 RX ADMIN — TRAMADOL HYDROCHLORIDE 25 MG: 50 TABLET ORAL at 00:46

## 2020-02-14 RX ADMIN — ENOXAPARIN SODIUM 30 MG: 30 INJECTION SUBCUTANEOUS at 17:09

## 2020-02-14 RX ADMIN — TORSEMIDE 20 MG: 20 TABLET ORAL at 09:42

## 2020-02-14 NOTE — PROGRESS NOTES
"   LOS: 4 days    Patient Care Team:  Quinn Washington MD as PCP - General (Family Medicine)  Hugh Caruso MD as PCP - Claims Attributed  Steve Simons Jr., MD as Consulting Physician (Hematology and Oncology)  Hugh Caruso MD as Consulting Physician (Nephrology)  Adalgisa Hernandez APRN as Nurse Practitioner (Endocrinology)  Reagan Beltran MD as Consulting Physician (Endocrinology)  Aguilar Goldman MD as Consulting Physician (Pulmonary Disease)  Mil Sr MD as Consulting Physician (Ophthalmology)  Tasneem Montelongo MD as Surgeon (General Surgery)  Victor Hugo Ng MD as Consulting Physician (Cardiology)    Chief Complaint:  No chief complaint on file.    Follow UP CKD3  Subjective     Interval History:     Seen and examined.  Laying down in bed.  Nauseous earlier today.  Soriano catheter with output.  Planning to pull chest tubes out today.    Review of Systems:   See above.    Objective     Vital Signs  Temp:  [97.2 °F (36.2 °C)-99 °F (37.2 °C)] 97.2 °F (36.2 °C)  Heart Rate:  [68-91] 68  Resp:  [18] 18  BP: (117-153)/(65-85) 117/77    Flowsheet Rows      First Filed Value   Admission Height  165.1 cm (65\") Documented at 02/09/2020 1646   Admission Weight  99.3 kg (219 lb) Documented at 02/09/2020 1646          I/O this shift:  In: 60 [P.O.:60]  Out: 300 [Urine:300]  I/O last 3 completed shifts:  In: 1789.3 [P.O.:220; I.V.:1069.3; IV Piggyback:500]  Out: 4305 [Urine:3570; Chest Tube:735]    Intake/Output Summary (Last 24 hours) at 2/14/2020 1218  Last data filed at 2/14/2020 1100  Gross per 24 hour   Intake 564.3 ml   Output 2940 ml   Net -2375.7 ml       Physical Exam:  Elderly female.  Alert in no acute distress  RIJ Cordis, Left radial Dejah.   Oral mucosa dry  Neck no jvd  Heart RRR no s3 or rub  Lungs clear to auscultation. Mediastinal and pleural tubes  Abd few bs soft, nontender  Ext ++ edema lower ext   Skin no rash  VAN soriano .      Results Review:    Results from last 7 days   Lab Units " 02/14/20 0322 02/13/20 0314 02/12/20  1633  02/12/20  0429 02/11/20  0438 02/10/20  1058   SODIUM mmol/L 142 139 140   < > 142 139 140   POTASSIUM mmol/L 3.4* 3.9 3.9   < > 3.5 3.5 3.5   CHLORIDE mmol/L 106 106 106   < > 104 103 106   CO2 mmol/L 21.3* 22.5 22.5   < > 25.7 24.2 23.0   BUN mg/dL 41* 33* 34*   < > 40* 34* 31*   CREATININE mg/dL 1.93* 1.71* 1.78*   < > 1.83* 1.68* 1.60*   CALCIUM mg/dL 9.1 8.2* 7.8*   < > 8.7 9.0 7.6*   BILIRUBIN mg/dL  --   --   --   --  <0.2* <0.2* 0.2   ALK PHOS U/L  --   --   --   --  66 73 67   ALT (SGPT) U/L  --   --   --   --  11 11 13   AST (SGOT) U/L  --   --   --   --  10 11 10   GLUCOSE mg/dL 117* 137* 104*   < > 63* 186* 83    < > = values in this interval not displayed.       Estimated Creatinine Clearance: 27.1 mL/min (A) (by C-G formula based on SCr of 1.93 mg/dL (H)).    Results from last 7 days   Lab Units 02/13/20 0314 02/12/20 1633 02/12/20  1312   MAGNESIUM mg/dL 2.2 1.9 1.9   PHOSPHORUS mg/dL 4.5 3.7 3.5             Results from last 7 days   Lab Units 02/14/20 0322 02/13/20 0314 02/12/20  1633 02/12/20  1312 02/12/20  1119  02/12/20  0429   WBC 10*3/mm3 13.39* 8.90 14.89* 23.60*  --   --  9.85   HEMOGLOBIN g/dL 9.4* 7.6* 8.1* 9.3*  --   --  9.9*   HEMOGLOBIN, POC g/dL  --   --   --   --  8.2*   < >  --    PLATELETS 10*3/mm3 116* 107* 108* 137*  --   --  208    < > = values in this interval not displayed.       Results from last 7 days   Lab Units 02/13/20  0314 02/12/20  1312 02/11/20  0438 02/10/20  1058   INR  1.19* 1.27* 1.01 1.03         Imaging Results (Last 24 Hours)     Procedure Component Value Units Date/Time    XR Chest 1 View [[id number]] Collected:  02/14/20 0524     Updated:  02/14/20 0528    Narrative:       PORTABLE CHEST RADIOGRAPH     HISTORY: Postop open heart surgery     COMPARISON: 03/13/2020     FINDINGS:  [place] catheter has been removed, although the introducer remains.  Remaining tubes and lines are stable in position. Vascular  congestion  appears improved. No definite pneumothorax is seen. There is bibasilar  atelectasis. There are small bilateral effusions.       Impression:       Improving vascular congestion.     This report was finalized on 2/14/2020 5:25 AM by Dr. Angeles Noland M.D.             aspirin 81 mg Oral Daily   atorvastatin 40 mg Oral Nightly   carBAMazepine 300 mg Oral Q12H   chlorhexidine 15 mL Mouth/Throat Q12H   cloNIDine 0.1 mg Oral Q12H   enoxaparin 30 mg Subcutaneous Daily   insulin glargine 20 Units Subcutaneous Q12H   insulin lispro 2-5 Units Subcutaneous 4x Daily AC & at Bedtime   levothyroxine 175 mcg Oral Q AM   mupirocin  Each Nare BID   pantoprazole 40 mg Oral Daily   Scopolamine 1 patch Transdermal Q72H   torsemide 20 mg Oral Daily   Vancomycin Pharmacy Intermittent Dosing  Does not apply Daily       insulin 0-50 Units/hr Last Rate: 4.8 Units/hr (02/13/20 0615)   Pharmacy to dose vancomycin     sodium chloride 9 mL/hr Last Rate: 9 mL/hr (02/12/20 1500)   sodium chloride 30 mL/hr        Medication Review:   Current Facility-Administered Medications   Medication Dose Route Frequency Provider Last Rate Last Dose   • acetaminophen (TYLENOL) tablet 650 mg  650 mg Oral Q4H PRN Jr Anthony Rai MD   650 mg at 02/13/20 2059    Or   • acetaminophen (TYLENOL) 160 MG/5ML solution 650 mg  650 mg Oral Q4H PRN Jr Anthony Rai MD        Or   • acetaminophen (TYLENOL) suppository 650 mg  650 mg Rectal Q4H PRN Jr Anthony Rai MD       • ALPRAZolam (XANAX) tablet 0.25 mg  0.25 mg Oral Q8H PRN Jr Anthony Rai MD       • aluminum-magnesium hydroxide-simethicone (MAALOX MAX) 400-400-40 MG/5ML suspension 15 mL  15 mL Oral Q6H PRN Brianne Foss MD       • aspirin EC tablet 81 mg  81 mg Oral Daily Jr Anthony Rai MD   81 mg at 02/14/20 0944   • atorvastatin (LIPITOR) tablet 40 mg  40 mg Oral Nightly Jr Anthony Rai MD   40 mg at 02/13/20 2032   • calcium carbonate (TUMS) chewable tablet 500  mg (200 mg elemental)  2 tablet Oral TID PRN Jr Anthony Rai MD       • carBAMazepine (CARBATROL) 12 hr capsule 300 mg  300 mg Oral Q12H Jr Anthony Rai MD   300 mg at 02/13/20 2032   • chlorhexidine (PERIDEX) 0.12 % solution 15 mL  15 mL Mouth/Throat Q12H Jr Anthony Rai MD   15 mL at 02/14/20 0944   • cloNIDine (CATAPRES) tablet 0.1 mg  0.1 mg Oral Q12H Solange Ceballos, APREMMA       • cyclobenzaprine (FLEXERIL) tablet 10 mg  10 mg Oral Q8H PRN Jr Anthony Rai MD   10 mg at 02/13/20 0013   • dextrose (D50W) 25 g/ 50mL Intravenous Solution 25 g  25 g Intravenous Q15 Min PRN Jr Anthony Rai MD       • dextrose (GLUTOSE) oral gel 15 g  15 g Oral Q15 Min PRN Jr Anthony Rai MD       • enoxaparin (LOVENOX) syringe 30 mg  30 mg Subcutaneous Daily Jr Anthony Rai MD   30 mg at 02/13/20 1643   • furosemide (LASIX) injection 40 mg  40 mg Intravenous Q6H PRN Jr Anthony Rai MD       • glucagon (human recombinant) (GLUCAGEN DIAGNOSTIC) injection 1 mg  1 mg Subcutaneous Q15 Min PRN Jr Anthony Rai MD       • HYDROcodone-acetaminophen (NORCO) 5-325 MG per tablet 2 tablet  2 tablet Oral Q4H PRN Jr Anthony Rai MD   2 tablet at 02/13/20 0013   • insulin glargine (LANTUS) injection 20 Units  20 Units Subcutaneous Q12H Jayy Garrido MD   20 Units at 02/14/20 0946   • insulin lispro (humaLOG) injection 2-5 Units  2-5 Units Subcutaneous 4x Daily AC & at Bedtime Jayy Garrido MD   2 Units at 02/13/20 2031   • insulin regular (HumuLIN R,NovoLIN R) 100 Units in sodium chloride 0.9 % 100 mL (1 Units/mL) infusion  0-50 Units/hr Intravenous Continuous PRN Miladys Ramirez, TAYO 4.8 mL/hr at 02/13/20 0615 4.8 Units/hr at 02/13/20 0615   • levothyroxine (SYNTHROID, LEVOTHROID) tablet 175 mcg  175 mcg Oral Q AM Jr Anthony Rai MD   175 mcg at 02/14/20 0633   • morphine injection 1 mg  1 mg Intravenous Q4H PRN Jr Anthony Rai MD        And   •  naloxone (NARCAN) injection 0.4 mg  0.4 mg Intravenous Q5 Min PRN Jr Anthony Rai MD       • morphine injection 4 mg  4 mg Intravenous Q30 Min PRN Jr Anthony Rai MD       • mupirocin (BACTROBAN) 2 % nasal ointment   Each Nare BID Jr Anthony Rai MD   1 application at 02/14/20 0944   • ondansetron (ZOFRAN) injection 4 mg  4 mg Intravenous Q6H PRN Jr Anthony Rai MD   4 mg at 02/14/20 0641   • oxyCODONE (ROXICODONE) immediate release tablet 10 mg  10 mg Oral Q4H PRN Jr Anthony Rai MD       • pantoprazole (PROTONIX) EC tablet 40 mg  40 mg Oral Daily Jr Anthony Rai MD   40 mg at 02/14/20 0942   • Pharmacy to dose vancomycin   Does not apply Continuous PRN Jr Anthony Rai MD       • potassium chloride (MICRO-K) CR capsule 40 mEq  40 mEq Oral PRN Jr Anthony Rai MD   40 mEq at 02/14/20 0633    Or   • potassium chloride (KLOR-CON) packet 40 mEq  40 mEq Oral PRN Jr Anthony Rai MD       • potassium chloride 10 mEq in 100 mL IVPB  10 mEq Intravenous Q1H PRN Jr Anthony Rai MD        Or   • potassium chloride 10 mEq in 100 mL IVPB  10 mEq Intravenous Q1H PRN Jr Anthony Rai MD       • potassium chloride 20 mEq in 50 mL IVPB  20 mEq Intravenous Q1H PRN Jr Anthony Rai MD       • potassium chloride 20 mEq in 50 mL IVPB  20 mEq Intravenous Q1H PRN Jr Anthony Rai MD       • potassium chloride 20 mEq in 50 mL IVPB  20 mEq Intravenous Q1H PRN Jr Anthony Rai MD       • promethazine (PHENERGAN) tablet 12.5 mg  12.5 mg Oral Q6H PRN Jr Anthony Rai MD        Or   • promethazine (PHENERGAN) injection 12.5 mg  12.5 mg Intravenous Q6H PRN Jr Anthony Rai MD   12.5 mg at 02/13/20 0948   • Scopolamine (TRANSDERM-SCOP) 1.5 MG/3DAYS patch 1 patch  1 patch Transdermal Q72H Solange Ceballos, APRN   1 patch at 02/14/20 1138   • sodium chloride 0.9 % flush 30 mL  30 mL Intravenous Once PRN Jr Anthony Rai MD       • sodium chloride 0.9  % infusion  9 mL/hr Intravenous Continuous Jr Anthony Rai MD 9 mL/hr at 02/12/20 1500 9 mL/hr at 02/12/20 1500   • sodium chloride 0.9 % infusion  30 mL/hr Intravenous Continuous PRN Jr Anthony Rai MD       • torsemide (DEMADEX) tablet 20 mg  20 mg Oral Daily Dyana Urbano MD   20 mg at 02/14/20 0942   • traMADol (ULTRAM) tablet 25 mg  25 mg Oral Q8H PRN Jr Anthony Rai MD   25 mg at 02/14/20 0046   • Vancomycin Pharmacy Intermittent Dosing   Does not apply Daily Miladys Ramirez APRN           Assessment/Plan   1. CKD 3.  Stable so far post op CABG. kidney function is stable.  No further need of IV fluid.  Will continue gentle diuresis. Casarez is out today. Will monitor PVR    2. CAD. Critical left main stenosis, LAD disease 90%. CABG x 5.   3. DM2 on insulin drip post op    4. Bradycardia, paced.    5. Hypothyroid on replacement .  6. Leukocytosis likely reactive  7. Anemia, chronic and blood loss.  Iron stores are low.  Will need IV Venofer.      Dyana Urbano MD  02/14/20  12:18 PM

## 2020-02-14 NOTE — PROGRESS NOTES
" LOS: 4 days   Patient Care Team:  Quinn Washington MD as PCP - General (Family Medicine)  Hugh Caruso MD as PCP - Claims Attributed  Steve Simons Jr., MD as Consulting Physician (Hematology and Oncology)  Hugh Caruso MD as Consulting Physician (Nephrology)  Adalgisa Hernandez APRN as Nurse Practitioner (Endocrinology)  Reagan Beltran MD as Consulting Physician (Endocrinology)  Aguilar Goldman MD as Consulting Physician (Pulmonary Disease)  Mil Sr MD as Consulting Physician (Ophthalmology)  Tasneem Montelongo MD as Surgeon (General Surgery)  Victor Hugo Ng MD as Consulting Physician (Cardiology)    Chief Complaint: post op    Subjective:  Symptoms:  No shortness of breath, cough or chest pain.    Diet:  Poor intake.  She reports  nausea.  No vomiting.    Activity level: Impaired due to weakness.    Pain:  She complains of pain that is mild.  Pain is requiring pain medication and well controlled.      C/o nausea this morning, no vomiting    Vital Signs  Temp:  [97.4 °F (36.3 °C)-99 °F (37.2 °C)] 98.6 °F (37 °C)  Heart Rate:  [69-91] 84  Resp:  [18] 18  BP: ()/(49-85) 153/83  Body mass index is 36.31 kg/m².    Intake/Output Summary (Last 24 hours) at 2/14/2020 0848  Last data filed at 2/14/2020 0730  Gross per 24 hour   Intake 564.3 ml   Output 2810 ml   Net -2245.7 ml     I/O this shift:  In: 60 [P.O.:60]  Out: -     Chest tube drainage last 8 hours 80/80        02/11/20  2233 02/12/20  0527 02/14/20  0500   Weight: 98.7 kg (217 lb 8 oz) 98.3 kg (216 lb 12.8 oz) 99 kg (218 lb 3.2 oz)       Objective:  General Appearance:  Comfortable and in no acute distress.    Vital signs: (most recent): Blood pressure 153/83, pulse 84, temperature 98.6 °F (37 °C), temperature source Oral, resp. rate 18, height 165.1 cm (65\"), weight 99 kg (218 lb 3.2 oz), SpO2 93 %, not currently breastfeeding.  Vital signs are normal.  No fever.    Output: Producing urine.    Lungs:  Normal effort and normal respiratory rate.  " There are decreased breath sounds.    Heart: Normal rate.  Regular rhythm.  (Junctional in 60s)  Abdomen: Abdomen is soft.  (Active bowel sounds in all quadrents).  Bowel sounds are normal.     Extremities: There is dependent edema.    Pulses: Distal pulses are intact.    Neurological: Patient is alert and oriented to person, place and time.    Skin:  Warm and dry.  (Surgical dressing clean, dry, and intact)        Results Review:        WBC WBC   Date Value Ref Range Status   02/14/2020 13.39 (H) 3.40 - 10.80 10*3/mm3 Final   02/13/2020 8.90 3.40 - 10.80 10*3/mm3 Final   02/12/2020 14.89 (H) 3.40 - 10.80 10*3/mm3 Final   02/12/2020 23.60 (H) 3.40 - 10.80 10*3/mm3 Final   02/12/2020 9.85 3.40 - 10.80 10*3/mm3 Final      HGB Hemoglobin   Date Value Ref Range Status   02/14/2020 9.4 (L) 12.0 - 15.9 g/dL Final   02/13/2020 7.6 (L) 12.0 - 15.9 g/dL Final   02/12/2020 8.1 (L) 12.0 - 15.9 g/dL Final   02/12/2020 9.3 (L) 12.0 - 15.9 g/dL Final   02/12/2020 8.2 (L) 12.0 - 17.0 g/dL Final   02/12/2020 8.5 (L) 12.0 - 17.0 g/dL Final   02/12/2020 7.5 (L) 12.0 - 17.0 g/dL Final   02/12/2020 7.8 (L) 12.0 - 17.0 g/dL Final   02/12/2020 7.8 (L) 12.0 - 17.0 g/dL Final   02/12/2020 9.2 (L) 12.0 - 17.0 g/dL Final   02/12/2020 9.9 (L) 12.0 - 15.9 g/dL Final      HCT Hematocrit   Date Value Ref Range Status   02/14/2020 28.0 (L) 34.0 - 46.6 % Final   02/13/2020 23.1 (L) 34.0 - 46.6 % Final   02/12/2020 24.4 (L) 34.0 - 46.6 % Final   02/12/2020 27.0 (L) 34.0 - 46.6 % Final   02/12/2020 24 (L) 38 - 51 % Final   02/12/2020 25 (L) 38 - 51 % Final   02/12/2020 22 (L) 38 - 51 % Final   02/12/2020 23 (L) 38 - 51 % Final   02/12/2020 23 (L) 38 - 51 % Final   02/12/2020 27 (L) 38 - 51 % Final   02/12/2020 29.7 (L) 34.0 - 46.6 % Final      Platelets Platelets   Date Value Ref Range Status   02/14/2020 116 (L) 140 - 450 10*3/mm3 Final   02/13/2020 107 (L) 140 - 450 10*3/mm3 Final   02/12/2020 108 (L) 140 - 450 10*3/mm3 Final   02/12/2020 137 (L)  140 - 450 10*3/mm3 Final   02/12/2020 208 140 - 450 10*3/mm3 Final        PT/INR:    Protime   Date Value Ref Range Status   02/13/2020 14.8 (H) 11.7 - 14.2 Seconds Final   02/12/2020 15.6 (H) 11.7 - 14.2 Seconds Final   /  INR   Date Value Ref Range Status   02/13/2020 1.19 (H) 0.90 - 1.10 Final   02/12/2020 1.27 (H) 0.90 - 1.10 Final       Sodium Sodium   Date Value Ref Range Status   02/14/2020 142 136 - 145 mmol/L Final   02/13/2020 139 136 - 145 mmol/L Final   02/12/2020 140 136 - 145 mmol/L Final   02/12/2020 135 (L) 136 - 145 mmol/L Final   02/12/2020 142 136 - 145 mmol/L Final      Potassium Potassium   Date Value Ref Range Status   02/14/2020 3.4 (L) 3.5 - 5.2 mmol/L Final   02/13/2020 3.9 3.5 - 5.2 mmol/L Final   02/12/2020 3.9 3.5 - 5.2 mmol/L Final   02/12/2020 3.8 3.5 - 5.2 mmol/L Final   02/12/2020 3.5 3.5 - 5.2 mmol/L Final      Chloride Chloride   Date Value Ref Range Status   02/14/2020 106 98 - 107 mmol/L Final   02/13/2020 106 98 - 107 mmol/L Final   02/12/2020 106 98 - 107 mmol/L Final   02/12/2020 103 98 - 107 mmol/L Final   02/12/2020 104 98 - 107 mmol/L Final      Bicarbonate CO2   Date Value Ref Range Status   02/14/2020 21.3 (L) 22.0 - 29.0 mmol/L Final   02/13/2020 22.5 22.0 - 29.0 mmol/L Final   02/12/2020 22.5 22.0 - 29.0 mmol/L Final   02/12/2020 20.8 (L) 22.0 - 29.0 mmol/L Final   02/12/2020 25.7 22.0 - 29.0 mmol/L Final      BUN BUN   Date Value Ref Range Status   02/14/2020 41 (H) 8 - 23 mg/dL Final   02/13/2020 33 (H) 8 - 23 mg/dL Final   02/12/2020 34 (H) 8 - 23 mg/dL Final   02/12/2020 34 (H) 8 - 23 mg/dL Final   02/12/2020 40 (H) 8 - 23 mg/dL Final      Creatinine Creatinine   Date Value Ref Range Status   02/14/2020 1.93 (H) 0.57 - 1.00 mg/dL Final   02/13/2020 1.71 (H) 0.57 - 1.00 mg/dL Final   02/12/2020 1.78 (H) 0.57 - 1.00 mg/dL Final   02/12/2020 1.74 (H) 0.57 - 1.00 mg/dL Final   02/12/2020 1.83 (H) 0.57 - 1.00 mg/dL Final      Calcium Calcium   Date Value Ref Range Status    02/14/2020 9.1 8.6 - 10.5 mg/dL Final   02/13/2020 8.2 (L) 8.6 - 10.5 mg/dL Final   02/12/2020 7.8 (L) 8.6 - 10.5 mg/dL Final   02/12/2020 7.9 (L) 8.6 - 10.5 mg/dL Final   02/12/2020 8.7 8.6 - 10.5 mg/dL Final      Magnesium Magnesium   Date Value Ref Range Status   02/13/2020 2.2 1.6 - 2.4 mg/dL Final   02/12/2020 1.9 1.6 - 2.4 mg/dL Final   02/12/2020 1.9 1.6 - 2.4 mg/dL Final            aspirin 81 mg Oral Daily   atorvastatin 40 mg Oral Nightly   carBAMazepine 300 mg Oral Q12H   chlorhexidine 15 mL Mouth/Throat Q12H   enoxaparin 30 mg Subcutaneous Daily   insulin glargine 20 Units Subcutaneous Q12H   insulin lispro 2-5 Units Subcutaneous 4x Daily AC & at Bedtime   levothyroxine 175 mcg Oral Q AM   metoprolol tartrate 12.5 mg Oral Q12H   mupirocin  Each Nare BID   pantoprazole 40 mg Oral Daily   torsemide 20 mg Oral Daily   Vancomycin Pharmacy Intermittent Dosing  Does not apply Daily       insulin 0-50 Units/hr Last Rate: 4.8 Units/hr (02/13/20 0615)   Pharmacy to dose vancomycin     sodium chloride 9 mL/hr Last Rate: 9 mL/hr (02/12/20 1500)   sodium chloride 30 mL/hr        Patient Active Problem List   Diagnosis Code   • Asthma J45.909   • Type 2 diabetes mellitus with diabetic polyneuropathy, with long-term current use of insulin (CMS/[facility]) E11.42, Z79.4   • Essential hypertension I10   • Mixed hyperlipidemia E78.2   • Disorder of muscle, ligament, and fascia M62.9   • Proteinuria R80.9   • Postoperative hypothyroidism E89.0   • Hyperparathyroidism (CMS/[facility]) E21.3   • Vitamin D deficiency E55.9   • 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   • Solitary thyroid nodule E04.1   • Anemia in stage 3 chronic kidney disease (CMS/[facility]) N18.3, D63.1   • Stage 3 chronic kidney disease (CMS/[facility]) N18.3   • Class 1 obesity due to excess calories with serious comorbidity and body mass index (BMI) of 34.0 to 34.9 in adult E66.09, Z68.34   • Heartburn R12   • Stable angina (CMS/HCC) I20.8   • Coronary artery disease of native heart with  stable angina pectoris (CMS/[facility]) I25.118       Assessment & Plan   -Severe CAD with left main stenosis--s/p CABGx5, sternal closure with zip fix--POD#2 Cecelia  -Diabetes type 2  -CKD stage III, baseline 1.6-1.8  -hypertension  -hypothyroidism- on synthroid  -hyperlipidemia   -post op anemia- expected acute blood loss, improving  - leukocytosis- probably reactive  -TCP- plt count 116 today     Creatinine 1.93 today--renal following; torsemide initiated  Replete potassium  Will resume clonidine  Appears junctional in the 60s this morning; hold BB for now  Hgb improved this morning following transfusion  Encourage pulmonary toilet/mobilize  Weaned to RA and tolerating  Continue routine care  Will add scopolamine patch for nausea  Discontinue central line and soriano  Discontinue chest tubes and leave marcus tube to bulb suction    Solange Ceballos, APRN  02/14/20  8:48 AM

## 2020-02-14 NOTE — PLAN OF CARE
Problem: Patient Care Overview  Goal: Plan of Care Review  Outcome: Ongoing (interventions implemented as appropriate)  Flowsheets (Taken 2/14/2020 0439)  Progress: improving  Plan of Care Reviewed With: patient; daughter  Outcome Summary: VSS. % A-paced 80,10,0.4. RA. Pain treated with Ultram and tylenol. CTx4, RIJ, and F/C in place. Will continue to monitor.  Goal: Individualization and Mutuality  Outcome: Ongoing (interventions implemented as appropriate)  Goal: Discharge Needs Assessment  Outcome: Ongoing (interventions implemented as appropriate)  Goal: Interprofessional Rounds/Family Conf  Outcome: Ongoing (interventions implemented as appropriate)     Problem: Fall Risk (Adult)  Goal: Identify Related Risk Factors and Signs and Symptoms  Outcome: Ongoing (interventions implemented as appropriate)  Flowsheets (Taken 2/14/2020 0439)  Related Risk Factors (Fall Risk): gait/mobility problems; environment unfamiliar  Signs and Symptoms (Fall Risk): presence of risk factors  Goal: Absence of Fall  Outcome: Ongoing (interventions implemented as appropriate)  Flowsheets (Taken 2/14/2020 0439)  Absence of Fall: making progress toward outcome     Problem: Cardiac Surgery (Adult)  Goal: Signs and Symptoms of Listed Potential Problems Will be Absent, Minimized or Managed (Cardiac Surgery)  Outcome: Ongoing (interventions implemented as appropriate)  Flowsheets (Taken 2/14/2020 0439)  Problems Assessed (Cardiac Surgery): all  Problems Present (Cardiac Surgery): pain; situational response  Goal: Signs and Symptoms of Listed Potential Problems Will be Absent, Minimized or Managed (Cardiac Surgery)  Outcome: Ongoing (interventions implemented as appropriate)  Flowsheets (Taken 2/14/2020 0434)  Problems Assessed (Cardiac Surgery): all  Problems Present (Cardiac Surgery): pain; situational response     Problem: Skin Injury Risk (Adult)  Goal: Identify Related Risk Factors and Signs and Symptoms  Outcome: Ongoing  (interventions implemented as appropriate)  Flowsheets (Taken 2/14/2020 8296)  Related Risk Factors (Skin Injury Risk): medical devices; mobility impaired; edema  Goal: Skin Health and Integrity  Outcome: Ongoing (interventions implemented as appropriate)  Flowsheets (Taken 2/14/2020 9753)  Skin Health and Integrity: making progress toward outcome

## 2020-02-14 NOTE — PLAN OF CARE
Problem: Patient Care Overview  Goal: Plan of Care Review  Outcome: Ongoing (interventions implemented as appropriate)  Flowsheets (Taken 2/14/2020 1613)  Progress: improving  Plan of Care Reviewed With: patient  Outcome Summary: Pt tolerated treatment well this date. Increased gait distance to 90ft w/ HHA-min A x2. Instructed on cardiac exercise protocol. PT will continue to address functional mobility deficits as tolerated.

## 2020-02-14 NOTE — CONSULTS
Met with patient and her daughters, discussed benefits of cardiac rehab. Provided phase II information along with  the contact information for cardiac rehab here at [facility].    Explained if receiving home health would not be able to attend cardiac rehab until finished with home health. Instructed to bring a copy of After Visit Summary to initial assessment.

## 2020-02-14 NOTE — PROGRESS NOTES
"Pharmacokinetic Evaluation - Vancomycin    Ange Johnson is a 79 y.o. female on vancomycin pharmacy to dose.  MRN: [id number]  : 1940    Day of therapy: 3  Indication: surgical prophylaxis  per [facility] protocol (original order per TAYO Mccracken).   Goal level: 10-20 mcg/ml    Current dose: intermittent  Other antimicrobials: none    Blood pressure 153/83, pulse 84, temperature 98.6 °F (37 °C), temperature source Oral, resp. rate 18, height 165.1 cm (65\"), weight 99 kg (218 lb 3.2 oz), SpO2 93 %, not currently breastfeeding.  Results from last 7 days   Lab Units 20  03220  03120  1633   CREATININE mg/dL 1.93* 1.71* 1.78*     Estimated Creatinine Clearance: 27.1 mL/min (A) (by C-G formula based on SCr of 1.93 mg/dL (H)).  Results from last 7 days   Lab Units 20  0314 20  1633   WBC 10*3/mm3 13.39* 8.90 14.89*   HEMOGLOBIN g/dL 9.4* 7.6* 8.1*   HEMATOCRIT % 28.0* 23.1* 24.4*   PLATELETS 10*3/mm3 116* 107* 108*     Cultures/ labs/ radiology:   2/10 urine cx: NG    Vancomycin dosing hx (include troughs if drawn):   0726: 1500mg x1    2133: 13.7 mcg/ml   2322: 1500mg x1    1158: 25 mcg/ml    0322: 19.8 mcg/ml    Assessment:  Scr increased today, gentle diuresis per nephrology, good UOP yesterday. Random level this AM is at upper end of goal range 10-20 mcg/ml, expect to remain within goal range through today. Today is last day of vancomycin.    Plan:  1) No further doses as level expected to remain therapeutic through today.  Encourage hydration as allowed by MD to prevent toxic accumulation. Monitor for signs and symptoms of toxicity or intolerance including rash, increase in Scr or decrease in UOP.   Consult ends today, please extend order if further vancomycin therapy is desired.  Thanks,    Marily Guerrero, [facility]    "

## 2020-02-14 NOTE — THERAPY TREATMENT NOTE
Patient Name: Ange Johnson  : 1940    MRN: [id number]                              Today's Date: 2020       Admit Date: 2020    Visit Dx:     ICD-10-CM ICD-9-CM   1. 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 pectoris, unspecified vessel or lesion type (CMS/[facility]) I25.118 414.01     413.9   2. Stable angina (CMS/[facility]) I20.8 413.9   3. S/P CABG (coronary artery bypass graft) Z95.1 V45.81     Patient Active Problem List   Diagnosis   • Asthma   • Type 2 diabetes mellitus with diabetic polyneuropathy, with long-term current use of insulin (CMS/[facility])   • Essential hypertension   • Mixed hyperlipidemia   • Disorder of muscle, ligament, and fascia   • Proteinuria   • Postoperative hypothyroidism   • Hyperparathyroidism (CMS/[facility])   • Vitamin D deficiency   • Gout without tophus   • Solitary thyroid nodule   • Anemia in stage 3 chronic kidney disease (CMS/HCC)   • Stage 3 chronic kidney disease (CMS/[facility])   • Class 1 obesity due to excess calories with serious comorbidity and body mass index (BMI) of 34.0 to 34.9 in adult   • Heartburn   • Stable angina (CMS/[facility])   • 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 pectoris (CMS/[facility])     Past Medical History:   Diagnosis Date   • Anemia in stage 3 chronic kidney disease (CMS/HCC) 2016   • Arthritis    • Asthma    • Bone spur of acromioclavicular joint    • Carotid atherosclerosis     <50% bilaterally   • Chronic venous insufficiency    • CKD (chronic kidney disease) stage 3, GFR 30-59 ml/min (CMS/[facility])    • Essential hypertension    • Gout    • Grief reaction       2010 after 15 foot fall off ladder   • H/O cataract    • Heel spur    • History of tendinitis    • Hyperlipidemia    • Hyperparathyroidism (CMS/HCC)    • Hyperthyroidism    • Hypothyroidism, acquired    • Non-toxic goiter    • Pneumonia    • Proteinuria    • Type 2 diabetes mellitus with neurological manifestations (CMS/[facility])    • Vitamin D deficiency       Past Surgical History:   Procedure Laterality Date   • BREAST EXCISIONAL BIOPSY Left 1965    Dr. Ybarra benign   • BREAST EXCISIONAL BIOPSY Bilateral 1965    benign   • CARDIAC CATHETERIZATION N/A 2/10/2020    Procedure: Left heart cath without LV gram;  Surgeon: Emerson Deras MD;  Location: [facility] CATH INVASIVE LOCATION;  Service: Cardiovascular;  Laterality: N/A;   • CARDIAC CATHETERIZATION N/A 2/10/2020    Procedure: Coronary angiography;  Surgeon: Emerson Deras MD;  Location: [facility] CATH INVASIVE LOCATION;  Service: Cardiovascular;  Laterality: N/A;   • CATARACT EXTRACTION Left 02/03/2010   • CATARACT EXTRACTION Right 04/21/2010   • CORONARY ARTERY BYPASS GRAFT N/A 2/12/2020    Procedure: MIDLINE STERNOTOMY, CORONARY ARTERY BYPASS GRAFTING X  5 USING LEFT INTERNAL MAMMARY ARTERY AND LEFT ENDOSCOPICALLY HARVESTED GREATER SAPHENOUS VEIN, INTRAOP KERWIN, PRP;  Surgeon: Jr Anthony Rai MD;  Location: [facility]  Service: Cardiothoracic;  Laterality: N/A;   • HYSTERECTOMY  1988    Dr. Quinn Peña   • OOPHORECTOMY      late 40's   • THYROIDECTOMY Right 12/8/2016    Procedure: right PARATHYROID EXCISION OF NODULE and right thyroid lobectomy and bilateral exploration.;  Surgeon: Tasneem Montelongo MD;  Location: [facility];  Service:      General Information     Row Name 02/14/20 1612          PT Evaluation Time/Intention    Document Type  therapy note (daily note)  -     Mode of Treatment  physical therapy  -     Row Name 02/14/20 1612          General Information    Existing Precautions/Restrictions  fall;sternal  -     Row Name 02/14/20 1612          Cognitive Assessment/Intervention- PT/OT    Orientation Status (Cognition)  oriented to;person;place  -       User Key  (r) = Recorded By, (t) = Taken By, (c) = Cosigned By    Initials Name Provider Type     Yodit Boland PTA Physical Therapy Assistant        Mobility     Row Name 02/14/20 1613          Bed Mobility  Assessment/Treatment    Bed Mobility Assessment/Treatment  supine-sit  -SM     Supine-Sit [place] (Bed Mobility)  moderate assist (50% patient effort);2 person assist  -SM     Sit-Supine [place] (Bed Mobility)  not tested  -     Assistive Device (Bed Mobility)  head of bed elevated  -SM     Row Name 02/14/20 1613          Sit-Stand Transfer    Sit-Stand [place] (Transfers)  minimum assist (75% patient effort);2 person assist  -SM     Assistive Device (Sit-Stand Transfers)  -- HHA  -SM     Row Name 02/14/20 1613          Gait/Stairs Assessment/Training    [place] Level (Gait)  contact guard;minimum assist (75% patient effort);2 person assist  -SM     Assistive Device (Gait)  -- HHA  -SM     Distance in Feet (Gait)  90  -SM     Pattern (Gait)  step-through  -SM     Deviations/Abnormal Patterns (Gait)  alisha decreased;stride length decreased  -SM     Bilateral Gait Deviations  forward flexed posture  -SM     Comment (Gait/Stairs)  slightly unsteady, stating she felt weak  -       User Key  (r) = Recorded By, (t) = Taken By, (c) = Cosigned By    Initials Name Provider Type    Yodit Sweeney PTA Physical Therapy Assistant        Obj/Interventions     Row Name 02/14/20 1616          Therapeutic Exercise    Comment (Therapeutic Exercise)  cardiac protocol x 10 reps  -       User Key  (r) = Recorded By, (t) = Taken By, (c) = Cosigned By    Initials Name Provider Type    Yodit Sweeney PTA Physical Therapy Assistant        Goals/Plan    No documentation.       Clinical Impression     Row Name 02/14/20 1618          Pain Assessment    Additional Documentation  Pain Scale: Numbers Pre/Post-Treatment (Group)  -SM     Row Name 02/14/20 1618          Pain Scale: Numbers Pre/Post-Treatment    Pain Scale: Numbers, Pretreatment  0/10 - no pain  -     Pain Scale: Numbers, Post-Treatment  0/10 - no pain  -SM     Row Name 02/14/20 1618          Positioning and Restraints    Pre-Treatment  Position  in bed  -SM     Post Treatment Position  chair  -SM     In Chair  sitting;call light within reach;encouraged to call for assist;with family/caregiver  -       User Key  (r) = Recorded By, (t) = Taken By, (c) = Cosigned By    Initials Name Provider Type    Yodit Sweeney PTA Physical Therapy Assistant        Outcome Measures     Row Name 02/14/20 1618          How much help from another person do you currently need...    Turning from your back to your side while in flat bed without using bedrails?  2  -SM     Moving from lying on back to sitting on the side of a flat bed without bedrails?  2  -SM     Moving to and from a bed to a chair (including a wheelchair)?  3  -SM     Standing up from a chair using your arms (e.g., wheelchair, bedside chair)?  3  -SM     Climbing 3-5 steps with a railing?  2  -SM     To walk in hospital room?  3  -SM     AM-PAC 6 Clicks Score (PT)  15  -     Row Name 02/14/20 1618          Functional Assessment    Outcome Measure Options  AM-PAC 6 Clicks Basic Mobility (PT)  -       User Key  (r) = Recorded By, (t) = Taken By, (c) = Cosigned By    Initials Name Provider Type    Yodit Sweeney PTA Physical Therapy Assistant          PT Recommendation and Plan     Outcome Summary/Treatment Plan (PT)  Anticipated Discharge Disposition (PT): skilled nursing facility  Plan of Care Reviewed With: patient  Progress: improving  Outcome Summary: Pt tolerated treatment well this date. Increased gait distance to 90ft w/ HHA-min A x2. Instructed on cardiac exercise protocol. PT will continue to address functional mobility deficits as tolerated.     Time Calculation:   PT Charges     Row Name 02/14/20 1622 02/14/20 0957          Time Calculation    Start Time  1524  -SM  --     Stop Time  1542  -SM  --     Time Calculation (min)  18 min  -  --     PT Received On  02/14/20  -  --     PT - Next Appointment  02/15/20  -  02/14/20  -       User Key  (r) = Recorded By,  (t) = Taken By, (c) = Cosigned By    Initials Name Provider Type    [facility] Lauren Chaney, PT Physical Therapist    Yodit Sweeney, NADEEN Physical Therapy Assistant        Therapy Charges for Today     Code Description Service Date Service Provider Modifiers Qty    70749653637 HC PT THER PROC EA 15 MIN 2/14/2020 Yodit Boland, NADEEN GP 1    33051352139 HC PT THER SUPP EA 15 MIN 2/14/2020 Yodit Boland, NADEEN GP 1          PT G-Codes  Outcome Measure Options: AM-PAC 6 Clicks Basic Mobility (PT)  AM-PAC 6 Clicks Score (PT): 15    Yodit Boland PTA  2/14/2020

## 2020-02-14 NOTE — PROGRESS NOTES
HOSPITAL PROGRESS NOTE    Date of Service: 20  LOS:  LOS: 4 days   Patient Name: Ange Johnson  Age/Sex: 79 y.o. female  : 1940  MRN: [id number]  Primary Cardiologist: Dr. Victor Hugo Ng     Subjective:     Chief Complaint/Follow up:   CAD, s/p CABG x 5    Interval History:   POD #2.  Sitting up in chair.  Doing very well.  Denies incisional discomfort, chest pain, shortness of breath, palpitations, or dizziness.  Paced rhythm from external pacemaker.    Objective:     Objective:  Temp:  [97.4 °F (36.3 °C)-99 °F (37.2 °C)] 98.6 °F (37 °C)  Heart Rate:  [69-91] 84  Resp:  [18] 18  BP: ()/(49-85) 153/83  Body mass index is 36.31 kg/m².    Intake/Output Summary (Last 24 hours) at 2020 0900  Last data filed at 2020 0730  Gross per 24 hour   Intake 564.3 ml   Output 2730 ml   Net -2165.7 ml         20  2233 20  0527 20  0500   Weight: 98.7 kg (217 lb 8 oz) 98.3 kg (216 lb 12.8 oz) 99 kg (218 lb 3.2 oz)     Weight change:     Physical Exam:   General Appearance: Alert, cooperative, in no acute distress. AAOx4.  Obese.  HEENT: Normocephalic.  Neck: Supple. No JVD. No carotid bruit. No thyromegaly  Lungs: Diminished.  Chest tube placed.  Normal respiratory effort and rate.  Heart: Regular rate and rhythm, normal S1 and S2, no murmurs, gallops or rubs.  Chest incision dressed-CDI.  Abdomen: Soft, nontender, nondistended. Positive bowel sounds  Extremities: Warm, no cyanosis, or clubbing. No edema.   Casarez catheter.    Lab Review:   Results from last 7 days   Lab Units 20  0322 20  0314  20  0429 20  0438   SODIUM mmol/L 142 139   < > 142 139   POTASSIUM mmol/L 3.4* 3.9   < > 3.5 3.5   CHLORIDE mmol/L 106 106   < > 104 103   CO2 mmol/L 21.3* 22.5   < > 25.7 24.2   BUN mg/dL 41* 33*   < > 40* 34*   CREATININE mg/dL 1.93* 1.71*   < > 1.83* 1.68*   GLUCOSE mg/dL 117* 137*   < > 63* 186*   CALCIUM mg/dL 9.1 8.2*   < > 8.7 9.0   AST (SGOT) U/L  --   --    --  10 11   ALT (SGPT) U/L  --   --   --  11 11    < > = values in this interval not displayed.         Results from last 7 days   Lab Units 02/14/20  0322 02/13/20  0314   WBC 10*3/mm3 13.39* 8.90   HEMOGLOBIN g/dL 9.4* 7.6*   HEMATOCRIT % 28.0* 23.1*   PLATELETS 10*3/mm3 116* 107*     Results from last 7 days   Lab Units 02/13/20  0314 02/12/20  1312  02/10/20  1058   INR  1.19* 1.27*   < > 1.03   APTT seconds  --  26.4  --  33.2    < > = values in this interval not displayed.     Results from last 7 days   Lab Units 02/13/20  0314 02/12/20  1633   MAGNESIUM mg/dL 2.2 1.9     Results from last 7 days   Lab Units 02/12/20  0429 02/10/20  1058   CHOLESTEROL mg/dL 153 131   TRIGLYCERIDES mg/dL 137 70   HDL CHOL mg/dL 55 49     Results from last 7 days   Lab Units 02/12/20  0429 02/10/20  1058   PROBNP pg/mL 1,218.0 631.9     Results from last 7 days   Lab Units 02/12/20  0429   TSH uIU/mL 2.350       Results for orders placed in visit on 02/12/20   Emergent/Open-Heart Anesthesia [facility]    Narrative Oscar Loco MD     2/12/2020 12:30 PM  Procedure Performed: Emergent/Open-Heart Anesthesia [facility]     Start Time:        End Time:      Preanesthesia Checklist:  Patient identified, IV assessed, risks and benefits discussed, monitors   and equipment assessed, procedure being performed at surgeon's request and   anesthesia consent obtained.    General Procedure Information  Diagnostic Indications for Echo:  assessment of ascending aorta and   assessment of surgical repair  Physician Requesting Echo: Jr Anthony Rai MD  Location performed:  OR  Intubated  Bite block not placed  Heart visualized  Probe Insertion:  Easy  Probe Type:  Multiplane  Modalities:  Color flow mapping, continuous wave Doppler and pulse wave   Doppler    Echocardiographic and Doppler Measurements    Ventricles    Right Ventricle:  Cavity size normal.  Hypertrophy not present.  Thrombus not present.    Global function normal.    Left  Ventricle:  Cavity size dilated.  Hypertrophy present.  Thrombus not present.  Global   Function normal.  Ejection Fraction 58%.      Ventricular Regional Function:  1- Basal Anteroseptal:  normal  2- Basal Anterior:  normal  3- Basal Anterolateral:  normal  4- Basal Inferolateral:  normal  5- Basal Inferior:  hypokinetic  6- Basal Inferoseptal:  hypokinetic  7- Mid Anteroseptal:  normal  8- Mid Anterior:  normal  9- Mid Anterolateral:  normal  10- Mid Inferolateral:  normal  11- Mid Inferior:  hypokinetic  12- Mid Inferoseptal:  hypokinetic  13- Apical Anterior:  normal  14- Apical Lateral:  normal  15- Apical Inferior:  normal  16- Apical Septal:  normal  17- Stirum:  normal      Valves    Aortic Valve:  Annulus normal.  Stenosis not present.  Regurgitation absent.  Leaflets   thickened.  Leaflet motions normal.      Mitral Valve:  Annulus normal.  Stenosis not present.  Regurgitation mild.  Leaflets   normal.  Leaflet motions normal.      Tricuspid Valve:  Stenosis not present.  Regurgitation moderate.  Leaflets normal.  Leaflet   motions normal.    Pulmonic Valve:  Stenosis not present.  Regurgitation absent.          Aorta    Ascending Aorta:  Size normal.  Diameter 3.4 cm.  Dissection not present.  Plaque thickness   less than 3 mm.  Mobile plaque not present.    Aortic Arch:  Size normal.  Dissection not present.  Plaque thickness greater than 3 mm.    Mobile plaque not present.    Descending Aorta:  Size normal.  Dissection not present.  Plaque thickness less than 3 mm.    Mobile plaque not present.          Atria    Right Atrium:  Size dilated.  Spontaneous echo contrast not present.  Thrombus not   present.  Tumor not present.  Device not present.      Left Atrium:  Size dilated.  Spontaneous echo contrast not present.  Thrombus not   present.  Tumor not present.  Device not present.    Left atrial appendage normal.      Septa    Atrial Septum:  Intra-atrial septal morphology lipomatous hypertrophy.       Ventricular Septum:  Intra-ventricular septum morphology normal.      Diastolic Function Measurements:  Diastolic Dysfunction Grade= II  E= ms  A= ms  E/A Ratio=   DT= ms  S/D=  IVRT=    Other Findings  Pericardium:  normal  Pleural Effusion:  none  Pulmonary Arteries:  normal  Pulmonary Venous Flow:  blunted (decreased) systolic flow    Anesthesia Information  Performed Personally  Anesthesiologist:  Oscar Loco MD      Echocardiogram Comments:       Post CABG,  LV EF is 55-60%.  RV function is similar to baseline. TR is moderate and appears a bit   severe compared to baseline.  MR is mild.  Aorta is intact.  There is a small pericardial effusion near Rt heart with no compression.      I reviewed the patient's new clinical results.  I personally viewed and interpreted the patient's EKG/Telemetry data/Labs/Test Results.     Current Medications:   Scheduled Meds:  aspirin 81 mg Oral Daily   atorvastatin 40 mg Oral Nightly   carBAMazepine 300 mg Oral Q12H   chlorhexidine 15 mL Mouth/Throat Q12H   enoxaparin 30 mg Subcutaneous Daily   insulin glargine 20 Units Subcutaneous Q12H   insulin lispro 2-5 Units Subcutaneous 4x Daily AC & at Bedtime   levothyroxine 175 mcg Oral Q AM   metoprolol tartrate 12.5 mg Oral Q12H   mupirocin  Each Nare BID   pantoprazole 40 mg Oral Daily   torsemide 20 mg Oral Daily   Vancomycin Pharmacy Intermittent Dosing  Does not apply Daily     Continuous Infusions:  insulin 0-50 Units/hr Last Rate: 4.8 Units/hr (02/13/20 0615)   Pharmacy to dose vancomycin     sodium chloride 9 mL/hr Last Rate: 9 mL/hr (02/12/20 1500)   sodium chloride 30 mL/hr        Allergies:  Allergies   Allergen Reactions   • Codeine Nausea Only   • Hydrocodone-Acetaminophen Nausea Only and Other (See Comments)     Percocet and Vicodin; vertigo   • Meperidine Nausea Only and Nausea And Vomiting   • Morphine And Related Nausea Only   • Oxycodone-Acetaminophen Nausea Only   • Oxycodone-Acetaminophen Nausea Only  and Other (See Comments)     dilzziness   • Oxycodone-Aspirin Nausea Only and Other (See Comments)     vertigo   • Nickel Rash   • Penicillins Hives   • Shrimp Rash   • Sulfa Antibiotics Hives       Assessment:       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 pectoris (CMS/[facility])    Type 2 diabetes mellitus with diabetic polyneuropathy, with long-term current use of insulin (CMS/[facility])    Stage 3 chronic kidney disease (CMS/[facility])    Stable angina (CMS/[facility])        Plan:   Assessment/Plan       1.  Coronary Artery Disease: Status post CABG-POD #2.  Doing very well postoperatively.  Daughter states that there is plans to remove her pacemaker, chest tube, tunnel catheter, and Casarez catheter today.  Continue aspirin and beta-blocker..    2.  Chronic Kidney Disease-appreciate nephrology following.  Torsemide started.    3.  Junctional Heart Rhythm-paced rhythm.  Beta-blocker on hold.    4.  Nausea-Scopolamine added today.    5.  Hypokalemia-replace potassium per protocol.      TAYO Mcmillan  [facility]   02/14/20  9:00 AM

## 2020-02-15 ENCOUNTER — APPOINTMENT (OUTPATIENT)
Dept: GENERAL RADIOLOGY | Facility: HOSPITAL | Age: 80
End: 2020-02-15

## 2020-02-15 LAB
ABO + RH BLD: NORMAL
ABO + RH BLD: NORMAL
ANION GAP SERPL CALCULATED.3IONS-SCNC: 14.7 MMOL/L (ref 5–15)
BH BB BLOOD EXPIRATION DATE: NORMAL
BH BB BLOOD EXPIRATION DATE: NORMAL
BH BB BLOOD TYPE BARCODE: 6200
BH BB BLOOD TYPE BARCODE: 6200
BH BB DISPENSE STATUS: NORMAL
BH BB DISPENSE STATUS: NORMAL
BH BB PRODUCT CODE: NORMAL
BH BB PRODUCT CODE: NORMAL
BH BB UNIT NUMBER: NORMAL
BH BB UNIT NUMBER: NORMAL
BUN BLD-MCNC: 56 MG/DL (ref 8–23)
BUN/CREAT SERPL: 25.6 (ref 7–25)
CALCIUM SPEC-SCNC: 8.9 MG/DL (ref 8.6–10.5)
CHLORIDE SERPL-SCNC: 105 MMOL/L (ref 98–107)
CO2 SERPL-SCNC: 20.3 MMOL/L (ref 22–29)
CREAT BLD-MCNC: 2.19 MG/DL (ref 0.57–1)
DEPRECATED RDW RBC AUTO: 47.1 FL (ref 37–54)
ERYTHROCYTE [DISTWIDTH] IN BLOOD BY AUTOMATED COUNT: 14.1 % (ref 12.3–15.4)
GFR SERPL CREATININE-BSD FRML MDRD: 22 ML/MIN/1.73
GLUCOSE BLD-MCNC: 127 MG/DL (ref 65–99)
GLUCOSE BLDC GLUCOMTR-MCNC: 127 MG/DL (ref 70–130)
GLUCOSE BLDC GLUCOMTR-MCNC: 141 MG/DL (ref 70–130)
GLUCOSE BLDC GLUCOMTR-MCNC: 181 MG/DL (ref 70–130)
GLUCOSE BLDC GLUCOMTR-MCNC: 265 MG/DL (ref 70–130)
HCT VFR BLD AUTO: 26.4 % (ref 34–46.6)
HGB BLD-MCNC: 8.9 G/DL (ref 12–15.9)
MCH RBC QN AUTO: 31 PG (ref 26.6–33)
MCHC RBC AUTO-ENTMCNC: 33.7 G/DL (ref 31.5–35.7)
MCV RBC AUTO: 92 FL (ref 79–97)
PLATELET # BLD AUTO: 124 10*3/MM3 (ref 140–450)
PMV BLD AUTO: 11.6 FL (ref 6–12)
POTASSIUM BLD-SCNC: 4.2 MMOL/L (ref 3.5–5.2)
RBC # BLD AUTO: 2.87 10*6/MM3 (ref 3.77–5.28)
SODIUM BLD-SCNC: 140 MMOL/L (ref 136–145)
UNIT  ABO: NORMAL
UNIT  ABO: NORMAL
UNIT  RH: NORMAL
UNIT  RH: NORMAL
WBC NRBC COR # BLD: 13.7 10*3/MM3 (ref 3.4–10.8)

## 2020-02-15 PROCEDURE — 25010000002 ENOXAPARIN PER 10 MG: Performed by: THORACIC SURGERY (CARDIOTHORACIC VASCULAR SURGERY)

## 2020-02-15 PROCEDURE — 99024 POSTOP FOLLOW-UP VISIT: CPT | Performed by: NURSE PRACTITIONER

## 2020-02-15 PROCEDURE — 97110 THERAPEUTIC EXERCISES: CPT

## 2020-02-15 PROCEDURE — 71045 X-RAY EXAM CHEST 1 VIEW: CPT

## 2020-02-15 PROCEDURE — 63710000001 INSULIN LISPRO (HUMAN) PER 5 UNITS: Performed by: INTERNAL MEDICINE

## 2020-02-15 PROCEDURE — 63710000001 INSULIN GLARGINE PER 5 UNITS: Performed by: INTERNAL MEDICINE

## 2020-02-15 PROCEDURE — 99232 SBSQ HOSP IP/OBS MODERATE 35: CPT | Performed by: NURSE PRACTITIONER

## 2020-02-15 PROCEDURE — 85027 COMPLETE CBC AUTOMATED: CPT | Performed by: THORACIC SURGERY (CARDIOTHORACIC VASCULAR SURGERY)

## 2020-02-15 PROCEDURE — 80048 BASIC METABOLIC PNL TOTAL CA: CPT | Performed by: THORACIC SURGERY (CARDIOTHORACIC VASCULAR SURGERY)

## 2020-02-15 PROCEDURE — 71046 X-RAY EXAM CHEST 2 VIEWS: CPT

## 2020-02-15 PROCEDURE — 82962 GLUCOSE BLOOD TEST: CPT

## 2020-02-15 PROCEDURE — 99232 SBSQ HOSP IP/OBS MODERATE 35: CPT | Performed by: INTERNAL MEDICINE

## 2020-02-15 RX ADMIN — ACETAMINOPHEN 650 MG: 325 TABLET, FILM COATED ORAL at 22:25

## 2020-02-15 RX ADMIN — HYDRALAZINE HYDROCHLORIDE 25 MG: 25 TABLET ORAL at 22:02

## 2020-02-15 RX ADMIN — HYDRALAZINE HYDROCHLORIDE 25 MG: 25 TABLET ORAL at 06:34

## 2020-02-15 RX ADMIN — INSULIN GLARGINE 20 UNITS: 100 INJECTION, SOLUTION SUBCUTANEOUS at 20:45

## 2020-02-15 RX ADMIN — ASPIRIN 81 MG: 81 TABLET ORAL at 09:27

## 2020-02-15 RX ADMIN — CLONIDINE HYDROCHLORIDE 0.1 MG: 0.1 TABLET ORAL at 20:45

## 2020-02-15 RX ADMIN — INSULIN LISPRO 4 UNITS: 100 INJECTION, SOLUTION INTRAVENOUS; SUBCUTANEOUS at 20:45

## 2020-02-15 RX ADMIN — LEVOTHYROXINE SODIUM 175 MCG: 175 TABLET ORAL at 06:34

## 2020-02-15 RX ADMIN — METOPROLOL TARTRATE 12.5 MG: 25 TABLET ORAL at 16:32

## 2020-02-15 RX ADMIN — ATORVASTATIN CALCIUM 40 MG: 20 TABLET, FILM COATED ORAL at 20:45

## 2020-02-15 RX ADMIN — INSULIN LISPRO 2 UNITS: 100 INJECTION, SOLUTION INTRAVENOUS; SUBCUTANEOUS at 11:09

## 2020-02-15 RX ADMIN — ENOXAPARIN SODIUM 30 MG: 30 INJECTION SUBCUTANEOUS at 16:32

## 2020-02-15 RX ADMIN — PANTOPRAZOLE SODIUM 40 MG: 40 TABLET, DELAYED RELEASE ORAL at 09:26

## 2020-02-15 RX ADMIN — MUPIROCIN 1 APPLICATION: 20 OINTMENT TOPICAL at 09:27

## 2020-02-15 RX ADMIN — TORSEMIDE 20 MG: 20 TABLET ORAL at 09:27

## 2020-02-15 RX ADMIN — CARBAMAZEPINE 300 MG: 300 CAPSULE, EXTENDED RELEASE ORAL at 09:26

## 2020-02-15 RX ADMIN — CLONIDINE HYDROCHLORIDE 0.1 MG: 0.1 TABLET ORAL at 09:27

## 2020-02-15 RX ADMIN — INSULIN GLARGINE 20 UNITS: 100 INJECTION, SOLUTION SUBCUTANEOUS at 09:44

## 2020-02-15 RX ADMIN — CARBAMAZEPINE 300 MG: 300 CAPSULE, EXTENDED RELEASE ORAL at 20:45

## 2020-02-15 RX ADMIN — MUPIROCIN: 20 OINTMENT TOPICAL at 20:45

## 2020-02-15 RX ADMIN — TRAMADOL HYDROCHLORIDE 25 MG: 50 TABLET ORAL at 09:37

## 2020-02-15 NOTE — PROGRESS NOTES
"CC: CAD     Interval History: Had some nausea Scopolamine patch was started and that improved.  No longer being paced.  She is sinus rhythm heart rate 60s.  Just come back from chest x-ray which is pending.      Vital Signs  Temp:  [97.4 °F (36.3 °C)-98.6 °F (37 °C)] 97.4 °F (36.3 °C)  Heart Rate:  [59-66] 59  Resp:  [18] 18  BP: (117-162)/(62-78) 123/68    Intake/Output Summary (Last 24 hours) at 2/15/2020 1201  Last data filed at 2/15/2020 0800  Gross per 24 hour   Intake 410 ml   Output 350 ml   Net 60 ml     Flowsheet Rows      First Filed Value   Admission Height  165.1 cm (65\") Documented at 02/09/2020 1646   Admission Weight  99.3 kg (219 lb) Documented at 02/09/2020 1646          PHYSICAL EXAM:  General: No acute distress  Resp:NL Rate, unlabored, diminished bibasilar  CV:NL rate and rhythm, NL PMI, Nl S1 and S2, no Murmur, no gallop, no rub, No JVD. Normal pedal pulses  ABD:Nl sounds, no masses or tenderness, nondistended, no guarding or rebound  Neuro: alert,cooperative and oriented  Extr: No edema or cyanosis, moves all extremities-compression stockings in place      Results Review:    Results from last 7 days   Lab Units 02/15/20  0332   SODIUM mmol/L 140   POTASSIUM mmol/L 4.2   CHLORIDE mmol/L 105   CO2 mmol/L 20.3*   BUN mg/dL 56*   CREATININE mg/dL 2.19*   GLUCOSE mg/dL 127*   CALCIUM mg/dL 8.9         Results from last 7 days   Lab Units 02/15/20  0332   WBC 10*3/mm3 13.70*   HEMOGLOBIN g/dL 8.9*   HEMATOCRIT % 26.4*   PLATELETS 10*3/mm3 124*     Results from last 7 days   Lab Units 02/13/20  0314 02/12/20  1312 02/11/20  0438 02/10/20  1058   INR  1.19* 1.27* 1.01 1.03   APTT seconds  --  26.4  --  33.2     Results from last 7 days   Lab Units 02/12/20  0429   CHOLESTEROL mg/dL 153     Results from last 7 days   Lab Units 02/13/20  0314   MAGNESIUM mg/dL 2.2     Results from last 7 days   Lab Units 02/12/20  0429   CHOLESTEROL mg/dL 153   TRIGLYCERIDES mg/dL 137   HDL CHOL mg/dL 55   LDL CHOL mg/dL " 71     I reviewed the patient's new clinical results.  I personally viewed and interpreted the patient's EKG/Telemetry data-NSR        Medication Review:   Meds reviewed      insulin 0-50 Units/hr Last Rate: 4.8 Units/hr (02/13/20 0615)   sodium chloride 9 mL/hr Last Rate: 9 mL/hr (02/12/20 1500)   sodium chloride 30 mL/hr        Assessment/Plan  1. 79 year old female with CAD s/p CABG x 5 with Skeletonized LIMA to mid LAD.  Vein graft to PDA.  Vein graft to OM1, OM 2 and D1 on 2/12/2020.  Normal left ventricular systolic function preoperatively  -No longer being paced.  We will see if she tolerates low-dose metoprolol with parameters set.  Chest x-ray is pending.  Chest tubes in place.  2.  Postoperative junctional rhythm-no longer paced.  Normal sinus rhythm at this time  3.  DM- endocrinology following  4.  Post op anemia/ iron deficient anemia- follow trends. HGB stable  5.  Bilateral carotid plaque without stenosis on duplex fiber 2020  6.  Hypothyroidism on replacement therapy  7.  Hyperlipidemia on target dose atorvastatin target LDL 70 or less  8.  Hypertension-blood pressure occasionally elevated follow trends  9. Leukocytosis expected postoperatively follow trends  10.  Right bundle branch block  11.CKD- had IVF low dose diuretics per nephrology on torsemide 20 mg    -Diuretics per nephrology.  Chest x-ray and chest tube management per cardiothoracic surgery.  Encouraged incentive spirometer and increase mobility.  We will see if she tolerates low-dose metoprolol parameters are set.  She was on 12.5 atenolol at home.    TAYO Carter  02/15/20  12:01 PM

## 2020-02-15 NOTE — PROGRESS NOTES
" LOS: 5 days   Patient Care Team:  Quinn Washington MD as PCP - General (Family Medicine)  Hugh Caruso MD as PCP - Claims Attributed  Steve Simons Jr., MD as Consulting Physician (Hematology and Oncology)  Hugh Caruso MD as Consulting Physician (Nephrology)  Adalgisa Hernandez APRN as Nurse Practitioner (Endocrinology)  Reagan Beltran MD as Consulting Physician (Endocrinology)  Aguilar Goldman MD as Consulting Physician (Pulmonary Disease)  Mil Sr MD as Consulting Physician (Ophthalmology)  Tasneem Montelongo MD as Surgeon (General Surgery)  Victor Hugo Ng MD as Consulting Physician (Cardiology)    Chief Complaint: post op    Subjective:  Symptoms:  No shortness of breath, cough or chest pain.    Diet:  Poor intake.  No nausea or vomiting.    Activity level: Impaired due to weakness.    Pain:  She complains of pain that is mild.  Pain is requiring pain medication and well controlled.      No complaints this morning    Vital Signs  Temp:  [97.4 °F (36.3 °C)-98.6 °F (37 °C)] 97.4 °F (36.3 °C)  Heart Rate:  [59-65] 59  Resp:  [18] 18  BP: (117-162)/(62-78) 123/68  Body mass index is 36.11 kg/m².    Intake/Output Summary (Last 24 hours) at 2/15/2020 1314  Last data filed at 2/15/2020 0800  Gross per 24 hour   Intake 410 ml   Output 40 ml   Net 370 ml     I/O this shift:  In: 120 [P.O.:120]  Out: -     Chest tube drainage last 8 hours 80/80        02/12/20  0527 02/14/20  0500 02/15/20  0500   Weight: 98.3 kg (216 lb 12.8 oz) 99 kg (218 lb 3.2 oz) 98.4 kg (217 lb)       Objective:  General Appearance:  Comfortable and in no acute distress.    Vital signs: (most recent): Blood pressure 123/68, pulse 59, temperature 97.4 °F (36.3 °C), temperature source Oral, resp. rate 18, height 165.1 cm (65\"), weight 98.4 kg (217 lb), SpO2 93 %, not currently breastfeeding.  Vital signs are normal.  No fever.    Output: Producing urine.    Lungs:  Normal effort and normal respiratory rate.  There are decreased breath " sounds.    Heart: Normal rate.  Regular rhythm.  (Junctional in 60s)  Abdomen: Abdomen is soft.  (Active bowel sounds in all quadrents).  Bowel sounds are normal.     Extremities: There is dependent edema.    Pulses: Distal pulses are intact.    Neurological: Patient is alert and oriented to person, place and time.    Skin:  Warm and dry.  (Surgical dressing clean, dry, and intact)        Results Review:        WBC WBC   Date Value Ref Range Status   02/15/2020 13.70 (H) 3.40 - 10.80 10*3/mm3 Final   02/14/2020 13.39 (H) 3.40 - 10.80 10*3/mm3 Final   02/13/2020 8.90 3.40 - 10.80 10*3/mm3 Final   02/12/2020 14.89 (H) 3.40 - 10.80 10*3/mm3 Final      HGB Hemoglobin   Date Value Ref Range Status   02/15/2020 8.9 (L) 12.0 - 15.9 g/dL Final   02/14/2020 9.4 (L) 12.0 - 15.9 g/dL Final   02/13/2020 7.6 (L) 12.0 - 15.9 g/dL Final   02/12/2020 8.1 (L) 12.0 - 15.9 g/dL Final      HCT Hematocrit   Date Value Ref Range Status   02/15/2020 26.4 (L) 34.0 - 46.6 % Final   02/14/2020 28.0 (L) 34.0 - 46.6 % Final   02/13/2020 23.1 (L) 34.0 - 46.6 % Final   02/12/2020 24.4 (L) 34.0 - 46.6 % Final      Platelets Platelets   Date Value Ref Range Status   02/15/2020 124 (L) 140 - 450 10*3/mm3 Final   02/14/2020 116 (L) 140 - 450 10*3/mm3 Final   02/13/2020 107 (L) 140 - 450 10*3/mm3 Final   02/12/2020 108 (L) 140 - 450 10*3/mm3 Final        PT/INR:    Protime   Date Value Ref Range Status   02/13/2020 14.8 (H) 11.7 - 14.2 Seconds Final   /  INR   Date Value Ref Range Status   02/13/2020 1.19 (H) 0.90 - 1.10 Final       Sodium Sodium   Date Value Ref Range Status   02/15/2020 140 136 - 145 mmol/L Final   02/14/2020 142 136 - 145 mmol/L Final   02/13/2020 139 136 - 145 mmol/L Final   02/12/2020 140 136 - 145 mmol/L Final      Potassium Potassium   Date Value Ref Range Status   02/15/2020 4.2 3.5 - 5.2 mmol/L Final   02/14/2020 4.1 3.5 - 5.2 mmol/L Final   02/14/2020 3.4 (L) 3.5 - 5.2 mmol/L Final   02/13/2020 3.9 3.5 - 5.2 mmol/L Final    02/12/2020 3.9 3.5 - 5.2 mmol/L Final      Chloride Chloride   Date Value Ref Range Status   02/15/2020 105 98 - 107 mmol/L Final   02/14/2020 106 98 - 107 mmol/L Final   02/13/2020 106 98 - 107 mmol/L Final   02/12/2020 106 98 - 107 mmol/L Final      Bicarbonate CO2   Date Value Ref Range Status   02/15/2020 20.3 (L) 22.0 - 29.0 mmol/L Final   02/14/2020 21.3 (L) 22.0 - 29.0 mmol/L Final   02/13/2020 22.5 22.0 - 29.0 mmol/L Final   02/12/2020 22.5 22.0 - 29.0 mmol/L Final      BUN BUN   Date Value Ref Range Status   02/15/2020 56 (H) 8 - 23 mg/dL Final   02/14/2020 41 (H) 8 - 23 mg/dL Final   02/13/2020 33 (H) 8 - 23 mg/dL Final   02/12/2020 34 (H) 8 - 23 mg/dL Final      Creatinine Creatinine   Date Value Ref Range Status   02/15/2020 2.19 (H) 0.57 - 1.00 mg/dL Final   02/14/2020 1.93 (H) 0.57 - 1.00 mg/dL Final   02/13/2020 1.71 (H) 0.57 - 1.00 mg/dL Final   02/12/2020 1.78 (H) 0.57 - 1.00 mg/dL Final      Calcium Calcium   Date Value Ref Range Status   02/15/2020 8.9 8.6 - 10.5 mg/dL Final   02/14/2020 9.1 8.6 - 10.5 mg/dL Final   02/13/2020 8.2 (L) 8.6 - 10.5 mg/dL Final   02/12/2020 7.8 (L) 8.6 - 10.5 mg/dL Final      Magnesium Magnesium   Date Value Ref Range Status   02/13/2020 2.2 1.6 - 2.4 mg/dL Final   02/12/2020 1.9 1.6 - 2.4 mg/dL Final            aspirin 81 mg Oral Daily   atorvastatin 40 mg Oral Nightly   carBAMazepine 300 mg Oral Q12H   cloNIDine 0.1 mg Oral Q12H   enoxaparin 30 mg Subcutaneous Daily   hydrALAZINE 25 mg Oral Q8H   insulin glargine 20 Units Subcutaneous Q12H   insulin lispro 2-5 Units Subcutaneous 4x Daily AC & at Bedtime   levothyroxine 175 mcg Oral Q AM   metoprolol tartrate 12.5 mg Oral Q12H   mupirocin  Each Nare BID   pantoprazole 40 mg Oral Daily   Scopolamine 1 patch Transdermal Q72H   torsemide 20 mg Oral Daily       insulin 0-50 Units/hr Last Rate: 4.8 Units/hr (02/13/20 0615)   sodium chloride 9 mL/hr Last Rate: 9 mL/hr (02/12/20 1500)   sodium chloride 30 mL/hr         Patient Active Problem List   Diagnosis Code   • Asthma J45.909   • Type 2 diabetes mellitus with diabetic polyneuropathy, with long-term current use of insulin (CMS/[facility]) E11.42, Z79.4   • Essential hypertension I10   • Mixed hyperlipidemia E78.2   • Disorder of muscle, ligament, and fascia M62.9   • Proteinuria R80.9   • Postoperative hypothyroidism E89.0   • Hyperparathyroidism (CMS/[facility]) E21.3   • Vitamin D deficiency E55.9   • 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   • Solitary thyroid nodule E04.1   • Anemia in stage 3 chronic kidney disease (CMS/[facility]) N18.3, D63.1   • Stage 3 chronic kidney disease (CMS/[facility]) N18.3   • Class 1 obesity due to excess calories with serious comorbidity and body mass index (BMI) of 34.0 to 34.9 in adult E66.09, Z68.34   • Heartburn R12   • Stable angina (CMS/[facility]) I20.8   • 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 pectoris (CMS/[facility]) I25.118       Assessment & Plan   -Severe CAD with left main stenosis--s/p CABGx5, sternal closure with zip fix--POD#3 Cecelia  -Diabetes type 2  -CKD stage III, baseline 1.6-1.8  -hypertension  -hypothyroidism- on synthroid  -hyperlipidemia   -post op anemia- expected acute blood loss, improving  - leukocytosis- probably reactive  -TCP- plt count 116 today     Creatinine 2.19 (1.93) today--renal following; on torsemide  Appears junctional in the 60s this morning; hold BB for now  Encourage pulmonary toilet/mobilize  Continue routine care  BARBARA marcus chest tube    MESHA MCKINNON, APRN  02/15/20  1:14 PM

## 2020-02-15 NOTE — PROGRESS NOTES
79 y.o.   LOS: 5 days   Patient Care Team:  Quinn Washington MD as PCP - General (Family Medicine)  Hugh Caruso MD as PCP - Claims Attributed  Steve Simons Jr., MD as Consulting Physician (Hematology and Oncology)  Hugh Caruso MD as Consulting Physician (Nephrology)  Adalgisa Hernandez APRN as Nurse Practitioner (Endocrinology)  Reagan Beltran MD as Consulting Physician (Endocrinology)  Aguilar Goldman MD as Consulting Physician (Pulmonary Disease)  Mil Sr MD as Consulting Physician (Ophthalmology)  Tasneem Montelongo MD as Surgeon (General Surgery)  Victor Hugo Ng MD as Consulting Physician (Cardiology)    Chief Complaint: Uncontrolled type 2 diabetes mellitus and postoperative management    No chief complaint on file.      Subjective     HPI  Patient is reporting poor intake and nausea  Her blood sugars are mostly in the  range  Currently transitioned to Lantus 20 units every 12 hours and Humalog correction scale  Blood sugars are mostly in the 100 range  I encouraged the patient to consider Glucerna if possible  Patient is willing to try    Interval History:      Review of Systems:      Review of Systems   Constitutional: Positive for appetite change.   Respiratory: Positive for cough and chest tightness.    Cardiovascular: Negative.    Gastrointestinal: Positive for abdominal distention and nausea.   All other systems reviewed and are negative.    Objective     Vital Signs   Temp:  [97.4 °F (36.3 °C)-98.6 °F (37 °C)] 97.4 °F (36.3 °C)  Heart Rate:  [59-66] 59  Resp:  [18] 18  BP: (117-162)/(62-78) 123/68    Physical Exam:  Physical Exam   Cardiovascular: Normal rate, regular rhythm, normal heart sounds and intact distal pulses.   Pulmonary/Chest: Effort normal and breath sounds normal.   Vent sounds heard   Abdominal: Soft. Bowel sounds are normal.   Neurological:   Unable to examine due to clinical condition   Skin: Skin is warm and dry.   Nursing note and vitals reviewed.  Results Review:      I reviewed the patient's new clinical results.      Glucose   Date/Time Value Ref Range Status   02/15/2020 0332 127 (H) 65 - 99 mg/dL Final   02/14/2020 0322 117 (H) 65 - 99 mg/dL Final   02/13/2020 0314 137 (H) 65 - 99 mg/dL Final   02/12/2020 1633 104 (H) 65 - 99 mg/dL Final   02/12/2020 1312 147 (H) 65 - 99 mg/dL Final     Lab Results (last 72 hours)     Procedure Component Value Units Date/Time    POC Glucose Once [[id number]]  (Abnormal) Collected:  02/15/20 1055    Specimen:  Blood Updated:  02/15/20 1057     Glucose 181 mg/dL     POC Glucose Once [[id number]]  (Normal) Collected:  02/15/20 0624    Specimen:  Blood Updated:  02/15/20 0625     Glucose 127 mg/dL     Basic Metabolic Panel [[id number]]  (Abnormal) Collected:  02/15/20 0332    Specimen:  Blood Updated:  02/15/20 0439     Glucose 127 mg/dL      BUN 56 mg/dL      Creatinine 2.19 mg/dL      Sodium 140 mmol/L      Potassium 4.2 mmol/L      Chloride 105 mmol/L      CO2 20.3 mmol/L      Calcium 8.9 mg/dL      eGFR Non African Amer 22 mL/min/1.73      BUN/Creatinine Ratio 25.6     Anion Gap 14.7 mmol/L     Narrative:       GFR Normal >60  Chronic Kidney Disease <60  Kidney Failure <15      CBC (No Diff) [[id number]]  (Abnormal) Collected:  02/15/20 0332    Specimen:  Blood Updated:  02/15/20 0351     WBC 13.70 10*3/mm3      RBC 2.87 10*6/mm3      Hemoglobin 8.9 g/dL      Hematocrit 26.4 %      MCV 92.0 fL      MCH 31.0 pg      MCHC 33.7 g/dL      RDW 14.1 %      RDW-SD 47.1 fl      MPV 11.6 fL      Platelets 124 10*3/mm3     POC Glucose Once [[id number]]  (Abnormal) Collected:  02/14/20 2009    Specimen:  Blood Updated:  02/14/20 2011     Glucose 161 mg/dL     Potassium [[id number]]  (Normal) Collected:  02/14/20 1759    Specimen:  Blood Updated:  02/14/20 1923     Potassium 4.1 mmol/L     POC Glucose Once [[id number]]  (Abnormal) Collected:  02/14/20 1600    Specimen:  Blood Updated:  02/14/20 1602     Glucose 174 mg/dL     POC Glucose Once [[id number]]   (Abnormal) Collected:  02/14/20 1024    Specimen:  Blood Updated:  02/14/20 1025     Glucose 190 mg/dL     POC Glucose Once [[id number]]  (Normal) Collected:  02/14/20 0539    Specimen:  Blood Updated:  02/14/20 0540     Glucose 129 mg/dL     Vancomycin, Random [[id number]]  (Normal) Collected:  02/14/20 0322    Specimen:  Blood Updated:  02/14/20 0427     Vancomycin Random 19.80 mcg/mL     Basic Metabolic Panel [[id number]]  (Abnormal) Collected:  02/14/20 0322    Specimen:  Blood Updated:  02/14/20 0426     Glucose 117 mg/dL      BUN 41 mg/dL      Creatinine 1.93 mg/dL      Sodium 142 mmol/L      Potassium 3.4 mmol/L      Chloride 106 mmol/L      CO2 21.3 mmol/L      Calcium 9.1 mg/dL      eGFR Non African Amer 25 mL/min/1.73      BUN/Creatinine Ratio 21.2     Anion Gap 14.7 mmol/L     Narrative:       GFR Normal >60  Chronic Kidney Disease <60  Kidney Failure <15      CBC (No Diff) [[id number]]  (Abnormal) Collected:  02/14/20 0322    Specimen:  Blood Updated:  02/14/20 0413     WBC 13.39 10*3/mm3      RBC 3.00 10*6/mm3      Hemoglobin 9.4 g/dL      Hematocrit 28.0 %      MCV 93.3 fL      MCH 31.3 pg      MCHC 33.6 g/dL      RDW 14.1 %      RDW-SD 47.6 fl      MPV 11.1 fL      Platelets 116 10*3/mm3     POC Glucose Once [[id number]]  (Abnormal) Collected:  02/13/20 1932    Specimen:  Blood Updated:  02/13/20 1933     Glucose 179 mg/dL     POC Glucose Once [[id number]]  (Abnormal) Collected:  02/13/20 1614    Specimen:  Blood Updated:  02/13/20 1615     Glucose 174 mg/dL     Vancomycin, Random [[id number]]  (Normal) Collected:  02/13/20 1158    Specimen:  Blood Updated:  02/13/20 1247     Vancomycin Random 25.00 mcg/mL     POC Glucose Once [[id number]]  (Abnormal) Collected:  02/13/20 1233    Specimen:  Blood Updated:  02/13/20 1235     Glucose 142 mg/dL     POC OR Panel, ISTAT [[id number]]  (Abnormal) Collected:  02/12/20 1119    Specimen:  Blood Updated:  02/13/20 1149     POC Potassium 4.2 mmol/L      Total CO2 27  mmol/L      Comment: Serial Number: [id number]:  [id number]        Hemoglobin 8.2 g/dL      Hematocrit 24 %      pH, Arterial 7.38 pH units      HCO3, Arterial 26.0 mmol/L      Base Excess 1.0000 mmol/L      O2 Saturation, Arterial 100 %      pO2, Arterial 443 mmHg      pCO2, Arterial 43.7 mm Hg      Glucose 178 mg/dL     POC OR Panel, ISTAT [[id number]]  (Abnormal) Collected:  02/12/20 1051    Specimen:  Blood Updated:  02/13/20 1149     POC Potassium 4.5 mmol/L      Total CO2 27 mmol/L      Comment: Serial Number: [id number]:  [id number]        Hemoglobin 8.5 g/dL      Hematocrit 25 %      pH, Arterial 7.38 pH units      HCO3, Arterial 25.5 mmol/L      Base Excess 0.0000 mmol/L      O2 Saturation, Arterial 100 %      pO2, Arterial 425 mmHg      pCO2, Arterial 42.9 mm Hg      Glucose 146 mg/dL     POC OR Panel, ISTAT [[id number]]  (Abnormal) Collected:  02/12/20 1030    Specimen:  Blood Updated:  02/13/20 1149     POC Potassium 4.1 mmol/L      Total CO2 28 mmol/L      Comment: Serial Number: [id number]:  [id number]        Hemoglobin 7.5 g/dL      Hematocrit 22 %      pH, Arterial 7.40 pH units      HCO3, Arterial 26.3 mmol/L      Base Excess 1.0000 mmol/L      O2 Saturation, Arterial 100 %      pO2, Arterial 478 mmHg      pCO2, Arterial 42.6 mm Hg      Glucose 127 mg/dL     POC OR Panel, ISTAT [[id number]]  (Abnormal) Collected:  02/12/20 1005    Specimen:  Blood Updated:  02/13/20 1149     POC Potassium 3.7 mmol/L      Total CO2 25 mmol/L      Comment: Serial Number: [id number]:  [id number]        Hemoglobin 7.8 g/dL      Hematocrit 23 %      pH, Arterial 7.36 pH units      HCO3, Arterial 23.3 mmol/L      Base Excess -2.0000 mmol/L      O2 Saturation, Arterial 75 %      pO2, Arterial 42 mmHg      pCO2, Arterial 41.6 mm Hg      Glucose 99 mg/dL     POC OR Panel, ISTAT [[id number]]  (Abnormal) Collected:  02/12/20 0959    Specimen:  Blood Updated:  02/13/20 1149     POC Potassium 3.8 mmol/L      Total CO2 27 mmol/L       Comment: Serial Number: [id number]:  [id number]        Hemoglobin 7.8 g/dL      Hematocrit 23 %      pH, Arterial 7.37 pH units      HCO3, Arterial 25.9 mmol/L      Base Excess 1.0000 mmol/L      O2 Saturation, Arterial 100 %      pO2, Arterial 460 mmHg      pCO2, Arterial 44.8 mm Hg      Glucose 101 mg/dL     POC Activated Clotting Time [[id number]]  (Abnormal) Collected:  02/12/20 0936    Specimen:  Blood Updated:  02/13/20 1149     Activated Clotting Time  334 Seconds      Comment: Serial Number: [id number]:  [id number]       POC OR Panel, ISTAT [[id number]]  (Abnormal) Collected:  02/12/20 0820    Specimen:  Blood Updated:  02/13/20 1147     POC Potassium 3.1 mmol/L      Total CO2 27 mmol/L      Comment: Serial Number: [id number]:  [id number]        Hemoglobin 9.2 g/dL      Hematocrit 27 %      pH, Arterial 7.43 pH units      HCO3, Arterial 25.8 mmol/L      Base Excess 2.0000 mmol/L      O2 Saturation, Arterial 100 %      pO2, Arterial 276 mmHg      pCO2, Arterial 38.6 mm Hg      Glucose 91 mg/dL     POC Glucose Once [[id number]]  (Normal) Collected:  02/13/20 0927    Specimen:  Blood Updated:  02/13/20 0930     Glucose 117 mg/dL     POC Glucose Once [[id number]]  (Abnormal) Collected:  02/13/20 0804    Specimen:  Blood Updated:  02/13/20 0807     Glucose 148 mg/dL     POC Glucose Once [[id number]]  (Abnormal) Collected:  02/13/20 0641    Specimen:  Blood Updated:  02/13/20 0644     Glucose 175 mg/dL     POC Glucose Once [[id number]]  (Abnormal) Collected:  02/13/20 0441    Specimen:  Blood Updated:  02/13/20 0442     Glucose 147 mg/dL     Ferritin [[id number]]  (Abnormal) Collected:  02/13/20 0314    Specimen:  Blood Updated:  02/13/20 0409     Ferritin 192.00 ng/mL     Narrative:       Results may be falsely decreased if patient taking Biotin.      Renal Function Panel [[id number]]  (Abnormal) Collected:  02/13/20 0314    Specimen:  Blood Updated:  02/13/20 0404     Glucose 137 mg/dL      BUN 33 mg/dL      Creatinine 1.71  mg/dL      Sodium 139 mmol/L      Potassium 3.9 mmol/L      Chloride 106 mmol/L      CO2 22.5 mmol/L      Calcium 8.2 mg/dL      Albumin 3.50 g/dL      Phosphorus 4.5 mg/dL      Anion Gap 10.5 mmol/L      BUN/Creatinine Ratio 19.3     eGFR Non African Amer 29 mL/min/1.73     Narrative:       GFR Normal >60  Chronic Kidney Disease <60  Kidney Failure <15      Iron Profile [[id number]]  (Abnormal) Collected:  02/13/20 0314    Specimen:  Blood Updated:  02/13/20 0404     Iron 18 mcg/dL      Iron Saturation 13 %      Transferrin 92 mg/dL      TIBC 137 mcg/dL     Magnesium [[id number]]  (Normal) Collected:  02/13/20 0314    Specimen:  Blood Updated:  02/13/20 0404     Magnesium 2.2 mg/dL     Protime-INR [[id number]]  (Abnormal) Collected:  02/13/20 0314    Specimen:  Blood Updated:  02/13/20 0345     Protime 14.8 Seconds      INR 1.19    CBC & Differential [[id number]] Collected:  02/13/20 0314    Specimen:  Blood Updated:  02/13/20 0335    Narrative:       The following orders were created for panel order CBC & Differential.  Procedure                               Abnormality         Status                     ---------                               -----------         ------                     CBC Auto Differential[[id number]]        Abnormal            Final result                 Please view results for these tests on the individual orders.    CBC Auto Differential [[id number]]  (Abnormal) Collected:  02/13/20 0314    Specimen:  Blood Updated:  02/13/20 0335     WBC 8.90 10*3/mm3      RBC 2.43 10*6/mm3      Hemoglobin 7.6 g/dL      Hematocrit 23.1 %      MCV 95.1 fL      MCH 31.3 pg      MCHC 32.9 g/dL      RDW 12.2 %      RDW-SD 42.4 fl      MPV 10.4 fL      Platelets 107 10*3/mm3      Neutrophil % 83.4 %      Lymphocyte % 5.5 %      Monocyte % 10.7 %      Eosinophil % 0.0 %      Basophil % 0.1 %      Immature Grans % 0.3 %      Neutrophils, Absolute 7.42 10*3/mm3      Lymphocytes, Absolute 0.49 10*3/mm3      Monocytes,  Absolute 0.95 10*3/mm3      Eosinophils, Absolute 0.00 10*3/mm3      Basophils, Absolute 0.01 10*3/mm3      Immature Grans, Absolute 0.03 10*3/mm3      nRBC 0.0 /100 WBC     POC Glucose Once [[id number]]  (Abnormal) Collected:  02/13/20 0308    Specimen:  Blood Updated:  02/13/20 0309     Glucose 140 mg/dL     POC Glucose Once [[id number]]  (Normal) Collected:  02/13/20 0204    Specimen:  Blood Updated:  02/13/20 0206     Glucose 84 mg/dL     POC Glucose Once [[id number]]  (Abnormal) Collected:  02/12/20 2358    Specimen:  Blood Updated:  02/13/20 0000     Glucose 165 mg/dL     POC Glucose Once [[id number]]  (Normal) Collected:  02/12/20 2301    Specimen:  Blood Updated:  02/12/20 2303     Glucose 88 mg/dL     Vancomycin, Random [[id number]]  (Normal) Collected:  02/12/20 2133    Specimen:  Blood Updated:  02/12/20 2224     Vancomycin Random 13.70 mcg/mL     POC Glucose Once [[id number]]  (Normal) Collected:  02/12/20 2018    Specimen:  Blood Updated:  02/12/20 2020     Glucose 111 mg/dL     POC Glucose Once [[id number]]  (Normal) Collected:  02/12/20 1806    Specimen:  Blood Updated:  02/12/20 1808     Glucose 113 mg/dL     Blood Gas, Arterial [[id number]]  (Abnormal) Collected:  02/12/20 1746    Specimen:  Arterial Blood Updated:  02/12/20 1751     Site Arterial Line     Stone's Test Positive     pH, Arterial 7.326 pH units      pCO2, Arterial 46.8 mm Hg      pO2, Arterial 103.1 mm Hg      HCO3, Arterial 24.4 mmol/L      Base Excess, Arterial -1.6 mmol/L      O2 Saturation Calculated 97.4 %      A-a Gradiant 0.6 mmHg      Barometric Pressure for Blood Gas 746.2 mmHg      Modality Adult Vent     FIO2 30 %      Ventilator Mode PS     Rate 21 Breaths/minute      PEEP 5     PSV 7 cmH2O     Renal Function Panel [[id number]]  (Abnormal) Collected:  02/12/20 1633    Specimen:  Blood Updated:  02/12/20 1712     Glucose 104 mg/dL      BUN 34 mg/dL      Creatinine 1.78 mg/dL      Sodium 140 mmol/L      Potassium 3.9 mmol/L       Chloride 106 mmol/L      CO2 22.5 mmol/L      Calcium 7.8 mg/dL      Albumin 3.70 g/dL      Phosphorus 3.7 mg/dL      Anion Gap 11.5 mmol/L      BUN/Creatinine Ratio 19.1     eGFR Non African Amer 27 mL/min/1.73     Narrative:       GFR Normal >60  Chronic Kidney Disease <60  Kidney Failure <15      Magnesium [[id number]]  (Normal) Collected:  02/12/20 1633    Specimen:  Blood Updated:  02/12/20 1712     Magnesium 1.9 mg/dL     POC Glucose Once [[id number]]  (Normal) Collected:  02/12/20 1707    Specimen:  Blood Updated:  02/12/20 1711     Glucose 112 mg/dL     CBC (No Diff) [[id number]]  (Abnormal) Collected:  02/12/20 1633    Specimen:  Blood Updated:  02/12/20 1654     WBC 14.89 10*3/mm3      RBC 2.52 10*6/mm3      Hemoglobin 8.1 g/dL      Hematocrit 24.4 %      MCV 96.8 fL      MCH 32.1 pg      MCHC 33.2 g/dL      RDW 12.4 %      RDW-SD 44.1 fl      MPV 9.8 fL      Platelets 108 10*3/mm3     POC Glucose Once [[id number]]  (Normal) Collected:  02/12/20 1616    Specimen:  Blood Updated:  02/12/20 1619     Glucose 112 mg/dL     POC Glucose Once [[id number]]  (Abnormal) Collected:  02/12/20 1511    Specimen:  Blood Updated:  02/12/20 1522     Glucose 135 mg/dL     POC Glucose Once [[id number]]  (Abnormal) Collected:  02/12/20 1411    Specimen:  Blood Updated:  02/12/20 1422     Glucose 148 mg/dL     Blood Gas, Arterial [[id number]]  (Abnormal) Collected:  02/12/20 1410    Specimen:  Arterial Blood Updated:  02/12/20 1413     Site Arterial Line     Stone's Test Positive     pH, Arterial 7.338 pH units      pCO2, Arterial 44.8 mm Hg      pO2, Arterial 410.8 mm Hg      HCO3, Arterial 24.1 mmol/L      Base Excess, Arterial -1.7 mmol/L      O2 Saturation Calculated 100.0 %      A-a Gradiant 0.6 mmHg      Barometric Pressure for Blood Gas 750.6 mmHg      Modality Adult Vent     FIO2 100 %      Ventilator Mode AC     Set Tidal Volume 480     Set Mech Resp Rate 20     Rate 20 Breaths/minute      PEEP 7.5    Renal Function Panel  [[id number]]  (Abnormal) Collected:  02/12/20 1312    Specimen:  Blood Updated:  02/12/20 1400     Glucose 147 mg/dL      BUN 34 mg/dL      Creatinine 1.74 mg/dL      Sodium 135 mmol/L      Potassium 3.8 mmol/L      Chloride 103 mmol/L      CO2 20.8 mmol/L      Calcium 7.9 mg/dL      Albumin 3.20 g/dL      Phosphorus 3.5 mg/dL      Anion Gap 11.2 mmol/L      BUN/Creatinine Ratio 19.5     eGFR Non African Amer 28 mL/min/1.73     Narrative:       GFR Normal >60  Chronic Kidney Disease <60  Kidney Failure <15      Magnesium [[id number]]  (Normal) Collected:  02/12/20 1312    Specimen:  Blood Updated:  02/12/20 1357     Magnesium 1.9 mg/dL     Calcium, Ionized [[id number]]  (Normal) Collected:  02/12/20 1312    Specimen:  Blood Updated:  02/12/20 1353     Ionized Calcium 1.25 mmol/L      Ionized Calcium 5.0 mg/dL     CBC & Differential [[id number]] Collected:  02/12/20 1312    Specimen:  Blood Updated:  02/12/20 1350    Narrative:       The following orders were created for panel order CBC & Differential.  Procedure                               Abnormality         Status                     ---------                               -----------         ------                     CBC Auto Differential[[id number]]        Abnormal            Final result                 Please view results for these tests on the individual orders.    CBC Auto Differential [[id number]]  (Abnormal) Collected:  02/12/20 1312    Specimen:  Blood Updated:  02/12/20 1350     WBC 23.60 10*3/mm3      RBC 2.89 10*6/mm3      Hemoglobin 9.3 g/dL      Hematocrit 27.0 %      MCV 93.4 fL      MCH 32.2 pg      MCHC 34.4 g/dL      RDW 12.1 %      RDW-SD 41.0 fl      MPV 9.6 fL      Platelets 137 10*3/mm3      Neutrophil % 86.2 %      Lymphocyte % 5.9 %      Monocyte % 6.7 %      Eosinophil % 0.4 %      Basophil % 0.2 %      Immature Grans % 0.6 %      Neutrophils, Absolute 20.32 10*3/mm3      Lymphocytes, Absolute 1.39 10*3/mm3      Monocytes, Absolute 1.59  10*3/mm3      Eosinophils, Absolute 0.10 10*3/mm3      Basophils, Absolute 0.05 10*3/mm3      Immature Grans, Absolute 0.15 10*3/mm3      nRBC 0.0 /100 WBC     Protime-INR [[id number]]  (Abnormal) Collected:  02/12/20 1312    Specimen:  Blood Updated:  02/12/20 1344     Protime 15.6 Seconds      INR 1.27    aPTT [[id number]]  (Normal) Collected:  02/12/20 1312    Specimen:  Blood Updated:  02/12/20 1344     PTT 26.4 seconds     Fibrinogen [[id number]]  (Normal) Collected:  02/12/20 1312    Specimen:  Blood Updated:  02/12/20 1344     Fibrinogen 325 mg/dL     POC Glucose Once [[id number]]  (Abnormal) Collected:  02/12/20 1321    Specimen:  Blood Updated:  02/12/20 1331     Glucose 148 mg/dL     POC Activated Clotting Time [[id number]]  (Abnormal) Collected:  02/12/20 1050    Specimen:  Blood Updated:  02/12/20 1154     Activated Clotting Time  417 Seconds      Comment: Serial Number: [id number]:  [id number]       POC Activated Clotting Time [[id number]]  (Abnormal) Collected:  02/12/20 1030    Specimen:  Blood Updated:  02/12/20 1154     Activated Clotting Time  367 Seconds      Comment: Serial Number: [id number]:  [id number]       POC Activated Clotting Time [[id number]]  (Abnormal) Collected:  02/12/20 0958    Specimen:  Blood Updated:  02/12/20 1154     Activated Clotting Time  345 Seconds      Comment: Serial Number: [id number]:  [id number]       POC Activated Clotting Time [[id number]]  (Normal) Collected:  02/12/20 1118    Specimen:  Blood Updated:  02/12/20 1153     Activated Clotting Time  98 Seconds      Comment: Serial Number: [id number]:  [id number]       POC Activated Clotting Time [[id number]]  (Normal) Collected:  02/12/20 0819    Specimen:  Blood Updated:  02/12/20 1153     Activated Clotting Time  120 Seconds      Comment: Serial Number: [id number]:  [id number]           Imaging Results (Last 72 Hours)     Procedure Component Value Units Date/Time    XR Chest 1 View [[id number]] Collected:  02/14/20 1255     Updated:   02/14/20 1300    Narrative:       XR CHEST 1 VW-     HISTORY: Female who is 79 years-old,  chest tube removal     TECHNIQUE: Frontal view of the chest     COMPARISON: 02/14/2020 at 0440 hours     FINDINGS: Interval removal of chest tubes, right IJ sheath. Heart size  is borderline. Aorta is calcified. Pulmonary vasculature is less  congested. Mild likely atelectasis at the bases. There could be small  left pleural effusion. No pneumothorax. Otherwise stable.       Impression:       Chest tube removal. No pneumothorax.     This report was finalized on 2/14/2020 12:57 PM by Dr. Brian Olsen M.D.       XR Chest 1 View [[id number]] Collected:  02/14/20 0524     Updated:  02/14/20 0528    Narrative:       PORTABLE CHEST RADIOGRAPH     HISTORY: Postop open heart surgery     COMPARISON: 03/13/2020     FINDINGS:  Gardiner-Junior catheter has been removed, although the introducer remains.  Remaining tubes and lines are stable in position. Vascular congestion  appears improved. No definite pneumothorax is seen. There is bibasilar  atelectasis. There are small bilateral effusions.       Impression:       Improving vascular congestion.     This report was finalized on 2/14/2020 5:25 AM by Dr. Angeles Noland M.D.       XR Chest 1 View [[id number]] Collected:  02/13/20 0753     Updated:  02/13/20 0800    Narrative:       XR CHEST 1 VW-     Clinical: Status post open heart surgery     COMPARISON 2/12/2020     FINDINGS: There is a Gardiner-Junior catheter again demonstrated in position.  The end is looped in the pulmonary outflow tract.     There are bilateral chest tubes in place. There is cardiac enlargement  similar to the previous examination, interval removal of the  endotracheal tube.     There is improved aeration at the left lung base with diminished opacity  at this location. There is improved delineation of the right  hemidiaphragm consistent with decreasing airspace disease at the right  lung base. There is suggestion of  perhaps minimal cephalization of the  pulmonary vasculature which could indicate early or mild congestion. No  pneumothorax identified. The remainder is unremarkable.     This report was finalized on 2/13/2020 7:57 AM by Dr. Rogerio Looney M.D.       XR Chest 1 View [[id number]] Collected:  02/12/20 1356     Updated:  02/12/20 1402    Narrative:       XR CHEST 1 VW-     HISTORY: Female who is 79 years-old,  postoperative evaluation     TECHNIQUE: Frontal view of the chest     COMPARISON: 02/10/2020     FINDINGS: Endotracheal tube tip is 4.0 cm above the [place]. Right IJ  Valmeyer-Junior catheter extends to a left lower lobe branch pulmonary artery.  Bilateral chest tubes are seen. Heart size is borderline. Pulmonary  vasculature is unremarkable. Mild likely atelectasis in the lower lungs.  No pneumothorax or pleural effusion. Otherwise stable.       Impression:       Postsurgical changes.     This report was finalized on 2/12/2020 1:59 PM by Dr. Brian Olsen M.D.           Medical record review  Summary  Nephrology notes reviewed  Slightly worsening renal function could be from overdiuresis  Date decrease in the diuretics  Lab surveillance    Medication Review:       Current Facility-Administered Medications:   •  acetaminophen (TYLENOL) tablet 650 mg, 650 mg, Oral, Q4H PRN, 650 mg at 02/13/20 2059 **OR** acetaminophen (TYLENOL) 160 MG/5ML solution 650 mg, 650 mg, Oral, Q4H PRN **OR** acetaminophen (TYLENOL) suppository 650 mg, 650 mg, Rectal, Q4H PRN, Jr Anthony Rai MD  •  ALPRAZolam (XANAX) tablet 0.25 mg, 0.25 mg, Oral, Q8H PRN, Jr Anthony Rai MD  •  aluminum-magnesium hydroxide-simethicone (MAALOX MAX) 400-400-40 MG/5ML suspension 15 mL, 15 mL, Oral, Q6H PRN, Brianne Foss MD  •  aspirin EC tablet 81 mg, 81 mg, Oral, Daily, Jr Anthony Rai MD, 81 mg at 02/15/20 0927  •  atorvastatin (LIPITOR) tablet 40 mg, 40 mg, Oral, Nightly, Jr Anthony Rai MD, 40 mg at 02/14/20 2118  •  calcium  carbonate (TUMS) chewable tablet 500 mg (200 mg elemental), 2 tablet, Oral, TID PRN, Jr Anthony Rai MD  •  carBAMazepine (CARBATROL) 12 hr capsule 300 mg, 300 mg, Oral, Q12H, Jr Anthony Rai MD, 300 mg at 02/15/20 0926  •  cloNIDine (CATAPRES) tablet 0.1 mg, 0.1 mg, Oral, Q12H, Solange Ceballos APRN, 0.1 mg at 02/15/20 0927  •  cyclobenzaprine (FLEXERIL) tablet 10 mg, 10 mg, Oral, Q8H PRN, Jr Anthony Rai MD, 10 mg at 02/13/20 0013  •  dextrose (D50W) 25 g/ 50mL Intravenous Solution 25 g, 25 g, Intravenous, Q15 Min PRN, Jr Anthony Rai MD  •  dextrose (GLUTOSE) oral gel 15 g, 15 g, Oral, Q15 Min PRN, Jr Anthony Rai MD  •  enoxaparin (LOVENOX) syringe 30 mg, 30 mg, Subcutaneous, Daily, Jr Anthony Rai MD, 30 mg at 02/14/20 1709  •  furosemide (LASIX) injection 40 mg, 40 mg, Intravenous, Q6H PRN, Jr Anthony Rai MD  •  glucagon (human recombinant) (GLUCAGEN DIAGNOSTIC) injection 1 mg, 1 mg, Subcutaneous, Q15 Min PRN, Jr Anthony Rai MD  •  hydrALAZINE (APRESOLINE) tablet 25 mg, 25 mg, Oral, Q8H, Solange Ceballos APRN, 25 mg at 02/15/20 0634  •  HYDROcodone-acetaminophen (NORCO) 5-325 MG per tablet 2 tablet, 2 tablet, Oral, Q4H PRN, Jr Anthony Rai MD, 2 tablet at 02/13/20 0013  •  insulin glargine (LANTUS) injection 20 Units, 20 Units, Subcutaneous, Q12H, Jayy Garrido MD, 20 Units at 02/15/20 0944  •  insulin lispro (humaLOG) injection 2-5 Units, 2-5 Units, Subcutaneous, 4x Daily AC & at Bedtime, Jayy Garrido MD, 2 Units at 02/15/20 1109  •  insulin regular (HumuLIN R,NovoLIN R) 100 Units in sodium chloride 0.9 % 100 mL (1 Units/mL) infusion, 0-50 Units/hr, Intravenous, Continuous PRN, Miladys Ramirez, TAYO, Last Rate: 4.8 mL/hr at 02/13/20 0615, 4.8 Units/hr at 02/13/20 0615  •  levothyroxine (SYNTHROID, LEVOTHROID) tablet 175 mcg, 175 mcg, Oral, Q AM, Jr Anthony Rai MD, 175 mcg at 02/15/20 0634  •  morphine injection 1  mg, 1 mg, Intravenous, Q4H PRN **AND** naloxone (NARCAN) injection 0.4 mg, 0.4 mg, Intravenous, Q5 Min PRN, Jr Anthony Rai MD  •  morphine injection 4 mg, 4 mg, Intravenous, Q30 Min PRN, Jr Anthony Rai MD  •  mupirocin (BACTROBAN) 2 % nasal ointment, , Each Nare, BID, Jr Anthony Rai MD, 1 application at 02/15/20 0927  •  ondansetron (ZOFRAN) injection 4 mg, 4 mg, Intravenous, Q6H PRN, Jr Anthony Rai MD, 4 mg at 02/14/20 0641  •  oxyCODONE (ROXICODONE) immediate release tablet 10 mg, 10 mg, Oral, Q4H PRN, Jr Anthony Rai MD  •  pantoprazole (PROTONIX) EC tablet 40 mg, 40 mg, Oral, Daily, Jr Anthony Rai MD, 40 mg at 02/15/20 0926  •  potassium chloride (MICRO-K) CR capsule 40 mEq, 40 mEq, Oral, PRN, 40 mEq at 02/14/20 1502 **OR** potassium chloride (KLOR-CON) packet 40 mEq, 40 mEq, Oral, PRN, Jr Anthony Rai MD  •  potassium chloride 10 mEq in 100 mL IVPB, 10 mEq, Intravenous, Q1H PRN **OR** potassium chloride 10 mEq in 100 mL IVPB, 10 mEq, Intravenous, Q1H PRN, Jr Anthony Rai MD  •  potassium chloride 20 mEq in 50 mL IVPB, 20 mEq, Intravenous, Q1H PRN, Jr Anthony Rai MD  •  potassium chloride 20 mEq in 50 mL IVPB, 20 mEq, Intravenous, Q1H PRN, Jr Anthony Rai MD  •  potassium chloride 20 mEq in 50 mL IVPB, 20 mEq, Intravenous, Q1H PRN, Jr Anthony Rai MD  •  promethazine (PHENERGAN) tablet 12.5 mg, 12.5 mg, Oral, Q6H PRN **OR** promethazine (PHENERGAN) injection 12.5 mg, 12.5 mg, Intravenous, Q6H PRN, Jr Anthony Rai MD, 12.5 mg at 02/13/20 0948  •  Scopolamine (TRANSDERM-SCOP) 1.5 MG/3DAYS patch 1 patch, 1 patch, Transdermal, Q72H, Solange Ceballos APRN, 1 patch at 02/14/20 1138  •  sodium chloride 0.9 % flush 30 mL, 30 mL, Intravenous, Once PRN, Jr Anthony Rai MD  •  sodium chloride 0.9 % infusion, 9 mL/hr, Intravenous, Continuous, Jr Anthony Rai MD, Last Rate: 9 mL/hr at 02/12/20 1500, 9 mL/hr at 02/12/20 1500  •   sodium chloride 0.9 % infusion, 30 mL/hr, Intravenous, Continuous PRN, Jr Anthony Rai MD  •  torsemide (DEMADEX) tablet 20 mg, 20 mg, Oral, Daily, Dyana Urbano MD, 20 mg at 02/15/20 0927  •  traMADol (ULTRAM) tablet 25 mg, 25 mg, Oral, Q8H PRN, Jr Anthony Rai MD, 25 mg at 02/15/20 0937    Assessment/Plan     Patient Active Problem List   Diagnosis   • Asthma   • Type 2 diabetes mellitus with diabetic polyneuropathy, with long-term current use of insulin (CMS/HCC)   • Essential hypertension   • Mixed hyperlipidemia   • Disorder of muscle, ligament, and fascia   • Proteinuria   • Postoperative hypothyroidism   • Hyperparathyroidism (CMS/HCC)   • Vitamin D deficiency   • Gout without tophus   • Solitary thyroid nodule   • Anemia in stage 3 chronic kidney disease (CMS/HCC)   • Stage 3 chronic kidney disease (CMS/HCC)   • Class 1 obesity due to excess calories with serious comorbidity and body mass index (BMI) of 34.0 to 34.9 in adult   • Heartburn   • Stable angina (CMS/HCC)   • 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 pectoris (CMS/HCC)     Uncontrolled type 2 diabetes mellitus  Uncontrolled type 2 diabetes mellitus with neuropathy  Uncontrolled type 2 diabetes mellitus with nephropathy  Chronic kidney disease  Postoperative hypothyroidism patient had partial right hemithyroidectomy 2016  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  Status post Avastin injections  Recently steroid use complicating diabetes  Hyperlipidemia associated with diabetes  Coronary artery disease status post cardiac cath and is currently awaiting CABG in the morning    Blood sugars are mostly in the 100 range  Family at the bedside  Patient is trying to eat slightly better  I advised her to try Glucerna  She may also get food from home if she prefers  I discussed with the nurse  Discussed with the cardiology team    Jayy Garrido MD FACE.  02/15/20  11:38 AM      EMR Dragon /  "transcription disclaimer:     \"Dictated utilizing Dragon dictation\".   "

## 2020-02-15 NOTE — PLAN OF CARE
Problem: Patient Care Overview  Goal: Plan of Care Review  Outcome: Ongoing (interventions implemented as appropriate)  Flowsheets (Taken 2/15/2020 0424)  Progress: improving  Plan of Care Reviewed With: patient;daughter  Outcome Summary: SR. -160s. RA. Connected to XPM but turned off. CTx1 to bulb suction. No complaints of pain. Will continue to monitor.  Goal: Individualization and Mutuality  Outcome: Ongoing (interventions implemented as appropriate)  Goal: Discharge Needs Assessment  Outcome: Ongoing (interventions implemented as appropriate)  Goal: Interprofessional Rounds/Family Conf  Outcome: Ongoing (interventions implemented as appropriate)     Problem: Fall Risk (Adult)  Goal: Identify Related Risk Factors and Signs and Symptoms  Outcome: Ongoing (interventions implemented as appropriate)  Flowsheets (Taken 2/15/2020 0424)  Related Risk Factors (Fall Risk): gait/mobility problems  Signs and Symptoms (Fall Risk): presence of risk factors  Goal: Absence of Fall  Outcome: Ongoing (interventions implemented as appropriate)  Flowsheets (Taken 2/15/2020 0424)  Absence of Fall: making progress toward outcome     Problem: Cardiac Surgery (Adult)  Goal: Signs and Symptoms of Listed Potential Problems Will be Absent, Minimized or Managed (Cardiac Surgery)  Outcome: Ongoing (interventions implemented as appropriate)  Flowsheets (Taken 2/15/2020 0424)  Problems Assessed (Cardiac Surgery): all  Problems Present (Cardiac Surgery): situational response  Goal: Signs and Symptoms of Listed Potential Problems Will be Absent, Minimized or Managed (Cardiac Surgery)  Outcome: Ongoing (interventions implemented as appropriate)  Flowsheets (Taken 2/15/2020 0424)  Problems Assessed (Cardiac Surgery): all  Problems Present (Cardiac Surgery): situational response     Problem: Skin Injury Risk (Adult)  Goal: Identify Related Risk Factors and Signs and Symptoms  Outcome: Ongoing (interventions implemented as  appropriate)  Flowsheets (Taken 2/15/2020 0424)  Related Risk Factors (Skin Injury Risk): edema;medical devices;mobility impaired  Goal: Skin Health and Integrity  Outcome: Ongoing (interventions implemented as appropriate)  Flowsheets (Taken 2/15/2020 0424)  Skin Health and Integrity: making progress toward outcome

## 2020-02-15 NOTE — PROGRESS NOTES
"   LOS: 5 days    Patient Care Team:  Quinn Washington MD as PCP - General (Family Medicine)  Hugh Caruso MD as PCP - Claims Attributed  Steve Simons Jr., MD as Consulting Physician (Hematology and Oncology)  Hugh Caruso MD as Consulting Physician (Nephrology)  Adalgisa Hernandez APRN as Nurse Practitioner (Endocrinology)  Reagan Beltran MD as Consulting Physician (Endocrinology)  Aguilar Goldman MD as Consulting Physician (Pulmonary Disease)  Mil Sr MD as Consulting Physician (Ophthalmology)  Tasneem Montelongo MD as Surgeon (General Surgery)  Victor Hugo Ng MD as Consulting Physician (Cardiology)    Chief Complaint:  No chief complaint on file.    Follow UP CKD3  Subjective     Interval History:     Seen and examined. Up in chair; eating dinner. Nausea gone for now. Drinking Boost. Is drinking fluids.       Review of Systems:   See above.    Objective     Vital Signs  Temp:  [97.2 °F (36.2 °C)-98.6 °F (37 °C)] 97.4 °F (36.3 °C)  Heart Rate:  [59-68] 59  Resp:  [18] 18  BP: (117-162)/(62-78) 139/62    Flowsheet Rows      First Filed Value   Admission Height  165.1 cm (65\") Documented at 02/09/2020 1646   Admission Weight  99.3 kg (219 lb) Documented at 02/09/2020 1646          No intake/output data recorded.  I/O last 3 completed shifts:  In: 854.3 [P.O.:570; I.V.:284.3]  Out: 2100 [Urine:1850; Chest Tube:250]    Intake/Output Summary (Last 24 hours) at 2/15/2020 0906  Last data filed at 2/15/2020 0634  Gross per 24 hour   Intake 290 ml   Output 650 ml   Net -360 ml       Physical Exam:  Elderly female.  Alert and appropriate  Central lines removed.  Oral mucosa dry  Neck no jvd  Heart RRR no s3 or rub  Lungs - few crackles in bases. Has one mediastinal CT in place.   Abd few bs soft, nontender  Ext trace edema  Skin no rash   - voiding     Results Review:    Results from last 7 days   Lab Units 02/15/20  0332 02/14/20  1759 02/14/20  0322 02/13/20  0314  02/12/20  0429 02/11/20  0438 02/10/20  1058 "   SODIUM mmol/L 140  --  142 139   < > 142 139 140   POTASSIUM mmol/L 4.2 4.1 3.4* 3.9   < > 3.5 3.5 3.5   CHLORIDE mmol/L 105  --  106 106   < > 104 103 106   CO2 mmol/L 20.3*  --  21.3* 22.5   < > 25.7 24.2 23.0   BUN mg/dL 56*  --  41* 33*   < > 40* 34* 31*   CREATININE mg/dL 2.19*  --  1.93* 1.71*   < > 1.83* 1.68* 1.60*   CALCIUM mg/dL 8.9  --  9.1 8.2*   < > 8.7 9.0 7.6*   BILIRUBIN mg/dL  --   --   --   --   --  <0.2* <0.2* 0.2   ALK PHOS U/L  --   --   --   --   --  66 73 67   ALT (SGPT) U/L  --   --   --   --   --  11 11 13   AST (SGOT) U/L  --   --   --   --   --  10 11 10   GLUCOSE mg/dL 127*  --  117* 137*   < > 63* 186* 83    < > = values in this interval not displayed.       Estimated Creatinine Clearance: 23.8 mL/min (A) (by C-G formula based on SCr of 2.19 mg/dL (H)).    Results from last 7 days   Lab Units 02/13/20 0314 02/12/20  1633 02/12/20  1312   MAGNESIUM mg/dL 2.2 1.9 1.9   PHOSPHORUS mg/dL 4.5 3.7 3.5             Results from last 7 days   Lab Units 02/15/20  0332 02/14/20  0322 02/13/20  0314 02/12/20  1633 02/12/20  1312   WBC 10*3/mm3 13.70* 13.39* 8.90 14.89* 23.60*   HEMOGLOBIN g/dL 8.9* 9.4* 7.6* 8.1* 9.3*   PLATELETS 10*3/mm3 124* 116* 107* 108* 137*       Results from last 7 days   Lab Units 02/13/20  0314 02/12/20  1312 02/11/20  0438 02/10/20  1058   INR  1.19* 1.27* 1.01 1.03         Imaging Results (Last 24 Hours)     Procedure Component Value Units Date/Time    XR Chest 1 View [[id number]] Collected:  02/14/20 1255     Updated:  02/14/20 1300    Narrative:       XR CHEST 1 VW-     HISTORY: Female who is 79 years-old,  chest tube removal     TECHNIQUE: Frontal view of the chest     COMPARISON: 02/14/2020 at 0440 hours     FINDINGS: Interval removal of chest tubes, right IJ sheath. Heart size  is borderline. Aorta is calcified. Pulmonary vasculature is less  congested. Mild likely atelectasis at the bases. There could be small  left pleural effusion. No pneumothorax. Otherwise  stable.       Impression:       Chest tube removal. No pneumothorax.     This report was finalized on 2/14/2020 12:57 PM by Dr. Brian Olsen M.D.             aspirin 81 mg Oral Daily   atorvastatin 40 mg Oral Nightly   carBAMazepine 300 mg Oral Q12H   cloNIDine 0.1 mg Oral Q12H   enoxaparin 30 mg Subcutaneous Daily   hydrALAZINE 25 mg Oral Q8H   insulin glargine 20 Units Subcutaneous Q12H   insulin lispro 2-5 Units Subcutaneous 4x Daily AC & at Bedtime   levothyroxine 175 mcg Oral Q AM   mupirocin  Each Nare BID   pantoprazole 40 mg Oral Daily   Scopolamine 1 patch Transdermal Q72H   torsemide 20 mg Oral Daily       insulin 0-50 Units/hr Last Rate: 4.8 Units/hr (02/13/20 0615)   sodium chloride 9 mL/hr Last Rate: 9 mL/hr (02/12/20 1500)   sodium chloride 30 mL/hr        Medication Review:   Current Facility-Administered Medications   Medication Dose Route Frequency Provider Last Rate Last Dose   • acetaminophen (TYLENOL) tablet 650 mg  650 mg Oral Q4H PRN Jr Anthony Rai MD   650 mg at 02/13/20 2059    Or   • acetaminophen (TYLENOL) 160 MG/5ML solution 650 mg  650 mg Oral Q4H PRN Jr Anthony Rai MD        Or   • acetaminophen (TYLENOL) suppository 650 mg  650 mg Rectal Q4H PRN Jr Anthony Rai MD       • ALPRAZolam (XANAX) tablet 0.25 mg  0.25 mg Oral Q8H PRN Jr Anthony Rai MD       • aluminum-magnesium hydroxide-simethicone (MAALOX MAX) 400-400-40 MG/5ML suspension 15 mL  15 mL Oral Q6H PRN Brianne Foss MD       • aspirin EC tablet 81 mg  81 mg Oral Daily Jr Anthony Rai MD   81 mg at 02/14/20 0944   • atorvastatin (LIPITOR) tablet 40 mg  40 mg Oral Nightly Jr Anthony Rai MD   40 mg at 02/14/20 2118   • calcium carbonate (TUMS) chewable tablet 500 mg (200 mg elemental)  2 tablet Oral TID PRN Jr Anthony Rai MD       • carBAMazepine (CARBATROL) 12 hr capsule 300 mg  300 mg Oral Q12H Jr Anthony Rai MD   300 mg at 02/14/20 2119   • cloNIDine (CATAPRES) tablet  0.1 mg  0.1 mg Oral Q12H Solange Ceballos, APRN   0.1 mg at 02/14/20 2118   • cyclobenzaprine (FLEXERIL) tablet 10 mg  10 mg Oral Q8H PRN Jr Anthony Rai MD   10 mg at 02/13/20 0013   • dextrose (D50W) 25 g/ 50mL Intravenous Solution 25 g  25 g Intravenous Q15 Min PRN Jr Anthony Rai MD       • dextrose (GLUTOSE) oral gel 15 g  15 g Oral Q15 Min PRN Jr Anthony Rai MD       • enoxaparin (LOVENOX) syringe 30 mg  30 mg Subcutaneous Daily Jr Anthony Rai MD   30 mg at 02/14/20 1709   • furosemide (LASIX) injection 40 mg  40 mg Intravenous Q6H PRN Jr Anthony Rai MD       • glucagon (human recombinant) (GLUCAGEN DIAGNOSTIC) injection 1 mg  1 mg Subcutaneous Q15 Min PRN Jr Anthony Rai MD       • hydrALAZINE (APRESOLINE) tablet 25 mg  25 mg Oral Q8H Solange Ceballos APRN   25 mg at 02/15/20 0634   • HYDROcodone-acetaminophen (NORCO) 5-325 MG per tablet 2 tablet  2 tablet Oral Q4H PRN Jr Anthony Rai MD   2 tablet at 02/13/20 0013   • insulin glargine (LANTUS) injection 20 Units  20 Units Subcutaneous Q12H Jayy Garrido MD   20 Units at 02/14/20 2119   • insulin lispro (humaLOG) injection 2-5 Units  2-5 Units Subcutaneous 4x Daily AC & at Bedtime Jayy Garrido MD   2 Units at 02/14/20 2119   • insulin regular (HumuLIN R,NovoLIN R) 100 Units in sodium chloride 0.9 % 100 mL (1 Units/mL) infusion  0-50 Units/hr Intravenous Continuous PRN Miladys Ramirez APRN 4.8 mL/hr at 02/13/20 0615 4.8 Units/hr at 02/13/20 0615   • levothyroxine (SYNTHROID, LEVOTHROID) tablet 175 mcg  175 mcg Oral Q AM Jr Anthony Rai MD   175 mcg at 02/15/20 0634   • morphine injection 1 mg  1 mg Intravenous Q4H PRN Jr Anthony Rai MD        And   • naloxone (NARCAN) injection 0.4 mg  0.4 mg Intravenous Q5 Min PRN Jr Anthony Rai MD       • morphine injection 4 mg  4 mg Intravenous Q30 Min PRN Jr Anthony Rai MD       • mupirocin (BACTROBAN) 2 % nasal  ointment   Each Nare BID Jr Anthony Rai MD   1 application at 02/14/20 2118   • ondansetron (ZOFRAN) injection 4 mg  4 mg Intravenous Q6H PRN Jr Anthony Rai MD   4 mg at 02/14/20 0641   • oxyCODONE (ROXICODONE) immediate release tablet 10 mg  10 mg Oral Q4H PRN Jr Anthony Rai MD       • pantoprazole (PROTONIX) EC tablet 40 mg  40 mg Oral Daily Jr Anthony Rai MD   40 mg at 02/14/20 0942   • potassium chloride (MICRO-K) CR capsule 40 mEq  40 mEq Oral PRN Jr Anthony Rai MD   40 mEq at 02/14/20 1502    Or   • potassium chloride (KLOR-CON) packet 40 mEq  40 mEq Oral PRN Jr Anthony Rai MD       • potassium chloride 10 mEq in 100 mL IVPB  10 mEq Intravenous Q1H PRN Jr Anthony Rai MD        Or   • potassium chloride 10 mEq in 100 mL IVPB  10 mEq Intravenous Q1H PRN Jr Anthony Rai MD       • potassium chloride 20 mEq in 50 mL IVPB  20 mEq Intravenous Q1H PRN Jr Anthony Rai MD       • potassium chloride 20 mEq in 50 mL IVPB  20 mEq Intravenous Q1H PRN Jr Anthony Rai MD       • potassium chloride 20 mEq in 50 mL IVPB  20 mEq Intravenous Q1H PRN Jr Anthony Rai MD       • promethazine (PHENERGAN) tablet 12.5 mg  12.5 mg Oral Q6H PRN Jr Anthony Rai MD        Or   • promethazine (PHENERGAN) injection 12.5 mg  12.5 mg Intravenous Q6H PRN Jr Anthony Rai MD   12.5 mg at 02/13/20 0948   • Scopolamine (TRANSDERM-SCOP) 1.5 MG/3DAYS patch 1 patch  1 patch Transdermal Q72H Solange Ceballos APRN   1 patch at 02/14/20 1138   • sodium chloride 0.9 % flush 30 mL  30 mL Intravenous Once PRN Jr Anthony Rai MD       • sodium chloride 0.9 % infusion  9 mL/hr Intravenous Continuous Jr Anthony Rai MD 9 mL/hr at 02/12/20 1500 9 mL/hr at 02/12/20 1500   • sodium chloride 0.9 % infusion  30 mL/hr Intravenous Continuous PRN Jr Anthony Rai MD       • torsemide (DEMADEX) tablet 20 mg  20 mg Oral Daily Dyana Urbano MD   20 mg at  02/14/20 0942   • traMADol (ULTRAM) tablet 25 mg  25 mg Oral Q8H PRN Jr Anthony Rai MD   25 mg at 02/14/20 0046       Assessment/Plan   1. CKD 3.  Stable so far post op CABG. Renal function a little worse today with elevated BUN. Possible over-diuresis given poor intake due to nausea. CXR on 2/14 showed improving vascular congestion.  2. CAD. Critical left main stenosis, LAD disease 90%. CABG x 5. POD #3 progressing.  3. DM2 on insulin. Sugars controlled  4. Bradycardia. Pacemaker present but not pacing; sinus bradycardia. BB on hold.  5. Hypothyroid on replacement.  6. Leukocytosis likely reactive  7. Anemia, chronic and blood loss.  Iron stores are low.  Will need IV Venofer.  8. Hypertension - stable on 0.1 mg BID and 25 mg hydralazine TID.    Plan:  1. Decrease diuretics.  2. Surveillance labs.     Plan discussed with daughter, patient, and nursing at bedside.       TAYO Saleh  02/15/20  9:06 AM    I examined this patient, reviewed the data, and personally edited this note.  I agree with the assessment and plan as outlined above.  --Farhad Valderrama MD    02/15/20  4:43 PM

## 2020-02-15 NOTE — THERAPY TREATMENT NOTE
Acute Care - Physical Therapy Treatment Note  [facility]     Patient Name: Ange Johnson  : 1940  MRN: [id number]  Today's Date: 2/15/2020             Admit Date: 2020    Visit Dx:    ICD-10-CM ICD-9-CM   1. 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 pectoris, unspecified vessel or lesion type (CMS/HCC) I25.118 414.01     413.9   2. Stable angina (CMS/HCC) I20.8 413.9   3. S/P CABG (coronary artery bypass graft) Z95.1 V45.81     Patient Active Problem List   Diagnosis   • Asthma   • Type 2 diabetes mellitus with diabetic polyneuropathy, with long-term current use of insulin (CMS/HCC)   • Essential hypertension   • Mixed hyperlipidemia   • Disorder of muscle, ligament, and fascia   • Proteinuria   • Postoperative hypothyroidism   • Hyperparathyroidism (CMS/HCC)   • Vitamin D deficiency   • Gout without tophus   • Solitary thyroid nodule   • Anemia in stage 3 chronic kidney disease (CMS/HCC)   • Stage 3 chronic kidney disease (CMS/HCC)   • Class 1 obesity due to excess calories with serious comorbidity and body mass index (BMI) of 34.0 to 34.9 in adult   • Heartburn   • Stable angina (CMS/HCC)   • 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 pectoris (CMS/HCC)       Therapy Treatment    Rehabilitation Treatment Summary     Row Name 02/15/20 1000             Treatment Time/Intention    Discipline  physical therapy assistant  -CW      Document Type  therapy note (daily note)  -CW      Subjective Information  complains of;fatigue  -CW      Mode of Treatment  physical therapy  -CW      Therapy Frequency (PT Clinical Impression)  daily  -CW      Patient Effort  good  -CW      Existing Precautions/Restrictions  cardiac;fall;sternal  -CW      Recorded by [CW] Leonardo Worley PTA 02/15/20 1006      Row Name 02/15/20 1000             Vital Signs    O2 Delivery Pre Treatment  room air  -CW      Recorded by [CW] Leonardo Worley PTA 02/15/20 1006      Row Name 02/15/20 1000              Cognitive Assessment/Intervention- PT/OT    Orientation Status (Cognition)  oriented x 3  -CW      Recorded by [CW] Leonardo Worley, PTA 02/15/20 1006      Row Name 02/15/20 1000             Bed Mobility Assessment/Treatment    Supine-Sit Petroleum (Bed Mobility)  not tested  -CW      Comment (Bed Mobility)  in chair  -CW      Recorded by [CW] Leonardo Worley, PTA 02/15/20 1006      Row Name 02/15/20 1000             Transfer Assessment/Treatment    Transfer Assessment/Treatment  sit-stand transfer;stand-sit transfer  -CW      Recorded by [CW] Leoanrdo Worley, PTA 02/15/20 1006      Row Name 02/15/20 1000             Sit-Stand Transfer    Sit-Stand Petroleum (Transfers)  minimum assist (75% patient effort)  -CW      Assistive Device (Sit-Stand Transfers)  -- HHA  -CW      Recorded by [CW] Leonardo Worley, PTA 02/15/20 1006      Row Name 02/15/20 1000             Stand-Sit Transfer    Stand-Sit Petroleum (Transfers)  minimum assist (75% patient effort)  -CW      Assistive Device (Stand-Sit Transfers)  -- HHA  -CW      Recorded by [CW] Leonardo Worley, PTA 02/15/20 1006      Row Name 02/15/20 1000             Gait/Stairs Assessment/Training    Petroleum Level (Gait)  contact guard  -CW      Assistive Device (Gait)  -- HHA  -CW      Distance in Feet (Gait)  60x2  -CW      Pattern (Gait)  step-through  -CW      Deviations/Abnormal Patterns (Gait)  alisha decreased;stride length decreased  -CW      Comment (Gait/Stairs)  2 standing rest breaks  -CW      Recorded by [CW] Leonardo Worley, PTA 02/15/20 1006      Row Name 02/15/20 1000             Positioning and Restraints    Pre-Treatment Position  sitting in chair/recliner  -CW      Post Treatment Position  chair  -CW      In Chair  notified nsg;reclined;call light within reach;encouraged to call for assist;with family/caregiver  -CW      Recorded by [CW] Leonardo Worley, PTA 02/15/20 1006      Row Name 02/15/20 1000              Pain Scale: Numbers Pre/Post-Treatment    Pain Scale: Numbers, Pretreatment  0/10 - no pain  -CW      Pain Scale: Numbers, Post-Treatment  0/10 - no pain  -CW      Pain Location  chest  -CW      Pain Intervention(s)  Repositioned;Ambulation/increased activity  -CW      Recorded by [CW] Leonardo Worley PTA 02/15/20 1006      Row Name                Wound 02/12/20 1016 midline sternal Incision    Wound - Properties Group Date first assessed: 02/12/20 [JT] Time first assessed: 1016 [JT] Present on Hospital Admission: Y [JT] Orientation: midline [JT] Location: sternal [JT] Primary Wound Type: Incision [JT] Recorded by:  [JT] Steve Ochoa RN 02/12/20 1017    Row Name                Wound 02/12/20 1138 Left anterior;lower;proximal leg Incision    Wound - Properties Group Date first assessed: 02/12/20 [JT] Time first assessed: 1138 [JT] Present on Hospital Admission: N [JT] Side: Left [JT] Orientation: anterior;lower;proximal [JT] Location: leg [JT] Primary Wound Type: Incision [JT] Recorded by:  [JT] Steve Ochoa RN 02/12/20 1138    Row Name 02/15/20 1000             Outcome Summary/Treatment Plan (PT)    Anticipated Discharge Disposition (PT)  skilled nursing facility  -CW      Recorded by [CW] Leonardo Worley PTA 02/15/20 1006        User Key  (r) = Recorded By, (t) = Taken By, (c) = Cosigned By    Initials Name Effective Dates Discipline    JT Steve Ochoa RN 02/13/17 -  Nurse    CW Leonardo Worley PTA 03/07/18 -  PT          Wound 02/12/20 1016 midline sternal Incision (Active)   Dressing Appearance dry;intact;no drainage 2/15/2020  9:25 AM   Closure MIRIAM 2/15/2020  9:25 AM   Base dressing in place, unable to visualize 2/15/2020  9:25 AM   Drainage Amount none 2/15/2020  9:25 AM   Dressing Care, Wound foam;low-adherent 2/15/2020  4:14 AM       Wound 02/12/20 1138 Left anterior;lower;proximal leg Incision (Active)   Dressing Appearance open to air 2/15/2020  9:25 AM   Closure Liquid skin adhesive  2/15/2020  9:25 AM   Base clean;dry;pink 2/15/2020  9:25 AM   Periwound dry;intact 2/14/2020  4:00 PM   Periwound Skin Turgor firm 2/14/2020  4:00 PM   Drainage Amount none 2/15/2020  9:25 AM   Dressing Care, Wound open to air 2/15/2020  4:14 AM               PT Recommendation and Plan  Anticipated Discharge Disposition (PT): skilled nursing facility  Therapy Frequency (PT Clinical Impression): daily  Outcome Summary/Treatment Plan (PT)  Anticipated Discharge Disposition (PT): skilled nursing facility     Time Calculation:   PT Charges     Row Name 02/15/20 1006             Time Calculation    Start Time  0948  -CW      Stop Time  1006  -CW      Time Calculation (min)  18 min  -CW      PT Received On  02/15/20  -CW      PT - Next Appointment  02/16/20  -CW        User Key  (r) = Recorded By, (t) = Taken By, (c) = Cosigned By    Initials Name Provider Type    CW Leonardo Worley PTA Physical Therapy Assistant        Therapy Charges for Today     Code Description Service Date Service Provider Modifiers Qty    [id number] HC PT THER PROC EA 15 MIN 2/15/2020 Leonardo Worley PTA GP 1          PT G-Codes  Outcome Measure Options: AM-PAC 6 Clicks Basic Mobility (PT)  AM-PAC 6 Clicks Score (PT): 15    Leonardo Worley PTA  2/15/2020

## 2020-02-16 LAB
ALBUMIN SERPL-MCNC: 3.2 G/DL (ref 3.5–5.2)
ANION GAP SERPL CALCULATED.3IONS-SCNC: 13.8 MMOL/L (ref 5–15)
BUN BLD-MCNC: 64 MG/DL (ref 8–23)
BUN/CREAT SERPL: 26.4 (ref 7–25)
CALCIUM SPEC-SCNC: 8.5 MG/DL (ref 8.6–10.5)
CHLORIDE SERPL-SCNC: 102 MMOL/L (ref 98–107)
CO2 SERPL-SCNC: 22.2 MMOL/L (ref 22–29)
CREAT BLD-MCNC: 2.42 MG/DL (ref 0.57–1)
DEPRECATED RDW RBC AUTO: 44.4 FL (ref 37–54)
ERYTHROCYTE [DISTWIDTH] IN BLOOD BY AUTOMATED COUNT: 13.4 % (ref 12.3–15.4)
GFR SERPL CREATININE-BSD FRML MDRD: 19 ML/MIN/1.73
GLUCOSE BLD-MCNC: 83 MG/DL (ref 65–99)
GLUCOSE BLDC GLUCOMTR-MCNC: 145 MG/DL (ref 70–130)
GLUCOSE BLDC GLUCOMTR-MCNC: 162 MG/DL (ref 70–130)
GLUCOSE BLDC GLUCOMTR-MCNC: 187 MG/DL (ref 70–130)
GLUCOSE BLDC GLUCOMTR-MCNC: 88 MG/DL (ref 70–130)
HCT VFR BLD AUTO: 26.5 % (ref 34–46.6)
HGB BLD-MCNC: 8.8 G/DL (ref 12–15.9)
MAGNESIUM SERPL-MCNC: 2.3 MG/DL (ref 1.6–2.4)
MCH RBC QN AUTO: 30.6 PG (ref 26.6–33)
MCHC RBC AUTO-ENTMCNC: 33.2 G/DL (ref 31.5–35.7)
MCV RBC AUTO: 92 FL (ref 79–97)
PHOSPHATE SERPL-MCNC: 3.8 MG/DL (ref 2.5–4.5)
PLATELET # BLD AUTO: 132 10*3/MM3 (ref 140–450)
PMV BLD AUTO: 11.1 FL (ref 6–12)
POTASSIUM BLD-SCNC: 3.3 MMOL/L (ref 3.5–5.2)
POTASSIUM BLD-SCNC: 4.5 MMOL/L (ref 3.5–5.2)
RBC # BLD AUTO: 2.88 10*6/MM3 (ref 3.77–5.28)
SODIUM BLD-SCNC: 138 MMOL/L (ref 136–145)
WBC NRBC COR # BLD: 12.82 10*3/MM3 (ref 3.4–10.8)

## 2020-02-16 PROCEDURE — 83735 ASSAY OF MAGNESIUM: CPT | Performed by: INTERNAL MEDICINE

## 2020-02-16 PROCEDURE — 25010000002 IRON SUCROSE PER 1 MG: Performed by: NURSE PRACTITIONER

## 2020-02-16 PROCEDURE — 63710000001 INSULIN GLARGINE PER 5 UNITS: Performed by: INTERNAL MEDICINE

## 2020-02-16 PROCEDURE — 80069 RENAL FUNCTION PANEL: CPT | Performed by: INTERNAL MEDICINE

## 2020-02-16 PROCEDURE — 82962 GLUCOSE BLOOD TEST: CPT

## 2020-02-16 PROCEDURE — 25010000002 ENOXAPARIN PER 10 MG: Performed by: THORACIC SURGERY (CARDIOTHORACIC VASCULAR SURGERY)

## 2020-02-16 PROCEDURE — 99233 SBSQ HOSP IP/OBS HIGH 50: CPT | Performed by: INTERNAL MEDICINE

## 2020-02-16 PROCEDURE — 85027 COMPLETE CBC AUTOMATED: CPT | Performed by: THORACIC SURGERY (CARDIOTHORACIC VASCULAR SURGERY)

## 2020-02-16 PROCEDURE — 99232 SBSQ HOSP IP/OBS MODERATE 35: CPT | Performed by: INTERNAL MEDICINE

## 2020-02-16 PROCEDURE — 97110 THERAPEUTIC EXERCISES: CPT

## 2020-02-16 PROCEDURE — 25010000002 ONDANSETRON PER 1 MG: Performed by: THORACIC SURGERY (CARDIOTHORACIC VASCULAR SURGERY)

## 2020-02-16 PROCEDURE — 84132 ASSAY OF SERUM POTASSIUM: CPT | Performed by: THORACIC SURGERY (CARDIOTHORACIC VASCULAR SURGERY)

## 2020-02-16 PROCEDURE — 99024 POSTOP FOLLOW-UP VISIT: CPT | Performed by: NURSE PRACTITIONER

## 2020-02-16 RX ORDER — FUROSEMIDE 40 MG/1
40 TABLET ORAL DAILY
Status: DISCONTINUED | OUTPATIENT
Start: 2020-02-17 | End: 2020-02-16

## 2020-02-16 RX ORDER — POLYETHYLENE GLYCOL 3350 17 G/17G
17 POWDER, FOR SOLUTION ORAL DAILY
Status: DISCONTINUED | OUTPATIENT
Start: 2020-02-16 | End: 2020-02-18 | Stop reason: HOSPADM

## 2020-02-16 RX ORDER — BISACODYL 10 MG
10 SUPPOSITORY, RECTAL RECTAL DAILY
Status: DISCONTINUED | OUTPATIENT
Start: 2020-02-16 | End: 2020-02-17

## 2020-02-16 RX ORDER — INSULIN GLARGINE 100 [IU]/ML
20 INJECTION, SOLUTION SUBCUTANEOUS EVERY MORNING
Status: DISCONTINUED | OUTPATIENT
Start: 2020-02-17 | End: 2020-02-18 | Stop reason: HOSPADM

## 2020-02-16 RX ADMIN — POTASSIUM CHLORIDE 40 MEQ: 750 CAPSULE, EXTENDED RELEASE ORAL at 14:32

## 2020-02-16 RX ADMIN — CARBAMAZEPINE 300 MG: 300 CAPSULE, EXTENDED RELEASE ORAL at 09:22

## 2020-02-16 RX ADMIN — POTASSIUM CHLORIDE 40 MEQ: 750 CAPSULE, EXTENDED RELEASE ORAL at 09:46

## 2020-02-16 RX ADMIN — ASPIRIN 81 MG: 81 TABLET ORAL at 09:22

## 2020-02-16 RX ADMIN — TRAMADOL HYDROCHLORIDE 25 MG: 50 TABLET ORAL at 03:25

## 2020-02-16 RX ADMIN — POTASSIUM CHLORIDE 40 MEQ: 750 CAPSULE, EXTENDED RELEASE ORAL at 06:22

## 2020-02-16 RX ADMIN — ACETAMINOPHEN 650 MG: 325 TABLET, FILM COATED ORAL at 17:42

## 2020-02-16 RX ADMIN — CLONIDINE HYDROCHLORIDE 0.1 MG: 0.1 TABLET ORAL at 09:22

## 2020-02-16 RX ADMIN — ATORVASTATIN CALCIUM 40 MG: 20 TABLET, FILM COATED ORAL at 20:33

## 2020-02-16 RX ADMIN — INSULIN GLARGINE 20 UNITS: 100 INJECTION, SOLUTION SUBCUTANEOUS at 09:17

## 2020-02-16 RX ADMIN — POLYETHYLENE GLYCOL 3350 17 G: 17 POWDER, FOR SOLUTION ORAL at 09:46

## 2020-02-16 RX ADMIN — HYDRALAZINE HYDROCHLORIDE 25 MG: 25 TABLET ORAL at 22:07

## 2020-02-16 RX ADMIN — ONDANSETRON HYDROCHLORIDE 4 MG: 2 SOLUTION INTRAMUSCULAR; INTRAVENOUS at 00:06

## 2020-02-16 RX ADMIN — HYDRALAZINE HYDROCHLORIDE 25 MG: 25 TABLET ORAL at 06:22

## 2020-02-16 RX ADMIN — CLONIDINE HYDROCHLORIDE 0.1 MG: 0.1 TABLET ORAL at 20:33

## 2020-02-16 RX ADMIN — TRAMADOL HYDROCHLORIDE 25 MG: 50 TABLET ORAL at 22:07

## 2020-02-16 RX ADMIN — IRON SUCROSE 300 MG: 20 INJECTION, SOLUTION INTRAVENOUS at 12:49

## 2020-02-16 RX ADMIN — MUPIROCIN: 20 OINTMENT TOPICAL at 09:22

## 2020-02-16 RX ADMIN — ENOXAPARIN SODIUM 30 MG: 30 INJECTION SUBCUTANEOUS at 17:22

## 2020-02-16 RX ADMIN — CARBAMAZEPINE 300 MG: 300 CAPSULE, EXTENDED RELEASE ORAL at 20:33

## 2020-02-16 RX ADMIN — LEVOTHYROXINE SODIUM 175 MCG: 175 TABLET ORAL at 06:22

## 2020-02-16 RX ADMIN — METOPROLOL TARTRATE 12.5 MG: 25 TABLET ORAL at 09:22

## 2020-02-16 RX ADMIN — PANTOPRAZOLE SODIUM 40 MG: 40 TABLET, DELAYED RELEASE ORAL at 09:21

## 2020-02-16 NOTE — PLAN OF CARE
Problem: Patient Care Overview  Goal: Plan of Care Review  Outcome: Ongoing (interventions implemented as appropriate)  Flowsheets (Taken 2/16/2020 0453)  Progress: improving  Plan of Care Reviewed With: patient; daughter  Outcome Summary: SR. 2L NC with sleep. Wires taped and isolated. Pain treated with Ultram and tylenol. 1 dose of Zofran given d/t pt being nauseous. Will continue to monitor.  Goal: Individualization and Mutuality  Outcome: Ongoing (interventions implemented as appropriate)  Goal: Discharge Needs Assessment  Outcome: Ongoing (interventions implemented as appropriate)  Goal: Interprofessional Rounds/Family Conf  Outcome: Ongoing (interventions implemented as appropriate)     Problem: Fall Risk (Adult)  Goal: Identify Related Risk Factors and Signs and Symptoms  Outcome: Ongoing (interventions implemented as appropriate)  Flowsheets (Taken 2/16/2020 0453)  Related Risk Factors (Fall Risk): gait/mobility problems  Signs and Symptoms (Fall Risk): presence of risk factors  Goal: Absence of Fall  Outcome: Ongoing (interventions implemented as appropriate)  Flowsheets (Taken 2/16/2020 0453)  Absence of Fall: making progress toward outcome     Problem: Cardiac Surgery (Adult)  Goal: Signs and Symptoms of Listed Potential Problems Will be Absent, Minimized or Managed (Cardiac Surgery)  Outcome: Ongoing (interventions implemented as appropriate)  Goal: Signs and Symptoms of Listed Potential Problems Will be Absent, Minimized or Managed (Cardiac Surgery)  Outcome: Ongoing (interventions implemented as appropriate)  Flowsheets (Taken 2/16/2020 0453)  Problems Assessed (Cardiac Surgery): all  Problems Present (Cardiac Surgery): pain; situational response     Problem: Skin Injury Risk (Adult)  Goal: Identify Related Risk Factors and Signs and Symptoms  Outcome: Ongoing (interventions implemented as appropriate)  Flowsheets (Taken 2/16/2020 3843)  Related Risk Factors (Skin Injury Risk): edema; mobility  impaired  Goal: Skin Health and Integrity  Outcome: Ongoing (interventions implemented as appropriate)  Flowsheets (Taken 2/16/2020 0451)  Skin Health and Integrity: making progress toward outcome

## 2020-02-16 NOTE — PROGRESS NOTES
" LOS: 6 days   Patient Care Team:  Quinn Washington MD as PCP - General (Family Medicine)  Hugh Caruso MD as PCP - Claims Attributed  Steve Simons Jr., MD as Consulting Physician (Hematology and Oncology)  Hugh Caruso MD as Consulting Physician (Nephrology)  Adalgisa Hernandez APRN as Nurse Practitioner (Endocrinology)  Reagan Beltran MD as Consulting Physician (Endocrinology)  Aguilar Goldman MD as Consulting Physician (Pulmonary Disease)  Mil Sr MD as Consulting Physician (Ophthalmology)  Tasneem Montelongo MD as Surgeon (General Surgery)  Victor Hugo Ng MD as Consulting Physician (Cardiology)    Chief Complaint: post op    Subjective:  Symptoms:  No shortness of breath, cough or chest pain.    Diet:  Poor intake.  No nausea or vomiting.    Activity level: Impaired due to weakness.    Pain:  She complains of pain that is mild.  Pain is requiring pain medication and well controlled.      No complaints this morning.  Creatinine continues to increase, 2.42 (2.19)    Vital Signs  Temp:  [97.6 °F (36.4 °C)-99 °F (37.2 °C)] 98 °F (36.7 °C)  Heart Rate:  [54-65] 60  Resp:  [18] 18  BP: (118-160)/(60-74) 121/62  Body mass index is 36.19 kg/m².    Intake/Output Summary (Last 24 hours) at 2/16/2020 1022  Last data filed at 2/16/2020 0415  Gross per 24 hour   Intake 50 ml   Output 1010 ml   Net -960 ml     No intake/output data recorded.    Chest tube drainage last 8 hours 80/80        02/14/20  0500 02/15/20  0500 02/16/20  0554   Weight: 99 kg (218 lb 3.2 oz) 98.4 kg (217 lb) 98.7 kg (217 lb 8 oz)       Objective:  General Appearance:  Comfortable and in no acute distress.    Vital signs: (most recent): Blood pressure 121/62, pulse 60, temperature 98 °F (36.7 °C), temperature source Oral, resp. rate 18, height 165.1 cm (65\"), weight 98.7 kg (217 lb 8 oz), SpO2 96 %, not currently breastfeeding.  Vital signs are normal.  No fever.    Output: Producing urine.    Lungs:  Normal effort and normal respiratory " rate.  There are decreased breath sounds.    Heart: Normal rate.  Regular rhythm.    Abdomen: Abdomen is soft.  (Active bowel sounds in all quadrents).  Bowel sounds are normal.     Extremities: There is dependent edema.    Pulses: Distal pulses are intact.    Neurological: Patient is alert and oriented to person, place and time.    Skin:  Warm and dry.  (Surgical dressing clean, dry, and intact)      Results Review:        WBC WBC   Date Value Ref Range Status   02/16/2020 12.82 (H) 3.40 - 10.80 10*3/mm3 Final   02/15/2020 13.70 (H) 3.40 - 10.80 10*3/mm3 Final   02/14/2020 13.39 (H) 3.40 - 10.80 10*3/mm3 Final      HGB Hemoglobin   Date Value Ref Range Status   02/16/2020 8.8 (L) 12.0 - 15.9 g/dL Final   02/15/2020 8.9 (L) 12.0 - 15.9 g/dL Final   02/14/2020 9.4 (L) 12.0 - 15.9 g/dL Final      HCT Hematocrit   Date Value Ref Range Status   02/16/2020 26.5 (L) 34.0 - 46.6 % Final   02/15/2020 26.4 (L) 34.0 - 46.6 % Final   02/14/2020 28.0 (L) 34.0 - 46.6 % Final      Platelets Platelets   Date Value Ref Range Status   02/16/2020 132 (L) 140 - 450 10*3/mm3 Final   02/15/2020 124 (L) 140 - 450 10*3/mm3 Final   02/14/2020 116 (L) 140 - 450 10*3/mm3 Final        PT/INR:    No results found for: PROTIME/  No results found for: INR    Sodium Sodium   Date Value Ref Range Status   02/16/2020 138 136 - 145 mmol/L Final   02/15/2020 140 136 - 145 mmol/L Final   02/14/2020 142 136 - 145 mmol/L Final      Potassium Potassium   Date Value Ref Range Status   02/16/2020 3.3 (L) 3.5 - 5.2 mmol/L Final   02/15/2020 4.2 3.5 - 5.2 mmol/L Final   02/14/2020 4.1 3.5 - 5.2 mmol/L Final   02/14/2020 3.4 (L) 3.5 - 5.2 mmol/L Final      Chloride Chloride   Date Value Ref Range Status   02/16/2020 102 98 - 107 mmol/L Final   02/15/2020 105 98 - 107 mmol/L Final   02/14/2020 106 98 - 107 mmol/L Final      Bicarbonate CO2   Date Value Ref Range Status   02/16/2020 22.2 22.0 - 29.0 mmol/L Final   02/15/2020 20.3 (L) 22.0 - 29.0 mmol/L Final    02/14/2020 21.3 (L) 22.0 - 29.0 mmol/L Final      BUN BUN   Date Value Ref Range Status   02/16/2020 64 (H) 8 - 23 mg/dL Final   02/15/2020 56 (H) 8 - 23 mg/dL Final   02/14/2020 41 (H) 8 - 23 mg/dL Final      Creatinine Creatinine   Date Value Ref Range Status   02/16/2020 2.42 (H) 0.57 - 1.00 mg/dL Final   02/15/2020 2.19 (H) 0.57 - 1.00 mg/dL Final   02/14/2020 1.93 (H) 0.57 - 1.00 mg/dL Final      Calcium Calcium   Date Value Ref Range Status   02/16/2020 8.5 (L) 8.6 - 10.5 mg/dL Final   02/15/2020 8.9 8.6 - 10.5 mg/dL Final   02/14/2020 9.1 8.6 - 10.5 mg/dL Final      Magnesium Magnesium   Date Value Ref Range Status   02/16/2020 2.3 1.6 - 2.4 mg/dL Final            aspirin 81 mg Oral Daily   atorvastatin 40 mg Oral Nightly   carBAMazepine 300 mg Oral Q12H   cloNIDine 0.1 mg Oral Q12H   enoxaparin 30 mg Subcutaneous Daily   hydrALAZINE 25 mg Oral Q8H   insulin glargine 20 Units Subcutaneous Q12H   insulin lispro 2-5 Units Subcutaneous 4x Daily AC & at Bedtime   iron sucrose 300 mg Intravenous Once   levothyroxine 175 mcg Oral Q AM   metoprolol tartrate 12.5 mg Oral Q12H   mupirocin  Each Nare BID   pantoprazole 40 mg Oral Daily   polyethylene glycol 17 g Oral Daily   Scopolamine 1 patch Transdermal Q72H       insulin 0-50 Units/hr Last Rate: 4.8 Units/hr (02/13/20 0615)   sodium chloride 9 mL/hr Last Rate: 9 mL/hr (02/12/20 1500)   sodium chloride 30 mL/hr        Patient Active Problem List   Diagnosis Code   • Asthma J45.909   • Type 2 diabetes mellitus with diabetic polyneuropathy, with long-term current use of insulin (CMS/[facility]) E11.42, Z79.4   • Essential hypertension I10   • Mixed hyperlipidemia E78.2   • Disorder of muscle, ligament, and fascia M62.9   • Proteinuria R80.9   • Postoperative hypothyroidism E89.0   • Hyperparathyroidism (CMS/HCC) E21.3   • Vitamin D deficiency E55.9   • 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   • Solitary thyroid nodule E04.1   • Anemia in stage 3 chronic kidney disease (CMS/HCC) N18.3,  D63.1   • Stage 3 chronic kidney disease (CMS/[facility]) N18.3   • Class 1 obesity due to excess calories with serious comorbidity and body mass index (BMI) of 34.0 to 34.9 in adult E66.09, Z68.34   • Heartburn R12   • Stable angina (CMS/[facility]) I20.8   • 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 pectoris (CMS/[facility]) I25.118       Assessment & Plan   -Severe CAD with left main stenosis--s/p CABGx5, sternal closure with zip fix--POD#4 Cecelia  -Diabetes type 2  -NUHA on CKD stage III, baseline 1.6-1.8  -hypertension  -hypothyroidism- on synthroid  -hyperlipidemia   -post op anemia- expected acute blood loss, improving  - leukocytosis- probably reactive  -TCP- plt count 132 today     Creatinine 2.42 (2.19) today--renal following; plans to hold diuretics per renal  Cardiac rhythm NSR in 60's  Encourage pulmonary toilet/mobilize  Continue routine care  DC AV wires    MESHA MCKINNON, TAYO  02/16/20  10:22 AM

## 2020-02-16 NOTE — PROGRESS NOTES
"   LOS: 6 days    Patient Care Team:  Quinn Washington MD as PCP - General (Family Medicine)  Hugh Caruso MD as PCP - Claims Attributed  Steve Simons Jr., MD as Consulting Physician (Hematology and Oncology)  Hugh Caruso MD as Consulting Physician (Nephrology)  Adalgisa Hernandez APRN as Nurse Practitioner (Endocrinology)  Reagan Beltran MD as Consulting Physician (Endocrinology)  Aguilar Goldman MD as Consulting Physician (Pulmonary Disease)  Mil Sr MD as Consulting Physician (Ophthalmology)  Tasneem Montelongo MD as Surgeon (General Surgery)  Victor Hugo Ng MD as Consulting Physician (Cardiology)    Chief Complaint:  No chief complaint on file.    Follow UP CKD3  Subjective     Interval History:     Seen and examined. Sitting in chair. Not sleeping well; no orthopnea. No SOB on RA.  Having left shoulder pain; helped with ice. Still with occas N       Review of Systems:   See above.    Objective   I/O -840 mL; wt up 0.2 kg.     Vital Signs  Temp:  [97.6 °F (36.4 °C)-99 °F (37.2 °C)] 98 °F (36.7 °C)  Heart Rate:  [54-65] 60  Resp:  [18] 18  BP: (118-160)/(60-74) 121/62    Flowsheet Rows      First Filed Value   Admission Height  165.1 cm (65\") Documented at 02/09/2020 1646   Admission Weight  99.3 kg (219 lb) Documented at 02/09/2020 1646          No intake/output data recorded.  I/O last 3 completed shifts:  In: 220 [P.O.:220]  Out: 1050 [Urine:1000; Chest Tube:50]    Intake/Output Summary (Last 24 hours) at 2/16/2020 0831  Last data filed at 2/16/2020 0415  Gross per 24 hour   Intake 50 ml   Output 1010 ml   Net -960 ml       Physical Exam:  Elderly female.  Alert and appropriate  Central lines removed.  Oral mucosa dry  Neck no jvd  Heart RRR no s3 or rub  Lungs - few crackles in bases. Has one mediastinal CT in place.   Abd few bs soft, nontender  Ext trace -+1 edema  Skin no rash   - voiding     Results Review:    Results from last 7 days   Lab Units 02/16/20  0342 02/15/20  0332 02/14/20  1759 " 02/14/20  0322 02/12/20  0429 02/11/20  0438 02/10/20  1058   SODIUM mmol/L 138 140  --  142   < > 142 139 140   POTASSIUM mmol/L 3.3* 4.2 4.1 3.4*   < > 3.5 3.5 3.5   CHLORIDE mmol/L 102 105  --  106   < > 104 103 106   CO2 mmol/L 22.2 20.3*  --  21.3*   < > 25.7 24.2 23.0   BUN mg/dL 64* 56*  --  41*   < > 40* 34* 31*   CREATININE mg/dL 2.42* 2.19*  --  1.93*   < > 1.83* 1.68* 1.60*   CALCIUM mg/dL 8.5* 8.9  --  9.1   < > 8.7 9.0 7.6*   BILIRUBIN mg/dL  --   --   --   --   --  <0.2* <0.2* 0.2   ALK PHOS U/L  --   --   --   --   --  66 73 67   ALT (SGPT) U/L  --   --   --   --   --  11 11 13   AST (SGOT) U/L  --   --   --   --   --  10 11 10   GLUCOSE mg/dL 83 127*  --  117*   < > 63* 186* 83    < > = values in this interval not displayed.       Estimated Creatinine Clearance: 21.6 mL/min (A) (by C-G formula based on SCr of 2.42 mg/dL (H)).    Results from last 7 days   Lab Units 02/16/20 0342 02/13/20 0314 02/12/20  1633   MAGNESIUM mg/dL 2.3 2.2 1.9   PHOSPHORUS mg/dL 3.8 4.5 3.7             Results from last 7 days   Lab Units 02/16/20  0342 02/15/20  0332 02/14/20 0322 02/13/20 0314 02/12/20  1633   WBC 10*3/mm3 12.82* 13.70* 13.39* 8.90 14.89*   HEMOGLOBIN g/dL 8.8* 8.9* 9.4* 7.6* 8.1*   PLATELETS 10*3/mm3 132* 124* 116* 107* 108*       Results from last 7 days   Lab Units 02/13/20  0314 02/12/20  1312 02/11/20  0438 02/10/20  1058   INR  1.19* 1.27* 1.01 1.03         Imaging Results (Last 24 Hours)     Procedure Component Value Units Date/Time    XR Chest 1 View [[id number]] Collected:  02/15/20 1624     Updated:  02/15/20 1628    Narrative:       XR CHEST 1 VW-     HISTORY: Female who is 79 years-old,  chest tube removal     TECHNIQUE: Frontal view of the chest     COMPARISON: 02/15/2020 at 1135 hours     FINDINGS: Heart size is borderline. Pulmonary vasculature is  unremarkable. Mild bilateral basilar atelectasis/infiltrate, small left  pleural effusion, appearance is similar to prior exam. No  pneumothorax.  No  acute osseous process.          Impression:       Persistent mild bilateral basilar atelectasis/infiltrate, small left  pleural effusion.        This report was finalized on 2/15/2020 4:25 PM by Dr. Brian Olsen M.D.       XR Chest PA & Lateral [[id number]] Collected:  02/15/20 1226     Updated:  02/15/20 1234    Narrative:       XR CHEST PA AND LATERAL-     HISTORY: Female who is 79 years-old,  postoperative evaluation     TECHNIQUE: Frontal and lateral views of the chest     COMPARISON: 02/14/2020     FINDINGS: Heart size is borderline. Pulmonary vasculature is  unremarkable. Mild bilateral basilar atelectasis/infiltrate, small left  pleural effusion, continued follow-up suggested.. No pneumothorax. No  acute osseous process.       Impression:       Mild bilateral basilar atelectasis/infiltrate, small left  pleural effusion.     This report was finalized on 2/15/2020 12:31 PM by Dr. Brian Olsen M.D.             aspirin 81 mg Oral Daily   atorvastatin 40 mg Oral Nightly   carBAMazepine 300 mg Oral Q12H   cloNIDine 0.1 mg Oral Q12H   enoxaparin 30 mg Subcutaneous Daily   [START ON 2/17/2020] furosemide 40 mg Oral Daily   hydrALAZINE 25 mg Oral Q8H   insulin glargine 20 Units Subcutaneous Q12H   insulin lispro 2-5 Units Subcutaneous 4x Daily AC & at Bedtime   levothyroxine 175 mcg Oral Q AM   metoprolol tartrate 12.5 mg Oral Q12H   mupirocin  Each Nare BID   pantoprazole 40 mg Oral Daily   Scopolamine 1 patch Transdermal Q72H       insulin 0-50 Units/hr Last Rate: 4.8 Units/hr (02/13/20 0615)   sodium chloride 9 mL/hr Last Rate: 9 mL/hr (02/12/20 1500)   sodium chloride 30 mL/hr        Medication Review:   Current Facility-Administered Medications   Medication Dose Route Frequency Provider Last Rate Last Dose   • acetaminophen (TYLENOL) tablet 650 mg  650 mg Oral Q4H PRN Jr Anthony Rai MD   650 mg at 02/15/20 2225    Or   • acetaminophen (TYLENOL) 160 MG/5ML solution 650 mg   650 mg Oral Q4H PRN Jr Anthony Rai MD        Or   • acetaminophen (TYLENOL) suppository 650 mg  650 mg Rectal Q4H PRN Jr Anthony Rai MD       • ALPRAZolam (XANAX) tablet 0.25 mg  0.25 mg Oral Q8H PRN Jr Anthony Rai MD       • aluminum-magnesium hydroxide-simethicone (MAALOX MAX) 400-400-40 MG/5ML suspension 15 mL  15 mL Oral Q6H PRN Brianne Foss MD       • aspirin EC tablet 81 mg  81 mg Oral Daily Jr Anthony Rai MD   81 mg at 02/15/20 0927   • atorvastatin (LIPITOR) tablet 40 mg  40 mg Oral Nightly Jr Anthony Rai MD   40 mg at 02/15/20 2045   • calcium carbonate (TUMS) chewable tablet 500 mg (200 mg elemental)  2 tablet Oral TID PRN Jr Anthony Rai MD       • carBAMazepine (CARBATROL) 12 hr capsule 300 mg  300 mg Oral Q12H Jr Anthony Rai MD   300 mg at 02/15/20 2045   • cloNIDine (CATAPRES) tablet 0.1 mg  0.1 mg Oral Q12H Solange Ceballos APRN   0.1 mg at 02/15/20 2045   • cyclobenzaprine (FLEXERIL) tablet 10 mg  10 mg Oral Q8H PRN Jr Anthony Rai MD   10 mg at 02/13/20 0013   • dextrose (D50W) 25 g/ 50mL Intravenous Solution 25 g  25 g Intravenous Q15 Min PRN Jr Anthony Rai MD       • dextrose (GLUTOSE) oral gel 15 g  15 g Oral Q15 Min PRN Jr Anthony Rai MD       • enoxaparin (LOVENOX) syringe 30 mg  30 mg Subcutaneous Daily Jr Anthony Rai MD   30 mg at 02/15/20 1632   • furosemide (LASIX) injection 40 mg  40 mg Intravenous Q6H PRN Jr Anthony Rai MD       • [START ON 2/17/2020] furosemide (LASIX) tablet 40 mg  40 mg Oral Daily Lily Gonzalez, TAYO       • glucagon (human recombinant) (GLUCAGEN DIAGNOSTIC) injection 1 mg  1 mg Subcutaneous Q15 Min PRN Jr Anthony Rai MD       • hydrALAZINE (APRESOLINE) tablet 25 mg  25 mg Oral Q8H Solange Ceballos APRN   25 mg at 02/16/20 0622   • HYDROcodone-acetaminophen (NORCO) 5-325 MG per tablet 2 tablet  2 tablet Oral Q4H PRN Jr Anthony Rai MD   2 tablet at  02/13/20 0013   • insulin glargine (LANTUS) injection 20 Units  20 Units Subcutaneous Q12H Jayy Garrido MD   20 Units at 02/15/20 2045   • insulin lispro (humaLOG) injection 2-5 Units  2-5 Units Subcutaneous 4x Daily AC & at Bedtime Jayy Garrido MD   4 Units at 02/15/20 2045   • insulin regular (HumuLIN R,NovoLIN R) 100 Units in sodium chloride 0.9 % 100 mL (1 Units/mL) infusion  0-50 Units/hr Intravenous Continuous PRN Miladys Ramirez APRN 4.8 mL/hr at 02/13/20 0615 4.8 Units/hr at 02/13/20 0615   • levothyroxine (SYNTHROID, LEVOTHROID) tablet 175 mcg  175 mcg Oral Q AM Jr Anthony Rai MD   175 mcg at 02/16/20 0622   • metoprolol tartrate (LOPRESSOR) tablet 12.5 mg  12.5 mg Oral Q12H Nadiya Kyle APRN   12.5 mg at 02/15/20 1632   • morphine injection 1 mg  1 mg Intravenous Q4H PRN Jr Anthony Rai MD        And   • naloxone (NARCAN) injection 0.4 mg  0.4 mg Intravenous Q5 Min PRN Jr Anthony Rai MD       • morphine injection 4 mg  4 mg Intravenous Q30 Min PRN Jr Anthony Rai MD       • mupirocin (BACTROBAN) 2 % nasal ointment   Each Nare BID Jr Anthony Rai MD       • ondansetron (ZOFRAN) injection 4 mg  4 mg Intravenous Q6H PRN Jr Anthony Rai MD   4 mg at 02/16/20 0006   • oxyCODONE (ROXICODONE) immediate release tablet 10 mg  10 mg Oral Q4H PRN Jr Anthony Rai MD       • pantoprazole (PROTONIX) EC tablet 40 mg  40 mg Oral Daily Jr Anthony Rai MD   40 mg at 02/15/20 0926   • potassium chloride (MICRO-K) CR capsule 40 mEq  40 mEq Oral PRN Jr Anthony Rai MD   40 mEq at 02/16/20 0622    Or   • potassium chloride (KLOR-CON) packet 40 mEq  40 mEq Oral PRN Jr Anthony Rai MD       • potassium chloride 10 mEq in 100 mL IVPB  10 mEq Intravenous Q1H PRN Jr Anthony Rai MD        Or   • potassium chloride 10 mEq in 100 mL IVPB  10 mEq Intravenous Q1H PRN Jr Anthony Rai MD       • potassium chloride 20 mEq in 50 mL IVPB  20  mEq Intravenous Q1H PRN Jr Anthony Rai MD       • potassium chloride 20 mEq in 50 mL IVPB  20 mEq Intravenous Q1H PRN Jr Anthony Rai MD       • potassium chloride 20 mEq in 50 mL IVPB  20 mEq Intravenous Q1H PRN Jr Anthony Rai MD       • promethazine (PHENERGAN) tablet 12.5 mg  12.5 mg Oral Q6H PRN Jr Anthony Rai MD        Or   • promethazine (PHENERGAN) injection 12.5 mg  12.5 mg Intravenous Q6H PRN Jr Anthony Rai MD   12.5 mg at 02/13/20 0948   • Scopolamine (TRANSDERM-SCOP) 1.5 MG/3DAYS patch 1 patch  1 patch Transdermal Q72H Solange Ceballos APRN   1 patch at 02/14/20 1138   • sodium chloride 0.9 % flush 30 mL  30 mL Intravenous Once PRN Jr Anthony Rai MD       • sodium chloride 0.9 % infusion  9 mL/hr Intravenous Continuous Jr Anthony Rai MD 9 mL/hr at 02/12/20 1500 9 mL/hr at 02/12/20 1500   • sodium chloride 0.9 % infusion  30 mL/hr Intravenous Continuous PRN Jr Anthony Rai MD       • traMADol (ULTRAM) tablet 25 mg  25 mg Oral Q8H PRN Jr Anthony Rai MD   25 mg at 02/16/20 0325       Assessment/Plan   1. NUHA on CKD 3, nonoliguric, with expected rise in SCR post-CABG and following aggressive diuresis.  Low potassium; peripheral greater than central volume excess  2. CAD. Critical left main stenosis, LAD disease 90%. CABG x 5. POD #4 progressing.  3. DM2 on insulin. Sugars controlled  4. Bradycardia. Pacemaker present but not pacing; intermittent sinus rhythm and junctional rhythm. Low dose BB started.  5. Hypothyroid on replacement.  6. Leukocytosis likely reactive  7. Anemia, chronic and blood loss.  Iron stores are low.  Will need IV Venofer.  8. Hypertension - stable on 0.1 mg BID, 25 mg hydralazine TID, and 12.5 mg lopressor with parameters.    Plan:  1. Stop diuretics and allow time for volume to re-equilibrate  2. IV Venofer x 1.  3. Surveillance labs.     Plan discussed with daughter, patient, and nursing at bedside.       Lily  Dee Gonzalez, TAYO  02/16/20  8:31 AM        I examined this patient, reviewed the data, and personally edited this note.  I agree with the assessment and plan as outlined above.  --Farhad Valderrama MD    02/16/20  9:55 AM

## 2020-02-16 NOTE — PROGRESS NOTES
79 y.o.   LOS: 6 days   Patient Care Team:  Quinn Washington MD as PCP - General (Family Medicine)  Hugh Caruso MD as PCP - Claims Attributed  Steve Simons Jr., MD as Consulting Physician (Hematology and Oncology)  Hugh Caruso MD as Consulting Physician (Nephrology)  Adalgisa Hernandez APRN as Nurse Practitioner (Endocrinology)  Reagan Beltran MD as Consulting Physician (Endocrinology)  Aguilar Goldman MD as Consulting Physician (Pulmonary Disease)  Mil Sr MD as Consulting Physician (Ophthalmology)  Tasneem Montelongo MD as Surgeon (General Surgery)  Victor Hugo Ng MD as Consulting Physician (Cardiology)    Chief Complaint: Uncontrolled type 2 diabetes mellitus and postoperative management    No chief complaint on file.      Subjective     HPI  Patient's blood sugars are trending down  Overnight they were in the 80-88 range  Creatinine also jumped to 2.4  Patient is not eating much  She however started drinking Glucerna  Family at the bedside    Interval History:      Review of Systems:      Review of Systems   Constitutional: Positive for appetite change and fatigue.   Respiratory: Positive for chest tightness.    Cardiovascular: Negative.    Gastrointestinal: Negative.    Neurological: Positive for dizziness and numbness.   All other systems reviewed and are negative.    Objective     Vital Signs   Temp:  [97.6 °F (36.4 °C)-99 °F (37.2 °C)] 98 °F (36.7 °C)  Heart Rate:  [54-65] 60  Resp:  [18] 18  BP: (118-160)/(60-74) 121/62    Physical Exam:  Physical Exam   Constitutional: She is oriented to person, place, and time.   Eyes: Pupils are equal, round, and reactive to light. EOM are normal.   No circumorbital puffiness   Neck: Normal range of motion. Neck supple. No thyromegaly present.   Cardiovascular: Normal rate, regular rhythm, normal heart sounds and intact distal pulses.   Pulmonary/Chest: Effort normal and breath sounds normal.   Abdominal: Soft. Bowel sounds are normal. She exhibits no  distension. There is no tenderness.   Musculoskeletal: Normal range of motion. She exhibits no edema.   Neurological: She is alert and oriented to person, place, and time.   Skin: Skin is warm and dry.   Negative for acanthosis and vitiligo or purple striae   Psychiatric: She has a normal mood and affect. Her behavior is normal.   Nursing note and vitals reviewed.  Results Review:     I reviewed the patient's new clinical results.      Glucose   Date/Time Value Ref Range Status   02/16/2020 0342 83 65 - 99 mg/dL Final   02/15/2020 0332 127 (H) 65 - 99 mg/dL Final   02/14/2020 0322 117 (H) 65 - 99 mg/dL Final     Lab Results (last 72 hours)     Procedure Component Value Units Date/Time    POC Glucose Once [[id number]]  (Normal) Collected:  02/16/20 0612    Specimen:  Blood Updated:  02/16/20 0617     Glucose 88 mg/dL     Renal Function Panel [[id number]]  (Abnormal) Collected:  02/16/20 0342    Specimen:  Blood Updated:  02/16/20 0451     Glucose 83 mg/dL      BUN 64 mg/dL      Creatinine 2.42 mg/dL      Sodium 138 mmol/L      Potassium 3.3 mmol/L      Chloride 102 mmol/L      CO2 22.2 mmol/L      Calcium 8.5 mg/dL      Albumin 3.20 g/dL      Phosphorus 3.8 mg/dL      Anion Gap 13.8 mmol/L      BUN/Creatinine Ratio 26.4     eGFR Non African Amer 19 mL/min/1.73     Narrative:       GFR Normal >60  Chronic Kidney Disease <60  Kidney Failure <15      Magnesium [[id number]]  (Normal) Collected:  02/16/20 0342    Specimen:  Blood Updated:  02/16/20 0451     Magnesium 2.3 mg/dL     CBC (No Diff) [[id number]]  (Abnormal) Collected:  02/16/20 0342    Specimen:  Blood Updated:  02/16/20 0423     WBC 12.82 10*3/mm3      RBC 2.88 10*6/mm3      Hemoglobin 8.8 g/dL      Hematocrit 26.5 %      MCV 92.0 fL      MCH 30.6 pg      MCHC 33.2 g/dL      RDW 13.4 %      RDW-SD 44.4 fl      MPV 11.1 fL      Platelets 132 10*3/mm3     POC Glucose Once [[id number]]  (Abnormal) Collected:  02/15/20 2015    Specimen:  Blood Updated:  02/15/20 2017      Glucose 265 mg/dL     POC Glucose Once [[id number]]  (Abnormal) Collected:  02/15/20 1539    Specimen:  Blood Updated:  02/15/20 1540     Glucose 141 mg/dL     POC Glucose Once [[id number]]  (Abnormal) Collected:  02/15/20 1055    Specimen:  Blood Updated:  02/15/20 1057     Glucose 181 mg/dL     POC Glucose Once [[id number]]  (Normal) Collected:  02/15/20 0624    Specimen:  Blood Updated:  02/15/20 0625     Glucose 127 mg/dL     Basic Metabolic Panel [[id number]]  (Abnormal) Collected:  02/15/20 0332    Specimen:  Blood Updated:  02/15/20 0439     Glucose 127 mg/dL      BUN 56 mg/dL      Creatinine 2.19 mg/dL      Sodium 140 mmol/L      Potassium 4.2 mmol/L      Chloride 105 mmol/L      CO2 20.3 mmol/L      Calcium 8.9 mg/dL      eGFR Non African Amer 22 mL/min/1.73      BUN/Creatinine Ratio 25.6     Anion Gap 14.7 mmol/L     Narrative:       GFR Normal >60  Chronic Kidney Disease <60  Kidney Failure <15      CBC (No Diff) [[id number]]  (Abnormal) Collected:  02/15/20 0332    Specimen:  Blood Updated:  02/15/20 0351     WBC 13.70 10*3/mm3      RBC 2.87 10*6/mm3      Hemoglobin 8.9 g/dL      Hematocrit 26.4 %      MCV 92.0 fL      MCH 31.0 pg      MCHC 33.7 g/dL      RDW 14.1 %      RDW-SD 47.1 fl      MPV 11.6 fL      Platelets 124 10*3/mm3     POC Glucose Once [[id number]]  (Abnormal) Collected:  02/14/20 2009    Specimen:  Blood Updated:  02/14/20 2011     Glucose 161 mg/dL     Potassium [[id number]]  (Normal) Collected:  02/14/20 1759    Specimen:  Blood Updated:  02/14/20 1923     Potassium 4.1 mmol/L     POC Glucose Once [[id number]]  (Abnormal) Collected:  02/14/20 1600    Specimen:  Blood Updated:  02/14/20 1602     Glucose 174 mg/dL     POC Glucose Once [[id number]]  (Abnormal) Collected:  02/14/20 1024    Specimen:  Blood Updated:  02/14/20 1025     Glucose 190 mg/dL     POC Glucose Once [[id number]]  (Normal) Collected:  02/14/20 0539    Specimen:  Blood Updated:  02/14/20 0540     Glucose 129 mg/dL      Vancomycin, Random [[id number]]  (Normal) Collected:  02/14/20 0322    Specimen:  Blood Updated:  02/14/20 0427     Vancomycin Random 19.80 mcg/mL     Basic Metabolic Panel [[id number]]  (Abnormal) Collected:  02/14/20 0322    Specimen:  Blood Updated:  02/14/20 0426     Glucose 117 mg/dL      BUN 41 mg/dL      Creatinine 1.93 mg/dL      Sodium 142 mmol/L      Potassium 3.4 mmol/L      Chloride 106 mmol/L      CO2 21.3 mmol/L      Calcium 9.1 mg/dL      eGFR Non African Amer 25 mL/min/1.73      BUN/Creatinine Ratio 21.2     Anion Gap 14.7 mmol/L     Narrative:       GFR Normal >60  Chronic Kidney Disease <60  Kidney Failure <15      CBC (No Diff) [[id number]]  (Abnormal) Collected:  02/14/20 0322    Specimen:  Blood Updated:  02/14/20 0413     WBC 13.39 10*3/mm3      RBC 3.00 10*6/mm3      Hemoglobin 9.4 g/dL      Hematocrit 28.0 %      MCV 93.3 fL      MCH 31.3 pg      MCHC 33.6 g/dL      RDW 14.1 %      RDW-SD 47.6 fl      MPV 11.1 fL      Platelets 116 10*3/mm3     POC Glucose Once [[id number]]  (Abnormal) Collected:  02/13/20 1932    Specimen:  Blood Updated:  02/13/20 1933     Glucose 179 mg/dL     POC Glucose Once [[id number]]  (Abnormal) Collected:  02/13/20 1614    Specimen:  Blood Updated:  02/13/20 1615     Glucose 174 mg/dL     Vancomycin, Random [[id number]]  (Normal) Collected:  02/13/20 1158    Specimen:  Blood Updated:  02/13/20 1247     Vancomycin Random 25.00 mcg/mL     POC Glucose Once [[id number]]  (Abnormal) Collected:  02/13/20 1233    Specimen:  Blood Updated:  02/13/20 1235     Glucose 142 mg/dL     POC OR Panel, ISTAT [[id number]]  (Abnormal) Collected:  02/12/20 1119    Specimen:  Blood Updated:  02/13/20 1149     POC Potassium 4.2 mmol/L      Total CO2 27 mmol/L      Comment: Serial Number: [id number]:  [id number]        Hemoglobin 8.2 g/dL      Hematocrit 24 %      pH, Arterial 7.38 pH units      HCO3, Arterial 26.0 mmol/L      Base Excess 1.0000 mmol/L      O2 Saturation, Arterial 100 %      pO2,  Arterial 443 mmHg      pCO2, Arterial 43.7 mm Hg      Glucose 178 mg/dL     POC OR Panel, ISTAT [[id number]]  (Abnormal) Collected:  02/12/20 1051    Specimen:  Blood Updated:  02/13/20 1149     POC Potassium 4.5 mmol/L      Total CO2 27 mmol/L      Comment: Serial Number: [id number]:  [id number]        Hemoglobin 8.5 g/dL      Hematocrit 25 %      pH, Arterial 7.38 pH units      HCO3, Arterial 25.5 mmol/L      Base Excess 0.0000 mmol/L      O2 Saturation, Arterial 100 %      pO2, Arterial 425 mmHg      pCO2, Arterial 42.9 mm Hg      Glucose 146 mg/dL     POC OR Panel, ISTAT [[id number]]  (Abnormal) Collected:  02/12/20 1030    Specimen:  Blood Updated:  02/13/20 1149     POC Potassium 4.1 mmol/L      Total CO2 28 mmol/L      Comment: Serial Number: [id number]:  [id number]        Hemoglobin 7.5 g/dL      Hematocrit 22 %      pH, Arterial 7.40 pH units      HCO3, Arterial 26.3 mmol/L      Base Excess 1.0000 mmol/L      O2 Saturation, Arterial 100 %      pO2, Arterial 478 mmHg      pCO2, Arterial 42.6 mm Hg      Glucose 127 mg/dL     POC OR Panel, ISTAT [[id number]]  (Abnormal) Collected:  02/12/20 1005    Specimen:  Blood Updated:  02/13/20 1149     POC Potassium 3.7 mmol/L      Total CO2 25 mmol/L      Comment: Serial Number: [id number]:  [id number]        Hemoglobin 7.8 g/dL      Hematocrit 23 %      pH, Arterial 7.36 pH units      HCO3, Arterial 23.3 mmol/L      Base Excess -2.0000 mmol/L      O2 Saturation, Arterial 75 %      pO2, Arterial 42 mmHg      pCO2, Arterial 41.6 mm Hg      Glucose 99 mg/dL     POC OR Panel, ISTAT [[id number]]  (Abnormal) Collected:  02/12/20 0959    Specimen:  Blood Updated:  02/13/20 1149     POC Potassium 3.8 mmol/L      Total CO2 27 mmol/L      Comment: Serial Number: [id number]:  [id number]        Hemoglobin 7.8 g/dL      Hematocrit 23 %      pH, Arterial 7.37 pH units      HCO3, Arterial 25.9 mmol/L      Base Excess 1.0000 mmol/L      O2 Saturation, Arterial 100 %      pO2, Arterial 460 mmHg       pCO2, Arterial 44.8 mm Hg      Glucose 101 mg/dL     POC Activated Clotting Time [[id number]]  (Abnormal) Collected:  02/12/20 0936    Specimen:  Blood Updated:  02/13/20 1149     Activated Clotting Time  334 Seconds      Comment: Serial Number: [id number]:  [id number]       POC OR Panel, ISTAT [[id number]]  (Abnormal) Collected:  02/12/20 0820    Specimen:  Blood Updated:  02/13/20 1147     POC Potassium 3.1 mmol/L      Total CO2 27 mmol/L      Comment: Serial Number: [id number]:  [id number]        Hemoglobin 9.2 g/dL      Hematocrit 27 %      pH, Arterial 7.43 pH units      HCO3, Arterial 25.8 mmol/L      Base Excess 2.0000 mmol/L      O2 Saturation, Arterial 100 %      pO2, Arterial 276 mmHg      pCO2, Arterial 38.6 mm Hg      Glucose 91 mg/dL         Imaging Results (Last 72 Hours)     Procedure Component Value Units Date/Time    XR Chest 1 View [[id number]] Collected:  02/15/20 1624     Updated:  02/15/20 1628    Narrative:       XR CHEST 1 VW-     HISTORY: Female who is 79 years-old,  chest tube removal     TECHNIQUE: Frontal view of the chest     COMPARISON: 02/15/2020 at 1135 hours     FINDINGS: Heart size is borderline. Pulmonary vasculature is  unremarkable. Mild bilateral basilar atelectasis/infiltrate, small left  pleural effusion, appearance is similar to prior exam. No pneumothorax.  No  acute osseous process.          Impression:       Persistent mild bilateral basilar atelectasis/infiltrate, small left  pleural effusion.        This report was finalized on 2/15/2020 4:25 PM by Dr. Brian Olsen M.D.       XR Chest PA & Lateral [[id number]] Collected:  02/15/20 1226     Updated:  02/15/20 1234    Narrative:       XR CHEST PA AND LATERAL-     HISTORY: Female who is 79 years-old,  postoperative evaluation     TECHNIQUE: Frontal and lateral views of the chest     COMPARISON: 02/14/2020     FINDINGS: Heart size is borderline. Pulmonary vasculature is  unremarkable. Mild bilateral basilar  atelectasis/infiltrate, small left  pleural effusion, continued follow-up suggested.. No pneumothorax. No  acute osseous process.       Impression:       Mild bilateral basilar atelectasis/infiltrate, small left  pleural effusion.     This report was finalized on 2/15/2020 12:31 PM by Dr. Brian Olsen M.D.       XR Chest 1 View [[id number]] Collected:  02/14/20 1255     Updated:  02/14/20 1300    Narrative:       XR CHEST 1 VW-     HISTORY: Female who is 79 years-old,  chest tube removal     TECHNIQUE: Frontal view of the chest     COMPARISON: 02/14/2020 at 0440 hours     FINDINGS: Interval removal of chest tubes, right IJ sheath. Heart size  is borderline. Aorta is calcified. Pulmonary vasculature is less  congested. Mild likely atelectasis at the bases. There could be small  left pleural effusion. No pneumothorax. Otherwise stable.       Impression:       Chest tube removal. No pneumothorax.     This report was finalized on 2/14/2020 12:57 PM by Dr. Brian Olsen M.D.       XR Chest 1 View [[id number]] Collected:  02/14/20 0524     Updated:  02/14/20 0528    Narrative:       PORTABLE CHEST RADIOGRAPH     HISTORY: Postop open heart surgery     COMPARISON: 03/13/2020     FINDINGS:  Narka-Junior catheter has been removed, although the introducer remains.  Remaining tubes and lines are stable in position. Vascular congestion  appears improved. No definite pneumothorax is seen. There is bibasilar  atelectasis. There are small bilateral effusions.       Impression:       Improving vascular congestion.     This report was finalized on 2/14/2020 5:25 AM by Dr. Angeles Noland M.D.           Medical record history  Summary  Nephrology notes reviewed  Sudden increase in creatinine noted possibly secondary to volume depletion due to aggressive diuresis as well as status post CABG  Diuretics are being discontinued and patient received IV Venofer 1 unit    Chest x-ray report reviewed  Vascular congestion is  improving  Patient is currently not on steroids  Medication Review:       Current Facility-Administered Medications:   •  acetaminophen (TYLENOL) tablet 650 mg, 650 mg, Oral, Q4H PRN, 650 mg at 02/15/20 2225 **OR** acetaminophen (TYLENOL) 160 MG/5ML solution 650 mg, 650 mg, Oral, Q4H PRN **OR** acetaminophen (TYLENOL) suppository 650 mg, 650 mg, Rectal, Q4H PRN, Jr Anthony Rai MD  •  ALPRAZolam (XANAX) tablet 0.25 mg, 0.25 mg, Oral, Q8H PRN, Jr Anthony Rai MD  •  aluminum-magnesium hydroxide-simethicone (MAALOX MAX) 400-400-40 MG/5ML suspension 15 mL, 15 mL, Oral, Q6H PRN, Brianne Foss MD  •  aspirin EC tablet 81 mg, 81 mg, Oral, Daily, Jr Anthony Rai MD, 81 mg at 02/16/20 0922  •  atorvastatin (LIPITOR) tablet 40 mg, 40 mg, Oral, Nightly, Jr Anthony Rai MD, 40 mg at 02/15/20 2045  •  calcium carbonate (TUMS) chewable tablet 500 mg (200 mg elemental), 2 tablet, Oral, TID PRN, Jr Anthony Rai MD  •  carBAMazepine (CARBATROL) 12 hr capsule 300 mg, 300 mg, Oral, Q12H, Jr Anthony Rai MD, 300 mg at 02/16/20 0922  •  cloNIDine (CATAPRES) tablet 0.1 mg, 0.1 mg, Oral, Q12H, Solange Ceballos APRN, 0.1 mg at 02/16/20 0922  •  cyclobenzaprine (FLEXERIL) tablet 10 mg, 10 mg, Oral, Q8H PRN, Jr Anthony Rai MD, 10 mg at 02/13/20 0013  •  dextrose (D50W) 25 g/ 50mL Intravenous Solution 25 g, 25 g, Intravenous, Q15 Min PRN, Jr Anthony Rai MD  •  dextrose (GLUTOSE) oral gel 15 g, 15 g, Oral, Q15 Min PRN, Jr Anthony Rai MD  •  enoxaparin (LOVENOX) syringe 30 mg, 30 mg, Subcutaneous, Daily, Jr Anthony Rai MD, 30 mg at 02/15/20 1632  •  furosemide (LASIX) injection 40 mg, 40 mg, Intravenous, Q6H PRN, Jr Anthony Rai MD  •  glucagon (human recombinant) (GLUCAGEN DIAGNOSTIC) injection 1 mg, 1 mg, Subcutaneous, Q15 Min PRN, Jr Anthony Rai MD  •  hydrALAZINE (APRESOLINE) tablet 25 mg, 25 mg, Oral, Q8H, Solange Ceballos, APRN, 25 mg at 02/16/20  0622  •  HYDROcodone-acetaminophen (NORCO) 5-325 MG per tablet 2 tablet, 2 tablet, Oral, Q4H PRN, Jr Anthony Rai MD, 2 tablet at 02/13/20 0013  •  [START ON 2/17/2020] insulin glargine (LANTUS) injection 20 Units, 20 Units, Subcutaneous, QAM, Jayy Garrido MD  •  insulin lispro (humaLOG) injection 2-4 Units, 2-4 Units, Subcutaneous, 4x Daily AC & at Bedtime, Jayy Garrido MD  •  insulin regular (HumuLIN R,NovoLIN R) 100 Units in sodium chloride 0.9 % 100 mL (1 Units/mL) infusion, 0-50 Units/hr, Intravenous, Continuous PRN, Miladys Ramirez, TAYO, Last Rate: 4.8 mL/hr at 02/13/20 0615, 4.8 Units/hr at 02/13/20 0615  •  iron sucrose (VENOFER) 300 mg in sodium chloride 0.9 % 250 mL IVPB, 300 mg, Intravenous, Once, Lily Gonzalez, APRN  •  levothyroxine (SYNTHROID, LEVOTHROID) tablet 175 mcg, 175 mcg, Oral, Q AM, Jr Anthony Rai MD, 175 mcg at 02/16/20 0622  •  metoprolol tartrate (LOPRESSOR) tablet 12.5 mg, 12.5 mg, Oral, Q12H, Nadiya Kyle, APRN, 12.5 mg at 02/16/20 0922  •  morphine injection 1 mg, 1 mg, Intravenous, Q4H PRN **AND** naloxone (NARCAN) injection 0.4 mg, 0.4 mg, Intravenous, Q5 Min PRN, Jr Anthony Rai MD  •  morphine injection 4 mg, 4 mg, Intravenous, Q30 Min PRN, Jr Anthony Rai MD  •  mupirocin (BACTROBAN) 2 % nasal ointment, , Each Nare, BID, Jr Anthony Rai MD  •  ondansetron (ZOFRAN) injection 4 mg, 4 mg, Intravenous, Q6H PRN, Jr Anthony Rai MD, 4 mg at 02/16/20 0006  •  oxyCODONE (ROXICODONE) immediate release tablet 10 mg, 10 mg, Oral, Q4H PRN, Jr Anthony Rai MD  •  pantoprazole (PROTONIX) EC tablet 40 mg, 40 mg, Oral, Daily, Jr Anthony Rai MD, 40 mg at 02/16/20 0921  •  polyethylene glycol (MIRALAX) packet 17 g, 17 g, Oral, Daily, Jr Anthony Rai MD, 17 g at 02/16/20 0946  •  potassium chloride (MICRO-K) CR capsule 40 mEq, 40 mEq, Oral, PRN, 40 mEq at 02/16/20 0946 **OR** potassium chloride (KLOR-CON)  packet 40 mEq, 40 mEq, Oral, PRN, Jr Anthony Rai MD  •  potassium chloride 10 mEq in 100 mL IVPB, 10 mEq, Intravenous, Q1H PRN **OR** potassium chloride 10 mEq in 100 mL IVPB, 10 mEq, Intravenous, Q1H PRN, Jr Anthony Rai MD  •  potassium chloride 20 mEq in 50 mL IVPB, 20 mEq, Intravenous, Q1H PRN, Jr Anthony Rai MD  •  potassium chloride 20 mEq in 50 mL IVPB, 20 mEq, Intravenous, Q1H PRN, Jr Anthony Rai MD  •  potassium chloride 20 mEq in 50 mL IVPB, 20 mEq, Intravenous, Q1H PRN, Jr Anthony Rai MD  •  promethazine (PHENERGAN) tablet 12.5 mg, 12.5 mg, Oral, Q6H PRN **OR** promethazine (PHENERGAN) injection 12.5 mg, 12.5 mg, Intravenous, Q6H PRN, Jr Anthony Rai MD, 12.5 mg at 02/13/20 0948  •  Scopolamine (TRANSDERM-SCOP) 1.5 MG/3DAYS patch 1 patch, 1 patch, Transdermal, Q72H, Solange Ceballos APRN, 1 patch at 02/14/20 1138  •  sodium chloride 0.9 % flush 30 mL, 30 mL, Intravenous, Once PRN, Jr Anthony Rai MD  •  sodium chloride 0.9 % infusion, 9 mL/hr, Intravenous, Continuous, Jr Anthony Rai MD, Last Rate: 9 mL/hr at 02/12/20 1500, 9 mL/hr at 02/12/20 1500  •  sodium chloride 0.9 % infusion, 30 mL/hr, Intravenous, Continuous PRN, Jr Anthony Rai MD  •  traMADol (ULTRAM) tablet 25 mg, 25 mg, Oral, Q8H PRN, Jr Anthony Rai MD, 25 mg at 02/16/20 0325    Assessment/Plan     Patient Active Problem List   Diagnosis   • Asthma   • Type 2 diabetes mellitus with diabetic polyneuropathy, with long-term current use of insulin (CMS/[facility])   • Essential hypertension   • Mixed hyperlipidemia   • Disorder of muscle, ligament, and fascia   • Proteinuria   • Postoperative hypothyroidism   • Hyperparathyroidism (CMS/HCC)   • Vitamin D deficiency   • Gout without tophus   • Solitary thyroid nodule   • Anemia in stage 3 chronic kidney disease (CMS/HCC)   • Stage 3 chronic kidney disease (CMS/HCC)   • Class 1 obesity due to excess calories with serious comorbidity  "and body mass index (BMI) of 34.0 to 34.9 in adult   • Heartburn   • Stable angina (CMS/[facility])   • 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 pectoris (CMS/HCC)     Uncontrolled type 2 diabetes mellitus  Uncontrolled type 2 diabetes mellitus with neuropathy  Uncontrolled type 2 diabetes mellitus with nephropathy  Chronic kidney disease  Postoperative hypothyroidism patient had partial right hemithyroidectomy 2016  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  Status post Avastin injections  Recently steroid use complicating diabetes  Hyperlipidemia associated with diabetes  Coronary artery disease status post cardiac cath and status post CABG  Hypoglycemia    Patient is currently receiving Lantus twice daily and Humalog correction scale with each meal  Overnight blood sugars came down to 80 and 88  Patient started drinking Glucerna every meal  Renal function worsened with a creatinine of 2.4  I will decrease the Lantus appropriately to 20 units every morning only  We will continue the current Humalog correction scale  Patient also received IV Venofer which frequently contributes to hypoglycemia  We will continue to monitor the Accu-Cheks and monitor the renal function and adjust insulin  Patient and family verbalized understanding      Jayy Garrido MD FACE.  02/16/20  10:34 AM      EMR Dragon / transcription disclaimer:     \"Dictated utilizing Dragon dictation\".   "

## 2020-02-16 NOTE — PROGRESS NOTES
"CC: CAD    Interval History:   No complaints.    Vital Signs  Temp:  [97.6 °F (36.4 °C)-99 °F (37.2 °C)] 98 °F (36.7 °C)  Heart Rate:  [54-65] 60  Resp:  [18] 18  BP: (118-160)/(60-74) 121/62    Intake/Output Summary (Last 24 hours) at 2/16/2020 0913  Last data filed at 2/16/2020 0415  Gross per 24 hour   Intake 50 ml   Output 1010 ml   Net -960 ml     Flowsheet Rows      First Filed Value   Admission Height  165.1 cm (65\") Documented at 02/09/2020 1646   Admission Weight  99.3 kg (219 lb) Documented at 02/09/2020 1646          PHYSICAL EXAM:  General: No acute distress  Resp:NL Rate, unlabored, clear  CV:NL rate and rhythm, NL PMI, Nl S1 and S2, no Murmur, no gallop, no rub, No JVD. Normal pedal pulses  ABD:Nl sounds, no masses or tenderness, nondistended, no guarding or rebound  Neuro: alert,cooperative and oriented  Extr: 1+ bilateral tibial edema no cyanosis, moves all extremities      Results Review:    Results from last 7 days   Lab Units 02/16/20  0342   SODIUM mmol/L 138   POTASSIUM mmol/L 3.3*   CHLORIDE mmol/L 102   CO2 mmol/L 22.2   BUN mg/dL 64*   CREATININE mg/dL 2.42*   GLUCOSE mg/dL 83   CALCIUM mg/dL 8.5*         Results from last 7 days   Lab Units 02/16/20  0342   WBC 10*3/mm3 12.82*   HEMOGLOBIN g/dL 8.8*   HEMATOCRIT % 26.5*   PLATELETS 10*3/mm3 132*     Results from last 7 days   Lab Units 02/13/20  0314 02/12/20  1312 02/11/20  0438 02/10/20  1058   INR  1.19* 1.27* 1.01 1.03   APTT seconds  --  26.4  --  33.2     Results from last 7 days   Lab Units 02/12/20  0429   CHOLESTEROL mg/dL 153     Results from last 7 days   Lab Units 02/16/20  0342   MAGNESIUM mg/dL 2.3     Results from last 7 days   Lab Units 02/12/20  0429   CHOLESTEROL mg/dL 153   TRIGLYCERIDES mg/dL 137   HDL CHOL mg/dL 55   LDL CHOL mg/dL 71     I reviewed the patient's new clinical results.  I personally viewed and interpreted the patient's EKG/Telemetry data        Medication Review:   Meds reviewed      insulin 0-50 Units/hr " Last Rate: 4.8 Units/hr (02/13/20 0615)   sodium chloride 9 mL/hr Last Rate: 9 mL/hr (02/12/20 1500)   sodium chloride 30 mL/hr        Assessment/Plan  1. 79 year old female with CAD s/p CABG x 5 with Skeletonized LIMA to mid LAD.  Vein graft to PDA.  Vein graft to OM1, OM 2 and D1 on 2/12/2020.  Normal left ventricular systolic function preoperatively. Doing well should be ready for DC soon.  2.  Postoperative junctional rhythm-no longer paced.  Normal sinus rhythm at this time.  3.  DM- endocrinology following  4.  Post op anemia/ iron deficient anemia- follow trends. HGB stable  5.  Bilateral carotid plaque without stenosis on duplex fiber 2020  6.  Hypothyroidism on replacement therapy  7.  Hyperlipidemia on target dose atorvastatin target LDL 70 or less  8.  Hypertension-blood pressure occasionally elevated follow trends  9. Leukocytosis expected postoperatively follow trends  10.  Right bundle branch block  11.CKD- had IVF low dose diuretics per nephrology on torsemide 20 mg        Naeem Karimi MD  02/16/20  9:13 AM

## 2020-02-16 NOTE — THERAPY TREATMENT NOTE
Acute Care - Physical Therapy Treatment Note  [facility]     Patient Name: Ange Johnson  : 1940  MRN: [id number]  Today's Date: 2020             Admit Date: 2020    Visit Dx:    ICD-10-CM ICD-9-CM   1. 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 pectoris, unspecified vessel or lesion type (CMS/HCC) I25.118 414.01     413.9   2. Stable angina (CMS/HCC) I20.8 413.9   3. S/P CABG (coronary artery bypass graft) Z95.1 V45.81     Patient Active Problem List   Diagnosis   • Asthma   • Type 2 diabetes mellitus with diabetic polyneuropathy, with long-term current use of insulin (CMS/HCC)   • Essential hypertension   • Mixed hyperlipidemia   • Disorder of muscle, ligament, and fascia   • Proteinuria   • Postoperative hypothyroidism   • Hyperparathyroidism (CMS/HCC)   • Vitamin D deficiency   • Gout without tophus   • Solitary thyroid nodule   • Anemia in stage 3 chronic kidney disease (CMS/HCC)   • Stage 3 chronic kidney disease (CMS/HCC)   • Class 1 obesity due to excess calories with serious comorbidity and body mass index (BMI) of 34.0 to 34.9 in adult   • Heartburn   • Stable angina (CMS/HCC)   • 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 pectoris (CMS/HCC)       Therapy Treatment    Rehabilitation Treatment Summary     Row Name 20             Treatment Time/Intention    Discipline  physical therapy assistant  -CW      Document Type  therapy note (daily note)  -CW      Subjective Information  complains of;fatigue  -CW      Mode of Treatment  physical therapy  -CW      Therapy Frequency (PT Clinical Impression)  daily  -CW      Patient Effort  good  -CW      Existing Precautions/Restrictions  cardiac;fall;sternal  -CW      Recorded by [CW] Leonardo Worley PTA 20      Row Name 20             Vital Signs    O2 Delivery Pre Treatment  room air  -CW      Recorded by [CW] Leonardo Worley PTA 20      Row Name 20              Cognitive Assessment/Intervention- PT/OT    Orientation Status (Cognition)  oriented x 3  -CW      Recorded by [CW] Leonardo Worley, PTA 02/16/20 0905      Row Name 02/16/20 0900             Bed Mobility Assessment/Treatment    Supine-Sit [place] (Bed Mobility)  not tested  -CW      Recorded by [CW] Leonardo Worley, PTA 02/16/20 0905      Row Name 02/16/20 0900             Transfer Assessment/Treatment    Transfer Assessment/Treatment  sit-stand transfer;stand-sit transfer  -CW      Recorded by [CW] Leonardo Worley, PTA 02/16/20 0905      Row Name 02/16/20 0900             Sit-Stand Transfer    Sit-Stand [place] (Transfers)  minimum assist (75% patient effort)  -CW      Assistive Device (Sit-Stand Transfers)  -- HHA  -CW      Recorded by [CW] Leonardo Worley, PTA 02/16/20 0905      Row Name 02/16/20 0900             Stand-Sit Transfer    Stand-Sit [place] (Transfers)  minimum assist (75% patient effort)  -CW      Assistive Device (Stand-Sit Transfers)  -- HHA  -CW      Recorded by [CW] Leonardo Worley, PTA 02/16/20 0905      Row Name 02/16/20 0900             Gait/Stairs Assessment/Training    [place] Level (Gait)  contact guard  -CW      Assistive Device (Gait)  -- HHA  -CW      Distance in Feet (Gait)  60x2  -CW      Pattern (Gait)  step-through  -CW      Deviations/Abnormal Patterns (Gait)  alisha decreased;stride length decreased  -CW      Comment (Gait/Stairs)  2 standing rest breaks  -CW      Recorded by [CW] Leonardo Worley, PTA 02/16/20 0905      Row Name 02/16/20 0900             Positioning and Restraints    Pre-Treatment Position  sitting in chair/recliner  -CW      Post Treatment Position  chair  -CW      In Chair  notified nsg;reclined;call light within reach;encouraged to call for assist;with family/caregiver  -CW      Recorded by [CW] Leonardo Worley, PTA 02/16/20 0905      Row Name 02/16/20 0900             Pain Scale: Numbers  Pre/Post-Treatment    Pain Scale: Numbers, Pretreatment  0/10 - no pain  -CW      Pain Scale: Numbers, Post-Treatment  0/10 - no pain  -CW      Pain Location  chest  -CW      Recorded by [CW] Leonardo Worley PTA 02/16/20 0905      Row Name                Wound 02/12/20 1016 midline sternal Incision    Wound - Properties Group Date first assessed: 02/12/20 [JT] Time first assessed: 1016 [JT] Present on Hospital Admission: Y [JT] Orientation: midline [JT] Location: sternal [JT] Primary Wound Type: Incision [JT] Recorded by:  [JT] Steve Ochoa RN 02/12/20 1017    Row Name                Wound 02/12/20 1138 Left anterior;lower;proximal leg Incision    Wound - Properties Group Date first assessed: 02/12/20 [JT] Time first assessed: 1138 [JT] Present on Hospital Admission: N [JT] Side: Left [JT] Orientation: anterior;lower;proximal [JT] Location: leg [JT] Primary Wound Type: Incision [JT] Recorded by:  [JT] Steve Ochoa RN 02/12/20 1138    Row Name 02/16/20 0900             Outcome Summary/Treatment Plan (PT)    Anticipated Discharge Disposition (PT)  skilled nursing facility  -CW      Recorded by [CW] Leonardo Worley PTA 02/16/20 0905        User Key  (r) = Recorded By, (t) = Taken By, (c) = Cosigned By    Initials Name Effective Dates Discipline    JT Steve Ochoa RN 02/13/17 -  Nurse    CW Leonardo Worley PTA 03/07/18 -  PT          Wound 02/12/20 1016 midline sternal Incision (Active)   Dressing Appearance open to air 2/16/2020  4:15 AM   Closure Approximated 2/16/2020  4:15 AM   Base clean;dry;pink 2/16/2020  4:15 AM   Drainage Amount none 2/16/2020  4:15 AM   Dressing Care, Wound open to air 2/16/2020  4:15 AM       Wound 02/12/20 1138 Left anterior;lower;proximal leg Incision (Active)   Dressing Appearance open to air 2/16/2020  4:15 AM   Closure Liquid skin adhesive 2/16/2020  4:15 AM   Base clean;dry;pink 2/16/2020  4:15 AM   Drainage Amount none 2/16/2020  4:15 AM   Dressing Care, Wound open to  air 2/16/2020  4:15 AM               PT Recommendation and Plan  Anticipated Discharge Disposition (PT): skilled nursing facility  Therapy Frequency (PT Clinical Impression): daily  Outcome Summary/Treatment Plan (PT)  Anticipated Discharge Disposition (PT): skilled nursing facility     Time Calculation:   PT Charges     Row Name 02/16/20 0905             Time Calculation    Start Time  0849  -CW      Stop Time  0905  -CW      Time Calculation (min)  16 min  -CW      PT Received On  02/16/20  -CW      PT - Next Appointment  02/17/20  -CW        User Key  (r) = Recorded By, (t) = Taken By, (c) = Cosigned By    Initials Name Provider Type    CW Leonardo Worley PTA Physical Therapy Assistant        Therapy Charges for Today     Code Description Service Date Service Provider Modifiers Qty    54422340369 HC PT THER PROC EA 15 MIN 2/15/2020 Leonardo Worley PTA GP 1    60262377942 HC PT THER PROC EA 15 MIN 2/16/2020 Leonardo Worley PTA GP 1          PT G-Codes  Outcome Measure Options: AM-PAC 6 Clicks Basic Mobility (PT)  AM-PAC 6 Clicks Score (PT): 15    Leonardo Worley PTA  2/16/2020

## 2020-02-17 LAB
ALBUMIN SERPL-MCNC: 3.2 G/DL (ref 3.5–5.2)
ANION GAP SERPL CALCULATED.3IONS-SCNC: 13 MMOL/L (ref 5–15)
BASOPHILS # BLD AUTO: 0.04 10*3/MM3 (ref 0–0.2)
BASOPHILS NFR BLD AUTO: 0.4 % (ref 0–1.5)
BUN BLD-MCNC: 63 MG/DL (ref 8–23)
BUN/CREAT SERPL: 27.4 (ref 7–25)
CALCIUM SPEC-SCNC: 8.8 MG/DL (ref 8.6–10.5)
CHLORIDE SERPL-SCNC: 107 MMOL/L (ref 98–107)
CO2 SERPL-SCNC: 22 MMOL/L (ref 22–29)
CREAT BLD-MCNC: 2.3 MG/DL (ref 0.57–1)
DEPRECATED RDW RBC AUTO: 44.7 FL (ref 37–54)
EOSINOPHIL # BLD AUTO: 0.08 10*3/MM3 (ref 0–0.4)
EOSINOPHIL NFR BLD AUTO: 0.8 % (ref 0.3–6.2)
ERYTHROCYTE [DISTWIDTH] IN BLOOD BY AUTOMATED COUNT: 13.4 % (ref 12.3–15.4)
GFR SERPL CREATININE-BSD FRML MDRD: 20 ML/MIN/1.73
GLUCOSE BLD-MCNC: 95 MG/DL (ref 65–99)
GLUCOSE BLDC GLUCOMTR-MCNC: 102 MG/DL (ref 70–130)
GLUCOSE BLDC GLUCOMTR-MCNC: 115 MG/DL (ref 70–130)
GLUCOSE BLDC GLUCOMTR-MCNC: 151 MG/DL (ref 70–130)
GLUCOSE BLDC GLUCOMTR-MCNC: 153 MG/DL (ref 70–130)
GLUCOSE BLDC GLUCOMTR-MCNC: 171 MG/DL (ref 70–130)
HCT VFR BLD AUTO: 28.9 % (ref 34–46.6)
HGB BLD-MCNC: 9.4 G/DL (ref 12–15.9)
IMM GRANULOCYTES # BLD AUTO: 0.06 10*3/MM3 (ref 0–0.05)
IMM GRANULOCYTES NFR BLD AUTO: 0.6 % (ref 0–0.5)
LYMPHOCYTES # BLD AUTO: 1.55 10*3/MM3 (ref 0.7–3.1)
LYMPHOCYTES NFR BLD AUTO: 14.6 % (ref 19.6–45.3)
MCH RBC QN AUTO: 30.4 PG (ref 26.6–33)
MCHC RBC AUTO-ENTMCNC: 32.5 G/DL (ref 31.5–35.7)
MCV RBC AUTO: 93.5 FL (ref 79–97)
MONOCYTES # BLD AUTO: 1.46 10*3/MM3 (ref 0.1–0.9)
MONOCYTES NFR BLD AUTO: 13.7 % (ref 5–12)
NEUTROPHILS # BLD AUTO: 7.43 10*3/MM3 (ref 1.7–7)
NEUTROPHILS NFR BLD AUTO: 69.9 % (ref 42.7–76)
NRBC BLD AUTO-RTO: 0.1 /100 WBC (ref 0–0.2)
PHOSPHATE SERPL-MCNC: 2.8 MG/DL (ref 2.5–4.5)
PLATELET # BLD AUTO: 140 10*3/MM3 (ref 140–450)
PMV BLD AUTO: 11.4 FL (ref 6–12)
POTASSIUM BLD-SCNC: 4.3 MMOL/L (ref 3.5–5.2)
RBC # BLD AUTO: 3.09 10*6/MM3 (ref 3.77–5.28)
SODIUM BLD-SCNC: 142 MMOL/L (ref 136–145)
WBC NRBC COR # BLD: 10.62 10*3/MM3 (ref 3.4–10.8)

## 2020-02-17 PROCEDURE — 97110 THERAPEUTIC EXERCISES: CPT

## 2020-02-17 PROCEDURE — 94762 N-INVAS EAR/PLS OXIMTRY CONT: CPT

## 2020-02-17 PROCEDURE — 85025 COMPLETE CBC W/AUTO DIFF WBC: CPT | Performed by: NURSE PRACTITIONER

## 2020-02-17 PROCEDURE — 93010 ELECTROCARDIOGRAM REPORT: CPT | Performed by: INTERNAL MEDICINE

## 2020-02-17 PROCEDURE — 99232 SBSQ HOSP IP/OBS MODERATE 35: CPT | Performed by: INTERNAL MEDICINE

## 2020-02-17 PROCEDURE — 99232 SBSQ HOSP IP/OBS MODERATE 35: CPT | Performed by: NURSE PRACTITIONER

## 2020-02-17 PROCEDURE — 99024 POSTOP FOLLOW-UP VISIT: CPT | Performed by: NURSE PRACTITIONER

## 2020-02-17 PROCEDURE — 82962 GLUCOSE BLOOD TEST: CPT

## 2020-02-17 PROCEDURE — 63710000001 INSULIN GLARGINE PER 5 UNITS: Performed by: INTERNAL MEDICINE

## 2020-02-17 PROCEDURE — 93005 ELECTROCARDIOGRAM TRACING: CPT | Performed by: INTERNAL MEDICINE

## 2020-02-17 PROCEDURE — 25010000002 ENOXAPARIN PER 10 MG: Performed by: THORACIC SURGERY (CARDIOTHORACIC VASCULAR SURGERY)

## 2020-02-17 PROCEDURE — 80069 RENAL FUNCTION PANEL: CPT | Performed by: NURSE PRACTITIONER

## 2020-02-17 RX ORDER — TORSEMIDE 20 MG/1
20 TABLET ORAL DAILY
Status: DISCONTINUED | OUTPATIENT
Start: 2020-02-17 | End: 2020-02-18 | Stop reason: HOSPADM

## 2020-02-17 RX ORDER — POTASSIUM CHLORIDE 750 MG/1
10 CAPSULE, EXTENDED RELEASE ORAL DAILY
Status: DISCONTINUED | OUTPATIENT
Start: 2020-02-17 | End: 2020-02-18 | Stop reason: HOSPADM

## 2020-02-17 RX ADMIN — ASPIRIN 81 MG: 81 TABLET ORAL at 09:20

## 2020-02-17 RX ADMIN — SCOPALAMINE 1 PATCH: 1 PATCH, EXTENDED RELEASE TRANSDERMAL at 09:22

## 2020-02-17 RX ADMIN — MUPIROCIN 1 APPLICATION: 20 OINTMENT TOPICAL at 20:29

## 2020-02-17 RX ADMIN — POTASSIUM CHLORIDE 10 MEQ: 750 CAPSULE, EXTENDED RELEASE ORAL at 09:22

## 2020-02-17 RX ADMIN — HYDRALAZINE HYDROCHLORIDE 25 MG: 25 TABLET ORAL at 06:13

## 2020-02-17 RX ADMIN — CLONIDINE HYDROCHLORIDE 0.1 MG: 0.1 TABLET ORAL at 20:28

## 2020-02-17 RX ADMIN — INSULIN GLARGINE 20 UNITS: 100 INJECTION, SOLUTION SUBCUTANEOUS at 07:54

## 2020-02-17 RX ADMIN — TRAMADOL HYDROCHLORIDE 25 MG: 50 TABLET ORAL at 20:29

## 2020-02-17 RX ADMIN — MUPIROCIN 1 APPLICATION: 20 OINTMENT TOPICAL at 09:23

## 2020-02-17 RX ADMIN — METOPROLOL TARTRATE 12.5 MG: 25 TABLET ORAL at 20:28

## 2020-02-17 RX ADMIN — LEVOTHYROXINE SODIUM 175 MCG: 175 TABLET ORAL at 06:13

## 2020-02-17 RX ADMIN — CLONIDINE HYDROCHLORIDE 0.1 MG: 0.1 TABLET ORAL at 09:20

## 2020-02-17 RX ADMIN — TORSEMIDE 20 MG: 20 TABLET ORAL at 09:20

## 2020-02-17 RX ADMIN — HYDRALAZINE HYDROCHLORIDE 25 MG: 25 TABLET ORAL at 23:19

## 2020-02-17 RX ADMIN — ENOXAPARIN SODIUM 30 MG: 30 INJECTION SUBCUTANEOUS at 18:07

## 2020-02-17 RX ADMIN — ATORVASTATIN CALCIUM 40 MG: 20 TABLET, FILM COATED ORAL at 20:28

## 2020-02-17 RX ADMIN — ACETAMINOPHEN 650 MG: 325 TABLET, FILM COATED ORAL at 09:23

## 2020-02-17 RX ADMIN — CARBAMAZEPINE 300 MG: 300 CAPSULE, EXTENDED RELEASE ORAL at 09:23

## 2020-02-17 RX ADMIN — HYDRALAZINE HYDROCHLORIDE 25 MG: 25 TABLET ORAL at 13:53

## 2020-02-17 RX ADMIN — METOPROLOL TARTRATE 12.5 MG: 25 TABLET ORAL at 09:22

## 2020-02-17 RX ADMIN — CARBAMAZEPINE 300 MG: 300 CAPSULE, EXTENDED RELEASE ORAL at 20:28

## 2020-02-17 RX ADMIN — PANTOPRAZOLE SODIUM 40 MG: 40 TABLET, DELAYED RELEASE ORAL at 09:23

## 2020-02-17 NOTE — PROGRESS NOTES
"   LOS: 7 days    Patient Care Team:  Quinn Washington MD as PCP - General (Family Medicine)  Hugh Caruso MD as PCP - Claims Attributed  Steve Simons Jr., MD as Consulting Physician (Hematology and Oncology)  Hugh Caruso MD as Consulting Physician (Nephrology)  Adalgisa Hernandez APRN as Nurse Practitioner (Endocrinology)  Reagan Beltran MD as Consulting Physician (Endocrinology)  Aguilar Goldman MD as Consulting Physician (Pulmonary Disease)  Mil Sr MD as Consulting Physician (Ophthalmology)  Tasneem Montelongo MD as Surgeon (General Surgery)  Victor Hugo Ng MD as Consulting Physician (Cardiology)    Chief Complaint:  No chief complaint on file.    Follow UP NUHA CKD3  Subjective     Interval History:   I/O 50/250 + void x2 .  Denies dyspnea.  Wt same 217 lb    Objective     Vital Signs  Temp:  [98 °F (36.7 °C)-99.2 °F (37.3 °C)] 98.6 °F (37 °C)  Heart Rate:  [55-62] 58  Resp:  [17-18] 17  BP: (115-167)/(55-76) 145/67    Flowsheet Rows      First Filed Value   Admission Height  165.1 cm (65\") Documented at 02/09/2020 1646   Admission Weight  99.3 kg (219 lb) Documented at 02/09/2020 1646          No intake/output data recorded.  I/O last 3 completed shifts:  In: 100 [P.O.:100]  Out: 750 [Urine:750]    Intake/Output Summary (Last 24 hours) at 2/17/2020 0707  Last data filed at 2/17/2020 0613  Gross per 24 hour   Intake 50 ml   Output 250 ml   Net -200 ml       Physical Exam:  GEN in chair, no distress, comfortable  Neck supple no JVD  Lungs dec BS bibasilar no rales  CV RRR no m/g  abd soft NT/ND  vasc 1+ BLE edema with compression stockings     Results Review:    Results from last 7 days   Lab Units 02/17/20  0337 02/16/20  1747 02/16/20  0342 02/15/20  0332  02/12/20  0429 02/11/20  0438 02/10/20  1058   SODIUM mmol/L 142  --  138 140   < > 142 139 140   POTASSIUM mmol/L 4.3 4.5 3.3* 4.2   < > 3.5 3.5 3.5   CHLORIDE mmol/L 107  --  102 105   < > 104 103 106   CO2 mmol/L 22.0  --  22.2 20.3*   < > 25.7 " 24.2 23.0   BUN mg/dL 63*  --  64* 56*   < > 40* 34* 31*   CREATININE mg/dL 2.30*  --  2.42* 2.19*   < > 1.83* 1.68* 1.60*   CALCIUM mg/dL 8.8  --  8.5* 8.9   < > 8.7 9.0 7.6*   BILIRUBIN mg/dL  --   --   --   --   --  <0.2* <0.2* 0.2   ALK PHOS U/L  --   --   --   --   --  66 73 67   ALT (SGPT) U/L  --   --   --   --   --  11 11 13   AST (SGOT) U/L  --   --   --   --   --  10 11 10   GLUCOSE mg/dL 95  --  83 127*   < > 63* 186* 83    < > = values in this interval not displayed.       Estimated Creatinine Clearance: 22.7 mL/min (A) (by C-G formula based on SCr of 2.3 mg/dL (H)).    Results from last 7 days   Lab Units 02/17/20  0337 02/16/20  0342 02/13/20  0314 02/12/20  1633   MAGNESIUM mg/dL  --  2.3 2.2 1.9   PHOSPHORUS mg/dL 2.8 3.8 4.5 3.7             Results from last 7 days   Lab Units 02/17/20  0337 02/16/20  0342 02/15/20  0332 02/14/20  0322 02/13/20  0314   WBC 10*3/mm3 10.62 12.82* 13.70* 13.39* 8.90   HEMOGLOBIN g/dL 9.4* 8.8* 8.9* 9.4* 7.6*   PLATELETS 10*3/mm3 140 132* 124* 116* 107*       Results from last 7 days   Lab Units 02/13/20  0314 02/12/20  1312 02/11/20  0438 02/10/20  1058   INR  1.19* 1.27* 1.01 1.03         Imaging Results (Last 24 Hours)     ** No results found for the last 24 hours. **          aspirin 81 mg Oral Daily   atorvastatin 40 mg Oral Nightly   bisacodyl 10 mg Rectal Daily   carBAMazepine 300 mg Oral Q12H   cloNIDine 0.1 mg Oral Q12H   enoxaparin 30 mg Subcutaneous Daily   hydrALAZINE 25 mg Oral Q8H   insulin glargine 20 Units Subcutaneous QAM   insulin lispro 2-4 Units Subcutaneous 4x Daily AC & at Bedtime   levothyroxine 175 mcg Oral Q AM   metoprolol tartrate 12.5 mg Oral Q12H   mupirocin  Each Nare BID   pantoprazole 40 mg Oral Daily   polyethylene glycol 17 g Oral Daily   Scopolamine 1 patch Transdermal Q72H       insulin 0-50 Units/hr Last Rate: 4.8 Units/hr (02/13/20 0615)   sodium chloride 9 mL/hr Last Rate: 9 mL/hr (02/12/20 1500)   sodium chloride 30 mL/hr         Medication Review:   Current Facility-Administered Medications   Medication Dose Route Frequency Provider Last Rate Last Dose   • acetaminophen (TYLENOL) tablet 650 mg  650 mg Oral Q4H PRN Jr Anthony Rai MD   650 mg at 02/16/20 1742    Or   • acetaminophen (TYLENOL) 160 MG/5ML solution 650 mg  650 mg Oral Q4H PRN Jr Anthony Rai MD        Or   • acetaminophen (TYLENOL) suppository 650 mg  650 mg Rectal Q4H PRN Jr Anthony Rai MD       • ALPRAZolam (XANAX) tablet 0.25 mg  0.25 mg Oral Q8H PRN Jr Anthony Rai MD       • aluminum-magnesium hydroxide-simethicone (MAALOX MAX) 400-400-40 MG/5ML suspension 15 mL  15 mL Oral Q6H PRN Brianne Foss MD       • aspirin EC tablet 81 mg  81 mg Oral Daily Jr Anthony Rai MD   81 mg at 02/16/20 0922   • atorvastatin (LIPITOR) tablet 40 mg  40 mg Oral Nightly Jr Anthony Rai MD   40 mg at 02/16/20 2033   • bisacodyl (DULCOLAX) suppository 10 mg  10 mg Rectal Daily Clifton Mitchell MD       • calcium carbonate (TUMS) chewable tablet 500 mg (200 mg elemental)  2 tablet Oral TID PRN Jr Anthony Rai MD       • carBAMazepine (CARBATROL) 12 hr capsule 300 mg  300 mg Oral Q12H Jr Anthony Rai MD   300 mg at 02/16/20 2033   • cloNIDine (CATAPRES) tablet 0.1 mg  0.1 mg Oral Q12H Solange Ceballos APRN   0.1 mg at 02/16/20 2033   • cyclobenzaprine (FLEXERIL) tablet 10 mg  10 mg Oral Q8H PRN Jr Anthony Rai MD   10 mg at 02/13/20 0013   • dextrose (D50W) 25 g/ 50mL Intravenous Solution 25 g  25 g Intravenous Q15 Min PRN Jr Anthony Rai MD       • dextrose (GLUTOSE) oral gel 15 g  15 g Oral Q15 Min PRN Jr Anthony Rai MD       • enoxaparin (LOVENOX) syringe 30 mg  30 mg Subcutaneous Daily Jr Anthony Rai MD   30 mg at 02/16/20 1722   • furosemide (LASIX) injection 40 mg  40 mg Intravenous Q6H PRN Jr Anthony Rai MD       • glucagon (human recombinant) (GLUCAGEN DIAGNOSTIC) injection 1 mg  1 mg  Subcutaneous Q15 Min PRN Jr Anthony Rai MD       • hydrALAZINE (APRESOLINE) tablet 25 mg  25 mg Oral Q8H Solange Ceballos APRN   25 mg at 02/17/20 0613   • HYDROcodone-acetaminophen (NORCO) 5-325 MG per tablet 2 tablet  2 tablet Oral Q4H PRN Jr Anthony Rai MD   2 tablet at 02/13/20 0013   • insulin glargine (LANTUS) injection 20 Units  20 Units Subcutaneous QAM Jayy Garrido MD       • insulin lispro (humaLOG) injection 2-4 Units  2-4 Units Subcutaneous 4x Daily AC & at Bedtime Jayy Garrido MD       • insulin regular (HumuLIN R,NovoLIN R) 100 Units in sodium chloride 0.9 % 100 mL (1 Units/mL) infusion  0-50 Units/hr Intravenous Continuous PRN Miladys Ramirez APRN 4.8 mL/hr at 02/13/20 0615 4.8 Units/hr at 02/13/20 0615   • levothyroxine (SYNTHROID, LEVOTHROID) tablet 175 mcg  175 mcg Oral Q AM Jr Anthony Rai MD   175 mcg at 02/17/20 0613   • metoprolol tartrate (LOPRESSOR) tablet 12.5 mg  12.5 mg Oral Q12H Nadiya Kyle APRN   12.5 mg at 02/16/20 0922   • morphine injection 1 mg  1 mg Intravenous Q4H PRN Jr Anthony Rai MD        And   • naloxone (NARCAN) injection 0.4 mg  0.4 mg Intravenous Q5 Min PRN Jr Anthony Rai MD       • morphine injection 4 mg  4 mg Intravenous Q30 Min PRN Jr Anthony Rai MD       • mupirocin (BACTROBAN) 2 % nasal ointment   Each Nare BID Jr Anthony Rai MD       • ondansetron (ZOFRAN) injection 4 mg  4 mg Intravenous Q6H PRN Jr Anthony Rai MD   4 mg at 02/16/20 0006   • oxyCODONE (ROXICODONE) immediate release tablet 10 mg  10 mg Oral Q4H PRN Jr Anthony Rai MD       • pantoprazole (PROTONIX) EC tablet 40 mg  40 mg Oral Daily Jr Anthony Rai MD   40 mg at 02/16/20 0921   • polyethylene glycol (MIRALAX) packet 17 g  17 g Oral Daily Jr Anthony Rai MD   17 g at 02/16/20 0946   • potassium chloride (MICRO-K) CR capsule 40 mEq  40 mEq Oral PRN Jr Anthony Rai MD   40 mEq at 02/16/20 0182     Or   • potassium chloride (KLOR-CON) packet 40 mEq  40 mEq Oral PRN Jr Anthony Rai MD       • potassium chloride 10 mEq in 100 mL IVPB  10 mEq Intravenous Q1H PRN Jr Anthony Rai MD        Or   • potassium chloride 10 mEq in 100 mL IVPB  10 mEq Intravenous Q1H PRN Jr Anthony Rai MD       • potassium chloride 20 mEq in 50 mL IVPB  20 mEq Intravenous Q1H PRN Jr Anthony Rai MD       • potassium chloride 20 mEq in 50 mL IVPB  20 mEq Intravenous Q1H PRN Jr Anthony Rai MD       • potassium chloride 20 mEq in 50 mL IVPB  20 mEq Intravenous Q1H PRN Jr Anthony Rai MD       • promethazine (PHENERGAN) tablet 12.5 mg  12.5 mg Oral Q6H PRN Jr Anthony Rai MD        Or   • promethazine (PHENERGAN) injection 12.5 mg  12.5 mg Intravenous Q6H PRN Jr Anthony Rai MD   12.5 mg at 02/13/20 0948   • Scopolamine (TRANSDERM-SCOP) 1.5 MG/3DAYS patch 1 patch  1 patch Transdermal Q72H Solange Ceballos, APREMMA   1 patch at 02/14/20 1138   • sodium chloride 0.9 % flush 30 mL  30 mL Intravenous Once PRN Jr Anthony Rai MD       • sodium chloride 0.9 % infusion  9 mL/hr Intravenous Continuous Jr Anthony Rai MD 9 mL/hr at 02/12/20 1500 9 mL/hr at 02/12/20 1500   • sodium chloride 0.9 % infusion  30 mL/hr Intravenous Continuous PRN Jr Anthony Rai MD       • traMADol (ULTRAM) tablet 25 mg  25 mg Oral Q8H PRN Jr Anthony Rai MD   25 mg at 02/16/20 2207       Assessment/Plan   1. NUHA on CKD 3, nonoliguric, with expected rise in SCR post-CABG and following aggressive diuresis.  Cr stabilizing 2.2 - 2.4 - 2.3 (BL 1.6 - 1.8, sees Dr Caruso).  Mild vol excess, diuretic held yesterday, will resume today  2. CAD. Critical left main stenosis, LAD disease 90%. CABG x 5. POD #4 progressing.  3. DM2 on insulin.   4. Vol overload - mild periph edema; small left effusion; no dyspnea  5. Hypokalemia - better, K 4.3, but resuming diuretic and note KCL use at home prev w/ thiazide  6.  Leukocytosis likely reactive  7. Anemia, chronic and blood loss.  Iron stores are low.  s/p IV venofer yest, hgb up 9.4  8. Hypertension - BP reasonably controlled on current regimen; cont to hold ARB/HCTZ for now    Plan  - resume torsemide 20mg daily, KCL 10meq daily   - hold ARB/HCTZ   - note plan for DC soon; ok from nephrology standpoint; rec CBC/BMP 1 week and nephrology follow up 2 weeks      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 pectoris (CMS/HCC)    Type 2 diabetes mellitus with diabetic polyneuropathy, with long-term current use of insulin (CMS/HCC)    Stage 3 chronic kidney disease (CMS/HCC)    Stable angina (CMS/HCC)              Naeem Huber MD  02/17/20  7:07 AM

## 2020-02-17 NOTE — PROGRESS NOTES
79 y.o.   LOS: 7 days   Patient Care Team:  Quinn Washington MD as PCP - General (Family Medicine)  Hugh Caruso MD as PCP - Claims Attributed  Steve Simons Jr., MD as Consulting Physician (Hematology and Oncology)  Hugh Caruso MD as Consulting Physician (Nephrology)  Adalgisa Hernandez APRN as Nurse Practitioner (Endocrinology)  Reagan Beltran MD as Consulting Physician (Endocrinology)  Aguilar Goldman MD as Consulting Physician (Pulmonary Disease)  Mil Sr MD as Consulting Physician (Ophthalmology)  Tasneem Montelongo MD as Surgeon (General Surgery)  Victor Hugo Ng MD as Consulting Physician (Cardiology)    Chief Complaint: Uncontrolled type 2 diabetes mellitus with postoperative management    No chief complaint on file.      Subjective     HPI  Patient is eating better  She is currently receiving Lantus 20 units every 12 hours and Humalog correction scale  Blood sugars are between  range  Patient reports that she may be going home today or tomorrow  I advised her to continue the current regimen from the hospital  Family at the bedside    Interval History:      Review of Systems:      Review of Systems   Constitutional: Positive for appetite change and fatigue.   Respiratory: Positive for chest tightness.    Cardiovascular: Negative.    Gastrointestinal: Negative.    Neurological: Positive for dizziness and numbness.   All other systems reviewed and are negative.    Objective     Vital Signs   Temp:  [98.3 °F (36.8 °C)-99.2 °F (37.3 °C)] 98.4 °F (36.9 °C)  Heart Rate:  [55-62] 62  Resp:  [17-18] 18  BP: (115-167)/(55-76) 127/57    Physical Exam:  Physical Exam   Constitutional: She is oriented to person, place, and time.   Eyes: Pupils are equal, round, and reactive to light. EOM are normal.   No circumorbital puffiness   Neck: Normal range of motion. Neck supple. No thyromegaly present.   Cardiovascular: Normal rate, regular rhythm, normal heart sounds and intact distal pulses.    Pulmonary/Chest: Effort normal and breath sounds normal.   Abdominal: Soft. Bowel sounds are normal. She exhibits no distension. There is no tenderness.   Musculoskeletal: Normal range of motion. She exhibits no edema.   Neurological: She is alert and oriented to person, place, and time.   Skin: Skin is warm and dry.   Negative for acanthosis and vitiligo or purple striae   Psychiatric: She has a normal mood and affect. Her behavior is normal.   Nursing note and vitals reviewed.  Results Review:     I reviewed the patient's new clinical results.      Glucose   Date/Time Value Ref Range Status   02/17/2020 0337 95 65 - 99 mg/dL Final   02/16/2020 0342 83 65 - 99 mg/dL Final   02/15/2020 0332 127 (H) 65 - 99 mg/dL Final     Lab Results (last 72 hours)     Procedure Component Value Units Date/Time    POC Glucose Once [[id number]]  (Normal) Collected:  02/17/20 0719    Specimen:  Blood Updated:  02/17/20 0721     Glucose 115 mg/dL     POC Glucose Once [[id number]]  (Normal) Collected:  02/17/20 0545    Specimen:  Blood Updated:  02/17/20 0546     Glucose 102 mg/dL     Renal Function Panel [[id number]]  (Abnormal) Collected:  02/17/20 0337    Specimen:  Blood Updated:  02/17/20 0434     Glucose 95 mg/dL      BUN 63 mg/dL      Creatinine 2.30 mg/dL      Sodium 142 mmol/L      Potassium 4.3 mmol/L      Chloride 107 mmol/L      CO2 22.0 mmol/L      Calcium 8.8 mg/dL      Albumin 3.20 g/dL      Phosphorus 2.8 mg/dL      Anion Gap 13.0 mmol/L      BUN/Creatinine Ratio 27.4     eGFR Non African Amer 20 mL/min/1.73     Narrative:       GFR Normal >60  Chronic Kidney Disease <60  Kidney Failure <15      CBC & Differential [[id number]] Collected:  02/17/20 0337    Specimen:  Blood Updated:  02/17/20 0409    Narrative:       The following orders were created for panel order CBC & Differential.  Procedure                               Abnormality         Status                     ---------                               -----------          ------                     CBC Auto Differential[[id number]]        Abnormal            Final result                 Please view results for these tests on the individual orders.    CBC Auto Differential [[id number]]  (Abnormal) Collected:  02/17/20 0337    Specimen:  Blood Updated:  02/17/20 0409     WBC 10.62 10*3/mm3      RBC 3.09 10*6/mm3      Hemoglobin 9.4 g/dL      Hematocrit 28.9 %      MCV 93.5 fL      MCH 30.4 pg      MCHC 32.5 g/dL      RDW 13.4 %      RDW-SD 44.7 fl      MPV 11.4 fL      Platelets 140 10*3/mm3      Neutrophil % 69.9 %      Lymphocyte % 14.6 %      Monocyte % 13.7 %      Eosinophil % 0.8 %      Basophil % 0.4 %      Immature Grans % 0.6 %      Neutrophils, Absolute 7.43 10*3/mm3      Lymphocytes, Absolute 1.55 10*3/mm3      Monocytes, Absolute 1.46 10*3/mm3      Eosinophils, Absolute 0.08 10*3/mm3      Basophils, Absolute 0.04 10*3/mm3      Immature Grans, Absolute 0.06 10*3/mm3      nRBC 0.1 /100 WBC     POC Glucose Once [[id number]]  (Abnormal) Collected:  02/16/20 2013    Specimen:  Blood Updated:  02/16/20 2013     Glucose 162 mg/dL     Potassium [[id number]]  (Normal) Collected:  02/16/20 1747    Specimen:  Blood Updated:  02/16/20 1822     Potassium 4.5 mmol/L     POC Glucose Once [[id number]]  (Abnormal) Collected:  02/16/20 1544    Specimen:  Blood Updated:  02/16/20 1546     Glucose 145 mg/dL     POC Glucose Once [[id number]]  (Abnormal) Collected:  02/16/20 1044    Specimen:  Blood Updated:  02/16/20 1047     Glucose 187 mg/dL     POC Glucose Once [[id number]]  (Normal) Collected:  02/16/20 0612    Specimen:  Blood Updated:  02/16/20 0617     Glucose 88 mg/dL     Renal Function Panel [[id number]]  (Abnormal) Collected:  02/16/20 0342    Specimen:  Blood Updated:  02/16/20 0451     Glucose 83 mg/dL      BUN 64 mg/dL      Creatinine 2.42 mg/dL      Sodium 138 mmol/L      Potassium 3.3 mmol/L      Chloride 102 mmol/L      CO2 22.2 mmol/L      Calcium 8.5 mg/dL      Albumin 3.20 g/dL       Phosphorus 3.8 mg/dL      Anion Gap 13.8 mmol/L      BUN/Creatinine Ratio 26.4     eGFR Non African Amer 19 mL/min/1.73     Narrative:       GFR Normal >60  Chronic Kidney Disease <60  Kidney Failure <15      Magnesium [[id number]]  (Normal) Collected:  02/16/20 0342    Specimen:  Blood Updated:  02/16/20 0451     Magnesium 2.3 mg/dL     CBC (No Diff) [[id number]]  (Abnormal) Collected:  02/16/20 0342    Specimen:  Blood Updated:  02/16/20 0423     WBC 12.82 10*3/mm3      RBC 2.88 10*6/mm3      Hemoglobin 8.8 g/dL      Hematocrit 26.5 %      MCV 92.0 fL      MCH 30.6 pg      MCHC 33.2 g/dL      RDW 13.4 %      RDW-SD 44.4 fl      MPV 11.1 fL      Platelets 132 10*3/mm3     POC Glucose Once [[id number]]  (Abnormal) Collected:  02/15/20 2015    Specimen:  Blood Updated:  02/15/20 2017     Glucose 265 mg/dL     POC Glucose Once [[id number]]  (Abnormal) Collected:  02/15/20 1539    Specimen:  Blood Updated:  02/15/20 1540     Glucose 141 mg/dL     POC Glucose Once [[id number]]  (Abnormal) Collected:  02/15/20 1055    Specimen:  Blood Updated:  02/15/20 1057     Glucose 181 mg/dL     POC Glucose Once [[id number]]  (Normal) Collected:  02/15/20 0624    Specimen:  Blood Updated:  02/15/20 0625     Glucose 127 mg/dL     Basic Metabolic Panel [[id number]]  (Abnormal) Collected:  02/15/20 0332    Specimen:  Blood Updated:  02/15/20 0439     Glucose 127 mg/dL      BUN 56 mg/dL      Creatinine 2.19 mg/dL      Sodium 140 mmol/L      Potassium 4.2 mmol/L      Chloride 105 mmol/L      CO2 20.3 mmol/L      Calcium 8.9 mg/dL      eGFR Non African Amer 22 mL/min/1.73      BUN/Creatinine Ratio 25.6     Anion Gap 14.7 mmol/L     Narrative:       GFR Normal >60  Chronic Kidney Disease <60  Kidney Failure <15      CBC (No Diff) [[id number]]  (Abnormal) Collected:  02/15/20 0332    Specimen:  Blood Updated:  02/15/20 0351     WBC 13.70 10*3/mm3      RBC 2.87 10*6/mm3      Hemoglobin 8.9 g/dL      Hematocrit 26.4 %      MCV 92.0 fL      MCH  31.0 pg      MCHC 33.7 g/dL      RDW 14.1 %      RDW-SD 47.1 fl      MPV 11.6 fL      Platelets 124 10*3/mm3     POC Glucose Once [[id number]]  (Abnormal) Collected:  02/14/20 2009    Specimen:  Blood Updated:  02/14/20 2011     Glucose 161 mg/dL     Potassium [[id number]]  (Normal) Collected:  02/14/20 1759    Specimen:  Blood Updated:  02/14/20 1923     Potassium 4.1 mmol/L     POC Glucose Once [[id number]]  (Abnormal) Collected:  02/14/20 1600    Specimen:  Blood Updated:  02/14/20 1602     Glucose 174 mg/dL     POC Glucose Once [[id number]]  (Abnormal) Collected:  02/14/20 1024    Specimen:  Blood Updated:  02/14/20 1025     Glucose 190 mg/dL         Imaging Results (Last 72 Hours)     Procedure Component Value Units Date/Time    XR Chest 1 View [[id number]] Collected:  02/15/20 1624     Updated:  02/15/20 1628    Narrative:       XR CHEST 1 VW-     HISTORY: Female who is 79 years-old,  chest tube removal     TECHNIQUE: Frontal view of the chest     COMPARISON: 02/15/2020 at 1135 hours     FINDINGS: Heart size is borderline. Pulmonary vasculature is  unremarkable. Mild bilateral basilar atelectasis/infiltrate, small left  pleural effusion, appearance is similar to prior exam. No pneumothorax.  No  acute osseous process.          Impression:       Persistent mild bilateral basilar atelectasis/infiltrate, small left  pleural effusion.        This report was finalized on 2/15/2020 4:25 PM by Dr. Brian Olsen M.D.       XR Chest PA & Lateral [[id number]] Collected:  02/15/20 1226     Updated:  02/15/20 1234    Narrative:       XR CHEST PA AND LATERAL-     HISTORY: Female who is 79 years-old,  postoperative evaluation     TECHNIQUE: Frontal and lateral views of the chest     COMPARISON: 02/14/2020     FINDINGS: Heart size is borderline. Pulmonary vasculature is  unremarkable. Mild bilateral basilar atelectasis/infiltrate, small left  pleural effusion, continued follow-up suggested.. No pneumothorax. No  acute  osseous process.       Impression:       Mild bilateral basilar atelectasis/infiltrate, small left  pleural effusion.     This report was finalized on 2/15/2020 12:31 PM by Dr. Brian Olsen M.D.       XR Chest 1 View [[id number]] Collected:  02/14/20 1255     Updated:  02/14/20 1300    Narrative:       XR CHEST 1 VW-     HISTORY: Female who is 79 years-old,  chest tube removal     TECHNIQUE: Frontal view of the chest     COMPARISON: 02/14/2020 at 0440 hours     FINDINGS: Interval removal of chest tubes, right IJ sheath. Heart size  is borderline. Aorta is calcified. Pulmonary vasculature is less  congested. Mild likely atelectasis at the bases. There could be small  left pleural effusion. No pneumothorax. Otherwise stable.       Impression:       Chest tube removal. No pneumothorax.     This report was finalized on 2/14/2020 12:57 PM by Dr. Brian Olsen M.D.           Medical record review  Summary  Nephrology notes reviewed  The resuming the torsemide 20 mg daily along with potassium  ARB and HCTZ are on hold due to potassium issues  Nephrology are clearing her for discharge    Medication Review:       Current Facility-Administered Medications:   •  acetaminophen (TYLENOL) tablet 650 mg, 650 mg, Oral, Q4H PRN, 650 mg at 02/16/20 1742 **OR** acetaminophen (TYLENOL) 160 MG/5ML solution 650 mg, 650 mg, Oral, Q4H PRN **OR** acetaminophen (TYLENOL) suppository 650 mg, 650 mg, Rectal, Q4H PRN, Jr Anthony Rai MD  •  ALPRAZolam (XANAX) tablet 0.25 mg, 0.25 mg, Oral, Q8H PRN, Jr Anthony Rai MD  •  aluminum-magnesium hydroxide-simethicone (MAALOX MAX) 400-400-40 MG/5ML suspension 15 mL, 15 mL, Oral, Q6H PRN, Brianne Foss MD  •  aspirin EC tablet 81 mg, 81 mg, Oral, Daily, Jr Anthony Rai MD, 81 mg at 02/16/20 0922  •  atorvastatin (LIPITOR) tablet 40 mg, 40 mg, Oral, Nightly, Jr Anthony Rai MD, 40 mg at 02/16/20 2033  •  bisacodyl (DULCOLAX) suppository 10 mg, 10 mg, Rectal, Daily,  Clifton Mitchell MD  •  calcium carbonate (TUMS) chewable tablet 500 mg (200 mg elemental), 2 tablet, Oral, TID PRN, Jr Anthony Rai MD  •  carBAMazepine (CARBATROL) 12 hr capsule 300 mg, 300 mg, Oral, Q12H, Jr Anthony Rai MD, 300 mg at 02/16/20 2033  •  cloNIDine (CATAPRES) tablet 0.1 mg, 0.1 mg, Oral, Q12H, Solange Ceballos APRN, 0.1 mg at 02/16/20 2033  •  cyclobenzaprine (FLEXERIL) tablet 10 mg, 10 mg, Oral, Q8H PRN, Jr Anthony Rai MD, 10 mg at 02/13/20 0013  •  dextrose (D50W) 25 g/ 50mL Intravenous Solution 25 g, 25 g, Intravenous, Q15 Min PRN, Jr Anthony Rai MD  •  dextrose (GLUTOSE) oral gel 15 g, 15 g, Oral, Q15 Min PRN, Jr Anthony Rai MD  •  enoxaparin (LOVENOX) syringe 30 mg, 30 mg, Subcutaneous, Daily, Jr Anthony Rai MD, 30 mg at 02/16/20 1722  •  furosemide (LASIX) injection 40 mg, 40 mg, Intravenous, Q6H PRN, Jr Anthony Rai MD  •  glucagon (human recombinant) (GLUCAGEN DIAGNOSTIC) injection 1 mg, 1 mg, Subcutaneous, Q15 Min PRN, Jr Anthony Rai MD  •  hydrALAZINE (APRESOLINE) tablet 25 mg, 25 mg, Oral, Q8H, Solange Ceballos APRN, 25 mg at 02/17/20 0613  •  HYDROcodone-acetaminophen (NORCO) 5-325 MG per tablet 2 tablet, 2 tablet, Oral, Q4H PRN, Jr Anthony Rai MD, 2 tablet at 02/13/20 0013  •  insulin glargine (LANTUS) injection 20 Units, 20 Units, Subcutaneous, QA, Jayy Garrido MD  •  insulin lispro (humaLOG) injection 2-4 Units, 2-4 Units, Subcutaneous, 4x Daily AC & at Bedtime, Jayy Garrido MD  •  insulin regular (HumuLIN R,NovoLIN R) 100 Units in sodium chloride 0.9 % 100 mL (1 Units/mL) infusion, 0-50 Units/hr, Intravenous, Continuous PRN, Miladys Ramirez APRN, Last Rate: 4.8 mL/hr at 02/13/20 0615, 4.8 Units/hr at 02/13/20 0615  •  levothyroxine (SYNTHROID, LEVOTHROID) tablet 175 mcg, 175 mcg, Oral, Q AM, Jr Anthony Rai MD, 175 mcg at 02/17/20 0613  •  metoprolol tartrate (LOPRESSOR)  tablet 12.5 mg, 12.5 mg, Oral, Q12H, Nadiya Kyle, APRN, 12.5 mg at 02/16/20 0922  •  morphine injection 1 mg, 1 mg, Intravenous, Q4H PRN **AND** naloxone (NARCAN) injection 0.4 mg, 0.4 mg, Intravenous, Q5 Min PRN, Jr Anthony Rai MD  •  morphine injection 4 mg, 4 mg, Intravenous, Q30 Min PRN, Jr Anthony Rai MD  •  mupirocin (BACTROBAN) 2 % nasal ointment, , Each Nare, BID, Jr Anthony Rai MD  •  ondansetron (ZOFRAN) injection 4 mg, 4 mg, Intravenous, Q6H PRN, Jr Anthony Rai MD, 4 mg at 02/16/20 0006  •  oxyCODONE (ROXICODONE) immediate release tablet 10 mg, 10 mg, Oral, Q4H PRN, Jr Anthony Rai MD  •  pantoprazole (PROTONIX) EC tablet 40 mg, 40 mg, Oral, Daily, Jr Anthony Rai MD, 40 mg at 02/16/20 0921  •  polyethylene glycol (MIRALAX) packet 17 g, 17 g, Oral, Daily, Jr Anthony Rai MD, 17 g at 02/16/20 0946  •  potassium chloride (MICRO-K) CR capsule 40 mEq, 40 mEq, Oral, PRN, 40 mEq at 02/16/20 1432 **OR** potassium chloride (KLOR-CON) packet 40 mEq, 40 mEq, Oral, PRN, Jr Anthony Rai MD  •  potassium chloride (MICRO-K) CR capsule 10 mEq, 10 mEq, Oral, Daily, Naeem Huber MD  •  potassium chloride 10 mEq in 100 mL IVPB, 10 mEq, Intravenous, Q1H PRN **OR** potassium chloride 10 mEq in 100 mL IVPB, 10 mEq, Intravenous, Q1H PRN, Jr Anthony Rai MD  •  potassium chloride 20 mEq in 50 mL IVPB, 20 mEq, Intravenous, Q1H PRN, Jr Anthony Rai MD  •  potassium chloride 20 mEq in 50 mL IVPB, 20 mEq, Intravenous, Q1H PRN, Jr Anthony Rai MD  •  potassium chloride 20 mEq in 50 mL IVPB, 20 mEq, Intravenous, Q1H PRN, Jr Anthony Rai MD  •  promethazine (PHENERGAN) tablet 12.5 mg, 12.5 mg, Oral, Q6H PRN **OR** promethazine (PHENERGAN) injection 12.5 mg, 12.5 mg, Intravenous, Q6H PRN, Jr Anthony Rai MD, 12.5 mg at 02/13/20 0948  •  Scopolamine (TRANSDERM-SCOP) 1.5 MG/3DAYS patch 1 patch, 1 patch, Transdermal, Q72H, Solange Ceballos  S, APRN, 1 patch at 02/14/20 1138  •  sodium chloride 0.9 % flush 30 mL, 30 mL, Intravenous, Once PRN, Jr Anthony Rai MD  •  sodium chloride 0.9 % infusion, 9 mL/hr, Intravenous, Continuous, Jr Anthony Rai MD, Last Rate: 9 mL/hr at 02/12/20 1500, 9 mL/hr at 02/12/20 1500  •  sodium chloride 0.9 % infusion, 30 mL/hr, Intravenous, Continuous PRN, Jr Anthony Rai MD  •  torsemide (DEMADEX) tablet 20 mg, 20 mg, Oral, Daily, Naeem Huber MD  •  traMADol (ULTRAM) tablet 25 mg, 25 mg, Oral, Q8H PRN, Jr Anthony Rai MD, 25 mg at 02/16/20 2207    Assessment/Plan     Patient Active Problem List   Diagnosis   • Asthma   • Type 2 diabetes mellitus with diabetic polyneuropathy, with long-term current use of insulin (CMS/[facility])   • Essential hypertension   • Mixed hyperlipidemia   • Disorder of muscle, ligament, and fascia   • Proteinuria   • Postoperative hypothyroidism   • Hyperparathyroidism (CMS/HCC)   • Vitamin D deficiency   • Gout without tophus   • Solitary thyroid nodule   • Anemia in stage 3 chronic kidney disease (CMS/HCC)   • Stage 3 chronic kidney disease (CMS/HCC)   • Class 1 obesity due to excess calories with serious comorbidity and body mass index (BMI) of 34.0 to 34.9 in adult   • Heartburn   • Stable angina (CMS/HCC)   • 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 pectoris (CMS/HCC)   Uncontrolled type 2 diabetes mellitus  Uncontrolled type 2 diabetes mellitus with neuropathy  Uncontrolled type 2 diabetes mellitus with nephropathy  Chronic kidney disease  Postoperative hypothyroidism patient had partial right hemithyroidectomy 2016  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  Status post Avastin injections  Recently steroid use complicating diabetes  Hyperlipidemia associated with diabetes  Coronary artery disease status post cardiac cath and is currently awaiting CABG in the morning    Patient blood sugars are trending down less than 100 in the  "morning  I will change Lantus 20 units every morning only  We will continue the Humalog correction scale if the blood sugars are greater than 200 at home  Patient is advised to follow-up with Dr. Beltran in the next 3 to 4 weeks  Patient verbalized understanding    Discussed with the primary team    Jayy Garrido MD FACE.  02/17/20  7:49 AM      [facility] Dragon / transcription disclaimer:     \"Dictated utilizing Dragon dictation\".   "

## 2020-02-17 NOTE — THERAPY TREATMENT NOTE
Patient Name: Ange Johnson  : 1940    MRN: [id number]                              Today's Date: 2020       Admit Date: 2020    Visit Dx:     ICD-10-CM ICD-9-CM   1. 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 pectoris, unspecified vessel or lesion type (CMS/[facility]) I25.118 414.01     413.9   2. Stable angina (CMS/[facility]) I20.8 413.9   3. S/P CABG (coronary artery bypass graft) Z95.1 V45.81     Patient Active Problem List   Diagnosis   • Asthma   • Type 2 diabetes mellitus with diabetic polyneuropathy, with long-term current use of insulin (CMS/[facility])   • Essential hypertension   • Mixed hyperlipidemia   • Disorder of muscle, ligament, and fascia   • Proteinuria   • Postoperative hypothyroidism   • Hyperparathyroidism (CMS/[facility])   • Vitamin D deficiency   • Gout without tophus   • Solitary thyroid nodule   • Anemia in stage 3 chronic kidney disease (CMS/HCC)   • Stage 3 chronic kidney disease (CMS/[facility])   • Class 1 obesity due to excess calories with serious comorbidity and body mass index (BMI) of 34.0 to 34.9 in adult   • Heartburn   • Stable angina (CMS/[facility])   • 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 pectoris (CMS/[facility])     Past Medical History:   Diagnosis Date   • Anemia in stage 3 chronic kidney disease (CMS/HCC) 2016   • Arthritis    • Asthma    • Bone spur of acromioclavicular joint    • Carotid atherosclerosis     <50% bilaterally   • Chronic venous insufficiency    • CKD (chronic kidney disease) stage 3, GFR 30-59 ml/min (CMS/[facility])    • Essential hypertension    • Gout    • Grief reaction       2010 after 15 foot fall off ladder   • H/O cataract    • Heel spur    • History of tendinitis    • Hyperlipidemia    • Hyperparathyroidism (CMS/HCC)    • Hyperthyroidism    • Hypothyroidism, acquired    • Non-toxic goiter    • Pneumonia    • Proteinuria    • Type 2 diabetes mellitus with neurological manifestations (CMS/[facility])    • Vitamin D deficiency       Past Surgical History:   Procedure Laterality Date   • BREAST EXCISIONAL BIOPSY Left 1965    Dr. Ybarra benign   • BREAST EXCISIONAL BIOPSY Bilateral 1965    benign   • CARDIAC CATHETERIZATION N/A 2/10/2020    Procedure: Left heart cath without LV gram;  Surgeon: Emerson Deras MD;  Location: [facility] INVASIVE LOCATION;  Service: Cardiovascular;  Laterality: N/A;   • CARDIAC CATHETERIZATION N/A 2/10/2020    Procedure: Coronary angiography;  Surgeon: Emerson Deras MD;  Location: [facility] CATH INVASIVE LOCATION;  Service: Cardiovascular;  Laterality: N/A;   • CATARACT EXTRACTION Left 02/03/2010   • CATARACT EXTRACTION Right 04/21/2010   • CORONARY ARTERY BYPASS GRAFT N/A 2/12/2020    Procedure: MIDLINE STERNOTOMY, CORONARY ARTERY BYPASS GRAFTING X  5 USING LEFT INTERNAL MAMMARY ARTERY AND LEFT ENDOSCOPICALLY HARVESTED GREATER SAPHENOUS VEIN, INTRAOP KERWIN, PRP;  Surgeon: Jr Anthony Rai MD;  Location: [facility];  Service: Cardiothoracic;  Laterality: N/A;   • HYSTERECTOMY  1988    Dr. Quinn Peña   • OOPHORECTOMY      late 40's   • THYROIDECTOMY Right 12/8/2016    Procedure: right PARATHYROID EXCISION OF NODULE and right thyroid lobectomy and bilateral exploration.;  Surgeon: Tasneem Montelongo MD;  Location: [facility];  Service:      General Information     Row Name 02/17/20 1021          PT Evaluation Time/Intention    Document Type  therapy note (daily note)  (Pended)   -     Mode of Treatment  individual therapy;physical therapy  (Pended)   -     Row Name 02/17/20 1021          General Information    Existing Precautions/Restrictions  cardiac;fall;sternal  (Pended)   -     Row Name 02/17/20 1021          Cognitive Assessment/Intervention- PT/OT    Orientation Status (Cognition)  oriented x 3  (Pended)   -     Row Name 02/17/20 1021          Safety Issues, Functional Mobility    Impairments Affecting Function (Mobility)  balance;endurance/activity  tolerance;pain;strength;shortness of breath  (Pended)   -       User Key  (r) = Recorded By, (t) = Taken By, (c) = Cosigned By    Initials Name Provider Type    Dharmesh Pimentel, PT Student PT Student        Mobility     Row Name 02/17/20 1022          Bed Mobility Assessment/Treatment    Comment (Bed Mobility)  Pt up in chair upon arrival  (Pended)   -[facility] Name 02/17/20 1022          Sit-Stand Transfer    Sit-Stand Drew (Transfers)  minimum assist (75% patient effort);1 person assist;verbal cues  (Pended)   -     Assistive Device (Sit-Stand Transfers)  --  (Pended)  HHA  -[facility] Name 02/17/20 1022          Gait/Stairs Assessment/Training    Drew Level (Gait)  minimum assist (75% patient effort);1 person assist  (Pended)   -     Assistive Device (Gait)  --  (Pended)  HHA  -TW     Distance in Feet (Gait)  120  (Pended)   -TW     Pattern (Gait)  step-through  (Pended)   -TW     Deviations/Abnormal Patterns (Gait)  alisha decreased;stride length decreased;gait speed decreased  (Pended)   -TW     Bilateral Gait Deviations  forward flexed posture  (Pended)   -TW     Comment (Gait/Stairs)  Pt was able to complete a full lap (120') but required 5 standing rest breaks due to SOA. Pt requested HHA due to still feeling unsteady.  (Pended)   -       User Key  (r) = Recorded By, (t) = Taken By, (c) = Cosigned By    Initials Name Provider Type    Dharmesh Pimentel, PT Student PT Student        Obj/Interventions     Row Name 02/17/20 1024          Therapeutic Exercise    Comment (Therapeutic Exercise)  Cardiac protocol x10 reps in LE and x5 reps in UE due to pain and SOA.  (Pended)   -[facility] Name 02/17/20 1024          Static Standing Balance    Level of Drew (Supported Standing, Static Balance)  minimal assist, 75% patient effort;1 person assist  (Pended)   -[facility] Name 02/17/20 1024          Dynamic Standing Balance    Level of Drew, Reaches Outside Midline (Standing,  Dynamic Balance)  minimal assist, 75% patient effort;1 person assist  (Pended)   -       User Key  (r) = Recorded By, (t) = Taken By, (c) = Cosigned By    Initials Name Provider Type    TW Dharmesh Hernandez, PT Student PT Student        Goals/Plan    No documentation.       Clinical Impression     Row Name 02/17/20 1025          Pain Scale: Numbers Pre/Post-Treatment    Pre/Post Treatment Pain Comment  Pt states that she still has pain in her L shoulder and pain in B LE  (Pended)   -TW     Pain Intervention(s)  Repositioned;Ambulation/increased activity  (Pended)   -[facility] Name 02/17/20 1025          Pain Scale: FACES Pre/Post-Treatment    Pain: FACES Scale, Pretreatment  2-->hurts little bit  (Pended)   -TW     Pain: FACES Scale, Post-Treatment  2-->hurts little bit  (Pended)   -[facility] Name 02/17/20 1025          Plan of Care Review    Plan of Care Reviewed With  patient;family  (Pended)   -     Progress  improving  (Pended)   -     Outcome Summary  Pt continuesd to progress with therapy, but still suffers from deficits in strength, endurance, and coordination that impact gait, balance, and functional mobility. PT will continue to follow pt to address these deficits in gait and functional mobility.  (Pended)   -[facility] Name 02/17/20 1025          Physical Therapy Clinical Impression    Criteria for Skilled Interventions Met (PT Clinical Impression)  treatment indicated;yes  (Pended)   -TW     Rehab Potential (PT Clinical Summary)  good, to achieve stated therapy goals  (Pended)   -[facility] Name 02/17/20 1025          Vital Signs    O2 Delivery Pre Treatment  room air  (Pended)   -TW     O2 Delivery Intra Treatment  room air  (Pended)   -TW     O2 Delivery Post Treatment  room air  (Pended)   -[facility] Name 02/17/20 1025          Positioning and Restraints    Pre-Treatment Position  sitting in chair/recliner  (Pended)   -TW     Post Treatment Position  chair  (Pended)   -TW     In Chair   reclined;call light within reach;encouraged to call for assist;with family/caregiver  (Pended)   -TW       User Key  (r) = Recorded By, (t) = Taken By, (c) = Cosigned By    Initials Name Provider Type    Dharmesh Pimentel, PT Student PT Student        Outcome Measures     Row Name 02/17/20 1027          How much help from another person do you currently need...    Turning from your back to your side while in flat bed without using bedrails?  2  (Pended)   -TW     Moving from lying on back to sitting on the side of a flat bed without bedrails?  2  (Pended)   -TW     Moving to and from a bed to a chair (including a wheelchair)?  3  (Pended)   -TW     Standing up from a chair using your arms (e.g., wheelchair, bedside chair)?  3  (Pended)   -TW     Climbing 3-5 steps with a railing?  2  (Pended)   -TW     To walk in hospital room?  3  (Pended)   -TW     AM-PAC 6 Clicks Score (PT)  15  (Pended)   -MS (r) TW (t)     Row Name 02/17/20 1027          Functional Assessment    Outcome Measure Options  AM-PAC 6 Clicks Basic Mobility (PT)  (Pended)   -TW       User Key  (r) = Recorded By, (t) = Taken By, (c) = Cosigned By    Initials Name Provider Type    Falguni Skinner, RN Registered Nurse    Dharmesh Pimentel, PT Student PT Student          PT Recommendation and Plan     Outcome Summary/Treatment Plan (PT)  Anticipated Discharge Disposition (PT): (P) skilled nursing facility  Plan of Care Reviewed With: (P) patient, family  Progress: (P) improving  Outcome Summary: (P) Pt continuesd to progress with therapy, but still suffers from deficits in strength, endurance, and coordination that impact gait, balance, and functional mobility. PT will continue to follow pt to address these deficits in gait and functional mobility.     Time Calculation:   PT Charges     Row Name 02/17/20 1029             Time Calculation    Start Time  1005  (Pended)   -TW      Stop Time  1020  (Pended)   -TW      Time Calculation (min)  15 min   (Pended)   -TW      PT Received On  02/17/20  (Pended)   -TW      PT - Next Appointment  02/18/20  (Pended)   -TW      PT Goal Re-Cert Due Date  02/20/20  (Pended)   -TW         Time Calculation- PT    Total Timed Code Minutes- PT  13 minute(s)  (Pended)   -TW        User Key  (r) = Recorded By, (t) = Taken By, (c) = Cosigned By    Initials Name Provider Type    TW Dharmesh Hernandez, PT Student PT Student        Therapy Charges for Today     Code Description Service Date Service Provider Modifiers Qty    [id number]  PT THER PROC EA 15 MIN 2/17/2020 Dharmesh Hernandez, PT Student GP 1          PT G-Codes  Outcome Measure Options: (P) AM-PAC 6 Clicks Basic Mobility (PT)  AM-PAC 6 Clicks Score (PT): (P) 15    Dharmesh Hernandez PT Student  2/17/2020

## 2020-02-17 NOTE — PROGRESS NOTES
"    Patient Name: Ange Johnson  :1940  79 y.o.      Patient Care Team:  Quinn Washington MD as PCP - General (Family Medicine)  Hugh Caruso MD as PCP - Claims Attributed  Steve Simons Jr., MD as Consulting Physician (Hematology and Oncology)  Hugh Caruso MD as Consulting Physician (Nephrology)  Adalgisa Hernandez APRN as Nurse Practitioner (Endocrinology)  Reagan Beltran MD as Consulting Physician (Endocrinology)  Aguilar Goldman MD as Consulting Physician (Pulmonary Disease)  Mil Sr MD as Consulting Physician (Ophthalmology)  Tasneem Montelongo MD as Surgeon (General Surgery)  Victor Hugo Ng MD as Consulting Physician (Cardiology)    Chief Complaint: follow up coronary artery disease    Interval History: She is sitting up in the chair. Incision is open to air. She had a bowel movement yesterday.        Objective   Vital Signs  Temp:  [98.3 °F (36.8 °C)-99.2 °F (37.3 °C)] 98.4 °F (36.9 °C)  Heart Rate:  [55-62] 62  Resp:  [17-18] 18  BP: (115-167)/(55-76) 127/57    Intake/Output Summary (Last 24 hours) at 2020 1003  Last data filed at 2020 0933  Gross per 24 hour   Intake 170 ml   Output 550 ml   Net -380 ml     Flowsheet Rows      First Filed Value   Admission Height  165.1 cm (65\") Documented at 2020 1646   Admission Weight  99.3 kg (219 lb) Documented at 2020 1646          Physical Exam:   General Appearance:    Alert, cooperative, in no acute distress   Lungs:     Clear to auscultation.  Normal respiratory effort and rate.      Heart:    Regular rhythm and normal rate, normal S1 and S2, no murmurs, gallops or rubs.     Chest Wall:    No abnormalities observed   Abdomen:     Soft, nontender, positive bowel sounds.     Extremities:   no cyanosis, clubbing   No marked joint deformities.  Adequate musculoskeletal strength.    Right radial cath site, soft, no hematoma. Pulse intact. Mild lower extremity edema     Results Review:    Results from last 7 days   Lab Units " 02/17/20  0337   SODIUM mmol/L 142   POTASSIUM mmol/L 4.3   CHLORIDE mmol/L 107   CO2 mmol/L 22.0   BUN mg/dL 63*   CREATININE mg/dL 2.30*   GLUCOSE mg/dL 95   CALCIUM mg/dL 8.8         Results from last 7 days   Lab Units 02/17/20  0337   WBC 10*3/mm3 10.62   HEMOGLOBIN g/dL 9.4*   HEMATOCRIT % 28.9*   PLATELETS 10*3/mm3 140     Results from last 7 days   Lab Units 02/13/20  0314 02/12/20  1312 02/11/20  0438 02/10/20  1058   INR  1.19* 1.27* 1.01 1.03   APTT seconds  --  26.4  --  33.2     Results from last 7 days   Lab Units 02/16/20  0342   MAGNESIUM mg/dL 2.3     Results from last 7 days   Lab Units 02/12/20  0429   CHOLESTEROL mg/dL 153   TRIGLYCERIDES mg/dL 137   HDL CHOL mg/dL 55   LDL CHOL mg/dL 71               Medication Review:     aspirin 81 mg Oral Daily   atorvastatin 40 mg Oral Nightly   carBAMazepine 300 mg Oral Q12H   cloNIDine 0.1 mg Oral Q12H   enoxaparin 30 mg Subcutaneous Daily   hydrALAZINE 25 mg Oral Q8H   insulin glargine 20 Units Subcutaneous QAM   insulin lispro 2-4 Units Subcutaneous 4x Daily AC & at Bedtime   levothyroxine 175 mcg Oral Q AM   metoprolol tartrate 12.5 mg Oral Q12H   mupirocin  Each Nare BID   pantoprazole 40 mg Oral Daily   polyethylene glycol 17 g Oral Daily   potassium chloride 10 mEq Oral Daily   Scopolamine 1 patch Transdermal Q72H   torsemide 20 mg Oral Daily          insulin 0-50 Units/hr Last Rate: 4.8 Units/hr (02/13/20 0615)   sodium chloride 9 mL/hr Last Rate: 9 mL/hr (02/12/20 1500)   sodium chloride 30 mL/hr        Assessment/Plan   1. Multivessel coronary artery disease with critical left main stenosis - s/p CABG x 5 (2/12)  2. HTN -  Normal renal artery doppler 2/3/20.  Well controlled currently.   3. Chronic kidney disease - . nephology is following. Diuretics held yesterday and restarted today.   4. 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 Hgb A1c 6.81. internal medicine is following.   5. Right bundle branch block    - resuming diuretics  today   - doing well. Likely home tomorrow.     TAYO Cardoza  [facility]  02/17/20  10:03 AM

## 2020-02-17 NOTE — PLAN OF CARE
Problem: Patient Care Overview  Goal: Plan of Care Review  Flowsheets  Taken 2/17/2020 1028  Plan of Care Reviewed With: patient;family (Pended)  Taken 2/17/2020 1025  Outcome Summary: Pt continuesd to progress with therapy, but still suffers from deficits in strength, endurance, and coordination that impact gait, balance, and functional mobility. PT will continue to follow pt to address these deficits in gait and functional mobility. (Pended)

## 2020-02-17 NOTE — PLAN OF CARE
Overnight Oximetry to be completed tonight. Denies any pain. On room air. HR is running 50s - 60s. Blood pressure stable. Possible d/c tomorrow. Urine output is good.

## 2020-02-17 NOTE — PLAN OF CARE
Problem: Patient Care Overview  Goal: Plan of Care Review  Outcome: Ongoing (interventions implemented as appropriate)  Flowsheets (Taken 2/17/2020 0512)  Progress: improving  Plan of Care Reviewed With: patient; family  Outcome Summary: Beta blocker held tonight d/t pt being sinus cindy. Pain treated with Ultram. 2L NC with sleep. Will continue to monitor.  Goal: Individualization and Mutuality  Outcome: Ongoing (interventions implemented as appropriate)  Goal: Discharge Needs Assessment  Outcome: Ongoing (interventions implemented as appropriate)  Goal: Interprofessional Rounds/Family Conf  Outcome: Ongoing (interventions implemented as appropriate)     Problem: Fall Risk (Adult)  Goal: Identify Related Risk Factors and Signs and Symptoms  Outcome: Ongoing (interventions implemented as appropriate)  Flowsheets (Taken 2/17/2020 0512)  Related Risk Factors (Fall Risk): gait/mobility problems  Signs and Symptoms (Fall Risk): presence of risk factors  Goal: Absence of Fall  Outcome: Ongoing (interventions implemented as appropriate)  Flowsheets (Taken 2/17/2020 0512)  Absence of Fall: making progress toward outcome     Problem: Cardiac Surgery (Adult)  Goal: Signs and Symptoms of Listed Potential Problems Will be Absent, Minimized or Managed (Cardiac Surgery)  Outcome: Ongoing (interventions implemented as appropriate)  Goal: Signs and Symptoms of Listed Potential Problems Will be Absent, Minimized or Managed (Cardiac Surgery)  Outcome: Ongoing (interventions implemented as appropriate)  Flowsheets (Taken 2/17/2020 0512)  Problems Assessed (Cardiac Surgery): all  Problems Present (Cardiac Surgery): pain; situational response     Problem: Skin Injury Risk (Adult)  Goal: Identify Related Risk Factors and Signs and Symptoms  Outcome: Ongoing (interventions implemented as appropriate)  Flowsheets (Taken 2/17/2020 0512)  Related Risk Factors (Skin Injury Risk): edema; mobility impaired  Goal: Skin Health and  Integrity  Outcome: Ongoing (interventions implemented as appropriate)  Flowsheets (Taken 2/17/2020 0512)  Skin Health and Integrity: making progress toward outcome

## 2020-02-17 NOTE — PROGRESS NOTES
Continued Stay Note  [facility]     Patient Name: Ange Johnson  MRN: [id number]  Today's Date: 2/17/2020    Admit Date: 2/9/2020    Discharge Plan     Row Name 02/17/20 1739       Plan    Plan  Home w/ daughter assist and [facility] to follow.     Plan Comments  CCP followed up with Pt and daughter Daphne Dasilva at bedside in regards to Pt d/c plan.  Pt confirmed, her d/c plan remains to go home where she lives with her daugther and have [facility] follow her at d/c.  HARMAN Gannon/[facility] is following.  CCP following.  HARMAN CHIANG/CCP        Discharge Codes    No documentation.             Tuyet Kauffman RN

## 2020-02-17 NOTE — PROGRESS NOTES
" LOS: 7 days   Patient Care Team:  Quinn Washington MD as PCP - General (Family Medicine)  Hugh Caruso MD as PCP - Claims Attributed  Steve Simons Jr., MD as Consulting Physician (Hematology and Oncology)  Hugh Caruso MD as Consulting Physician (Nephrology)  Adalgisa Hernandez APRN as Nurse Practitioner (Endocrinology)  Reagan Betlran MD as Consulting Physician (Endocrinology)  Aguilar Goldman MD as Consulting Physician (Pulmonary Disease)  Mil Sr MD as Consulting Physician (Ophthalmology)  Tasneem Montelongo MD as Surgeon (General Surgery)  Victor Hugo Ng MD as Consulting Physician (Cardiology)    Chief Complaint: post op    Subjective:  Symptoms:  She reports chest pain.  No shortness of breath or cough.    Diet:  Adequate intake.  No nausea or vomiting.    Activity level: Returning to normal.    Pain:  She complains of pain that is mild.  Pain is well controlled.      C/o incisional discomfort    Vital Signs  Temp:  [98.3 °F (36.8 °C)-99.2 °F (37.3 °C)] 98.4 °F (36.9 °C)  Heart Rate:  [55-62] 62  Resp:  [17-18] 18  BP: (115-167)/(55-76) 127/57  Body mass index is 36.21 kg/m².    Intake/Output Summary (Last 24 hours) at 2/17/2020 1000  Last data filed at 2/17/2020 0933  Gross per 24 hour   Intake 170 ml   Output 550 ml   Net -380 ml     I/O this shift:  In: 120 [P.O.:120]  Out: 300 [Urine:300]          02/15/20  0500 02/16/20  0554 02/17/20  0500   Weight: 98.4 kg (217 lb) 98.7 kg (217 lb 8 oz) 98.7 kg (217 lb 9.6 oz)         Objective:  General Appearance:  Comfortable and in no acute distress.    Vital signs: (most recent): Blood pressure 127/57, pulse 62, temperature 98.4 °F (36.9 °C), temperature source Oral, resp. rate 18, height 165.1 cm (65\"), weight 98.7 kg (217 lb 9.6 oz), SpO2 96 %, not currently breastfeeding.  Vital signs are normal.  No fever.    Output: Producing urine and producing stool.    Lungs:  Normal effort and normal respiratory rate.  There are decreased breath sounds.  "   Heart: Bradycardia.  Regular rhythm.  (SB on tele monitor)  Abdomen: Abdomen is soft.  Bowel sounds are normal.     Pulses: Distal pulses are intact.    Neurological: Patient is alert and oriented to person, place and time.    Skin:  Warm and dry.  (Incision clean, dry, and intact)        Results Review:        WBC WBC   Date Value Ref Range Status   02/17/2020 10.62 3.40 - 10.80 10*3/mm3 Final   02/16/2020 12.82 (H) 3.40 - 10.80 10*3/mm3 Final   02/15/2020 13.70 (H) 3.40 - 10.80 10*3/mm3 Final      HGB Hemoglobin   Date Value Ref Range Status   02/17/2020 9.4 (L) 12.0 - 15.9 g/dL Final   02/16/2020 8.8 (L) 12.0 - 15.9 g/dL Final   02/15/2020 8.9 (L) 12.0 - 15.9 g/dL Final      HCT Hematocrit   Date Value Ref Range Status   02/17/2020 28.9 (L) 34.0 - 46.6 % Final   02/16/2020 26.5 (L) 34.0 - 46.6 % Final   02/15/2020 26.4 (L) 34.0 - 46.6 % Final      Platelets Platelets   Date Value Ref Range Status   02/17/2020 140 140 - 450 10*3/mm3 Final   02/16/2020 132 (L) 140 - 450 10*3/mm3 Final   02/15/2020 124 (L) 140 - 450 10*3/mm3 Final        PT/INR:  No results found for: PROTIME/No results found for: INR    Sodium Sodium   Date Value Ref Range Status   02/17/2020 142 136 - 145 mmol/L Final   02/16/2020 138 136 - 145 mmol/L Final   02/15/2020 140 136 - 145 mmol/L Final      Potassium Potassium   Date Value Ref Range Status   02/17/2020 4.3 3.5 - 5.2 mmol/L Final   02/16/2020 4.5 3.5 - 5.2 mmol/L Final   02/16/2020 3.3 (L) 3.5 - 5.2 mmol/L Final   02/15/2020 4.2 3.5 - 5.2 mmol/L Final   02/14/2020 4.1 3.5 - 5.2 mmol/L Final      Chloride Chloride   Date Value Ref Range Status   02/17/2020 107 98 - 107 mmol/L Final   02/16/2020 102 98 - 107 mmol/L Final   02/15/2020 105 98 - 107 mmol/L Final      Bicarbonate CO2   Date Value Ref Range Status   02/17/2020 22.0 22.0 - 29.0 mmol/L Final   02/16/2020 22.2 22.0 - 29.0 mmol/L Final   02/15/2020 20.3 (L) 22.0 - 29.0 mmol/L Final      BUN BUN   Date Value Ref Range Status    02/17/2020 63 (H) 8 - 23 mg/dL Final   02/16/2020 64 (H) 8 - 23 mg/dL Final   02/15/2020 56 (H) 8 - 23 mg/dL Final      Creatinine Creatinine   Date Value Ref Range Status   02/17/2020 2.30 (H) 0.57 - 1.00 mg/dL Final   02/16/2020 2.42 (H) 0.57 - 1.00 mg/dL Final   02/15/2020 2.19 (H) 0.57 - 1.00 mg/dL Final      Calcium Calcium   Date Value Ref Range Status   02/17/2020 8.8 8.6 - 10.5 mg/dL Final   02/16/2020 8.5 (L) 8.6 - 10.5 mg/dL Final   02/15/2020 8.9 8.6 - 10.5 mg/dL Final      Magnesium Magnesium   Date Value Ref Range Status   02/16/2020 2.3 1.6 - 2.4 mg/dL Final            aspirin 81 mg Oral Daily   atorvastatin 40 mg Oral Nightly   carBAMazepine 300 mg Oral Q12H   cloNIDine 0.1 mg Oral Q12H   enoxaparin 30 mg Subcutaneous Daily   hydrALAZINE 25 mg Oral Q8H   insulin glargine 20 Units Subcutaneous QAM   insulin lispro 2-4 Units Subcutaneous 4x Daily AC & at Bedtime   levothyroxine 175 mcg Oral Q AM   metoprolol tartrate 12.5 mg Oral Q12H   mupirocin  Each Nare BID   pantoprazole 40 mg Oral Daily   polyethylene glycol 17 g Oral Daily   potassium chloride 10 mEq Oral Daily   Scopolamine 1 patch Transdermal Q72H   torsemide 20 mg Oral Daily       insulin 0-50 Units/hr Last Rate: 4.8 Units/hr (02/13/20 0615)   sodium chloride 9 mL/hr Last Rate: 9 mL/hr (02/12/20 1500)   sodium chloride 30 mL/hr            Patient Active Problem List   Diagnosis Code   • Asthma J45.909   • Type 2 diabetes mellitus with diabetic polyneuropathy, with long-term current use of insulin (CMS/[facility]) E11.42, Z79.4   • Essential hypertension I10   • Mixed hyperlipidemia E78.2   • Disorder of muscle, ligament, and fascia M62.9   • Proteinuria R80.9   • Postoperative hypothyroidism E89.0   • Hyperparathyroidism (CMS/[facility]) E21.3   • Vitamin D deficiency E55.9   • 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   • Solitary thyroid nodule E04.1   • Anemia in stage 3 chronic kidney disease (CMS/HCC) N18.3, D63.1   • Stage 3 chronic kidney disease (CMS/HCC) N18.3    • Class 1 obesity due to excess calories with serious comorbidity and body mass index (BMI) of 34.0 to 34.9 in adult E66.09, Z68.34   • Heartburn R12   • Stable angina (CMS/HCC) I20.8   • 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 pectoris (CMS/HCC) I25.118       Assessment & Plan   -Severe CAD with left main stenosis--s/p CABGx5, sternal closure with zip fix--POD#5 [facility]  -Diabetes type 2  -NUHA on CKD stage III, baseline 1.6-1.8  -hypertension  -hypothyroidism- on synthroid  -hyperlipidemia   -post op anemia- expected acute blood loss, improving  -leukocytosis- probably reactive; improved  -TCP- resolved     Creatinine 2.3 (2.19) today--renal following; resumption of torsemide per renal  NSR/SB in high 50s/60s  Encourage pulmonary toilet/mobilize  Weaned to RA during the day  Requiring oxygen at night---will check overnight oximetry  Anticipated discharge home with home health possibly tomorrow    Solange Ceballos, APRN  02/17/20  10:00 AM

## 2020-02-18 VITALS
BODY MASS INDEX: 36.06 KG/M2 | HEART RATE: 56 BPM | RESPIRATION RATE: 14 BRPM | WEIGHT: 216.4 LBS | SYSTOLIC BLOOD PRESSURE: 136 MMHG | OXYGEN SATURATION: 97 % | HEIGHT: 65 IN | TEMPERATURE: 98.5 F | DIASTOLIC BLOOD PRESSURE: 61 MMHG

## 2020-02-18 LAB
ALBUMIN SERPL-MCNC: 3 G/DL (ref 3.5–5.2)
ANION GAP SERPL CALCULATED.3IONS-SCNC: 12 MMOL/L (ref 5–15)
BASOPHILS # BLD AUTO: 0.02 10*3/MM3 (ref 0–0.2)
BASOPHILS NFR BLD AUTO: 0.2 % (ref 0–1.5)
BUN BLD-MCNC: 57 MG/DL (ref 8–23)
BUN/CREAT SERPL: 31.5 (ref 7–25)
CALCIUM SPEC-SCNC: 8.7 MG/DL (ref 8.6–10.5)
CHLORIDE SERPL-SCNC: 104 MMOL/L (ref 98–107)
CO2 SERPL-SCNC: 23 MMOL/L (ref 22–29)
CREAT BLD-MCNC: 1.81 MG/DL (ref 0.57–1)
DEPRECATED RDW RBC AUTO: 46.7 FL (ref 37–54)
EOSINOPHIL # BLD AUTO: 0.1 10*3/MM3 (ref 0–0.4)
EOSINOPHIL NFR BLD AUTO: 1.1 % (ref 0.3–6.2)
ERYTHROCYTE [DISTWIDTH] IN BLOOD BY AUTOMATED COUNT: 13.8 % (ref 12.3–15.4)
GFR SERPL CREATININE-BSD FRML MDRD: 27 ML/MIN/1.73
GLUCOSE BLD-MCNC: 122 MG/DL (ref 65–99)
GLUCOSE BLDC GLUCOMTR-MCNC: 126 MG/DL (ref 70–130)
GLUCOSE BLDC GLUCOMTR-MCNC: 146 MG/DL (ref 70–130)
HCT VFR BLD AUTO: 26.2 % (ref 34–46.6)
HGB BLD-MCNC: 8.7 G/DL (ref 12–15.9)
IMM GRANULOCYTES # BLD AUTO: 0.03 10*3/MM3 (ref 0–0.05)
IMM GRANULOCYTES NFR BLD AUTO: 0.3 % (ref 0–0.5)
LYMPHOCYTES # BLD AUTO: 1.24 10*3/MM3 (ref 0.7–3.1)
LYMPHOCYTES NFR BLD AUTO: 13.2 % (ref 19.6–45.3)
MCH RBC QN AUTO: 31.2 PG (ref 26.6–33)
MCHC RBC AUTO-ENTMCNC: 33.2 G/DL (ref 31.5–35.7)
MCV RBC AUTO: 93.9 FL (ref 79–97)
MONOCYTES # BLD AUTO: 1.06 10*3/MM3 (ref 0.1–0.9)
MONOCYTES NFR BLD AUTO: 11.3 % (ref 5–12)
NEUTROPHILS # BLD AUTO: 6.91 10*3/MM3 (ref 1.7–7)
NEUTROPHILS NFR BLD AUTO: 73.9 % (ref 42.7–76)
NRBC BLD AUTO-RTO: 0 /100 WBC (ref 0–0.2)
PHOSPHATE SERPL-MCNC: 2.5 MG/DL (ref 2.5–4.5)
PLATELET # BLD AUTO: 152 10*3/MM3 (ref 140–450)
PMV BLD AUTO: 11.5 FL (ref 6–12)
POTASSIUM BLD-SCNC: 3.8 MMOL/L (ref 3.5–5.2)
RBC # BLD AUTO: 2.79 10*6/MM3 (ref 3.77–5.28)
SODIUM BLD-SCNC: 139 MMOL/L (ref 136–145)
WBC NRBC COR # BLD: 9.36 10*3/MM3 (ref 3.4–10.8)

## 2020-02-18 PROCEDURE — 80069 RENAL FUNCTION PANEL: CPT | Performed by: NURSE PRACTITIONER

## 2020-02-18 PROCEDURE — 99238 HOSP IP/OBS DSCHRG MGMT 30/<: CPT | Performed by: NURSE PRACTITIONER

## 2020-02-18 PROCEDURE — 99024 POSTOP FOLLOW-UP VISIT: CPT | Performed by: NURSE PRACTITIONER

## 2020-02-18 PROCEDURE — 85025 COMPLETE CBC W/AUTO DIFF WBC: CPT | Performed by: NURSE PRACTITIONER

## 2020-02-18 PROCEDURE — 97110 THERAPEUTIC EXERCISES: CPT

## 2020-02-18 PROCEDURE — 82962 GLUCOSE BLOOD TEST: CPT

## 2020-02-18 PROCEDURE — 63710000001 INSULIN GLARGINE PER 5 UNITS: Performed by: INTERNAL MEDICINE

## 2020-02-18 RX ORDER — CLONIDINE HYDROCHLORIDE 0.1 MG/1
0.1 TABLET ORAL EVERY 12 HOURS SCHEDULED
Qty: 60 TABLET | Refills: 4 | Status: SHIPPED | OUTPATIENT
Start: 2020-02-18 | End: 2020-08-21

## 2020-02-18 RX ORDER — TRAMADOL HYDROCHLORIDE 50 MG/1
50 TABLET ORAL EVERY 8 HOURS PRN
Qty: 20 TABLET | Refills: 0 | Status: SHIPPED | OUTPATIENT
Start: 2020-02-18 | End: 2020-02-25

## 2020-02-18 RX ORDER — TORSEMIDE 20 MG/1
20 TABLET ORAL DAILY
Qty: 30 TABLET | Refills: 5 | Status: SHIPPED | OUTPATIENT
Start: 2020-02-19 | End: 2020-08-13

## 2020-02-18 RX ORDER — HYDRALAZINE HYDROCHLORIDE 25 MG/1
25 TABLET, FILM COATED ORAL 3 TIMES DAILY
Qty: 270 TABLET | Refills: 1 | Status: SHIPPED | OUTPATIENT
Start: 2020-02-18 | End: 2020-02-25 | Stop reason: SDUPTHER

## 2020-02-18 RX ORDER — ATORVASTATIN CALCIUM 40 MG/1
40 TABLET, FILM COATED ORAL NIGHTLY
Qty: 30 TABLET | Refills: 11 | Status: SHIPPED | OUTPATIENT
Start: 2020-02-18 | End: 2021-02-25

## 2020-02-18 RX ORDER — ASPIRIN 81 MG/1
81 TABLET ORAL DAILY
Qty: 30 TABLET | Refills: 11 | Status: SHIPPED | OUTPATIENT
Start: 2020-02-19 | End: 2021-04-28 | Stop reason: SDUPTHER

## 2020-02-18 RX ORDER — AMIODARONE HYDROCHLORIDE 200 MG/1
200 TABLET ORAL
Status: DISCONTINUED | OUTPATIENT
Start: 2020-02-18 | End: 2020-02-18 | Stop reason: HOSPADM

## 2020-02-18 RX ORDER — AMIODARONE HYDROCHLORIDE 200 MG/1
200 TABLET ORAL
Qty: 30 TABLET | Refills: 0 | Status: SHIPPED | OUTPATIENT
Start: 2020-02-19 | End: 2020-03-17

## 2020-02-18 RX ORDER — FERROUS SULFATE 325(65) MG
325 TABLET ORAL
Status: DISCONTINUED | OUTPATIENT
Start: 2020-02-18 | End: 2020-02-18 | Stop reason: HOSPADM

## 2020-02-18 RX ORDER — INSULIN GLARGINE 100 [IU]/ML
20 INJECTION, SOLUTION SUBCUTANEOUS EVERY MORNING
Qty: 3 ML | Refills: 12 | Status: SHIPPED | OUTPATIENT
Start: 2020-02-19 | End: 2020-07-17

## 2020-02-18 RX ADMIN — MUPIROCIN 1 APPLICATION: 20 OINTMENT TOPICAL at 09:07

## 2020-02-18 RX ADMIN — HYDRALAZINE HYDROCHLORIDE 25 MG: 25 TABLET ORAL at 13:33

## 2020-02-18 RX ADMIN — INSULIN GLARGINE 20 UNITS: 100 INJECTION, SOLUTION SUBCUTANEOUS at 06:58

## 2020-02-18 RX ADMIN — FERROUS SULFATE TAB 325 MG (65 MG ELEMENTAL FE) 325 MG: 325 (65 FE) TAB at 11:32

## 2020-02-18 RX ADMIN — METOPROLOL TARTRATE 12.5 MG: 25 TABLET ORAL at 09:09

## 2020-02-18 RX ADMIN — LEVOTHYROXINE SODIUM 175 MCG: 175 TABLET ORAL at 06:57

## 2020-02-18 RX ADMIN — PANTOPRAZOLE SODIUM 40 MG: 40 TABLET, DELAYED RELEASE ORAL at 09:07

## 2020-02-18 RX ADMIN — HYDRALAZINE HYDROCHLORIDE 25 MG: 25 TABLET ORAL at 06:57

## 2020-02-18 RX ADMIN — POTASSIUM CHLORIDE 10 MEQ: 750 CAPSULE, EXTENDED RELEASE ORAL at 09:07

## 2020-02-18 RX ADMIN — AMIODARONE HYDROCHLORIDE 200 MG: 200 TABLET ORAL at 09:07

## 2020-02-18 RX ADMIN — CLONIDINE HYDROCHLORIDE 0.1 MG: 0.1 TABLET ORAL at 09:09

## 2020-02-18 RX ADMIN — CARBAMAZEPINE 300 MG: 300 CAPSULE, EXTENDED RELEASE ORAL at 09:06

## 2020-02-18 RX ADMIN — TORSEMIDE 20 MG: 20 TABLET ORAL at 09:07

## 2020-02-18 RX ADMIN — HYDROCODONE BITARTRATE AND ACETAMINOPHEN 2 TABLET: 5; 325 TABLET ORAL at 05:59

## 2020-02-18 RX ADMIN — ASPIRIN 81 MG: 81 TABLET ORAL at 09:07

## 2020-02-18 NOTE — DISCHARGE INSTRUCTIONS
[facility] to draw weekly BMP on Mondays for 4 weeks with results faxed to [phone]  ATTN Dr. Caruso

## 2020-02-18 NOTE — PROGRESS NOTES
"   LOS: 8 days    Patient Care Team:  Quinn Washington MD as PCP - General (Family Medicine)  Hugh Caruso MD as PCP - Claims Attributed  Steve Simons Jr., MD as Consulting Physician (Hematology and Oncology)  Hugh Caruso MD as Consulting Physician (Nephrology)  Adalgisa Hernandez APRN as Nurse Practitioner (Endocrinology)  Reagan Beltran MD as Consulting Physician (Endocrinology)  Aguilar Goldman MD as Consulting Physician (Pulmonary Disease)  Mil Sr MD as Consulting Physician (Ophthalmology)  Tasneem Montelongo MD as Surgeon (General Surgery)  Victor Hugo Ng MD as Consulting Physician (Cardiology)    Chief Complaint:  No chief complaint on file.    Follow UP NUHA on CKD3  Subjective     Interval History:   Objective    Going home today.  Not eating well.  Discussed strategies for getting protein at home.  Glucerna or boost glucose control.  Bowels moved in last 2 days. Urinating.       Vital Signs  Temp:  [98 °F (36.7 °C)-98.8 °F (37.1 °C)] 98 °F (36.7 °C)  Heart Rate:  [] 70  Resp:  [16-18] 16  BP: (108-163)/(53-91) 159/81    Flowsheet Rows      First Filed Value   Admission Height  165.1 cm (65\") Documented at 02/09/2020 1646   Admission Weight  99.3 kg (219 lb) Documented at 02/09/2020 1646          No intake/output data recorded.  I/O last 3 completed shifts:  In: 510 [P.O.:510]  Out: 1800 [Urine:1800]    Intake/Output Summary (Last 24 hours) at 2/18/2020 1112  Last data filed at 2/18/2020 0700  Gross per 24 hour   Intake 340 ml   Output 1250 ml   Net -910 ml       Physical Exam:  GEN in chair, no distress, comfortable  Neck supple no JVD  Lungs decreased bs bases, no wheezes  Heart RRR no s3 or rub  Abd + bs , soft, nontender  Ext 1+ BLE edema .    SKin scattered ecchymoses.      Results Review:    Results from last 7 days   Lab Units 02/18/20  0317 02/17/20  0337 02/16/20  1747 02/16/20  0342  02/12/20  0429   SODIUM mmol/L 139 142  --  138   < > 142   POTASSIUM mmol/L 3.8 4.3 4.5 3.3*   < " > 3.5   CHLORIDE mmol/L 104 107  --  102   < > 104   CO2 mmol/L 23.0 22.0  --  22.2   < > 25.7   BUN mg/dL 57* 63*  --  64*   < > 40*   CREATININE mg/dL 1.81* 2.30*  --  2.42*   < > 1.83*   CALCIUM mg/dL 8.7 8.8  --  8.5*   < > 8.7   BILIRUBIN mg/dL  --   --   --   --   --  <0.2*   ALK PHOS U/L  --   --   --   --   --  66   ALT (SGPT) U/L  --   --   --   --   --  11   AST (SGOT) U/L  --   --   --   --   --  10   GLUCOSE mg/dL 122* 95  --  83   < > 63*    < > = values in this interval not displayed.       Estimated Creatinine Clearance: 28.8 mL/min (A) (by C-G formula based on SCr of 1.81 mg/dL (H)).    Results from last 7 days   Lab Units 02/18/20 0317 02/17/20 0337 02/16/20 0342 02/13/20 0314 02/12/20  1633   MAGNESIUM mg/dL  --   --  2.3 2.2 1.9   PHOSPHORUS mg/dL 2.5 2.8 3.8 4.5 3.7             Results from last 7 days   Lab Units 02/18/20  0317 02/17/20  0337 02/16/20  0342 02/15/20  0332 02/14/20  0322   WBC 10*3/mm3 9.36 10.62 12.82* 13.70* 13.39*   HEMOGLOBIN g/dL 8.7* 9.4* 8.8* 8.9* 9.4*   PLATELETS 10*3/mm3 152 140 132* 124* 116*       Results from last 7 days   Lab Units 02/13/20 0314 02/12/20  1312   INR  1.19* 1.27*         Imaging Results (Last 24 Hours)     ** No results found for the last 24 hours. **          amiodarone 200 mg Oral Q24H   aspirin 81 mg Oral Daily   atorvastatin 40 mg Oral Nightly   carBAMazepine 300 mg Oral Q12H   cloNIDine 0.1 mg Oral Q12H   enoxaparin 30 mg Subcutaneous Daily   ferrous sulfate 325 mg Oral Daily With Breakfast   hydrALAZINE 25 mg Oral Q8H   insulin glargine 20 Units Subcutaneous QAM   insulin lispro 2-4 Units Subcutaneous 4x Daily AC & at Bedtime   levothyroxine 175 mcg Oral Q AM   metoprolol tartrate 12.5 mg Oral Q12H   mupirocin  Each Nare BID   pantoprazole 40 mg Oral Daily   polyethylene glycol 17 g Oral Daily   potassium chloride 10 mEq Oral Daily   Scopolamine 1 patch Transdermal Q72H   torsemide 20 mg Oral Daily       insulin 0-50 Units/hr Last Rate: 4.8  Units/hr (02/13/20 0615)   sodium chloride 9 mL/hr Last Rate: 9 mL/hr (02/12/20 1500)   sodium chloride 30 mL/hr        Medication Review:   Current Facility-Administered Medications   Medication Dose Route Frequency Provider Last Rate Last Dose   • acetaminophen (TYLENOL) tablet 650 mg  650 mg Oral Q4H PRN Jr Anthony Rai MD   650 mg at 02/17/20 0923    Or   • acetaminophen (TYLENOL) 160 MG/5ML solution 650 mg  650 mg Oral Q4H PRN Jr Anthony Rai MD        Or   • acetaminophen (TYLENOL) suppository 650 mg  650 mg Rectal Q4H PRN Jr Anthony Rai MD       • ALPRAZolam (XANAX) tablet 0.25 mg  0.25 mg Oral Q8H PRN Jr Anthony Rai MD       • aluminum-magnesium hydroxide-simethicone (MAALOX MAX) 400-400-40 MG/5ML suspension 15 mL  15 mL Oral Q6H PRN Brianne Foss MD       • amiodarone (PACERONE) tablet 200 mg  200 mg Oral Q24H Sonia Daugherty APRN   200 mg at 02/18/20 0907   • aspirin EC tablet 81 mg  81 mg Oral Daily Jr Anthony Rai MD   81 mg at 02/18/20 0907   • atorvastatin (LIPITOR) tablet 40 mg  40 mg Oral Nightly Jr Anthony Rai MD   40 mg at 02/17/20 2028   • calcium carbonate (TUMS) chewable tablet 500 mg (200 mg elemental)  2 tablet Oral TID PRN Jr Anthony Rai MD       • carBAMazepine (CARBATROL) 12 hr capsule 300 mg  300 mg Oral Q12H Jr Anthony Rai MD   300 mg at 02/18/20 0906   • cloNIDine (CATAPRES) tablet 0.1 mg  0.1 mg Oral Q12H Solange Ceballos APRN   0.1 mg at 02/18/20 0909   • cyclobenzaprine (FLEXERIL) tablet 10 mg  10 mg Oral Q8H PRN Jr Anthony Rai MD   10 mg at 02/13/20 0013   • dextrose (D50W) 25 g/ 50mL Intravenous Solution 25 g  25 g Intravenous Q15 Min PRN Jr Anthony Rai MD       • dextrose (GLUTOSE) oral gel 15 g  15 g Oral Q15 Min PRN Jr Anthony Rai MD       • enoxaparin (LOVENOX) syringe 30 mg  30 mg Subcutaneous Daily Jr Anthony Rai MD   30 mg at 02/17/20 180   • ferrous sulfate tablet 325 mg  325 mg Oral  Daily With Breakfast Solange Ceballos APRN       • furosemide (LASIX) injection 40 mg  40 mg Intravenous Q6H PRN Jr Anthony Rai MD       • glucagon (human recombinant) (GLUCAGEN DIAGNOSTIC) injection 1 mg  1 mg Subcutaneous Q15 Min PRN Jr Anthony Rai MD       • hydrALAZINE (APRESOLINE) tablet 25 mg  25 mg Oral Q8H Solange Ceballos APRN   25 mg at 02/18/20 0657   • HYDROcodone-acetaminophen (NORCO) 5-325 MG per tablet 2 tablet  2 tablet Oral Q4H PRN Jr Anthony Rai MD   2 tablet at 02/18/20 0559   • insulin glargine (LANTUS) injection 20 Units  20 Units Subcutaneous QAM Jayy Garrido MD   20 Units at 02/18/20 0658   • insulin lispro (humaLOG) injection 2-4 Units  2-4 Units Subcutaneous 4x Daily AC & at Bedtime Jayy Garrido MD       • insulin regular (HumuLIN R,NovoLIN R) 100 Units in sodium chloride 0.9 % 100 mL (1 Units/mL) infusion  0-50 Units/hr Intravenous Continuous PRN Miladys Ramirez APRN 4.8 mL/hr at 02/13/20 0615 4.8 Units/hr at 02/13/20 0615   • levothyroxine (SYNTHROID, LEVOTHROID) tablet 175 mcg  175 mcg Oral Q AM Jr Anthony Rai MD   175 mcg at 02/18/20 0657   • metoprolol tartrate (LOPRESSOR) tablet 12.5 mg  12.5 mg Oral Q12H Nadiya Kyle APRN   12.5 mg at 02/18/20 0909   • morphine injection 1 mg  1 mg Intravenous Q4H PRN Jr Anthony Rai MD        And   • naloxone (NARCAN) injection 0.4 mg  0.4 mg Intravenous Q5 Min PRN Jr Anthony Rai MD       • morphine injection 4 mg  4 mg Intravenous Q30 Min PRN Jr Anthony Rai MD       • mupirocin (BACTROBAN) 2 % nasal ointment   Each Nare BID Jr Anthony Rai MD   1 application at 02/18/20 0907   • ondansetron (ZOFRAN) injection 4 mg  4 mg Intravenous Q6H PRN Jr Anthony Rai MD   4 mg at 02/16/20 0006   • oxyCODONE (ROXICODONE) immediate release tablet 10 mg  10 mg Oral Q4H PRN Jr Anthony Rai MD       • pantoprazole (PROTONIX) EC tablet 40 mg  40 mg Oral Daily  Jr Anthony Rai MD   40 mg at 02/18/20 0907   • polyethylene glycol (MIRALAX) packet 17 g  17 g Oral Daily Jr Anthony Rai MD   17 g at 02/16/20 0946   • potassium chloride (MICRO-K) CR capsule 40 mEq  40 mEq Oral PRN Jr Anthony Rai MD   40 mEq at 02/16/20 1432    Or   • potassium chloride (KLOR-CON) packet 40 mEq  40 mEq Oral PRN Jr Anthony Rai MD       • potassium chloride (MICRO-K) CR capsule 10 mEq  10 mEq Oral Daily Naeem Huber MD   10 mEq at 02/18/20 0907   • potassium chloride 10 mEq in 100 mL IVPB  10 mEq Intravenous Q1H PRN Jr Anthony Rai MD        Or   • potassium chloride 10 mEq in 100 mL IVPB  10 mEq Intravenous Q1H PRN Jr Anthony Rai MD       • potassium chloride 20 mEq in 50 mL IVPB  20 mEq Intravenous Q1H PRN Jr Anthony Rai MD       • potassium chloride 20 mEq in 50 mL IVPB  20 mEq Intravenous Q1H PRN Jr Anthony Rai MD       • potassium chloride 20 mEq in 50 mL IVPB  20 mEq Intravenous Q1H PRN Jr Anthony Rai MD       • promethazine (PHENERGAN) tablet 12.5 mg  12.5 mg Oral Q6H PRN Jr Anthony Rai MD        Or   • promethazine (PHENERGAN) injection 12.5 mg  12.5 mg Intravenous Q6H PRN Jr Anthony Rai MD   12.5 mg at 02/13/20 0948   • Scopolamine (TRANSDERM-SCOP) 1.5 MG/3DAYS patch 1 patch  1 patch Transdermal Q72H Solange Ceballos APRN   1 patch at 02/17/20 0922   • sodium chloride 0.9 % flush 30 mL  30 mL Intravenous Once PRN Jr Anthony Rai MD       • sodium chloride 0.9 % infusion  9 mL/hr Intravenous Continuous Jr Anthony Rai MD 9 mL/hr at 02/12/20 1500 9 mL/hr at 02/12/20 1500   • sodium chloride 0.9 % infusion  30 mL/hr Intravenous Continuous PRN Jr Anthony Rai MD       • torsemide (DEMADEX) tablet 20 mg  20 mg Oral Daily Naeem Huber MD   20 mg at 02/18/20 0907   • traMADol (ULTRAM) tablet 25 mg  25 mg Oral Q8H PRN Jr Anthony Rai MD   25 mg at 02/17/20 2029        Assessment/Plan   1. NUHA on CKD 3, nonoliguric, with expected rise in SCR post-CABG and following aggressive diuresis.  Now back to baseline 1.8.   2. CAD. Critical left main stenosis, LAD disease 90%. CABG x 5. POD #5 progressing.  3. DM2 on insulin.   4. Volume overload improving .  5. Hypokalemia - resolved .  6. Leukocytosis likely reactive  7. Anemia, chronic and blood loss.  Iron stores are low.  s/p IV venofer yest, hgb up 9.4  8. Hypertension - BP reasonably controlled on current regimen; cont to hold ARB/HCTZ for now    Plan  1. Continue po torsemide  2. Has follow up arranged in our office  3. BMP by home health weekly on mondays with results faxed to [phone] ATTN Dr. Caruso.         Christine Hernandez MD  02/18/20

## 2020-02-18 NOTE — PROGRESS NOTES
" LOS: 8 days   Patient Care Team:  Quinn Washington MD as PCP - General (Family Medicine)  Hugh Caruso MD as PCP - Claims Attributed  Steve Simons Jr., MD as Consulting Physician (Hematology and Oncology)  Hugh Caruso MD as Consulting Physician (Nephrology)  Adalgisa Hernandez APRN as Nurse Practitioner (Endocrinology)  Reagan Beltran MD as Consulting Physician (Endocrinology)  Aguilar Goldman MD as Consulting Physician (Pulmonary Disease)  Mil Sr MD as Consulting Physician (Ophthalmology)  Tasneem Montelongo MD as Surgeon (General Surgery)  Victor Hugo Ng MD as Consulting Physician (Cardiology)    Chief Complaint: post op    Subjective:  Symptoms:  She reports cough.  No shortness of breath or chest pain.    Diet:  Adequate intake.  No nausea or vomiting.    Activity level: Returning to normal.    Pain:  She complains of pain that is mild.  Pain is requiring pain medication and well controlled.      Feeling good sitting in the chair, wants to go home    Vital Signs  Temp:  [98.1 °F (36.7 °C)-98.8 °F (37.1 °C)] 98.8 °F (37.1 °C)  Heart Rate:  [] 67  Resp:  [16-18] 16  BP: (108-163)/(53-91) 108/56  Body mass index is 36.01 kg/m².    Intake/Output Summary (Last 24 hours) at 2/18/2020 0846  Last data filed at 2/18/2020 0700  Gross per 24 hour   Intake 340 ml   Output 1550 ml   Net -1210 ml     No intake/output data recorded.        02/16/20  0554 02/17/20  0500 02/18/20  0550   Weight: 98.7 kg (217 lb 8 oz) 98.7 kg (217 lb 9.6 oz) 98.2 kg (216 lb 6.4 oz)         Objective:  General Appearance:  Comfortable and in no acute distress.    Vital signs: (most recent): Blood pressure 108/56, pulse 67, temperature 98.8 °F (37.1 °C), temperature source Oral, resp. rate 16, height 165.1 cm (65\"), weight 98.2 kg (216 lb 6.4 oz), SpO2 95 %, not currently breastfeeding.  Vital signs are normal.  No fever.    Output: Producing urine.    Lungs:  Normal effort and normal respiratory rate.  There are decreased " breath sounds.    Heart: Normal rate.  Regular rhythm.  (SR on tele monitor)  Abdomen: Abdomen is soft.  Bowel sounds are normal.     Extremities: There is dependent edema.  (Trace edema bilateral LE)  Pulses: Distal pulses are intact.    Neurological: Patient is alert and oriented to person, place and time.    Skin:  Warm and dry.  (Mid sternal incision clean and dry)        Results Review:        WBC WBC   Date Value Ref Range Status   02/18/2020 9.36 3.40 - 10.80 10*3/mm3 Final   02/17/2020 10.62 3.40 - 10.80 10*3/mm3 Final   02/16/2020 12.82 (H) 3.40 - 10.80 10*3/mm3 Final      HGB Hemoglobin   Date Value Ref Range Status   02/18/2020 8.7 (L) 12.0 - 15.9 g/dL Final   02/17/2020 9.4 (L) 12.0 - 15.9 g/dL Final   02/16/2020 8.8 (L) 12.0 - 15.9 g/dL Final      HCT Hematocrit   Date Value Ref Range Status   02/18/2020 26.2 (L) 34.0 - 46.6 % Final   02/17/2020 28.9 (L) 34.0 - 46.6 % Final   02/16/2020 26.5 (L) 34.0 - 46.6 % Final      Platelets Platelets   Date Value Ref Range Status   02/18/2020 152 140 - 450 10*3/mm3 Final   02/17/2020 140 140 - 450 10*3/mm3 Final   02/16/2020 132 (L) 140 - 450 10*3/mm3 Final        PT/INR:  No results found for: PROTIME/No results found for: INR    Sodium Sodium   Date Value Ref Range Status   02/18/2020 139 136 - 145 mmol/L Final   02/17/2020 142 136 - 145 mmol/L Final   02/16/2020 138 136 - 145 mmol/L Final      Potassium Potassium   Date Value Ref Range Status   02/18/2020 3.8 3.5 - 5.2 mmol/L Final   02/17/2020 4.3 3.5 - 5.2 mmol/L Final   02/16/2020 4.5 3.5 - 5.2 mmol/L Final   02/16/2020 3.3 (L) 3.5 - 5.2 mmol/L Final      Chloride Chloride   Date Value Ref Range Status   02/18/2020 104 98 - 107 mmol/L Final   02/17/2020 107 98 - 107 mmol/L Final   02/16/2020 102 98 - 107 mmol/L Final      Bicarbonate CO2   Date Value Ref Range Status   02/18/2020 23.0 22.0 - 29.0 mmol/L Final   02/17/2020 22.0 22.0 - 29.0 mmol/L Final   02/16/2020 22.2 22.0 - 29.0 mmol/L Final      BUN BUN    Date Value Ref Range Status   02/18/2020 57 (H) 8 - 23 mg/dL Final   02/17/2020 63 (H) 8 - 23 mg/dL Final   02/16/2020 64 (H) 8 - 23 mg/dL Final      Creatinine Creatinine   Date Value Ref Range Status   02/18/2020 1.81 (H) 0.57 - 1.00 mg/dL Final   02/17/2020 2.30 (H) 0.57 - 1.00 mg/dL Final   02/16/2020 2.42 (H) 0.57 - 1.00 mg/dL Final      Calcium Calcium   Date Value Ref Range Status   02/18/2020 8.7 8.6 - 10.5 mg/dL Final   02/17/2020 8.8 8.6 - 10.5 mg/dL Final   02/16/2020 8.5 (L) 8.6 - 10.5 mg/dL Final      Magnesium Magnesium   Date Value Ref Range Status   02/16/2020 2.3 1.6 - 2.4 mg/dL Final            amiodarone 200 mg Oral Q24H   aspirin 81 mg Oral Daily   atorvastatin 40 mg Oral Nightly   carBAMazepine 300 mg Oral Q12H   cloNIDine 0.1 mg Oral Q12H   enoxaparin 30 mg Subcutaneous Daily   hydrALAZINE 25 mg Oral Q8H   insulin glargine 20 Units Subcutaneous QAM   insulin lispro 2-4 Units Subcutaneous 4x Daily AC & at Bedtime   levothyroxine 175 mcg Oral Q AM   metoprolol tartrate 12.5 mg Oral Q12H   mupirocin  Each Nare BID   pantoprazole 40 mg Oral Daily   polyethylene glycol 17 g Oral Daily   potassium chloride 10 mEq Oral Daily   Scopolamine 1 patch Transdermal Q72H   torsemide 20 mg Oral Daily       insulin 0-50 Units/hr Last Rate: 4.8 Units/hr (02/13/20 0615)   sodium chloride 9 mL/hr Last Rate: 9 mL/hr (02/12/20 1500)   sodium chloride 30 mL/hr        Patient Active Problem List   Diagnosis Code   • Asthma J45.909   • Type 2 diabetes mellitus with diabetic polyneuropathy, with long-term current use of insulin (CMS/[facility]) E11.42, Z79.4   • Essential hypertension I10   • Mixed hyperlipidemia E78.2   • Disorder of muscle, ligament, and fascia M62.9   • Proteinuria R80.9   • Postoperative hypothyroidism E89.0   • Hyperparathyroidism (CMS/[facility]) E21.3   • Vitamin D deficiency E55.9   • 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   • Solitary thyroid nodule E04.1   • Anemia in stage 3 chronic kidney disease (CMS/HCC) N18.3,  D63.1   • Stage 3 chronic kidney disease (CMS/[facility]) N18.3   • Class 1 obesity due to excess calories with serious comorbidity and body mass index (BMI) of 34.0 to 34.9 in adult E66.09, Z68.34   • Heartburn R12   • Stable angina (CMS/[facility]) I20.8   • 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 pectoris (CMS/[facility]) I25.118       Assessment & Plan   -Severe CAD with left main stenosis--s/p CABGx5, sternal closure with zip fix--POD#6 Cecelia  -Diabetes type 2  -NUHA on CKD stage III, baseline 1.6-1.8  -hypertension  -hypothyroidism- on synthroid  -hyperlipidemia   -post op anemia- expected acute blood loss  -leukocytosis- probably reactive; improved  -TCP- resolved     Creatinine 1.8 today--renal following; resumption of torsemide per renal  Encourage pulmonary toilet/mobilize  Short burst of atrial fibrillation, started on oral amiodarone  Will start on iron supplement  Overnight oximetry completed--will not need nocturnal oxygen at home  Okay for discharge from our standpoint  Follow up with Dr. Rai on 3/25/20 at 11am  Pain RX in chart  Home health orders placed  Sternal precautions reviewed    Solange Ceballos, TAYO  02/18/20  8:47AM

## 2020-02-18 NOTE — THERAPY TREATMENT NOTE
Patient Name: Ange Johnson  : 1940    MRN: [id number]                              Today's Date: 2020       Admit Date: 2020    Visit Dx:     ICD-10-CM ICD-9-CM   1. 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 pectoris, unspecified vessel or lesion type (CMS/[facility]) I25.118 414.01     413.9   2. Stable angina (CMS/[facility]) I20.8 413.9   3. S/P CABG (coronary artery bypass graft) Z95.1 V45.81     Patient Active Problem List   Diagnosis   • Asthma   • Type 2 diabetes mellitus with diabetic polyneuropathy, with long-term current use of insulin (CMS/[facility])   • Essential hypertension   • Mixed hyperlipidemia   • Disorder of muscle, ligament, and fascia   • Proteinuria   • Postoperative hypothyroidism   • Hyperparathyroidism (CMS/[facility])   • Vitamin D deficiency   • Gout without tophus   • Solitary thyroid nodule   • Anemia in stage 3 chronic kidney disease (CMS/HCC)   • Stage 3 chronic kidney disease (CMS/[facility])   • Class 1 obesity due to excess calories with serious comorbidity and body mass index (BMI) of 34.0 to 34.9 in adult   • Heartburn   • Stable angina (CMS/[facility])   • 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 pectoris (CMS/[facility])     Past Medical History:   Diagnosis Date   • Anemia in stage 3 chronic kidney disease (CMS/HCC) 2016   • Arthritis    • Asthma    • Bone spur of acromioclavicular joint    • Carotid atherosclerosis     <50% bilaterally   • Chronic venous insufficiency    • CKD (chronic kidney disease) stage 3, GFR 30-59 ml/min (CMS/[facility])    • Essential hypertension    • Gout    • Grief reaction       2010 after 15 foot fall off ladder   • H/O cataract    • Heel spur    • History of tendinitis    • Hyperlipidemia    • Hyperparathyroidism (CMS/HCC)    • Hyperthyroidism    • Hypothyroidism, acquired    • Non-toxic goiter    • Pneumonia    • Proteinuria    • Type 2 diabetes mellitus with neurological manifestations (CMS/[facility])    • Vitamin D deficiency       Past Surgical History:   Procedure Laterality Date   • BREAST EXCISIONAL BIOPSY Left 1965    Dr. Ybarra benign   • BREAST EXCISIONAL BIOPSY Bilateral 1965    benign   • CARDIAC CATHETERIZATION N/A 2/10/2020    Procedure: Left heart cath without LV gram;  Surgeon: Emerson Deras MD;  Location: [facility] CATH INVASIVE LOCATION;  Service: Cardiovascular;  Laterality: N/A;   • CARDIAC CATHETERIZATION N/A 2/10/2020    Procedure: Coronary angiography;  Surgeon: Emerson Deras MD;  Location: [facility] CATH INVASIVE LOCATION;  Service: Cardiovascular;  Laterality: N/A;   • CATARACT EXTRACTION Left 02/03/2010   • CATARACT EXTRACTION Right 04/21/2010   • CORONARY ARTERY BYPASS GRAFT N/A 2/12/2020    Procedure: MIDLINE STERNOTOMY, CORONARY ARTERY BYPASS GRAFTING X  5 USING LEFT INTERNAL MAMMARY ARTERY AND LEFT ENDOSCOPICALLY HARVESTED GREATER SAPHENOUS VEIN, INTRAOP KERWIN, PRP;  Surgeon: Jr Anthony Rai MD;  Location: [facility];  Service: Cardiothoracic;  Laterality: N/A;   • HYSTERECTOMY  1988    Dr. Quinn Peña   • OOPHORECTOMY      late 40's   • THYROIDECTOMY Right 12/8/2016    Procedure: right PARATHYROID EXCISION OF NODULE and right thyroid lobectomy and bilateral exploration.;  Surgeon: Tasneem Montelongo MD;  Location: [facility];  Service:      General Information     Row Name 02/18/20 0920          PT Evaluation Time/Intention    Document Type  therapy note (daily note)  -     Mode of Treatment  physical therapy  -     Row Name 02/18/20 0920          General Information    Existing Precautions/Restrictions  fall;sternal  -     Row Name 02/18/20 0920          Cognitive Assessment/Intervention- PT/OT    Orientation Status (Cognition)  oriented x 3  -       User Key  (r) = Recorded By, (t) = Taken By, (c) = Cosigned By    Initials Name Provider Type     Yodit Boland PTA Physical Therapy Assistant        Mobility     Row Name 02/18/20 0922          Bed Mobility Assessment/Treatment     Comment (Bed Mobility)  up in chair  -[facility] Name 02/18/20 0922          Sit-Stand Transfer    Sit-Stand Drew (Transfers)  minimum assist (75% patient effort);2 person assist  -     Assistive Device (Sit-Stand Transfers)  -- HHA  -[facility] Name 02/18/20 0922          Gait/Stairs Assessment/Training    Drew Level (Gait)  contact guard;minimum assist (75% patient effort)  -     Assistive Device (Gait)  -- HHA  -     Distance in Feet (Gait)  120  -     Pattern (Gait)  step-through  -     Deviations/Abnormal Patterns (Gait)  alisha decreased;stride length decreased  -     Bilateral Gait Deviations  forward flexed posture  -     Drew Level (Stairs)  contact guard  -     Handrail Location (Stairs)  right side (ascending)  -     Number of Steps (Stairs)  3  -SM     Ascending Technique (Stairs)  step-to-step  -SM     Descending Technique (Stairs)  step-to-step  -SM     Comment (Gait/Stairs)  required several standing rest breaks d/t SOA  -       User Key  (r) = Recorded By, (t) = Taken By, (c) = Cosigned By    Initials Name Provider Type    Yodit Sweeney PTA Physical Therapy Assistant        Obj/Interventions     [facility] Name 02/18/20 0924          Therapeutic Exercise    Comment (Therapeutic Exercise)  cardiac protocol x 10 reps  -       User Key  (r) = Recorded By, (t) = Taken By, (c) = Cosigned By    Initials Name Provider Type    Yodit Sweeney PTA Physical Therapy Assistant        Goals/Plan    No documentation.       Clinical Impression     Row Name 02/18/20 0924          Pain Assessment    Additional Documentation  Pain Scale: Numbers Pre/Post-Treatment (Group)  -SM     Row Name 02/18/20 0924          Pain Scale: Numbers Pre/Post-Treatment    Pain Scale: Numbers, Pretreatment  0/10 - no pain  -     Pain Scale: Numbers, Post-Treatment  0/10 - no pain  -SM     Row Name 02/18/20 0924          Positioning and Restraints    Pre-Treatment Position   sitting in chair/recliner  -SM     Post Treatment Position  chair  -SM     In Chair  sitting;call light within reach;encouraged to call for assist;with family/caregiver  -       User Key  (r) = Recorded By, (t) = Taken By, (c) = Cosigned By    Initials Name Provider Type    Yodit Sweeney PTA Physical Therapy Assistant        Outcome Measures     Row Name 02/18/20 0925          How much help from another person do you currently need...    Turning from your back to your side while in flat bed without using bedrails?  3  -SM     Moving from lying on back to sitting on the side of a flat bed without bedrails?  3  -SM     Moving to and from a bed to a chair (including a wheelchair)?  3  -SM     Standing up from a chair using your arms (e.g., wheelchair, bedside chair)?  3  -SM     Climbing 3-5 steps with a railing?  3  -SM     To walk in hospital room?  3  -SM     AM-PAC 6 Clicks Score (PT)  18  -SM     Row Name 02/18/20 0925          Functional Assessment    Outcome Measure Options  AM-PAC 6 Clicks Basic Mobility (PT)  -       User Key  (r) = Recorded By, (t) = Taken By, (c) = Cosigned By    Initials Name Provider Type    Yodit Sweeney PTA Physical Therapy Assistant          PT Recommendation and Plan     Outcome Summary/Treatment Plan (PT)  Anticipated Discharge Disposition (PT): home with assist, home with home health  Plan of Care Reviewed With: patient  Progress: improving  Outcome Summary: Pt tolerated treatment well this date. Ambulated 120ft w/ HHA-min A, then completed 3 stairs w/ CGA-min A. Cues to perform step-to pattern, and pt requiring several standing rest breaks throughout session d/t SOA/fatigue. PT will continue to address functional mobility deficits as tolerated.     Time Calculation:   PT Charges     Row Name 02/18/20 0928             Time Calculation    Start Time  0849  -SM      Stop Time  0906  -SM      Time Calculation (min)  17 min  -      PT Received On  02/18/20  -       PT - Next Appointment  02/19/20  -        User Key  (r) = Recorded By, (t) = Taken By, (c) = Cosigned By    Initials Name Provider Type    Yodit Sweeney PTA Physical Therapy Assistant        Therapy Charges for Today     Code Description Service Date Service Provider Modifiers Qty    50950314531 HC PT THER PROC EA 15 MIN 2/18/2020 Yodit Boland PTA GP 1    77902823759 HC PT THER SUPP EA 15 MIN 2/18/2020 Yodit Boland PTA GP 1          PT G-Codes  Outcome Measure Options: AM-PAC 6 Clicks Basic Mobility (PT)  AM-PAC 6 Clicks Score (PT): 18    Yodit Boland PTA  2/18/2020

## 2020-02-18 NOTE — DISCHARGE INSTR - ACTIVITY
Continue to use your incentive spirometer for an additional 2 weeks.  Continue to wear your ZEESHAN hose for an additional 2 weeks. You may remove them at night.  Walk 10 minutes at a time at least 3 times a day.  No driving until released by surgeons and no longer taking narcotics.  Do not lift, push or pull greater than 10 pounds for 6 weeks.  You may shower, but do not submerge your incisions until your surgeon approves (i.e., no baths, pools, hot tubs, etc.).  Clean your incision daily in the shower with Dial or Ivory soap. Do not put any additional lotions, creams, or any other substance on your incision without your surgeon's approval.

## 2020-02-18 NOTE — PROGRESS NOTES
Case Management Discharge Note      Final Note: Pt d/c home with [facility] scheduled to follow.      Provided post acute provider list?: Yes  Post Acute Provider List: Nursing Home, Home Health  Post Acute Provider Quality & Resource List: N/A  Delivered To: Patient  Method of Delivery: In person    Destination      No service has been selected for the patient.      Durable Medical Equipment      No service has been selected for the patient.      Dialysis/Infusion      No service has been selected for the patient.      Home Medical Care - Selection Complete      Service Provider Request Status Selected Services Address Phone Number Fax Number    [facility] Selected Home Health Services [address] [address] [phone]      Therapy      No service has been selected for the patient.      Community Resources      No service has been selected for the patient.        Transportation Services  Private: Car    Final Discharge Disposition Code: 06 - home with home health care

## 2020-02-18 NOTE — DISCHARGE SUMMARY
Hospital Discharge    Patient Name: Ange Johnson  Age/Sex: 79 y.o. female  : 1940  MRN: [id number]    Encounter Provider: TAYO Cardoza  Referring Provider: Victor Hugo Ng MD  Place of Service: [facility] CARDIOLOGY  Patient Care Team:  Quinn Washington MD as PCP - General (Family Medicine)  Hugh Caruso MD as PCP - Claims Attributed  Steve Simons Jr., MD as Consulting Physician (Hematology and Oncology)  Hugh Caruso MD as Consulting Physician (Nephrology)  Adalgisa Hernandez APRN as Nurse Practitioner (Endocrinology)  Reagan Beltran MD as Consulting Physician (Endocrinology)  Aguilar Goldman MD as Consulting Physician (Pulmonary Disease)  Mil Sr MD as Consulting Physician (Ophthalmology)  Tasneem Montelongo MD as Surgeon (General Surgery)  Victor Hugo Ng MD as Consulting Physician (Cardiology)         Date of Discharge:  2020   Date of Admit: 2020    Discharge Condition: Stable  Discharge Diagnosis:    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 pectoris (CMS/HCC)    Type 2 diabetes mellitus with diabetic polyneuropathy, with long-term current use of insulin (CMS/[facility])    Stage 3 chronic kidney disease (CMS/[facility])    Stable angina (CMS/[facility])      Hospital Course:   Ange Johnson is a 79 y.o. female who was evaluated by Dr. Ng on 20 for abnormal ECG and chest discomfort. She had an echo that was relatively normal for age and a perfusion stress test that was very abnormal with large amount of anterior ischemia. She was admitted on  for hydration due to chronic kidney disease. Coronary angiography done on 2/10 showed critical left main disease. On  she underwent CABG X 5 (LIMA to LAD, SVG to PDA, SVG to OM1, OM2, D1 - Dr. Rai). She had expected post op anemia requiring blood transfusion. She was temporarily junctional rhythm. She is currently sinus rhythm and tolerating low dose beta blocker. She had baseline sinus  bradycardia prior to surgery. Nephrology has followed along and she was started on oral torsemide yesterday. Renal function is stable. Endocrinology has also followed for diabetes/insulin management. We are awaiting their final recommendations. Patient has had myalgias in the past with statins. This was many years ago and daughter could not remember which statin. She has been tolerating atorvastatin. We will continue this at discharge and continue to monitor for side effects. Her blood pressure medications have also been adjusted post operatively. She will be on hydralazine, clonidine, and low dose metoprolol as she has been taking in the hospital. She has preserved LV function. We will continue to follow her blood pressure closely as an outpatient. Last night she had brief AF. She spontaneously converted to normal sinus rhythm. I have started low dose oral amiodarone to help maintain SR. She will get an EKG in one week at follow up appointment. Amiodarone will be a short term medications.     She will not requiring nocturnal oxygen. She has worked well with physical therapy. She is stable for discharge pending endocrinology final recommendations. She will see TAYO Barton in one week and Dr. Ng in 4 weeks. Home health, skilled nursing and physical therapy, will follow her at home.     Objective:  Temp:  [98.1 °F (36.7 °C)-98.8 °F (37.1 °C)] 98.8 °F (37.1 °C)  Heart Rate:  [] 70  Resp:  [16-18] 16  BP: (108-163)/(53-91) 159/81    Intake/Output Summary (Last 24 hours) at 2/18/2020 1043  Last data filed at 2/18/2020 0700  Gross per 24 hour   Intake 340 ml   Output 1250 ml   Net -910 ml     Body mass index is 36.01 kg/m².      02/16/20  0554 02/17/20  0500 02/18/20  0550   Weight: 98.7 kg (217 lb 8 oz) 98.7 kg (217 lb 9.6 oz) 98.2 kg (216 lb 6.4 oz)     Weight change: -0.544 kg (-1 lb 3.2 oz)    Physical Exam:  Constitutional: She is oriented to person, place, and time. She appears well-developed. She  does not appear ill.   HENT:   Head: Normocephalic and atraumatic. Head is without contusion.   Right Ear: Hearing normal. No drainage.   Left Ear: Hearing normal. No drainage.   Nose: No nasal deformity. No epistaxis.   Eyes: Lids are normal. Right eye exhibits no exudate. Left eye exhibits no exudate.  Neck: No JVD present. Carotid bruit is not present. No tracheal deviation present. No thyroid mass and no thyromegaly present.   Cardiovascular: Normal rate, regular rhythm and normal heart sounds.    Pulses:       Posterior tibial pulses are 2+ on the right side, and 2+ on the left side.   Pulmonary/Chest: Effort normal and breath sounds normal. median sternotomy , open to air. Well approximated  Abdominal: Soft. Normal appearance and bowel sounds are normal. There is no tenderness.   Musculoskeletal: Normal range of motion.        Right shoulder: She exhibits no deformity.        Left shoulder: She exhibits no deformity.   Neurological: She is alert and oriented to person, place, and time. She has normal strength. 1+ edema lower extremities  Skin: Skin is warm, dry and intact. No rash noted. left EVH site well approximated, no drainage.   Psychiatric: She has a normal mood and affect. Her behavior is normal. Thought content normal.   Vitals reviewed      Procedures Performed  Procedure(s):  MIDLINE STERNOTOMY, CORONARY ARTERY BYPASS GRAFTING X  5 USING LEFT INTERNAL MAMMARY ARTERY AND LEFT ENDOSCOPICALLY HARVESTED GREATER SAPHENOUS VEIN, INTRAOP KERWIN, PRP    CABG x5.  Skeletonized LIMA to mid LAD.  Vein graft to PDA.  Vein graft to OM1, OM 2 and D1.  Temporary cardiopulmonary bypass.  Antegrade and retrograde cold blood cardioplegia with warm reperfusion.  Neurologic monitoring.  Transesophageal echo.  Endoscopic vein harvest left greater saphenous vein.  Bilateral intercostal nerve blocks with Exparel.  Sternal closure with the zip fix plastic sternal closure system.    Cardiac cath 2/10/20     Hemodynamics:  1.   Aorta: 136/51, mean 83  2.  Left ventricle: 136/10     Cineangiography:  1.  Left main: The left main coronary artery narrows to approximately 90% just prior to the bifurcation.  2.  LAD: Anterior descending coronary artery is severely and diffusely diseased.  The proximal portion is narrowed 50 to 60% with post stenotic aneurysm formation.  The mid segment of the LAD is also critically narrowed after the origin of the diagonal branch.  The stenosis is approximately 90%.  Diagonal itself is severely narrowed up to 80 to 90%.  3.  LCx: The left circumflex coronary artery is involved in the left main stenosis.  In addition there is a 90% stenosis at the mid segment before the terminal marginal branch.  The first marginal branch arising from the proximal segment is small and narrowed 50 to 60% at the origin.  The second marginal branch arises from the mid segment is normal.  4.  RCA: The right coronary artery is a large dominant system.  The right coronary artery appears to be mildly narrowed at the origin.  The mid and distal segments are normal.  The posterior descending branch is large and narrowed 50% in the proximal portion.  The remainder of the vessel is normal.  The posterior lateral branches are normal.     Assessment:  1.  Ischemic heart disease:  Etiology: Coronary atherosclerosis  Anatomy: Severe two-vessel coronary disease including critical left main stenosis  Physiology: Unstable angina  Functional status: Severely compromised  Recommendations: CABG           Consults:  Consults     Date and Time Order Name Status Description    2/11/2020 1724 Inpatient Endocrinology Consult Completed     2/9/2020 1645 Inpatient Nephrology Consult Completed           Pertinent Test Results:  Results from last 7 days   Lab Units 02/18/20  0317 02/17/20  0337 02/16/20  1747 02/16/20  0342 02/15/20  0332 02/14/20  1759 02/14/20  0322 02/13/20  0314 02/12/20  1633  02/12/20  0429   SODIUM mmol/L 139 142  --  138 140  --  142  139 140   < > 142   POTASSIUM mmol/L 3.8 4.3 4.5 3.3* 4.2 4.1 3.4* 3.9 3.9   < > 3.5   CHLORIDE mmol/L 104 107  --  102 105  --  106 106 106   < > 104   CO2 mmol/L 23.0 22.0  --  22.2 20.3*  --  21.3* 22.5 22.5   < > 25.7   BUN mg/dL 57* 63*  --  64* 56*  --  41* 33* 34*   < > 40*   CREATININE mg/dL 1.81* 2.30*  --  2.42* 2.19*  --  1.93* 1.71* 1.78*   < > 1.83*   GLUCOSE mg/dL 122* 95  --  83 127*  --  117* 137* 104*   < > 63*   CALCIUM mg/dL 8.7 8.8  --  8.5* 8.9  --  9.1 8.2* 7.8*   < > 8.7   AST (SGOT) U/L  --   --   --   --   --   --   --   --   --   --  10   ALT (SGPT) U/L  --   --   --   --   --   --   --   --   --   --  11    < > = values in this interval not displayed.         Results from last 7 days   Lab Units 02/18/20 0317 02/17/20 0337 02/16/20  0342 02/15/20  0332 02/14/20  0322 02/13/20 0314 02/12/20  1633   WBC 10*3/mm3 9.36 10.62 12.82* 13.70* 13.39* 8.90 14.89*   HEMOGLOBIN g/dL 8.7* 9.4* 8.8* 8.9* 9.4* 7.6* 8.1*   HEMATOCRIT % 26.2* 28.9* 26.5* 26.4* 28.0* 23.1* 24.4*   PLATELETS 10*3/mm3 152 140 132* 124* 116* 107* 108*     Results from last 7 days   Lab Units 02/13/20  0314 02/12/20  1312   INR  1.19* 1.27*   APTT seconds  --  26.4     Results from last 7 days   Lab Units 02/16/20  0342 02/13/20  0314 02/12/20  1633 02/12/20  1312   MAGNESIUM mg/dL 2.3 2.2 1.9 1.9     Results from last 7 days   Lab Units 02/12/20  0429   CHOLESTEROL mg/dL 153   TRIGLYCERIDES mg/dL 137   HDL CHOL mg/dL 55     Results from last 7 days   Lab Units 02/12/20  0429   PROBNP pg/mL 1,218.0     Results from last 7 days   Lab Units 02/12/20  0429   TSH uIU/mL 2.350       Discharge Medications     Discharge Medications      New Medications      Instructions Start Date   amiodarone 200 MG tablet  Commonly known as:  PACERONE   200 mg, Oral, Every 24 Hours Scheduled   Start Date:  February 19, 2020     aspirin 81 MG EC tablet   81 mg, Oral, Daily   Start Date:  February 19, 2020     atorvastatin 40 MG tablet  Commonly  known as:  LIPITOR   40 mg, Oral, Nightly      metoprolol tartrate 25 MG tablet  Commonly known as:  LOPRESSOR   12.5 mg, Oral, Every 12 Hours Scheduled      torsemide 20 MG tablet  Commonly known as:  DEMADEX   20 mg, Oral, Daily   Start Date:  February 19, 2020     traMADol 50 MG tablet  Commonly known as:  ULTRAM   50 mg, Oral, Every 8 Hours PRN         Changes to Medications      Instructions Start Date   cloNIDine 0.1 MG tablet  Commonly known as:  CATAPRES  What changed:    · medication strength  · how much to take   0.1 mg, Oral, Every 12 Hours Scheduled      hydrALAZINE 25 MG tablet  Commonly known as:  APRESOLINE  What changed:    · medication strength  · how much to take   25 mg, Oral, 3 Times Daily         Continue These Medications      Instructions Start Date   carBAMazepine 300 MG 12 hr capsule  Commonly known as:  CARBATROL   300 mg, Oral, Every 12 Hours Scheduled      CONTOUR NEXT TEST test strip  Generic drug:  glucose blood   USE ONE STRIP TO TEST TWICE A DAY      febuxostat 40 MG tablet  Commonly known as:  ULORIC   40 mg, Oral, Daily      ferrous sulfate 325 (65 FE) MG tablet   1 tablet, Oral, Daily      Insulin Pen Needle 32G X 4 MM misc  Commonly known as:  BD PEN NEEDLE ISELA U/F   Use to inject 4 time daily      montelukast 10 MG tablet  Commonly known as:  SINGULAIR   10 mg, Daily      potassium chloride 10 MEQ CR capsule  Commonly known as:  MICRO-K   10 mEq, Oral, Daily      SYNTHROID 175 MCG tablet  Generic drug:  levothyroxine   175 mcg, Oral, Daily      vitamin B-12 1000 MCG tablet  Commonly known as:  CYANOCOBALAMIN   1 tablet, Oral, Daily      vitamin D 1.25 MG (35793 UT) capsule capsule  Commonly known as:  ERGOCALCIFEROL   TAKE ONE CAPSULE BY MOUTH ONCE WEEKLY         Stop These Medications    atenolol 25 MG tablet  Commonly known as:  TENORMIN     cetirizine 10 MG tablet  Commonly known as:  zyrTEC     diphenhydrAMINE 25 mg capsule  Commonly known as:  BENADRYL     ezetimibe 10 MG  tablet  Commonly known as:  ZETIA     icosapent ethyl 1 g capsule capsule  Commonly known as:  VASCEPA     LIVALO 4 MG tablet  Generic drug:  Pitavastatin Calcium     Olmesartan-amLODIPine-HCTZ 40-10-25 MG tablet        ASK your doctor about these medications      Instructions Start Date   Insulin Glargine 300 UNIT/ML solution pen-injector injection  Commonly known as:  TOUJEO MAX SOLOSTAR   100 Units, Subcutaneous, Daily      Insulin Lispro (1 Unit Dial) 100 UNIT/ML solution pen-injector  Commonly known as:  HUMALOG KWIKPEN   5 Units, Subcutaneous, 3 Times Daily With Meals      nateglinide 120 MG tablet  Commonly known as:  STARLIX   TAKE ONE TABLET BY MOUTH THREE TIMES A DAY BEFORE MEALS      ONGLYZA 2.5 MG tablet  Generic drug:  SAXagliptin   TAKE ONE TABLET BY MOUTH DAILY             Discharge Diet:    Dietary Orders (From admission, onward)     Start     Ordered    02/14/20 1705  Diet Regular; Thin; Cardiac, Consistent Carbohydrate  Diet Effective Now     Question Answer Comment   Diet Texture / Consistency Regular    Fluid Consistency Thin    Common Modifiers Cardiac    Common Modifiers Consistent Carbohydrate        02/14/20 1705                Activity at Discharge:   as tolerated. Activity restrictions per CT surgery    Discharge disposition: home with home health. She lives with her daughter, Henna.     Discharge Instructions and Follow ups:  Future Appointments   Date Time Provider Department Center   2/20/2020 10:45 AM [facility] 2 [facility]   2/25/2020  1:30 PM Samantha Lopez APRN [facility] None   3/13/2020 10:10 AM [facility] None   3/25/2020 11:00 AM Jr Anthony Rai MD [facility] CTS [facility] None   3/27/2020  3:30 PM Adalgisa Hernandez APRN [facility] None   4/20/2020  4:15 PM Quinn Washington MD [facility] None     Additional Instructions for the Follow-ups that You Need to Schedule     Ambulatory Referral to Cardiac Rehab   As directed      Call MD With Problems /  Concerns   As directed      Instructions:  Call office at [phone] for any drainage, increased redness, or fever over 100.5    Order Comments:  Instructions:  Call office at [phone] for any drainage, increased redness, or fever over 100.5          Discharge Follow-up with Specified Provider: Dr. Ng; 1 Month   As directed      To:  Dr. Ng    Follow Up:  1 Month         Discharge Follow-up with Specified Provider: Dr. Rai on 3/25/20 at 11am   As directed      To:  Dr. Rai on 3/25/20 at 11am    Follow Up Details:  bring all current medications to appointment         Discharge Follow-up with Specified Provider: TAYO Barton; 1 Week   As directed      To:  TAYO Barton    Follow Up:  1 Week         Referral to Home Health   As directed      Face to Face Visit Date:  2/18/2020    Follow-up provider for Plan of Care?:  I will be treating the patient on an ongoing basis.  Please send me the Plan of Care for signature.    Follow-up provider:  JR BUDDY RAI [[id number]]    Reason/Clinical Findings:  post op open heart surgery    Describe mobility limitations that make leaving home difficult:  post operative weakness    Nursing/Therapeutic Services Requested:  Skilled Nursing    Skilled nursing orders:  Post CABG care    Frequency:  1 Week 1            Contact information for follow-up providers     Quinn Washington MD Follow up.    Specialty:  Family Medicine  Why:  Follow up on 2/25/2020 at 1:30 pm with Samantha.   Contact information:  [address]  [facility] [id number] [phone]             Hugh Caruso MD. Go in 37 day(s).    Specialty:  Nephrology  Why:  Has appointment Thursday March 26 with TAYO Barrios 2 :30 pm.   Rajani 10 at 3 :20pm with Dr. Caruso.    Contact information:  [address]  [facility] [address]  [phone]                   Contact information for after-discharge care     Home Medical Care     [facility] .     Service:  Home Health Services  Contact information:  [address]  [facility] [address] [phone]                             Test Results Pending at Discharge: EKG in one week.      Sonia Daugherty, APRN  02/18/20  10:43 AM

## 2020-02-18 NOTE — PLAN OF CARE
Problem: Patient Care Overview  Goal: Plan of Care Review  Outcome: Ongoing (interventions implemented as appropriate)  Flowsheets (Taken 2/18/2020 3987)  Progress: improving  Plan of Care Reviewed With: patient  Outcome Summary: Pt tolerated treatment well this date. Ambulated 120ft w/ HHA-min A, then completed 3 stairs w/ CGA-min A. Cues to perform step-to pattern, and pt requiring several standing rest breaks throughout session d/t SOA/fatigue. PT will continue to address functional mobility deficits as tolerated.

## 2020-02-19 ENCOUNTER — TRANSITIONAL CARE MANAGEMENT TELEPHONE ENCOUNTER (OUTPATIENT)
Dept: FAMILY MEDICINE CLINIC | Facility: CLINIC | Age: 80
End: 2020-02-19

## 2020-02-19 ENCOUNTER — READMISSION MANAGEMENT (OUTPATIENT)
Dept: CALL CENTER | Facility: HOSPITAL | Age: 80
End: 2020-02-19

## 2020-02-19 NOTE — OUTREACH NOTE
Spoke with pt daughter Henna, who is with pt. States pt is weak from hospital stay, s/p x 5 CABG, CKD, but doing better. Her appetite is improving at home. No fever, chills, chest pain, SOB. No confusion. Rested better at home last night. Confirmed receipt and understanding of d/c orders, post op instructions, and all new medications. Daughter does decline to sched TCM [facility] at this time due to many upcoming specialists appts. Pt will keep April 2020 appt with Dr Washington.

## 2020-02-20 ENCOUNTER — APPOINTMENT (OUTPATIENT)
Dept: MAMMOGRAPHY | Facility: HOSPITAL | Age: 80
End: 2020-02-20

## 2020-02-20 ENCOUNTER — EPISODE CHANGES (OUTPATIENT)
Dept: CASE MANAGEMENT | Facility: OTHER | Age: 80
End: 2020-02-20

## 2020-02-20 ENCOUNTER — READMISSION MANAGEMENT (OUTPATIENT)
Dept: CALL CENTER | Facility: HOSPITAL | Age: 80
End: 2020-02-20

## 2020-02-20 NOTE — OUTREACH NOTE
CT Surgery Week 1 Survey      Responses   Facility patient discharged from?  [facility]   Does the patient have one of the following disease processes/diagnoses(primary or secondary)?  Cardiothoracic surgery   Is there a successful TCM telephone encounter documented?  Yes            Kelly Ritter RN

## 2020-02-20 NOTE — OUTREACH NOTE
Prep Survey      Responses   Facility patient discharged from?  [facility]   Is patient eligible?  Yes   Discharge diagnosis  CABGx5   Does the patient have one of the following disease processes/diagnoses(primary or secondary)?  Cardiothoracic surgery   Does the patient have Home health ordered?  Yes   What is the Home health agency?   [facility]   Is there a DME ordered?  No   Prep survey completed?  Yes          Kelly Ritter RN

## 2020-02-23 PROBLEM — I65.23 ATHEROSCLEROSIS OF BOTH CAROTID ARTERIES: Status: ACTIVE | Noted: 2020-02-23

## 2020-02-24 ENCOUNTER — LAB REQUISITION (OUTPATIENT)
Dept: LAB | Facility: HOSPITAL | Age: 80
End: 2020-02-24

## 2020-02-24 DIAGNOSIS — Z00.00 ENCOUNTER FOR GENERAL ADULT MEDICAL EXAMINATION WITHOUT ABNORMAL FINDINGS: ICD-10-CM

## 2020-02-24 LAB
ANION GAP SERPL CALCULATED.3IONS-SCNC: 12.3 MMOL/L (ref 5–15)
BUN BLD-MCNC: 23 MG/DL (ref 8–23)
BUN/CREAT SERPL: 14.3 (ref 7–25)
CALCIUM SPEC-SCNC: 9.2 MG/DL (ref 8.6–10.5)
CHLORIDE SERPL-SCNC: 96 MMOL/L (ref 98–107)
CO2 SERPL-SCNC: 28.7 MMOL/L (ref 22–29)
CREAT BLD-MCNC: 1.61 MG/DL (ref 0.57–1)
GFR SERPL CREATININE-BSD FRML MDRD: 31 ML/MIN/1.73
GLUCOSE BLD-MCNC: 250 MG/DL (ref 65–99)
POTASSIUM BLD-SCNC: 3.6 MMOL/L (ref 3.5–5.2)
SODIUM BLD-SCNC: 137 MMOL/L (ref 136–145)

## 2020-02-24 PROCEDURE — 80048 BASIC METABOLIC PNL TOTAL CA: CPT | Performed by: INTERNAL MEDICINE

## 2020-02-25 ENCOUNTER — READMISSION MANAGEMENT (OUTPATIENT)
Dept: CALL CENTER | Facility: HOSPITAL | Age: 80
End: 2020-02-25

## 2020-02-25 ENCOUNTER — OFFICE VISIT (OUTPATIENT)
Dept: CARDIOLOGY | Facility: CLINIC | Age: 80
End: 2020-02-25

## 2020-02-25 ENCOUNTER — HOSPITAL ENCOUNTER (OUTPATIENT)
Dept: CARDIOLOGY | Facility: HOSPITAL | Age: 80
Discharge: HOME | End: 2020-02-25
Admitting: NURSE PRACTITIONER

## 2020-02-25 VITALS
WEIGHT: 212.4 LBS | DIASTOLIC BLOOD PRESSURE: 90 MMHG | BODY MASS INDEX: 35.39 KG/M2 | HEART RATE: 66 BPM | HEIGHT: 65 IN | SYSTOLIC BLOOD PRESSURE: 170 MMHG

## 2020-02-25 DIAGNOSIS — E66.01 MORBIDLY OBESE (HCC): ICD-10-CM

## 2020-02-25 DIAGNOSIS — M79.605 DIFFUSE PAIN IN LEFT LOWER EXTREMITY: ICD-10-CM

## 2020-02-25 DIAGNOSIS — I97.89 POSTOPERATIVE ATRIAL FIBRILLATION (HCC): ICD-10-CM

## 2020-02-25 DIAGNOSIS — R60.0 LOWER EXTREMITY EDEMA: ICD-10-CM

## 2020-02-25 DIAGNOSIS — I45.10 RBBB (RIGHT BUNDLE BRANCH BLOCK): ICD-10-CM

## 2020-02-25 DIAGNOSIS — M79.605 DIFFUSE PAIN IN LEFT LOWER EXTREMITY: Primary | ICD-10-CM

## 2020-02-25 DIAGNOSIS — N18.30 STAGE 3 CHRONIC KIDNEY DISEASE (HCC): ICD-10-CM

## 2020-02-25 DIAGNOSIS — I25.10 CORONARY ARTERY DISEASE INVOLVING NATIVE CORONARY ARTERY OF NATIVE HEART WITHOUT ANGINA PECTORIS: ICD-10-CM

## 2020-02-25 DIAGNOSIS — I10 ESSENTIAL HYPERTENSION: ICD-10-CM

## 2020-02-25 DIAGNOSIS — I48.91 POSTOPERATIVE ATRIAL FIBRILLATION (HCC): ICD-10-CM

## 2020-02-25 DIAGNOSIS — M25.512 ACUTE PAIN OF LEFT SHOULDER: ICD-10-CM

## 2020-02-25 PROBLEM — I20.8 STABLE ANGINA (HCC): Status: RESOLVED | Noted: 2020-02-07 | Resolved: 2020-02-25

## 2020-02-25 PROBLEM — I25.118 CORONARY ARTERY DISEASE OF NATIVE HEART WITH STABLE ANGINA PECTORIS (HCC): Status: RESOLVED | Noted: 2020-02-07 | Resolved: 2020-02-25

## 2020-02-25 PROBLEM — I20.89 STABLE ANGINA: Status: RESOLVED | Noted: 2020-02-07 | Resolved: 2020-02-25

## 2020-02-25 LAB
BH CV LOW VAS LEFT GREATER SAPH BK VESSEL: 1
BH CV LOW VAS LEFT POPLITEAL SPONT: 1
BH CV LOW VAS RIGHT POPLITEAL SPONT: 1
BH CV LOWER VASCULAR LEFT COMMON FEMORAL AUGMENT: NORMAL
BH CV LOWER VASCULAR LEFT COMMON FEMORAL COMPETENT: NORMAL
BH CV LOWER VASCULAR LEFT COMMON FEMORAL COMPRESS: NORMAL
BH CV LOWER VASCULAR LEFT COMMON FEMORAL PHASIC: NORMAL
BH CV LOWER VASCULAR LEFT COMMON FEMORAL SPONT: NORMAL
BH CV LOWER VASCULAR LEFT DISTAL FEMORAL COMPRESS: NORMAL
BH CV LOWER VASCULAR LEFT GASTRONEMIUS COMPRESS: NORMAL
BH CV LOWER VASCULAR LEFT GREATER SAPH BK COMPRESS: NORMAL
BH CV LOWER VASCULAR LEFT GREATER SAPH BK THROMBUS: NORMAL
BH CV LOWER VASCULAR LEFT MID FEMORAL AUGMENT: NORMAL
BH CV LOWER VASCULAR LEFT MID FEMORAL COMPETENT: NORMAL
BH CV LOWER VASCULAR LEFT MID FEMORAL COMPRESS: NORMAL
BH CV LOWER VASCULAR LEFT MID FEMORAL PHASIC: NORMAL
BH CV LOWER VASCULAR LEFT MID FEMORAL SPONT: NORMAL
BH CV LOWER VASCULAR LEFT PERONEAL COMPRESS: NORMAL
BH CV LOWER VASCULAR LEFT POPLITEAL AUGMENT: NORMAL
BH CV LOWER VASCULAR LEFT POPLITEAL COMPETENT: NORMAL
BH CV LOWER VASCULAR LEFT POPLITEAL COMPRESS: NORMAL
BH CV LOWER VASCULAR LEFT POPLITEAL PHASIC: NORMAL
BH CV LOWER VASCULAR LEFT POPLITEAL SPONT: NORMAL
BH CV LOWER VASCULAR LEFT POSTERIOR TIBIAL COMPRESS: NORMAL
BH CV LOWER VASCULAR LEFT PROFUNDA FEMORAL COMPRESS: NORMAL
BH CV LOWER VASCULAR LEFT PROXIMAL FEMORAL COMPRESS: NORMAL
BH CV LOWER VASCULAR LEFT SAPHENOFEMORAL JUNCTION COMPRESS: NORMAL
BH CV LOWER VASCULAR RIGHT COMMON FEMORAL AUGMENT: NORMAL
BH CV LOWER VASCULAR RIGHT COMMON FEMORAL COMPETENT: NORMAL
BH CV LOWER VASCULAR RIGHT COMMON FEMORAL COMPRESS: NORMAL
BH CV LOWER VASCULAR RIGHT COMMON FEMORAL PHASIC: NORMAL
BH CV LOWER VASCULAR RIGHT COMMON FEMORAL SPONT: NORMAL
BH CV LOWER VASCULAR RIGHT DISTAL FEMORAL COMPRESS: NORMAL
BH CV LOWER VASCULAR RIGHT GASTRONEMIUS COMPRESS: NORMAL
BH CV LOWER VASCULAR RIGHT GREATER SAPH AK COMPRESS: NORMAL
BH CV LOWER VASCULAR RIGHT GREATER SAPH BK COMPRESS: NORMAL
BH CV LOWER VASCULAR RIGHT MID FEMORAL AUGMENT: NORMAL
BH CV LOWER VASCULAR RIGHT MID FEMORAL COMPETENT: NORMAL
BH CV LOWER VASCULAR RIGHT MID FEMORAL COMPRESS: NORMAL
BH CV LOWER VASCULAR RIGHT MID FEMORAL PHASIC: NORMAL
BH CV LOWER VASCULAR RIGHT MID FEMORAL SPONT: NORMAL
BH CV LOWER VASCULAR RIGHT PERONEAL COMPRESS: NORMAL
BH CV LOWER VASCULAR RIGHT POPLITEAL AUGMENT: NORMAL
BH CV LOWER VASCULAR RIGHT POPLITEAL COMPETENT: NORMAL
BH CV LOWER VASCULAR RIGHT POPLITEAL COMPRESS: NORMAL
BH CV LOWER VASCULAR RIGHT POPLITEAL PHASIC: NORMAL
BH CV LOWER VASCULAR RIGHT POPLITEAL SPONT: NORMAL
BH CV LOWER VASCULAR RIGHT POSTERIOR TIBIAL COMPRESS: NORMAL
BH CV LOWER VASCULAR RIGHT PROFUNDA FEMORAL COMPRESS: NORMAL
BH CV LOWER VASCULAR RIGHT PROXIMAL FEMORAL COMPRESS: NORMAL
BH CV LOWER VASCULAR RIGHT SAPHENOFEMORAL JUNCTION COMPRESS: NORMAL
BH CV POP FLUID COLLECT LEFT: 1
BH CV VAS POP FLUID COLLECTED: 1

## 2020-02-25 PROCEDURE — 93970 EXTREMITY STUDY: CPT

## 2020-02-25 PROCEDURE — 99214 OFFICE O/P EST MOD 30 MIN: CPT | Performed by: NURSE PRACTITIONER

## 2020-02-25 PROCEDURE — 93000 ELECTROCARDIOGRAM COMPLETE: CPT | Performed by: NURSE PRACTITIONER

## 2020-02-25 RX ORDER — HYDRALAZINE HYDROCHLORIDE 50 MG/1
50 TABLET, FILM COATED ORAL 3 TIMES DAILY
Qty: 180 TABLET | Refills: 0 | Status: SHIPPED | OUTPATIENT
Start: 2020-02-25 | End: 2021-01-14

## 2020-02-25 NOTE — PROGRESS NOTES
Date of Office Visit: 2020  Encounter Provider: TAYO Mcmillan  Place of Service: [facility] CARDIOLOGY  Patient Name: Ange Johnson  :1940  Primary Cardiologist: Dr. Victor Hugo Ng    Chief Complaint   Patient presents with   • Coronary Artery Disease   • Follow-up   :     Dear Dr. Washington,    HPI: Ange Johnson is a pleasant 79 y.o. female who presents today for hospital follow up.  She has a known history of stage III chronic kidney disease, hyperlipidemia, hyperlipidemia, and type 2 diabetes mellitus.    On 2020 she was evaluated by Victor Hugo Ng after she was noted to have chest pain and abnormal EKG.  An echocardiogram was relatively normal for age and perfusion stress test was abnormal.    On 2020 she was admitted for hydration due to chronic kidney disease and recommended to undergo cardiac catheterization.  Coronary angiogram completed 2/10/2020 showed critical left main disease.  On 2020 she underwent CABG x5 (LIMA to LAD, SVG to PDA, SVG to OM1, OM 2, and D1 by Dr. Rufus Rai.  Postoperatively she had a brief episode of atrial fibrillation and spontaneously converted to a normal sinus rhythm.  She was started on low-dose oral amiodarone to help maintain sinus rhythm and the medication was felt to be short-term.  She was discharged on 2020.    She presents today for hospital follow-up visit with her daughter accompanying her.  She is currently residing at home and her daughter is helping.  She is receiving services through home health and physical therapy.  She denies any incisional discomfort.  She is had a productive cough from sinus drainage, occasionally feels a faster heartbeat, and her lower extremity edema has improved.  She denies chest pain, shortness of air, PND, orthopnea, dizziness, syncope, or bleeding.  She reports some left shoulder discomfort since the surgery.  Today she has a chief concern that her left leg is painful at  rest and with walking.  That leg is slightly warm to touch and more swollen than the right.  Her blood pressure is elevated today and her daughter said the home health nurse got 168/64 the other day.    Past Medical History:   Diagnosis Date   • Anemia in stage 3 chronic kidney disease (CMS/HCC) 2016   • Arthritis    • Asthma    • Bone spur of acromioclavicular joint    • CAD (coronary artery disease)    • Carotid atherosclerosis     <50% bilaterally   • Chronic venous insufficiency    • CKD (chronic kidney disease) stage 3, GFR 30-59 ml/min (CMS/[facility])    • Essential hypertension    • Gout    • Grief reaction       2010 after 15 foot fall off ladder   • H/O cataract    • Heel spur    • History of tendinitis    • Hyperlipidemia    • Hyperparathyroidism (CMS/[facility])    • Hyperthyroidism    • Hypothyroidism, acquired    • Non-toxic goiter    • Pneumonia    • Proteinuria    • Type 2 diabetes mellitus with neurological manifestations (CMS/[facility])    • Vitamin D deficiency        Past Surgical History:   Procedure Laterality Date   • BREAST EXCISIONAL BIOPSY Left     Dr. Ybarra benign   • BREAST EXCISIONAL BIOPSY Bilateral     benign   • CARDIAC CATHETERIZATION N/A 2/10/2020    Procedure: Left heart cath without LV gram;  Surgeon: Emerson Deras MD;  Location: [facility] INVASIVE LOCATION;  Service: Cardiovascular;  Laterality: N/A;   • CARDIAC CATHETERIZATION N/A 2/10/2020    Procedure: Coronary angiography;  Surgeon: Emerson Deras MD;  Location: [facility] INVASIVE LOCATION;  Service: Cardiovascular;  Laterality: N/A;   • CATARACT EXTRACTION Left 2010   • CATARACT EXTRACTION Right 2010   • CORONARY ARTERY BYPASS GRAFT N/A 2020    Procedure: MIDLINE STERNOTOMY, CORONARY ARTERY BYPASS GRAFTING X  5 USING LEFT INTERNAL MAMMARY ARTERY AND LEFT ENDOSCOPICALLY HARVESTED GREATER SAPHENOUS VEIN, INTRAOP KERWIN, PRP;  Surgeon: Jr Anthony Rai MD;  Location: [facility] OR;   Service: Cardiothoracic;  Laterality: N/A;   • HYSTERECTOMY      Dr. Quinn Peña   • OOPHORECTOMY      late 40's   • THYROIDECTOMY Right 2016    Procedure: right PARATHYROID EXCISION OF NODULE and right thyroid lobectomy and bilateral exploration.;  Surgeon: Tasneem Montelongo MD;  Location[place] [facility] [place] [facility]  Service:        Social History     Socioeconomic History   • Marital status:      Spouse name: Not on file   • Number of children: 4   • Years of education: Not on file   • Highest education level: Not on file   Occupational History   • Occupation: Retired   Tobacco Use   • Smoking status: Never Smoker   • Smokeless tobacco: Never Used   Substance and Sexual Activity   • Alcohol use: No     Comment: Caffeine use: Decaf   • Drug use: No   • Sexual activity: Defer   Social History Narrative    4 children; one child .     Caffeine Use: decaf.        Family History   Problem Relation Age of Onset   • Diabetes Sister    • Diabetes Brother    • Hypertension Brother    • Kidney disease Brother    • Cervical cancer Mother    • Heart disease Sister    • Neuropathy Sister    • Diabetes Sister        The following portion of the patient's history were reviewed and updated as appropriate: past medical history, past surgical history, past social history, past family history, allergies, current medications, and problem list.    Review of Systems   Constitution: Negative for chills, diaphoresis, fever, malaise/fatigue, night sweats, weight gain and weight loss.   HENT: Negative for hearing loss, nosebleeds, sore throat and tinnitus.    Eyes: Negative for blurred vision, double vision, pain and visual disturbance.   Cardiovascular: Positive for leg swelling and palpitations. Negative for chest pain, claudication, cyanosis, dyspnea on exertion, irregular heartbeat, near-syncope, orthopnea, paroxysmal nocturnal dyspnea and syncope.   Respiratory: Positive for cough. Negative for hemoptysis, shortness of  breath, snoring and wheezing.    Endocrine: Negative for cold intolerance, heat intolerance and polyuria.   Hematologic/Lymphatic: Negative for bleeding problem. Does not bruise/bleed easily.   Skin: Negative for color change, dry skin, flushing and itching.   Musculoskeletal: Negative for falls, joint pain, joint swelling, muscle cramps, muscle weakness and myalgias.   Gastrointestinal: Negative for abdominal pain, constipation, heartburn, melena, nausea and vomiting.   Genitourinary: Negative for dysuria and hematuria.   Neurological: Negative for excessive daytime sleepiness, dizziness, light-headedness, loss of balance, numbness, paresthesias, seizures and vertigo.   Psychiatric/Behavioral: Negative for altered mental status, depression, memory loss and substance abuse. The patient does not have insomnia and is not nervous/anxious.    Allergic/Immunologic: Negative for environmental allergies.       Allergies   Allergen Reactions   • Codeine Nausea Only   • Hydrocodone-Acetaminophen Nausea Only and Other (See Comments)     Percocet and Vicodin; vertigo   • Meperidine Nausea Only and Nausea And Vomiting   • Morphine And Related Nausea Only   • Oxycodone-Acetaminophen Nausea Only   • Oxycodone-Acetaminophen Nausea Only and Other (See Comments)     dilzziness   • Oxycodone-Aspirin Nausea Only and Other (See Comments)     vertigo   • Nickel Rash   • Penicillins Hives   • Shrimp Rash   • Sulfa Antibiotics Hives         Current Outpatient Medications:   •  amiodarone (PACERONE) 200 MG tablet, Take 1 tablet by mouth Daily., Disp: 30 tablet, Rfl: 0  •  aspirin 81 MG EC tablet, Take 1 tablet by mouth Daily., Disp: 30 tablet, Rfl: 11  •  atorvastatin (LIPITOR) 40 MG tablet, Take 1 tablet by mouth Every Night., Disp: 30 tablet, Rfl: 11  •  carBAMazepine (CARBATROL) 300 MG 12 hr capsule, Take 1 capsule by mouth Every 12 (Twelve) Hours for 180 days., Disp: 180 capsule, Rfl: 1  •  cloNIDine (CATAPRES) 0.1 MG tablet, Take 1  tablet by mouth Every 12 (Twelve) Hours., Disp: 60 tablet, Rfl: 4  •  CONTOUR NEXT TEST test strip, USE ONE STRIP TO TEST TWICE A DAY, Disp: 200 each, Rfl: 0  •  febuxostat (ULORIC) 40 MG tablet, Take 1 tablet by mouth Daily for 180 days., Disp: 90 tablet, Rfl: 1  •  ferrous sulfate 325 (65 FE) MG tablet, Take 1 tablet by mouth daily., Disp: , Rfl:   •  hydrALAZINE (APRESOLINE) 25 MG tablet, Take 1 tablet by mouth 3 (Three) Times a Day for 180 days., Disp: 270 tablet, Rfl: 1  •  insulin glargine (LANTUS) 100 UNIT/ML injection, Inject 20 Units under the skin into the appropriate area as directed Every Morning., Disp: 3 mL, Rfl: 12  •  Insulin Lispro, 1 Unit Dial, (HUMALOG KWIKPEN) 100 UNIT/ML solution pen-injector, Inject 5 Units under the skin into the appropriate area as directed 3 (Three) Times a Day With Meals., Disp: 15 mL, Rfl: 2  •  Insulin Pen Needle (BD PEN NEEDLE ISELA U/F) 32G X 4 MM misc, Use to inject 4 time daily, Disp: 200 each, Rfl: 5  •  metoprolol tartrate (LOPRESSOR) 25 MG tablet, Take 0.5 tablets by mouth Every 12 (Twelve) Hours., Disp: 30 tablet, Rfl: 5  •  montelukast (SINGULAIR) 10 MG tablet, 10 mg daily., Disp: , Rfl:   •  potassium chloride (MICRO-K) 10 MEQ CR capsule, Take 10 mEq by mouth Daily., Disp: , Rfl:   •  SYNTHROID 175 MCG tablet, Take 1 tablet by mouth Daily., Disp: 90 tablet, Rfl: 2  •  torsemide (DEMADEX) 20 MG tablet, Take 1 tablet by mouth Daily., Disp: 30 tablet, Rfl: 5  •  traMADol (ULTRAM) 50 MG tablet, Take 1 tablet by mouth Every 8 (Eight) Hours As Needed for Moderate Pain  for up to 7 days., Disp: 20 tablet, Rfl: 0  •  vitamin B-12 (CYANOCOBALAMIN) 1000 MCG tablet, Take 1 tablet by mouth daily., Disp: , Rfl:   •  vitamin D (ERGOCALCIFEROL) 1.25 MG (98601 UT) capsule capsule, TAKE ONE CAPSULE BY MOUTH ONCE WEEKLY, Disp: 12 capsule, Rfl: 0        Objective:     Vitals:    02/25/20 1459 02/25/20 1500   BP: 170/70 170/90   BP Location: Left arm Right arm   Pulse: 66    Weight:  "96.3 kg (212 lb 6.4 oz)    Height: 165.1 cm (65\")      Body mass index is 35.35 kg/m².    PHYSICAL EXAM:    Vitals Reviewed.   General Appearance: No acute distress, well developed and well nourished. Obese.   Eyes: Conjunctiva and lids: No erythema, swelling, or discharge. Sclera non-icteric.   HENT: Atraumatic, normocephalic. External eyes, ears, and nose normal. No hearing loss noted. Mucous membranes normal. Lips not cyanotic. Neck supple with no tenderness.  Respiratory: No signs of respiratory distress. Respiration rhythm and depth normal.   Clear to auscultation. No rales, crackles, rhonchi, or wheezing auscultated.   Cardiovascular:  Jugular Venous Pressure: Normal  Heart Rate and Rhythm: Normal, Heart Sounds: Normal S1 and S2. No S3 or S4 noted.  Murmurs: No murmurs noted. No rubs, thrills, or gallops.   Chest incision and stab wounds-scabbed over and clean dry and intact.  Lower Extremities: Bilateral lower extremity edema noted; left greater than right.  Left leg incision clean dry and intact.  Warm to touch.  Gastrointestinal:  Abdomen soft, non-distended, non-tender. Normal bowel sounds. No hepatomegaly.   Musculoskeletal: Normal movement of extremities  Skin and Nails: General appearance normal. No pallor, cyanosis, diaphoresis. Skin temperature normal. No clubbing of fingernails.   Psychiatric: Patient alert and oriented to person, place, and time. Speech and behavior appropriate. Normal mood and affect.       ECG 12 Lead  Date/Time: 2/25/2020 3:03 PM  Performed by: Mary Calvillo APRN  Authorized by: Mary Calvillo APRN   Comparison: compared with previous ECG from 2/17/2020  Similar to previous ECG  Rhythm: sinus rhythm  Rate: normal  BPM: 72  Conduction: right bundle branch block  ST Segments: ST segments normal  T Waves: T waves normal  QRS axis: normal  Other: no other findings    Clinical impression: abnormal EKG              Assessment:       Diagnosis Plan   1. Diffuse pain in left lower " extremity  Duplex Venous Lower Extremity - Bilateral CAR   2. Lower extremity edema  Duplex Venous Lower Extremity - Bilateral CAR   3. Coronary artery disease involving native coronary artery of native heart without angina pectoris     4. Essential hypertension     5. Postoperative atrial fibrillation (CMS/[facility])     6. Stage 3 chronic kidney disease (CMS/[facility])     7. RBBB (right bundle branch block)     8. Acute pain of left shoulder     9. Morbidly obese (CMS/[facility])            Plan:       1/2.  Left Leg Pain and Swelling: I have recommended a venous duplex to be completed.  This may be from the vein harvesting, but the left leg swelling is greater than the right and warm to touch.  She has been compliant with compression stockings.    3.  Coronary Artery Disease: Status post CABG x6.  She is actually doing very well postoperatively and receiving services through home health.  When home health releases her I recommended a referral to cardiac rehab.  Continue aspirin, atorvastatin, and metoprolol.    4.  Hypertension: Blood pressure elevated and she had multiple medication changes during her hospitalization.  I recommended increasing her hydralazine to 50 mg 3 times daily.    5.  Postoperative Atrial Fibrillation: Continue amiodarone at this time.  We can reassess on her next visit.    6.  Chronic Kidney Disease: Follows with Dr. Hugh Caruso.  Her creatinine has improved according to yesterday's labs and is now 1.6.    7.  Right Bundle Branch Block: Chronic and unchanged.    8.  Left Shoulder Discomfort: Most likely positional from the surgery.  If it continues, I recommended follow-up with her PCP.    9.  Morbidly Obese: BMI is 35.3. She would benefit from exercise, weight loss, and low-sodium/heart healthy diet.    10.  She is having trouble staying asleep and I recommended trying low-dose melatonin.    11. I reviewed the venous duplex and further recommendations to follow.  I have recommended follow-up with Dr. Gay  Hernandez in 6 weeks, unless otherwise needed sooner.    ADDENDUM 2/26/2020:    Venous Duplex interpretation Summary 2/25/2020    · Right and left popliteal fossa fluid collection. Fluid left thigh and calf.  · Acute left lower extremity superficial thrombophlebitis noted in the greater saphenous (below knee).  · All other veins appeared normal bilaterally.     · I discussed the plan of care with Dr. Victor Hugo Ng and she is recommended that I contact Dr. Rufus Rai.  · Dr. Rai recommended elevation of legs, compression stockings, starting apixaban 2.5 mg twice per day, and a repeat venous duplex on 2/27/2020.  · I spoke with daughter, Henna via telephone about results and she verbalizes understanding.  Samples were left at the  and she will  the apixaban.    As always, it has been a pleasure to participate in your patient's care. Thank you.       Sincerely,         TAYO Barton        Dictated utilizing Dragon dictation

## 2020-02-25 NOTE — PROGRESS NOTES
When will the duplex be performed?  Any concern for cellulitis?  When does she see CT surgery? I usually want them aware of these complications this soon after surgery.    ALEC

## 2020-02-25 NOTE — OUTREACH NOTE
CT Surgery Week 2 Survey      Responses   Facility patient discharged from?  [facility]   Does the patient have one of the following disease processes/diagnoses(primary or secondary)?  Cardiothoracic surgery   Week 2 attempt successful?  Yes   Call start time  1146   Call end time  1151   Discharge diagnosis  CABGx5   Meds reviewed with patient/caregiver?  Yes   Is the patient taking all medications as directed (includes completed medication regime)?  Yes   Does the patient have a primary care provider?   Yes   Does the patient have an appointment scheduled with their C/T surgeon?  Yes   Has the patient kept scheduled appointments due by today?  N/A   What is the Home health agency?   [facility]   Has home health visited the patient within 72 hours of discharge?  Yes   Home health comments  HH coming in twice per week. PT coming in   Psychosocial issues?  No   Did the patient receive a copy of their discharge instructions?  Yes   Nursing interventions  Reviewed instructions with patient   What is the patient's perception of their health status since discharge?  Improving   Nursing interventions  Nurse provided patient education   Is the patient/caregiver able to teach back normal signs of recovery?  Pain or discomfort at incisional site   Nursing interventions  Reassured on normal signs of recovery   Is the patient /caregiver able to teach back basic post-op care?  Practice 'cough and deep breath', Drive as instructed by MD in discharge instructions, Take showers only when approved by MD-sponge bathe until then, No tub bath, swimming, or hot tub until instructed by MD, Lifting as instructed by MD in discharge instructions   Is the patient/caregiver able to teach back signs and symptoms of incisional infection?  Increased redness, swelling or pain at the incisonal site, Increased drainage or bleeding, Incisional warmth, Pus or odor from incision, Fever   Is the patient/caregiver able to teach back steps to recovery at  home?  Rest and rebuild strength, gradually increase activity   Is the patient /caregiver able to teach back the importance of cardiac rehab?  Yes   Nursing interventions  Provided education on importance of cardiac rehab   Is the patient/caregiver able to teach back the hierarchy of who to call/visit for symptoms/problems? PCP, Specialist, Home health nurse, Urgent Care, ED, 911  Yes   Additional teach back comments  pt states she is doing well and incision looks good.    Week 2 call completed?  Yes          Arianna Andrew RN

## 2020-02-26 ENCOUNTER — TELEPHONE (OUTPATIENT)
Dept: CARDIOLOGY | Facility: CLINIC | Age: 80
End: 2020-02-26

## 2020-02-26 DIAGNOSIS — I80.3 THROMBOPHLEBITIS LEG: Primary | ICD-10-CM

## 2020-02-26 NOTE — TELEPHONE ENCOUNTER
Venous Duplex Results:    Result Text     · Right and left popliteal fossa fluid collection. Fluid left thigh and calf.  · Acute left lower extremity superficial thrombophlebitis noted in the greater saphenous (below knee).  · All other veins appeared normal bilaterally.     I discussed the results with Victor Hugo Ng and she recommended that I call Dr. Rufus Rai.    I spoke with Dr. Suero via telephone about results.  He is recommended that she continue with compression stockings and elevation of legs.  He wants a repeat venous duplex tomorrow and our hospital  will arrange.  In addition, he wants to start her on apixaban 2.5 mg twice per day and arrange a follow-up appointment with him in the office next week.    I spoke with Henna, the daughter and she will  the samples at the  of apixaban today.  We will call Dr. Rai's office and have them call to make the next appointment.    Naa-please arrange a venous duplex to be completed tomorrow.

## 2020-02-26 NOTE — TELEPHONE ENCOUNTER
Spoke with Henna and informed her that pt is scheduled for her doppler tomorrow at 12:30 and to see Dr. Rai on 3/4/2020 @ 11:00

## 2020-02-27 ENCOUNTER — HOSPITAL ENCOUNTER (OUTPATIENT)
Dept: CARDIOLOGY | Facility: HOSPITAL | Age: 80
Discharge: HOME | End: 2020-02-27
Admitting: NURSE PRACTITIONER

## 2020-02-27 DIAGNOSIS — I80.3 THROMBOPHLEBITIS LEG: ICD-10-CM

## 2020-02-27 LAB
BH CV LOW VAS LEFT GREATER SAPH BK VESSEL: 1
BH CV LOW VAS RIGHT GREATER SAPH BK VESSEL: 1
BH CV LOWER VASCULAR LEFT COMMON FEMORAL AUGMENT: NORMAL
BH CV LOWER VASCULAR LEFT COMMON FEMORAL COMPETENT: NORMAL
BH CV LOWER VASCULAR LEFT COMMON FEMORAL COMPRESS: NORMAL
BH CV LOWER VASCULAR LEFT COMMON FEMORAL PHASIC: NORMAL
BH CV LOWER VASCULAR LEFT COMMON FEMORAL SPONT: NORMAL
BH CV LOWER VASCULAR LEFT DISTAL FEMORAL COMPRESS: NORMAL
BH CV LOWER VASCULAR LEFT GASTRONEMIUS COMPRESS: NORMAL
BH CV LOWER VASCULAR LEFT GREATER SAPH AK COMPRESS: NORMAL
BH CV LOWER VASCULAR LEFT GREATER SAPH BK COMPRESS: NORMAL
BH CV LOWER VASCULAR LEFT GREATER SAPH BK THROMBUS: NORMAL
BH CV LOWER VASCULAR LEFT MID FEMORAL AUGMENT: NORMAL
BH CV LOWER VASCULAR LEFT MID FEMORAL COMPETENT: NORMAL
BH CV LOWER VASCULAR LEFT MID FEMORAL COMPRESS: NORMAL
BH CV LOWER VASCULAR LEFT MID FEMORAL PHASIC: NORMAL
BH CV LOWER VASCULAR LEFT MID FEMORAL SPONT: NORMAL
BH CV LOWER VASCULAR LEFT PERONEAL COMPRESS: NORMAL
BH CV LOWER VASCULAR LEFT POPLITEAL AUGMENT: NORMAL
BH CV LOWER VASCULAR LEFT POPLITEAL COMPETENT: NORMAL
BH CV LOWER VASCULAR LEFT POPLITEAL COMPRESS: NORMAL
BH CV LOWER VASCULAR LEFT POPLITEAL PHASIC: NORMAL
BH CV LOWER VASCULAR LEFT POPLITEAL SPONT: NORMAL
BH CV LOWER VASCULAR LEFT POSTERIOR TIBIAL COMPRESS: NORMAL
BH CV LOWER VASCULAR LEFT PROFUNDA FEMORAL COMPRESS: NORMAL
BH CV LOWER VASCULAR LEFT PROXIMAL FEMORAL COMPRESS: NORMAL
BH CV LOWER VASCULAR LEFT SAPHENOFEMORAL JUNCTION COMPRESS: NORMAL
BH CV LOWER VASCULAR RIGHT COMMON FEMORAL AUGMENT: NORMAL
BH CV LOWER VASCULAR RIGHT COMMON FEMORAL COMPETENT: NORMAL
BH CV LOWER VASCULAR RIGHT COMMON FEMORAL COMPRESS: NORMAL
BH CV LOWER VASCULAR RIGHT COMMON FEMORAL PHASIC: NORMAL
BH CV LOWER VASCULAR RIGHT COMMON FEMORAL SPONT: NORMAL
BH CV LOWER VASCULAR RIGHT DISTAL FEMORAL COMPRESS: NORMAL
BH CV LOWER VASCULAR RIGHT GASTRONEMIUS COMPRESS: NORMAL
BH CV LOWER VASCULAR RIGHT GREATER SAPH AK COMPRESS: NORMAL
BH CV LOWER VASCULAR RIGHT GREATER SAPH BK COMPRESS: NORMAL
BH CV LOWER VASCULAR RIGHT GREATER SAPH BK THROMBUS: NORMAL
BH CV LOWER VASCULAR RIGHT MID FEMORAL AUGMENT: NORMAL
BH CV LOWER VASCULAR RIGHT MID FEMORAL COMPETENT: NORMAL
BH CV LOWER VASCULAR RIGHT MID FEMORAL COMPRESS: NORMAL
BH CV LOWER VASCULAR RIGHT MID FEMORAL PHASIC: NORMAL
BH CV LOWER VASCULAR RIGHT MID FEMORAL SPONT: NORMAL
BH CV LOWER VASCULAR RIGHT PERONEAL COMPRESS: NORMAL
BH CV LOWER VASCULAR RIGHT POPLITEAL AUGMENT: NORMAL
BH CV LOWER VASCULAR RIGHT POPLITEAL COMPETENT: NORMAL
BH CV LOWER VASCULAR RIGHT POPLITEAL COMPRESS: NORMAL
BH CV LOWER VASCULAR RIGHT POPLITEAL PHASIC: NORMAL
BH CV LOWER VASCULAR RIGHT POPLITEAL SPONT: NORMAL
BH CV LOWER VASCULAR RIGHT POSTERIOR TIBIAL COMPRESS: NORMAL
BH CV LOWER VASCULAR RIGHT PROFUNDA FEMORAL COMPRESS: NORMAL
BH CV LOWER VASCULAR RIGHT PROXIMAL FEMORAL COMPRESS: NORMAL
BH CV LOWER VASCULAR RIGHT SAPHENOFEMORAL JUNCTION COMPRESS: NORMAL

## 2020-02-27 PROCEDURE — 93970 EXTREMITY STUDY: CPT

## 2020-03-02 ENCOUNTER — LAB REQUISITION (OUTPATIENT)
Dept: LAB | Facility: HOSPITAL | Age: 80
End: 2020-03-02

## 2020-03-02 DIAGNOSIS — E11.9 TYPE 2 DIABETES MELLITUS WITHOUT COMPLICATION, WITH LONG-TERM CURRENT USE OF INSULIN (HCC): ICD-10-CM

## 2020-03-02 DIAGNOSIS — Z00.00 ENCOUNTER FOR GENERAL ADULT MEDICAL EXAMINATION WITHOUT ABNORMAL FINDINGS: ICD-10-CM

## 2020-03-02 DIAGNOSIS — Z79.4 TYPE 2 DIABETES MELLITUS WITHOUT COMPLICATION, WITH LONG-TERM CURRENT USE OF INSULIN (HCC): ICD-10-CM

## 2020-03-02 LAB
ANION GAP SERPL CALCULATED.3IONS-SCNC: 13.4 MMOL/L (ref 5–15)
BUN BLD-MCNC: 16 MG/DL (ref 8–23)
BUN/CREAT SERPL: 9.5 (ref 7–25)
CALCIUM SPEC-SCNC: 8.6 MG/DL (ref 8.6–10.5)
CHLORIDE SERPL-SCNC: 96 MMOL/L (ref 98–107)
CO2 SERPL-SCNC: 26.6 MMOL/L (ref 22–29)
CREAT BLD-MCNC: 1.68 MG/DL (ref 0.57–1)
GFR SERPL CREATININE-BSD FRML MDRD: 29 ML/MIN/1.73
GLUCOSE BLD-MCNC: 160 MG/DL (ref 65–99)
POTASSIUM BLD-SCNC: 3.2 MMOL/L (ref 3.5–5.2)
SODIUM BLD-SCNC: 136 MMOL/L (ref 136–145)

## 2020-03-02 PROCEDURE — 80048 BASIC METABOLIC PNL TOTAL CA: CPT | Performed by: INTERNAL MEDICINE

## 2020-03-02 RX ORDER — PERPHENAZINE 16 MG/1
TABLET, FILM COATED ORAL
Qty: 100 EACH | Refills: 0 | Status: SHIPPED | OUTPATIENT
Start: 2020-03-02 | End: 2020-05-26

## 2020-03-04 ENCOUNTER — OFFICE VISIT (OUTPATIENT)
Dept: CARDIAC SURGERY | Facility: CLINIC | Age: 80
End: 2020-03-04

## 2020-03-04 VITALS
WEIGHT: 204.6 LBS | OXYGEN SATURATION: 96 % | SYSTOLIC BLOOD PRESSURE: 139 MMHG | BODY MASS INDEX: 32.88 KG/M2 | TEMPERATURE: 98.4 F | HEIGHT: 66 IN | RESPIRATION RATE: 20 BRPM | HEART RATE: 60 BPM | DIASTOLIC BLOOD PRESSURE: 70 MMHG

## 2020-03-04 DIAGNOSIS — Z95.1 S/P CABG (CORONARY ARTERY BYPASS GRAFT): ICD-10-CM

## 2020-03-04 PROCEDURE — 99024 POSTOP FOLLOW-UP VISIT: CPT | Performed by: NURSE PRACTITIONER

## 2020-03-04 NOTE — PROGRESS NOTES
"CARDIOVASCULAR SURGERY FOLLOW-UP PROGRESS NOTE  Chief Complaint: post op        HPI:   Dear Quinn Snider MD and colleagues:    It was nice to see Ange Johnson in follow up 3 weeks after surgery.  As you know, she is a 79 y.o. female with CAD who underwent CABGx5. She did well postoperatively and continues to do well. She comes in today for us to check her left lower leg.  She had a doppler on 2/27, there was an acute superficial thrombophlebitis, Dr. Rai felt this should be treated.  She was started on eliquis.  Her leg looks good.  Her activity level has been good.       Physical Exam:         /70 (BP Location: Right arm, Patient Position: Sitting, Cuff Size: Adult)   Pulse 60   Temp 98.4 °F (36.9 °C) (Oral)   Resp 20   Ht 167.6 cm (66\")   Wt 92.8 kg (204 lb 9.6 oz)   SpO2 96%   BMI 33.02 kg/m²   Heart:  regular rate and rhythm  Lungs:  clear to auscultation bilaterally  Extremities:  no edema  Incision(s):  sternum stable    Assessment/Plan:     S/P CABG. Overall, she is doing well.    No significant post-op complications    OK to drive if not taking narcotic pain medicine  OK to begin cardiac rehab  Follow-up as scheduled with cardiology  Follow-up as scheduled with PCP  Follow-up with CT surgery on 3/25      Thank you for allowing me to participate in the care of your   patient.  Regards,  TAYO Welch      "

## 2020-03-09 ENCOUNTER — LAB REQUISITION (OUTPATIENT)
Dept: LAB | Facility: HOSPITAL | Age: 80
End: 2020-03-09

## 2020-03-09 DIAGNOSIS — N18.30 CHRONIC KIDNEY DISEASE, STAGE 3 (MODERATE): ICD-10-CM

## 2020-03-09 LAB
ANION GAP SERPL CALCULATED.3IONS-SCNC: 13.4 MMOL/L (ref 5–15)
BUN BLD-MCNC: 17 MG/DL (ref 8–23)
BUN/CREAT SERPL: 9.8 (ref 7–25)
CALCIUM SPEC-SCNC: 9.1 MG/DL (ref 8.6–10.5)
CHLORIDE SERPL-SCNC: 95 MMOL/L (ref 98–107)
CO2 SERPL-SCNC: 28.6 MMOL/L (ref 22–29)
CREAT BLD-MCNC: 1.73 MG/DL (ref 0.57–1)
GFR SERPL CREATININE-BSD FRML MDRD: 28 ML/MIN/1.73
GLUCOSE BLD-MCNC: 203 MG/DL (ref 65–99)
POTASSIUM BLD-SCNC: 3.3 MMOL/L (ref 3.5–5.2)
SODIUM BLD-SCNC: 137 MMOL/L (ref 136–145)

## 2020-03-09 PROCEDURE — 80048 BASIC METABOLIC PNL TOTAL CA: CPT | Performed by: INTERNAL MEDICINE

## 2020-03-13 ENCOUNTER — READMISSION MANAGEMENT (OUTPATIENT)
Dept: CALL CENTER | Facility: HOSPITAL | Age: 80
End: 2020-03-13

## 2020-03-13 NOTE — OUTREACH NOTE
CT Surgery Week 4 Survey      Responses   [facility] patient discharged from?  [place]   Does the patient have one of the following disease processes/diagnoses(primary or secondary)?  Cardiothoracic surgery   Week 4 attempt successful?  Yes   Call start time  1455   Call end time  1457   Discharge diagnosis  CABGx5   Has the patient kept scheduled appointments due by today?  Yes   What is the patient's perception of their health status since discharge?  Improving   Is the patient/caregiver able to teach back the hierarchy of who to call/visit for symptoms/problems? PCP, Specialist, Home health nurse, Urgent Care, ED, 911  Yes   Additional teach back comments  Patient states she is doing well and healing up good, no questions or concerns at this time.   Week 4 Call Completed?  Yes   Would the patient like one additional call?  No   Graduated  Yes   Did the patient feel the follow up calls were helpful during their recovery period?  Yes   Was the number of calls appropriate?  Yes          Tatianna Rivera RN

## 2020-03-14 LAB
25(OH)D3+25(OH)D2 SERPL-MCNC: 36.3 NG/ML (ref 30–100)
ALBUMIN SERPL-MCNC: 3.5 G/DL (ref 3.5–5.2)
ALBUMIN/GLOB SERPL: 1.3 G/DL
ALP SERPL-CCNC: 133 U/L (ref 39–117)
ALT SERPL-CCNC: 11 U/L (ref 1–33)
AST SERPL-CCNC: 11 U/L (ref 1–32)
BILIRUB SERPL-MCNC: 0.3 MG/DL (ref 0.2–1.2)
BUN SERPL-MCNC: 15 MG/DL (ref 8–23)
BUN/CREAT SERPL: 8.4 (ref 7–25)
C PEPTIDE SERPL-MCNC: 5.1 NG/ML (ref 1.1–4.4)
CA-I SERPL ISE-MCNC: 5.2 MG/DL (ref 4.5–5.6)
CALCIUM SERPL-MCNC: 9.1 MG/DL (ref 8.6–10.5)
CHLORIDE SERPL-SCNC: 95 MMOL/L (ref 98–107)
CHOLEST SERPL-MCNC: 168 MG/DL (ref 0–200)
CO2 SERPL-SCNC: 31.3 MMOL/L (ref 22–29)
CREAT SERPL-MCNC: 1.79 MG/DL (ref 0.57–1)
GLOBULIN SER CALC-MCNC: 2.8 GM/DL
GLUCOSE SERPL-MCNC: 208 MG/DL (ref 65–99)
HBA1C MFR BLD: 6.6 % (ref 4.8–5.6)
HDLC SERPL-MCNC: 50 MG/DL (ref 40–60)
INTERPRETATION: NORMAL
LDLC SERPL CALC-MCNC: 73 MG/DL (ref 0–100)
Lab: NORMAL
PHOSPHATE SERPL-MCNC: 2.9 MG/DL (ref 2.5–4.5)
POTASSIUM SERPL-SCNC: 3.6 MMOL/L (ref 3.5–5.2)
PROT SERPL-MCNC: 6.3 G/DL (ref 6–8.5)
PTH-INTACT SERPL-MCNC: 72 PG/ML (ref 15–65)
SODIUM SERPL-SCNC: 138 MMOL/L (ref 136–145)
T3FREE SERPL-MCNC: 1.5 PG/ML (ref 2–4.4)
T4 FREE SERPL-MCNC: 1.72 NG/DL (ref 0.93–1.7)
T4 SERPL-MCNC: 9.3 MCG/DL (ref 4.5–11.7)
TRIGL SERPL-MCNC: 226 MG/DL (ref 0–150)
TSH SERPL DL<=0.005 MIU/L-ACNC: 5.64 UIU/ML (ref 0.27–4.2)
URATE SERPL-MCNC: 3.9 MG/DL (ref 2.4–5.7)
VLDLC SERPL CALC-MCNC: 45.2 MG/DL

## 2020-03-16 ENCOUNTER — EPISODE CHANGES (OUTPATIENT)
Dept: CASE MANAGEMENT | Facility: OTHER | Age: 80
End: 2020-03-16

## 2020-03-16 RX ORDER — ERGOCALCIFEROL 1.25 MG/1
CAPSULE ORAL
Qty: 12 CAPSULE | Refills: 0 | Status: SHIPPED | OUTPATIENT
Start: 2020-03-16 | End: 2020-06-03

## 2020-03-17 RX ORDER — AMIODARONE HYDROCHLORIDE 200 MG/1
TABLET ORAL
Qty: 30 TABLET | Refills: 0 | Status: SHIPPED | OUTPATIENT
Start: 2020-03-17 | End: 2020-04-10 | Stop reason: ALTCHOICE

## 2020-03-23 ENCOUNTER — TELEPHONE (OUTPATIENT)
Dept: ENDOCRINOLOGY | Age: 80
End: 2020-03-23

## 2020-03-23 RX ORDER — LEVOTHYROXINE SODIUM 200 MCG
200 TABLET ORAL DAILY
Qty: 30 TABLET | Refills: 2 | Status: SHIPPED | OUTPATIENT
Start: 2020-03-23 | End: 2020-06-21

## 2020-03-23 NOTE — TELEPHONE ENCOUNTER
----- Message from TAYO Larsen sent at 3/23/2020  4:12 PM EDT -----  She needs to increase synthroid to 200 mcg and schedule an appt for 1 month to be seen  ----- Message -----  From: Ryne Cassidy MA  Sent: 3/23/2020   4:06 PM EDT  To: TAYO Larsen    Please review and advise. DB  ----- Message -----  From: Jacklyn Rodgers MA  Sent: 3/23/2020   3:30 PM EDT  To: Ryne Cassidy MA    Please call patient to discuss lab results. She can not come into office visit this week due to recently having open heart surgery.    [phone] Henna- daughter

## 2020-03-25 ENCOUNTER — TELEPHONE (OUTPATIENT)
Dept: CARDIOLOGY | Facility: CLINIC | Age: 80
End: 2020-03-25

## 2020-03-25 ENCOUNTER — TELEPHONE (OUTPATIENT)
Dept: CARDIAC SURGERY | Facility: CLINIC | Age: 80
End: 2020-03-25

## 2020-03-25 NOTE — TELEPHONE ENCOUNTER
Pt has an appt on 04/13.  She was dx with thrombophlebitis of the thigh and calf.  Pt was given samples of Eliquis from our office.  Will this be deferred to Dr. Suero?

## 2020-03-25 NOTE — TELEPHONE ENCOUNTER
Patients daughter calls to determine if amiodarone and Eliquis need to be continued they are about out of both. I suggested she call cardiology who sent them back to us stating Dr Rai wanted the Eliquis. I spoke with Dr Rai and he feels it is okay to stop both the amiodarone and the Eliquis. I relayed this to patients daughter. Patient continues to have some swelling in her leg where the SVHS is and will continue to wear her michelle hose during the day

## 2020-04-10 ENCOUNTER — OFFICE VISIT (OUTPATIENT)
Dept: CARDIOLOGY | Facility: CLINIC | Age: 80
End: 2020-04-10

## 2020-04-10 VITALS
HEART RATE: 66 BPM | OXYGEN SATURATION: 95 % | SYSTOLIC BLOOD PRESSURE: 140 MMHG | BODY MASS INDEX: 31.21 KG/M2 | DIASTOLIC BLOOD PRESSURE: 70 MMHG | WEIGHT: 194.2 LBS | HEIGHT: 66 IN

## 2020-04-10 DIAGNOSIS — I48.91 POSTOPERATIVE ATRIAL FIBRILLATION (HCC): ICD-10-CM

## 2020-04-10 DIAGNOSIS — E11.42 TYPE 2 DIABETES MELLITUS WITH DIABETIC POLYNEUROPATHY, WITH LONG-TERM CURRENT USE OF INSULIN (HCC): ICD-10-CM

## 2020-04-10 DIAGNOSIS — I25.10 CORONARY ARTERY DISEASE INVOLVING NATIVE CORONARY ARTERY OF NATIVE HEART WITHOUT ANGINA PECTORIS: Primary | ICD-10-CM

## 2020-04-10 DIAGNOSIS — I97.89 POSTOPERATIVE ATRIAL FIBRILLATION (HCC): ICD-10-CM

## 2020-04-10 DIAGNOSIS — N18.30 STAGE 3 CHRONIC KIDNEY DISEASE (HCC): ICD-10-CM

## 2020-04-10 DIAGNOSIS — I80.3 THROMBOPHLEBITIS LEG: ICD-10-CM

## 2020-04-10 DIAGNOSIS — I10 ESSENTIAL HYPERTENSION: ICD-10-CM

## 2020-04-10 DIAGNOSIS — Z79.4 TYPE 2 DIABETES MELLITUS WITH DIABETIC POLYNEUROPATHY, WITH LONG-TERM CURRENT USE OF INSULIN (HCC): ICD-10-CM

## 2020-04-10 PROBLEM — M25.512 ACUTE PAIN OF LEFT SHOULDER: Status: RESOLVED | Noted: 2020-02-25 | Resolved: 2020-04-10

## 2020-04-10 PROBLEM — R12 HEARTBURN: Status: RESOLVED | Noted: 2020-01-20 | Resolved: 2020-04-10

## 2020-04-10 PROCEDURE — 99442 PR PHYS/QHP TELEPHONE EVALUATION 11-20 MIN: CPT | Performed by: INTERNAL MEDICINE

## 2020-04-10 RX ORDER — CETIRIZINE HYDROCHLORIDE 10 MG/1
10 TABLET ORAL NIGHTLY
COMMUNITY

## 2020-04-10 NOTE — PROGRESS NOTES
"Date of Office Visit: 04/10/2020  Encounter Provider: Victor Hugo Ng MD  Place of Service: [facility] CARDIOLOGY  Patient Name: Ange Johnson  :1940    Chief Complaint   Patient presents with   • Coronary Artery Disease   :     HPI:     Ms. Johnson is 79 y.o. and presents today to follow up via phone.     I saw her in 2016 for preoperative risk assessment prior to thyroid surgery.  She reported chronic exertional dyspnea at that time.  She had a soft systolic murmur.  An echo showed normal LVSF and aortic sclerosis without stenosis.  I did not feel that she needed stress testing as her dyspnea was stable and long-standing.  Her EKG showed diffuse nonspecific ST abnormalities which were old.    I then saw her again in 2020 for dyspnea and chest pain which she described as \"heartburn.\"  An echo was normal, but a perfusion stress was abnormal/high-risk.  She underwent coronary angiography which revealed multivessel CAD, and underwent 5V CABG in 2020.  Her post-operative course was unremarkable other than an episode of atrial fibrillation and lower extremity superficial thrombophlebitis. She was placed on renally dosed apixaban as well as amiodarone.  Both of these have subsequently been stopped.       Her daughter Henna helps with the phone visit.  Mrs Johnson is doing very well! She can't go to rehab because of COVID-19, but she's exercising at home.  Her thrombophlebitis resolved. Her angina resolved. She denies dyspnea, palpitations, lightheadedness, or syncope. She has very mild pedal edema.     She sees Dr Caruso for her CKD.    Past Medical History:   Diagnosis Date   • Anemia in stage 3 chronic kidney disease (CMS/HCC) 2016   • Arthritis    • Asthma    • Atherosclerosis of both carotid arteries     plaque without stenosis,    • Bone spur of acromioclavicular joint    • CAD (coronary artery disease)     2020: 90% dLM, 60% pLAD, 90% mLAD, 90% D1, " 90% mLCx, 50-60% OM1, 50% pRCA; s/p CABG x 5 (LIMA-LAD, SVG-PDA, SVG-OM1, SVG-OM2, SVG-D1)   • Chronic venous insufficiency    • CKD (chronic kidney disease) stage 3, GFR 30-59 ml/min (CMS/HCC)    • Essential hypertension    • Gout    • Grief reaction       2010 after 15 foot fall off ladder   • H/O cataract    • Heel spur    • History of tendinitis    • Hyperlipidemia    • Hyperparathyroidism (CMS/HCC)    • Hyperthyroidism    • Hypothyroidism, acquired    • Non-toxic goiter    • Pneumonia    • Postoperative atrial fibrillation (CMS/HCC)     after CABG   • Proteinuria    • Thrombophlebitis leg     after CABG   • Type 2 diabetes mellitus with neurological manifestations (CMS/HCC)    • Vitamin D deficiency        Past Surgical History:   Procedure Laterality Date   • BREAST EXCISIONAL BIOPSY Left     Dr. Ybarra benign   • BREAST EXCISIONAL BIOPSY Bilateral     benign   • CARDIAC CATHETERIZATION N/A 2/10/2020    Procedure: Left heart cath without LV gram;  Surgeon: Emerson Deras MD;  Location: [facility] CATH INVASIVE LOCATION;  Service: Cardiovascular;  Laterality: N/A;   • CARDIAC CATHETERIZATION N/A 2/10/2020    Procedure: Coronary angiography;  Surgeon: Emerson Deras MD;  Location: [facility] CATH INVASIVE LOCATION;  Service: Cardiovascular;  Laterality: N/A;   • CATARACT EXTRACTION Left 2010   • CATARACT EXTRACTION Right 2010   • CORONARY ARTERY BYPASS GRAFT N/A 2020    Procedure: MIDLINE STERNOTOMY, CORONARY ARTERY BYPASS GRAFTING X  5 USING LEFT INTERNAL MAMMARY ARTERY AND LEFT ENDOSCOPICALLY HARVESTED GREATER SAPHENOUS VEIN, INTRAOP KERWIN, PRP;  Surgeon: Jr Anthony Rai MD;  Location: [facility] OR;  Service: Cardiothoracic;  Laterality: N/A;   • HYSTERECTOMY      Dr. Quinn Peña   • OOPHORECTOMY      late 40's   • THYROIDECTOMY Right 2016    Procedure: right PARATHYROID EXCISION OF NODULE and right thyroid lobectomy and bilateral exploration.;   Surgeon: Tasneem Montelongo MD;  Location: [facility] OR [facility];  Service:        Social History     Socioeconomic History   • Marital status:      Spouse name: Not on file   • Number of children: 4   • Years of education: Not on file   • Highest education level: Not on file   Occupational History   • Occupation: Retired   Tobacco Use   • Smoking status: Never Smoker   • Smokeless tobacco: Never Used   Substance and Sexual Activity   • Alcohol use: No     Comment: Caffeine use: Decaf   • Drug use: No   • Sexual activity: Defer   Social History Narrative    4 children; one child .     Caffeine Use: decaf.        Family History   Problem Relation Age of Onset   • Diabetes Sister    • Diabetes Brother    • Hypertension Brother    • Kidney disease Brother    • Cervical cancer Mother    • Heart disease Sister    • Neuropathy Sister    • Diabetes Sister        Review of Systems   Constitution: Positive for malaise/fatigue and weight gain.   Cardiovascular: Positive for dyspnea on exertion and leg swelling.   Respiratory: Positive for shortness of breath.    Endocrine: Positive for cold intolerance.   Musculoskeletal: Positive for joint pain and myalgias.   Gastrointestinal: Positive for heartburn.   Neurological: Positive for excessive daytime sleepiness.   All other systems reviewed and are negative.      Allergies   Allergen Reactions   • Codeine Nausea Only   • Hydrocodone-Acetaminophen Nausea Only and Other (See Comments)     Percocet and Vicodin; vertigo   • Meperidine Nausea Only and Nausea And Vomiting   • Morphine And Related Nausea Only   • Oxycodone-Acetaminophen Nausea Only   • Oxycodone-Acetaminophen Nausea Only and Other (See Comments)     dilzziness   • Oxycodone-Aspirin Nausea Only and Other (See Comments)     vertigo   • Nickel Rash   • Penicillins Hives   • Shrimp Rash   • Sulfa Antibiotics Hives         Current Outpatient Medications:   •  aspirin 81 MG EC tablet, Take 1 tablet by mouth Daily., Disp:  30 tablet, Rfl: 11  •  atorvastatin (LIPITOR) 40 MG tablet, Take 1 tablet by mouth Every Night., Disp: 30 tablet, Rfl: 11  •  carBAMazepine (CARBATROL) 300 MG 12 hr capsule, Take 1 capsule by mouth Every 12 (Twelve) Hours for 180 days., Disp: 180 capsule, Rfl: 1  •  cloNIDine (CATAPRES) 0.1 MG tablet, Take 1 tablet by mouth Every 12 (Twelve) Hours., Disp: 60 tablet, Rfl: 4  •  CONTOUR NEXT TEST test strip, USE ONE STRIP TO TEST TWICE A DAY, Disp: 100 each, Rfl: 0  •  febuxostat (ULORIC) 40 MG tablet, Take 1 tablet by mouth Daily for 180 days., Disp: 90 tablet, Rfl: 1  •  ferrous sulfate 325 (65 FE) MG tablet, Take 1 tablet by mouth daily., Disp: , Rfl:   •  hydrALAZINE (APRESOLINE) 50 MG tablet, Take 1 tablet by mouth 3 (Three) Times a Day., Disp: 180 tablet, Rfl: 0  •  insulin glargine (LANTUS) 100 UNIT/ML injection, Inject 20 Units under the skin into the appropriate area as directed Every Morning., Disp: 3 mL, Rfl: 12  •  Insulin Lispro, 1 Unit Dial, (HUMALOG KWIKPEN) 100 UNIT/ML solution pen-injector, Inject 5 Units under the skin into the appropriate area as directed 3 (Three) Times a Day With Meals., Disp: 15 mL, Rfl: 2  •  Insulin Pen Needle (BD PEN NEEDLE ISELA U/F) 32G X 4 MM misc, Use to inject 4 time daily, Disp: 200 each, Rfl: 5  •  metoprolol tartrate (LOPRESSOR) 25 MG tablet, Take 0.5 tablets by mouth Every 12 (Twelve) Hours., Disp: 30 tablet, Rfl: 5  •  montelukast (SINGULAIR) 10 MG tablet, 10 mg daily., Disp: , Rfl:   •  potassium chloride (MICRO-K) 10 MEQ CR capsule, Take 10 mEq by mouth Daily., Disp: , Rfl:   •  SYNTHROID 200 MCG tablet, Take 1 tablet by mouth Daily., Disp: 30 tablet, Rfl: 2  •  torsemide (DEMADEX) 20 MG tablet, Take 1 tablet by mouth Daily., Disp: 30 tablet, Rfl: 5  •  vitamin B-12 (CYANOCOBALAMIN) 1000 MCG tablet, Take 1 tablet by mouth daily., Disp: , Rfl:   •  vitamin D (ERGOCALCIFEROL) 1.25 MG (97513 UT) capsule capsule, TAKE ONE CAPSULE BY MOUTH ONCE WEEKLY, Disp: 12 capsule,  "Rfl: 0      Objective:     Vitals:    04/10/20 1434   BP: 140/70   Pulse: 66   SpO2: 95%   Weight: 88.1 kg (194 lb 3.2 oz)   Height: 167.6 cm (66\")     Body mass index is 31.34 kg/m².    Physical Exam    Procedures           Assessment:       Diagnosis Plan   1. Coronary artery disease involving native coronary artery of native heart without angina pectoris     2. Postoperative atrial fibrillation (CMS/[facility])     3. Thrombophlebitis leg     4. Essential hypertension     5. Stage 3 chronic kidney disease (CMS/[facility])     6. Type 2 diabetes mellitus with diabetic polyneuropathy, with long-term current use of insulin (CMS/[facility])            Plan:     1.  Coronary Artery Disease  Assessment  • The patient has no angina  • She's doing well s/p CABG.  She's on atorvastatin.  Rehab is on hold due to COVID-19.    Her LVEF prior to surgery was normal.     Subjective - Objective  • There is a history of previous coronary artery bypass graft 2/2020  • Current antiplatelet therapy includes aspirin 81 mg  • The patient qualifies for cardiac rehabilitation, and has been referred to cardiac rehab      2.  She had brief post-op AF.  She's was on amiodarone for a short while but it's been stopped, and that was my goal. I'll get a 14 day monitor in three months.    3.  This was treated with a short course of apixaban and has resolved.    4/5. Her BP is very mildly elevated today, but for her age, CKD, and female sex, I feel it's acceptable.     This patient has consented to a telehealth visit via telephone. The visit was scheduled as a phone visit to comply with patient safety concerns in accordance with CDC recommendations.  All vitals recorded within this visit are reported by the patient.  I spent  20 minutes in total including but not limited to the 10 minutes spent in direct conversation with this patient.     Sincerely,       Victor Hugo Ng MD                "

## 2020-04-13 ENCOUNTER — TELEMEDICINE (OUTPATIENT)
Dept: ENDOCRINOLOGY | Age: 80
End: 2020-04-13

## 2020-04-13 DIAGNOSIS — E55.9 VITAMIN D DEFICIENCY: ICD-10-CM

## 2020-04-13 DIAGNOSIS — E04.1 SOLITARY THYROID NODULE: ICD-10-CM

## 2020-04-13 DIAGNOSIS — E21.3 HYPERPARATHYROIDISM (HCC): ICD-10-CM

## 2020-04-13 DIAGNOSIS — E78.2 MIXED HYPERLIPIDEMIA: ICD-10-CM

## 2020-04-13 DIAGNOSIS — I10 ESSENTIAL HYPERTENSION: Primary | ICD-10-CM

## 2020-04-13 PROCEDURE — 99214 OFFICE O/P EST MOD 30 MIN: CPT | Performed by: NURSE PRACTITIONER

## 2020-04-13 NOTE — PATIENT INSTRUCTIONS
Increase Lantus to 22 units and continue to increase by 2 units every 3 days if morning blood sugars are staying greater than 110 mg/dL.  Be cautious of any hypoglycemic events less than 70 and notify the office if you have low blood sugars  Check your blood sugars at least twice daily.  Contact the office if you have any questions or concerns

## 2020-04-13 NOTE — PROGRESS NOTES
Subjective   Ange Johnson is a 79 y.o. female is here today for follow-up.  Chief Complaint   Patient presents with   • Diabetes     lab review; checking BG 2 times a day   • Hypothyroidism   • Hypertension   • Hyperlipidemia   • Vitamin D Deficiency     There were no vitals taken for this visit.  Current Outpatient Medications on File Prior to Visit   Medication Sig   • aspirin 81 MG EC tablet Take 1 tablet by mouth Daily.   • atorvastatin (LIPITOR) 40 MG tablet Take 1 tablet by mouth Every Night.   • carBAMazepine (CARBATROL) 300 MG 12 hr capsule Take 1 capsule by mouth Every 12 (Twelve) Hours for 180 days.   • cetirizine (zyrTEC) 10 MG tablet Take 10 mg by mouth Every Night.   • cloNIDine (CATAPRES) 0.1 MG tablet Take 1 tablet by mouth Every 12 (Twelve) Hours.   • CONTOUR NEXT TEST test strip USE ONE STRIP TO TEST TWICE A DAY   • febuxostat (ULORIC) 40 MG tablet Take 1 tablet by mouth Daily for 180 days.   • ferrous sulfate 325 (65 FE) MG tablet Take 1 tablet by mouth daily.   • hydrALAZINE (APRESOLINE) 50 MG tablet Take 1 tablet by mouth 3 (Three) Times a Day.   • insulin glargine (LANTUS) 100 UNIT/ML injection Inject 20 Units under the skin into the appropriate area as directed Every Morning.   • Insulin Lispro, 1 Unit Dial, (HUMALOG KWIKPEN) 100 UNIT/ML solution pen-injector Inject 5 Units under the skin into the appropriate area as directed 3 (Three) Times a Day With Meals.   • Insulin Pen Needle (BD PEN NEEDLE ISELA U/F) 32G X 4 MM misc Use to inject 4 time daily   • metoprolol tartrate (LOPRESSOR) 25 MG tablet Take 0.5 tablets by mouth Every 12 (Twelve) Hours.   • montelukast (SINGULAIR) 10 MG tablet 10 mg daily.   • potassium chloride (MICRO-K) 10 MEQ CR capsule Take 10 mEq by mouth Daily.   • SYNTHROID 200 MCG tablet Take 1 tablet by mouth Daily.   • torsemide (DEMADEX) 20 MG tablet Take 1 tablet by mouth Daily.   • vitamin B-12 (CYANOCOBALAMIN) 1000 MCG tablet Take 1 tablet by mouth daily.   • vitamin D  (ERGOCALCIFEROL) 1.25 MG (14208 UT) capsule capsule TAKE ONE CAPSULE BY MOUTH ONCE WEEKLY     No current facility-administered medications on file prior to visit.      Family History   Problem Relation Age of Onset   • Diabetes Sister    • Diabetes Brother    • Hypertension Brother    • Kidney disease Brother    • Cervical cancer Mother    • Heart disease Sister    • Neuropathy Sister    • Diabetes Sister      Social History     Tobacco Use   • Smoking status: Never Smoker   • Smokeless tobacco: Never Used   Substance Use Topics   • Alcohol use: No     Comment: Caffeine use: Decaf   • Drug use: No     Allergies   Allergen Reactions   • Codeine Nausea Only   • Hydrocodone-Acetaminophen Nausea Only and Other (See Comments)     Percocet and Vicodin; vertigo   • Meperidine Nausea Only and Nausea And Vomiting   • Morphine And Related Nausea Only   • Oxycodone-Acetaminophen Nausea Only   • Oxycodone-Acetaminophen Nausea Only and Other (See Comments)     dilzziness   • Oxycodone-Aspirin Nausea Only and Other (See Comments)     vertigo   • Nickel Rash   • Penicillins Hives   • Shrimp Rash   • Sulfa Antibiotics Hives         History of Present Illness   Encounter Diagnoses   Name Primary?   • Essential hypertension Yes   • Mixed hyperlipidemia    • Vitamin D deficiency    • Hyperparathyroidism (CMS/HCC)    • Solitary thyroid nodule      Is a 79-year-old female patient being evaluated today for the above diagnoses.  Her medication list was reviewed and updated.  She was taken off of her medications while in the hospital on February 12 where she presented with fatigue, shortness of breath, and chest pain.  She was diagnosed with cardiovascular disease and had a 5 vessel bypass surgery.  She had physical therapy a few weeks following her surgery.  She was due to go to cardiac rehab which is been canceled due to the coronavirus.  Her blood sugars according to her recent A1c are in satisfactory range.  She is currently taking  Lantus 20 units and regular insulin 5 units prior to each meal.  Her blood sugars have been helpful in the hospital are in the 170 range.  We discussed increasing her Lantus by 2 units every 3 days to target her morning blood sugars to less than 110 mg/dL.  She denies any hypoglycemic events.    The following portions of the patient's history were reviewed and updated as appropriate: allergies, current medications, past family history, past medical history, past social history, past surgical history and problem list.    Review of Systems   Constitutional: Negative for appetite change and fatigue.   Eyes: Negative for visual disturbance.   Respiratory: Negative for cough.    Cardiovascular: Negative for leg swelling.   Gastrointestinal: Negative for constipation and diarrhea.   Endocrine: Negative for cold intolerance, heat intolerance, polydipsia, polyphagia and polyuria.   Genitourinary: Negative for frequency.   Neurological: Negative for numbness.       Objective   Physical Exam   Constitutional: She is oriented to person, place, and time. She appears well-developed and well-nourished. No distress.   Presents in good spirits and in no distress   HENT:   Head: Normocephalic.   Nose: Nose normal.   Eyes: Pupils are equal, round, and reactive to light.   Neck: Normal range of motion.   Cardiovascular: Normal rate and regular rhythm.   Pulmonary/Chest: Effort normal.   Musculoskeletal: Normal range of motion.   Neurological: She is alert and oriented to person, place, and time.   Skin: Skin is warm and dry.   Chest incision healed following 5 vessel bypass surgery   Psychiatric: She has a normal mood and affect. Her behavior is normal. Judgment and thought content normal.         Assessment/Plan   Ange was seen today for diabetes, hypothyroidism, hypertension, hyperlipidemia and vitamin d deficiency.    Diagnoses and all orders for this visit:    Essential hypertension    Mixed hyperlipidemia    Vitamin D  deficiency    Hyperparathyroidism (CMS/HCC)    Solitary thyroid nodule      In Summary patient was evaluated.  He will increase her Lantus to 22 units once daily.  She is to continue her mealtime insulin 5 units prior to each meal.  If her blood sugars fail to improve or she has hypoglycemia badly alternating about 1 following bypass surgery.  Metabolically and clinically she is stable.  Her labs were reviewed and she will be mailed a copy.  She is been encouraged to contact the office should she have any questions or concerns.  She will follow-up in 6 months with labs prior

## 2020-04-15 ENCOUNTER — TELEMEDICINE (OUTPATIENT)
Dept: CARDIAC SURGERY | Facility: CLINIC | Age: 80
End: 2020-04-15

## 2020-04-15 VITALS
SYSTOLIC BLOOD PRESSURE: 140 MMHG | BODY MASS INDEX: 30.92 KG/M2 | HEIGHT: 66 IN | HEART RATE: 66 BPM | OXYGEN SATURATION: 95 % | WEIGHT: 192.4 LBS | DIASTOLIC BLOOD PRESSURE: 70 MMHG

## 2020-04-15 DIAGNOSIS — Z95.1 S/P CABG X 5: Primary | ICD-10-CM

## 2020-04-15 PROCEDURE — 99024 POSTOP FOLLOW-UP VISIT: CPT | Performed by: NURSE PRACTITIONER

## 2020-04-15 NOTE — PROGRESS NOTES
"CARDIOVASCULAR SURGERY FOLLOW-UP PROGRESS NOTE  Chief Complaint: Post op    HPI:   Dear Quinn Snider MD and colleagues:    It was nice to see Ange Johnson in follow up 9 weeks after surgery.  As you know, she is a 79 y.o. female with a history of chronic kidney disease, DM II, hypertension, hypothyroidism, hyperlipidemia, and left LE thrombophlebitis who underwent CABGx5 utilizing the LIMA and LSVG with Dr. Rai.  She did well postoperatively and continues to do well. Postoperatively she developed acute thrombophlebitis in the left lower extremity. She was treated with a course of eliquis. She had venous dopplers x2. The swelling has since improved and she is no longer on the eliquis. She is being followed by Dr. Caruso for her CKD, and is continued on her daily torsemide. She comes in today complaining of nothing. She denies any chest pain, shortness of breath, lower extremity swelling, cough or fever. She denies any drainage from her incision, as well as signs and symptoms of sternal instability. She states that she does have some post-nasal drip related to her allergies. Her activity level has been good. She reports that she is exercising at home.       Physical Exam:         /70 (BP Location: Right arm)   Pulse 66   Ht 167.6 cm (66\")   Wt 87.3 kg (192 lb 6.4 oz)   SpO2 95%   BMI 31.05 kg/m²   Heart: unable to assess  Lungs: unable to assess  Extremities:  no edema  Incision(s):  mid chest healing well, no significant drainage, no dehiscence, no significant erythema, left leg healing well, no significant drainage, no dehiscence, no significant erythema    Assessment/Plan:     S/P CABG. Overall, she is doing well.    Post operative acute left lower extremity thrombophlebitis---now improved    Patient had concerns about wanting to be baptized. I encouraged her to wait to do this until the COVID-19 pandemic has settled down.    Keep incisions clean and dry  OK to drive if not taking narcotic pain " medicine  OK to begin cardiac rehab  Do not lift over 50lbs for an additional 3 months  Follow-up as scheduled with cardiology  Follow-up as scheduled with PCP  Follow-up with CT surgery prn    No restrictions of activity.    All vitals documented were reported by patient    The patient consented to a telehealth visit via video.This visit was completed via video due to the global COVID-19 pandemic. I spent a total of 30 minutes in total in patient care including but not limited to 10 minutes spent in direct conversation with the patient and her daughter.    Thank you for allowing me to participate in the care of your patient.    Regards,  TAYO Maciel

## 2020-04-20 ENCOUNTER — TELEMEDICINE (OUTPATIENT)
Dept: FAMILY MEDICINE CLINIC | Facility: CLINIC | Age: 80
End: 2020-04-20

## 2020-04-20 DIAGNOSIS — M62.9 DISORDER OF MUSCLE, LIGAMENT, AND FASCIA: ICD-10-CM

## 2020-04-20 DIAGNOSIS — M10.9 GOUT WITHOUT TOPHUS: Primary | ICD-10-CM

## 2020-04-20 DIAGNOSIS — M24.20 DISORDER OF MUSCLE, LIGAMENT, AND FASCIA: ICD-10-CM

## 2020-04-20 PROCEDURE — 99213 OFFICE O/P EST LOW 20 MIN: CPT | Performed by: FAMILY MEDICINE

## 2020-04-20 RX ORDER — FEBUXOSTAT 40 MG/1
40 TABLET, FILM COATED ORAL DAILY
Qty: 90 TABLET | Refills: 1 | Status: SHIPPED | OUTPATIENT
Start: 2020-04-20 | End: 2020-10-19 | Stop reason: SDUPTHER

## 2020-04-20 RX ORDER — CARBAMAZEPINE 300 MG/1
300 CAPSULE, EXTENDED RELEASE ORAL EVERY 12 HOURS SCHEDULED
Qty: 180 CAPSULE | Refills: 1 | Status: SHIPPED | OUTPATIENT
Start: 2020-04-20 | End: 2020-10-19 | Stop reason: SDUPTHER

## 2020-04-20 NOTE — PROGRESS NOTES
Mode of Visit: Video  Ange Johnson confirmed that she was in [place] at the time of this video visit.   The visit included video face to face interaction. No technical issues occurred during this visit.   Patient has given verbal consent for this video visit.  She understands the limitations with limited options for physical exam and wishes to proceed with this visit.      Subjective       Chief Complaint   Patient presents with   • Gout   • Peripheral Neuropathy         HPI:       Ange Johnson is a 79 y.o. female who presents to  [facility]  [facility] today to refill medicines.  Since last visit she has had 5 vessel CABG.  She has had follow-ups with her cardiologist and endocrinologist the telemedicine.  She is feeling well.  She has not had any recent gout attacks.  Her peripheral neuropathy is well controlled on current therapy.  Recent labs have been reviewed and continue to show elevation of fasting blood sugar but controlled hemoglobin A1c.  Chronic kidney disease is stable.  LDL cholesterol 73.  Thyroid is mildly supratherapeutic on current medication dose.  Further adjustments per endocrinology direction.  Patient states that her blood pressures have been fairly well controlled with some systolic slightly above 140.    I have reviewed and updated her medications, medical history and problem list during today's office visit.     Social History     Tobacco Use   • Smoking status: Never Smoker   • Smokeless tobacco: Never Used   Substance Use Topics   • Alcohol use: No     Comment: Caffeine use: Decaf       Review of Systems   Musculoskeletal:        No recent gout attacks.   Neurological:        Neuropathic pain controlled on current therapy.         PE:   Objective   There were no vitals taken for this visit.    There is no height or weight on file to calculate BMI.    Physical Exam   Constitutional: She appears well-developed. No distress.   Eyes:  Conjunctivae are normal.   Pulmonary/Chest: Effort normal. She has no wheezes.   Neurological: She is alert. She is not disoriented.   Psychiatric: She has a normal mood and affect. Her speech is normal and behavior is normal.        Data Reviewed:              Lab Results   Component Value Date     (H) 03/13/2020    BUN 15 03/13/2020    BUN 17 03/09/2020    CREATININE 1.79 (H) 03/13/2020    CREATININE 1.73 (H) 03/09/2020    EGFRIFNONA 27 (L) 03/13/2020    EGFRIFNONA 28 (L) 03/09/2020    EGFRIFAFRI 33 (L) 03/13/2020     03/13/2020     03/09/2020    K 3.6 03/13/2020    K 3.3 (L) 03/09/2020    CL 95 (L) 03/13/2020    CL 95 (L) 03/09/2020    CALCIUM 9.1 03/13/2020    CALCIUM 9.1 03/09/2020    ALBUMIN 3.50 03/13/2020    ALBUMIN 3.00 (L) 02/18/2020    BILITOT 0.3 03/13/2020    BILITOT <0.2 (L) 02/12/2020    ALKPHOS 133 (H) 03/13/2020    ALKPHOS 66 02/12/2020    AST 11 03/13/2020    AST 10 02/12/2020    ALT 11 03/13/2020    ALT 11 02/12/2020    CHLPL 168 03/13/2020    TRIG 226 (H) 03/13/2020    TRIG 137 02/12/2020    HDL 50 03/13/2020    HDL 55 02/12/2020    VLDL 45.2 03/13/2020    VLDL 27.4 02/12/2020    LDL 73 03/13/2020    LDL 71 02/12/2020    LDLHDL 1.28 02/12/2020    WBC 9.36 02/18/2020    RBC 2.79 (L) 02/18/2020    HCT 26.2 (L) 02/18/2020    MCV 93.9 02/18/2020    MCH 31.2 02/18/2020    TSH 5.640 (H) 03/13/2020    TSH 2.350 02/12/2020    FREET4 1.72 (H) 03/13/2020    INR 1.19 (H) 02/13/2020    QPIU94FX 36.3 03/13/2020    URICACID 3.9 03/13/2020          A/P:     Assessment/Plan   Diagnoses and all orders for this visit:    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  No recent outbreaks.  Continue same medication therapy.  Recheck 6 months.    Orders:  -     febuxostat (Uloric) 40 MG tablet; Take 1 tablet by mouth Daily for 180 days.  Dispense: 90 tablet; Refill: 1    2. Disorder of muscle, ligament, and fascia  Assessment & Plan:  Condition  well-controlled with carbamazepine.  Continue current therapy.  Recheck 6 months.    Orders:  -     carBAMazepine (CARBATROL) 300 MG 12 hr capsule; Take 1 capsule by mouth Every 12 (Twelve) Hours for 180 days.  Dispense: 180 capsule; Refill: 1        Follow up:    Return in about 6 months (around 10/20/2020) for Medicare Wellness and regular visit, 30 minutes.

## 2020-04-20 NOTE — ASSESSMENT & PLAN NOTE
Condition stable on current therapy.  No recent outbreaks.  Continue same medication therapy.  Recheck 6 months.

## 2020-05-07 RX ORDER — SAXAGLIPTIN 2.5 MG/1
TABLET, FILM COATED ORAL
Qty: 90 TABLET | Refills: 0 | Status: SHIPPED | OUTPATIENT
Start: 2020-05-07 | End: 2020-07-17

## 2020-05-26 DIAGNOSIS — E11.9 TYPE 2 DIABETES MELLITUS WITHOUT COMPLICATION, WITH LONG-TERM CURRENT USE OF INSULIN (HCC): ICD-10-CM

## 2020-05-26 DIAGNOSIS — Z79.4 TYPE 2 DIABETES MELLITUS WITHOUT COMPLICATION, WITH LONG-TERM CURRENT USE OF INSULIN (HCC): ICD-10-CM

## 2020-05-26 RX ORDER — PERPHENAZINE 16 MG/1
TABLET, FILM COATED ORAL
Qty: 100 EACH | Refills: 1 | Status: SHIPPED | OUTPATIENT
Start: 2020-05-26 | End: 2020-11-29

## 2020-06-03 ENCOUNTER — TELEPHONE (OUTPATIENT)
Dept: CARDIAC REHAB | Facility: HOSPITAL | Age: 80
End: 2020-06-03

## 2020-06-03 RX ORDER — ERGOCALCIFEROL 1.25 MG/1
CAPSULE ORAL
Qty: 12 CAPSULE | Refills: 0 | Status: SHIPPED | OUTPATIENT
Start: 2020-06-03 | End: 2020-08-20

## 2020-06-03 NOTE — TELEPHONE ENCOUNTER
Called patient's daughter, Daphne Dasilva, who is a nurse here at hospital.  Pt lives with her daughter.  Discussed trying to get her mother re-enrolled in cardiac rehab, but with current Covid-19 crisis, patient would have to wear a mask while exercising.  Daughter does not think her mother could tolerate doing this.  We also discussed that with her mother having Medicare, she would have up to 1 year to complete the program.  If there is a future option to come and not be required to mask, daughter says she will try to enroll her mother then.

## 2020-06-18 ENCOUNTER — PATIENT OUTREACH (OUTPATIENT)
Dept: CASE MANAGEMENT | Facility: OTHER | Age: 80
End: 2020-06-18

## 2020-06-18 RX ORDER — INSULIN GLARGINE 300 U/ML
INJECTION, SOLUTION SUBCUTANEOUS
Qty: 12 PEN | Refills: 0 | Status: SHIPPED | OUTPATIENT
Start: 2020-06-18 | End: 2020-10-29

## 2020-06-18 NOTE — OUTREACH NOTE
Care Plan Note      Responses   Lifestyle Goals  Other (See Comment) [social distancing]   Barriers  Other (See Comment) [social distancing]   Self Management  Other (See Comment) [wash hands, wear mask, distance from others when possible. ]   Specific Disease Process Teaching  -- [Social Distancing Recommendations]   Goal Progress  Making Progress Toward Goal(s)   Health Literacy  Good        The main concerns and/or symptoms the patient would like to address are: Spoke with patient's daughter regarding COVID 19 Social Distancing recommendations. No needs or concerns expressed.    Education/instruction provided by Care Coordinator: Introduced self, explained ACM RN role and provided contact information. Reviewed patient's current social distancing practices. Recommended patient wear masks when in public. Reviewed refraining from touching face with hands when out in public. Reviewed the need to wash hands frequently. Reviewed monitoring social interactions and ensuring family and friends you are in close proximity with do not have fevers or other signs of illness as reintegration occurs. Patient's daughter appreciative of the call. No further outreach requested.       Follow Up Outreach Due: as needed    La Nena Farnsworth RN  Ambulatory     6/18/2020, 15:34

## 2020-06-21 RX ORDER — LEVOTHYROXINE SODIUM 200 MCG
TABLET ORAL
Qty: 30 TABLET | Refills: 1 | Status: SHIPPED | OUTPATIENT
Start: 2020-06-21 | End: 2020-08-20

## 2020-07-17 ENCOUNTER — OFFICE VISIT (OUTPATIENT)
Dept: CARDIOLOGY | Facility: CLINIC | Age: 80
End: 2020-07-17

## 2020-07-17 VITALS
DIASTOLIC BLOOD PRESSURE: 72 MMHG | HEART RATE: 78 BPM | SYSTOLIC BLOOD PRESSURE: 156 MMHG | BODY MASS INDEX: 31.16 KG/M2 | WEIGHT: 187 LBS | HEIGHT: 65 IN

## 2020-07-17 DIAGNOSIS — I97.89 POSTOPERATIVE ATRIAL FIBRILLATION (HCC): ICD-10-CM

## 2020-07-17 DIAGNOSIS — N18.30 STAGE 3 CHRONIC KIDNEY DISEASE (HCC): ICD-10-CM

## 2020-07-17 DIAGNOSIS — I25.10 CORONARY ARTERY DISEASE INVOLVING NATIVE CORONARY ARTERY OF NATIVE HEART WITHOUT ANGINA PECTORIS: Primary | ICD-10-CM

## 2020-07-17 DIAGNOSIS — I48.0 PAROXYSMAL ATRIAL FIBRILLATION (HCC): ICD-10-CM

## 2020-07-17 DIAGNOSIS — E66.9 CLASS 1 OBESITY WITH SERIOUS COMORBIDITY AND BODY MASS INDEX (BMI) OF 31.0 TO 31.9 IN ADULT, UNSPECIFIED OBESITY TYPE: ICD-10-CM

## 2020-07-17 DIAGNOSIS — I10 ESSENTIAL HYPERTENSION: ICD-10-CM

## 2020-07-17 DIAGNOSIS — I48.91 POSTOPERATIVE ATRIAL FIBRILLATION (HCC): ICD-10-CM

## 2020-07-17 PROBLEM — R60.0 LOWER EXTREMITY EDEMA: Status: RESOLVED | Noted: 2020-02-25 | Resolved: 2020-07-17

## 2020-07-17 PROCEDURE — 99214 OFFICE O/P EST MOD 30 MIN: CPT | Performed by: NURSE PRACTITIONER

## 2020-07-17 RX ORDER — LOSARTAN POTASSIUM 100 MG/1
100 TABLET ORAL DAILY
COMMUNITY
End: 2022-02-28 | Stop reason: SDUPTHER

## 2020-07-17 NOTE — PROGRESS NOTES
Telehealth Visit    Date of Visit: 2020  Encounter Provider: TAYO Mcmillan  Place of Service: [facility] CARDIOLOGY  Patient Name: Ange Johnson  :1940  Primary Cardiologist: Dr. Victor Hugo Ng     Chief Complaint   Patient presents with   • Follow-up   :     Dear Dr. Quinn Washington,     HPI: Ange Johnson is a pleasant 79 y.o. female who is an established patient of our practice. Due to COVID-19 virus, I am conducting a telehealth visit via telephone with patient and she has consented to this visit today.      She has a known history of stage III chronic kidney disease, hyperlipidemia, hyperlipidemia, and type 2 diabetes mellitus.     On 2020 she was evaluated by Victor Hugo Ng after she was noted to have chest pain and abnormal EKG.  An echocardiogram was relatively normal for age and perfusion stress test was abnormal.     On 2020 she was admitted for hydration due to chronic kidney disease and recommended to undergo cardiac catheterization.  Coronary angiogram completed 2/10/2020 showed critical left main disease.  On 2020 she underwent CABG x5 (LIMA to LAD, SVG to PDA, SVG to OM1, OM 2, and D1 by Dr. Rufus Rai.  Postoperatively she had a brief episode of atrial fibrillation and spontaneously converted to a normal sinus rhythm.  She was started on low-dose oral amiodarone to help maintain sinus rhythm and the medication was felt to be short-term.  She was discharged on 2020.     In 2020, Dr. Ng conducted a telehealth visit.  She was doing well at that time.  She had postoperative atrial fibrillation and was on a short course of the amiodarone.  Dr. Ng recommended a 14-day monitor in approximately 3 months.    I am speaking to both the daughter and the patient via telephone.  The patient says she is doing very well.  She has some occasional lower extremity edema.  She denies chest pain, shortness of air, palpitations, dizziness, syncope,  or bleeding.  Her blood pressure was quite elevated earlier and is now 156/72.  Her daughter said that Dr. Caruso just added losartan to her regimen.    Past Medical History:   Diagnosis Date   • Anemia in stage 3 chronic kidney disease (CMS/HCC) 2016   • Arthritis    • Asthma    • Atherosclerosis of both carotid arteries     plaque without stenosis,    • Bone spur of acromioclavicular joint    • CAD (coronary artery disease)     2020: 90% dLM, 60% pLAD, 90% mLAD, 90% D1, 90% mLCx, 50-60% OM1, 50% pRCA; s/p CABG x 5 (LIMA-LAD, SVG-PDA, SVG-OM1, SVG-OM2, SVG-D1)   • Chronic venous insufficiency    • CKD (chronic kidney disease) stage 3, GFR 30-59 ml/min (CMS/[facility])    • Essential hypertension    • Gout    • Grief reaction       2010 after 15 foot fall off ladder   • H/O cataract    • Heel spur    • History of tendinitis    • Hyperlipidemia    • Hyperparathyroidism (CMS/[facility])    • Hyperthyroidism    • Hypothyroidism, acquired    • Non-toxic goiter    • Pneumonia    • Postoperative atrial fibrillation (CMS/[facility])     after CABG   • Proteinuria    • Thrombophlebitis leg     after CABG   • Type 2 diabetes mellitus with neurological manifestations (CMS/[facility])    • Vitamin D deficiency        Past Surgical History:   Procedure Laterality Date   • BREAST EXCISIONAL BIOPSY Left     Dr. Ybarra benign   • BREAST EXCISIONAL BIOPSY Bilateral     benign   • CARDIAC CATHETERIZATION N/A 2/10/2020    Procedure: Left heart cath without LV gram;  Surgeon: Emerson Deras MD;  Location:  [facility];  Service: Cardiovascular;  Laterality: N/A;   • CARDIAC CATHETERIZATION N/A 2/10/2020    Procedure: Coronary angiography;  Surgeon: Emerson Deras MD;  Location:  [facility];  Service: Cardiovascular;  Laterality: N/A;   • CATARACT EXTRACTION Left 2010   • CATARACT EXTRACTION Right 2010   • CORONARY ARTERY BYPASS GRAFT N/A 2020    Procedure: MIDLINE  STERNOTOMY, CORONARY ARTERY BYPASS GRAFTING X  5 USING LEFT INTERNAL MAMMARY ARTERY AND LEFT ENDOSCOPICALLY HARVESTED GREATER SAPHENOUS VEIN, INTRAOP KERWIN, PRP;  Surgeon: Jr Anthony Rai MD;  Location: [facility];  Service: Cardiothoracic;  Laterality: N/A;   • HYSTERECTOMY      Dr. Quinn Peña   • OOPHORECTOMY      late 40's   • THYROIDECTOMY Right 2016    Procedure: right PARATHYROID EXCISION OF NODULE and right thyroid lobectomy and bilateral exploration.;  Surgeon: Tasneem Montelongo MD;  Location: [facility], [place];  Service:        Social History     Socioeconomic History   • Marital status:      Spouse name: Not on file   • Number of children: 4   • Years of education: Not on file   • Highest education level: Not on file   Occupational History   • Occupation: Retired   Tobacco Use   • Smoking status: Never Smoker   • Smokeless tobacco: Never Used   Substance and Sexual Activity   • Alcohol use: No     Comment: Caffeine use: Decaf   • Drug use: No   • Sexual activity: Defer   Social History Narrative    4 children; one child .     Caffeine Use: decaf.        Family History   Problem Relation Age of Onset   • Diabetes Sister    • Diabetes Brother    • Hypertension Brother    • Kidney disease Brother    • Cervical cancer Mother    • Heart disease Sister    • Neuropathy Sister    • Diabetes Sister        The following portion of the patient's history were reviewed and updated as appropriate: past medical history, past surgical history, past social history, past family history, allergies, current medications, and problem list.    Review of Systems   Constitution: Negative.   Cardiovascular: Positive for leg swelling.   Respiratory: Negative.    Hematologic/Lymphatic: Negative.    Neurological: Negative.        Allergies   Allergen Reactions   • Codeine Nausea Only   • Hydrocodone-Acetaminophen Nausea Only and Other (See Comments)     Percocet and Vicodin; vertigo   • Meperidine Nausea  Only and Nausea And Vomiting   • Morphine And Related Nausea Only   • Oxycodone-Acetaminophen Nausea Only   • Oxycodone-Acetaminophen Nausea Only and Other (See Comments)     dilzziness   • Oxycodone-Aspirin Nausea Only and Other (See Comments)     vertigo   • Nickel Rash   • Penicillins Hives   • Shrimp Rash   • Sulfa Antibiotics Hives         Current Outpatient Medications:   •  aspirin 81 MG EC tablet, Take 1 tablet by mouth Daily., Disp: 30 tablet, Rfl: 11  •  atorvastatin (LIPITOR) 40 MG tablet, Take 1 tablet by mouth Every Night., Disp: 30 tablet, Rfl: 11  •  carBAMazepine (CARBATROL) 300 MG 12 hr capsule, Take 1 capsule by mouth Every 12 (Twelve) Hours for 180 days., Disp: 180 capsule, Rfl: 1  •  cetirizine (zyrTEC) 10 MG tablet, Take 10 mg by mouth Every Night., Disp: , Rfl:   •  cloNIDine (CATAPRES) 0.1 MG tablet, Take 1 tablet by mouth Every 12 (Twelve) Hours., Disp: 60 tablet, Rfl: 4  •  CONTOUR NEXT TEST test strip, USE ONE STRIP TO TEST TWICE A DAY, Disp: 100 each, Rfl: 1  •  febuxostat (Uloric) 40 MG tablet, Take 1 tablet by mouth Daily for 180 days., Disp: 90 tablet, Rfl: 1  •  ferrous sulfate 325 (65 FE) MG tablet, Take 1 tablet by mouth daily., Disp: , Rfl:   •  hydrALAZINE (APRESOLINE) 50 MG tablet, Take 1 tablet by mouth 3 (Three) Times a Day., Disp: 180 tablet, Rfl: 0  •  Insulin Lispro, 1 Unit Dial, (HUMALOG KWIKPEN) 100 UNIT/ML solution pen-injector, Inject 5 Units under the skin into the appropriate area as directed 3 (Three) Times a Day With Meals., Disp: 15 mL, Rfl: 2  •  Insulin Pen Needle (BD PEN NEEDLE ISELA U/F) 32G X 4 MM misc, Use to inject 4 time daily, Disp: 200 each, Rfl: 5  •  losartan (COZAAR) 100 MG tablet, Take 100 mg by mouth Daily., Disp: , Rfl:   •  metoprolol tartrate (LOPRESSOR) 25 MG tablet, Take 0.5 tablets by mouth Every 12 (Twelve) Hours., Disp: 30 tablet, Rfl: 5  •  montelukast (SINGULAIR) 10 MG tablet, 10 mg daily., Disp: , Rfl:   •  potassium chloride (MICRO-K) 10 MEQ  "CR capsule, Take 10 mEq by mouth Daily., Disp: , Rfl:   •  SYNTHROID 200 MCG tablet, TAKE ONE TABLET BY MOUTH DAILY, Disp: 30 tablet, Rfl: 1  •  torsemide (DEMADEX) 20 MG tablet, Take 1 tablet by mouth Daily., Disp: 30 tablet, Rfl: 5  •  TOUJEO MAX SOLOSTAR 300 UNIT/ML solution pen-injector injection, INJECT 100 UNITS UNDER THE SKIN INTO THE APPROPRIATE AREA AS DIRECTED DAILY, Disp: 12 pen, Rfl: 0  •  vitamin B-12 (CYANOCOBALAMIN) 1000 MCG tablet, Take 1 tablet by mouth daily., Disp: , Rfl:   •  vitamin D (ERGOCALCIFEROL) 1.25 MG (44685 UT) capsule capsule, TAKE ONE CAPSULE BY MOUTH ONCE WEEKLY, Disp: 12 capsule, Rfl: 0        Objective:     Vitals:    07/17/20 1113 07/17/20 1130   BP: 180/74 156/72   Pulse: 78    Weight: 84.8 kg (187 lb)    Height: 165.1 cm (65\")      Body mass index is 31.12 kg/m².    Due to telehealth visit, there was no EKG, vitals, or weight performed in our office.  Vitals/Weight were reported by the patient and conducted at home.       Assessment:       Diagnosis Plan   1. Coronary artery disease involving native coronary artery of native heart without angina pectoris     2. Essential hypertension     3. Postoperative atrial fibrillation (CMS/HCC)  Holter Monitor - 72 Hour Up To 21 Days   4. Stage 3 chronic kidney disease (CMS/HCC)     5. Class 1 obesity with serious comorbidity and body mass index (BMI) of 31.0 to 31.9 in adult, unspecified obesity type     6. Paroxysmal atrial fibrillation (CMS/HCC)   Holter Monitor - 72 Hour Up To 21 Days          Plan:       1.  Coronary Artery Disease: Status post CABG x6.  She is actually doing very well postoperatively. Continue aspirin, atorvastatin, and metoprolol.     2.  Hypertension: Her blood pressure is elevated today and her daughter intervenes and said that Dr. Hugh Caruso just increased her losartan.     3.  Postoperative Atrial Fibrillation: She is continued with metoprolol and is no longer taking the amiodarone or apixaban.  Dr. Ng " recommended a follow-up 14-day Holter monitor and this has been ordered.    4.  Chronic Kidney Disease: Follows with Dr. Hugh Caruso.       5. Obesity: BMI is 31.1. She would benefit from exercise, weight loss, and low-sodium/heart healthy diet.     6.  Overall she is doing well from a cardiac standpoint.  We will review her 14-day Holter monitor results and further recommendations and appointment will follow.    ADDENDUM 8/14/2020:  · Holter monitor showed no further atrial fibrillation.  I discussed with Dr. Victor Hugo Ng and she recommended that the patient continue with her current medication regimen.  Her daughter Henna was informed.  · Henna reports that her mom may be having right upper back pain from the atorvastatin.  They are currently holding the atorvastatin and will reassess.  I have low suspicion it is coming from the statin but if we need to change to rosuvastatin in the future we will.  They will call with an update.  · I recommended follow-up with Dr. Victor Hugo Ng in 6 months, unless otherwise needed sooner.    This patient has consented to a telehealth visit via telephone. The visit was scheduled as a telephone visit to comply with patient safety concerns in accordance with CDC recommendations.  All vitals recorded within this visit are reported by the patient.  I spent 25 minutes in total including but not limited to the 8 minutes spent in direct conversation with this patient.      As always, it has been a pleasure to participate in your patient's care. Thank you.            Sincerely,         TAYO Barton        Dictated utilizing Dragon dictation

## 2020-07-19 NOTE — TELEPHONE ENCOUNTER
----- Message from Jackie Miranda sent at 3/21/2017  4:17 PM EDT -----  PATIENT REQUESTED SAMPLES ON 3/21/17 @ 4:18PM. WE HAVE 2 PENS THAT SHE WILL  ON 3/22/17 IN THE AM   23

## 2020-08-13 RX ORDER — TORSEMIDE 20 MG/1
TABLET ORAL
Qty: 90 TABLET | Refills: 0 | Status: SHIPPED | OUTPATIENT
Start: 2020-08-13 | End: 2020-10-29 | Stop reason: DRUGHIGH

## 2020-08-14 ENCOUNTER — TELEPHONE (OUTPATIENT)
Dept: CARDIOLOGY | Facility: CLINIC | Age: 80
End: 2020-08-14

## 2020-08-14 NOTE — TELEPHONE ENCOUNTER
I spoke with daughter Henna about holter results. Dr. Ng reviewed the Holter monitor results and did not recommend anything further.  Henna informed.    Henna said her mom is on statin therapy in the past and developed myalgias.  She is experiencing some right upper back pain.  Henna's been holding her atorvastatin for the past week.  Her pain is improved, but she still has a little bit.  I have low suspicion that this is from the atorvastatin.  They are going to continue to hold and monitor.  We may have to try another statin in the future.  They will call back with an update.    Obed-please arrange a 6-month follow-up visit with Dr. Victor Hugo Ng.  Please call Henna her daughter to arrange.  Thank you

## 2020-08-19 PROBLEM — E11.311: Status: ACTIVE | Noted: 2020-08-19

## 2020-08-20 RX ORDER — ERGOCALCIFEROL 1.25 MG/1
CAPSULE ORAL
Qty: 12 CAPSULE | Refills: 0 | Status: SHIPPED | OUTPATIENT
Start: 2020-08-20 | End: 2020-11-17

## 2020-08-20 RX ORDER — LEVOTHYROXINE SODIUM 200 MCG
TABLET ORAL
Qty: 30 TABLET | Refills: 1 | Status: SHIPPED | OUTPATIENT
Start: 2020-08-20 | End: 2020-10-20

## 2020-08-21 DIAGNOSIS — I10 ESSENTIAL HYPERTENSION: ICD-10-CM

## 2020-08-21 RX ORDER — CLONIDINE HYDROCHLORIDE 0.1 MG/1
TABLET ORAL
Qty: 60 TABLET | Refills: 3 | Status: SHIPPED | OUTPATIENT
Start: 2020-08-21 | End: 2020-12-28 | Stop reason: SDUPTHER

## 2020-10-15 ENCOUNTER — LAB (OUTPATIENT)
Dept: ENDOCRINOLOGY | Age: 80
End: 2020-10-15

## 2020-10-15 DIAGNOSIS — E04.1 SOLITARY THYROID NODULE: ICD-10-CM

## 2020-10-15 DIAGNOSIS — E11.42 TYPE 2 DIABETES MELLITUS WITH DIABETIC POLYNEUROPATHY, WITH LONG-TERM CURRENT USE OF INSULIN (HCC): ICD-10-CM

## 2020-10-15 DIAGNOSIS — I10 ESSENTIAL HYPERTENSION: Primary | ICD-10-CM

## 2020-10-15 DIAGNOSIS — E21.3 HYPERPARATHYROIDISM (HCC): ICD-10-CM

## 2020-10-15 DIAGNOSIS — E89.0 POSTOPERATIVE HYPOTHYROIDISM: ICD-10-CM

## 2020-10-15 DIAGNOSIS — Z79.4 TYPE 2 DIABETES MELLITUS WITH DIABETIC POLYNEUROPATHY, WITH LONG-TERM CURRENT USE OF INSULIN (HCC): ICD-10-CM

## 2020-10-15 DIAGNOSIS — E55.9 VITAMIN D DEFICIENCY: ICD-10-CM

## 2020-10-15 DIAGNOSIS — E78.2 MIXED HYPERLIPIDEMIA: ICD-10-CM

## 2020-10-15 DIAGNOSIS — R80.9 PROTEINURIA, UNSPECIFIED TYPE: ICD-10-CM

## 2020-10-16 LAB
25(OH)D3+25(OH)D2 SERPL-MCNC: 65.3 NG/ML (ref 30–100)
ALBUMIN SERPL-MCNC: 3.6 G/DL (ref 3.5–5.2)
ALBUMIN/GLOB SERPL: 1.6 G/DL
ALP SERPL-CCNC: 104 U/L (ref 39–117)
ALT SERPL-CCNC: 14 U/L (ref 1–33)
AST SERPL-CCNC: 11 U/L (ref 1–32)
BILIRUB SERPL-MCNC: 0.2 MG/DL (ref 0–1.2)
BUN SERPL-MCNC: 22 MG/DL (ref 8–23)
BUN/CREAT SERPL: 11.3 (ref 7–25)
C PEPTIDE SERPL-MCNC: 4.3 NG/ML (ref 1.1–4.4)
CA-I SERPL ISE-MCNC: 5.3 MG/DL (ref 4.5–5.6)
CALCIUM SERPL-MCNC: 9.2 MG/DL (ref 8.6–10.5)
CHLORIDE SERPL-SCNC: 103 MMOL/L (ref 98–107)
CHOLEST SERPL-MCNC: 159 MG/DL (ref 0–200)
CO2 SERPL-SCNC: 31.1 MMOL/L (ref 22–29)
CREAT SERPL-MCNC: 1.95 MG/DL (ref 0.57–1)
FT4I SERPL CALC-MCNC: 2.6 (ref 1.2–4.9)
GLOBULIN SER CALC-MCNC: 2.3 GM/DL
GLUCOSE SERPL-MCNC: 158 MG/DL (ref 65–99)
HBA1C MFR BLD: 6.7 % (ref 4.8–5.6)
HDLC SERPL-MCNC: 56 MG/DL (ref 40–60)
INTERPRETATION: NORMAL
LDLC SERPL CALC-MCNC: 85 MG/DL (ref 0–100)
Lab: NORMAL
MICROALBUMIN UR-MCNC: 498.4 UG/ML
PHOSPHATE SERPL-MCNC: 4.2 MG/DL (ref 2.5–4.5)
POTASSIUM SERPL-SCNC: 4.8 MMOL/L (ref 3.5–5.2)
PROT SERPL-MCNC: 5.9 G/DL (ref 6–8.5)
PTH-INTACT SERPL-MCNC: 63 PG/ML (ref 15–65)
SODIUM SERPL-SCNC: 144 MMOL/L (ref 136–145)
T3FREE SERPL-MCNC: 2.3 PG/ML (ref 2–4.4)
T3RU NFR SERPL: 36 % (ref 24–39)
T4 FREE SERPL-MCNC: 1.38 NG/DL (ref 0.93–1.7)
T4 SERPL-MCNC: 7.1 UG/DL (ref 4.5–12)
TRIGL SERPL-MCNC: 97 MG/DL (ref 0–150)
TSH SERPL DL<=0.005 MIU/L-ACNC: 0.71 UIU/ML (ref 0.45–4.5)
VLDLC SERPL CALC-MCNC: 18 MG/DL (ref 5–40)

## 2020-10-19 ENCOUNTER — OFFICE VISIT (OUTPATIENT)
Dept: FAMILY MEDICINE CLINIC | Facility: CLINIC | Age: 80
End: 2020-10-19

## 2020-10-19 VITALS
WEIGHT: 197 LBS | RESPIRATION RATE: 16 BRPM | OXYGEN SATURATION: 97 % | HEIGHT: 65 IN | HEART RATE: 73 BPM | BODY MASS INDEX: 32.82 KG/M2 | DIASTOLIC BLOOD PRESSURE: 80 MMHG | TEMPERATURE: 97.1 F | SYSTOLIC BLOOD PRESSURE: 194 MMHG

## 2020-10-19 DIAGNOSIS — M10.9 GOUT WITHOUT TOPHUS: ICD-10-CM

## 2020-10-19 DIAGNOSIS — Z79.4 TYPE 2 DIABETES MELLITUS WITH DIABETIC POLYNEUROPATHY, WITH LONG-TERM CURRENT USE OF INSULIN (HCC): ICD-10-CM

## 2020-10-19 DIAGNOSIS — Z00.00 MEDICARE ANNUAL WELLNESS VISIT, SUBSEQUENT: Primary | ICD-10-CM

## 2020-10-19 DIAGNOSIS — E11.42 TYPE 2 DIABETES MELLITUS WITH DIABETIC POLYNEUROPATHY, WITH LONG-TERM CURRENT USE OF INSULIN (HCC): ICD-10-CM

## 2020-10-19 DIAGNOSIS — M24.20 DISORDER OF MUSCLE, LIGAMENT, AND FASCIA: ICD-10-CM

## 2020-10-19 DIAGNOSIS — I10 ESSENTIAL HYPERTENSION: ICD-10-CM

## 2020-10-19 DIAGNOSIS — M62.9 DISORDER OF MUSCLE, LIGAMENT, AND FASCIA: ICD-10-CM

## 2020-10-19 PROBLEM — N18.4 STAGE 4 CHRONIC KIDNEY DISEASE (HCC): Status: ACTIVE | Noted: 2018-04-19

## 2020-10-19 PROBLEM — I97.89 POSTOPERATIVE ATRIAL FIBRILLATION (HCC): Status: RESOLVED | Noted: 2020-02-25 | Resolved: 2020-10-19

## 2020-10-19 PROBLEM — E66.09 CLASS 1 OBESITY DUE TO EXCESS CALORIES WITH SERIOUS COMORBIDITY AND BODY MASS INDEX (BMI) OF 32.0 TO 32.9 IN ADULT: Status: ACTIVE | Noted: 2020-07-17

## 2020-10-19 PROBLEM — Z95.1 HISTORY OF CORONARY ARTERY BYPASS SURGERY: Status: ACTIVE | Noted: 2020-02-12

## 2020-10-19 PROBLEM — I80.3 THROMBOPHLEBITIS LEG: Status: RESOLVED | Noted: 2020-02-26 | Resolved: 2020-10-19

## 2020-10-19 PROBLEM — I48.91 POSTOPERATIVE ATRIAL FIBRILLATION (HCC): Status: RESOLVED | Noted: 2020-02-25 | Resolved: 2020-10-19

## 2020-10-19 PROCEDURE — G0439 PPPS, SUBSEQ VISIT: HCPCS | Performed by: FAMILY MEDICINE

## 2020-10-19 PROCEDURE — 99214 OFFICE O/P EST MOD 30 MIN: CPT | Performed by: FAMILY MEDICINE

## 2020-10-19 RX ORDER — AMLODIPINE BESYLATE 5 MG/1
5 TABLET ORAL DAILY
Qty: 90 TABLET | Refills: 1 | Status: SHIPPED | OUTPATIENT
Start: 2020-10-19 | End: 2021-04-02 | Stop reason: ALTCHOICE

## 2020-10-19 RX ORDER — FEBUXOSTAT 40 MG/1
40 TABLET, FILM COATED ORAL DAILY
Qty: 90 TABLET | Refills: 1 | Status: SHIPPED | OUTPATIENT
Start: 2020-10-19 | End: 2021-04-19 | Stop reason: SDUPTHER

## 2020-10-19 RX ORDER — CARBAMAZEPINE 300 MG/1
300 CAPSULE, EXTENDED RELEASE ORAL EVERY 12 HOURS SCHEDULED
Qty: 180 CAPSULE | Refills: 1 | Status: SHIPPED | OUTPATIENT
Start: 2020-10-19 | End: 2021-04-19 | Stop reason: SDUPTHER

## 2020-10-19 NOTE — ASSESSMENT & PLAN NOTE
Renal condition is worsening.  Patient to discuss this issue in the near future with renal specialist.

## 2020-10-19 NOTE — PATIENT INSTRUCTIONS
Check on insurance coverage and cost for adacel Tdap(tetanus plus whooping cough vaccine) and get the immunization at your local pharmacy  Check on insurance coverage and cost for Shingrix (newest shingles vaccine) and get the immunization at your local pharmacy. It is more effective than the old Zostavax vaccine and is recommended even if you have had the Zostavax vaccine in the past. For more information, please look at the website below:    https://www.cdc.gov/vaccines/vpd/shingles/public/shingrix/index.html      Fall Prevention in the Home, Adult  Falls can cause injuries and can affect people from all age groups. There are many simple things that you can do to make your home safe and to help prevent falls. Ask for help when making these changes, if needed.  What actions can I take to prevent falls?  General instructions  · Use good lighting in all rooms. Replace any light bulbs that burn out.  · Turn on lights if it is dark. Use night-lights.  · Place frequently used items in easy-to-reach places. Lower the shelves around your home if necessary.  · Set up furniture so that there are clear paths around it. Avoid moving your furniture around.  · Remove throw rugs and other tripping hazards from the floor.  · Avoid walking on wet floors.  · Fix any uneven floor surfaces.  · Add color or contrast paint or tape to grab bars and handrails in your home. Place contrasting color strips on the first and last steps of stairways.  · When you use a stepladder, make sure that it is completely opened and that the sides are firmly locked. Have someone hold the ladder while you are using it. Do not climb a closed stepladder.  · Be aware of any and all pets.  What can I do in the bathroom?         · Keep the floor dry. Immediately clean up any water that spills onto the floor.  · Remove soap buildup in the tub or shower on a regular basis.  · Use non-skid mats or decals on the floor of the tub or shower.  · Attach bath mats  securely with double-sided, non-slip rug tape.  · If you need to sit down while you are in the shower, use a plastic, non-slip stool.  · Install grab bars by the toilet and in the tub and shower. Do not use towel bars as grab bars.  What can I do in the bedroom?  · Make sure that a bedside light is easy to reach.  · Do not use oversized bedding that drapes onto the floor.  · Have a firm chair that has side arms to use for getting dressed.  What can I do in the kitchen?  · Clean up any spills right away.  · If you need to reach for something above you, use a sturdy step stool that has a grab bar.  · Keep electrical cables out of the way.  · Do not use floor polish or wax that makes floors slippery. If you must use wax, make sure that it is non-skid floor wax.  What can I do in the stairways?  · Do not leave any items on the stairs.  · Make sure that you have a light switch at the top of the stairs and the bottom of the stairs. Have them installed if you do not have them.  · Make sure that there are handrails on both sides of the stairs. Fix handrails that are broken or loose. Make sure that handrails are as long as the stairways.  · Install non-slip stair treads on all stairs in your home.  · Avoid having throw rugs at the top or bottom of stairways, or secure the rugs with carpet tape to prevent them from moving.  · Choose a carpet design that does not hide the edge of steps on the stairway.  · Check any carpeting to make sure that it is firmly attached to the stairs. Fix any carpet that is loose or worn.  What can I do on the outside of my home?  · Use bright outdoor lighting.  · Regularly repair the edges of walkways and driveways and fix any cracks.  · Remove high doorway thresholds.  · Trim any shrubbery on the main path into your home.  · Regularly check that handrails are securely fastened and in good repair. Both sides of any steps should have handrails.  · Install guardrails along the edges of any raised  decks or porches.  · Clear walkways of debris and clutter, including tools and rocks.  · Have leaves, snow, and ice cleared regularly.  · Use sand or salt on walkways during winter months.  · In the garage, clean up any spills right away, including grease or oil spills.  What other actions can I take?  · Wear closed-toe shoes that fit well and support your feet. Wear shoes that have rubber soles or low heels.  · Use mobility aids as needed, such as canes, walkers, scooters, and crutches.  · Review your medicines with your health care provider. Some medicines can cause dizziness or changes in blood pressure, which increase your risk of falling.  Talk with your health care provider about other ways that you can decrease your risk of falls. This may include working with a physical therapist or  to improve your strength, balance, and endurance.  Where to find more information  · Centers for Disease Control and Prevention, [place]: https://www.cdc.gov  · National [place] on Aging: https://ip8hgxz.sea.nih.gov  Contact a health care provider if:  · You are afraid of falling at home.  · You feel weak, drowsy, or dizzy at home.  · You fall at home.  Summary  · There are many simple things that you can do to make your home safe and to help prevent falls.  · Ways to make your home safe include removing tripping hazards and installing grab bars in the bathroom.  · Ask for help when making these changes in your home.  This information is not intended to replace advice given to you by your health care provider. Make sure you discuss any questions you have with your health care provider.  Document Released: 12/08/2003 Document Revised: 11/30/2018 Document Reviewed: 08/02/2018  [employer]vier Patient Education © 2020 [employer]      Calorie Counting for Weight Loss  Calories are units of energy. Your body needs a certain amount of calories from food to keep you going throughout the day. When you eat more calories than your  body needs, your body stores the extra calories as fat. When you eat fewer calories than your body needs, your body burns fat to get the energy it needs.  Calorie counting means keeping track of how many calories you eat and drink each day. Calorie counting can be helpful if you need to lose weight. If you make sure to eat fewer calories than your body needs, you should lose weight. Ask your health care provider what a healthy weight is for you.  For calorie counting to work, you will need to eat the right number of calories in a day in order to lose a healthy amount of weight per week. A dietitian can help you determine how many calories you need in a day and will give you suggestions on how to reach your calorie goal.  · A healthy amount of weight to lose per week is usually 1-2 lb (0.5-0.9 kg). This usually means that your daily calorie intake should be reduced by 500-750 calories.  · Eating 1,200 - 1,500 calories per day can help most women lose weight.  · Eating 1,500 - 1,800 calories per day can help most men lose weight.  What is my plan?  My goal is to have __________ calories per day.  If I have this many calories per day, I should lose around __________ pounds per week.  What do I need to know about calorie counting?  In order to meet your daily calorie goal, you will need to:  · Find out how many calories are in each food you would like to eat. Try to do this before you eat.  · Decide how much of the food you plan to eat.  · Write down what you ate and how many calories it had. Doing this is called keeping a food log.  To successfully lose weight, it is important to balance calorie counting with a healthy lifestyle that includes regular activity. Aim for 150 minutes of moderate exercise (such as walking) or 75 minutes of vigorous exercise (such as running) each week.  Where do I find calorie information?    The number of calories in a food can be found on a Nutrition Facts label. If a food does not have a  Nutrition Facts label, try to look up the calories online or ask your dietitian for help.  Remember that calories are listed per serving. If you choose to have more than one serving of a food, you will have to multiply the calories per serving by the amount of servings you plan to eat. For example, the label on a package of bread might say that a serving size is 1 slice and that there are 90 calories in a serving. If you eat 1 slice, you will have eaten 90 calories. If you eat 2 slices, you will have eaten 180 calories.  How do I keep a food log?  Immediately after each meal, record the following information in your food log:  · What you ate. Don't forget to include toppings, sauces, and other extras on the food.  · How much you ate. This can be measured in cups, ounces, or number of items.  · How many calories each food and drink had.  · The total number of calories in the meal.  Keep your food log near you, such as in a small notebook in your pocket, or use a mobile liborio or website. Some programs will calculate calories for you and show you how many calories you have left for the day to meet your goal.  What are some calorie counting tips?    · Use your calories on foods and drinks that will fill you up and not leave you hungry:  ? Some examples of foods that fill you up are nuts and nut butters, vegetables, lean proteins, and high-fiber foods like whole grains. High-fiber foods are foods with more than 5 g fiber per serving.  ? Drinks such as sodas, specialty coffee drinks, alcohol, and juices have a lot of calories, yet do not fill you up.  · Eat nutritious foods and avoid empty calories. Empty calories are calories you get from foods or beverages that do not have many vitamins or protein, such as candy, sweets, and soda. It is better to have a nutritious high-calorie food (such as an avocado) than a food with few nutrients (such as a bag of chips).  · Know how many calories are in the foods you eat most often.  "This will help you calculate calorie counts faster.  · Pay attention to calories in drinks. Low-calorie drinks include water and unsweetened drinks.  · Pay attention to nutrition labels for \"low fat\" or \"fat free\" foods. These foods sometimes have the same amount of calories or more calories than the full fat versions. They also often have added sugar, starch, or salt, to make up for flavor that was removed with the fat.  · Find a way of tracking calories that works for you. Get creative. Try different apps or programs if writing down calories does not work for you.  What are some portion control tips?  · Know how many calories are in a serving. This will help you know how many servings of a certain food you can have.  · Use a measuring cup to measure serving sizes. You could also try weighing out portions on a kitchen scale. With time, you will be able to estimate serving sizes for some foods.  · Take some time to put servings of different foods on your favorite plates, bowls, and cups so you know what a serving looks like.  · Try not to eat straight from a bag or box. Doing this can lead to overeating. Put the amount you would like to eat in a cup or on a plate to make sure you are eating the right portion.  · Use smaller plates, glasses, and bowls to prevent overeating.  · Try not to multitask (for example, watch TV or use your computer) while eating. If it is time to eat, sit down at a table and enjoy your food. This will help you to know when you are full. It will also help you to be aware of what you are eating and how much you are eating.  What are tips for following this plan?  Reading food labels  · Check the calorie count compared to the serving size. The serving size may be smaller than what you are used to eating.  · Check the source of the calories. Make sure the food you are eating is high in vitamins and protein and low in saturated and trans fats.  Shopping  · Read nutrition labels while you shop. " "This will help you make healthy decisions before you decide to purchase your food.  · Make a grocery list and stick to it.  Cooking  · Try to cook your favorite foods in a healthier way. For example, try baking instead of frying.  · Use low-fat dairy products.  Meal planning  · Use more fruits and vegetables. Half of your plate should be fruits and vegetables.  · Include lean proteins like poultry and fish.  How do I count calories when eating out?  · Ask for smaller portion sizes.  · Consider sharing an entree and sides instead of getting your own entree.  · If you get your own entree, eat only half. Ask for a box at the beginning of your meal and put the rest of your entree in it so you are not tempted to eat it.  · If calories are listed on the menu, choose the lower calorie options.  · Choose dishes that include vegetables, fruits, whole grains, low-fat dairy products, and lean protein.  · Choose items that are boiled, broiled, grilled, or steamed. Stay away from items that are buttered, battered, fried, or served with cream sauce. Items labeled \"crispy\" are usually fried, unless stated otherwise.  · Choose water, low-fat milk, unsweetened iced tea, or other drinks without added sugar. If you want an alcoholic beverage, choose a lower calorie option such as a glass of wine or light beer.  · Ask for dressings, sauces, and syrups on the side. These are usually high in calories, so you should limit the amount you eat.  · If you want a salad, choose a garden salad and ask for grilled meats. Avoid extra toppings like [place], cheese, or fried items. Ask for the dressing on the side, or ask for olive oil and vinegar or lemon to use as dressing.  · Estimate how many servings of a food you are given. For example, a serving of cooked rice is ½ cup or about the size of half a baseball. Knowing serving sizes will help you be aware of how much food you are eating at restaurants. The list below tells you how big or small some " common portion sizes are based on everyday objects:  ? 1 oz--4 stacked dice.  ? 3 oz--1 deck of cards.  ? 1 tsp--1 die.  ? 1 Tbsp--½ a ping-pong ball.  ? 2 Tbsp--1 ping-pong ball.  ? ½ cup--½ baseball.  ? 1 cup--1 baseball.  Summary  · Calorie counting means keeping track of how many calories you eat and drink each day. If you eat fewer calories than your body needs, you should lose weight.  · A healthy amount of weight to lose per week is usually 1-2 lb (0.5-0.9 kg). This usually means reducing your daily calorie intake by 500-750 calories.  · The number of calories in a food can be found on a Nutrition Facts label. If a food does not have a Nutrition Facts label, try to look up the calories online or ask your dietitian for help.  · Use your calories on foods and drinks that will fill you up, and not on foods and drinks that will leave you hungry.  · Use smaller plates, glasses, and bowls to prevent overeating.  This information is not intended to replace advice given to you by your health care provider. Make sure you discuss any questions you have with your health care provider.  Document Released: 12/18/2006 Document Revised: 09/06/2019 Document Reviewed: 11/17/2017  [employer] Patient Education © 2020 [employer]      Exercising to Stay Healthy  To become healthy and stay healthy, it is recommended that you do moderate-intensity and vigorous-intensity exercise. You can tell that you are exercising at a moderate intensity if your heart starts beating faster and you start breathing faster but can still hold a conversation. You can tell that you are exercising at a vigorous intensity if you are breathing much harder and faster and cannot hold a conversation while exercising.  Exercising regularly is important. It has many health benefits, such as:  · Improving overall fitness, flexibility, and endurance.  · Increasing bone density.  · Helping with weight control.  · Decreasing body fat.  · Increasing muscle  strength.  · Reducing stress and tension.  · Improving overall health.  How often should I exercise?  Choose an activity that you enjoy, and set realistic goals. Your health care provider can help you make an activity plan that works for you.  Exercise regularly as told by your health care provider. This may include:  · Doing strength training two times a week, such as:  ? Lifting weights.  ? Using resistance bands.  ? Push-ups.  ? Sit-ups.  ? Yoga.  · Doing a certain intensity of exercise for a given amount of time. Choose from these options:  ? A total of 150 minutes of moderate-intensity exercise every week.  ? A total of 75 minutes of vigorous-intensity exercise every week.  ? A mix of moderate-intensity and vigorous-intensity exercise every week.  Children, pregnant women, people who have not exercised regularly, people who are overweight, and older adults may need to talk with a health care provider about what activities are safe to do. If you have a medical condition, be sure to talk with your health care provider before you start a new exercise program.  What are some exercise ideas?  Moderate-intensity exercise ideas include:  · Walking 1 mile (1.6 km) in about 15 minutes.  · Biking.  · Hiking.  · Golfing.  · Dancing.  · Water aerobics.  Vigorous-intensity exercise ideas include:  · Walking 4.5 miles (7.2 km) or more in about 1 hour.  · Jogging or running 5 miles (8 km) in about 1 hour.  · Biking 10 miles (16.1 km) or more in about 1 hour.  · Lap swimming.  · Roller-skating or in-line skating.  · Cross-country skiing.  · Vigorous competitive sports, such as football, basketball, and soccer.  · Jumping rope.  · Aerobic dancing.  What are some everyday activities that can help me to get exercise?  · Yard work, such as:  ? Pushing a .  ? Raking and bagging leaves.  · Washing your car.  · Pushing a stroller.  · Shoveling snow.  · Gardening.  · Washing windows or floors.  How can I be more active in my  day-to-day activities?  · Use stairs instead of an elevator.  · Take a walk during your lunch break.  · If you drive, park your car farther away from your work or school.  · If you take public transportation, get off one stop early and walk the rest of the way.  · Stand up or walk around during all of your indoor phone calls.  · Get up, stretch, and walk around every 30 minutes throughout the day.  · Enjoy exercise with a friend. Support to continue exercising will help you keep a regular routine of activity.  What guidelines can I follow while exercising?  · Before you start a new exercise program, talk with your health care provider.  · Do not exercise so much that you hurt yourself, feel dizzy, or get very short of breath.  · Wear comfortable clothes and wear shoes with good support.  · Drink plenty of water while you exercise to prevent dehydration or heat stroke.  · Work out until your breathing and your heartbeat get faster.  Where to find more information  · [place] Department of Health and Human Services: www.hhs.gov  · Centers for Disease Control and Prevention (CDC): www.cdc.gov  Summary  · Exercising regularly is important. It will improve your overall fitness, flexibility, and endurance.  · Regular exercise also will improve your overall health. It can help you control your weight, reduce stress, and improve your bone density.  · Do not exercise so much that you hurt yourself, feel dizzy, or get very short of breath.  · Before you start a new exercise program, talk with your health care provider.  This information is not intended to replace advice given to you by your health care provider. Make sure you discuss any questions you have with your health care provider.  Document Released: 01/20/2012 Document Revised: 11/30/2018 Document Reviewed: 11/08/2018  [employer] Patient Education © 2020 [employer]

## 2020-10-19 NOTE — PROGRESS NOTES
The ABCs of the Annual Wellness Visit  Subsequent Medicare Wellness Visit    Chief Complaint   Patient presents with   • Medicare Wellness-subsequent       Subjective   History of Present Illness:  Ange Johnson is a 80 y.o. female who presents for a Subsequent Medicare Wellness Visit.  She is also here to review recent labs and refill medicines.  Gout controlled on Uloric.  Diabetic polyneuropathy controlled with carbamazepine.  Labs reviewed show good blood sugar control.  Kidney function worsening.  Medication list has been updated.    HEALTH RISK ASSESSMENT    Recent Hospitalizations:  Recently treated at the following:  [facility]    Current Medical Providers:  Patient Care Team:  Quinn Washington MD as PCP - General (Family Medicine)  Quinn Washington MD as PCP - Claims Attributed  Steve Simons Jr., MD as Consulting Physician (Hematology and Oncology)  Hugh Caruso MD as Consulting Physician (Nephrology)  Adalgisa Hernandez APRN as Nurse Practitioner (Endocrinology)  Reagan Beltran MD as Consulting Physician (Endocrinology)  Aguilar Goldman MD as Consulting Physician (Pulmonary Disease)  Mil Sr MD as Consulting Physician (Ophthalmology)  Tasneem Montelongo MD as Surgeon (General Surgery)  Victor Hugo Ng MD as Consulting Physician (Cardiology)    Smoking Status:  Social History     Tobacco Use   Smoking Status Never Smoker   Smokeless Tobacco Never Used       Alcohol Consumption:  Social History     Substance and Sexual Activity   Alcohol Use No    Comment: Caffeine use: Decaf       Depression Screen:   PHQ-2/PHQ-9 Depression Screening 10/19/2020   Little interest or pleasure in doing things 0   Feeling down, depressed, or hopeless 0   Total Score 0       Fall Risk Screen:  STEADI Fall Risk Assessment was completed, and patient is at LOW risk for falls.Assessment completed on:10/19/2020    Health Habits and Functional and Cognitive Screening:  Functional & Cognitive Status 10/19/2020    Do you have difficulty preparing food and eating? No   Do you have difficulty bathing yourself, getting dressed or grooming yourself? No   Do you have difficulty using the toilet? No   Do you have difficulty moving around from place to place? No   Do you have trouble with steps or getting out of a bed or a chair? Yes   Current Diet Well Balanced Diet   Dental Exam Up to date   Eye Exam Up to date   Exercise (times per week) 2 times per week   Current Exercise Activities Include Walking   Do you need help using the phone?  No   Are you deaf or do you have serious difficulty hearing?  Yes   Do you need help with transportation? No   Do you need help shopping? No   Do you need help preparing meals?  No   Do you need help with housework?  No   Do you need help with laundry? No   Do you need help taking your medications? No   Do you need help managing money? No   Do you ever drive or ride in a car without wearing a seat belt? No   Have you felt unusual stress, anger or loneliness in the last month? No   Who do you live with? Child   If you need help, do you have trouble finding someone available to you? No   Have you been bothered in the last four weeks by sexual problems? No   Do you have difficulty concentrating, remembering or making decisions? Yes         Does the patient have evidence of cognitive impairment? No    Asprin use counseling:Taking ASA appropriately as indicated    Age-appropriate Screening Schedule:  Refer to the list below for future screening recommendations based on patient's age, sex and/or medical conditions. Orders for these recommended tests are listed in the plan section. The patient has been provided with a written plan.    Health Maintenance   Topic Date Due   • TDAP/TD VACCINES (1 - Tdap) 10/08/1959   • ZOSTER VACCINE (2 of 3) 08/17/2010   • DXA SCAN  11/09/2020   • MAMMOGRAM  02/19/2021   • HEMOGLOBIN A1C  04/15/2021   • DIABETIC EYE EXAM  07/01/2021   • LIPID PANEL  10/15/2021   • URINE  MICROALBUMIN  10/15/2021   • INFLUENZA VACCINE  Completed          The following portions of the patient's history were reviewed and updated as appropriate: allergies, current medications, past family history, past medical history, past social history, past surgical history and problem list.    Outpatient Medications Prior to Visit   Medication Sig Dispense Refill   • atorvastatin (LIPITOR) 40 MG tablet Take 1 tablet by mouth Every Night. 30 tablet 11   • cetirizine (zyrTEC) 10 MG tablet Take 10 mg by mouth Every Night.     • cloNIDine (CATAPRES) 0.1 MG tablet TAKE ONE TABLET BY MOUTH EVERY 12 HOURS 60 tablet 3   • CONTOUR NEXT TEST test strip USE ONE STRIP TO TEST TWICE A  each 1   • ferrous sulfate 325 (65 FE) MG tablet Take 1 tablet by mouth daily.     • hydrALAZINE (APRESOLINE) 50 MG tablet Take 1 tablet by mouth 3 (Three) Times a Day. 180 tablet 0   • losartan (COZAAR) 100 MG tablet Take 100 mg by mouth Daily.     • metoprolol tartrate (LOPRESSOR) 25 MG tablet TAKE 1/2 TABLET BY MOUTH EVERY 12 HOURS 90 tablet 0   • montelukast (SINGULAIR) 10 MG tablet 10 mg daily.     • potassium chloride (MICRO-K) 10 MEQ CR capsule Take 10 mEq by mouth Daily.     • SYNTHROID 200 MCG tablet TAKE ONE TABLET BY MOUTH DAILY 30 tablet 1   • torsemide (DEMADEX) 20 MG tablet TAKE ONE TABLET BY MOUTH DAILY 90 tablet 0   • TOUJEO MAX SOLOSTAR 300 UNIT/ML solution pen-injector injection INJECT 100 UNITS UNDER THE SKIN INTO THE APPROPRIATE AREA AS DIRECTED DAILY 12 pen 0   • vitamin B-12 (CYANOCOBALAMIN) 1000 MCG tablet Take 1 tablet by mouth daily.     • vitamin D (ERGOCALCIFEROL) 1.25 MG (76916 UT) capsule capsule TAKE ONE CAPSULE BY MOUTH ONCE WEEKLY 12 capsule 0   • carBAMazepine (CARBATROL) 300 MG 12 hr capsule Take 1 capsule by mouth Every 12 (Twelve) Hours for 180 days. 180 capsule 1   • febuxostat (Uloric) 40 MG tablet Take 1 tablet by mouth Daily for 180 days. 90 tablet 1   • aspirin 81 MG EC tablet Take 1 tablet by mouth  Daily. 30 tablet 11   • Insulin Lispro, 1 Unit Dial, (HUMALOG KWIKPEN) 100 UNIT/ML solution pen-injector Inject 5 Units under the skin into the appropriate area as directed 3 (Three) Times a Day With Meals. 15 mL 2   • Insulin Pen Needle (BD PEN NEEDLE ISELA U/F) 32G X 4 MM misc Use to inject 4 time daily 200 each 5     No facility-administered medications prior to visit.        Patient Active Problem List   Diagnosis   • Asthma   • Type 2 diabetes mellitus with diabetic polyneuropathy, with long-term current use of insulin (CMS/HCC)   • Essential hypertension   • Mixed hyperlipidemia   • Disorder of muscle, ligament, and fascia   • Proteinuria   • Postoperative hypothyroidism   • Hyperparathyroidism (CMS/HCC)   • Vitamin D deficiency   • Gout without tophus   • Solitary thyroid nodule   • Anemia in stage 3 chronic kidney disease   • Stage 4 chronic kidney disease (CMS/HCC)   • Coronary artery disease involving native coronary artery of native heart without angina pectoris   • Class 1 obesity due to excess calories with serious comorbidity and body mass index (BMI) of 32.0 to 32.9 in adult   • 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 Patient has an advance directive (not in EMR), copy requested.    Review of Systems   Constitutional: Negative for fatigue.   Cardiovascular: Negative for chest pain.       Compared to one year ago, the patient feels her physical health is better.  Compared to one year ago, the patient feels her mental health is better.    Reviewed chart for potential of high risk medication in the elderly: yes  Reviewed chart for potential of harmful drug interactions in the elderly:yes    Objective         Vitals:    10/19/20 1500   BP: (!) 194/80   Pulse: 73   Resp: 16   Temp: 97.1 °F (36.2 °C)   TempSrc: Tympanic   SpO2: 97%   Weight: 89.4 kg (197 lb)  "  Height: 165.1 cm (65\")   PainSc: 0-No pain       Body mass index is 32.78 kg/m².  Discussed the patient's BMI with her. The BMI is above average; BMI management plan is completed.    Physical Exam  Vitals signs reviewed.   Constitutional:       General: She is not in acute distress.  Eyes:      General: Lids are normal.      Conjunctiva/sclera: Conjunctivae normal.   Neck:      Vascular: No carotid bruit.      Trachea: No tracheal deviation.   Cardiovascular:      Rate and Rhythm: Normal rate and regular rhythm.      Heart sounds: Murmur present.   Pulmonary:      Effort: Pulmonary effort is normal.      Breath sounds: Normal breath sounds.   Skin:     General: Skin is warm and dry.   Neurological:      Mental Status: She is alert. She is not disoriented.   Psychiatric:         Speech: Speech normal.         Behavior: Behavior normal. Behavior is cooperative.         Lab Results   Component Value Date     (H) 10/15/2020    CHLPL 159 10/15/2020    TRIG 97 10/15/2020    HDL 56 10/15/2020    LDL 85 10/15/2020    VLDL 18 10/15/2020    HGBA1C 6.70 (H) 10/15/2020        Assessment/Plan   Medicare Risks and Personalized Health Plan  CMS Preventative Services Quick Reference  Advance Directive Discussion  Cardiovascular risk  Diabetic Lab Screening   Fall Risk  Immunizations Discussed/Encouraged (specific immunizations; adacel Tdap and Shingrix )  Inactivity/Sedentary  Obesity/Overweight     The above risks/problems have been discussed with the patient.  Pertinent information has been shared with the patient in the After Visit Summary.  Follow up plans and orders are seen below in the Assessment/Plan Section.    Diagnoses and all orders for this visit:    1. Medicare annual wellness visit, subsequent (Primary)  Comments:  Information on fall prevention, immunizations, diet and exercise shared in after visit summary.    2. Disorder of muscle, ligament, and fascia  -     carBAMazepine (CARBATROL) 300 MG 12 hr " capsule; Take 1 capsule by mouth Every 12 (Twelve) Hours for 180 days.  Dispense: 180 capsule; Refill: 1    3. Gout without tophus  Assessment & Plan:  The current medical regimen is effective;  continue present plan and medications.      Orders:  -     febuxostat (Uloric) 40 MG tablet; Take 1 tablet by mouth Daily for 180 days.  Dispense: 90 tablet; Refill: 1    4. Type 2 diabetes mellitus with diabetic polyneuropathy, with long-term current use of insulin (CMS/[facility])  Assessment & Plan:  Polyneuropathy well-controlled with carbamazepine.  No medicine side effects reported.      5. Essential hypertension  Assessment & Plan:  Hypertension is worsening.  Medication changes per orders.  Blood pressure will be reassessed at the next regular appointment.    Orders:  -     amLODIPine (NORVASC) 5 MG tablet; Take 1 tablet by mouth Daily for 180 days.  Dispense: 90 tablet; Refill: 1    Follow Up:  Return in about 6 months (around 4/19/2021) for Recheck/Medication.     An After Visit Summary and PPPS were given to the patient.

## 2020-10-20 RX ORDER — LEVOTHYROXINE SODIUM 200 MCG
TABLET ORAL
Qty: 30 TABLET | Refills: 0 | Status: SHIPPED | OUTPATIENT
Start: 2020-10-20 | End: 2020-11-17

## 2020-10-29 ENCOUNTER — OFFICE VISIT (OUTPATIENT)
Dept: ENDOCRINOLOGY | Age: 80
End: 2020-10-29

## 2020-10-29 VITALS
BODY MASS INDEX: 32.82 KG/M2 | HEIGHT: 65 IN | SYSTOLIC BLOOD PRESSURE: 130 MMHG | WEIGHT: 197 LBS | DIASTOLIC BLOOD PRESSURE: 70 MMHG

## 2020-10-29 DIAGNOSIS — E78.2 MIXED HYPERLIPIDEMIA: ICD-10-CM

## 2020-10-29 DIAGNOSIS — E55.9 VITAMIN D DEFICIENCY: ICD-10-CM

## 2020-10-29 DIAGNOSIS — Z79.4 TYPE 2 DIABETES MELLITUS WITH DIABETIC POLYNEUROPATHY, WITH LONG-TERM CURRENT USE OF INSULIN (HCC): Primary | ICD-10-CM

## 2020-10-29 DIAGNOSIS — E21.3 HYPERPARATHYROIDISM (HCC): ICD-10-CM

## 2020-10-29 DIAGNOSIS — E04.1 SOLITARY THYROID NODULE: ICD-10-CM

## 2020-10-29 DIAGNOSIS — I10 ESSENTIAL HYPERTENSION: ICD-10-CM

## 2020-10-29 DIAGNOSIS — E89.0 POSTOPERATIVE HYPOTHYROIDISM: ICD-10-CM

## 2020-10-29 DIAGNOSIS — E11.42 TYPE 2 DIABETES MELLITUS WITH DIABETIC POLYNEUROPATHY, WITH LONG-TERM CURRENT USE OF INSULIN (HCC): Primary | ICD-10-CM

## 2020-10-29 PROCEDURE — 99214 OFFICE O/P EST MOD 30 MIN: CPT | Performed by: NURSE PRACTITIONER

## 2020-10-29 RX ORDER — INSULIN GLARGINE 300 U/ML
70 INJECTION, SOLUTION SUBCUTANEOUS DAILY
Qty: 21 ML | Refills: 2 | Status: SHIPPED | OUTPATIENT
Start: 2020-10-29 | End: 2021-10-21 | Stop reason: SDUPTHER

## 2020-10-29 NOTE — PROGRESS NOTES
"  Subjective    Ange Johnson is a 80 y.o. female. she is here today for follow-up.  Chief Complaint   Patient presents with   • Diabetes     f/u type 2 diabetes, recent labs, checks BS once a day, no readings, eye exam 2020, needs samples of toujeo     /70   Ht 165.1 cm (65\")   Wt 89.4 kg (197 lb)   BMI 32.78 kg/m²       History of Present Illness   Encounter Diagnoses   Name Primary?   • Type 2 diabetes mellitus with diabetic polyneuropathy, with long-term current use of insulin (CMS/[facility]) Yes   • Postoperative hypothyroidism    • Essential hypertension    • Mixed hyperlipidemia    • Vitamin D deficiency    • Solitary thyroid nodule    • Hyperparathyroidism (CMS/[facility])      80-year-old female patient here today for routine follow-up visit.  She has been seen for the above diagnoses.  She had recent labs which were reviewed.  She is accompanied by her daughter at today's visit.  She has no complaints or concerns.  Her lowest blood sugars have been in the 70s.  She had an eye exam in 2020.  She is requesting samples of Toujeo.  She checks her blood sugars once daily however she did not bring her blood glucose meter with her today's visit.  She denies neuropathy.  She has a history of a thyroid nodule which was evaluated by ultrasound in 2019.  She is currently on 52 units of Toujeo insulin once daily.  She has titrated her dose up to 52 from 20.    The following portions of the patient's history were reviewed and updated as appropriate:   Past Medical History:   Diagnosis Date   • Anemia in stage 3 chronic kidney disease 12/28/2016   • Arthritis    • Asthma    • Atherosclerosis of both carotid arteries     plaque without stenosis, 2020   • Bone spur of acromioclavicular joint    • CAD (coronary artery disease)     2/2020: 90% dLM, 60% pLAD, 90% mLAD, 90% D1, 90% mLCx, 50-60% OM1, 50% pRCA; s/p CABG x 5 (LIMA-LAD, SVG-PDA, SVG-OM1, SVG-OM2, SVG-D1)   • Chronic venous insufficiency    • CKD (chronic kidney " disease) stage 3, GFR 30-59 ml/min    • Essential hypertension    • Gout    • Grief reaction       2010 after 15 foot fall off ladder   • H/O cataract    • Heel spur    • History of tendinitis    • Hyperlipidemia    • Hyperparathyroidism (CMS/HCC)    • Hyperthyroidism    • Hypothyroidism, acquired    • Non-toxic goiter    • Pneumonia    • Postoperative atrial fibrillation (CMS/HCC)     after CABG   • Proteinuria    • Thrombophlebitis leg     after CABG   • Type 2 diabetes mellitus with neurological manifestations (CMS/HCC)    • Vitamin D deficiency      Past Surgical History:   Procedure Laterality Date   • BREAST EXCISIONAL BIOPSY Left     Dr. Ybarra benign   • BREAST EXCISIONAL BIOPSY Bilateral     benign   • CARDIAC CATHETERIZATION N/A 2/10/2020    Procedure: Left heart cath without LV gram;  Surgeon: Emerson Deras MD;  Location: [facility] INVASIVE LOCATION;  Service: Cardiovascular;  Laterality: N/A;   • CARDIAC CATHETERIZATION N/A 2/10/2020    Procedure: Coronary angiography;  Surgeon: Emerson Deras MD;  Location: [facility] INVASIVE LOCATION;  Service: Cardiovascular;  Laterality: N/A;   • CATARACT EXTRACTION Left 2010   • CATARACT EXTRACTION Right 2010   • CORONARY ARTERY BYPASS GRAFT N/A 2020    Procedure: MIDLINE STERNOTOMY, CORONARY ARTERY BYPASS GRAFTING X  5 USING LEFT INTERNAL MAMMARY ARTERY AND LEFT ENDOSCOPICALLY HARVESTED GREATER SAPHENOUS VEIN, INTRAOP KERWIN, PRP;  Surgeon: Jr Anthony Rai MD;  Location: [facility] OR;  Service: Cardiothoracic;  Laterality: N/A;   • HYSTERECTOMY      Dr. Quinn Peña   • OOPHORECTOMY      late 40's   • THYROIDECTOMY Right 2016    Procedure: right PARATHYROID EXCISION OF NODULE and right thyroid lobectomy and bilateral exploration.;  Surgeon: Tasneem Montelongo MD;  Location: [facility];  Service:      Family History   Problem Relation Age of Onset   • Diabetes Sister    • Diabetes Brother    •  Hypertension Brother    • Kidney disease Brother    • Cervical cancer Mother    • Heart disease Sister    • Neuropathy Sister    • Diabetes Sister      OB History        4    Para   4    Term   4            AB        Living           SAB        TAB        Ectopic        Molar        Multiple        Live Births                  Current Outpatient Medications   Medication Sig Dispense Refill   • amLODIPine (NORVASC) 5 MG tablet Take 1 tablet by mouth Daily for 180 days. 90 tablet 1   • aspirin 81 MG EC tablet Take 1 tablet by mouth Daily. 30 tablet 11   • atorvastatin (LIPITOR) 40 MG tablet Take 1 tablet by mouth Every Night. 30 tablet 11   • carBAMazepine (CARBATROL) 300 MG 12 hr capsule Take 1 capsule by mouth Every 12 (Twelve) Hours for 180 days. 180 capsule 1   • cetirizine (zyrTEC) 10 MG tablet Take 10 mg by mouth Every Night.     • cloNIDine (CATAPRES) 0.1 MG tablet TAKE ONE TABLET BY MOUTH EVERY 12 HOURS 60 tablet 3   • CONTOUR NEXT TEST test strip USE ONE STRIP TO TEST TWICE A  each 1   • febuxostat (Uloric) 40 MG tablet Take 1 tablet by mouth Daily for 180 days. 90 tablet 1   • ferrous sulfate 325 (65 FE) MG tablet Take 1 tablet by mouth daily.     • hydrALAZINE (APRESOLINE) 50 MG tablet Take 1 tablet by mouth 3 (Three) Times a Day. 180 tablet 0   • Insulin Lispro, 1 Unit Dial, (HUMALOG KWIKPEN) 100 UNIT/ML solution pen-injector Inject 5 Units under the skin into the appropriate area as directed 3 (Three) Times a Day With Meals. 15 mL 2   • Insulin Pen Needle (BD PEN NEEDLE ISELA U/F) 32G X 4 MM misc Use to inject 4 time daily 200 each 5   • losartan (COZAAR) 100 MG tablet Take 100 mg by mouth Daily.     • metoprolol tartrate (LOPRESSOR) 25 MG tablet TAKE 1/2 TABLET BY MOUTH EVERY 12 HOURS 90 tablet 0   • montelukast (SINGULAIR) 10 MG tablet 10 mg daily.     • potassium chloride (MICRO-K) 10 MEQ CR capsule Take 10 mEq by mouth Daily.     • Synthroid 200 MCG tablet TAKE ONE TABLET BY MOUTH  "DAILY 30 tablet 0   • torsemide (DEMADEX) 20 MG tablet TAKE ONE TABLET BY MOUTH DAILY 90 tablet 0   • TOUJEO MAX SOLOSTAR 300 UNIT/ML solution pen-injector injection INJECT 100 UNITS UNDER THE SKIN INTO THE APPROPRIATE AREA AS DIRECTED DAILY 12 pen 0   • vitamin B-12 (CYANOCOBALAMIN) 1000 MCG tablet Take 1 tablet by mouth daily.     • vitamin D (ERGOCALCIFEROL) 1.25 MG (99672 UT) capsule capsule TAKE ONE CAPSULE BY MOUTH ONCE WEEKLY 12 capsule 0     No current facility-administered medications for this visit.      Allergies   Allergen Reactions   • Codeine Nausea Only   • Hydrocodone-Acetaminophen Nausea Only and Other (See Comments)     Percocet and Vicodin; vertigo   • Meperidine Nausea Only and Nausea And Vomiting   • Morphine And Related Nausea Only   • Oxycodone-Acetaminophen Nausea Only   • Oxycodone-Acetaminophen Nausea Only and Other (See Comments)     dilzziness   • Oxycodone-Aspirin Nausea Only and Other (See Comments)     vertigo   • Nickel Rash   • Penicillins Hives   • Shrimp Rash   • Sulfa Antibiotics Hives     Social History     Socioeconomic History   • Marital status:      Spouse name: Not on file   • Number of children: 4   • Years of education: Not on file   • Highest education level: Not on file   Occupational History   • Occupation: Retired   Tobacco Use   • Smoking status: Never Smoker   • Smokeless tobacco: Never Used   Substance and Sexual Activity   • Alcohol use: No     Comment: Caffeine use: Decaf   • Drug use: No   • Sexual activity: Defer   Social History Narrative    4 children; one child .     Caffeine Use: decaf.        Review of Systems  Review of Systems   Constitutional: Negative.    Cardiovascular: Negative.    Gastrointestinal: Negative.    Endocrine: Negative.    Neurological: Negative.    Psychiatric/Behavioral: Negative for sleep disturbance.        Objective    /70   Ht 165.1 cm (65\")   Wt 89.4 kg (197 lb)   BMI 32.78 kg/m²     Physical Exam  Vitals " signs and nursing note reviewed.   Constitutional:       General: She is not in acute distress.     Appearance: She is well-developed. She is not diaphoretic.   HENT:      Head: Normocephalic and atraumatic.      Right Ear: External ear normal.      Left Ear: External ear normal.      Nose: Nose normal.      Mouth/Throat:      Pharynx: No oropharyngeal exudate.   Eyes:      General:         Right eye: No discharge.         Left eye: No discharge.      Pupils: Pupils are equal, round, and reactive to light.   Neck:      Musculoskeletal: Full passive range of motion without pain, normal range of motion and neck supple. No edema or erythema.      Thyroid: No thyroid mass or thyromegaly.      Vascular: No carotid bruit.      Trachea: Trachea normal. No tracheal tenderness or tracheal deviation.   Cardiovascular:      Rate and Rhythm: Normal rate and regular rhythm.      Heart sounds: Normal heart sounds. No murmur. No friction rub. No gallop.    Pulmonary:      Effort: Pulmonary effort is normal. No respiratory distress.      Breath sounds: Normal breath sounds. No stridor. No wheezing or rales.   Abdominal:      General: Bowel sounds are normal. There is no distension.      Palpations: Abdomen is soft.   Musculoskeletal: Normal range of motion.         General: Swelling present. No deformity.   Lymphadenopathy:      Cervical: No cervical adenopathy.   Skin:     General: Skin is warm and dry.      Coloration: Skin is not pale.      Findings: No erythema or rash.      Comments: Dry skin   Neurological:      Mental Status: She is alert and oriented to person, place, and time.   Psychiatric:         Behavior: Behavior normal.         Thought Content: Thought content normal.         Judgment: Judgment normal.       HISTORY: A 78-year-old female with a history of a thyroid nodule from  2016 status post prior right hemithyroidectomy with a history of  hypothyroidism.     FINDINGS: Sonographic evaluation of the thyroid was  performed.  Comparison is made to a thyroid sonogram dated 07/27/2016. The left lobe  of the thyroid measures 4.6 x 1.9 x 1.7 cm and the isthmus measures 0.6  cm in thickness. No abnormality of the left lobe or isthmus is  appreciated. Evidence of prior right hemithyroidectomy is noted. No  residual tissue is seen within the posthemithyroidectomy bed.      IMPRESSION:  Status post prior right hemithyroidectomy with a normal  appearance of the left lobe of the thyroid and isthmus.     This report was finalized on 7/18/2019 4:32 PM by Dr. Geoffrey Javed M.D.       Lab Review  Glucose (mg/dL)   Date Value   03/09/2020 203 (H)   03/02/2020 160 (H)   02/24/2020 250 (H)     Sodium (mmol/L)   Date Value   10/15/2020 144   03/13/2020 138   03/09/2020 137   03/02/2020 136   02/24/2020 137     Potassium (mmol/L)   Date Value   10/15/2020 4.8   03/13/2020 3.6   03/09/2020 3.3 (L)   03/02/2020 3.2 (L)   02/24/2020 3.6     Chloride (mmol/L)   Date Value   10/15/2020 103   03/13/2020 95 (L)   03/09/2020 95 (L)   03/02/2020 96 (L)   02/24/2020 96 (L)     CO2 (mmol/L)   Date Value   03/09/2020 28.6   03/02/2020 26.6   02/24/2020 28.7     Total CO2 (mmol/L)   Date Value   10/15/2020 31.1 (H)   03/13/2020 31.3 (H)     BUN (mg/dL)   Date Value   10/15/2020 22   03/13/2020 15   03/09/2020 17   03/02/2020 16   02/24/2020 23     Creatinine (mg/dL)   Date Value   10/15/2020 1.95 (H)   03/13/2020 1.79 (H)   03/09/2020 1.73 (H)   03/02/2020 1.68 (H)   02/24/2020 1.61 (H)     Hemoglobin A1C (%)   Date Value   10/15/2020 6.70 (H)   03/13/2020 6.60 (H)   02/10/2020 6.81 (H)   11/13/2019 9.50 (H)     Triglycerides (mg/dL)   Date Value   10/15/2020 97   03/13/2020 226 (H)   02/12/2020 137   02/10/2020 70   11/13/2019 138     LDL Cholesterol  (mg/dL)   Date Value   03/13/2020 73   02/12/2020 71   02/10/2020 68   11/13/2019 95   05/09/2019 72     LDL Chol Calc (NIH) (mg/dL)   Date Value   10/15/2020 85       Assessment/Plan      1. Type 2  diabetes mellitus with diabetic polyneuropathy, with long-term current use of insulin (CMS/HCC)    2. Postoperative hypothyroidism    3. Essential hypertension    4. Mixed hyperlipidemia    5. Vitamin D deficiency    6. Solitary thyroid nodule    7. Hyperparathyroidism (CMS/HCC)    .  In summary patient was evaluated.  Metabolically and clinically she presents stable.  She is up-to-date on her eye exam.  She is currently checking her blood sugars once daily.  She has been titrating her insulin is currently up to 52 units once daily.  Her last thyroid ultrasound was stable in May 2019.  She was given samples of Toujeo at today's visit.  She has a history of hyperparathyroidism.  Her calcium, ionized calcium, parathyroid hormone and vitamin D are in normal range.  Her medication list was reviewed and updated for accuracy.  She will continue all her current medications as prescribed.  She will follow-up in 6 months with labs prior.  She is been encouraged to reach out should she have any questions or concerns prior to her next visit.  Thyroid hormone and cholesterol are in satisfactory range.  Blood pressure is stable  Medications prescribed:  Outpatient Encounter Medications as of 10/29/2020   Medication Sig Dispense Refill   • amLODIPine (NORVASC) 5 MG tablet Take 1 tablet by mouth Daily for 180 days. 90 tablet 1   • aspirin 81 MG EC tablet Take 1 tablet by mouth Daily. 30 tablet 11   • atorvastatin (LIPITOR) 40 MG tablet Take 1 tablet by mouth Every Night. 30 tablet 11   • carBAMazepine (CARBATROL) 300 MG 12 hr capsule Take 1 capsule by mouth Every 12 (Twelve) Hours for 180 days. 180 capsule 1   • cetirizine (zyrTEC) 10 MG tablet Take 10 mg by mouth Every Night.     • cloNIDine (CATAPRES) 0.1 MG tablet TAKE ONE TABLET BY MOUTH EVERY 12 HOURS 60 tablet 3   • CONTOUR NEXT TEST test strip USE ONE STRIP TO TEST TWICE A  each 1   • febuxostat (Uloric) 40 MG tablet Take 1 tablet by mouth Daily for 180 days. 90  tablet 1   • ferrous sulfate 325 (65 FE) MG tablet Take 1 tablet by mouth daily.     • hydrALAZINE (APRESOLINE) 50 MG tablet Take 1 tablet by mouth 3 (Three) Times a Day. 180 tablet 0   • Insulin Lispro, 1 Unit Dial, (HUMALOG KWIKPEN) 100 UNIT/ML solution pen-injector Inject 5 Units under the skin into the appropriate area as directed 3 (Three) Times a Day With Meals. 15 mL 2   • Insulin Pen Needle (BD PEN NEEDLE ISELA U/F) 32G X 4 MM misc Use to inject 4 time daily 200 each 5   • losartan (COZAAR) 100 MG tablet Take 100 mg by mouth Daily.     • metoprolol tartrate (LOPRESSOR) 25 MG tablet TAKE 1/2 TABLET BY MOUTH EVERY 12 HOURS 90 tablet 0   • montelukast (SINGULAIR) 10 MG tablet 10 mg daily.     • potassium chloride (MICRO-K) 10 MEQ CR capsule Take 10 mEq by mouth Daily.     • Synthroid 200 MCG tablet TAKE ONE TABLET BY MOUTH DAILY 30 tablet 0   • torsemide (DEMADEX) 20 MG tablet TAKE ONE TABLET BY MOUTH DAILY 90 tablet 0   • TOUJEO MAX SOLOSTAR 300 UNIT/ML solution pen-injector injection INJECT 100 UNITS UNDER THE SKIN INTO THE APPROPRIATE AREA AS DIRECTED DAILY 12 pen 0   • vitamin B-12 (CYANOCOBALAMIN) 1000 MCG tablet Take 1 tablet by mouth daily.     • vitamin D (ERGOCALCIFEROL) 1.25 MG (87715 UT) capsule capsule TAKE ONE CAPSULE BY MOUTH ONCE WEEKLY 12 capsule 0     No facility-administered encounter medications on file as of 10/29/2020.        Orders placed during this encounter include:  No orders of the defined types were placed in this encounter.      1. Treatment changes: none    2.  Education: blood sugar goals    3.  Compliance at present is estimated to be excellent. Efforts to improve compliance (if necessary) will be directed at regular blood sugar monitorin times daily.    4. Follow-up:6 mo with labs prior   Patient was not counseled about the need to test their blood sugar prior to driving/operating a motor vehicle.

## 2020-11-17 RX ORDER — ERGOCALCIFEROL 1.25 MG/1
CAPSULE ORAL
Qty: 12 CAPSULE | Refills: 0 | Status: SHIPPED | OUTPATIENT
Start: 2020-11-17 | End: 2021-02-15

## 2020-11-17 RX ORDER — LEVOTHYROXINE SODIUM 200 MCG
TABLET ORAL
Qty: 30 TABLET | Refills: 5 | Status: SHIPPED | OUTPATIENT
Start: 2020-11-17 | End: 2021-05-25 | Stop reason: SDUPTHER

## 2020-11-28 DIAGNOSIS — Z79.4 TYPE 2 DIABETES MELLITUS WITHOUT COMPLICATION, WITH LONG-TERM CURRENT USE OF INSULIN (HCC): ICD-10-CM

## 2020-11-28 DIAGNOSIS — E11.9 TYPE 2 DIABETES MELLITUS WITHOUT COMPLICATION, WITH LONG-TERM CURRENT USE OF INSULIN (HCC): ICD-10-CM

## 2020-11-29 RX ORDER — PERPHENAZINE 16 MG/1
TABLET, FILM COATED ORAL
Qty: 100 EACH | Refills: 3 | Status: SHIPPED | OUTPATIENT
Start: 2020-11-29 | End: 2021-06-10 | Stop reason: SDUPTHER

## 2020-12-28 DIAGNOSIS — I10 ESSENTIAL HYPERTENSION: ICD-10-CM

## 2020-12-28 RX ORDER — CLONIDINE HYDROCHLORIDE 0.1 MG/1
0.1 TABLET ORAL EVERY 12 HOURS
Qty: 180 TABLET | Refills: 3 | Status: SHIPPED | OUTPATIENT
Start: 2020-12-28 | End: 2021-04-28 | Stop reason: SDUPTHER

## 2021-01-13 DIAGNOSIS — I10 ESSENTIAL HYPERTENSION: ICD-10-CM

## 2021-01-13 DIAGNOSIS — N18.30 STAGE 3 CHRONIC KIDNEY DISEASE (HCC): ICD-10-CM

## 2021-01-14 RX ORDER — HYDRALAZINE HYDROCHLORIDE 50 MG/1
TABLET, FILM COATED ORAL
Qty: 270 TABLET | Refills: 0 | Status: SHIPPED | OUTPATIENT
Start: 2021-01-14 | End: 2021-04-02 | Stop reason: ALTCHOICE

## 2021-02-15 RX ORDER — ERGOCALCIFEROL 1.25 MG/1
CAPSULE ORAL
Qty: 12 CAPSULE | Refills: 0 | Status: SHIPPED | OUTPATIENT
Start: 2021-02-15 | End: 2021-06-01 | Stop reason: SDUPTHER

## 2021-02-25 RX ORDER — ATORVASTATIN CALCIUM 40 MG/1
TABLET, FILM COATED ORAL
Qty: 60 TABLET | Refills: 10 | Status: SHIPPED | OUTPATIENT
Start: 2021-02-25 | End: 2021-10-28 | Stop reason: SDUPTHER

## 2021-03-02 DIAGNOSIS — Z23 IMMUNIZATION DUE: ICD-10-CM

## 2021-03-24 ENCOUNTER — TELEPHONE (OUTPATIENT)
Dept: CARDIOLOGY | Facility: CLINIC | Age: 81
End: 2021-03-24

## 2021-03-24 NOTE — TELEPHONE ENCOUNTER
Her daughter is a retired cath lab nurse -- will you see if she's having any other symptoms?  Do I have any follow up appts before then?    ALEC

## 2021-03-24 NOTE — TELEPHONE ENCOUNTER
Patients mother Daphne called saying her mother is extremely fatigued and wants to know if she needs to come in earlier than her April appointment.

## 2021-04-02 ENCOUNTER — HOSPITAL ENCOUNTER (OUTPATIENT)
Dept: CARDIOLOGY | Facility: HOSPITAL | Age: 81
Discharge: HOME | End: 2021-04-02

## 2021-04-02 VITALS
WEIGHT: 203 LBS | BODY MASS INDEX: 33.82 KG/M2 | HEART RATE: 50 BPM | OXYGEN SATURATION: 98 % | SYSTOLIC BLOOD PRESSURE: 136 MMHG | DIASTOLIC BLOOD PRESSURE: 54 MMHG | HEIGHT: 65 IN

## 2021-04-02 DIAGNOSIS — Z95.1 HISTORY OF CORONARY ARTERY BYPASS SURGERY: ICD-10-CM

## 2021-04-02 DIAGNOSIS — I50.33 ACUTE ON CHRONIC DIASTOLIC CHF (CONGESTIVE HEART FAILURE) (HCC): Primary | ICD-10-CM

## 2021-04-02 DIAGNOSIS — I25.10 CORONARY ARTERY DISEASE INVOLVING NATIVE CORONARY ARTERY OF NATIVE HEART WITHOUT ANGINA PECTORIS: ICD-10-CM

## 2021-04-02 DIAGNOSIS — N18.4 ANEMIA IN STAGE 4 CHRONIC KIDNEY DISEASE (HCC): ICD-10-CM

## 2021-04-02 DIAGNOSIS — N18.4 STAGE 4 CHRONIC KIDNEY DISEASE (HCC): ICD-10-CM

## 2021-04-02 DIAGNOSIS — D63.1 ANEMIA IN STAGE 4 CHRONIC KIDNEY DISEASE (HCC): ICD-10-CM

## 2021-04-02 DIAGNOSIS — I10 ESSENTIAL HYPERTENSION: ICD-10-CM

## 2021-04-02 LAB
ALBUMIN SERPL-MCNC: 3.8 G/DL (ref 3.5–5.2)
ALBUMIN/GLOB SERPL: 1.5 G/DL
ALP SERPL-CCNC: 103 U/L (ref 39–117)
ALT SERPL W P-5'-P-CCNC: 16 U/L (ref 1–33)
ANION GAP SERPL CALCULATED.3IONS-SCNC: 10.5 MMOL/L (ref 5–15)
AST SERPL-CCNC: 21 U/L (ref 1–32)
BASOPHILS # BLD AUTO: 0.03 10*3/MM3 (ref 0–0.2)
BASOPHILS NFR BLD AUTO: 0.4 % (ref 0–1.5)
BILIRUB SERPL-MCNC: 0.2 MG/DL (ref 0–1.2)
BUN SERPL-MCNC: 32 MG/DL (ref 8–23)
BUN/CREAT SERPL: 16.6 (ref 7–25)
CALCIUM SPEC-SCNC: 8.7 MG/DL (ref 8.6–10.5)
CHLORIDE SERPL-SCNC: 103 MMOL/L (ref 98–107)
CO2 SERPL-SCNC: 25.5 MMOL/L (ref 22–29)
CREAT SERPL-MCNC: 1.93 MG/DL (ref 0.57–1)
DEPRECATED RDW RBC AUTO: 43.6 FL (ref 37–54)
EOSINOPHIL # BLD AUTO: 0.09 10*3/MM3 (ref 0–0.4)
EOSINOPHIL NFR BLD AUTO: 1.1 % (ref 0.3–6.2)
ERYTHROCYTE [DISTWIDTH] IN BLOOD BY AUTOMATED COUNT: 11.9 % (ref 12.3–15.4)
GFR SERPL CREATININE-BSD FRML MDRD: 25 ML/MIN/1.73
GLOBULIN UR ELPH-MCNC: 2.6 GM/DL
GLUCOSE SERPL-MCNC: 166 MG/DL (ref 65–99)
HCT VFR BLD AUTO: 32.8 % (ref 34–46.6)
HGB BLD-MCNC: 10.6 G/DL (ref 12–15.9)
IMM GRANULOCYTES # BLD AUTO: 0.02 10*3/MM3 (ref 0–0.05)
IMM GRANULOCYTES NFR BLD AUTO: 0.2 % (ref 0–0.5)
LYMPHOCYTES # BLD AUTO: 1.3 10*3/MM3 (ref 0.7–3.1)
LYMPHOCYTES NFR BLD AUTO: 15.9 % (ref 19.6–45.3)
MCH RBC QN AUTO: 32.3 PG (ref 26.6–33)
MCHC RBC AUTO-ENTMCNC: 32.3 G/DL (ref 31.5–35.7)
MCV RBC AUTO: 100 FL (ref 79–97)
MONOCYTES # BLD AUTO: 0.74 10*3/MM3 (ref 0.1–0.9)
MONOCYTES NFR BLD AUTO: 9.1 % (ref 5–12)
NEUTROPHILS NFR BLD AUTO: 5.99 10*3/MM3 (ref 1.7–7)
NEUTROPHILS NFR BLD AUTO: 73.3 % (ref 42.7–76)
NRBC BLD AUTO-RTO: 0 /100 WBC (ref 0–0.2)
NT-PROBNP SERPL-MCNC: 4103 PG/ML (ref 0–1800)
PLATELET # BLD AUTO: 187 10*3/MM3 (ref 140–450)
PMV BLD AUTO: 10.7 FL (ref 6–12)
POTASSIUM SERPL-SCNC: 4.2 MMOL/L (ref 3.5–5.2)
PROT SERPL-MCNC: 6.4 G/DL (ref 6–8.5)
RBC # BLD AUTO: 3.28 10*6/MM3 (ref 3.77–5.28)
SODIUM SERPL-SCNC: 139 MMOL/L (ref 136–145)
TSH SERPL DL<=0.05 MIU/L-ACNC: 0.48 UIU/ML (ref 0.27–4.2)
WBC # BLD AUTO: 8.17 10*3/MM3 (ref 3.4–10.8)

## 2021-04-02 PROCEDURE — 93010 ELECTROCARDIOGRAM REPORT: CPT | Performed by: INTERNAL MEDICINE

## 2021-04-02 PROCEDURE — 80053 COMPREHEN METABOLIC PANEL: CPT | Performed by: INTERNAL MEDICINE

## 2021-04-02 PROCEDURE — 25010000002 PERFLUTREN (DEFINITY) 8.476 MG IN SODIUM CHLORIDE (PF) 0.9 % 10 ML INJECTION: Performed by: INTERNAL MEDICINE

## 2021-04-02 PROCEDURE — 83880 ASSAY OF NATRIURETIC PEPTIDE: CPT | Performed by: INTERNAL MEDICINE

## 2021-04-02 PROCEDURE — 93306 TTE W/DOPPLER COMPLETE: CPT | Performed by: INTERNAL MEDICINE

## 2021-04-02 PROCEDURE — 36415 COLL VENOUS BLD VENIPUNCTURE: CPT

## 2021-04-02 PROCEDURE — 96374 THER/PROPH/DIAG INJ IV PUSH: CPT

## 2021-04-02 PROCEDURE — 93306 TTE W/DOPPLER COMPLETE: CPT

## 2021-04-02 PROCEDURE — 99215 OFFICE O/P EST HI 40 MIN: CPT | Performed by: INTERNAL MEDICINE

## 2021-04-02 PROCEDURE — 93005 ELECTROCARDIOGRAM TRACING: CPT | Performed by: INTERNAL MEDICINE

## 2021-04-02 PROCEDURE — 85025 COMPLETE CBC W/AUTO DIFF WBC: CPT | Performed by: INTERNAL MEDICINE

## 2021-04-02 PROCEDURE — 84443 ASSAY THYROID STIM HORMONE: CPT | Performed by: INTERNAL MEDICINE

## 2021-04-02 RX ORDER — BUMETANIDE 0.25 MG/ML
2 INJECTION INTRAMUSCULAR; INTRAVENOUS ONCE
Status: COMPLETED | OUTPATIENT
Start: 2021-04-02 | End: 2021-04-02

## 2021-04-02 RX ORDER — SODIUM CHLORIDE 0.9 % (FLUSH) 0.9 %
10 SYRINGE (ML) INJECTION AS NEEDED
Status: DISCONTINUED | OUTPATIENT
Start: 2021-04-02 | End: 2021-04-03 | Stop reason: HOSPADM

## 2021-04-02 RX ORDER — SODIUM CHLORIDE 0.9 % (FLUSH) 0.9 %
10 SYRINGE (ML) INJECTION EVERY 12 HOURS SCHEDULED
Status: DISCONTINUED | OUTPATIENT
Start: 2021-04-02 | End: 2021-04-03 | Stop reason: HOSPADM

## 2021-04-02 RX ADMIN — PERFLUTREN 1.5 ML: 6.52 INJECTION, SUSPENSION INTRAVENOUS at 12:43

## 2021-04-02 RX ADMIN — BUMETANIDE 2 MG: 0.25 INJECTION INTRAMUSCULAR; INTRAVENOUS at 10:50

## 2021-04-02 NOTE — PROGRESS NOTES
"Date of Hospital Visit: 21  Encounter Provider: Victor Hugo Ng MD  Place of Service: [facility] CARDIOLOGY  Patient Name: Ange Johnson  :1940  Referral Provider: Victor Hugo Ng MD    Chief complaint: leg swelling    History of Present Illness    Ms. Johnson was seen in the office today and transferred to [facility].    I initially saw her in 2016 for preoperative risk assessment prior to thyroid surgery.  She reported chronic exertional dyspnea at that time.  She had a soft systolic murmur.  An echo showed normal LVSF and aortic sclerosis without stenosis.  I did not feel that she needed stress testing as her dyspnea was stable and long-standing. Her EKG showed diffuse nonspecific ST abnormalities which were old.     I then saw her again in 2020 for dyspnea and chest pain which she described as \"heartburn.\"  An echo was normal, but a perfusion stress was abnormal/high-risk.  She underwent coronary angiography which revealed multivessel CAD, and underwent 5V CABG in 2020.  Her post-operative course was unremarkable other than an episode of atrial fibrillation and lower extremity superficial thrombophlebitis. She was placed on renally dosed apixaban as well as amiodarone.  Both of these were ultimately discontinued.     She has stage IV CKD and follows with Dr Caruso.  She has a history of anemia and has been receiving epo.    She does not weigh herself regularly.  She has had steadily increasing dyspnea and leg swelling and fatigue.  She has not had chest pain, palpitations, lightheadedness, or syncope.     Past Medical History:   Diagnosis Date   • Anemia in stage 3 chronic kidney disease (CMS/HCC) 2016   • Arthritis    • Asthma    • Atherosclerosis of both carotid arteries     plaque without stenosis,    • Bone spur of acromioclavicular joint    • CAD (coronary artery disease)     2020: 90% dLM, 60% pLAD, 90% mLAD, 90% D1, 90% mLCx, 50-60% OM1, " 50% pRCA; s/p CABG x 5 (LIMA-LAD, SVG-PDA, SVG-OM1, SVG-OM2, SVG-D1)   • Chronic venous insufficiency    • CKD (chronic kidney disease) stage 3, GFR 30-59 ml/min (CMS/[facility])    • Essential hypertension    • Gout    • Grief reaction       2010 after 15 foot fall off ladder   • H/O cataract    • Heel spur    • History of tendinitis    • Hyperlipidemia    • Hyperparathyroidism (CMS/[facility])    • Hyperthyroidism    • Hypothyroidism, acquired    • Non-toxic goiter    • Pneumonia    • Postoperative atrial fibrillation (CMS/[facility])     after CABG   • Proteinuria    • Thrombophlebitis leg     after CABG   • Type 2 diabetes mellitus with neurological manifestations (CMS/[facility])    • Vitamin D deficiency        Past Surgical History:   Procedure Laterality Date   • BREAST EXCISIONAL BIOPSY Left     Dr. Ybarra benign   • BREAST EXCISIONAL BIOPSY Bilateral     benign   • CARDIAC CATHETERIZATION N/A 2/10/2020    Procedure: Left heart cath without LV gram;  Surgeon: Emerson Deras MD;  Location: [facility] CATH INVASIVE LOCATION;  Service: Cardiovascular;  Laterality: N/A;   • CARDIAC CATHETERIZATION N/A 2/10/2020    Procedure: Coronary angiography;  Surgeon: Emerson Deras MD;  Location: [facility] CATH INVASIVE LOCATION;  Service: Cardiovascular;  Laterality: N/A;   • CATARACT EXTRACTION Left 2010   • CATARACT EXTRACTION Right 2010   • CORONARY ARTERY BYPASS GRAFT N/A 2020    Procedure: MIDLINE STERNOTOMY, CORONARY ARTERY BYPASS GRAFTING X  5 USING LEFT INTERNAL MAMMARY ARTERY AND LEFT ENDOSCOPICALLY HARVESTED GREATER SAPHENOUS VEIN, INTRAOP KERWIN, PRP;  Surgeon: Jr Anthony Rai MD;  Location: [facility] OR;  Service: Cardiothoracic;  Laterality: N/A;   • HYSTERECTOMY      Dr. Quinn Peña   • OOPHORECTOMY      late 40's   • THYROIDECTOMY Right 2016    Procedure: right PARATHYROID EXCISION OF NODULE and right thyroid lobectomy and bilateral exploration.;  Surgeon: Tasneem Montelongo MD;   Location[facility];  Service:        Prior to Admission medications    Medication Sig Start Date End Date Taking? Authorizing Provider   amLODIPine (NORVASC) 10 MG tablet Daily. 1/20/21   Fredy Sy MD   aspirin 81 MG EC tablet Take 1 tablet by mouth Daily. 2/19/20   Sonia Daugherty APRN   atorvastatin (LIPITOR) 40 MG tablet TAKE ONE TABLET BY MOUTH ONCE NIGHTLY 2/25/21   Victor Hugo Ng MD   carBAMazepine (CARBATROL) 300 MG 12 hr capsule Take 1 capsule by mouth Every 12 (Twelve) Hours for 180 days. 10/19/20 4/17/21  Quinn Washington MD   cetirizine (zyrTEC) 10 MG tablet Take 10 mg by mouth Every Night.    Fredy Sy MD   cloNIDine (CATAPRES) 0.1 MG tablet Take 1 tablet by mouth Every 12 (Twelve) Hours. 12/28/20   Sonia Daugherty APRN   Contour Next Test test strip USE ONE STRIP TO TEST TWICE A DAY 11/29/20   Adalgisa Hernandez APRN   febuxostat (Uloric) 40 MG tablet Take 1 tablet by mouth Daily for 180 days. 10/19/20 4/17/21  Quinn Washington MD   ferrous sulfate 325 (65 FE) MG tablet Take 1 tablet by mouth daily. 6/11/15   Fredy Sy MD   hydrALAZINE (APRESOLINE) 100 MG tablet Take 100 mg by mouth 2 (Two) Times a Day.    Fredy Sy MD   Insulin Glargine, 2 Unit Dial, (Toujeo Max SoloStar) 300 UNIT/ML solution pen-injector injection Inject 70 Units under the skin into the appropriate area as directed Daily for 90 days. 10/29/20 1/27/21  Adalgisa Hernandez APRN   Insulin Lispro, 1 Unit Dial, (HUMALOG KWIKPEN) 100 UNIT/ML solution pen-injector Inject 5 Units under the skin into the appropriate area as directed 3 (Three) Times a Day With Meals. 11/27/19   Adalgisa Hernandez APRN   Insulin Pen Needle (BD PEN NEEDLE ISELA U/F) 32G X 4 MM misc Use to inject 4 time daily 11/27/19   Adalgisa Hernandez APRN   losartan (COZAAR) 100 MG tablet Take 100 mg by mouth Daily.    Hugh Caruso MD   metoprolol tartrate (LOPRESSOR) 25 MG tablet TAKE 1/2 TABLET BY MOUTH EVERY 12 HOURS 11/16/20    Mary Calvillo APRN   montelukast (SINGULAIR) 10 MG tablet 10 mg daily. 12/13/15   Aguilar Goldman MD   potassium chloride (MICRO-K) 10 MEQ CR capsule Take 10 mEq by mouth Daily. 7/4/17   Victor Hugo Ng MD   Synthroid 200 MCG tablet TAKE ONE TABLET BY MOUTH DAILY 11/17/20   Adalgisa Hernandez APRN   TORSEMIDE PO Take 40 mg by mouth Daily.    ProviderFredy MD   vitamin B-12 (CYANOCOBALAMIN) 1000 MCG tablet Take 1 tablet by mouth daily. 11/6/14   Adalgisa Hernandez APRN   vitamin D (ERGOCALCIFEROL) 1.25 MG (80195 UT) capsule capsule TAKE ONE CAPSULE BY MOUTH ONCE WEEKLY 2/15/21   Adalgisa Hernandez APRN   amLODIPine (NORVASC) 5 MG tablet Take 1 tablet by mouth Daily for 180 days. 10/19/20 4/2/21  Quinn Washington MD   hydrALAZINE (APRESOLINE) 50 MG tablet TAKE ONE TABLET BY MOUTH THREE TIMES A DAY 1/14/21 4/2/21  Quinn Washington MD       Social History     Socioeconomic History   • Marital status:      Spouse name: Not on file   • Number of children: 4   • Years of education: Not on file   • Highest education level: Not on file   Tobacco Use   • Smoking status: Never Smoker   • Smokeless tobacco: Never Used   Vaping Use   • Vaping Use: Never used   Substance and Sexual Activity   • Alcohol use: Not Currently     Comment: Caffeine use: Decaf   • Drug use: No   • Sexual activity: Defer       Family History   Problem Relation Age of Onset   • Diabetes Sister    • Diabetes Brother    • Hypertension Brother    • Kidney disease Brother    • Cervical cancer Mother    • Heart disease Sister    • Neuropathy Sister    • Diabetes Sister        Review of Systems   Constitutional: Positive for fatigue and unexpected weight change.   Respiratory: Positive for shortness of breath.    Cardiovascular: Positive for leg swelling.   Gastrointestinal: Positive for abdominal distention.   All other systems reviewed and are negative.       Objective:   There were no vitals filed for this visit.  There is no height or weight on  file to calculate BMI.      Physical Exam  Constitutional:       General: She is not in acute distress.     Appearance: She is overweight.   HENT:      Head: Normocephalic.      Nose: Nose normal.      Mouth/Throat:      Mouth: Mucous membranes are moist.   Eyes:      Conjunctiva/sclera: Conjunctivae normal.   Neck:      Vascular: JVD present.   Cardiovascular:      Rate and Rhythm: Normal rate and regular rhythm.      Pulses: Normal pulses.      Heart sounds: Murmur heard.   Systolic murmur is present with a grade of 2/6.        Comments: There is severe pitting edema all the way to the mid thigh  Pulmonary:      Effort: Pulmonary effort is normal.      Breath sounds: Normal breath sounds.   Abdominal:      Palpations: Abdomen is soft.      Tenderness: There is no abdominal tenderness.   Skin:     General: Skin is warm and dry.   Neurological:      General: No focal deficit present.      Mental Status: She is alert and oriented to person, place, and time.   Psychiatric:         Mood and Affect: Mood normal.                 Lab Review:                Results from last 7 days   Lab Units 04/02/21  0941   SODIUM mmol/L 139   POTASSIUM mmol/L 4.2   CHLORIDE mmol/L 103   CO2 mmol/L 25.5   BUN mg/dL 32*   CREATININE mg/dL 1.93*   GLUCOSE mg/dL 166*   CALCIUM mg/dL 8.7         Results from last 7 days   Lab Units 04/02/21  0941   WBC 10*3/mm3 8.17   HEMOGLOBIN g/dL 10.6*   HEMATOCRIT % 32.8*   PLATELETS 10*3/mm3 187         Lab Results   Component Value Date    TSH 0.481 04/02/2021     No results found for: DDIMERQUANT, DDIMER  Lab Results   Component Value Date    PROBNP 4,103.0 (H) 04/02/2021         ECG 12 Lead    Date/Time: 4/2/2021 1:45 PM  Performed by: Victor Hugo Ng MD  Authorized by: Victor Hugo Ng MD   Comparison: compared with previous ECG   Similar to previous ECG  Rhythm: sinus rhythm  Conduction: right bundle branch block  Other findings: left ventricular hypertrophy with strain    Clinical impression:  abnormal EKG            Assessment/Plan:     1. Acute on chronic diastolic CHF (congestive heart failure) (CMS/HCC)    2. Stage 4 chronic kidney disease (CMS/HCC)    3. Essential hypertension    4. Coronary artery disease involving native coronary artery of native heart without angina pectoris    5. History of coronary artery bypass surgery    6. Anemia in stage 4 chronic kidney disease (CMS/HCC)      Ms Johnson is markedly volume overloaded.  Her lung exam is unremarkable and she's not hypoxic.  I repeated an echo today and it shows normal LVSF and grade II diastolic dysfunction, as well as mild pulmonary hypertension.  She has aortic valve sclerosis and mild-moderate TR.      Initially, I recommended admission and IV diuresis, especially with what I suspect is bowel wall edema.  However, she very much wants to avoid admission.  I checked labs and her SCr is at her baseline of 1.96.  Her proBNP is elevated at 4K.  Her Hgb is improved from prior studies.  I gave her 2mg of IV bumetanide and she did start experiencing good output in CEC; I increased torsemide to 40mg BID and will have her follow up in the office in four days and we'll repeat her BMP.    I called Dr Caruso's office; he's out, but I s/w TAYO Francisco and she will send a note to Dr Caruso.

## 2021-04-06 ENCOUNTER — HOSPITAL ENCOUNTER (OUTPATIENT)
Dept: CARDIOLOGY | Facility: HOSPITAL | Age: 81
Discharge: HOME | End: 2021-04-06
Admitting: NURSE PRACTITIONER

## 2021-04-06 ENCOUNTER — OFFICE VISIT (OUTPATIENT)
Dept: CARDIOLOGY | Facility: CLINIC | Age: 81
End: 2021-04-06

## 2021-04-06 VITALS
WEIGHT: 201.6 LBS | SYSTOLIC BLOOD PRESSURE: 142 MMHG | OXYGEN SATURATION: 98 % | HEART RATE: 57 BPM | HEIGHT: 65 IN | DIASTOLIC BLOOD PRESSURE: 70 MMHG | BODY MASS INDEX: 33.59 KG/M2

## 2021-04-06 DIAGNOSIS — I48.0 PAROXYSMAL ATRIAL FIBRILLATION (HCC): ICD-10-CM

## 2021-04-06 DIAGNOSIS — Z51.81 ENCOUNTER FOR THERAPEUTIC DRUG LEVEL MONITORING: ICD-10-CM

## 2021-04-06 DIAGNOSIS — I10 ESSENTIAL HYPERTENSION: ICD-10-CM

## 2021-04-06 DIAGNOSIS — I50.33 ACUTE ON CHRONIC DIASTOLIC CHF (CONGESTIVE HEART FAILURE) (HCC): ICD-10-CM

## 2021-04-06 DIAGNOSIS — I25.10 CORONARY ARTERY DISEASE INVOLVING NATIVE CORONARY ARTERY OF NATIVE HEART WITHOUT ANGINA PECTORIS: Primary | ICD-10-CM

## 2021-04-06 DIAGNOSIS — Z95.1 HISTORY OF CORONARY ARTERY BYPASS SURGERY: ICD-10-CM

## 2021-04-06 DIAGNOSIS — I50.32 CHRONIC DIASTOLIC HEART FAILURE (HCC): ICD-10-CM

## 2021-04-06 LAB
ANION GAP SERPL CALCULATED.3IONS-SCNC: 10.3 MMOL/L (ref 5–15)
AORTIC ARCH: 2.8 CM
ASCENDING AORTA: 3.5 CM
BH CV ECHO MEAS - ACS: 1.8 CM
BH CV ECHO MEAS - AO MAX PG (FULL): 8.5 MMHG
BH CV ECHO MEAS - AO MAX PG: 11.6 MMHG
BH CV ECHO MEAS - AO MEAN PG (FULL): 4.2 MMHG
BH CV ECHO MEAS - AO MEAN PG: 6 MMHG
BH CV ECHO MEAS - AO ROOT AREA (BSA CORRECTED): 1.4
BH CV ECHO MEAS - AO ROOT AREA: 6.6 CM^2
BH CV ECHO MEAS - AO ROOT DIAM: 2.9 CM
BH CV ECHO MEAS - AO V2 MAX: 170.4 CM/SEC
BH CV ECHO MEAS - AO V2 MEAN: 112 CM/SEC
BH CV ECHO MEAS - AO V2 VTI: 41.8 CM
BH CV ECHO MEAS - ASC AORTA: 3.5 CM
BH CV ECHO MEAS - AVA(I,A): 1.6 CM^2
BH CV ECHO MEAS - AVA(I,D): 1.6 CM^2
BH CV ECHO MEAS - AVA(V,A): 1.5 CM^2
BH CV ECHO MEAS - AVA(V,D): 1.5 CM^2
BH CV ECHO MEAS - BSA(HAYCOCK): 2.1 M^2
BH CV ECHO MEAS - BSA: 2 M^2
BH CV ECHO MEAS - BZI_BMI: 32.8 KILOGRAMS/M^2
BH CV ECHO MEAS - BZI_METRIC_HEIGHT: 167.6 CM
BH CV ECHO MEAS - BZI_METRIC_WEIGHT: 92.1 KG
BH CV ECHO MEAS - CONTRAST EF (2CH): 56 CM2
BH CV ECHO MEAS - CONTRAST EF 4CH: 61 CM2
BH CV ECHO MEAS - EDV(MOD-SP2): 90 ML
BH CV ECHO MEAS - EDV(MOD-SP4): 89 ML
BH CV ECHO MEAS - EDV(TEICH): 128.9 ML
BH CV ECHO MEAS - EF(CUBED): 65.7 %
BH CV ECHO MEAS - EF(MOD-BP): 57.9 %
BH CV ECHO MEAS - EF(MOD-SP2): 55.6 %
BH CV ECHO MEAS - EF(MOD-SP4): 60.7 %
BH CV ECHO MEAS - EF(TEICH): 56.8 %
BH CV ECHO MEAS - ESV(MOD-SP2): 40 ML
BH CV ECHO MEAS - ESV(MOD-SP4): 35 ML
BH CV ECHO MEAS - ESV(TEICH): 55.7 ML
BH CV ECHO MEAS - FS: 30 %
BH CV ECHO MEAS - IVS/LVPW: 0.91
BH CV ECHO MEAS - IVSD: 1.2 CM
BH CV ECHO MEAS - LAT PEAK E' VEL: 6.6 CM/SEC
BH CV ECHO MEAS - LV DIASTOLIC VOL/BSA (35-75): 44.2 ML/M^2
BH CV ECHO MEAS - LV MASS(C)D: 260 GRAMS
BH CV ECHO MEAS - LV MASS(C)DI: 129.2 GRAMS/M^2
BH CV ECHO MEAS - LV MAX PG: 3.2 MMHG
BH CV ECHO MEAS - LV MEAN PG: 1.8 MMHG
BH CV ECHO MEAS - LV SYSTOLIC VOL/BSA (12-30): 17.4 ML/M^2
BH CV ECHO MEAS - LV V1 MAX: 88.8 CM/SEC
BH CV ECHO MEAS - LV V1 MEAN: 63.8 CM/SEC
BH CV ECHO MEAS - LV V1 VTI: 24.3 CM
BH CV ECHO MEAS - LVIDD: 5.2 CM
BH CV ECHO MEAS - LVIDS: 3.6 CM
BH CV ECHO MEAS - LVLD AP2: 7.6 CM
BH CV ECHO MEAS - LVLD AP4: 7.3 CM
BH CV ECHO MEAS - LVLS AP2: 6.7 CM
BH CV ECHO MEAS - LVLS AP4: 6.3 CM
BH CV ECHO MEAS - LVOT AREA (M): 2.8 CM^2
BH CV ECHO MEAS - LVOT AREA: 2.8 CM^2
BH CV ECHO MEAS - LVOT DIAM: 1.9 CM
BH CV ECHO MEAS - LVPWD: 1.3 CM
BH CV ECHO MEAS - MED PEAK E' VEL: 5.4 CM/SEC
BH CV ECHO MEAS - MV A DUR: 0.09 SEC
BH CV ECHO MEAS - MV A MAX VEL: 74.9 CM/SEC
BH CV ECHO MEAS - MV DEC SLOPE: 579.4 CM/SEC^2
BH CV ECHO MEAS - MV DEC TIME: 0.27 SEC
BH CV ECHO MEAS - MV E MAX VEL: 109 CM/SEC
BH CV ECHO MEAS - MV E/A: 1.5
BH CV ECHO MEAS - MV MAX PG: 5.1 MMHG
BH CV ECHO MEAS - MV MEAN PG: 1.6 MMHG
BH CV ECHO MEAS - MV P1/2T MAX VEL: 108.6 CM/SEC
BH CV ECHO MEAS - MV P1/2T: 54.9 MSEC
BH CV ECHO MEAS - MV V2 MAX: 113.4 CM/SEC
BH CV ECHO MEAS - MV V2 MEAN: 57.8 CM/SEC
BH CV ECHO MEAS - MV V2 VTI: 45 CM
BH CV ECHO MEAS - MVA P1/2T LCG: 2 CM^2
BH CV ECHO MEAS - MVA(P1/2T): 4 CM^2
BH CV ECHO MEAS - MVA(VTI): 1.5 CM^2
BH CV ECHO MEAS - PA ACC TIME: 0.11 SEC
BH CV ECHO MEAS - PA MAX PG (FULL): -0.25 MMHG
BH CV ECHO MEAS - PA MAX PG: 4.3 MMHG
BH CV ECHO MEAS - PA PR(ACCEL): 31.5 MMHG
BH CV ECHO MEAS - PA V2 MAX: 103.6 CM/SEC
BH CV ECHO MEAS - PAPD(PI EDV): 3 MMHG
BH CV ECHO MEAS - PI END-D VEL: 86.9 CM/SEC
BH CV ECHO MEAS - PULM DIAS VEL: 66 CM/SEC
BH CV ECHO MEAS - PULM S/D: 0.48
BH CV ECHO MEAS - PULM SYS VEL: 31.9 CM/SEC
BH CV ECHO MEAS - PV RVPEPC: 7.3 SEC
BH CV ECHO MEAS - PVA(V,A): 4.4 CM^2
BH CV ECHO MEAS - PVA(V,D): 4.4 CM^2
BH CV ECHO MEAS - QP/QS: 1.6
BH CV ECHO MEAS - RAP SYSTOLE: 15 MMHG
BH CV ECHO MEAS - RV MAX PG: 4.5 MMHG
BH CV ECHO MEAS - RV MEAN PG: 2.3 MMHG
BH CV ECHO MEAS - RV V1 MAX: 106.5 CM/SEC
BH CV ECHO MEAS - RV V1 MEAN: 70.9 CM/SEC
BH CV ECHO MEAS - RV V1 VTI: 25 CM
BH CV ECHO MEAS - RVDD: 2.7 CM
BH CV ECHO MEAS - RVOT AREA: 4.3 CM^2
BH CV ECHO MEAS - RVOT DIAM: 2.3 CM
BH CV ECHO MEAS - RVSP: 45 MMHG
BH CV ECHO MEAS - SI(AO): 137.6 ML/M^2
BH CV ECHO MEAS - SI(CUBED): 45.6 ML/M^2
BH CV ECHO MEAS - SI(LVOT): 33.9 ML/M^2
BH CV ECHO MEAS - SI(MOD-SP2): 24.8 ML/M^2
BH CV ECHO MEAS - SI(MOD-SP4): 26.8 ML/M^2
BH CV ECHO MEAS - SI(TEICH): 36.4 ML/M^2
BH CV ECHO MEAS - SUP REN AO DIAM: 1.8 CM
BH CV ECHO MEAS - SV(AO): 276.9 ML
BH CV ECHO MEAS - SV(CUBED): 91.8 ML
BH CV ECHO MEAS - SV(LVOT): 68.3 ML
BH CV ECHO MEAS - SV(MOD-SP2): 50 ML
BH CV ECHO MEAS - SV(MOD-SP4): 54 ML
BH CV ECHO MEAS - SV(RVOT): 107.7 ML
BH CV ECHO MEAS - SV(TEICH): 73.3 ML
BH CV ECHO MEAS - TAPSE (>1.6): 1.8 CM
BH CV ECHO MEAS - TR MAX VEL: 274 CM/SEC
BH CV ECHO MEASUREMENTS AVERAGE E/E' RATIO: 18.17
BH CV VAS BP RIGHT ARM: NORMAL MMHG
BH CV XLRA - RV BASE: 3 CM
BH CV XLRA - RV LENGTH: 7.8 CM
BH CV XLRA - RV MID: 3.6 CM
BH CV XLRA - TDI S': 6.4 CM/SEC
BUN SERPL-MCNC: 39 MG/DL (ref 8–23)
BUN/CREAT SERPL: 20.1 (ref 7–25)
CALCIUM SPEC-SCNC: 9.3 MG/DL (ref 8.6–10.5)
CHLORIDE SERPL-SCNC: 99 MMOL/L (ref 98–107)
CO2 SERPL-SCNC: 28.7 MMOL/L (ref 22–29)
CREAT SERPL-MCNC: 1.94 MG/DL (ref 0.57–1)
GFR SERPL CREATININE-BSD FRML MDRD: 25 ML/MIN/1.73
GLUCOSE SERPL-MCNC: 148 MG/DL (ref 65–99)
LEFT ATRIUM VOLUME INDEX: 49 ML/M2
MAXIMAL PREDICTED HEART RATE: 139 BPM
POTASSIUM SERPL-SCNC: 3.9 MMOL/L (ref 3.5–5.2)
SINUS: 3.1 CM
SODIUM SERPL-SCNC: 138 MMOL/L (ref 136–145)
STJ: 3.3 CM
STRESS TARGET HR: 118 BPM

## 2021-04-06 PROCEDURE — 36415 COLL VENOUS BLD VENIPUNCTURE: CPT

## 2021-04-06 PROCEDURE — 80048 BASIC METABOLIC PNL TOTAL CA: CPT | Performed by: NURSE PRACTITIONER

## 2021-04-06 PROCEDURE — 93000 ELECTROCARDIOGRAM COMPLETE: CPT | Performed by: NURSE PRACTITIONER

## 2021-04-06 PROCEDURE — 99214 OFFICE O/P EST MOD 30 MIN: CPT | Performed by: NURSE PRACTITIONER

## 2021-04-06 RX ORDER — TORSEMIDE 20 MG/1
40 TABLET ORAL 2 TIMES DAILY
Qty: 120 TABLET | Refills: 3 | Status: SHIPPED | OUTPATIENT
Start: 2021-04-06 | End: 2021-08-13

## 2021-04-06 NOTE — PROGRESS NOTES
Date of Office Visit: 21  Encounter Provider: TAYO Carter  Place of Service: [facility] CARDIOLOGY  Patient Name: Ange Johnson  :1940    Chief Complaint   Patient presents with   • Congestive Heart Failure   • Follow-up   :     HPI: Ange Johnson is a 80 y.o. female  with history of coronary artery disease status post coronary artery bypass grafting x 5 in 2020,chronic kidney disease, aortic valve sclerosis, anemia.    She had an echocardiogram last week showing normal left ventricular systolic function, grade 2 diastolic dysfunction as well as mild pulmonary hypertension.  She had aortic valve sclerosis with mild to moderate tricuspid regurgitation.  He was in volume overload.  She received IV diureti with 2 mg IV bumetanide.  Her serum creatinine was at 1.96 and proBNP was elevated at 4000.  Torsemide was increased to 40 mg twice daily.    She presents today accompanied by her daughter, Henna.  Patient is doing better.  She has improved orthopnea and dyspnea on exertion.  She denies palpitation, lightheadedness, chest tightness pressure or fatigue.  She is not doing any exercise.  She is lost 6 pounds on her home scale.          Allergies   Allergen Reactions   • Codeine Nausea Only   • Hydrocodone-Acetaminophen Nausea Only and Other (See Comments)     Percocet and Vicodin; vertigo   • Meperidine Nausea Only and Nausea And Vomiting   • Morphine And Related Nausea Only   • Oxycodone-Acetaminophen Nausea Only   • Oxycodone-Acetaminophen Nausea Only and Other (See Comments)     dilzziness   • Oxycodone-Aspirin Nausea Only and Other (See Comments)     vertigo   • Nickel Rash   • Penicillins Hives   • Shrimp Rash   • Sulfa Antibiotics Hives           Family and social history reviewed.     ROS  All other systems were reviewed and are negative          Objective:     Vitals:    21 1431   BP: 142/70   BP Location: Left arm   Pulse: 57   SpO2: 98%   Weight:  "91.4 kg (201 lb 9.6 oz)   Height: 165.1 cm (65\")     Body mass index is 33.55 kg/m².    PHYSICAL EXAM:  Constitutional:       General: Not in acute distress.     Appearance: Well-developed. Not diaphoretic.   HENT:      Head: Normocephalic.   Pulmonary:      Effort: Pulmonary effort is normal. No respiratory distress.      Breath sounds: Normal breath sounds. No wheezing. No rhonchi. No rales.   Cardiovascular:      Normal rate. Regular rhythm.      Murmurs: There is a systolic murmur at the ULSB.   Pulses:     Radial: 2+ bilaterally.  Skin:     General: Skin is warm and dry. There is no cyanosis.      Findings: No rash.   Neurological:      Mental Status: Alert and oriented to person, place, and time.   Psychiatric:         Behavior: Behavior normal.         Thought Content: Thought content normal.         Judgment: Judgment normal.           ECG 12 Lead    Date/Time: 4/6/2021 5:55 PM  Performed by: Nadiya Kyle APRN  Authorized by: Nadiya Kyle APRN   Comparison: compared with previous ECG   Similar to previous ECG  Rhythm: sinus rhythm  Ectopy: atrial premature contractions  Rate: normal  Conduction: right bundle branch block and 1st degree AV block    Clinical impression: abnormal EKG              Current Outpatient Medications   Medication Sig Dispense Refill   • amLODIPine (NORVASC) 10 MG tablet Daily.     • aspirin 81 MG EC tablet Take 1 tablet by mouth Daily. 30 tablet 11   • atorvastatin (LIPITOR) 40 MG tablet TAKE ONE TABLET BY MOUTH ONCE NIGHTLY 60 tablet 10   • carBAMazepine (CARBATROL) 300 MG 12 hr capsule Take 1 capsule by mouth Every 12 (Twelve) Hours for 180 days. 180 capsule 1   • cetirizine (zyrTEC) 10 MG tablet Take 10 mg by mouth Every Night.     • cloNIDine (CATAPRES) 0.1 MG tablet Take 1 tablet by mouth Every 12 (Twelve) Hours. 180 tablet 3   • Contour Next Test test strip USE ONE STRIP TO TEST TWICE A  each 3   • febuxostat (Uloric) 40 MG tablet Take 1 tablet by mouth Daily for 180 " days. 90 tablet 1   • ferrous sulfate 325 (65 FE) MG tablet Take 1 tablet by mouth daily.     • hydrALAZINE (APRESOLINE) 100 MG tablet Take 100 mg by mouth 2 (Two) Times a Day.     • Insulin Lispro, 1 Unit Dial, (HUMALOG KWIKPEN) 100 UNIT/ML solution pen-injector Inject 5 Units under the skin into the appropriate area as directed 3 (Three) Times a Day With Meals. 15 mL 2   • Insulin Pen Needle (BD PEN NEEDLE ISELA U/F) 32G X 4 MM misc Use to inject 4 time daily 200 each 5   • losartan (COZAAR) 100 MG tablet Take 100 mg by mouth Daily.     • metoprolol tartrate (LOPRESSOR) 25 MG tablet TAKE 1/2 TABLET BY MOUTH EVERY 12 HOURS 90 tablet 3   • montelukast (SINGULAIR) 10 MG tablet 10 mg daily.     • potassium chloride (MICRO-K) 10 MEQ CR capsule Take 10 mEq by mouth Daily.     • Synthroid 200 MCG tablet TAKE ONE TABLET BY MOUTH DAILY 30 tablet 5   • vitamin B-12 (CYANOCOBALAMIN) 1000 MCG tablet Take 1 tablet by mouth daily.     • vitamin D (ERGOCALCIFEROL) 1.25 MG (04150 UT) capsule capsule TAKE ONE CAPSULE BY MOUTH ONCE WEEKLY 12 capsule 0   • Insulin Glargine, 2 Unit Dial, (Toujeo Max SoloStar) 300 UNIT/ML solution pen-injector injection Inject 70 Units under the skin into the appropriate area as directed Daily for 90 days. 21 mL 2   • torsemide (DEMADEX) 20 MG tablet Take 2 tablets by mouth 2 (Two) Times a Day. 120 tablet 3     No current facility-administered medications for this visit.     Assessment:       Diagnosis Plan   1. Coronary artery disease involving native coronary artery of native heart without angina pectoris     2. Paroxysmal atrial fibrillation (CMS/HCC)      3. Essential hypertension     4. History of coronary artery bypass surgery     5. Encounter for therapeutic drug level monitoring  Basic Metabolic Panel        Orders Placed This Encounter   Procedures   • Basic Metabolic Panel     Standing Status:   Future     Number of Occurrences:   1     Standing Expiration Date:   4/6/2022     Order Specific  Question:   Release to patient     Answer:   Immediate         Plan:       1.  80-year-old female with chronic diastolic congestive heart failure.  She is doing much better on 40 mg torsemide twice daily.  BMP today is stable so we will continue that until she follows up in 2 to 3 weeks.  I sent refills.  2.  Coronary artery disease status post coronary bypass grafting x5 02/2020.  No angina  3.  Hypertension blood pressure stable  4.  Stage IV chronic kidney disease-as above  5.  Anemia of chronic disease   6.  Aortic valve sclerosis-mild murmur left upper sternal border  7.  Chronic right bundle branch block  8.  Carotid artery plaque without significant stenosis bilateral on duplex 2/2020                It has been a pleasure to participate in this patient's care.      Thank you,  TAYO Carter      **I used Dragon to dictate this note:**

## 2021-04-15 DIAGNOSIS — E89.0 POSTOPERATIVE HYPOTHYROIDISM: Primary | ICD-10-CM

## 2021-04-15 DIAGNOSIS — Z79.4 TYPE 2 DIABETES MELLITUS WITHOUT COMPLICATION, WITH LONG-TERM CURRENT USE OF INSULIN (HCC): ICD-10-CM

## 2021-04-15 DIAGNOSIS — E11.9 TYPE 2 DIABETES MELLITUS WITHOUT COMPLICATION, WITH LONG-TERM CURRENT USE OF INSULIN (HCC): ICD-10-CM

## 2021-04-15 DIAGNOSIS — E55.9 VITAMIN D DEFICIENCY: ICD-10-CM

## 2021-04-16 LAB
25(OH)D3+25(OH)D2 SERPL-MCNC: 69.1 NG/ML (ref 30–100)
HBA1C MFR BLD: 6.9 % (ref 4.8–5.6)

## 2021-04-19 ENCOUNTER — OFFICE VISIT (OUTPATIENT)
Dept: FAMILY MEDICINE CLINIC | Facility: CLINIC | Age: 81
End: 2021-04-19

## 2021-04-19 VITALS
OXYGEN SATURATION: 95 % | HEART RATE: 51 BPM | HEIGHT: 65 IN | BODY MASS INDEX: 32.49 KG/M2 | WEIGHT: 195 LBS | SYSTOLIC BLOOD PRESSURE: 146 MMHG | TEMPERATURE: 97.1 F | DIASTOLIC BLOOD PRESSURE: 78 MMHG | RESPIRATION RATE: 16 BRPM

## 2021-04-19 DIAGNOSIS — F22 DELUSIONAL DISORDER (HCC): ICD-10-CM

## 2021-04-19 DIAGNOSIS — M10.9 GOUT WITHOUT TOPHUS: Primary | ICD-10-CM

## 2021-04-19 DIAGNOSIS — M62.9 DISORDER OF MUSCLE, LIGAMENT, AND FASCIA: ICD-10-CM

## 2021-04-19 DIAGNOSIS — M24.20 DISORDER OF MUSCLE, LIGAMENT, AND FASCIA: ICD-10-CM

## 2021-04-19 PROCEDURE — 99214 OFFICE O/P EST MOD 30 MIN: CPT | Performed by: FAMILY MEDICINE

## 2021-04-19 RX ORDER — CARBAMAZEPINE 300 MG/1
300 CAPSULE, EXTENDED RELEASE ORAL EVERY 12 HOURS SCHEDULED
Qty: 180 CAPSULE | Refills: 1 | Status: SHIPPED | OUTPATIENT
Start: 2021-04-19 | End: 2021-10-20 | Stop reason: SDUPTHER

## 2021-04-19 RX ORDER — FEBUXOSTAT 40 MG/1
40 TABLET, FILM COATED ORAL DAILY
Qty: 90 TABLET | Refills: 1 | Status: SHIPPED | OUTPATIENT
Start: 2021-04-19 | End: 2021-10-20 | Stop reason: SDUPTHER

## 2021-04-19 NOTE — PROGRESS NOTES
"Chief Complaint  Hypertension (follow up)    Subjective      History of Present Illness      Ange Johnson presents to [facility] to refill medicines.  No recent gout attacks.  Fibromyalgia stable.  Patient continues to have later in the day visual delusions periodically.  Recently she was treated for bout of acute on chronic congestive heart failure and medication list has been updated.  She has recently increased torsemide.              Objective     Vital Signs:   /78   Pulse 51   Temp 97.1 °F (36.2 °C) (Tympanic)   Resp 16   Ht 165.1 cm (65\")   Wt 88.5 kg (195 lb)   SpO2 95%   BMI 32.45 kg/m²       Physical Exam  Vitals reviewed.   Constitutional:       General: She is not in acute distress.  Eyes:      General: Lids are normal.      Conjunctiva/sclera: Conjunctivae normal.   Neck:      Vascular: No carotid bruit.      Trachea: No tracheal deviation.   Cardiovascular:      Rate and Rhythm: Normal rate and regular rhythm.      Heart sounds: Normal heart sounds. No murmur heard.     Pulmonary:      Effort: Pulmonary effort is normal.      Breath sounds: Normal breath sounds.   Skin:     General: Skin is warm and dry.   Neurological:      Mental Status: She is alert. She is not disoriented.   Psychiatric:         Speech: Speech normal.         Behavior: Behavior normal. Behavior is cooperative.              Result Review :                            Assessment/Plan     Assessment and Plan      Diagnoses and all orders for this visit:    1. 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        Orders:  -     febuxostat (Uloric) 40 MG tablet; Take 1 tablet by mouth Daily for 180 days.  Dispense: 90 tablet; Refill: 1    2. Disorder of muscle, ligament, and fascia  Assessment & Plan:  Condition stable.     Orders:  -     carBAMazepine (CARBATROL) 300 MG 12 hr capsule; Take 1 capsule by " mouth Every 12 (Twelve) Hours for 180 days.  Dispense: 180 capsule; Refill: 1    3. Delusional disorder (CMS/[facility])  Assessment & Plan:  Psychological condition is worsening.  Referral to psychiatry.  Psychological condition  will be reassessed at the next regular appointment.    Orders:  -     Ambulatory Referral to Psychiatry          Follow Up   Return in about 6 months (around 10/20/2021) for Medicare Wellness and regular visit, 30 min.    Patient was given instructions and counseling regarding her condition or for health maintenance advice. Please see specific information pulled into the AVS if appropriate.

## 2021-04-19 NOTE — ASSESSMENT & PLAN NOTE
Psychological condition is worsening.  Referral to psychiatry.  Psychological condition  will be reassessed at the next regular appointment.

## 2021-04-27 DIAGNOSIS — N18.30 STAGE 3 CHRONIC KIDNEY DISEASE (HCC): ICD-10-CM

## 2021-04-27 DIAGNOSIS — I10 ESSENTIAL HYPERTENSION: ICD-10-CM

## 2021-04-27 NOTE — PROGRESS NOTES
"Date of Office Visit: 21  Encounter Provider: Victor Hugo Ng MD  Place of Service: [facility] CARDIOLOGY  Patient Name: Ange Johnson  :1940    Chief Complaint   Patient presents with   • Coronary Artery Disease   :     HPI:     Ms. Johnson is 80 y.o. and presents today to follow up. I have reviewed prior notes and there are no changes except for any new updates described below. I have also reviewed any information entered into the medical record by the patient or by ancillary staff.     I initially evaluated her in 2016 for preoperative risk assessment prior to thyroid surgery.  She reported chronic exertional dyspnea at that time.  She had a soft systolic murmur.  An echo showed normal LVSF and aortic sclerosis without stenosis.  I did not feel that she needed stress testing as her dyspnea was stable and long-standing.  Her EKG showed diffuse nonspecific ST abnormalities which were old.    I then saw her again in 2020 for dyspnea and chest pain which she described as \"heartburn.\"  An echo was normal, but a perfusion stress was abnormal/high-risk.  She underwent coronary angiography which revealed multivessel CAD, and underwent 5V CABG in 2020.  Her post-operative course was unremarkable other than an episode of atrial fibrillation and lower extremity superficial thrombophlebitis. She was placed on renally dosed apixaban as well as amiodarone.  Both of these have subsequently been stopped.       She presented one month ago with increased dyspnea and marked volume overload. An echo revealed normal LVSF, grade 2 diastolic dysfunction, and mild PHTN. I recommended inpatient admission/diuresis, but she really wanted to avoid this. I gave her a dose of IV bumetanide and increased her diuretics. Repeat labs revealed stable renal function (baseline SCr is ~ 1.9-2). The increased dose has helped tremendously! Her breathing and swelling are so much better. " She denies chest pain or orthopnea.     Past Medical History:   Diagnosis Date   • Anemia in stage 3 chronic kidney disease (CMS/[facility]) 2016   • Asthma    • Atherosclerosis of both carotid arteries     plaque without stenosis,    • Bone spur of acromioclavicular joint    • CAD (coronary artery disease)     2020: 90% dLM, 60% pLAD, 90% mLAD, 90% D1, 90% mLCx, 50-60% OM1, 50% pRCA; s/p CABG x 5 (LIMA-LAD, SVG-PDA, SVG-OM1, SVG-OM2, SVG-D1)   • Chronic diastolic (congestive) heart failure (CMS/[facility]) 2021   • Chronic venous insufficiency    • CKD (chronic kidney disease) stage 3, GFR 30-59 ml/min (CMS/[facility])    • Essential hypertension    • Gout    • Grief reaction       2010 after 15 foot fall off ladder   • H/O cataract    • Heel spur    • History of tendinitis    • Hyperlipidemia    • Hyperparathyroidism (CMS/[facility])    • Hyperthyroidism    • Hypothyroidism, acquired    • Non-toxic goiter    • Osteoarthritis    • Pneumonia    • Postoperative atrial fibrillation (CMS/[facility])     after CABG   • Proteinuria    • Thrombophlebitis leg     after CABG   • Type 2 diabetes mellitus with neurological manifestations (CMS/[facility])    • Vitamin D deficiency        Past Surgical History:   Procedure Laterality Date   • BREAST EXCISIONAL BIOPSY Left     Dr. Ybarra benign   • BREAST EXCISIONAL BIOPSY Bilateral     benign   • CARDIAC CATHETERIZATION N/A 2/10/2020    Procedure: Left heart cath without LV gram;  Surgeon: Emerson Deras MD;  Location:  [facility];  Service: Cardiovascular;  Laterality: N/A;   • CARDIAC CATHETERIZATION N/A 2/10/2020    Procedure: Coronary angiography;  Surgeon: Emerson Deras MD;  Location:  [facility];  Service: Cardiovascular;  Laterality: N/A;   • CATARACT EXTRACTION Left 2010   • CATARACT EXTRACTION Right 2010   • CORONARY ARTERY BYPASS GRAFT N/A 2020    Procedure: MIDLINE STERNOTOMY, CORONARY ARTERY BYPASS  GRAFTING X  5 USING LEFT INTERNAL MAMMARY ARTERY AND LEFT ENDOSCOPICALLY HARVESTED GREATER SAPHENOUS VEIN, INTRAOP KERWIN, PRP;  Surgeon: Jr Anthony Rai MD;  Location: [facility];  Service: Cardiothoracic;  Laterality: N/A;   • HYSTERECTOMY  1988    Dr. Quinn Peña   • OOPHORECTOMY      late 40's   • THYROIDECTOMY Right 12/8/2016    Procedure: right PARATHYROID EXCISION OF NODULE and right thyroid lobectomy and bilateral exploration.;  Surgeon: Tasneem Montelongo MD;  Location: [facility];  Service:        Social History     Socioeconomic History   • Marital status:      Spouse name: Not on file   • Number of children: 4   • Years of education: Not on file   • Highest education level: Not on file   Tobacco Use   • Smoking status: Never Smoker   • Smokeless tobacco: Never Used   Vaping Use   • Vaping Use: Never used   Substance and Sexual Activity   • Alcohol use: Not Currently     Comment: Caffeine use: Decaf   • Drug use: No   • Sexual activity: Defer       Family History   Problem Relation Age of Onset   • Diabetes Sister    • Diabetes Brother    • Hypertension Brother    • Kidney disease Brother    • Cervical cancer Mother    • Heart disease Sister    • Neuropathy Sister    • Diabetes Sister        Review of Systems   Cardiovascular: Positive for leg swelling.   Musculoskeletal: Positive for joint pain and myalgias.   Neurological: Positive for excessive daytime sleepiness and dizziness.   All other systems reviewed and are negative.      Allergies   Allergen Reactions   • Codeine Nausea Only   • Hydrocodone-Acetaminophen Nausea Only and Other (See Comments)     Percocet and Vicodin; vertigo   • Meperidine Nausea Only and Nausea And Vomiting   • Morphine And Related Nausea Only   • Oxycodone-Acetaminophen Nausea Only   • Oxycodone-Acetaminophen Nausea Only and Other (See Comments)     dilzziness   • Oxycodone-Aspirin Nausea Only and Other (See Comments)     vertigo   • Nickel Rash   • Penicillins  Hives   • Shrimp Rash   • Sulfa Antibiotics Hives         Current Outpatient Medications:   •  amLODIPine (NORVASC) 10 MG tablet, Daily., Disp: , Rfl:   •  aspirin 81 MG EC tablet, Take 1 tablet by mouth Daily., Disp: 30 tablet, Rfl: 11  •  atorvastatin (LIPITOR) 40 MG tablet, TAKE ONE TABLET BY MOUTH ONCE NIGHTLY, Disp: 60 tablet, Rfl: 10  •  carBAMazepine (CARBATROL) 300 MG 12 hr capsule, Take 1 capsule by mouth Every 12 (Twelve) Hours for 180 days., Disp: 180 capsule, Rfl: 1  •  cetirizine (zyrTEC) 10 MG tablet, Take 10 mg by mouth Every Night., Disp: , Rfl:   •  cloNIDine (CATAPRES) 0.1 MG tablet, Take 1 tablet by mouth Every 12 (Twelve) Hours., Disp: 180 tablet, Rfl: 3  •  Contour Next Test test strip, USE ONE STRIP TO TEST TWICE A DAY, Disp: 100 each, Rfl: 3  •  febuxostat (Uloric) 40 MG tablet, Take 1 tablet by mouth Daily for 180 days., Disp: 90 tablet, Rfl: 1  •  ferrous sulfate 325 (65 FE) MG tablet, Take 1 tablet by mouth daily., Disp: , Rfl:   •  Glucagon (Gvoke HypoPen 2-Pack) 1 MG/0.2ML solution auto-injector, Inject 1 mg under the skin into the appropriate area as directed As Needed (Hypoglycemia)., Disp: 0.4 mL, Rfl: 1  •  hydrALAZINE (APRESOLINE) 100 MG tablet, Take 100 mg by mouth 2 (Two) Times a Day., Disp: , Rfl:   •  Insulin Lispro, 1 Unit Dial, (HUMALOG KWIKPEN) 100 UNIT/ML solution pen-injector, Inject 5 Units under the skin into the appropriate area as directed 3 (Three) Times a Day With Meals., Disp: 15 mL, Rfl: 2  •  Insulin Pen Needle (BD PEN NEEDLE ISELA U/F) 32G X 4 MM misc, Use to inject 4 time daily, Disp: 200 each, Rfl: 5  •  losartan (COZAAR) 100 MG tablet, Take 100 mg by mouth Daily., Disp: , Rfl:   •  metoprolol tartrate (LOPRESSOR) 25 MG tablet, TAKE 1/2 TABLET BY MOUTH EVERY 12 HOURS, Disp: 90 tablet, Rfl: 3  •  montelukast (SINGULAIR) 10 MG tablet, 10 mg daily., Disp: , Rfl:   •  potassium chloride (MICRO-K) 10 MEQ CR capsule, Take 10 mEq by mouth Daily., Disp: , Rfl:   •   "Synthroid 200 MCG tablet, TAKE ONE TABLET BY MOUTH DAILY, Disp: 30 tablet, Rfl: 5  •  torsemide (DEMADEX) 20 MG tablet, Take 2 tablets by mouth 2 (Two) Times a Day., Disp: 120 tablet, Rfl: 3  •  vitamin B-12 (CYANOCOBALAMIN) 1000 MCG tablet, Take 1 tablet by mouth daily., Disp: , Rfl:   •  vitamin D (ERGOCALCIFEROL) 1.25 MG (85492 UT) capsule capsule, TAKE ONE CAPSULE BY MOUTH ONCE WEEKLY, Disp: 12 capsule, Rfl: 0  •  Insulin Glargine, 2 Unit Dial, (Toujeo Max SoloStar) 300 UNIT/ML solution pen-injector injection, Inject 70 Units under the skin into the appropriate area as directed Daily for 90 days., Disp: 21 mL, Rfl: 2      Objective:     Vitals:    04/28/21 1456   BP: 142/60   BP Location: Right arm   Pulse: 57   Weight: 88.8 kg (195 lb 12.8 oz)   Height: 165.1 cm (65\")     Body mass index is 32.58 kg/m².    Physical Exam  Constitutional:       Appearance: Normal appearance.   HENT:      Head: Normocephalic.      Nose: Nose normal.      Mouth/Throat:      Comments: Masked  Eyes:      Conjunctiva/sclera: Conjunctivae normal.   Cardiovascular:      Rate and Rhythm: Normal rate and regular rhythm.      Pulses: Normal pulses.      Heart sounds: Murmur heard.   Systolic murmur is present with a grade of 2/6 at the upper left sternal border.     Pulmonary:      Effort: Pulmonary effort is normal.      Breath sounds: Normal breath sounds.   Abdominal:      Palpations: Abdomen is soft.      Tenderness: There is no abdominal tenderness.   Musculoskeletal:         General: Swelling (1+ pedal) present.   Skin:     General: Skin is warm and dry.   Neurological:      General: No focal deficit present.      Mental Status: She is alert and oriented to person, place, and time.   Psychiatric:         Mood and Affect: Mood normal.         Behavior: Behavior normal.           ECG 12 Lead    Date/Time: 4/28/2021 3:04 PM  Performed by: Victor Hugo Ng MD  Authorized by: Victor Hugo Ng MD   Comparison: compared with previous ECG "   Similar to previous ECG  Rhythm: sinus rhythm  Conduction: right bundle branch block and 1st degree AV block  ST Segments: ST segments normal  T Waves: T waves normal  QRS axis: normal  Other: no other findings    Clinical impression: abnormal EKG              Assessment:       Diagnosis Plan   1. Coronary artery disease involving native coronary artery of native heart without angina pectoris  ECG 12 Lead   2. Chronic diastolic (congestive) heart failure (CMS/HCC)     3. Essential hypertension     4. Stage 4 chronic kidney disease (CMS/[facility])          Plan:     1.  Coronary Artery Disease  Assessment  • The patient has no angina    Subjective - Objective  • There is a history of previous coronary artery bypass graft 2/2020  • Current antiplatelet therapy includes aspirin 81 mg    She's doing well s/p CABG.  She's on atorvastatin.    2-4. Her volume status is so much better since we increased her diuretic. She has seen Dr Caruso since then and he agrees with her regimen.     Sincerely,       Victor Hugo Ng MD

## 2021-04-28 ENCOUNTER — OFFICE VISIT (OUTPATIENT)
Dept: CARDIOLOGY | Facility: CLINIC | Age: 81
End: 2021-04-28

## 2021-04-28 ENCOUNTER — OFFICE VISIT (OUTPATIENT)
Dept: ENDOCRINOLOGY | Age: 81
End: 2021-04-28

## 2021-04-28 VITALS
DIASTOLIC BLOOD PRESSURE: 78 MMHG | BODY MASS INDEX: 32.22 KG/M2 | WEIGHT: 193.4 LBS | OXYGEN SATURATION: 98 % | HEIGHT: 65 IN | SYSTOLIC BLOOD PRESSURE: 136 MMHG

## 2021-04-28 VITALS
HEART RATE: 57 BPM | WEIGHT: 195.8 LBS | DIASTOLIC BLOOD PRESSURE: 60 MMHG | HEIGHT: 65 IN | SYSTOLIC BLOOD PRESSURE: 142 MMHG | BODY MASS INDEX: 32.62 KG/M2

## 2021-04-28 DIAGNOSIS — E89.0 POSTOPERATIVE HYPOTHYROIDISM: ICD-10-CM

## 2021-04-28 DIAGNOSIS — I25.10 CORONARY ARTERY DISEASE INVOLVING NATIVE CORONARY ARTERY OF NATIVE HEART WITHOUT ANGINA PECTORIS: Primary | ICD-10-CM

## 2021-04-28 DIAGNOSIS — Z79.4 TYPE 2 DIABETES MELLITUS WITH DIABETIC POLYNEUROPATHY, WITH LONG-TERM CURRENT USE OF INSULIN (HCC): Primary | ICD-10-CM

## 2021-04-28 DIAGNOSIS — N18.4 STAGE 4 CHRONIC KIDNEY DISEASE (HCC): ICD-10-CM

## 2021-04-28 DIAGNOSIS — E78.2 MIXED HYPERLIPIDEMIA: ICD-10-CM

## 2021-04-28 DIAGNOSIS — E11.42 TYPE 2 DIABETES MELLITUS WITH DIABETIC POLYNEUROPATHY, WITH LONG-TERM CURRENT USE OF INSULIN (HCC): Primary | ICD-10-CM

## 2021-04-28 DIAGNOSIS — I10 ESSENTIAL HYPERTENSION: ICD-10-CM

## 2021-04-28 DIAGNOSIS — I50.32 CHRONIC DIASTOLIC (CONGESTIVE) HEART FAILURE (HCC): ICD-10-CM

## 2021-04-28 PROCEDURE — 93000 ELECTROCARDIOGRAM COMPLETE: CPT | Performed by: INTERNAL MEDICINE

## 2021-04-28 PROCEDURE — 99214 OFFICE O/P EST MOD 30 MIN: CPT | Performed by: NURSE PRACTITIONER

## 2021-04-28 PROCEDURE — 99213 OFFICE O/P EST LOW 20 MIN: CPT | Performed by: INTERNAL MEDICINE

## 2021-04-28 RX ORDER — ASPIRIN 81 MG/1
81 TABLET ORAL DAILY
Qty: 90 TABLET | Refills: 3 | Status: SHIPPED | OUTPATIENT
Start: 2021-04-28

## 2021-04-28 RX ORDER — GLUCAGON INJECTION, SOLUTION 1 MG/.2ML
1 INJECTION, SOLUTION SUBCUTANEOUS AS NEEDED
Qty: 0.4 ML | Refills: 1 | Status: SHIPPED | OUTPATIENT
Start: 2021-04-28

## 2021-04-28 RX ORDER — CLONIDINE HYDROCHLORIDE 0.1 MG/1
0.1 TABLET ORAL EVERY 12 HOURS
Qty: 180 TABLET | Refills: 3 | Status: SHIPPED | OUTPATIENT
Start: 2021-04-28 | End: 2022-07-01

## 2021-04-28 RX ORDER — HYDRALAZINE HYDROCHLORIDE 50 MG/1
TABLET, FILM COATED ORAL
Qty: 270 TABLET | Refills: 0 | OUTPATIENT
Start: 2021-04-28

## 2021-04-28 NOTE — PROGRESS NOTES
"Chief Complaint  Diabetes (type 2 diabetes: check blood sugar 1x day )    Subjective     {Problem List  Visit Diagnosis   Encounters  Notes  Medications  Labs  Result Review Imaging  Media :23}     Ange Johnson presents to [facility] ENDOCRINOLOGY  History of Present Illness    Type 2 dm    Diagnosed around 40 years ago.   Today in clinic pt reports being on Toujeo 58U QAM, Humalog 5U if Bs >120 AC  FBG -   Checks BG - once a day  Dm retinopathy - denies ,Last eye exam - 3/2021  Dm nephropathy - sees nephrologist, stage 4 CKD  Dm neuropathy - denies, sees podiatrist q8 weeks  CAD - denies, February 2020 blockage in heart  CVA - denies  Episodes of hypoglycemia - 1-2x per month,   Pt is physically active. weight is down 10lbs since beginning of February  Pt tries to follow DM diet for most part.   On ARb.  Lab Results   Component Value Date    HGBA1C 6.90 (H) 04/15/2021     Hyperlipidemia  Atorvastatin 40 mg daily  Lab Results   Component Value Date    CHOL 153 02/12/2020    CHLPL 159 10/15/2020    TRIG 97 10/15/2020    HDL 56 10/15/2020    LDL 85 10/15/2020     Hypothyroid  Synthroid 200 mcg daily  Lab Results   Component Value Date    TSH 0.481 04/02/2021           Received both Covid vaccinations, did great with both.    Objective   Vital Signs:   /78 (BP Location: Right arm, Patient Position: Sitting, Cuff Size: Adult)   Ht 165.1 cm (65\")   Wt 87.7 kg (193 lb 6.4 oz)   SpO2 98%   BMI 32.18 kg/m²     Physical Exam  Vitals reviewed.   Constitutional:       General: She is not in acute distress.  HENT:      Head: Normocephalic and atraumatic.   Cardiovascular:      Rate and Rhythm: Normal rate and regular rhythm.   Pulmonary:      Effort: Pulmonary effort is normal. No respiratory distress.   Musculoskeletal:         General: No signs of injury. Normal range of motion.      Cervical back: Normal range of motion and neck supple.   Skin:     General: Skin is warm and dry. "   Neurological:      Mental Status: She is alert and oriented to person, place, and time. Mental status is at baseline.   Psychiatric:         Mood and Affect: Mood normal.         Behavior: Behavior normal.         Thought Content: Thought content normal.         Judgment: Judgment normal.          Result Review :   The following data was reviewed by: TAYO Chen on 04/28/2021:  Common labs    Common Labsle 4/2/21 4/2/21 4/6/21 4/15/21    0941 0941     Glucose  166 (A) 148 (A)    BUN  32 (A) 39 (A)    Creatinine  1.93 (A) 1.94 (A)    eGFR Non African Am  25 (A) 25 (A)    Sodium  139 138    Potassium  4.2 3.9    Chloride  103 99    Calcium  8.7 9.3    Albumin  3.80     Total Bilirubin  0.2     Alkaline Phosphatase  103     AST (SGOT)  21     ALT (SGPT)  16     WBC 8.17      Hemoglobin 10.6 (A)      Hematocrit 32.8 (A)      Platelets 187      Hemoglobin A1C    6.90 (A)   (A) Abnormal value       Comments are available for some flowsheets but are not being displayed.                     Assessment and Plan    Diagnoses and all orders for this visit:    1. Type 2 diabetes mellitus with diabetic polyneuropathy, with long-term current use of insulin (CMS/[facility]) (Primary)  -     Comprehensive Metabolic Panel; Future  -     Hemoglobin A1c; Future  -     Microalbumin / Creatinine Urine Ratio - Urine, Clean Catch; Future  -     Lipid Panel; Future  -     Glucagon (Gvoke HypoPen 2-Pack) 1 MG/0.2ML solution auto-injector; Inject 1 mg under the skin into the appropriate area as directed As Needed (Hypoglycemia).  Dispense: 0.4 mL; Refill: 1    2. Mixed hyperlipidemia    3. Postoperative hypothyroidism  -     TSH; Future  -     T4, Free; Future        Follow Up   Return in about 6 months (around 10/28/2021).   A1c at goal of less than 7%  Continue Lantus 58 units daily along with 5 units of rapid insulin before meals if blood sugars greater than 120  Low risk of hypoglycemia however I will send a prescription for glucagon  as an emergency backup, instructed daughter on use  Continue diabetic diet, daily diabetic foot care and yearly diabetic eye exams    Continue Synthroid at current dose  Needs close monitoring to reduce risk of complications from over or under treatment with thyroid hormone replacement    Continue statin and low-cholesterol diet    Patient was given instructions and counseling regarding her condition or for health maintenance advice. Please see specific information pulled into the AVS if appropriate.       Coleen Schilling APRN

## 2021-05-01 DIAGNOSIS — I10 ESSENTIAL HYPERTENSION: ICD-10-CM

## 2021-05-01 DIAGNOSIS — N18.30 STAGE 3 CHRONIC KIDNEY DISEASE (HCC): ICD-10-CM

## 2021-05-04 RX ORDER — HYDRALAZINE HYDROCHLORIDE 50 MG/1
TABLET, FILM COATED ORAL
Qty: 270 TABLET | Refills: 0 | OUTPATIENT
Start: 2021-05-04

## 2021-05-06 ENCOUNTER — TRANSCRIBE ORDERS (OUTPATIENT)
Dept: FAMILY MEDICINE CLINIC | Facility: CLINIC | Age: 81
End: 2021-05-06

## 2021-05-06 DIAGNOSIS — Z12.31 SCREENING MAMMOGRAM FOR HIGH-RISK PATIENT: Primary | ICD-10-CM

## 2021-05-24 ENCOUNTER — TELEPHONE (OUTPATIENT)
Dept: ENDOCRINOLOGY | Age: 81
End: 2021-05-24

## 2021-05-24 RX ORDER — LEVOTHYROXINE SODIUM 200 MCG
200 TABLET ORAL DAILY
Qty: 30 TABLET | Refills: 5 | Status: CANCELLED | OUTPATIENT
Start: 2021-05-24

## 2021-05-24 RX ORDER — ERGOCALCIFEROL 1.25 MG/1
50000 CAPSULE ORAL WEEKLY
Qty: 12 CAPSULE | Refills: 0 | Status: CANCELLED | OUTPATIENT
Start: 2021-05-24

## 2021-05-25 RX ORDER — LEVOTHYROXINE SODIUM 200 MCG
200 TABLET ORAL DAILY
Qty: 30 TABLET | Refills: 5 | Status: SHIPPED | OUTPATIENT
Start: 2021-05-25 | End: 2021-10-28 | Stop reason: SDUPTHER

## 2021-06-01 RX ORDER — ERGOCALCIFEROL 1.25 MG/1
50000 CAPSULE ORAL WEEKLY
Qty: 12 CAPSULE | Refills: 0 | Status: SHIPPED | OUTPATIENT
Start: 2021-06-01 | End: 2021-08-19 | Stop reason: SDUPTHER

## 2021-06-01 NOTE — TELEPHONE ENCOUNTER
lv 4/28/21   Patient is leaving at 10:00 am today for vacation is really wanting this prescription called in  Thank you

## 2021-06-10 DIAGNOSIS — E11.9 TYPE 2 DIABETES MELLITUS WITHOUT COMPLICATION, WITH LONG-TERM CURRENT USE OF INSULIN (HCC): ICD-10-CM

## 2021-06-10 DIAGNOSIS — Z79.4 TYPE 2 DIABETES MELLITUS WITHOUT COMPLICATION, WITH LONG-TERM CURRENT USE OF INSULIN (HCC): ICD-10-CM

## 2021-06-10 RX ORDER — PERPHENAZINE 16 MG/1
1 TABLET, FILM COATED ORAL 2 TIMES DAILY
Qty: 100 EACH | Refills: 3 | Status: SHIPPED | OUTPATIENT
Start: 2021-06-10 | End: 2022-07-08

## 2021-06-16 ENCOUNTER — APPOINTMENT (OUTPATIENT)
Dept: MAMMOGRAPHY | Facility: HOSPITAL | Age: 81
End: 2021-06-16

## 2021-06-29 ENCOUNTER — OFFICE VISIT (OUTPATIENT)
Dept: CARDIOLOGY | Facility: CLINIC | Age: 81
End: 2021-06-29

## 2021-06-29 ENCOUNTER — TELEPHONE (OUTPATIENT)
Dept: CARDIOLOGY | Facility: CLINIC | Age: 81
End: 2021-06-29

## 2021-06-29 ENCOUNTER — HOSPITAL ENCOUNTER (OUTPATIENT)
Dept: CARDIOLOGY | Facility: HOSPITAL | Age: 81
Discharge: HOME | End: 2021-06-29
Admitting: NURSE PRACTITIONER

## 2021-06-29 VITALS
HEART RATE: 50 BPM | SYSTOLIC BLOOD PRESSURE: 160 MMHG | WEIGHT: 194 LBS | DIASTOLIC BLOOD PRESSURE: 60 MMHG | BODY MASS INDEX: 32.32 KG/M2 | HEIGHT: 65 IN

## 2021-06-29 DIAGNOSIS — N18.4 ANEMIA IN STAGE 4 CHRONIC KIDNEY DISEASE (HCC): ICD-10-CM

## 2021-06-29 DIAGNOSIS — N18.4 STAGE 4 CHRONIC KIDNEY DISEASE (HCC): ICD-10-CM

## 2021-06-29 DIAGNOSIS — I45.10 RBBB (RIGHT BUNDLE BRANCH BLOCK): ICD-10-CM

## 2021-06-29 DIAGNOSIS — Z95.1 HISTORY OF CORONARY ARTERY BYPASS SURGERY: ICD-10-CM

## 2021-06-29 DIAGNOSIS — Z79.4 TYPE 2 DIABETES MELLITUS WITH DIABETIC POLYNEUROPATHY, WITH LONG-TERM CURRENT USE OF INSULIN (HCC): ICD-10-CM

## 2021-06-29 DIAGNOSIS — N18.4 STAGE 4 CHRONIC KIDNEY DISEASE (HCC): Primary | ICD-10-CM

## 2021-06-29 DIAGNOSIS — N17.9 AKI (ACUTE KIDNEY INJURY) (HCC): ICD-10-CM

## 2021-06-29 DIAGNOSIS — D63.1 ANEMIA IN STAGE 4 CHRONIC KIDNEY DISEASE (HCC): ICD-10-CM

## 2021-06-29 DIAGNOSIS — I25.10 CORONARY ARTERY DISEASE INVOLVING NATIVE CORONARY ARTERY OF NATIVE HEART WITHOUT ANGINA PECTORIS: Primary | ICD-10-CM

## 2021-06-29 DIAGNOSIS — E78.2 MIXED HYPERLIPIDEMIA: ICD-10-CM

## 2021-06-29 DIAGNOSIS — I50.32 CHRONIC DIASTOLIC (CONGESTIVE) HEART FAILURE (HCC): ICD-10-CM

## 2021-06-29 DIAGNOSIS — I25.10 CORONARY ARTERY DISEASE INVOLVING NATIVE CORONARY ARTERY OF NATIVE HEART WITHOUT ANGINA PECTORIS: ICD-10-CM

## 2021-06-29 DIAGNOSIS — I10 ESSENTIAL HYPERTENSION: ICD-10-CM

## 2021-06-29 DIAGNOSIS — E11.42 TYPE 2 DIABETES MELLITUS WITH DIABETIC POLYNEUROPATHY, WITH LONG-TERM CURRENT USE OF INSULIN (HCC): ICD-10-CM

## 2021-06-29 LAB
ALBUMIN SERPL-MCNC: 3.9 G/DL (ref 3.5–5.2)
ALBUMIN/GLOB SERPL: 1.6 G/DL
ALP SERPL-CCNC: 99 U/L (ref 39–117)
ALT SERPL W P-5'-P-CCNC: 20 U/L (ref 1–33)
ANION GAP SERPL CALCULATED.3IONS-SCNC: 11.5 MMOL/L (ref 5–15)
AST SERPL-CCNC: 11 U/L (ref 1–32)
BILIRUB SERPL-MCNC: 0.2 MG/DL (ref 0–1.2)
BUN SERPL-MCNC: 50 MG/DL (ref 8–23)
BUN/CREAT SERPL: 17.6 (ref 7–25)
CALCIUM SPEC-SCNC: 9.6 MG/DL (ref 8.6–10.5)
CHLORIDE SERPL-SCNC: 101 MMOL/L (ref 98–107)
CO2 SERPL-SCNC: 27.5 MMOL/L (ref 22–29)
CREAT SERPL-MCNC: 2.84 MG/DL (ref 0.57–1)
DEPRECATED RDW RBC AUTO: 40.9 FL (ref 37–54)
ERYTHROCYTE [DISTWIDTH] IN BLOOD BY AUTOMATED COUNT: 12 % (ref 12.3–15.4)
GFR SERPL CREATININE-BSD FRML MDRD: 16 ML/MIN/1.73
GLOBULIN UR ELPH-MCNC: 2.5 GM/DL
GLUCOSE SERPL-MCNC: 143 MG/DL (ref 65–99)
HCT VFR BLD AUTO: 30.5 % (ref 34–46.6)
HGB BLD-MCNC: 10.2 G/DL (ref 12–15.9)
MCH RBC QN AUTO: 31.5 PG (ref 26.6–33)
MCHC RBC AUTO-ENTMCNC: 33.4 G/DL (ref 31.5–35.7)
MCV RBC AUTO: 94.1 FL (ref 79–97)
NT-PROBNP SERPL-MCNC: 3030 PG/ML (ref 0–1800)
PLATELET # BLD AUTO: 191 10*3/MM3 (ref 140–450)
PMV BLD AUTO: 11.3 FL (ref 6–12)
POTASSIUM SERPL-SCNC: 3.9 MMOL/L (ref 3.5–5.2)
PROT SERPL-MCNC: 6.4 G/DL (ref 6–8.5)
RBC # BLD AUTO: 3.24 10*6/MM3 (ref 3.77–5.28)
SODIUM SERPL-SCNC: 140 MMOL/L (ref 136–145)
WBC # BLD AUTO: 8.12 10*3/MM3 (ref 3.4–10.8)

## 2021-06-29 PROCEDURE — 99214 OFFICE O/P EST MOD 30 MIN: CPT | Performed by: NURSE PRACTITIONER

## 2021-06-29 PROCEDURE — 93000 ELECTROCARDIOGRAM COMPLETE: CPT | Performed by: NURSE PRACTITIONER

## 2021-06-29 PROCEDURE — 85027 COMPLETE CBC AUTOMATED: CPT | Performed by: NURSE PRACTITIONER

## 2021-06-29 PROCEDURE — 80053 COMPREHEN METABOLIC PANEL: CPT | Performed by: NURSE PRACTITIONER

## 2021-06-29 PROCEDURE — 83880 ASSAY OF NATRIURETIC PEPTIDE: CPT | Performed by: NURSE PRACTITIONER

## 2021-06-29 PROCEDURE — 36415 COLL VENOUS BLD VENIPUNCTURE: CPT

## 2021-06-29 RX ORDER — HYDROXYZINE HYDROCHLORIDE 25 MG/1
TABLET, FILM COATED ORAL
Qty: 1 TABLET | Refills: 0 | Status: SHIPPED | OUTPATIENT
Start: 2021-06-29 | End: 2021-08-10

## 2021-06-29 NOTE — TELEPHONE ENCOUNTER
S/w daughter. Renal function worse. Avoid additional torsemide x 1 week. Increase water intake minimally. Repeat lab next week and discuss after nuclear.

## 2021-06-29 NOTE — PROGRESS NOTES
Sascha  Date of Office Visit: 21  Encounter Provider: TAYO Carter  Place of Service: [facility] CARDIOLOGY  Patient Name: Ange Johnson  :1940    Chief Complaint   Patient presents with   • Coronary artery disease involving native coronary artery of    • Shortness of Breath   • Fatigue   • Follow-up   :     HPI: Ange Johnson is a 80 y.o. female  with history of  coronary artery disease status post coronary artery bypass grafting x 5 in 2020,chronic kidney disease, aortic valve sclerosis, S/P CABG induced afib and placed on renal dosed AC and Amio, which both have since been discontinued.   She is followed by DR. Ng.  I will visit with her in follow-up today who reviewed her medical record  She had an echocardiogram last week showing normal left ventricular systolic function, grade 2 diastolic dysfunction as well as mild pulmonary hypertension.  She had aortic valve sclerosis with mild to moderate tricuspid regurgitation.  He was in volume overload.  She received IV diureti with 2 mg IV bumetanide.  Her serum creatinine was at 1.96 and proBNP was elevated at 4000.  Torsemide was increased to 40 mg twice daily.     She presents today accompanied by her daughter, Henna.  Patient has been increased shortness of breath and fatigue.  Today she is in no acute distress, but her and her daughter are concerned about the symptoms of increased weakness, fatigue, shortness of breath with all activity, occasional dizziness and lightheadedness that has been present for the last 3 to 4 weeks.  Daughter states symptoms are similar to 2020 when she got her bypass.  Symptoms started 4 weeks ago during a trip to [place].  She has been short of breath and dyspnea at rest and with her exercise.  Exercising twice a day on stationary bike for 30 minutes.  During and after workout she denies chest pain, tightness, or heart fluttering.  She reports may be increased shortness of breath  "with stationary bike.  She also reports shortness of breath and lightheadedness has been noted with activities of daily living.  She uses a wheelchair for long distances, activities self limited due to symptoms and mobility status.  This Sunday she had to leave [place] early due to dizziness, lightheadedness without syncope, and nausea without vomiting.  Daughter states blood pressure and blood sugars were normal.  Cause unknown at this time.  Off-and-on issues with swelling and weight gain.  She denies changes in diet, salt, or free water intake.  Her usual dose of torsemide is 40 mg daily.  She occasionally has a total of 60 mg for increase fluid retention.  Her last additional dose of torsemide was over the weekend.          Allergies   Allergen Reactions   • Codeine Nausea Only   • Hydrocodone-Acetaminophen Nausea Only and Other (See Comments)     Percocet and Vicodin; vertigo   • Meperidine Nausea Only and Nausea And Vomiting   • Morphine And Related Nausea Only   • Oxycodone-Acetaminophen Nausea Only   • Oxycodone-Acetaminophen Nausea Only and Other (See Comments)     dilzziness   • Oxycodone-Aspirin Nausea Only and Other (See Comments)     vertigo   • Nickel Rash   • Penicillins Hives   • Shrimp Rash   • Sulfa Antibiotics Hives           Family and social history reviewed.     Review of Systems   Constitutional: Positive for malaise/fatigue and weight gain.   Cardiovascular: Positive for dyspnea on exertion and leg swelling. Negative for chest pain, irregular heartbeat, near-syncope, palpitations and syncope.   Respiratory: Positive for shortness of breath.    All other systems reviewed and are negative.    All other systems were reviewed and are negative          Objective:     Vitals:    06/29/21 1405   BP: 160/60   BP Location: Left arm   Patient Position: Sitting   Pulse: 50   Weight: 88 kg (194 lb)   Height: 165.1 cm (65\")     Body mass index is 32.28 kg/m².    PHYSICAL EXAM:  Vitals reviewed. "   Constitutional:       General: Awake. Not in acute distress.     Appearance: Healthy appearance. Not in distress.   Pulmonary:      Effort: Increased respiratory effort. No respiratory distress.      Breath sounds: Examination of the right-middle field reveals decreased breath sounds. Examination of the right-lower field reveals decreased breath sounds. Examination of the left-lower field reveals decreased breath sounds. Decreased breath sounds present. No wheezing. No rhonchi. No rales.   Chest:      Chest wall: Not tender to palpatation. Not reproducing clinical pain.   Cardiovascular:      PMI at left midclavicular line. Normal rate. Regular rhythm.      Murmurs: There is a systolic murmur.   Edema:     Ankle: bilateral trace edema of the ankle.     Feet: bilateral trace edema of the feet.  Neurological:      Mental Status: Alert, easily aroused and oriented to person, place and time.   Psychiatric:         Behavior: Behavior is cooperative.           ECG 12 Lead    Date/Time: 6/29/2021 3:17 PM  Performed by: Nadiya Kyle APRN  Authorized by: Nadiya Kyle APRN   Comparison: compared with previous ECG   Similar to previous ECG  Rhythm: sinus rhythm  Rate: normal  Conduction: right bundle branch block and 1st degree AV block  QRS axis: left  Other: no other findings    Clinical impression: abnormal EKG  Comments: No signs of new onset ischemia.              Current Outpatient Medications   Medication Sig Dispense Refill   • amLODIPine (NORVASC) 10 MG tablet Daily.     • aspirin (aspirin) 81 MG EC tablet Take 1 tablet by mouth Daily. 90 tablet 3   • atorvastatin (LIPITOR) 40 MG tablet TAKE ONE TABLET BY MOUTH ONCE NIGHTLY 60 tablet 10   • carBAMazepine (CARBATROL) 300 MG 12 hr capsule Take 1 capsule by mouth Every 12 (Twelve) Hours for 180 days. 180 capsule 1   • cetirizine (zyrTEC) 10 MG tablet Take 10 mg by mouth Every Night.     • cloNIDine (CATAPRES) 0.1 MG tablet Take 1 tablet by mouth Every 12 (Twelve)  Hours. 180 tablet 3   • febuxostat (Uloric) 40 MG tablet Take 1 tablet by mouth Daily for 180 days. 90 tablet 1   • ferrous sulfate 325 (65 FE) MG tablet Take 1 tablet by mouth daily.     • Glucagon (Gvoke HypoPen 2-Pack) 1 MG/0.2ML solution auto-injector Inject 1 mg under the skin into the appropriate area as directed As Needed (Hypoglycemia). 0.4 mL 1   • glucose blood (Contour Next Test) test strip 1 each by Other route 2 (Two) Times a Day. use 1 strip to test twice a day 100 each 3   • hydrALAZINE (APRESOLINE) 100 MG tablet Take 100 mg by mouth 2 (Two) Times a Day.     • Insulin Lispro, 1 Unit Dial, (HUMALOG KWIKPEN) 100 UNIT/ML solution pen-injector Inject 5 Units under the skin into the appropriate area as directed 3 (Three) Times a Day With Meals. 15 mL 2   • Insulin Pen Needle (BD PEN NEEDLE ISELA U/F) 32G X 4 MM misc Use to inject 4 time daily 200 each 5   • losartan (COZAAR) 100 MG tablet Take 100 mg by mouth Daily.     • metoprolol tartrate (LOPRESSOR) 25 MG tablet TAKE 1/2 TABLET BY MOUTH EVERY 12 HOURS 90 tablet 3   • montelukast (SINGULAIR) 10 MG tablet 10 mg daily.     • potassium chloride (MICRO-K) 10 MEQ CR capsule Take 10 mEq by mouth Daily.     • Synthroid 200 MCG tablet Take 1 tablet by mouth Daily. 30 tablet 5   • torsemide (DEMADEX) 20 MG tablet Take 2 tablets by mouth 2 (Two) Times a Day. 120 tablet 3   • vitamin B-12 (CYANOCOBALAMIN) 1000 MCG tablet Take 1 tablet by mouth daily.     • vitamin D (ERGOCALCIFEROL) 1.25 MG (03203 UT) capsule capsule Take 1 capsule by mouth 1 (One) Time Per Week. 12 capsule 0   • hydrOXYzine (ATARAX) 25 MG tablet Once 1 hour prior to stress test for anxiety 1 tablet 0   • Insulin Glargine, 2 Unit Dial, (Toujeo Max SoloStar) 300 UNIT/ML solution pen-injector injection Inject 70 Units under the skin into the appropriate area as directed Daily for 90 days. 21 mL 2     No current facility-administered medications for this visit.     Assessment:       Diagnosis Plan    1. Coronary artery disease involving native coronary artery of native heart without angina pectoris  Comprehensive Metabolic Panel    Stress Test With Myocardial Perfusion One Day   2. Chronic diastolic (congestive) heart failure (CMS/[facility])  proBNP   3. Essential hypertension     4. Stage 4 chronic kidney disease (CMS/[facility])  Comprehensive Metabolic Panel   5. RBBB (right bundle branch block)     6. Anemia in stage 4 chronic kidney disease (CMS/[facility])  CBC (No Diff)   7. History of coronary artery bypass surgery     8. Mixed hyperlipidemia     9. Type 2 diabetes mellitus with diabetic polyneuropathy, with long-term current use of insulin (CMS/[facility])          Orders Placed This Encounter   Procedures   • Comprehensive Metabolic Panel     Standing Status:   Future     Number of Occurrences:   1     Standing Expiration Date:   6/30/2022     Order Specific Question:   Release to patient     Answer:   Immediate   • CBC (No Diff)     Standing Status:   Future     Number of Occurrences:   1     Standing Expiration Date:   6/29/2022     Order Specific Question:   Release to patient     Answer:   Immediate   • proBNP     Order Specific Question:   Release to patient     Answer:   Immediate   • Stress Test With Myocardial Perfusion One Day     Standing Status:   Future     Standing Expiration Date:   6/29/2022     Order Specific Question:   What stress agent will be used?     Answer:   Regadenoson (Lexiscan)     Order Specific Question:   Difficulty walking criteria?     Answer:   Poor Exercise Tolerance     Order Specific Question:   Difficulty walking criteria?     Answer:   Severe COPD O2 dependence, CHF, Dyspnea     Order Specific Question:   Reason for exam?     Answer:   Chest Pain     Order Specific Question:   Release to patient     Answer:   Immediate   • ECG 12 Lead     This order was created via procedure documentation     Order Specific Question:   Release to patient     Answer:   Immediate         Plan:         1.   80-year-old female with chronic diastolic congestive heart failure. She is doing much better on 40 mg torsemide daily.  She had an additional dose of 20 mg over the weekend as well as the week prior.  We will check proBNP, renal function and electrolyte as well as CBC today.  2.  Coronary artery disease status post coronary bypass grafting x5 02/2020.  No angina or EKG changes at present, but is having occasional lightheadedness, dizziness, increased fatigue, and increased baseline shortness of breath and with activity.  She is to return for perfusion stress test to rule out ischemia.  3.  Hypertension blood pressure stable -no change in medications.  4.  Stage IV chronic kidney disease- follows with DR. Caruso.  Stable over the last 8 months with creatinine at 1.9.  However today creatinine is at 2.8-see telephone note  5.  Anemia of chronic disease, currently stable at 10.2  6.  Aortic valve sclerosis-mild murmur left upper sternal border  7.  Chronic right bundle branch block-no changes in EKG.  8.  Carotid artery plaque without significant stenosis bilateral on duplex 2/2020.  Recent issues of lightheadedness, dizziness, but denies syncope or vision changes.  9. HLD- continue to take lipitor 40 mg daily, last LDL 10/20 at 85, so not goal. <70 desired. Future lipid panel orders in.    10-type 2 diabetes-fairly controlled on subcu insulin.  Last A1c 6.9.   11-hypothyroidism, on Synthroid per PCP.  Last TSH 0.481.  Stable.      Follow-up to be determined pending results of perfusion stress test and repeat renal function in 1 week            It has been a pleasure to participate in this patient's care.      Thank you,  TAYO Carter      **I used Dragon to dictate this note:**   normal...

## 2021-07-08 ENCOUNTER — HOSPITAL ENCOUNTER (OUTPATIENT)
Dept: CARDIOLOGY | Facility: HOSPITAL | Age: 81
Discharge: HOME | End: 2021-07-08

## 2021-07-08 ENCOUNTER — TELEPHONE (OUTPATIENT)
Dept: CARDIOLOGY | Facility: CLINIC | Age: 81
End: 2021-07-08

## 2021-07-08 ENCOUNTER — LAB (OUTPATIENT)
Dept: LAB | Facility: HOSPITAL | Age: 81
End: 2021-07-08

## 2021-07-08 VITALS — HEIGHT: 65 IN | BODY MASS INDEX: 32.49 KG/M2 | WEIGHT: 195 LBS

## 2021-07-08 DIAGNOSIS — N17.9 AKI (ACUTE KIDNEY INJURY) (HCC): ICD-10-CM

## 2021-07-08 DIAGNOSIS — I25.10 CORONARY ARTERY DISEASE INVOLVING NATIVE CORONARY ARTERY OF NATIVE HEART WITHOUT ANGINA PECTORIS: ICD-10-CM

## 2021-07-08 DIAGNOSIS — N18.4 STAGE 4 CHRONIC KIDNEY DISEASE (HCC): ICD-10-CM

## 2021-07-08 LAB
ANION GAP SERPL CALCULATED.3IONS-SCNC: 9.6 MMOL/L (ref 5–15)
BH CV NUCLEAR PRIOR STUDY: 2
BH CV REST NUCLEAR ISOTOPE DOSE: 28.6 MCI
BH CV STRESS BP STAGE 1: NORMAL
BH CV STRESS COMMENTS STAGE 1: NORMAL
BH CV STRESS DOSE REGADENOSON STAGE 1: 0.4
BH CV STRESS DURATION MIN STAGE 1: 0
BH CV STRESS DURATION SEC STAGE 1: 10
BH CV STRESS HR STAGE 1: 69
BH CV STRESS NUCLEAR ISOTOPE DOSE: 28.6 MCI
BH CV STRESS PROTOCOL 1: NORMAL
BH CV STRESS RECOVERY BP: NORMAL MMHG
BH CV STRESS RECOVERY HR: 65 BPM
BH CV STRESS STAGE 1: 1
BUN SERPL-MCNC: 59 MG/DL (ref 8–23)
BUN/CREAT SERPL: 26.8 (ref 7–25)
CALCIUM SPEC-SCNC: 9.4 MG/DL (ref 8.6–10.5)
CHLORIDE SERPL-SCNC: 102 MMOL/L (ref 98–107)
CO2 SERPL-SCNC: 26.4 MMOL/L (ref 22–29)
CREAT SERPL-MCNC: 2.2 MG/DL (ref 0.57–1)
GFR SERPL CREATININE-BSD FRML MDRD: 21 ML/MIN/1.73
GLUCOSE SERPL-MCNC: 130 MG/DL (ref 65–99)
LV EF NUC BP: 66 %
MAXIMAL PREDICTED HEART RATE: 139 BPM
PERCENT MAX PREDICTED HR: 49.64 %
POTASSIUM SERPL-SCNC: 4.1 MMOL/L (ref 3.5–5.2)
SODIUM SERPL-SCNC: 138 MMOL/L (ref 136–145)
STRESS BASELINE BP: NORMAL MMHG
STRESS BASELINE HR: 56 BPM
STRESS PERCENT HR: 58 %
STRESS POST EXERCISE DUR SEC: 10 SEC
STRESS POST PEAK BP: NORMAL MMHG
STRESS POST PEAK HR: 69 BPM
STRESS TARGET HR: 118 BPM

## 2021-07-08 PROCEDURE — 80048 BASIC METABOLIC PNL TOTAL CA: CPT

## 2021-07-08 PROCEDURE — 78492 MYOCRD IMG PET MLT RST&STRS: CPT | Performed by: INTERNAL MEDICINE

## 2021-07-08 PROCEDURE — 93018 CV STRESS TEST I&R ONLY: CPT | Performed by: INTERNAL MEDICINE

## 2021-07-08 PROCEDURE — 0 RUBIDIUM CHLORIDE: Performed by: NURSE PRACTITIONER

## 2021-07-08 PROCEDURE — 25010000002 REGADENOSON 0.4 MG/5ML SOLUTION: Performed by: NURSE PRACTITIONER

## 2021-07-08 PROCEDURE — 36415 COLL VENOUS BLD VENIPUNCTURE: CPT

## 2021-07-08 PROCEDURE — 93017 CV STRESS TEST TRACING ONLY: CPT

## 2021-07-08 PROCEDURE — 78492 MYOCRD IMG PET MLT RST&STRS: CPT

## 2021-07-08 PROCEDURE — A9555 RB82 RUBIDIUM: HCPCS | Performed by: NURSE PRACTITIONER

## 2021-07-08 PROCEDURE — 93016 CV STRESS TEST SUPVJ ONLY: CPT | Performed by: INTERNAL MEDICINE

## 2021-07-08 RX ADMIN — REGADENOSON 0.4 MG: 0.08 INJECTION, SOLUTION INTRAVENOUS at 13:44

## 2021-07-08 NOTE — TELEPHONE ENCOUNTER
Spoke with patient and daughter, Henna.  Stress test shows small area of ischemia in the anterior wall which is intermediate risk and normal left ventricular systolic function.  Patient still not having chest pain.  Some mild shortness of breath with her exercise that she is able to work through.  I feel as though this may be her anginal equivalent.  We also discussed that her renal function slightly better.  They have not needed any additional torsemide doses this week.  I have asked that they avoid any additional torsemide doses for another week.  If she has increased shortness of breath or swelling then may need to consider 1 additional torsemide dose each week but I would like to be notified of that so we can repeat renal function.  Daughter verbalized understanding.  Patient and daughter agreeable with monitoring symptoms and not proceeding to cardiac catheterization at this time.  There is some concern with need for IV contrast with that and again patient has very mild exertional symptoms.        She is going to see me in 6 weeks for reassessment, repeat EKG and will still keep November appointment.  They understand to call me with any deterioration or progression of symptoms

## 2021-08-09 ENCOUNTER — TELEPHONE (OUTPATIENT)
Dept: CARDIOLOGY | Facility: CLINIC | Age: 81
End: 2021-08-09

## 2021-08-09 DIAGNOSIS — I50.32 CHRONIC DIASTOLIC (CONGESTIVE) HEART FAILURE (HCC): Primary | ICD-10-CM

## 2021-08-09 DIAGNOSIS — Z51.81 ENCOUNTER FOR THERAPEUTIC DRUG LEVEL MONITORING: ICD-10-CM

## 2021-08-09 NOTE — TELEPHONE ENCOUNTER
Pts dtr is calling in to let you know that she gave the pt an extra torsemide yesterday and one day next week.  She wanted to know if you wanted to get any labs before her up coming apt with you tomorrow?  Thanks  Briseida Ahmadi RN  Triage nurse

## 2021-08-09 NOTE — TELEPHONE ENCOUNTER
BMP ordered. This can be drawn in [facility] prior to our visit tomorrow. Please give [facility] a heads up and have them arrive 15 minutes prior to our visit.

## 2021-08-10 ENCOUNTER — HOSPITAL ENCOUNTER (OUTPATIENT)
Dept: CARDIOLOGY | Facility: HOSPITAL | Age: 81
Discharge: HOME | End: 2021-08-10
Admitting: NURSE PRACTITIONER

## 2021-08-10 ENCOUNTER — OFFICE VISIT (OUTPATIENT)
Dept: CARDIOLOGY | Facility: CLINIC | Age: 81
End: 2021-08-10

## 2021-08-10 VITALS
HEIGHT: 65 IN | HEART RATE: 51 BPM | OXYGEN SATURATION: 98 % | DIASTOLIC BLOOD PRESSURE: 60 MMHG | SYSTOLIC BLOOD PRESSURE: 170 MMHG | BODY MASS INDEX: 33.82 KG/M2 | WEIGHT: 203 LBS

## 2021-08-10 DIAGNOSIS — I45.10 RBBB (RIGHT BUNDLE BRANCH BLOCK): ICD-10-CM

## 2021-08-10 DIAGNOSIS — N18.4 STAGE 4 CHRONIC KIDNEY DISEASE (HCC): ICD-10-CM

## 2021-08-10 DIAGNOSIS — I25.10 CORONARY ARTERY DISEASE INVOLVING NATIVE CORONARY ARTERY OF NATIVE HEART WITHOUT ANGINA PECTORIS: Primary | ICD-10-CM

## 2021-08-10 DIAGNOSIS — N18.9 HISTORY OF ANEMIA DUE TO CHRONIC KIDNEY DISEASE: ICD-10-CM

## 2021-08-10 DIAGNOSIS — I50.32 CHRONIC DIASTOLIC (CONGESTIVE) HEART FAILURE (HCC): ICD-10-CM

## 2021-08-10 DIAGNOSIS — Z51.81 ENCOUNTER FOR THERAPEUTIC DRUG LEVEL MONITORING: ICD-10-CM

## 2021-08-10 DIAGNOSIS — I10 ESSENTIAL HYPERTENSION: ICD-10-CM

## 2021-08-10 DIAGNOSIS — E11.42 TYPE 2 DIABETES MELLITUS WITH DIABETIC POLYNEUROPATHY, WITH LONG-TERM CURRENT USE OF INSULIN (HCC): ICD-10-CM

## 2021-08-10 DIAGNOSIS — E78.2 MIXED HYPERLIPIDEMIA: ICD-10-CM

## 2021-08-10 DIAGNOSIS — Z86.2 HISTORY OF ANEMIA DUE TO CHRONIC KIDNEY DISEASE: ICD-10-CM

## 2021-08-10 DIAGNOSIS — Z79.4 TYPE 2 DIABETES MELLITUS WITH DIABETIC POLYNEUROPATHY, WITH LONG-TERM CURRENT USE OF INSULIN (HCC): ICD-10-CM

## 2021-08-10 LAB
ANION GAP SERPL CALCULATED.3IONS-SCNC: 11.5 MMOL/L (ref 5–15)
BUN SERPL-MCNC: 39 MG/DL (ref 8–23)
BUN/CREAT SERPL: 16.5 (ref 7–25)
CALCIUM SPEC-SCNC: 9.1 MG/DL (ref 8.6–10.5)
CHLORIDE SERPL-SCNC: 105 MMOL/L (ref 98–107)
CO2 SERPL-SCNC: 25.5 MMOL/L (ref 22–29)
CREAT SERPL-MCNC: 2.36 MG/DL (ref 0.57–1)
DEPRECATED RDW RBC AUTO: 42 FL (ref 37–54)
ERYTHROCYTE [DISTWIDTH] IN BLOOD BY AUTOMATED COUNT: 11.8 % (ref 12.3–15.4)
GFR SERPL CREATININE-BSD FRML MDRD: 20 ML/MIN/1.73
GLUCOSE SERPL-MCNC: 140 MG/DL (ref 65–99)
HCT VFR BLD AUTO: 31.2 % (ref 34–46.6)
HGB BLD-MCNC: 10.1 G/DL (ref 12–15.9)
MCH RBC QN AUTO: 31.3 PG (ref 26.6–33)
MCHC RBC AUTO-ENTMCNC: 32.4 G/DL (ref 31.5–35.7)
MCV RBC AUTO: 96.6 FL (ref 79–97)
PLATELET # BLD AUTO: 168 10*3/MM3 (ref 140–450)
PMV BLD AUTO: 11.4 FL (ref 6–12)
POTASSIUM SERPL-SCNC: 3.5 MMOL/L (ref 3.5–5.2)
RBC # BLD AUTO: 3.23 10*6/MM3 (ref 3.77–5.28)
SODIUM SERPL-SCNC: 142 MMOL/L (ref 136–145)
WBC # BLD AUTO: 6.46 10*3/MM3 (ref 3.4–10.8)

## 2021-08-10 PROCEDURE — 36415 COLL VENOUS BLD VENIPUNCTURE: CPT

## 2021-08-10 PROCEDURE — 99214 OFFICE O/P EST MOD 30 MIN: CPT | Performed by: NURSE PRACTITIONER

## 2021-08-10 PROCEDURE — 85027 COMPLETE CBC AUTOMATED: CPT | Performed by: NURSE PRACTITIONER

## 2021-08-10 PROCEDURE — 80048 BASIC METABOLIC PNL TOTAL CA: CPT | Performed by: NURSE PRACTITIONER

## 2021-08-10 NOTE — PROGRESS NOTES
Date of Office Visit: 08/10/21  Encounter Provider: TAYO Carter  Place of Service: [facility] CARDIOLOGY  Patient Name: Ange Johnson  :1940    Chief Complaint   Patient presents with   • Coronary artery disease involving native coronary artery of    • Shortness of Breath   • Fatigue   • Dizziness   • Follow-up   :     HPI: Ange Johnson is a 80 y.o. female  with history of coronary artery disease status post coronary artery bypass grafting x 5 in 2020,chronic kidney disease, aortic valve sclerosis, S/P CABG induced afib and placed on renal dosed AC and Amio, which both have since been discontinued.   She is followed by DR. Ng.  I will visit with her in follow-up today who reviewed her medical record  She had an echocardiogram last week showing normal left ventricular systolic function, grade 2 diastolic dysfunction as well as mild pulmonary hypertension.  She had aortic valve sclerosis with mild to moderate tricuspid regurgitation.  He was in volume overload.  She received IV diureti with 2 mg IV bumetanide.  Her serum creatinine was at 1.96 and proBNP was elevated at 4000.  Torsemide was increased.  She required occasional additional doses of torsemide for fluid retention and at the end of 2021 her creatinine peaked to 2.84 but we rechecked a week later and she was back down to 2.20.        She now presents and have repeat renal function today.  She required 2 additional torsemide doses this week but overall she continues have good and bad days.  They went near [place] last weekend and she had a good weekend there.  She has not had any chest pain tightness or pressure.  Lower extremity swelling is stable.  She is accompanied by her daughter Henna today who helps to check weights every 3 days.  They have not been checking blood pressure closely at home.              Allergies   Allergen Reactions   • Codeine Nausea Only   • Hydrocodone-Acetaminophen Nausea  "Only and Other (See Comments)     Percocet and Vicodin; vertigo   • Meperidine Nausea Only and Nausea And Vomiting   • Morphine And Related Nausea Only   • Oxycodone-Acetaminophen Nausea Only   • Oxycodone-Acetaminophen Nausea Only and Other (See Comments)     dilzziness   • Oxycodone-Aspirin Nausea Only and Other (See Comments)     vertigo   • Nickel Rash   • Penicillins Hives   • Shrimp Rash   • Sulfa Antibiotics Hives           Family and social history reviewed.     ROS  All other systems were reviewed and are negative          Objective:     Vitals:    08/10/21 1356   BP: 170/60   BP Location: Left arm   Patient Position: Sitting   Pulse: 51   SpO2: 98%   Weight: 92.1 kg (203 lb)   Height: 165.1 cm (65\")     Body mass index is 33.78 kg/m².    PHYSICAL EXAM:  Constitutional:       General: Not in acute distress.     Appearance: Well-developed. Not diaphoretic.   HENT:      Head: Normocephalic.   Pulmonary:      Effort: Pulmonary effort is normal. No respiratory distress.      Breath sounds: Normal breath sounds. No wheezing. No rhonchi. No rales.   Cardiovascular:      Normal rate. Regular rhythm.      Murmurs: There is a grade 2/6 systolic murmur.   Pulses:     Radial: 2+ bilaterally.  Edema:     Ankle: bilateral 1+ edema of the ankle.  Skin:     General: Skin is warm and dry. There is no cyanosis.      Findings: No rash.   Neurological:      Mental Status: Alert and oriented to person, place, and time.   Psychiatric:         Behavior: Behavior normal.         Thought Content: Thought content normal.         Judgment: Judgment normal.         Procedures- refused      Current Outpatient Medications   Medication Sig Dispense Refill   • amLODIPine (NORVASC) 10 MG tablet Daily.     • aspirin (aspirin) 81 MG EC tablet Take 1 tablet by mouth Daily. 90 tablet 3   • atorvastatin (LIPITOR) 40 MG tablet TAKE ONE TABLET BY MOUTH ONCE NIGHTLY 60 tablet 10   • carBAMazepine (CARBATROL) 300 MG 12 hr capsule Take 1 capsule by " mouth Every 12 (Twelve) Hours for 180 days. 180 capsule 1   • cetirizine (zyrTEC) 10 MG tablet Take 10 mg by mouth Every Night.     • cloNIDine (CATAPRES) 0.1 MG tablet Take 1 tablet by mouth Every 12 (Twelve) Hours. 180 tablet 3   • febuxostat (Uloric) 40 MG tablet Take 1 tablet by mouth Daily for 180 days. 90 tablet 1   • ferrous sulfate 325 (65 FE) MG tablet Take 1 tablet by mouth daily.     • Glucagon (Gvoke HypoPen 2-Pack) 1 MG/0.2ML solution auto-injector Inject 1 mg under the skin into the appropriate area as directed As Needed (Hypoglycemia). 0.4 mL 1   • glucose blood (Contour Next Test) test strip 1 each by Other route 2 (Two) Times a Day. use 1 strip to test twice a day 100 each 3   • hydrALAZINE (APRESOLINE) 100 MG tablet Take 100 mg by mouth 2 (Two) Times a Day.     • Insulin Lispro, 1 Unit Dial, (HUMALOG KWIKPEN) 100 UNIT/ML solution pen-injector Inject 5 Units under the skin into the appropriate area as directed 3 (Three) Times a Day With Meals. 15 mL 2   • Insulin Pen Needle (BD PEN NEEDLE ISELA U/F) 32G X 4 MM misc Use to inject 4 time daily 200 each 5   • losartan (COZAAR) 100 MG tablet Take 100 mg by mouth Daily.     • metoprolol tartrate (LOPRESSOR) 25 MG tablet TAKE 1/2 TABLET BY MOUTH EVERY 12 HOURS 90 tablet 3   • montelukast (SINGULAIR) 10 MG tablet 10 mg daily.     • potassium chloride (MICRO-K) 10 MEQ CR capsule Take 10 mEq by mouth Daily.     • Synthroid 200 MCG tablet Take 1 tablet by mouth Daily. 30 tablet 5   • torsemide (DEMADEX) 20 MG tablet Take 2 tablets by mouth 2 (Two) Times a Day. 120 tablet 3   • vitamin B-12 (CYANOCOBALAMIN) 1000 MCG tablet Take 1 tablet by mouth daily.     • vitamin D (ERGOCALCIFEROL) 1.25 MG (55300 UT) capsule capsule Take 1 capsule by mouth 1 (One) Time Per Week. 12 capsule 0   • Insulin Glargine, 2 Unit Dial, (Toujeo Max SoloStar) 300 UNIT/ML solution pen-injector injection Inject 70 Units under the skin into the appropriate area as directed Daily for 90  days. 21 mL 2     No current facility-administered medications for this visit.     Assessment:       Diagnosis Plan   1. Coronary artery disease involving native coronary artery of native heart without angina pectoris     2. Essential hypertension     3. Stage 4 chronic kidney disease (CMS/[facility])     4. Type 2 diabetes mellitus with diabetic polyneuropathy, with long-term current use of insulin (CMS/[facility])     5. RBBB (right bundle branch block)     6. Mixed hyperlipidemia     7. History of anemia due to chronic kidney disease  CBC (No Diff)        Orders Placed This Encounter   Procedures   • CBC (No Diff)     Standing Status:   Future     Standing Expiration Date:   8/10/2022     Order Specific Question:   Release to patient     Answer:   Immediate         Plan:       1.  80-year-old female with chronic diastolic congestive heart failure. She is doing much better on 40 mg torsemide twice daily.  Her renal function today stable despite 2 additional doses of torsemide she required this week.  Daughter is to continue using clinical judgment regarding increased shortness of breath, increased swelling or increased weights to give up to 2 additional torsemide each week as needed but she is to call if she exceeds that so that patient can be reevaluated  2.  Coronary artery disease status post coronary bypass grafting x5 02/2020.  No angina or EKG changes   3.  Hypertension blood pressure stable -no change in medications.  4.  Stage IV chronic kidney disease- follows with DR. Caruso.   Creatinine today is 2.3.  She has follow-up with Dr. Caruso in 2 weeks  5.  Anemia of chronic disease, currently stable at 10.1 today   6.  Aortic valve sclerosis-mild murmur left upper sternal border  7.  Chronic right bundle branch block-no changes in EKG.  8.  Carotid artery plaque without significant stenosis bilateral on duplex 2/2020.  Recent issues of lightheadedness, dizziness, but denies syncope or vision changes.  9. HLD- continue to take  lipitor 40 mg daily, last LDL 10/20 at 85, so not goal. <70 desired. Future lipid panel orders in.    10-type 2 diabetes-fairly controlled on subcu insulin.  Last A1c 6.9.   11-hypothyroidism, on Synthroid per PCP.  Last TSH 0.481.  Stable.    Keep appointment in 3 months.  Call with any questions or concerns          It has been a pleasure to participate in this patient's care.      Thank you,  TAYO Carter      **I used Dragon to dictate this note:**

## 2021-08-13 RX ORDER — TORSEMIDE 20 MG/1
TABLET ORAL
Qty: 120 TABLET | Refills: 8 | Status: SHIPPED | OUTPATIENT
Start: 2021-08-13 | End: 2021-11-08 | Stop reason: SDUPTHER

## 2021-08-16 ENCOUNTER — HOSPITAL ENCOUNTER (OUTPATIENT)
Dept: MAMMOGRAPHY | Facility: HOSPITAL | Age: 81
Discharge: HOME | End: 2021-08-16
Admitting: FAMILY MEDICINE

## 2021-08-16 DIAGNOSIS — Z12.31 SCREENING MAMMOGRAM FOR HIGH-RISK PATIENT: ICD-10-CM

## 2021-08-16 PROCEDURE — 77067 SCR MAMMO BI INCL CAD: CPT

## 2021-08-19 RX ORDER — ERGOCALCIFEROL 1.25 MG/1
50000 CAPSULE ORAL WEEKLY
Qty: 12 CAPSULE | Refills: 0 | Status: SHIPPED | OUTPATIENT
Start: 2021-08-19 | End: 2021-10-28 | Stop reason: SDUPTHER

## 2021-08-19 NOTE — TELEPHONE ENCOUNTER
Patient's daughter called    They need a new prescription for Vitamin D. She is running low.    [facility] on [place]

## 2021-10-15 LAB
ALBUMIN SERPL-MCNC: 4 G/DL (ref 3.5–5.2)
ALBUMIN/CREAT UR: 1305 MG/G CREAT (ref 0–29)
ALBUMIN/GLOB SERPL: 1.7 G/DL
ALP SERPL-CCNC: 114 U/L (ref 39–117)
ALT SERPL-CCNC: 11 U/L (ref 1–33)
AST SERPL-CCNC: 13 U/L (ref 1–32)
BILIRUB SERPL-MCNC: 0.2 MG/DL (ref 0–1.2)
BUN SERPL-MCNC: 41 MG/DL (ref 8–23)
BUN/CREAT SERPL: 20.5 (ref 7–25)
CALCIUM SERPL-MCNC: 9.4 MG/DL (ref 8.6–10.5)
CHLORIDE SERPL-SCNC: 100 MMOL/L (ref 98–107)
CHOLEST SERPL-MCNC: 154 MG/DL (ref 0–200)
CO2 SERPL-SCNC: 30.9 MMOL/L (ref 22–29)
CREAT SERPL-MCNC: 2 MG/DL (ref 0.57–1)
CREAT UR-MCNC: 35 MG/DL
GLOBULIN SER CALC-MCNC: 2.3 GM/DL
GLUCOSE SERPL-MCNC: 150 MG/DL (ref 65–99)
HBA1C MFR BLD: 6.7 % (ref 4.8–5.6)
HDLC SERPL-MCNC: 49 MG/DL (ref 40–60)
IMP & REVIEW OF LAB RESULTS: NORMAL
LDLC SERPL CALC-MCNC: 82 MG/DL (ref 0–100)
MICROALBUMIN UR-MCNC: 456.8 UG/ML
POTASSIUM SERPL-SCNC: 3.9 MMOL/L (ref 3.5–5.2)
PROT SERPL-MCNC: 6.3 G/DL (ref 6–8.5)
SODIUM SERPL-SCNC: 140 MMOL/L (ref 136–145)
T4 FREE SERPL-MCNC: 1.29 NG/DL (ref 0.93–1.7)
TRIGL SERPL-MCNC: 130 MG/DL (ref 0–150)
TSH SERPL DL<=0.005 MIU/L-ACNC: 0.8 UIU/ML (ref 0.27–4.2)
VLDLC SERPL CALC-MCNC: 23 MG/DL (ref 5–40)

## 2021-10-20 ENCOUNTER — OFFICE VISIT (OUTPATIENT)
Dept: FAMILY MEDICINE CLINIC | Facility: CLINIC | Age: 81
End: 2021-10-20

## 2021-10-20 VITALS
SYSTOLIC BLOOD PRESSURE: 150 MMHG | HEART RATE: 56 BPM | TEMPERATURE: 97.2 F | OXYGEN SATURATION: 96 % | HEIGHT: 65 IN | BODY MASS INDEX: 33.49 KG/M2 | RESPIRATION RATE: 16 BRPM | DIASTOLIC BLOOD PRESSURE: 58 MMHG | WEIGHT: 201 LBS

## 2021-10-20 DIAGNOSIS — M10.9 GOUT WITHOUT TOPHUS: Primary | ICD-10-CM

## 2021-10-20 DIAGNOSIS — Z23 IMMUNIZATION DUE: ICD-10-CM

## 2021-10-20 DIAGNOSIS — M24.20 DISORDER OF MUSCLE, LIGAMENT, AND FASCIA: ICD-10-CM

## 2021-10-20 DIAGNOSIS — M62.9 DISORDER OF MUSCLE, LIGAMENT, AND FASCIA: ICD-10-CM

## 2021-10-20 PROCEDURE — 99214 OFFICE O/P EST MOD 30 MIN: CPT | Performed by: FAMILY MEDICINE

## 2021-10-20 PROCEDURE — G0008 ADMIN INFLUENZA VIRUS VAC: HCPCS | Performed by: FAMILY MEDICINE

## 2021-10-20 PROCEDURE — 90662 IIV NO PRSV INCREASED AG IM: CPT | Performed by: FAMILY MEDICINE

## 2021-10-20 RX ORDER — FEBUXOSTAT 40 MG/1
40 TABLET, FILM COATED ORAL DAILY
Qty: 90 TABLET | Refills: 1 | Status: SHIPPED | OUTPATIENT
Start: 2021-10-20 | End: 2022-06-10 | Stop reason: SDUPTHER

## 2021-10-20 RX ORDER — CARBAMAZEPINE 300 MG/1
300 CAPSULE, EXTENDED RELEASE ORAL EVERY 12 HOURS SCHEDULED
Qty: 180 CAPSULE | Refills: 1 | Status: SHIPPED | OUTPATIENT
Start: 2021-10-20 | End: 2022-06-27 | Stop reason: SDUPTHER

## 2021-10-20 NOTE — PROGRESS NOTES
"Chief Complaint  Gout    Subjective          Ange presents to [facility] to refill medicines.  Labs from October 14 have been reviewed.  She has upcoming visit with endocrinology to also go over those lab results.  She continues to have stage IV chronic kidney disease which is actually shown interval improvement.  Hemoglobin A1c is controlled at 6.7.  Her most recent creatinine was 2.0.  She has not had any recent gout attacks.  She continues to take carbamazepine for her fibromyalgia and that condition is stable.  She would like to get her flu shot today          Objective   Vital Signs:   Vitals:    10/20/21 1601   BP: 150/58   Pulse: 56   Resp: 16   Temp: 97.2 °F (36.2 °C)   TempSrc: Skin   SpO2: 96%   Weight: 91.2 kg (201 lb)   Height: 165.1 cm (65\")        Physical Exam  Vitals reviewed.   Constitutional:       General: She is not in acute distress.  Eyes:      General: Lids are normal.      Conjunctiva/sclera: Conjunctivae normal.   Neck:      Vascular: No carotid bruit.      Trachea: No tracheal deviation.   Cardiovascular:      Rate and Rhythm: Normal rate and regular rhythm.      Heart sounds: Normal heart sounds. No murmur heard.      Pulmonary:      Effort: Pulmonary effort is normal.      Breath sounds: Normal breath sounds.   Skin:     General: Skin is warm and dry.   Neurological:      Mental Status: She is alert. She is not disoriented.   Psychiatric:         Speech: Speech normal.         Behavior: Behavior normal. Behavior is cooperative.          Result Review :     The following data was reviewed by: Quinn Washington MD on 10/20/2021:  Cardiovascular Risk Assessment (10/14/2021 15:49)  TSH (10/14/2021 15:49)  T4, Free (10/14/2021 15:49)  Hemoglobin A1c (10/14/2021 15:49)  Microalbumin / Creatinine Urine Ratio - (10/14/2021 15:49)  Lipid Panel (10/14/2021 15:49)  Comprehensive Metabolic Panel (10/14/2021 15:49)           Assessment and Plan    Diagnoses and all orders " for this visit:    1. 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      Orders:  -     febuxostat (Uloric) 40 MG tablet; Take 1 tablet by mouth Daily for 180 days.  Dispense: 90 tablet; Refill: 1    2. Disorder of muscle, ligament, and fascia  Assessment & Plan:  The current medical regimen is effective;  continue present plan and medications.      Orders:  -     carBAMazepine (CARBATROL) 300 MG 12 hr capsule; Take 1 capsule by mouth Every 12 (Twelve) Hours for 180 days.  Dispense: 180 capsule; Refill: 1    3. Immunization due  -     Fluzone High-Dose 65+yrs (8534-1383)      Follow Up   Return in about 6 months (around 4/20/2022) for Medicare Wellness & regular visit, [id number] min.  Patient was given instructions and counseling regarding her condition or for health maintenance advice. Please see specific information pulled into the AVS if appropriate.

## 2021-10-20 NOTE — PROGRESS NOTES
Immunization  Immunization performed in RD by Madhuri Rhoades MA. Patient tolerated the procedure well without complications.  10/20/21   Madhuri Rhoades MA

## 2021-10-21 ENCOUNTER — TELEPHONE (OUTPATIENT)
Dept: ENDOCRINOLOGY | Age: 81
End: 2021-10-21

## 2021-10-21 RX ORDER — INSULIN GLARGINE 300 U/ML
70 INJECTION, SOLUTION SUBCUTANEOUS DAILY
Qty: 27 ML | Refills: 2 | Status: SHIPPED | OUTPATIENT
Start: 2021-10-21 | End: 2021-10-28 | Stop reason: SDUPTHER

## 2021-10-28 ENCOUNTER — OFFICE VISIT (OUTPATIENT)
Dept: ENDOCRINOLOGY | Age: 81
End: 2021-10-28

## 2021-10-28 VITALS
SYSTOLIC BLOOD PRESSURE: 122 MMHG | HEART RATE: 54 BPM | WEIGHT: 197.8 LBS | OXYGEN SATURATION: 97 % | HEIGHT: 66 IN | DIASTOLIC BLOOD PRESSURE: 72 MMHG | BODY MASS INDEX: 31.79 KG/M2

## 2021-10-28 DIAGNOSIS — E89.0 POSTOPERATIVE HYPOTHYROIDISM: ICD-10-CM

## 2021-10-28 DIAGNOSIS — E78.2 MIXED HYPERLIPIDEMIA: ICD-10-CM

## 2021-10-28 DIAGNOSIS — Z79.4 TYPE 2 DIABETES MELLITUS WITH DIABETIC POLYNEUROPATHY, WITH LONG-TERM CURRENT USE OF INSULIN (HCC): Primary | ICD-10-CM

## 2021-10-28 DIAGNOSIS — N18.4 CHRONIC KIDNEY DISEASE, STAGE 4 (SEVERE) (HCC): ICD-10-CM

## 2021-10-28 DIAGNOSIS — E11.42 TYPE 2 DIABETES MELLITUS WITH DIABETIC POLYNEUROPATHY, WITH LONG-TERM CURRENT USE OF INSULIN (HCC): Primary | ICD-10-CM

## 2021-10-28 PROCEDURE — 99214 OFFICE O/P EST MOD 30 MIN: CPT | Performed by: NURSE PRACTITIONER

## 2021-10-28 RX ORDER — INSULIN LISPRO 100 [IU]/ML
5 INJECTION, SOLUTION INTRAVENOUS; SUBCUTANEOUS
Qty: 15 ML | Refills: 0 | Status: SHIPPED | OUTPATIENT
Start: 2021-10-28

## 2021-10-28 RX ORDER — ATORVASTATIN CALCIUM 40 MG/1
40 TABLET, FILM COATED ORAL NIGHTLY
Qty: 90 TABLET | Refills: 1 | Status: SHIPPED | OUTPATIENT
Start: 2021-10-28 | End: 2022-02-28 | Stop reason: SDUPTHER

## 2021-10-28 RX ORDER — ERGOCALCIFEROL 1.25 MG/1
50000 CAPSULE ORAL WEEKLY
Qty: 12 CAPSULE | Refills: 1 | Status: SHIPPED | OUTPATIENT
Start: 2021-10-28 | End: 2022-02-28 | Stop reason: SDUPTHER

## 2021-10-28 RX ORDER — LEVOTHYROXINE SODIUM 200 MCG
200 TABLET ORAL DAILY
Qty: 90 TABLET | Refills: 1 | Status: SHIPPED | OUTPATIENT
Start: 2021-10-28 | End: 2022-02-15

## 2021-10-28 RX ORDER — INSULIN GLARGINE 300 U/ML
60 INJECTION, SOLUTION SUBCUTANEOUS DAILY
Qty: 27 ML | Refills: 0 | Status: SHIPPED | OUTPATIENT
Start: 2021-10-28 | End: 2022-02-28 | Stop reason: SDUPTHER

## 2021-10-28 NOTE — PROGRESS NOTES
"Chief Complaint  Diabetes    Subjective          Ange Johnson presents to [facility]  History of Present Illness     I have reviewed PMH, allergies and medications UTD at this visit     Type 2 dm    Diagnosed around 40 years ago.   Today in clinic pt reports being on Toujeo 50U QAM, Humalog 5U if Bs >120 AC ( rare use)  FBG -   Checks BG - once a day  Dm retinopathy - denies, Last eye exam - 3/2021  Dm nephropathy - sees nephrologist, stage 4 CKD, + proteinuria, Dr Caruso    Dm neuropathy - denies, sees podiatrist q10 weeks, was there yesterday   CAD - yes, February 2020 blockage in heart, + CHF  CVA - denies  Episodes of hypoglycemia - 1-2x per month,   Pt is physically active. weight is down 10lbs since beginning of February  Pt tries to follow DM diet for most part.   On ARB  Lab Results   Component Value Date    HGBA1C 6.70 (H) 10/14/2021         Hyperlipidemia  Atorvastatin 40 mg daily  Eats a healthy diet   Lab Results   Component Value Date    CHOL 153 02/12/2020    CHLPL 154 10/14/2021    TRIG 130 10/14/2021    HDL 49 10/14/2021    LDL 82 10/14/2021         Hypothyroid  Synthroid 200 mcg daily  Denies s/s of hyper and hypothyroid   Lab Results   Component Value Date    TSH 0.803 10/14/2021       Objective   Vital Signs:   /72   Pulse 54   Ht 167.6 cm (66\")   Wt 89.7 kg (197 lb 12.8 oz)   SpO2 97%   BMI 31.93 kg/m²     Physical Exam  Vitals reviewed.   Constitutional:       General: She is not in acute distress.  HENT:      Head: Normocephalic and atraumatic.   Cardiovascular:      Rate and Rhythm: Normal rate and regular rhythm.   Pulmonary:      Effort: Pulmonary effort is normal. No respiratory distress.   Musculoskeletal:         General: No signs of injury. Normal range of motion.      Cervical back: Normal range of motion and neck supple.   Skin:     General: Skin is warm and dry.   Neurological:      Mental Status: She is alert and oriented to person, " place, and time. Mental status is at baseline.   Psychiatric:         Mood and Affect: Mood normal.         Behavior: Behavior normal.         Thought Content: Thought content normal.         Judgment: Judgment normal.        Result Review :   The following data was reviewed by: TAYO Chen on 10/28/2021:  Common labs    Common Labsle 7/8/21 8/10/21 8/10/21 10/14/21 10/14/21 10/14/21 10/14/21     1345 1459 1549 1549 1549 1549   Glucose 130 (A) 140 (A)        Glucose    150 (A)      BUN 59 (A) 39 (A)  41 (A)      Creatinine 2.20 (A) 2.36 (A)  2.00 (A)      eGFR Non African Am 21 (A) 20 (A)  24 (A)      eGFR  Am    29 (A)      Sodium 138 142  140      Potassium 4.1 3.5  3.9      Chloride 102 105  100      Calcium 9.4 9.1  9.4      Total Protein    6.3      Albumin    4.00      Total Bilirubin    0.2      Alkaline Phosphatase    114      AST (SGOT)    13      ALT (SGPT)    11      WBC   6.46       Hemoglobin   10.1 (A)       Hematocrit   31.2 (A)       Platelets   168       Total Cholesterol     154     Triglycerides     130     HDL Cholesterol     49     LDL Cholesterol      82     Hemoglobin A1C       6.70 (A)   Microalbumin, Urine      456.8    (A) Abnormal value       Comments are available for some flowsheets but are not being displayed.                     Assessment and Plan    Diagnoses and all orders for this visit:    1. Type 2 diabetes mellitus with diabetic polyneuropathy, with long-term current use of insulin (HCC) (Primary)  -     atorvastatin (LIPITOR) 40 MG tablet; Take 1 tablet by mouth Every Night.  Dispense: 90 tablet; Refill: 1  -     Insulin Glargine, 2 Unit Dial, (Toujeo Max SoloStar) 300 UNIT/ML solution pen-injector injection; Inject 60 Units under the skin into the appropriate area as directed Daily for 90 days.  Dispense: 27 mL; Refill: 0  -     Synthroid 200 MCG tablet; Take 1 tablet by mouth Daily.  Dispense: 90 tablet; Refill: 1  -     vitamin D (ERGOCALCIFEROL) 1.25 MG  (87638 UT) capsule capsule; Take 1 capsule by mouth 1 (One) Time Per Week.  Dispense: 12 capsule; Refill: 1  -     Insulin Lispro, 1 Unit Dial, (HumaLOG KwikPen) 100 UNIT/ML solution pen-injector; Inject 5 Units under the skin into the appropriate area as directed 3 (Three) Times a Day With Meals.  Dispense: 15 mL; Refill: 0  -     Hemoglobin A1c; Future  -     TSH; Future  -     Lipid Panel; Future  -     Vitamin D 25 Hydroxy; Future    2. Mixed hyperlipidemia  -     atorvastatin (LIPITOR) 40 MG tablet; Take 1 tablet by mouth Every Night.  Dispense: 90 tablet; Refill: 1  -     Insulin Glargine, 2 Unit Dial, (Toujeo Max SoloStar) 300 UNIT/ML solution pen-injector injection; Inject 60 Units under the skin into the appropriate area as directed Daily for 90 days.  Dispense: 27 mL; Refill: 0  -     Synthroid 200 MCG tablet; Take 1 tablet by mouth Daily.  Dispense: 90 tablet; Refill: 1  -     vitamin D (ERGOCALCIFEROL) 1.25 MG (94571 UT) capsule capsule; Take 1 capsule by mouth 1 (One) Time Per Week.  Dispense: 12 capsule; Refill: 1  -     Insulin Lispro, 1 Unit Dial, (HumaLOG KwikPen) 100 UNIT/ML solution pen-injector; Inject 5 Units under the skin into the appropriate area as directed 3 (Three) Times a Day With Meals.  Dispense: 15 mL; Refill: 0  -     Hemoglobin A1c; Future  -     TSH; Future  -     Lipid Panel; Future  -     Vitamin D 25 Hydroxy; Future    3. Postoperative hypothyroidism  -     atorvastatin (LIPITOR) 40 MG tablet; Take 1 tablet by mouth Every Night.  Dispense: 90 tablet; Refill: 1  -     Insulin Glargine, 2 Unit Dial, (Toujeo Max SoloStar) 300 UNIT/ML solution pen-injector injection; Inject 60 Units under the skin into the appropriate area as directed Daily for 90 days.  Dispense: 27 mL; Refill: 0  -     Synthroid 200 MCG tablet; Take 1 tablet by mouth Daily.  Dispense: 90 tablet; Refill: 1  -     vitamin D (ERGOCALCIFEROL) 1.25 MG (47008 UT) capsule capsule; Take 1 capsule by mouth 1 (One) Time Per  Week.  Dispense: 12 capsule; Refill: 1  -     Insulin Lispro, 1 Unit Dial, (HumaLOG KwikPen) 100 UNIT/ML solution pen-injector; Inject 5 Units under the skin into the appropriate area as directed 3 (Three) Times a Day With Meals.  Dispense: 15 mL; Refill: 0  -     Hemoglobin A1c; Future  -     TSH; Future  -     Lipid Panel; Future  -     Vitamin D 25 Hydroxy; Future    4. Chronic kidney disease, stage 4 (severe) ([facility])   -     Vitamin D 25 Hydroxy; Future        Follow Up   Return in about 4 months (around 2/28/2022).   a1c at goal  Continue toujeo  Yearly diabetic eye exams  Daily diabetic foot care  Diabetic diet  Routine physical activity as tolerated  On statin and ARB     Patient was given instructions and counseling regarding her condition or for health maintenance advice. Please see specific information pulled into the AVS if appropriate.     TAYO Chen

## 2021-11-08 ENCOUNTER — OFFICE VISIT (OUTPATIENT)
Dept: CARDIOLOGY | Facility: CLINIC | Age: 81
End: 2021-11-08

## 2021-11-08 VITALS
BODY MASS INDEX: 32.3 KG/M2 | WEIGHT: 201 LBS | HEIGHT: 66 IN | SYSTOLIC BLOOD PRESSURE: 156 MMHG | HEART RATE: 57 BPM | DIASTOLIC BLOOD PRESSURE: 62 MMHG

## 2021-11-08 DIAGNOSIS — I25.10 CORONARY ARTERY DISEASE INVOLVING NATIVE CORONARY ARTERY OF NATIVE HEART WITHOUT ANGINA PECTORIS: Primary | ICD-10-CM

## 2021-11-08 DIAGNOSIS — I50.32 CHRONIC DIASTOLIC (CONGESTIVE) HEART FAILURE (HCC): ICD-10-CM

## 2021-11-08 DIAGNOSIS — N18.4 STAGE 4 CHRONIC KIDNEY DISEASE (HCC): ICD-10-CM

## 2021-11-08 DIAGNOSIS — I10 ESSENTIAL HYPERTENSION: ICD-10-CM

## 2021-11-08 PROCEDURE — 93000 ELECTROCARDIOGRAM COMPLETE: CPT | Performed by: INTERNAL MEDICINE

## 2021-11-08 PROCEDURE — 99213 OFFICE O/P EST LOW 20 MIN: CPT | Performed by: INTERNAL MEDICINE

## 2021-11-08 RX ORDER — TORSEMIDE 100 MG/1
50 TABLET ORAL 2 TIMES DAILY
Start: 2021-11-08 | End: 2022-10-21 | Stop reason: ALTCHOICE

## 2021-12-01 ENCOUNTER — APPOINTMENT (OUTPATIENT)
Dept: VACCINE CLINIC | Facility: HOSPITAL | Age: 81
End: 2021-12-01

## 2022-01-13 NOTE — PATIENT INSTRUCTIONS
Increase nateglinide to 120 mg prior to each meal  continue all other meds the same  Contact office if blood sugars fail to improve or if you have low blood sugars    
room air

## 2022-02-14 ENCOUNTER — LAB (OUTPATIENT)
Dept: ENDOCRINOLOGY | Age: 82
End: 2022-02-14

## 2022-02-14 DIAGNOSIS — E11.42 TYPE 2 DIABETES MELLITUS WITH DIABETIC POLYNEUROPATHY, WITH LONG-TERM CURRENT USE OF INSULIN: ICD-10-CM

## 2022-02-14 DIAGNOSIS — E89.0 POSTOPERATIVE HYPOTHYROIDISM: ICD-10-CM

## 2022-02-14 DIAGNOSIS — E78.2 MIXED HYPERLIPIDEMIA: ICD-10-CM

## 2022-02-14 DIAGNOSIS — Z79.4 TYPE 2 DIABETES MELLITUS WITH DIABETIC POLYNEUROPATHY, WITH LONG-TERM CURRENT USE OF INSULIN: ICD-10-CM

## 2022-02-14 DIAGNOSIS — N18.4 CHRONIC KIDNEY DISEASE, STAGE 4 (SEVERE): ICD-10-CM

## 2022-02-15 LAB
25(OH)D3+25(OH)D2 SERPL-MCNC: 78.4 NG/ML (ref 30–100)
CHOLEST SERPL-MCNC: 173 MG/DL (ref 100–199)
HBA1C MFR BLD: 6.3 % (ref 4.8–5.6)
HDLC SERPL-MCNC: 50 MG/DL
IMP & REVIEW OF LAB RESULTS: NORMAL
LDLC SERPL CALC-MCNC: 102 MG/DL (ref 0–99)
REPORT: NORMAL
TRIGL SERPL-MCNC: 120 MG/DL (ref 0–149)
TSH SERPL DL<=0.005 MIU/L-ACNC: 0.09 UIU/ML (ref 0.45–4.5)
VLDLC SERPL CALC-MCNC: 21 MG/DL (ref 5–40)

## 2022-02-15 RX ORDER — LEVOTHYROXINE SODIUM 175 MCG
175 TABLET ORAL
Qty: 90 TABLET | Refills: 1 | Status: SHIPPED | OUTPATIENT
Start: 2022-02-15 | End: 2022-07-01

## 2022-02-15 NOTE — PROGRESS NOTES
Cholesterol level stable    TSH suppressed showing too much thyroid hormone medication.  I sent new prescription for Synthroid 175 mcg daily, please call and notify patient    A1c well controlled at 6.3%

## 2022-02-24 ENCOUNTER — DOCUMENTATION (OUTPATIENT)
Dept: ENDOCRINOLOGY | Age: 82
End: 2022-02-24

## 2022-02-24 NOTE — PROGRESS NOTES
Benefits Investigation Summary    Prescription: Renewal     Dispensing pharmacy: [facility]    Copay amount: TOUJEO $35    PLAN: OPTUM PART D  BIN: [id number]  PCN: [id number]  RX GROUP: [id number]    Prior Auth and Med Assistance notes:

## 2022-02-28 ENCOUNTER — OFFICE VISIT (OUTPATIENT)
Dept: ENDOCRINOLOGY | Age: 82
End: 2022-02-28

## 2022-02-28 VITALS
HEART RATE: 73 BPM | WEIGHT: 179.8 LBS | BODY MASS INDEX: 30.7 KG/M2 | SYSTOLIC BLOOD PRESSURE: 141 MMHG | OXYGEN SATURATION: 96 % | HEIGHT: 64 IN | DIASTOLIC BLOOD PRESSURE: 73 MMHG

## 2022-02-28 DIAGNOSIS — Z79.4 TYPE 2 DIABETES MELLITUS WITH DIABETIC POLYNEUROPATHY, WITH LONG-TERM CURRENT USE OF INSULIN: Primary | ICD-10-CM

## 2022-02-28 DIAGNOSIS — E89.0 POSTOPERATIVE HYPOTHYROIDISM: ICD-10-CM

## 2022-02-28 DIAGNOSIS — E78.2 MIXED HYPERLIPIDEMIA: ICD-10-CM

## 2022-02-28 DIAGNOSIS — E11.42 TYPE 2 DIABETES MELLITUS WITH DIABETIC POLYNEUROPATHY, WITH LONG-TERM CURRENT USE OF INSULIN: Primary | ICD-10-CM

## 2022-02-28 PROCEDURE — 99214 OFFICE O/P EST MOD 30 MIN: CPT | Performed by: NURSE PRACTITIONER

## 2022-02-28 RX ORDER — ERGOCALCIFEROL 1.25 MG/1
50000 CAPSULE ORAL WEEKLY
Qty: 12 CAPSULE | Refills: 1 | Status: SHIPPED | OUTPATIENT
Start: 2022-02-28 | End: 2022-07-01

## 2022-02-28 RX ORDER — INSULIN GLARGINE 300 U/ML
60 INJECTION, SOLUTION SUBCUTANEOUS DAILY
Qty: 27 ML | Refills: 0 | Status: SHIPPED | OUTPATIENT
Start: 2022-02-28 | End: 2022-03-28

## 2022-02-28 RX ORDER — LOSARTAN POTASSIUM 100 MG/1
100 TABLET ORAL DAILY
Qty: 90 TABLET | Refills: 1 | Status: SHIPPED | OUTPATIENT
Start: 2022-02-28 | End: 2022-07-01 | Stop reason: ALTCHOICE

## 2022-02-28 RX ORDER — ATORVASTATIN CALCIUM 40 MG/1
40 TABLET, FILM COATED ORAL NIGHTLY
Qty: 90 TABLET | Refills: 1 | Status: SHIPPED | OUTPATIENT
Start: 2022-02-28 | End: 2022-11-28

## 2022-02-28 NOTE — PROGRESS NOTES
"Chief Complaint  Diabetes Mellitus (follow up)    Subjective          Ange Johnson presents to [facility]  History of Present Illness     I have reviewed PMH, allergies and medications UTD at this visit      Type 2 dm    Diagnosed around 40 years ago.   Today in clinic pt reports being on Toujeo 44-46U QAM, Humalog 5U if Bs >120 AC (rare use)  FBG - <120  Checks BG - once a day  Dm retinopathy - denies, Last eye exam - 1/2022  Dm nephropathy - sees nephrologist, stage 4 CKD, + proteinuria, Dr Caruso retired, getting new renal    Dm neuropathy - denies, sees podiatrist q10 weeks  CAD - yes, February 2020 blockage in heart, + CHF  CVA - denies  Episodes of hypoglycemia - no  Pt is physically active. weight is down another 20lbs since November 2021  Pt tries to follow DM diet for most part.   On ARB  Lab Results   Component Value Date    HGBA1C 6.3 (H) 02/14/2022       Hyperlipidemia  Atorvastatin 40 mg daily  Reports tolerance  Lab Results   Component Value Date    CHOL 153 02/12/2020    CHLPL 173 02/14/2022    TRIG 120 02/14/2022    HDL 50 02/14/2022     (H) 02/14/2022         Hypothyroid  Synthroid 175 mcg daily- dose was decreased around the time of the lab draw 2/14/2022  Lab Results   Component Value Date    TSH 0.091 (L) 02/14/2022     Objective   Vital Signs:   /73   Pulse 73   Ht 162.6 cm (64\")   Wt 81.6 kg (179 lb 12.8 oz)   SpO2 96%   BMI 30.86 kg/m²     Physical Exam  Vitals reviewed.   Constitutional:       General: She is not in acute distress.  HENT:      Head: Normocephalic and atraumatic.   Cardiovascular:      Rate and Rhythm: Normal rate and regular rhythm.   Pulmonary:      Effort: Pulmonary effort is normal. No respiratory distress.   Musculoskeletal:         General: No signs of injury. Normal range of motion.      Cervical back: Normal range of motion and neck supple.   Skin:     General: Skin is warm and dry.   Neurological:      Mental Status: " She is alert and oriented to person, place, and time. Mental status is at baseline.   Psychiatric:         Mood and Affect: Mood normal.         Behavior: Behavior normal.         Thought Content: Thought content normal.         Judgment: Judgment normal.          Result Review :   The following data was reviewed by: TAYO Chen on 02/28/2022:  Common labs    Common Labsle 8/10/21 8/10/21 10/14/21 10/14/21 10/14/21 10/14/21 2/14/22 2/14/22    1345 1459 1549 1549 1549 1549 1540 1540   Glucose 140 (A)  150 (A)        BUN 39 (A)  41 (A)        Creatinine 2.36 (A)  2.00 (A)        eGFR Non  Am 20 (A)  24 (A)        eGFR  Am   29 (A)        Sodium 142  140        Potassium 3.5  3.9        Chloride 105  100        Calcium 9.1  9.4        Total Protein   6.3        Albumin   4.00        Total Bilirubin   0.2        Alkaline Phosphatase   114        AST (SGOT)   13        ALT (SGPT)   11        WBC  6.46         Hemoglobin  10.1 (A)         Hematocrit  31.2 (A)         Platelets  168         Total Cholesterol    154   173    Triglycerides    130   120    HDL Cholesterol    49   50    LDL Cholesterol     82   102 (A)    Hemoglobin A1C      6.70 (A)  6.3 (A)   Microalbumin, Urine     456.8      (A) Abnormal value       Comments are available for some flowsheets but are not being displayed.                     Assessment and Plan    Diagnoses and all orders for this visit:    1. Type 2 diabetes mellitus with diabetic polyneuropathy, with long-term current use of insulin (HCC) (Primary)  -     vitamin D (ERGOCALCIFEROL) 1.25 MG (97815 UT) capsule capsule; Take 1 capsule by mouth 1 (One) Time Per Week.  Dispense: 12 capsule; Refill: 1  -     atorvastatin (LIPITOR) 40 MG tablet; Take 1 tablet by mouth Every Night.  Dispense: 90 tablet; Refill: 1  -     Insulin Glargine, 2 Unit Dial, (Toujeo Max SoloStar) 300 UNIT/ML solution pen-injector injection; Inject 60 Units under the skin into the appropriate area as  directed Daily for 90 days.  Dispense: 27 mL; Refill: 0  -     TSH; Future  -     T4, Free; Future  -     T3, Free; Future  -     Hemoglobin A1c; Future  -     Lipid Panel; Future    2. Mixed hyperlipidemia  -     vitamin D (ERGOCALCIFEROL) 1.25 MG (47011 UT) capsule capsule; Take 1 capsule by mouth 1 (One) Time Per Week.  Dispense: 12 capsule; Refill: 1  -     atorvastatin (LIPITOR) 40 MG tablet; Take 1 tablet by mouth Every Night.  Dispense: 90 tablet; Refill: 1  -     Insulin Glargine, 2 Unit Dial, (Toujeo Max SoloStar) 300 UNIT/ML solution pen-injector injection; Inject 60 Units under the skin into the appropriate area as directed Daily for 90 days.  Dispense: 27 mL; Refill: 0  -     Lipid Panel; Future    3. Postoperative hypothyroidism  -     vitamin D (ERGOCALCIFEROL) 1.25 MG (90773 UT) capsule capsule; Take 1 capsule by mouth 1 (One) Time Per Week.  Dispense: 12 capsule; Refill: 1  -     atorvastatin (LIPITOR) 40 MG tablet; Take 1 tablet by mouth Every Night.  Dispense: 90 tablet; Refill: 1  -     Insulin Glargine, 2 Unit Dial, (Toujeo Max SoloStar) 300 UNIT/ML solution pen-injector injection; Inject 60 Units under the skin into the appropriate area as directed Daily for 90 days.  Dispense: 27 mL; Refill: 0  -     TSH; Future  -     T4, Free; Future  -     T3, Free; Future    Other orders  -     losartan (COZAAR) 100 MG tablet; Take 1 tablet by mouth Daily.  Dispense: 90 tablet; Refill: 1        Follow Up   No follow-ups on file.     A1c at goal, continue current diabetic regimen  T4 dose was decreased 2 weeks ago, will repeat labs before next visit  Continue statin  We will continue vitamin D, with upcoming nephrology evaluation    Patient was given instructions and counseling regarding her condition or for health maintenance advice. Please see specific information pulled into the AVS if appropriate.     TAYO Chen

## 2022-03-21 ENCOUNTER — OFFICE VISIT (OUTPATIENT)
Dept: CARDIOLOGY | Facility: CLINIC | Age: 82
End: 2022-03-21

## 2022-03-21 VITALS
HEIGHT: 64 IN | SYSTOLIC BLOOD PRESSURE: 138 MMHG | BODY MASS INDEX: 31.24 KG/M2 | WEIGHT: 183 LBS | HEART RATE: 74 BPM | DIASTOLIC BLOOD PRESSURE: 74 MMHG

## 2022-03-21 DIAGNOSIS — I25.10 CORONARY ARTERY DISEASE INVOLVING NATIVE CORONARY ARTERY OF NATIVE HEART WITHOUT ANGINA PECTORIS: Primary | ICD-10-CM

## 2022-03-21 DIAGNOSIS — E11.42 TYPE 2 DIABETES MELLITUS WITH DIABETIC POLYNEUROPATHY, WITH LONG-TERM CURRENT USE OF INSULIN: ICD-10-CM

## 2022-03-21 DIAGNOSIS — I45.10 RBBB (RIGHT BUNDLE BRANCH BLOCK): ICD-10-CM

## 2022-03-21 DIAGNOSIS — Z79.4 TYPE 2 DIABETES MELLITUS WITH DIABETIC POLYNEUROPATHY, WITH LONG-TERM CURRENT USE OF INSULIN: ICD-10-CM

## 2022-03-21 PROCEDURE — 93000 ELECTROCARDIOGRAM COMPLETE: CPT | Performed by: NURSE PRACTITIONER

## 2022-03-21 PROCEDURE — 99214 OFFICE O/P EST MOD 30 MIN: CPT | Performed by: NURSE PRACTITIONER

## 2022-03-21 NOTE — PROGRESS NOTES
Date of Office Visit: 22  Encounter Provider: TAYO Carter  Place of Service: [facility] CARDIOLOGY  Patient Name: Ange Johnson  :1940    Chief Complaint   Patient presents with   • Coronary artery disease involving native coronary artery of   • Congestive Heart Failure   • Hypertension   • Mixed Hyperlipidemia   • Follow-up   :     HPI: Ange Johnson is a 81 y.o. female  with coronary artery disease status post coronary artery bypass grafting x 5 in 2020,chronic kidney disease, aortic valve sclerosis, S/P CABG induced afib and placed on renal dosed AC and Amio, which both have since been discontinued.   She is followed by DR. Ng.  I will visit with her in follow-up today who reviewed her medical record  She had an echocardiogram last week showing normal left ventricular systolic function, grade 2 diastolic dysfunction as well as mild pulmonary hypertension.  She had aortic valve sclerosis with mild to moderate tricuspid regurgitation.  She was in volume overload.  She received IV diureticwith 2 mg IV bumetanide.  Her serum creatinine was at 1.96 and proBNP was elevated at 4000.  Torsemide was increased to 50 mg twice daily.     She presents today for 3-month follow-up.  She denies chest pain tightness pressure heartburn.  She has been doing stationary bike 2-3 times a week for half hour.  She feels good with that.  She does have some occasional shortness of breath with exertion but overall that is improved.  She is lost over 15 pounds since her last office visit.  Her last renal function was checked 2021 and creatinine was 1.88.  She is accompanied by her daughter, Henna.  Patient is feeling very good at this time.          Allergies   Allergen Reactions   • Codeine Nausea Only   • Hydrocodone-Acetaminophen Nausea Only and Other (See Comments)     Percocet and Vicodin; vertigo   • Meperidine Nausea Only and Nausea And Vomiting   • Morphine And Related  "Nausea Only   • Oxycodone-Acetaminophen Nausea Only   • Oxycodone-Acetaminophen Nausea Only and Other (See Comments)     dilzziness   • Oxycodone-Aspirin Nausea Only and Other (See Comments)     vertigo   • Nickel Rash   • Penicillins Hives   • Shrimp Rash   • Sulfa Antibiotics Hives           Family and social history reviewed.     ROS  All other systems were reviewed and are negative          Objective:     Vitals:    03/21/22 1451   BP: 138/74   BP Location: Left arm   Patient Position: Sitting   Pulse: 74   Weight: 83 kg (183 lb)   Height: 162.6 cm (64\")     Body mass index is 31.41 kg/m².    PHYSICAL EXAM:  Cardiovascular:      Murmurs: There is a grade 1/6 systolic murmur at the ULSB.           ECG 12 Lead    Date/Time: 3/21/2022 3:03 PM  Performed by: Nadiya Kyle APRN  Authorized by: Nadiya Kyle APRN   Comparison: compared with previous ECG   Similar to previous ECG  Rhythm: sinus rhythm  Rate: normal  Conduction: right bundle branch block and 1st degree AV block    Clinical impression: abnormal EKG              Current Outpatient Medications   Medication Sig Dispense Refill   • amLODIPine (NORVASC) 10 MG tablet Daily.     • aspirin (aspirin) 81 MG EC tablet Take 1 tablet by mouth Daily. 90 tablet 3   • atorvastatin (LIPITOR) 40 MG tablet Take 1 tablet by mouth Every Night. 90 tablet 1   • carBAMazepine (CARBATROL) 300 MG 12 hr capsule Take 1 capsule by mouth Every 12 (Twelve) Hours for 180 days. 180 capsule 1   • cetirizine (zyrTEC) 10 MG tablet Take 10 mg by mouth Every Night.     • cloNIDine (CATAPRES) 0.1 MG tablet Take 1 tablet by mouth Every 12 (Twelve) Hours. 180 tablet 3   • febuxostat (Uloric) 40 MG tablet Take 1 tablet by mouth Daily for 180 days. 90 tablet 1   • ferrous sulfate 325 (65 FE) MG tablet Take 1 tablet by mouth daily.     • Glucagon (Gvoke HypoPen 2-Pack) 1 MG/0.2ML solution auto-injector Inject 1 mg under the skin into the appropriate area as directed As Needed (Hypoglycemia). 0.4 " mL 1   • glucose blood (Contour Next Test) test strip 1 each by Other route 2 (Two) Times a Day. use 1 strip to test twice a day 100 each 3   • hydrALAZINE (APRESOLINE) 100 MG tablet Take 100 mg by mouth 2 (Two) Times a Day.     • Insulin Glargine, 2 Unit Dial, (Toujeo Max SoloStar) 300 UNIT/ML solution pen-injector injection Inject 60 Units under the skin into the appropriate area as directed Daily for 90 days. 27 mL 0   • Insulin Lispro, 1 Unit Dial, (HumaLOG KwikPen) 100 UNIT/ML solution pen-injector Inject 5 Units under the skin into the appropriate area as directed 3 (Three) Times a Day With Meals. 15 mL 0   • Insulin Pen Needle (BD PEN NEEDLE ISELA U/F) 32G X 4 MM misc Use to inject 4 time daily 200 each 5   • losartan (COZAAR) 100 MG tablet Take 1 tablet by mouth Daily. 90 tablet 1   • metoprolol tartrate (LOPRESSOR) 25 MG tablet TAKE 1/2 TABLET BY MOUTH EVERY 12 HOURS 90 tablet 3   • montelukast (SINGULAIR) 10 MG tablet 10 mg daily.     • potassium chloride (MICRO-K) 10 MEQ CR capsule Take 10 mEq by mouth Daily.     • Synthroid 175 MCG tablet Take 1 tablet by mouth Every Morning. Take on an empty stomach with just water 30 min to an hour before any other medications, food or drink 90 tablet 1   • torsemide (DEMADEX) 100 MG tablet Take 0.5 tablets by mouth 2 (Two) Times a Day. Pt takes 50mg in AM & 50mg in PM.     • vitamin B-12 (CYANOCOBALAMIN) 1000 MCG tablet Take 1 tablet by mouth daily.     • vitamin D (ERGOCALCIFEROL) 1.25 MG (94621 UT) capsule capsule Take 1 capsule by mouth 1 (One) Time Per Week. 12 capsule 1     No current facility-administered medications for this visit.     Assessment:      No diagnosis found.     Orders Placed This Encounter   Procedures   • ECG 12 Lead     This order was created via procedure documentation     Order Specific Question:   Release to patient     Answer:   Immediate         Plan:     1.  81-year-old female with chronic diastolic congestive heart failure. She has been  stable on torsemide 50 mg twice daily.  She continues to weigh at home and has actually lost weight.  2.  Coronary artery disease status post coronary bypass grafting x5 02/2020.  No angina at this time.  She is exercising 2 to 3 days a week up to half hour without chest pain.  Could consider nitrate or ranexa 500 BID  ( not a candidate for 1000 BID due to CKD) but at this time not needed  3.  Hypertension blood pressure stable -no change in medications.  4.  Stage IV chronic kidney disease- follows with DR. Caruso but since he is retiring she will be seeing someone new in their office.  Her last creatinine 2 months ago was 1.88  5.  Anemia of chronic disease, currently stable at 10.1 today   6.  Aortic valve sclerosis-mild murmur left upper sternal border  7.  Chronic right bundle branch block-no changes in EKG.  8.  Carotid artery plaque without significant stenosis bilateral on duplex 2/2020.    9. HLD- continue to take lipitor 40 mg daily   10-type 2 diabetes-well controlled on subcu insulin.  Last A1c 6.3.   11-hypothyroidism, on Synthroid per PCP.     Follow-up in 6 months call with questions or concerns          It has been a pleasure to participate in this patient's care.      Thank you,  TAYO Carter      **I used Dragon to dictate this note:**

## 2022-03-27 DIAGNOSIS — Z79.4 TYPE 2 DIABETES MELLITUS WITH DIABETIC POLYNEUROPATHY, WITH LONG-TERM CURRENT USE OF INSULIN: ICD-10-CM

## 2022-03-27 DIAGNOSIS — E89.0 POSTOPERATIVE HYPOTHYROIDISM: ICD-10-CM

## 2022-03-27 DIAGNOSIS — E11.42 TYPE 2 DIABETES MELLITUS WITH DIABETIC POLYNEUROPATHY, WITH LONG-TERM CURRENT USE OF INSULIN: ICD-10-CM

## 2022-03-27 DIAGNOSIS — E78.2 MIXED HYPERLIPIDEMIA: ICD-10-CM

## 2022-03-28 RX ORDER — INSULIN GLARGINE 300 U/ML
INJECTION, SOLUTION SUBCUTANEOUS
Qty: 19 ML | Refills: 1 | Status: SHIPPED | OUTPATIENT
Start: 2022-03-28 | End: 2022-09-22

## 2022-03-28 NOTE — TELEPHONE ENCOUNTER
Insulin Protocol Failed 03/27/2022 12:59 PM   Protocol Details  Creatinine < 1.3 in past 12 months

## 2022-05-18 ENCOUNTER — TELEPHONE (OUTPATIENT)
Dept: NEUROLOGY | Facility: OTHER | Age: 82
End: 2022-05-18

## 2022-05-18 NOTE — TELEPHONE ENCOUNTER
Patients daughter asked about referral to neurology for memory. I let her know we would need referral with updated office notes. Patient has not been seen with primary care since April of 2021

## 2022-06-10 DIAGNOSIS — M10.9 GOUT WITHOUT TOPHUS: ICD-10-CM

## 2022-06-10 RX ORDER — FEBUXOSTAT 40 MG/1
40 TABLET, FILM COATED ORAL DAILY
Qty: 90 TABLET | Refills: 0 | Status: SHIPPED | OUTPATIENT
Start: 2022-06-10 | End: 2022-06-27 | Stop reason: SDUPTHER

## 2022-06-10 NOTE — TELEPHONE ENCOUNTER
Caller: Daphne Dasilva    Relationship: Emergency Contact    Best call back number: [phone]    Requested Prescriptions:   Requested Prescriptions     Pending Prescriptions Disp Refills   • febuxostat (Uloric) 40 MG tablet 90 tablet 1     Sig: Take 1 tablet by mouth Daily for 180 days.        Pharmacy where request should be sent: [facility] [address] AT [facility] [phone] [facility] [phone] FX     Additional details provided by patient: NA    Does the patient have less than a 3 day supply:  [x] Yes  [] No    Saundra ARTHUR Rep   06/10/22 08:52 EDT

## 2022-06-27 ENCOUNTER — OFFICE VISIT (OUTPATIENT)
Dept: FAMILY MEDICINE CLINIC | Facility: CLINIC | Age: 82
End: 2022-06-27

## 2022-06-27 VITALS
HEIGHT: 64 IN | DIASTOLIC BLOOD PRESSURE: 76 MMHG | WEIGHT: 180 LBS | BODY MASS INDEX: 30.73 KG/M2 | HEART RATE: 56 BPM | RESPIRATION RATE: 18 BRPM | SYSTOLIC BLOOD PRESSURE: 126 MMHG | TEMPERATURE: 98.4 F | OXYGEN SATURATION: 98 %

## 2022-06-27 DIAGNOSIS — Z13.820 ENCOUNTER FOR SCREENING FOR OSTEOPOROSIS: ICD-10-CM

## 2022-06-27 DIAGNOSIS — M24.20 DISORDER OF MUSCLE, LIGAMENT, AND FASCIA: ICD-10-CM

## 2022-06-27 DIAGNOSIS — M62.9 DISORDER OF MUSCLE, LIGAMENT, AND FASCIA: ICD-10-CM

## 2022-06-27 DIAGNOSIS — Z00.00 MEDICARE ANNUAL WELLNESS VISIT, SUBSEQUENT: Primary | ICD-10-CM

## 2022-06-27 DIAGNOSIS — N18.4 STAGE 4 CHRONIC KIDNEY DISEASE: ICD-10-CM

## 2022-06-27 DIAGNOSIS — M10.9 GOUT WITHOUT TOPHUS: ICD-10-CM

## 2022-06-27 DIAGNOSIS — N95.1 MENOPAUSAL STATE: ICD-10-CM

## 2022-06-27 DIAGNOSIS — F22 DELUSIONAL DISORDER: ICD-10-CM

## 2022-06-27 DIAGNOSIS — E66.09 CLASS 1 OBESITY DUE TO EXCESS CALORIES WITH SERIOUS COMORBIDITY AND BODY MASS INDEX (BMI) OF 30.0 TO 30.9 IN ADULT: ICD-10-CM

## 2022-06-27 PROCEDURE — 1170F FXNL STATUS ASSESSED: CPT | Performed by: FAMILY MEDICINE

## 2022-06-27 PROCEDURE — G0439 PPPS, SUBSEQ VISIT: HCPCS | Performed by: FAMILY MEDICINE

## 2022-06-27 PROCEDURE — 1160F RVW MEDS BY RX/DR IN RCRD: CPT | Performed by: FAMILY MEDICINE

## 2022-06-27 RX ORDER — FEBUXOSTAT 40 MG/1
40 TABLET, FILM COATED ORAL DAILY
Qty: 90 TABLET | Refills: 3 | Status: SHIPPED | OUTPATIENT
Start: 2022-06-27 | End: 2023-06-27

## 2022-06-27 RX ORDER — CARBAMAZEPINE 300 MG/1
300 CAPSULE, EXTENDED RELEASE ORAL EVERY 12 HOURS SCHEDULED
Qty: 180 CAPSULE | Refills: 3 | Status: SHIPPED | OUTPATIENT
Start: 2022-06-27 | End: 2023-06-27

## 2022-06-27 RX ORDER — HYDRALAZINE HYDROCHLORIDE 100 MG/1
100 TABLET, FILM COATED ORAL
COMMUNITY
Start: 2022-03-24 | End: 2022-07-01

## 2022-06-27 NOTE — ASSESSMENT & PLAN NOTE
Renal condition is worsening.  seeing new md this week.   Renal condition will be reassessed in 1 year.

## 2022-06-27 NOTE — PATIENT INSTRUCTIONS
You are due for adacel Tdap vaccination. (provides protection against tetanus, diptheria and whooping cough) Please  get the immunization at your local pharmacy at your earliest convenience. This immunization will next be due in 10 years. Please click on the link for more information about this vaccine.    [facility] Tdap Vaccine Information    You are due for Shingrix vaccination series ( the newest shingles vaccine).  It is a two shot series spaced 2-6 months apart. Please get this vaccine series started at your earliest convenience at your local pharmacy to help avoid shingles outbreak. It is more effective than the old Zostavax vaccine and is recommended even if you have had the Zostavax vaccine in the past.  Once the Shingrix series is completed, it does not need to be repeated.   For more information, please look at the website below:  [facility] Shingrix Vaccine Information      Medicare Wellness  Personal Prevention Plan of Service     Date of Office Visit:    Encounter Provider:  Quinn Washington MD  Place of Service:  [facility]  Patient Name: Ange Johnson  :  1940    As part of the Medicare Wellness portion of your visit today, we are providing you with this personalized preventive plan of services (PPPS). This plan is based upon recommendations of the [place] Preventive Services Task Force (USPSTF) and the Advisory Committee on Immunization Practices (ACIP).    This lists the preventive care services that should be considered, and provides dates of when you are due. Items listed as completed are up-to-date and do not require any further intervention.    Health Maintenance   Topic Date Due   • TDAP/TD VACCINES (1 - Tdap) Never done   • ZOSTER VACCINE (2 of 3) 2010   • DXA SCAN  2020   • COVID-19 Vaccine (4 - Booster) 2022   • INFLUENZA VACCINE  10/01/2022   • URINE MICROALBUMIN  10/14/2022   • HEMOGLOBIN A1C  2022   • DIABETIC EYE EXAM  2023    • LIPID PANEL  06/16/2023   • ANNUAL WELLNESS VISIT  06/27/2023   • MAMMOGRAM  08/16/2023   • Pneumococcal Vaccine 65+  Completed       Orders Placed This Encounter   Procedures   • DEXA Bone Density, Axial (Hospital)     Standing Status:   Future     Standing Expiration Date:   6/27/2023     Order Specific Question:   Is patient taking or have taken long term Glucocorticoid (steroids)?     Answer:   No     Order Specific Question:   Does the patient have rheumatoid arthritis?     Answer:   No     Order Specific Question:   Does the patient have secondary osteoporosis?     Answer:   No     Order Specific Question:   Reason for Exam:     Answer:   screen for osteoporosis     Order Specific Question:   Release to patient     Answer:   Immediate       No follow-ups on file.

## 2022-06-27 NOTE — ASSESSMENT & PLAN NOTE
Psychological condition is unchanged.  has been referred to Dr. Grullon at [facility]  Psychological condition  will be reassessed at the next regular appointment.

## 2022-06-27 NOTE — ASSESSMENT & PLAN NOTE
Patient's (Body mass index is 30.9 kg/m².) indicates that they are obese (BMI >30) with health conditions that include hypertension . Weight is improving with lifestyle modifications. BMI is is above average; BMI management plan is completed. We discussed portion control and increasing exercise.

## 2022-06-27 NOTE — PROGRESS NOTES
The ABCs of the Annual Wellness Visit  Subsequent Medicare Wellness Visit    Chief Complaint   Patient presents with   • Medicare Wellness-subsequent      Subjective    History of Present Illness:  Ange Johnson is a 81 y.o. female who presents for a Subsequent Medicare Wellness Visit.    The following portions of the patient's history were reviewed and   updated as appropriate: allergies, current medications, past family history, past medical history, past social history, past surgical history and problem list.    Compared to one year ago, the patient feels her physical   health is the same.    Compared to one year ago, the patient feels her mental   health is the same.    Recent Hospitalizations:  She was not admitted to the hospital during the last year.       Current Medical Providers:  Patient Care Team:  Quinn Washington MD as PCP - General (Family Medicine)  Steve Simons Jr., MD as Consulting Physician (Hematology and Oncology)  Hugh Caruso MD as Consulting Physician (Nephrology)  Adalgisa Hernandez APRN as Nurse Practitioner (Endocrinology)  Reagan Beltran MD as Consulting Physician (Endocrinology)  Aguilar Goldman Jr., MD as Consulting Physician (Pulmonary Disease)  Mil Sr MD as Consulting Physician (Ophthalmology)  Tasneem Montelongo MD as Surgeon (General Surgery)  Victor Hugo Ng MD as Consulting Physician (Cardiology)    Outpatient Medications Prior to Visit   Medication Sig Dispense Refill   • hydrALAZINE (APRESOLINE) 100 MG tablet Take 100 mg by mouth.     • amLODIPine (NORVASC) 10 MG tablet Daily.     • aspirin (aspirin) 81 MG EC tablet Take 1 tablet by mouth Daily. 90 tablet 3   • atorvastatin (LIPITOR) 40 MG tablet Take 1 tablet by mouth Every Night. 90 tablet 1   • cetirizine (zyrTEC) 10 MG tablet Take 10 mg by mouth Every Night.     • cloNIDine (CATAPRES) 0.1 MG tablet Take 1 tablet by mouth Every 12 (Twelve) Hours. 180 tablet 3   • ferrous sulfate 325 (65 FE) MG tablet Take 1 tablet  by mouth daily.     • Glucagon (Gvoke HypoPen 2-Pack) 1 MG/0.2ML solution auto-injector Inject 1 mg under the skin into the appropriate area as directed As Needed (Hypoglycemia). 0.4 mL 1   • glucose blood (Contour Next Test) test strip 1 each by Other route 2 (Two) Times a Day. use 1 strip to test twice a day 100 each 3   • hydrALAZINE (APRESOLINE) 100 MG tablet Take 100 mg by mouth 2 (Two) Times a Day.     • Insulin Lispro, 1 Unit Dial, (HumaLOG KwikPen) 100 UNIT/ML solution pen-injector Inject 5 Units under the skin into the appropriate area as directed 3 (Three) Times a Day With Meals. 15 mL 0   • Insulin Pen Needle (BD PEN NEEDLE ISELA U/F) 32G X 4 MM misc Use to inject 4 time daily 200 each 5   • losartan (COZAAR) 100 MG tablet Take 1 tablet by mouth Daily. 90 tablet 1   • metoprolol tartrate (LOPRESSOR) 25 MG tablet TAKE 1/2 TABLET BY MOUTH EVERY 12 HOURS 90 tablet 3   • montelukast (SINGULAIR) 10 MG tablet 10 mg daily.     • potassium chloride (MICRO-K) 10 MEQ CR capsule Take 10 mEq by mouth Daily.     • Synthroid 175 MCG tablet Take 1 tablet by mouth Every Morning. Take on an empty stomach with just water 30 min to an hour before any other medications, food or drink 90 tablet 1   • torsemide (DEMADEX) 100 MG tablet Take 0.5 tablets by mouth 2 (Two) Times a Day. Pt takes 50mg in AM & 50mg in PM.     • Toujeo Max SoloStar 300 UNIT/ML solution pen-injector injection INJECT 60 UNITS UNDER THE SKIN INTO THE APPROPRIATE AREA AS DIRECTED DAILY 19 mL 1   • vitamin B-12 (CYANOCOBALAMIN) 1000 MCG tablet Take 1 tablet by mouth daily.     • vitamin D (ERGOCALCIFEROL) 1.25 MG (52303 UT) capsule capsule Take 1 capsule by mouth 1 (One) Time Per Week. 12 capsule 1   • Vitamin E 268 MG (400 UNIT) capsule Take  by mouth.     • carBAMazepine (CARBATROL) 300 MG 12 hr capsule Take 1 capsule by mouth Every 12 (Twelve) Hours for 180 days. 180 capsule 1   • febuxostat (Uloric) 40 MG tablet Take 1 tablet by mouth Daily for 180 days.  "90 tablet 0     No facility-administered medications prior to visit.       No opioid medication identified on active medication list. I have reviewed chart for other potential  high risk medication/s and harmful drug interactions in the elderly.          Aspirin is on active medication list. Aspirin use is indicated based on review of current medical condition/s. Pros and cons of this therapy have been discussed today. Benefits of this medication outweigh potential harm.  Patient has been encouraged to continue taking this medication.  .      Patient Active Problem List   Diagnosis   • Asthma   • Type 2 diabetes mellitus with diabetic polyneuropathy, with long-term current use of insulin (HCC)   • Essential hypertension   • Mixed hyperlipidemia   • Disorder of muscle, ligament, and fascia   • Proteinuria   • Postoperative hypothyroidism   • Hyperparathyroidism (HCC)   • Vitamin D deficiency   • Gout without tophus   • Solitary thyroid nodule   • Anemia in stage 4 chronic kidney disease (HCC)   • Stage 4 chronic kidney disease (HCC)   • Coronary artery disease involving native coronary artery of native heart without angina pectoris   • Class 1 obesity due to excess calories with serious comorbidity and body mass index (BMI) of 30.0 to 30.9 in adult   • 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  ACP discussion was held with the patient during this visit. Patient has an advance directive in EMR which is still valid.           Objective    Vitals:    06/27/22 1517   BP: 126/76   Pulse: 56   Resp: 18   Temp: 98.4 °F (36.9 °C)   SpO2: 98%   Weight: 81.6 kg (180 lb)   Height: 162.6 cm (64\")     Estimated body mass index is 30.9 kg/m² as calculated from the following:    Height as of this encounter: 162.6 cm (64\").    Weight as of " this encounter: 81.6 kg (180 lb).    BMI is >= 30 and <35. (Class 1 Obesity). The following options were offered after discussion;: exercise counseling/recommendations and nutrition counseling/recommendations      Does the patient have evidence of cognitive impairment? Yes c/o some short term memory issues    Physical Exam  Lab Results   Component Value Date    CHLPL 166 06/16/2022    TRIG 102 06/16/2022    HDL 53 06/16/2022    LDL 94 06/16/2022    VLDL 19 06/16/2022    HGBA1C 6.1 (H) 06/16/2022            HEALTH RISK ASSESSMENT    Smoking Status:  Social History     Tobacco Use   Smoking Status Never Smoker   Smokeless Tobacco Never Used     Alcohol Consumption:  Social History     Substance and Sexual Activity   Alcohol Use Not Currently    Comment: Caffeine use: Decaf     Fall Risk Screen:    STEADI Fall Risk Assessment was completed, and patient is at LOW risk for falls.Assessment completed on:6/27/2022    Depression Screening:  PHQ-2/PHQ-9 Depression Screening 6/27/2022   Retired PHQ-9 Total Score -   Retired Total Score -   Little Interest or Pleasure in Doing Things 0-->not at all   Feeling Down, Depressed or Hopeless 0-->not at all   PHQ-9: Brief Depression Severity Measure Score 0       Health Habits and Functional and Cognitive Screening:  Functional & Cognitive Status 6/27/2022   Do you have difficulty preparing food and eating? No   Do you have difficulty bathing yourself, getting dressed or grooming yourself? No   Do you have difficulty using the toilet? No   Do you have difficulty moving around from place to place? No   Do you have trouble with steps or getting out of a bed or a chair? No   Current Diet Well Balanced Diet   Dental Exam Up to date   Eye Exam Up to date   Exercise (times per week) 3 times per week   Current Exercises Include Stationary Bicycling/Spin Class   Current Exercise Activities Include -   Do you need help using the phone?  No   Are you deaf or do you have serious difficulty  hearing?  No   Do you need help with transportation? Yes   Do you need help shopping? Yes   Do you need help preparing meals?  Yes   Do you need help with housework?  Yes   Do you need help with laundry? Yes   Do you need help taking your medications? Yes   Do you need help managing money? Yes   Do you ever drive or ride in a car without wearing a seat belt? No   Have you felt unusual stress, anger or loneliness in the last month? No   Who do you live with? Child   If you need help, do you have trouble finding someone available to you? No   Have you been bothered in the last four weeks by sexual problems? -   Do you have difficulty concentrating, remembering or making decisions? No       Age-appropriate Screening Schedule:  Refer to the list below for future screening recommendations based on patient's age, sex and/or medical conditions. Orders for these recommended tests are listed in the plan section. The patient has been provided with a written plan.    Health Maintenance   Topic Date Due   • TDAP/TD VACCINES (1 - Tdap) Never done   • ZOSTER VACCINE (2 of 3) 08/17/2010   • DXA SCAN  11/09/2020   • INFLUENZA VACCINE  10/01/2022   • URINE MICROALBUMIN  10/14/2022   • HEMOGLOBIN A1C  12/16/2022   • DIABETIC EYE EXAM  02/02/2023   • LIPID PANEL  06/16/2023   • MAMMOGRAM  08/16/2023              Assessment & Plan   CMS Preventative Services Quick Reference  Risk Factors Identified During Encounter  Cardiovascular Disease  Dementia/Memory   Immunizations Discussed/Encouraged (specific Immunizations; Tdap and Shingrix  Obesity/Overweight   The above risks/problems have been discussed with the patient.  Follow up actions/plans if indicated are seen below in the Assessment/Plan Section.  Pertinent information has been shared with the patient in the After Visit Summary.    Diagnoses and all orders for this visit:    1. Medicare annual wellness visit, subsequent (Primary)  Comments:  sleep hygeine discussed. immunizations  discussed. dexa ordered.     2. Disorder of muscle, ligament, and fascia  Assessment & Plan:  The current medical regimen is effective;  continue present plan and medications.      Orders:  -     carBAMazepine (CARBATROL) 300 MG 12 hr capsule; Take 1 capsule by mouth Every 12 (Twelve) Hours.  Dispense: 180 capsule; Refill: 3    3. Gout without tophus  Assessment & Plan:  The current medical regimen is effective;  continue present plan and medications.      Orders:  -     febuxostat (Uloric) 40 MG tablet; Take 1 tablet by mouth Daily.  Dispense: 90 tablet; Refill: 3    4. Delusional disorder (HCC)  Assessment & Plan:  Psychological condition is unchanged.  has been referred to Dr. Grullon at [facility]  Psychological condition  will be reassessed at the next regular appointment.      5. Class 1 obesity due to excess calories with serious comorbidity and body mass index (BMI) of 30.0 to 30.9 in adult  Assessment & Plan:  Patient's (Body mass index is 30.9 kg/m².) indicates that they are obese (BMI >30) with health conditions that include hypertension . Weight is improving with lifestyle modifications. BMI is is above average; BMI management plan is completed. We discussed portion control and increasing exercise.       6. Stage 4 chronic kidney disease (HCC)  Assessment & Plan:  Renal condition is worsening.  seeing new md this week.   Renal condition will be reassessed in 1 year.      7. Encounter for screening for osteoporosis  -     DEXA Bone Density, Axial (Hospital); Future    8. Menopausal state  -     DEXA Bone Density, Axial (Hospital); Future      Follow Up:   Return in about 1 year (around 6/27/2023) for Medicare Wellness & regular visit, [id number] min.     An After Visit Summary and PPPS were made available to the patient.

## 2022-07-01 ENCOUNTER — OFFICE VISIT (OUTPATIENT)
Dept: ENDOCRINOLOGY | Age: 82
End: 2022-07-01

## 2022-07-01 VITALS
HEIGHT: 64 IN | HEART RATE: 44 BPM | TEMPERATURE: 96.6 F | SYSTOLIC BLOOD PRESSURE: 128 MMHG | OXYGEN SATURATION: 96 % | WEIGHT: 186.4 LBS | BODY MASS INDEX: 31.82 KG/M2 | DIASTOLIC BLOOD PRESSURE: 74 MMHG

## 2022-07-01 DIAGNOSIS — Z79.4 TYPE 2 DIABETES MELLITUS WITH DIABETIC POLYNEUROPATHY, WITH LONG-TERM CURRENT USE OF INSULIN: Primary | ICD-10-CM

## 2022-07-01 DIAGNOSIS — E11.42 TYPE 2 DIABETES MELLITUS WITH DIABETIC POLYNEUROPATHY, WITH LONG-TERM CURRENT USE OF INSULIN: Primary | ICD-10-CM

## 2022-07-01 DIAGNOSIS — E89.0 POSTOPERATIVE HYPOTHYROIDISM: ICD-10-CM

## 2022-07-01 DIAGNOSIS — E78.2 MIXED HYPERLIPIDEMIA: ICD-10-CM

## 2022-07-01 PROCEDURE — 99214 OFFICE O/P EST MOD 30 MIN: CPT | Performed by: NURSE PRACTITIONER

## 2022-07-01 RX ORDER — CLONIDINE HYDROCHLORIDE 0.1 MG/1
TABLET ORAL
Qty: 180 TABLET | Refills: 3 | Status: SHIPPED | OUTPATIENT
Start: 2022-07-01 | End: 2023-02-01 | Stop reason: ALTCHOICE

## 2022-07-01 RX ORDER — LEVOTHYROXINE SODIUM 150 MCG
150 TABLET ORAL DAILY
Qty: 30 TABLET | Refills: 5 | Status: SHIPPED | OUTPATIENT
Start: 2022-07-01 | End: 2022-12-29

## 2022-07-01 NOTE — PROGRESS NOTES
"Chief Complaint  Diabetes (Type2: Patient doesn't have meter with her today but states that her bs have been regular )    Subjective        Ange Johnson presents to [facility] ENDOCRINOLOGY  History of Present Illness     Type 2 dm    Diagnosed around 40 years ago.   Today in clinic pt reports being on Toujeo 40-46U QAM, Humalog 5U AC- rare use   FBG -   Checks BG - once a day  Dm retinopathy - denies, Last eye exam - 1/2022  Dm nephropathy - sees nephrologist, stage 4 CKD  Dm neuropathy - denies, sees podiatrist q10 weeks  CAD - yes, February 2020 blockage in heart, + CHF, + at baseline ankle edema  CVA - denies  Episodes of hypoglycemia - no  Off ARB per nephrology  On statin    Hypothyroid  Synthroid 175 mcg daily  Lab Results   Component Value Date    TSH 0.113 (L) 06/16/2022         Objective   Vital Signs:  /74   Pulse (!) 44   Temp 96.6 °F (35.9 °C) (Temporal)   Ht 162.6 cm (64\")   Wt 84.6 kg (186 lb 6.4 oz)   SpO2 96%   BMI 32.00 kg/m²   Estimated body mass index is 32 kg/m² as calculated from the following:    Height as of this encounter: 162.6 cm (64\").    Weight as of this encounter: 84.6 kg (186 lb 6.4 oz).    \plain      Physical Exam  Vitals reviewed.   Constitutional:       General: She is not in acute distress.  HENT:      Head: Normocephalic and atraumatic.   Cardiovascular:      Rate and Rhythm: Normal rate and regular rhythm.   Pulmonary:      Effort: Pulmonary effort is normal. No respiratory distress.   Musculoskeletal:         General: No signs of injury. Normal range of motion.      Cervical back: Normal range of motion and neck supple.   Skin:     General: Skin is warm and dry.   Neurological:      Mental Status: She is alert and oriented to person, place, and time. Mental status is at baseline.   Psychiatric:         Mood and Affect: Mood normal.         Behavior: Behavior normal.         Thought Content: Thought content normal.         Judgment: " Judgment normal.        Result Review :  The following data was reviewed by: TAYO Chen on 07/01/2022:  Common labs    Common Labsle 10/14/21 10/14/21 10/14/21 10/14/21 2/14/22 2/14/22 6/16/22 6/16/22    1549 1549 1549 1549 1540 1540 1531 1531   Glucose 150 (A)          BUN 41 (A)          Creatinine 2.00 (A)          eGFR Non  Am 24 (A)          eGFR  Am 29 (A)          Sodium 140          Potassium 3.9          Chloride 100          Calcium 9.4          Total Protein 6.3          Albumin 4.00          Total Bilirubin 0.2          Alkaline Phosphatase 114          AST (SGOT) 13          ALT (SGPT) 11          Total Cholesterol  154   173   166   Triglycerides  130   120   102   HDL Cholesterol  49   50   53   LDL Cholesterol   82   102 (A)   94   Hemoglobin A1C    6.70 (A)  6.3 (A) 6.1 (A)    Microalbumin, Urine   456.8        (A) Abnormal value       Comments are available for some flowsheets but are not being displayed.                     Assessment and Plan   Diagnoses and all orders for this visit:    1. Type 2 diabetes mellitus with diabetic polyneuropathy, with long-term current use of insulin (HCC) (Primary)    2. Postoperative hypothyroidism  -     TSH; Future  -     T4, Free; Future  -     T3, Free; Future    3. Mixed hyperlipidemia    Other orders  -     Synthroid 150 MCG tablet; Take 1 tablet by mouth Daily.  Dispense: 30 tablet; Refill: 5             Follow Up   Return in about 6 months (around 1/1/2023).     A1c at goal, continue diabetic regimen as above  TSH outside target range, decrease Synthroid to 150 mcg and repeat labs in 2 months  LDL at goal, continue statin    Patient was given instructions and counseling regarding her condition or for health maintenance advice. Please see specific information pulled into the AVS if appropriate.     TAYO Chen

## 2022-07-01 NOTE — PROGRESS NOTES
I defer management of HTN meds to nephrology when CKD4. She just saw Dr Ram yesterday and he adjusted her medications.    jdk

## 2022-07-05 ENCOUNTER — APPOINTMENT (OUTPATIENT)
Dept: BONE DENSITY | Facility: HOSPITAL | Age: 82
End: 2022-07-05

## 2022-07-07 DIAGNOSIS — E11.9 TYPE 2 DIABETES MELLITUS WITHOUT COMPLICATION, WITH LONG-TERM CURRENT USE OF INSULIN: ICD-10-CM

## 2022-07-07 DIAGNOSIS — Z79.4 TYPE 2 DIABETES MELLITUS WITHOUT COMPLICATION, WITH LONG-TERM CURRENT USE OF INSULIN: ICD-10-CM

## 2022-08-12 ENCOUNTER — TELEPHONE (OUTPATIENT)
Dept: CARDIOLOGY | Facility: CLINIC | Age: 82
End: 2022-08-12

## 2022-08-12 DIAGNOSIS — Z51.81 ENCOUNTER FOR THERAPEUTIC DRUG LEVEL MONITORING: ICD-10-CM

## 2022-08-12 DIAGNOSIS — I50.32 CHRONIC DIASTOLIC (CONGESTIVE) HEART FAILURE: Primary | ICD-10-CM

## 2022-08-12 NOTE — TELEPHONE ENCOUNTER
Called patient's daughter and gave her Nadiya's recommendations. Mary verbalized understanding and agreed with this plan.     Andreea Suarez RN  [facility] Triage  08/12/22  15:55 EDT

## 2022-08-12 NOTE — TELEPHONE ENCOUNTER
I am ok with the use of torsemide 20 mg PRN for 3 pound weight gain overnight. Since she improved. I would monitor several more days. If she needs more doses of PRN torsemide 20 mg then I would arrange for follow up renal function. If she needs more torsemide PRN in the next 7 days, I would recommend seeing her sooner. If she, stabilizes then I think she is ok to wait longer. Advise to limit sodium in diet.     Thank you

## 2022-08-12 NOTE — TELEPHONE ENCOUNTER
Patient's daughter called to report she had to give patient and extra 20 mg diuretic. Patient has increased SOA and 3 lb weight gain overnight.      Patient's Nephrologist had her to stop Losartan and increase Hydralazine to tid.  She has been following this for one month now. Patient has a follow up appt with PATRICK on 10/03/22.  / STEFFEN Hernandez can be reached at [phone]

## 2022-08-12 NOTE — TELEPHONE ENCOUNTER
Yesterday patient gained 3 lbs overnight, increased SOA and edema in her lower extremities. Patient is prescribed torsemide 50 mg twice daily and was given an additional 20 mg torsemide. Her daughter states this is an old prescription from nephrology to use PRN. Today patient's weight is down 2lbs, decreased edema and improved SOA that has not completely resolved. Patient normally has SOA with exertion and pauses for breaks to catch her breath but yesterday was worse. Patient has had decreased energy.    Patient's daughter states she was instructed to call and notify you if patient has to take an additional 20 mg of torsemide, and asked if she needed a sooner appointment or renal function checked? Patient was last seen by nephrology on 7/26/2022 and had labs drawn. Renal function has improved since d/c losartan.     Let me know if you have any recommendations.     Thanks,   Andreea Suarez RN  [facility] Triage  08/12/22  14:31 EDT

## 2022-08-12 NOTE — TELEPHONE ENCOUNTER
I am sorry, what is the question for me? Did the extra lasix help? Was that extra dose given today?    Triage- can you get more info

## 2022-09-01 ENCOUNTER — LAB (OUTPATIENT)
Dept: ENDOCRINOLOGY | Age: 82
End: 2022-09-01

## 2022-09-01 DIAGNOSIS — E89.0 POSTOPERATIVE HYPOTHYROIDISM: ICD-10-CM

## 2022-09-01 DIAGNOSIS — I50.32 CHRONIC DIASTOLIC (CONGESTIVE) HEART FAILURE: ICD-10-CM

## 2022-09-01 DIAGNOSIS — Z51.81 ENCOUNTER FOR THERAPEUTIC DRUG LEVEL MONITORING: ICD-10-CM

## 2022-09-02 LAB
T3FREE SERPL-MCNC: 1.6 PG/ML (ref 2–4.4)
T4 FREE SERPL-MCNC: 1.31 NG/DL (ref 0.82–1.77)
TSH SERPL DL<=0.005 MIU/L-ACNC: 1.29 UIU/ML (ref 0.45–4.5)

## 2022-09-06 NOTE — PROGRESS NOTES
Date of Office Visit: 2022  Encounter Provider: TAYO Schmitt  Place of Service: [facility] CARDIOLOGY  Patient Name: Ange Johnson  :1940    Chief Complaint   Patient presents with   • Coronary Artery Disease   • Follow-up   • Shortness of Breath   • Edema   :     HPI: Ange Johnson is a 81 y.o. female who is a patient of Dr. Ng.  She is new to me today and presents with complaints of increased lower extremity edema, shortness of breath and weight gain.  He has a history of coronary artery disease, chronic kidney disease, postoperative atrial fibrillation.  In 2020, patient underwent coronary artery bypass graft x5.  She was last seen in our office in 2022 by TAYO Carter.  Patient has had intermittent bouts of fluid overload as evidence of swelling and weight gain.  She has required extra doses of torsemide at times.    Earlier in August, patient's daughter called reporting patient had increased shortness of air with 3 pound weight gain.  She gave her an extra 20 mg torsemide.  Patient sees nephrology for chronic kidney disease.  In July, nephrology had her stop losartan and increase hydralazine to 3 times daily.    2021, patient underwent Lexiscan stress test which showed small sized, moderately severe area of ischemia located in the anterior wall, LVEF 66%.  Patient not having chest pain.  Patient medically managed.    2021: 2D echocardiogram showed LVEF 58% with grade 2 diastolic dysfunction.  LV wall thickness consistent with moderate LVH.  RV cavity size.  Left atrial cavity moderately dilated.  Mild mitral regurgitation and mild to moderate tricuspid regurgitation with an RVSP of 45 mmHg.    6-day Holter monitor 2020 was relatively benign showed 3 episodes supraventricular tachycardia, no episodes of VT, occasional PACs and normal SA node conduction with no AV block.    Ms. Johnson presents with her daughter at this  visit.  Over the last week, patient has had about three extra torsemide 20mg tablets.  She has not noticed substantial increase in urine output.  She has gained approximately 9 pounds over the last week.  Blood pressure is well controlled as they monitor her blood pressure at home, as well.  On physical exam, breath sounds are clear.  Her legs are very taut, almost weeping.  She has no complaints of increase in abdominal girth.  Patient has no complaints of chest pain, pressure.  She has shortness of breath with exertion.  EKG is similar to EKG on 2021 and shows sinus rhythm with right bundle branch block.  There is some ST depression in inferior leads.      With her chronic kidney disease and low GFR, nephrology has been dosing diuretics.  I will message her nephrologist, Dr. Ram, RE: recommendations on diuretic therapy, as I believe patient may benefit from a change of diuretic.  Her last BMP 22 showed creatinine 2.08 and GFR 23.  We discussed importance of low-sodium diet and gave examples of some foods to avoid.  Not sure how compliant patient is with her sodium intake.    Previous testing and notes have been reviewed by me.   Past Medical History:   Diagnosis Date   • Anemia in stage 3 chronic kidney disease (HCC) 2016   • Asthma    • Atherosclerosis of both carotid arteries     plaque without stenosis,    • Bone spur of acromioclavicular joint    • CAD (coronary artery disease)     2020: 90% dLM, 60% pLAD, 90% mLAD, 90% D1, 90% mLCx, 50-60% OM1, 50% pRCA; s/p CABG x 5 (LIMA-LAD, SVG-PDA, SVG-OM1, SVG-OM2, SVG-D1)   • Chronic diastolic (congestive) heart failure ([facility]) 2021   • Chronic venous insufficiency    • CKD (chronic kidney disease) stage 3, GFR 30-59 ml/min ([facility])    • Essential hypertension    • Gout    • Grief reaction       2010 after 15 foot fall off ladder   • H/O cataract    • Heel spur    • History of tendinitis    • Hyperlipidemia    •  Hyperparathyroidism (HCC)    • Hyperthyroidism    • Hypothyroidism, acquired    • Non-toxic goiter    • Osteoarthritis    • Pneumonia    • Postoperative atrial fibrillation (HCC)     after CABG   • Proteinuria    • Thrombophlebitis leg     after CABG   • 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 Ybarra benign   • BREAST EXCISIONAL BIOPSY Bilateral 1965    benign   • CARDIAC CATHETERIZATION N/A 2/10/2020    Procedure: Left heart cath without LV gram;  Surgeon: Emerson Deras MD;  Location: [facility] CATH INVASIVE LOCATION;  Service: Cardiovascular;  Laterality: N/A;   • CARDIAC CATHETERIZATION N/A 2/10/2020    Procedure: Coronary angiography;  Surgeon: Emerson Deras MD;  Location: [facility] CATH INVASIVE LOCATION;  Service: Cardiovascular;  Laterality: N/A;   • CATARACT EXTRACTION Left 02/03/2010   • CATARACT EXTRACTION Right 04/21/2010   • CORONARY ARTERY BYPASS GRAFT N/A 2/12/2020    Procedure: MIDLINE STERNOTOMY, CORONARY ARTERY BYPASS GRAFTING X  5 USING LEFT INTERNAL MAMMARY ARTERY AND LEFT ENDOSCOPICALLY HARVESTED GREATER SAPHENOUS VEIN, INTRAOP KERWIN, PRP;  Surgeon: Jr Anthony Rai MD;  Location: [facility] OR;  Service: Cardiothoracic;  Laterality: N/A;   • HYSTERECTOMY  1988    Dr. Quinn Peña   • OOPHORECTOMY      late 40's   • THYROIDECTOMY Right 12/8/2016    Procedure: right PARATHYROID EXCISION OF NODULE and right thyroid lobectomy and bilateral exploration.;  Surgeon: Tasneem Montelongo MD;  Location: [facility];  Service:        Social History     Socioeconomic History   • Marital status:    • Number of children: 4   Tobacco Use   • Smoking status: Never Smoker   • Smokeless tobacco: Never Used   Vaping Use   • Vaping Use: Never used   Substance and Sexual Activity   • Alcohol use: Not Currently     Comment: Caffeine use: Decaf   •  Drug use: No   • Sexual activity: Defer       Family History   Problem Relation Age of Onset   • Diabetes Sister    • Diabetes Brother    • Hypertension Brother    • Kidney disease Brother    • Cervical cancer Mother    • Heart disease Sister    • Neuropathy Sister    • Diabetes Sister    • Breast cancer Neg Hx        Review of Systems   Constitutional: Positive for weight gain.   HENT: Negative.    Eyes: Negative.    Cardiovascular: Positive for dyspnea on exertion.   Respiratory: Negative.    Endocrine: Negative.    Skin: Negative.    Musculoskeletal: Negative.    Gastrointestinal: Negative.    Genitourinary: Negative.    Neurological: Negative.    Psychiatric/Behavioral: Negative.    Allergic/Immunologic: Negative.        Allergies   Allergen Reactions   • Codeine Nausea Only   • Hydrocodone-Acetaminophen Nausea Only and Other (See Comments)     Percocet and Vicodin; vertigo   • Meperidine Nausea Only and Nausea And Vomiting   • Morphine And Related Nausea Only   • Oxycodone-Acetaminophen Nausea Only   • Oxycodone-Acetaminophen Nausea Only and Other (See Comments)     dilzziness   • Oxycodone-Aspirin Nausea Only and Other (See Comments)     vertigo   • Nickel Rash   • Penicillins Hives   • Shrimp Rash   • Sulfa Antibiotics Hives         Current Outpatient Medications:   •  amLODIPine (NORVASC) 10 MG tablet, Daily., Disp: , Rfl:   •  aspirin (aspirin) 81 MG EC tablet, Take 1 tablet by mouth Daily., Disp: 90 tablet, Rfl: 3  •  atorvastatin (LIPITOR) 40 MG tablet, Take 1 tablet by mouth Every Night., Disp: 90 tablet, Rfl: 1  •  carBAMazepine (CARBATROL) 300 MG 12 hr capsule, Take 1 capsule by mouth Every 12 (Twelve) Hours., Disp: 180 capsule, Rfl: 3  •  cetirizine (zyrTEC) 10 MG tablet, Take 10 mg by mouth Every Night., Disp: , Rfl:   •  cloNIDine (CATAPRES) 0.1 MG tablet, TAKE ONE TABLET BY MOUTH EVERY 12 HOURS, Disp: 180 tablet, Rfl: 3  •  febuxostat (Uloric) 40 MG tablet, Take 1 tablet by mouth Daily., Disp: 90  "tablet, Rfl: 3  •  ferrous sulfate 325 (65 FE) MG tablet, Take 1 tablet by mouth daily., Disp: , Rfl:   •  Glucagon (Gvoke HypoPen 2-Pack) 1 MG/0.2ML solution auto-injector, Inject 1 mg under the skin into the appropriate area as directed As Needed (Hypoglycemia)., Disp: 0.4 mL, Rfl: 1  •  glucose blood (Contour Next Test) test strip, 1 each by Other route 2 (Two) Times a Day for 90 days., Disp: 180 each, Rfl: 0  •  hydrALAZINE (APRESOLINE) 100 MG tablet, Take 100 mg by mouth 3 (Three) Times a Day., Disp: , Rfl:   •  Insulin Lispro, 1 Unit Dial, (HumaLOG KwikPen) 100 UNIT/ML solution pen-injector, Inject 5 Units under the skin into the appropriate area as directed 3 (Three) Times a Day With Meals., Disp: 15 mL, Rfl: 0  •  Insulin Pen Needle (BD PEN NEEDLE ISELA U/F) 32G X 4 MM misc, Use to inject 4 time daily, Disp: 200 each, Rfl: 5  •  metoprolol tartrate (LOPRESSOR) 25 MG tablet, TAKE 1/2 TABLET BY MOUTH EVERY 12 HOURS, Disp: 90 tablet, Rfl: 3  •  montelukast (SINGULAIR) 10 MG tablet, 10 mg daily., Disp: , Rfl:   •  potassium chloride (MICRO-K) 10 MEQ CR capsule, Take 10 mEq by mouth Daily., Disp: , Rfl:   •  Synthroid 150 MCG tablet, Take 1 tablet by mouth Daily., Disp: 30 tablet, Rfl: 5  •  torsemide (DEMADEX) 100 MG tablet, Take 0.5 tablets by mouth 2 (Two) Times a Day. Pt takes 50mg in AM & 50mg in PM., Disp: , Rfl:   •  Toujeo Max SoloStar 300 UNIT/ML solution pen-injector injection, INJECT 60 UNITS UNDER THE SKIN INTO THE APPROPRIATE AREA AS DIRECTED DAILY, Disp: 19 mL, Rfl: 1  •  vitamin B-12 (CYANOCOBALAMIN) 1000 MCG tablet, Take 1 tablet by mouth daily., Disp: , Rfl:   •  Vitamin E 268 MG (400 UNIT) capsule, Take  by mouth., Disp: , Rfl:       Objective:     Vitals:    09/07/22 1038   BP: 136/60   BP Location: Left arm   Patient Position: Sitting   Pulse: 51   SpO2: 94%   Weight: 89.2 kg (196 lb 9.6 oz)   Height: 162.6 cm (64\")     Body mass index is 33.75 kg/m².    PHYSICAL EXAM:    Constitutional:       " Appearance: Healthy appearance. Not in distress.   Neck:      Vascular: No JVR. JVD normal.   Pulmonary:      Effort: Pulmonary effort is normal.      Breath sounds: Normal breath sounds. No wheezing. No rhonchi. No rales.   Chest:      Chest wall: Not tender to palpatation.   Cardiovascular:      PMI at left midclavicular line. Normal rate. Regular rhythm. Normal S1. Normal S2.      Murmurs: There is no murmur.      No gallop. No click. No rub.      Comments: Bilateral lower extremity edema  Pulses:     Intact distal pulses.   Edema:     Thigh: bilateral 1+ pitting edema of the thigh.     Pretibial: bilateral 3+ pitting edema of the pretibial area.     Ankle: bilateral 3+ pitting edema of the ankle.     Feet: bilateral 3+ pitting edema of the feet.  Abdominal:      General: Bowel sounds are normal.      Palpations: Abdomen is soft.      Tenderness: There is no abdominal tenderness.   Musculoskeletal: Normal range of motion.         General: No tenderness. Skin:     General: Skin is warm and dry.   Neurological:      General: No focal deficit present.      Mental Status: Alert and oriented to person, place and time.           ECG 12 Lead    Date/Time: 9/7/2022 11:28 AM  Performed by: Karen Arellano APRN  Authorized by: Karen Arellano APRN   Comparison: compared with previous ECG from 6/29/2021  Similar to previous ECG  Rhythm: sinus rhythm  Rate: normal  BPM: 51  Conduction: right bundle branch block  ST Depression: II, III, aVF and V3  Other findings: non-specific ST-T wave changes              Assessment:       Diagnosis Plan   1. Essential hypertension     2. Mixed hyperlipidemia     3. Coronary artery disease involving native coronary artery of native heart without angina pectoris     4. History of coronary artery bypass surgery     5. Chronic diastolic (congestive) heart failure (HCC)  Basic Metabolic Panel   6. Type 2 diabetes mellitus with diabetic polyneuropathy, with long-term current use of  insulin ([facility])     7. Stage 4 chronic kidney disease ([facility])  Basic Metabolic Panel   8. Heart failure due to valvular disease, chronic, diastolic ([facility])       Orders Placed This Encounter   Procedures   • Basic Metabolic Panel     Standing Status:   Future     Standing Expiration Date:   9/7/2023     Order Specific Question:   Release to patient     Answer:   Routine Release          Plan:       1.  Acute on chronic diastolic heart failure: Normal LV systolic function.  Mild MR. On torsemide.  Weight gain of 9 pounds over the last week, despite extra torsemide given.  He discussed importance of low-sodium diet.  She will have BMP drawn today after this visit to serve his baseline if new diuretic recommended by nephrology.  She has a follow-up with her nephrologist on 9/26/2022.  2.  Hypertension: Well-controlled.  Monitors at home.  Losartan recently stopped and hydralazine dosage increased at that time.    3.  Coronary artery disease status post CABG x5 in 2020.  On aspirin statin and beta-blocker.  No angina.  Stable EKG  4.  Hyperlipidemia: Goal LDL less than 70.  Lipid panel shows total cholesterol 166, triglycerides 102, HDL 53, LDL 94.  On statin therapy  5.  Dyspnea on exertion  6.  Diabetes type 2: Hemoglobin A1c 6.1.  On oral medications. Followed by PCP  7.  Chronic kidney disease: Follows with nephrology    Patient has a follow-up appointment with Dr. Ng in the beginning of October.  She will call sooner for any questions or concerns.  I will reach out to patient once I hear from her nephrologist regarding any changes to be made.           Your medication list          Accurate as of September 7, 2022 11:16 AM. If you have any questions, ask your nurse or doctor.            CONTINUE taking these medications      Instructions Last Dose Given Next Dose Due   amLODIPine 10 MG tablet  Commonly known as: NORVASC      Daily.       aspirin 81 MG EC tablet      Take 1 tablet by mouth Daily.       atorvastatin 40  MG tablet  Commonly known as: LIPITOR      Take 1 tablet by mouth Every Night.       carBAMazepine 300 MG 12 hr capsule  Commonly known as: CARBATROL      Take 1 capsule by mouth Every 12 (Twelve) Hours.       cetirizine 10 MG tablet  Commonly known as: zyrTEC      Take 10 mg by mouth Every Night.       cloNIDine 0.1 MG tablet  Commonly known as: CATAPRES      TAKE ONE TABLET BY MOUTH EVERY 12 HOURS       febuxostat 40 MG tablet  Commonly known as: Uloric      Take 1 tablet by mouth Daily.       ferrous sulfate 325 (65 FE) MG tablet      Take 1 tablet by mouth daily.       glucose blood test strip  Commonly known as: Contour Next Test      1 each by Other route 2 (Two) Times a Day for 90 days.       Gvoke HypoPen 2-Pack 1 MG/0.2ML solution auto-injector  Generic drug: Glucagon      Inject 1 mg under the skin into the appropriate area as directed As Needed (Hypoglycemia).       hydrALAZINE 100 MG tablet  Commonly known as: APRESOLINE      Take 100 mg by mouth 3 (Three) Times a Day.       Insulin Lispro (1 Unit Dial) 100 UNIT/ML solution pen-injector  Commonly known as: HumaLOG KwikPen      Inject 5 Units under the skin into the appropriate area as directed 3 (Three) Times a Day With Meals.       Insulin Pen Needle 32G X 4 MM misc  Commonly known as: BD Pen Needle Micaela U/F      Use to inject 4 time daily       metoprolol tartrate 25 MG tablet  Commonly known as: LOPRESSOR      TAKE 1/2 TABLET BY MOUTH EVERY 12 HOURS       montelukast 10 MG tablet  Commonly known as: SINGULAIR      10 mg daily.       potassium chloride 10 MEQ CR capsule  Commonly known as: MICRO-K      Take 10 mEq by mouth Daily.       Synthroid 150 MCG tablet  Generic drug: levothyroxine      Take 1 tablet by mouth Daily.       torsemide 100 MG tablet  Commonly known as: DEMADEX      Take 0.5 tablets by mouth 2 (Two) Times a Day. Pt takes 50mg in AM & 50mg in PM.       Toujeo Max SoloStar 300 UNIT/ML solution pen-injector injection  Generic drug:  Insulin Glargine (2 Unit Dial)      INJECT 60 UNITS UNDER THE SKIN INTO THE APPROPRIATE AREA AS DIRECTED DAILY       vitamin B-12 1000 MCG tablet  Commonly known as: CYANOCOBALAMIN      Take 1 tablet by mouth daily.       Vitamin E 268 MG (400 UNIT) capsule      Take  by mouth.                As always, it has been a pleasure to participate in your patient's care.      Sincerely,       TAYO Monahan

## 2022-09-07 ENCOUNTER — HOSPITAL ENCOUNTER (OUTPATIENT)
Dept: CARDIOLOGY | Facility: HOSPITAL | Age: 82
Discharge: HOME | End: 2022-09-07
Admitting: NURSE PRACTITIONER

## 2022-09-07 ENCOUNTER — TELEPHONE (OUTPATIENT)
Dept: CARDIOLOGY | Facility: CLINIC | Age: 82
End: 2022-09-07

## 2022-09-07 ENCOUNTER — OFFICE VISIT (OUTPATIENT)
Dept: CARDIOLOGY | Facility: CLINIC | Age: 82
End: 2022-09-07

## 2022-09-07 VITALS
BODY MASS INDEX: 33.57 KG/M2 | WEIGHT: 196.6 LBS | HEIGHT: 64 IN | DIASTOLIC BLOOD PRESSURE: 60 MMHG | SYSTOLIC BLOOD PRESSURE: 136 MMHG | OXYGEN SATURATION: 94 % | HEART RATE: 51 BPM

## 2022-09-07 DIAGNOSIS — I10 ESSENTIAL HYPERTENSION: Primary | ICD-10-CM

## 2022-09-07 DIAGNOSIS — I50.32 CHRONIC DIASTOLIC (CONGESTIVE) HEART FAILURE: ICD-10-CM

## 2022-09-07 DIAGNOSIS — E11.42 TYPE 2 DIABETES MELLITUS WITH DIABETIC POLYNEUROPATHY, WITH LONG-TERM CURRENT USE OF INSULIN: ICD-10-CM

## 2022-09-07 DIAGNOSIS — E78.2 MIXED HYPERLIPIDEMIA: ICD-10-CM

## 2022-09-07 DIAGNOSIS — Z95.1 HISTORY OF CORONARY ARTERY BYPASS SURGERY: ICD-10-CM

## 2022-09-07 DIAGNOSIS — I38 HEART FAILURE DUE TO VALVULAR DISEASE, CHRONIC, DIASTOLIC: ICD-10-CM

## 2022-09-07 DIAGNOSIS — Z79.4 TYPE 2 DIABETES MELLITUS WITH DIABETIC POLYNEUROPATHY, WITH LONG-TERM CURRENT USE OF INSULIN: ICD-10-CM

## 2022-09-07 DIAGNOSIS — N18.4 STAGE 4 CHRONIC KIDNEY DISEASE: ICD-10-CM

## 2022-09-07 DIAGNOSIS — I25.10 CORONARY ARTERY DISEASE INVOLVING NATIVE CORONARY ARTERY OF NATIVE HEART WITHOUT ANGINA PECTORIS: ICD-10-CM

## 2022-09-07 DIAGNOSIS — I50.32 HEART FAILURE DUE TO VALVULAR DISEASE, CHRONIC, DIASTOLIC: ICD-10-CM

## 2022-09-07 LAB
ANION GAP SERPL CALCULATED.3IONS-SCNC: 13 MMOL/L (ref 5–15)
BUN SERPL-MCNC: 46 MG/DL (ref 8–23)
BUN/CREAT SERPL: 17.8 (ref 7–25)
CALCIUM SPEC-SCNC: 9 MG/DL (ref 8.6–10.5)
CHLORIDE SERPL-SCNC: 101 MMOL/L (ref 98–107)
CO2 SERPL-SCNC: 24 MMOL/L (ref 22–29)
CREAT SERPL-MCNC: 2.58 MG/DL (ref 0.57–1)
EGFRCR SERPLBLD CKD-EPI 2021: 18.1 ML/MIN/1.73
GLUCOSE SERPL-MCNC: 114 MG/DL (ref 65–99)
POTASSIUM SERPL-SCNC: 3.4 MMOL/L (ref 3.5–5.2)
SODIUM SERPL-SCNC: 138 MMOL/L (ref 136–145)

## 2022-09-07 PROCEDURE — 99214 OFFICE O/P EST MOD 30 MIN: CPT | Performed by: NURSE PRACTITIONER

## 2022-09-07 PROCEDURE — 36415 COLL VENOUS BLD VENIPUNCTURE: CPT

## 2022-09-07 PROCEDURE — 93000 ELECTROCARDIOGRAM COMPLETE: CPT | Performed by: NURSE PRACTITIONER

## 2022-09-07 PROCEDURE — 80048 BASIC METABOLIC PNL TOTAL CA: CPT | Performed by: NURSE PRACTITIONER

## 2022-09-07 NOTE — TELEPHONE ENCOUNTER
Called with BMP results.  Spoke with daughter.  We are waiting to hear from nephrologist on diuretic therapy.

## 2022-09-22 DIAGNOSIS — Z79.4 TYPE 2 DIABETES MELLITUS WITH DIABETIC POLYNEUROPATHY, WITH LONG-TERM CURRENT USE OF INSULIN: ICD-10-CM

## 2022-09-22 DIAGNOSIS — E89.0 POSTOPERATIVE HYPOTHYROIDISM: ICD-10-CM

## 2022-09-22 DIAGNOSIS — E11.42 TYPE 2 DIABETES MELLITUS WITH DIABETIC POLYNEUROPATHY, WITH LONG-TERM CURRENT USE OF INSULIN: ICD-10-CM

## 2022-09-22 DIAGNOSIS — E78.2 MIXED HYPERLIPIDEMIA: ICD-10-CM

## 2022-09-22 RX ORDER — INSULIN GLARGINE 300 U/ML
INJECTION, SOLUTION SUBCUTANEOUS
Qty: 18 ML | Refills: 0 | Status: SHIPPED | OUTPATIENT
Start: 2022-09-22

## 2022-09-26 ENCOUNTER — TRANSCRIBE ORDERS (OUTPATIENT)
Dept: ADMINISTRATIVE | Facility: HOSPITAL | Age: 82
End: 2022-09-26

## 2022-09-26 DIAGNOSIS — E11.22 CKD STAGE 4 DUE TO TYPE 2 DIABETES MELLITUS: Primary | ICD-10-CM

## 2022-09-26 DIAGNOSIS — N18.4 CKD STAGE 4 DUE TO TYPE 2 DIABETES MELLITUS: Primary | ICD-10-CM

## 2022-09-27 ENCOUNTER — HOSPITAL ENCOUNTER (OUTPATIENT)
Dept: ULTRASOUND IMAGING | Facility: HOSPITAL | Age: 82
Discharge: HOME | End: 2022-09-27
Admitting: INTERNAL MEDICINE

## 2022-09-27 DIAGNOSIS — E11.22 CKD STAGE 4 DUE TO TYPE 2 DIABETES MELLITUS: ICD-10-CM

## 2022-09-27 DIAGNOSIS — N18.4 CKD STAGE 4 DUE TO TYPE 2 DIABETES MELLITUS: ICD-10-CM

## 2022-09-27 PROCEDURE — 76775 US EXAM ABDO BACK WALL LIM: CPT

## 2022-10-03 ENCOUNTER — HOSPITAL ENCOUNTER (OUTPATIENT)
Dept: CARDIOLOGY | Facility: HOSPITAL | Age: 82
Discharge: HOME | End: 2022-10-03
Admitting: INTERNAL MEDICINE

## 2022-10-03 ENCOUNTER — OFFICE VISIT (OUTPATIENT)
Dept: CARDIOLOGY | Facility: CLINIC | Age: 82
End: 2022-10-03

## 2022-10-03 VITALS
DIASTOLIC BLOOD PRESSURE: 60 MMHG | SYSTOLIC BLOOD PRESSURE: 140 MMHG | WEIGHT: 193.8 LBS | HEIGHT: 64 IN | BODY MASS INDEX: 33.09 KG/M2 | HEART RATE: 46 BPM

## 2022-10-03 DIAGNOSIS — I10 ESSENTIAL HYPERTENSION: ICD-10-CM

## 2022-10-03 DIAGNOSIS — I25.10 CORONARY ARTERY DISEASE INVOLVING NATIVE CORONARY ARTERY OF NATIVE HEART WITHOUT ANGINA PECTORIS: ICD-10-CM

## 2022-10-03 DIAGNOSIS — N18.4 STAGE 4 CHRONIC KIDNEY DISEASE: ICD-10-CM

## 2022-10-03 DIAGNOSIS — I50.32 CHRONIC DIASTOLIC (CONGESTIVE) HEART FAILURE: Primary | ICD-10-CM

## 2022-10-03 DIAGNOSIS — R00.1 BRADYCARDIA, SINUS: ICD-10-CM

## 2022-10-03 DIAGNOSIS — E87.70 EDEMA DUE TO HYPERVOLEMIA: ICD-10-CM

## 2022-10-03 PROBLEM — F22 DELUSIONAL DISORDER: Status: RESOLVED | Noted: 2021-04-19 | Resolved: 2022-10-03

## 2022-10-03 PROCEDURE — 96374 THER/PROPH/DIAG INJ IV PUSH: CPT

## 2022-10-03 PROCEDURE — 93000 ELECTROCARDIOGRAM COMPLETE: CPT | Performed by: INTERNAL MEDICINE

## 2022-10-03 PROCEDURE — 36415 COLL VENOUS BLD VENIPUNCTURE: CPT

## 2022-10-03 PROCEDURE — 99214 OFFICE O/P EST MOD 30 MIN: CPT | Performed by: INTERNAL MEDICINE

## 2022-10-03 RX ORDER — DOXAZOSIN MESYLATE 1 MG/1
2 TABLET ORAL
COMMUNITY
Start: 2022-09-26 | End: 2023-02-01

## 2022-10-03 RX ORDER — BUMETANIDE 0.25 MG/ML
4 INJECTION INTRAMUSCULAR; INTRAVENOUS ONCE
Status: COMPLETED | OUTPATIENT
Start: 2022-10-03 | End: 2022-10-03

## 2022-10-03 RX ORDER — BUMETANIDE 2 MG/1
4 TABLET ORAL 2 TIMES DAILY
COMMUNITY
Start: 2022-09-19 | End: 2022-10-19

## 2022-10-03 RX ADMIN — BUMETANIDE 4 MG: 0.25 INJECTION, SOLUTION INTRAMUSCULAR; INTRAVENOUS at 15:43

## 2022-10-03 NOTE — PROGRESS NOTES
"Date of Office Visit: 10/03/22  Encounter Provider: Victor Hugo Ng MD  Place of Service: [facility] CARDIOLOGY  Patient Name: Ange Johnson  :1940    Chief Complaint   Patient presents with   • Coronary Artery Disease   :     HPI:     Ms. Johnson is 81 y.o. and presents today to follow up. I have reviewed prior notes and there are no changes except for any new updates described below. I have also reviewed any information entered into the medical record by the patient or by ancillary staff.     I initially evaluated her in 2016 for preoperative risk assessment prior to thyroid surgery.  She reported chronic exertional dyspnea at that time.  She had a soft systolic murmur.  An echo showed normal LVSF and aortic sclerosis without stenosis.  I did not feel that she needed stress testing as her dyspnea was stable and long-standing.  Her EKG showed diffuse nonspecific ST abnormalities which were old.    I then saw her again in 2020 for dyspnea and chest pain which she described as \"heartburn.\"  An echo was normal, but a perfusion stress was abnormal/high-risk.  She underwent coronary angiography which revealed multivessel CAD, and underwent 5V CABG in 2020.  Her post-operative course was unremarkable other than an episode of atrial fibrillation and lower extremity superficial thrombophlebitis. She was placed on renally dosed apixaban as well as amiodarone.  Both of these have subsequently been stopped.       She presented in 2021 with increasing dyspnea and edema. An echo revealed normal LVSF (EF 55-60%), grade 2 diastolic dysfunction, mild-moderate TR/PHTN. I increased her diuretics.  She presented in 2021 with increased fatigue and dyspnea; a perfusion stress test was performed. Per my interpretation it showed a small amount of anterolateral ischemia; given her severe CKD, medical therapy was recommended.     She has really been struggling with " severe lower extremity edema, weight gain, increased dyspnea, fatigue, and feeling cold. Her diuretics have been increased/amended. While she has had some mild improvement, she is still very volume overloaded. She is not having angina. She denies palpitations. Her HR at home is usually in the high 40s.    Past Medical History:   Diagnosis Date   • Anemia in stage 3 chronic kidney disease (HCC) 2016   • Asthma    • Atherosclerosis of both carotid arteries     plaque without stenosis,    • Bone spur of acromioclavicular joint    • CAD (coronary artery disease)     2020: 90% dLM, 60% pLAD, 90% mLAD, 90% D1, 90% mLCx, 50-60% OM1, 50% pRCA; s/p CABG x 5 (LIMA-LAD, SVG-PDA, SVG-OM1, SVG-OM2, SVG-D1)   • Chronic diastolic (congestive) heart failure ([facility]) 2021   • Chronic venous insufficiency    • CKD (chronic kidney disease) stage 3, GFR 30-59 ml/min ([facility])    • Essential hypertension    • Gout    • Grief reaction       2010 after 15 foot fall off ladder   • H/O cataract    • Heel spur    • History of tendinitis    • Hyperlipidemia    • Hyperparathyroidism ([facility])    • Hyperthyroidism    • Hypothyroidism, acquired    • Non-toxic goiter    • Osteoarthritis    • Pneumonia    • Postoperative atrial fibrillation (HCC)     after CABG   • Proteinuria    • Thrombophlebitis leg     after CABG   • Type 2 diabetes mellitus with neurological manifestations ([facility])    • Vitamin D deficiency        Past Surgical History:   Procedure Laterality Date   • BREAST EXCISIONAL BIOPSY Left     Dr. Ybarra benign   • BREAST EXCISIONAL BIOPSY Bilateral     benign   • CARDIAC CATHETERIZATION N/A 2/10/2020    Procedure: Left heart cath without LV gram;  Surgeon: Emerson Deras MD;  Location: [facility] CATH INVASIVE LOCATION;  Service: Cardiovascular;  Laterality: N/A;   • CARDIAC CATHETERIZATION N/A 2/10/2020    Procedure: Coronary angiography;  Surgeon: Emerson Deras MD;  Location: [facility] CATH INVASIVE  LOCATION;  Service: Cardiovascular;  Laterality: N/A;   • CATARACT EXTRACTION Left 02/03/2010   • CATARACT EXTRACTION Right 04/21/2010   • CORONARY ARTERY BYPASS GRAFT N/A 2/12/2020    Procedure: MIDLINE STERNOTOMY, CORONARY ARTERY BYPASS GRAFTING X  5 USING LEFT INTERNAL MAMMARY ARTERY AND LEFT ENDOSCOPICALLY HARVESTED GREATER SAPHENOUS VEIN, INTRAOP KERWIN, PRP;  Surgeon: Jr Anthony Rai MD;  Location: [facility];  Service: Cardiothoracic;  Laterality: N/A;   • HYSTERECTOMY  1988    Dr. Quinn Peña   • OOPHORECTOMY      late 40's   • THYROIDECTOMY Right 12/8/2016    Procedure: right PARATHYROID EXCISION OF NODULE and right thyroid lobectomy and bilateral exploration.;  Surgeon: Tasneem Montelongo MD;  Location: [facility] [place];  Service:        Social History     Socioeconomic History   • Marital status:    • Number of children: 4   Tobacco Use   • Smoking status: Never Smoker   • Smokeless tobacco: Never Used   Vaping Use   • Vaping Use: Never used   Substance and Sexual Activity   • Alcohol use: Not Currently     Comment: Caffeine use: Decaf   • Drug use: No   • Sexual activity: Defer       Family History   Problem Relation Age of Onset   • Diabetes Sister    • Diabetes Brother    • Hypertension Brother    • Kidney disease Brother    • Cervical cancer Mother    • Heart disease Sister    • Neuropathy Sister    • Diabetes Sister    • Breast cancer Neg Hx        Review of Systems   Constitutional: Positive for chills and malaise/fatigue.   Cardiovascular: Positive for dyspnea on exertion and leg swelling.   Endocrine: Positive for cold intolerance.   Musculoskeletal: Positive for joint pain and myalgias.   Neurological: Positive for excessive daytime sleepiness and dizziness.   All other systems reviewed and are negative.      Allergies   Allergen Reactions   • Codeine Nausea Only   • Hydrocodone-Acetaminophen Nausea Only and Other (See Comments)     Percocet and Vicodin; vertigo   • Meperidine Nausea Only  and Nausea And Vomiting   • Morphine And Related Nausea Only   • Oxycodone-Acetaminophen Nausea Only   • Oxycodone-Acetaminophen Nausea Only and Other (See Comments)     dilzziness   • Oxycodone-Aspirin Nausea Only and Other (See Comments)     vertigo   • Nickel Rash   • Penicillins Hives   • Shrimp Rash   • Sulfa Antibiotics Hives         Current Outpatient Medications:   •  amLODIPine (NORVASC) 10 MG tablet, 5 mg Daily., Disp: , Rfl:   •  aspirin (aspirin) 81 MG EC tablet, Take 1 tablet by mouth Daily., Disp: 90 tablet, Rfl: 3  •  atorvastatin (LIPITOR) 40 MG tablet, Take 1 tablet by mouth Every Night., Disp: 90 tablet, Rfl: 1  •  bumetanide (BUMEX) 2 MG tablet, Take 4 mg by mouth 2 (Two) Times a Day., Disp: , Rfl:   •  carBAMazepine (CARBATROL) 300 MG 12 hr capsule, Take 1 capsule by mouth Every 12 (Twelve) Hours., Disp: 180 capsule, Rfl: 3  •  cetirizine (zyrTEC) 10 MG tablet, Take 10 mg by mouth Every Night., Disp: , Rfl:   •  cloNIDine (CATAPRES) 0.1 MG tablet, TAKE ONE TABLET BY MOUTH EVERY 12 HOURS, Disp: 180 tablet, Rfl: 3  •  doxazosin (CARDURA) 1 MG tablet, Take 1 mg by mouth., Disp: , Rfl:   •  febuxostat (Uloric) 40 MG tablet, Take 1 tablet by mouth Daily., Disp: 90 tablet, Rfl: 3  •  ferrous sulfate 325 (65 FE) MG tablet, Take 1 tablet by mouth daily., Disp: , Rfl:   •  Glucagon (Gvoke HypoPen 2-Pack) 1 MG/0.2ML solution auto-injector, Inject 1 mg under the skin into the appropriate area as directed As Needed (Hypoglycemia)., Disp: 0.4 mL, Rfl: 1  •  glucose blood (Contour Next Test) test strip, 1 each by Other route 2 (Two) Times a Day for 90 days., Disp: 180 each, Rfl: 0  •  hydrALAZINE (APRESOLINE) 100 MG tablet, Take 100 mg by mouth 3 (Three) Times a Day., Disp: , Rfl:   •  Insulin Lispro, 1 Unit Dial, (HumaLOG KwikPen) 100 UNIT/ML solution pen-injector, Inject 5 Units under the skin into the appropriate area as directed 3 (Three) Times a Day With Meals., Disp: 15 mL, Rfl: 0  •  Insulin Pen Needle  "(BD PEN NEEDLE ISELA U/F) 32G X 4 MM misc, Use to inject 4 time daily, Disp: 200 each, Rfl: 5  •  metoprolol tartrate (LOPRESSOR) 25 MG tablet, TAKE 1/2 TABLET BY MOUTH EVERY 12 HOURS, Disp: 90 tablet, Rfl: 3  •  montelukast (SINGULAIR) 10 MG tablet, 10 mg daily., Disp: , Rfl:   •  potassium chloride (MICRO-K) 10 MEQ CR capsule, Take 10 mEq by mouth Daily., Disp: , Rfl:   •  Synthroid 150 MCG tablet, Take 1 tablet by mouth Daily., Disp: 30 tablet, Rfl: 5  •  Toujeo Max SoloStar 300 UNIT/ML solution pen-injector injection, INJECT 60 UNITS UNDER THE SKIN AS DIRECTED DAILY, Disp: 18 mL, Rfl: 0  •  vitamin B-12 (CYANOCOBALAMIN) 1000 MCG tablet, Take 1 tablet by mouth daily., Disp: , Rfl:   •  Vitamin E 268 MG (400 UNIT) capsule, Take  by mouth., Disp: , Rfl:   •  torsemide (DEMADEX) 100 MG tablet, Take 0.5 tablets by mouth 2 (Two) Times a Day. Pt takes 50mg in AM & 50mg in PM., Disp: , Rfl:       Objective:     Vitals:    10/03/22 1501   BP: 140/60   BP Location: Left arm   Pulse: (!) 46   Weight: 87.9 kg (193 lb 12.8 oz)   Height: 162.6 cm (64\")     Body mass index is 33.27 kg/m².    Physical Exam  Constitutional:       Appearance: Normal appearance.   HENT:      Head: Normocephalic.      Nose: Nose normal.      Mouth/Throat:      Comments: Masked  Eyes:      Conjunctiva/sclera: Conjunctivae normal.   Neck:      Vascular: JVD present.   Cardiovascular:      Rate and Rhythm: Regular rhythm. Bradycardia present.      Pulses: Normal pulses.      Heart sounds: Murmur heard.    Systolic murmur is present with a grade of 2/6 at the upper left sternal border.  Pulmonary:      Effort: Pulmonary effort is normal.      Breath sounds: Normal breath sounds.   Abdominal:      Palpations: Abdomen is soft.      Tenderness: There is no abdominal tenderness.   Musculoskeletal:         General: Swelling (3-4+ edema to thighs) present.   Skin:     General: Skin is warm and dry.   Neurological:      General: No focal deficit present.      " Mental Status: She is alert and oriented to person, place, and time.   Psychiatric:         Mood and Affect: Mood normal.         Behavior: Behavior normal.           ECG 12 Lead    Date/Time: 10/3/2022 3:35 PM  Performed by: Victor Hugo Ng MD  Authorized by: Victor Hugo Ng MD   Comparison: compared with previous ECG   Similar to previous ECG  Rhythm: sinus bradycardia  Conduction: right bundle branch block  ST Segments: ST segments normal  T Waves: T waves normal  QRS axis: normal  Other: no other findings    Clinical impression: abnormal EKG              Assessment:       Diagnosis Plan   1. Chronic diastolic (congestive) heart failure (HCC)  Adult Transthoracic Echo Complete W/ Cont if Necessary Per Protocol   2. Edema due to hypervolemia  Adult Transthoracic Echo Complete W/ Cont if Necessary Per Protocol   3. Stage 4 chronic kidney disease (HCC)     4. Essential hypertension     5. Bradycardia, sinus     6. Coronary artery disease involving native coronary artery of native heart without angina pectoris          Plan:     1-5.  I am very worried about her.  She is profoundly hypervolemic, especially in her legs.  However, she does not have rails.  This all seems most consistent with right-sided heart failure.  Unfortunately, she has significant renal dysfunction.  I am afraid that her low heart rate is contributing, and I have asked him to stop metoprolol completely.  I am giving her 4 mg of IV bumetanide in the office today.  She sees her nephrologist in just a few days, and will have repeat labs drawn.  I am worried that we are going to need to directly admit her for IV diuresis.  She definitely needs an echo, as well, and I have ordered that.    5.  Coronary Artery Disease  Assessment  • The patient has no angina    Subjective - Objective  • There is a history of previous coronary artery bypass graft 2/2020  • Current antiplatelet therapy includes aspirin 81 mg      She had a stress test last year that  reported some anterolateral ischemia, but it was not a large defect. Given her significant CKD, I feel we should continue with medical therapy and not pursue a cardiac catheterization.  Unfortunately, her amlodipine has been decreased, and will likely be stopped, due to her leg swelling.  I am having to stop her metoprolol due to low heart rate.  I could consider ranolazine, 5 mg twice daily (I cannot use 1000mg due to CKD), but she has pretty bad constipation at baseline.  We could consider nitrates.    Further recommendations will be made following the results of her echo as well as her follow-up visit with nephrology.    Sincerely,       Victor Hugo Ng MD

## 2022-10-13 RX ORDER — PERPHENAZINE 16 MG/1
TABLET, FILM COATED ORAL
Qty: 150 EACH | Refills: 0 | Status: SHIPPED | OUTPATIENT
Start: 2022-10-13 | End: 2023-01-16

## 2022-10-19 ENCOUNTER — HOSPITAL ENCOUNTER (OUTPATIENT)
Dept: BONE DENSITY | Facility: HOSPITAL | Age: 82
Discharge: HOME | End: 2022-10-19
Admitting: FAMILY MEDICINE

## 2022-10-19 DIAGNOSIS — N95.1 MENOPAUSAL STATE: ICD-10-CM

## 2022-10-19 DIAGNOSIS — Z13.820 ENCOUNTER FOR SCREENING FOR OSTEOPOROSIS: ICD-10-CM

## 2022-10-19 PROCEDURE — 77080 DXA BONE DENSITY AXIAL: CPT

## 2022-10-21 ENCOUNTER — TELEPHONE (OUTPATIENT)
Dept: CARDIOLOGY | Facility: CLINIC | Age: 82
End: 2022-10-21

## 2022-10-21 ENCOUNTER — OFFICE VISIT (OUTPATIENT)
Dept: CARDIOLOGY | Facility: CLINIC | Age: 82
End: 2022-10-21

## 2022-10-21 ENCOUNTER — HOSPITAL ENCOUNTER (OUTPATIENT)
Dept: CARDIOLOGY | Facility: HOSPITAL | Age: 82
Discharge: HOME | End: 2022-10-21
Admitting: INTERNAL MEDICINE

## 2022-10-21 VITALS
HEART RATE: 68 BPM | WEIGHT: 193 LBS | SYSTOLIC BLOOD PRESSURE: 140 MMHG | DIASTOLIC BLOOD PRESSURE: 70 MMHG | BODY MASS INDEX: 32.95 KG/M2 | HEIGHT: 64 IN | OXYGEN SATURATION: 97 %

## 2022-10-21 VITALS
WEIGHT: 169 LBS | DIASTOLIC BLOOD PRESSURE: 60 MMHG | HEART RATE: 69 BPM | HEIGHT: 64 IN | OXYGEN SATURATION: 95 % | BODY MASS INDEX: 28.85 KG/M2 | SYSTOLIC BLOOD PRESSURE: 140 MMHG

## 2022-10-21 DIAGNOSIS — I50.32 CHRONIC DIASTOLIC (CONGESTIVE) HEART FAILURE: ICD-10-CM

## 2022-10-21 DIAGNOSIS — I10 ESSENTIAL HYPERTENSION: ICD-10-CM

## 2022-10-21 DIAGNOSIS — E78.2 MIXED HYPERLIPIDEMIA: ICD-10-CM

## 2022-10-21 DIAGNOSIS — Z79.4 TYPE 2 DIABETES MELLITUS WITH DIABETIC POLYNEUROPATHY, WITH LONG-TERM CURRENT USE OF INSULIN: ICD-10-CM

## 2022-10-21 DIAGNOSIS — N18.4 STAGE 4 CHRONIC KIDNEY DISEASE: ICD-10-CM

## 2022-10-21 DIAGNOSIS — I50.32 CHRONIC DIASTOLIC (CONGESTIVE) HEART FAILURE: Primary | ICD-10-CM

## 2022-10-21 DIAGNOSIS — E11.42 TYPE 2 DIABETES MELLITUS WITH DIABETIC POLYNEUROPATHY, WITH LONG-TERM CURRENT USE OF INSULIN: ICD-10-CM

## 2022-10-21 DIAGNOSIS — E87.70 EDEMA DUE TO HYPERVOLEMIA: ICD-10-CM

## 2022-10-21 DIAGNOSIS — I25.10 CORONARY ARTERY DISEASE INVOLVING NATIVE CORONARY ARTERY OF NATIVE HEART WITHOUT ANGINA PECTORIS: ICD-10-CM

## 2022-10-21 LAB
AORTIC ARCH: 2.1 CM
ASCENDING AORTA: 3.8 CM
BH CV ECHO MEAS - ACS: 1.75 CM
BH CV ECHO MEAS - AO MAX PG: 8.8 MMHG
BH CV ECHO MEAS - AO MEAN PG: 5.3 MMHG
BH CV ECHO MEAS - AO ROOT DIAM: 3 CM
BH CV ECHO MEAS - AO V2 MAX: 148 CM/SEC
BH CV ECHO MEAS - AO V2 VTI: 32 CM
BH CV ECHO MEAS - AVA(I,D): 2.27 CM2
BH CV ECHO MEAS - EDV(CUBED): 162 ML
BH CV ECHO MEAS - EDV(MOD-SP2): 144 ML
BH CV ECHO MEAS - EDV(MOD-SP4): 145 ML
BH CV ECHO MEAS - EF(MOD-BP): 60.1 %
BH CV ECHO MEAS - EF(MOD-SP2): 61.1 %
BH CV ECHO MEAS - EF(MOD-SP4): 58.6 %
BH CV ECHO MEAS - ESV(CUBED): 49.1 ML
BH CV ECHO MEAS - ESV(MOD-SP2): 56 ML
BH CV ECHO MEAS - ESV(MOD-SP4): 60 ML
BH CV ECHO MEAS - FS: 32.8 %
BH CV ECHO MEAS - IVS/LVPW: 1.06 CM
BH CV ECHO MEAS - IVSD: 1.34 CM
BH CV ECHO MEAS - LAT PEAK E' VEL: 5.2 CM/SEC
BH CV ECHO MEAS - LV DIASTOLIC VOL/BSA (35-75): 75.3 CM2
BH CV ECHO MEAS - LV MASS(C)D: 300.7 GRAMS
BH CV ECHO MEAS - LV MAX PG: 4.2 MMHG
BH CV ECHO MEAS - LV MEAN PG: 2.4 MMHG
BH CV ECHO MEAS - LV SYSTOLIC VOL/BSA (12-30): 31.1 CM2
BH CV ECHO MEAS - LV V1 MAX: 102.8 CM/SEC
BH CV ECHO MEAS - LV V1 VTI: 25.5 CM
BH CV ECHO MEAS - LVIDD: 5.5 CM
BH CV ECHO MEAS - LVIDS: 3.7 CM
BH CV ECHO MEAS - LVOT AREA: 2.8 CM2
BH CV ECHO MEAS - LVOT DIAM: 1.9 CM
BH CV ECHO MEAS - LVPWD: 1.26 CM
BH CV ECHO MEAS - MED PEAK E' VEL: 3.5 CM/SEC
BH CV ECHO MEAS - MR MAX PG: 53.2 MMHG
BH CV ECHO MEAS - MR MAX VEL: 364.9 CM/SEC
BH CV ECHO MEAS - MV A DUR: 0.11 SEC
BH CV ECHO MEAS - MV A MAX VEL: 96.6 CM/SEC
BH CV ECHO MEAS - MV DEC SLOPE: 566.6 CM/SEC2
BH CV ECHO MEAS - MV DEC TIME: 0.22 MSEC
BH CV ECHO MEAS - MV E MAX VEL: 136 CM/SEC
BH CV ECHO MEAS - MV E/A: 1.41
BH CV ECHO MEAS - MV MAX PG: 7.7 MMHG
BH CV ECHO MEAS - MV MEAN PG: 3 MMHG
BH CV ECHO MEAS - MV P1/2T: 68.8 MSEC
BH CV ECHO MEAS - MV V2 VTI: 46.5 CM
BH CV ECHO MEAS - MVA(P1/2T): 3.2 CM2
BH CV ECHO MEAS - MVA(VTI): 1.56 CM2
BH CV ECHO MEAS - PA ACC TIME: 0.1 SEC
BH CV ECHO MEAS - PA PR(ACCEL): 34.6 MMHG
BH CV ECHO MEAS - PA V2 MAX: 131.2 CM/SEC
BH CV ECHO MEAS - PAPD(PI EDV): 9.1 MMHG
BH CV ECHO MEAS - PI END-D VEL: 150.8 CM/SEC
BH CV ECHO MEAS - PULM A REVS DUR: 0.1 SEC
BH CV ECHO MEAS - PULM A REVS VEL: 22.4 CM/SEC
BH CV ECHO MEAS - PULM DIAS VEL: 69.5 CM/SEC
BH CV ECHO MEAS - PULM S/D: 0.57
BH CV ECHO MEAS - PULM SYS VEL: 39.5 CM/SEC
BH CV ECHO MEAS - QP/QS: 1.02
BH CV ECHO MEAS - RAP SYSTOLE: 15 MMHG
BH CV ECHO MEAS - RV MAX PG: 3.9 MMHG
BH CV ECHO MEAS - RV V1 MAX: 98.5 CM/SEC
BH CV ECHO MEAS - RV V1 VTI: 24.4 CM
BH CV ECHO MEAS - RVOT DIAM: 1.97 CM
BH CV ECHO MEAS - RVSP: 55.1 MMHG
BH CV ECHO MEAS - SI(MOD-SP2): 45.7 ML/M2
BH CV ECHO MEAS - SI(MOD-SP4): 44.1 ML/M2
BH CV ECHO MEAS - SUP REN AO DIAM: 1.9 CM
BH CV ECHO MEAS - SV(LVOT): 72.8 ML
BH CV ECHO MEAS - SV(MOD-SP2): 88 ML
BH CV ECHO MEAS - SV(MOD-SP4): 85 ML
BH CV ECHO MEAS - SV(RVOT): 74.2 ML
BH CV ECHO MEAS - TAPSE (>1.6): 1.84 CM
BH CV ECHO MEAS - TR MAX PG: 40.1 MMHG
BH CV ECHO MEAS - TR MAX VEL: 316.7 CM/SEC
BH CV ECHO MEASUREMENTS AVERAGE E/E' RATIO: 31.26
BH CV XLRA - RV BASE: 3.2 CM
BH CV XLRA - RV LENGTH: 8.6 CM
BH CV XLRA - RV MID: 3.8 CM
BH CV XLRA - TDI S': 7 CM/SEC
LEFT ATRIUM VOLUME INDEX: 55.4 ML/M2
MAXIMAL PREDICTED HEART RATE: 137 BPM
SINUS: 3 CM
STJ: 3 CM
STRESS TARGET HR: 116 BPM

## 2022-10-21 PROCEDURE — 25010000002 PERFLUTREN (DEFINITY) 8.476 MG IN SODIUM CHLORIDE (PF) 0.9 % 10 ML INJECTION: Performed by: INTERNAL MEDICINE

## 2022-10-21 PROCEDURE — 99214 OFFICE O/P EST MOD 30 MIN: CPT | Performed by: NURSE PRACTITIONER

## 2022-10-21 PROCEDURE — 93306 TTE W/DOPPLER COMPLETE: CPT | Performed by: INTERNAL MEDICINE

## 2022-10-21 PROCEDURE — 93306 TTE W/DOPPLER COMPLETE: CPT

## 2022-10-21 RX ORDER — METOLAZONE 2.5 MG/1
TABLET ORAL 3 TIMES WEEKLY
COMMUNITY
Start: 2022-10-12 | End: 2023-02-01 | Stop reason: ALTCHOICE

## 2022-10-21 RX ORDER — BUMETANIDE 2 MG/1
2 TABLET ORAL 2 TIMES DAILY
COMMUNITY

## 2022-10-21 RX ADMIN — PERFLUTREN 1.5 ML: 6.52 INJECTION, SUSPENSION INTRAVENOUS at 07:48

## 2022-10-21 NOTE — TELEPHONE ENCOUNTER
Notified patient's daughter of results/recommendations. She verbalized understanding.    Akosua Santiago RN  [place] [facility] [place]

## 2022-10-21 NOTE — PROGRESS NOTES
Date of Office Visit: 10/21/22  Encounter Provider: TAYO Carter  Place of Service: [facility] CARDIOLOGY  Patient Name: Ange Johnson  :1940    Chief Complaint   Patient presents with   • Shortness of Breath   • Edema   • Dizziness     When turing head to fast.   • Follow-up   :     HPI: Ange Johnson is a 82 y.o. female  with coronary artery disease status post coronary artery bypass grafting x 5 in 2020,chronic kidney disease, aortic valve sclerosis, S/P CABG induced afib and placed on renal dosed AC and Amio, which both have since been discontinued.   She is followed by DR. Ng.  I will visit with her in follow-up today who reviewed her medical record  She had an echocardiogram 2021  showing normal left ventricular systolic function, grade 2 diastolic dysfunction as well as mild pulmonary hypertension.  She had aortic valve sclerosis with mild to moderate tricuspid regurgitation.  She was in volume overload.  She received IV diureticwith 2 mg IV bumetanide.  Her serum creatinine was at 1.96 and proBNP was elevated at 4000.  Torsemide was increased to 50 mg twice daily approximately 2022.   She has small size, moderately severe area of ischemia in the anterior wall on perfusion stress test 2021.  It was decided to treat her medically due to stage IV kidney disease  In 2022 she began having symptoms of fluid overload.  On 2020 to torsemide 50 mg once daily was switched to Bumex 2 mg twice daily.  Bumetanide was increased to 4 mg twice daily on 2022.  Amlodipine was decreased from 10 mg to 5 mg due to edema and patient was started on Cardura 2022.  On 10/3/2022.  She had significant hypervolemia and was given 4 mg IV Bumex in our cardiac evaluation clinic.  Metoprolol was stopped due to bradycardia.  She had significant lower extremity swelling.  Amlodipine was ultimately stopped by her nephrologist and Cardura was increased to 2 mg  "daily.  Metolazone was added 2.5 mg 3 times weekly.  Patient presents today for reassessment.  Her swelling has significantly improved although she still has some lower extremity swelling and wounds that her daughter is treating daily.  Her breathing is also better.  She denies chest pain, chest tightness and chest pressure.  No orthopnea or PND.  Patient reports feeling better than she has in a couple months        Allergies   Allergen Reactions   • Codeine Nausea Only   • Hydrocodone-Acetaminophen Nausea Only and Other (See Comments)     Percocet and Vicodin; vertigo   • Meperidine Nausea Only and Nausea And Vomiting   • Morphine And Related Nausea Only   • Oxycodone-Acetaminophen Nausea Only   • Oxycodone-Acetaminophen Nausea Only and Other (See Comments)     dilzziness   • Oxycodone-Aspirin Nausea Only and Other (See Comments)     vertigo   • Nickel Rash   • Penicillins Hives   • Shrimp Rash   • Sulfa Antibiotics Hives           Family and social history reviewed.     ROS  All other systems were reviewed and are negative          Objective:     Vitals:    10/21/22 0816   BP: 140/60   BP Location: Right arm   Patient Position: Sitting   Cuff Size: Adult   Pulse: 69   SpO2: 95%   Weight: 76.7 kg (169 lb)   Height: 162.6 cm (64\")     Body mass index is 29.01 kg/m².    PHYSICAL EXAM:  Pulmonary:      Breath sounds: Examination of the right-lower field reveals decreased breath sounds. Examination of the left-lower field reveals decreased breath sounds and rales. Decreased breath sounds present. Rales present.   Cardiovascular:      Regular rhythm.      Murmurs: There is a grade 2/6 systolic murmur at the ULSB.         Procedures      Current Outpatient Medications   Medication Sig Dispense Refill   • aspirin (aspirin) 81 MG EC tablet Take 1 tablet by mouth Daily. 90 tablet 3   • atorvastatin (LIPITOR) 40 MG tablet Take 1 tablet by mouth Every Night. 90 tablet 1   • bumetanide (BUMEX) 2 MG tablet Take 2 tablets by " mouth 2 (Two) Times a Day.     • carBAMazepine (CARBATROL) 300 MG 12 hr capsule Take 1 capsule by mouth Every 12 (Twelve) Hours. 180 capsule 3   • cetirizine (zyrTEC) 10 MG tablet Take 10 mg by mouth Every Night.     • cloNIDine (CATAPRES) 0.1 MG tablet TAKE ONE TABLET BY MOUTH EVERY 12 HOURS 180 tablet 3   • Contour Next Test test strip USE ONE STRIP TO TEST TWICE A  each 0   • doxazosin (CARDURA) 1 MG tablet Take 2 tablets by mouth.     • febuxostat (Uloric) 40 MG tablet Take 1 tablet by mouth Daily. 90 tablet 3   • ferrous sulfate 325 (65 FE) MG tablet Take 1 tablet by mouth daily.     • Glucagon (Gvoke HypoPen 2-Pack) 1 MG/0.2ML solution auto-injector Inject 1 mg under the skin into the appropriate area as directed As Needed (Hypoglycemia). 0.4 mL 1   • hydrALAZINE (APRESOLINE) 100 MG tablet Take 100 mg by mouth 3 (Three) Times a Day.     • Insulin Lispro, 1 Unit Dial, (HumaLOG KwikPen) 100 UNIT/ML solution pen-injector Inject 5 Units under the skin into the appropriate area as directed 3 (Three) Times a Day With Meals. 15 mL 0   • Insulin Pen Needle (BD PEN NEEDLE ISELA U/F) 32G X 4 MM misc Use to inject 4 time daily 200 each 5   • metOLazone (ZAROXOLYN) 2.5 MG tablet 3 (Three) Times a Week.     • metoprolol tartrate (LOPRESSOR) 25 MG tablet TAKE 1/2 TABLET BY MOUTH EVERY 12 HOURS 90 tablet 3   • montelukast (SINGULAIR) 10 MG tablet 10 mg daily.     • potassium chloride (MICRO-K) 10 MEQ CR capsule Take 10 mEq by mouth Daily.     • Synthroid 150 MCG tablet Take 1 tablet by mouth Daily. 30 tablet 5   • Toujeo Max SoloStar 300 UNIT/ML solution pen-injector injection INJECT 60 UNITS UNDER THE SKIN AS DIRECTED DAILY 18 mL 0   • vitamin B-12 (CYANOCOBALAMIN) 1000 MCG tablet Take 1 tablet by mouth daily.     • Vitamin E 268 MG (400 UNIT) capsule Take  by mouth.       No current facility-administered medications for this visit.     Assessment:       Diagnosis Plan   1. Chronic diastolic (congestive) heart failure  (HCC)        2. Essential hypertension        3. Coronary artery disease involving native coronary artery of native heart without angina pectoris        4. Mixed hyperlipidemia        5. Type 2 diabetes mellitus with diabetic polyneuropathy, with long-term current use of insulin (HCC)        6. Stage 4 chronic kidney disease (HCC)             No orders of the defined types were placed in this encounter.        Plan:         1.  82-year-old female with chronic diastolic congestive heart failure.  She has evidence of mild RV dysfunction today on echocardiogram in the setting of pulmonary hypertension with RVSP approximately 55 mmHg.  She  has improved and her volume status is much better on bumetanide 4 mg twice daily and metolazone 2.5 mg 3 times a week.  She will have follow-up labs with her nephrologist in about 2-1/2 weeks.  I did not make any changes today  2.  Coronary artery disease status post coronary bypass grafting x5 02/2020.  She had an abnormal perfusion stress test July 2021 but has been treated medically due to progressive kidney disease.  She needs to resume exercise now that she is feeling better. Could consider nitrate or ranexa 500 BID  ( not a candidate for 1000 BID due to CKD) but at this time not needed since she denies angina  3.  Hypertension blood pressure stable -continue current regimen.  She is off amlodipine due to swelling.  4.  Stage IV chronic kidney disease-following closely with Dr. Ram  5.  Anemia of chronic disease.  Her last hemoglobin was 9 point  6.  Aortic valve sclerosis-mild murmur left upper sternal border  7.  Chronic right bundle branch block-no changes in EKG.  8.  Carotid artery plaque without significant stenosis bilateral on duplex 2/2020.    9. HLD- continue to take lipitor 40 mg daily   10-type 2 diabetes-well controlled on subcu insulin.  Last A1c 6.1.   11-hypothyroidism, on Synthroid per PCP.       Follow-up in 3 months with Dr. Ng          It has been a  pleasure to participate in this patient's care.      Thank you,  TAYO Carter      **I used Dragon to dictate this note:**

## 2022-10-21 NOTE — TELEPHONE ENCOUNTER
Triage-Please inform patient's daughterHenna that echo shows left ventricular systolic function remains preserved.  RV has mildly decreased function secondary to pulmonary hypertension.  Aortic valve sclerosis without significant regurgitation or stenosis is appreciated as well as mitral calcification is noted with mild mitral insufficiency.  Overall, echo findings are stable and patient has improved with diuretic management.  She will keep her appointment in 3 months and continue diuretic therapy as advised by nephrology    Dr. Ng- patient is much better now. She has no angina.  She is off metoprolol and off amlodipine. Cardura was increased by nephrology and she is down 20 pounds since she saw you. She is on bumex 4 mg BID and metolazone 2.5 mg 3x weekly. She will see nephrology 11/14 with labs. I did not change anything today.

## 2022-11-28 DIAGNOSIS — E11.42 TYPE 2 DIABETES MELLITUS WITH DIABETIC POLYNEUROPATHY, WITH LONG-TERM CURRENT USE OF INSULIN: ICD-10-CM

## 2022-11-28 DIAGNOSIS — E78.2 MIXED HYPERLIPIDEMIA: ICD-10-CM

## 2022-11-28 DIAGNOSIS — Z79.4 TYPE 2 DIABETES MELLITUS WITH DIABETIC POLYNEUROPATHY, WITH LONG-TERM CURRENT USE OF INSULIN: ICD-10-CM

## 2022-11-28 DIAGNOSIS — E89.0 POSTOPERATIVE HYPOTHYROIDISM: ICD-10-CM

## 2022-11-28 RX ORDER — ATORVASTATIN CALCIUM 40 MG/1
TABLET, FILM COATED ORAL
Qty: 90 TABLET | Refills: 0 | Status: SHIPPED | OUTPATIENT
Start: 2022-11-28 | End: 2023-01-05 | Stop reason: SDUPTHER

## 2022-12-19 ENCOUNTER — TELEPHONE (OUTPATIENT)
Dept: ENDOCRINOLOGY | Age: 82
End: 2022-12-19

## 2022-12-19 DIAGNOSIS — E78.2 MIXED HYPERLIPIDEMIA: ICD-10-CM

## 2022-12-19 DIAGNOSIS — E11.42 TYPE 2 DIABETES MELLITUS WITH DIABETIC POLYNEUROPATHY, WITH LONG-TERM CURRENT USE OF INSULIN: Primary | ICD-10-CM

## 2022-12-19 DIAGNOSIS — Z79.4 TYPE 2 DIABETES MELLITUS WITH DIABETIC POLYNEUROPATHY, WITH LONG-TERM CURRENT USE OF INSULIN: Primary | ICD-10-CM

## 2022-12-22 ENCOUNTER — LAB (OUTPATIENT)
Dept: ENDOCRINOLOGY | Age: 82
End: 2022-12-22

## 2022-12-22 DIAGNOSIS — Z79.4 TYPE 2 DIABETES MELLITUS WITH DIABETIC POLYNEUROPATHY, WITH LONG-TERM CURRENT USE OF INSULIN: ICD-10-CM

## 2022-12-22 DIAGNOSIS — E11.42 TYPE 2 DIABETES MELLITUS WITH DIABETIC POLYNEUROPATHY, WITH LONG-TERM CURRENT USE OF INSULIN: ICD-10-CM

## 2022-12-22 DIAGNOSIS — E78.2 MIXED HYPERLIPIDEMIA: ICD-10-CM

## 2022-12-23 LAB
ALBUMIN SERPL-MCNC: 3.5 G/DL (ref 3.5–5.2)
ALBUMIN/GLOB SERPL: 1.3 G/DL
ALP SERPL-CCNC: 99 U/L (ref 39–117)
ALT SERPL-CCNC: 7 U/L (ref 1–33)
AST SERPL-CCNC: 14 U/L (ref 1–32)
BILIRUB SERPL-MCNC: <0.2 MG/DL (ref 0–1.2)
BUN SERPL-MCNC: 44 MG/DL (ref 8–23)
BUN/CREAT SERPL: 24 (ref 7–25)
CALCIUM SERPL-MCNC: 9.4 MG/DL (ref 8.6–10.5)
CHLORIDE SERPL-SCNC: 102 MMOL/L (ref 98–107)
CHOLEST SERPL-MCNC: 148 MG/DL (ref 0–200)
CO2 SERPL-SCNC: 30.7 MMOL/L (ref 22–29)
CREAT SERPL-MCNC: 1.83 MG/DL (ref 0.57–1)
EGFRCR SERPLBLD CKD-EPI 2021: 27.1 ML/MIN/1.73
GLOBULIN SER CALC-MCNC: 2.7 GM/DL
GLUCOSE SERPL-MCNC: 197 MG/DL (ref 65–99)
HBA1C MFR BLD: 5.2 % (ref 4.8–5.6)
HDLC SERPL-MCNC: 55 MG/DL (ref 40–60)
IMP & REVIEW OF LAB RESULTS: NORMAL
LDLC SERPL CALC-MCNC: 71 MG/DL (ref 0–100)
POTASSIUM SERPL-SCNC: 3.7 MMOL/L (ref 3.5–5.2)
PROT SERPL-MCNC: 6.2 G/DL (ref 6–8.5)
SODIUM SERPL-SCNC: 142 MMOL/L (ref 136–145)
T3FREE SERPL-MCNC: 2 PG/ML (ref 2–4.4)
T4 FREE SERPL-MCNC: 1.41 NG/DL (ref 0.93–1.7)
TRIGL SERPL-MCNC: 122 MG/DL (ref 0–150)
TSH SERPL DL<=0.005 MIU/L-ACNC: 0.14 UIU/ML (ref 0.27–4.2)
VLDLC SERPL CALC-MCNC: 22 MG/DL (ref 5–40)

## 2022-12-29 RX ORDER — LEVOTHYROXINE SODIUM 150 MCG
TABLET ORAL
Qty: 30 TABLET | Refills: 0 | Status: SHIPPED | OUTPATIENT
Start: 2022-12-29 | End: 2023-01-05

## 2023-01-05 ENCOUNTER — OFFICE VISIT (OUTPATIENT)
Dept: ENDOCRINOLOGY | Age: 83
End: 2023-01-05
Payer: MEDICARE

## 2023-01-05 VITALS
DIASTOLIC BLOOD PRESSURE: 68 MMHG | SYSTOLIC BLOOD PRESSURE: 152 MMHG | WEIGHT: 160 LBS | HEART RATE: 64 BPM | RESPIRATION RATE: 16 BRPM | BODY MASS INDEX: 27.46 KG/M2

## 2023-01-05 DIAGNOSIS — E11.42 TYPE 2 DIABETES MELLITUS WITH DIABETIC POLYNEUROPATHY, WITH LONG-TERM CURRENT USE OF INSULIN: ICD-10-CM

## 2023-01-05 DIAGNOSIS — E78.2 MIXED HYPERLIPIDEMIA: ICD-10-CM

## 2023-01-05 DIAGNOSIS — Z79.4 TYPE 2 DIABETES MELLITUS WITH DIABETIC POLYNEUROPATHY, WITH LONG-TERM CURRENT USE OF INSULIN: ICD-10-CM

## 2023-01-05 DIAGNOSIS — E89.0 POSTOPERATIVE HYPOTHYROIDISM: ICD-10-CM

## 2023-01-05 PROCEDURE — 99214 OFFICE O/P EST MOD 30 MIN: CPT | Performed by: NURSE PRACTITIONER

## 2023-01-05 RX ORDER — ATORVASTATIN CALCIUM 40 MG/1
40 TABLET, FILM COATED ORAL NIGHTLY
Qty: 90 TABLET | Refills: 1 | Status: SHIPPED | OUTPATIENT
Start: 2023-01-05

## 2023-01-05 RX ORDER — LEVOTHYROXINE SODIUM 0.12 MG/1
125 TABLET ORAL DAILY
Qty: 90 TABLET | Refills: 1 | Status: SHIPPED | OUTPATIENT
Start: 2023-01-05 | End: 2023-01-05

## 2023-01-05 RX ORDER — LEVOTHYROXINE SODIUM 125 MCG
125 TABLET ORAL DAILY
Qty: 90 TABLET | Refills: 1 | Status: SHIPPED | OUTPATIENT
Start: 2023-01-05 | End: 2024-01-05

## 2023-01-05 NOTE — PROGRESS NOTES
Chief Complaint  Diabetes    Subjective        Ange Johnson presents to 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   Today in clinic pt reports being on Toujeo 34U QAM if BS > 100  Not using humalog   Checks BG - once a day   Dm retinopathy - denies, Last eye exam -  UTD 2023  Dm nephropathy - yes, sees nephrologist, stage 4 CKD  Dm neuropathy - denies, sees podiatrist q10 weeks  CAD - yes, February 2020 blockage in heart, + CHF, losing fluid/ weight, has f/u feb 2023   CVA - denies  Episodes of hypoglycemia - no  S/s: jitteriness and weakness  Treatment: glucose tablets     Off ARB per nephrology  On statin  Lab Results   Component Value Date    CHOL 153 02/12/2020    CHLPL 148 12/22/2022    TRIG 122 12/22/2022    HDL 55 12/22/2022    LDL 71 12/22/2022        Hypothyroid  Synthroid 150 mcg daily  Lab Results   Component Value Date    TSH 0.143 (L) 12/22/2022       Objective   Vital Signs:  /68 Comment (BP Location): left arm sitting  Pulse 64   Resp 16   Wt 72.6 kg (160 lb)   BMI 27.46 kg/m²   Estimated body mass index is 27.46 kg/m² as calculated from the following:    Height as of 10/21/22: 162.6 cm (64\").    Weight as of this encounter: 72.6 kg (160 lb).          Physical Exam  Vitals reviewed.   Constitutional:       General: She is not in acute distress.  HENT:      Head: Normocephalic and atraumatic.   Eyes:      General:         Right eye: No discharge.         Left eye: No discharge.   Cardiovascular:      Rate and Rhythm: Normal rate and regular rhythm.   Pulmonary:      Effort: Pulmonary effort is normal. No respiratory distress.   Musculoskeletal:         General: No signs of injury. Normal range of motion.      Cervical back: Normal range of motion and neck supple.   Skin:     General: Skin is warm and dry.   Neurological:      Mental  Status: She is alert and oriented to person, place, and time. Mental status is at baseline.   Psychiatric:         Mood and Affect: Mood normal.         Behavior: Behavior normal.         Thought Content: Thought content normal.         Judgment: Judgment normal.        Result Review :  The following data was reviewed by: TAYO Chen on 01/05/2023:  Common labs    Common Labs 6/16/22 6/16/22 9/7/22 12/22/22 12/22/22 12/22/22    1531 1531  1552 1552 1552   Glucose   114 (A)  197 (A)    BUN   46 (A)  44 (A)    Creatinine   2.58 (A)  1.83 (A)    Sodium   138  142    Potassium   3.4 (A)  3.7    Chloride   101  102    Calcium   9.0  9.4    Total Protein     6.2    Albumin     3.50    Total Bilirubin     <0.2    Alkaline Phosphatase     99    AST (SGOT)     14    ALT (SGPT)     7    Total Cholesterol  166    148   Triglycerides  102    122   HDL Cholesterol  53    55   LDL Cholesterol   94    71   Hemoglobin A1C 6.1 (A)   5.20     (A) Abnormal value       Comments are available for some flowsheets but are not being displayed.                     Assessment and Plan   Diagnoses and all orders for this visit:    1. Type 2 diabetes mellitus with diabetic polyneuropathy, with long-term current use of insulin (HCC)  -     atorvastatin (LIPITOR) 40 MG tablet; Take 1 tablet by mouth Every Night.  Dispense: 90 tablet; Refill: 1    2. Mixed hyperlipidemia  -     atorvastatin (LIPITOR) 40 MG tablet; Take 1 tablet by mouth Every Night.  Dispense: 90 tablet; Refill: 1    3. Postoperative hypothyroidism  -     atorvastatin (LIPITOR) 40 MG tablet; Take 1 tablet by mouth Every Night.  Dispense: 90 tablet; Refill: 1  -     TSH; Future  -     T4, Free; Future  -     T3, Free; Future    Other orders  -     Discontinue: levothyroxine (Synthroid) 125 MCG tablet; Take 1 tablet by mouth Daily.  Dispense: 90 tablet; Refill: 1  -     Synthroid 125 MCG tablet; Take 1 tablet by mouth Daily.  Dispense: 90 tablet; Refill: 1              Follow Up   Return in about 6 months (around 7/5/2023).     Decrease thyroid dose to 125mcg daily, repeat labs in 2-3 months  A1c at goal, continue current insulin dosing with hypoglycemia prevention and monitoring  Cholesterol labs favorable, continue statin    Patient was given instructions and counseling regarding her condition or for health maintenance advice. Please see specific information pulled into the AVS if appropriate.     Coleen Schilling APRN

## 2023-01-05 NOTE — Clinical Note
No one was here to check the patient out after her visit Follow-up labs only in 3 months Follow-up with me in 6 months

## 2023-01-16 RX ORDER — PERPHENAZINE 16 MG/1
TABLET, FILM COATED ORAL
Qty: 100 EACH | Refills: 0 | Status: SHIPPED | OUTPATIENT
Start: 2023-01-16

## 2023-01-18 ENCOUNTER — OFFICE VISIT (OUTPATIENT)
Dept: FAMILY MEDICINE CLINIC | Facility: CLINIC | Age: 83
End: 2023-01-18
Payer: MEDICARE

## 2023-01-18 VITALS
HEART RATE: 72 BPM | DIASTOLIC BLOOD PRESSURE: 72 MMHG | OXYGEN SATURATION: 96 % | RESPIRATION RATE: 16 BRPM | SYSTOLIC BLOOD PRESSURE: 168 MMHG | BODY MASS INDEX: 26.98 KG/M2 | WEIGHT: 158 LBS | TEMPERATURE: 97.6 F | HEIGHT: 64 IN

## 2023-01-18 DIAGNOSIS — L03.116 CELLULITIS OF LEFT LOWER EXTREMITY: ICD-10-CM

## 2023-01-18 DIAGNOSIS — R60.0 LEG EDEMA, LEFT: Primary | ICD-10-CM

## 2023-01-18 PROCEDURE — 99214 OFFICE O/P EST MOD 30 MIN: CPT | Performed by: FAMILY MEDICINE

## 2023-01-18 RX ORDER — DOXAZOSIN 2 MG/1
2 TABLET ORAL
COMMUNITY
Start: 2022-11-21 | End: 2023-11-21

## 2023-01-18 RX ORDER — CEPHALEXIN 500 MG/1
CAPSULE ORAL
COMMUNITY
Start: 2023-01-11 | End: 2023-02-01

## 2023-01-18 RX ORDER — DOXYCYCLINE HYCLATE 100 MG
100 TABLET ORAL 2 TIMES DAILY
Qty: 20 TABLET | Refills: 0 | Status: SHIPPED | OUTPATIENT
Start: 2023-01-18 | End: 2023-01-29

## 2023-01-18 NOTE — PROGRESS NOTES
"Subjective   Ange Johnson is a 82 y.o. female.     CC: Leg Redness/Swelling    History of Present Illness     Pt of Dr Washington's comes to see me today reporting a several month h/o LLE redness/rash situation and now a 1 day h/o some mild swelling. Pt currently on Kefex by her Podiatrist last week. Pt has had issues with the bilateral LE for some time now regarding wounds and cellulitis, but the edema of the LLE calf started after a car trip 2 days ago.      The following portions of the patient's history were reviewed and updated as appropriate: allergies, current medications, past family history, past medical history, past social history, past surgical history and problem list.    Review of Systems   Constitutional: Negative for activity change, chills and fever.   Respiratory: Negative for cough.    Cardiovascular: Positive for leg swelling. Negative for chest pain.   Skin: Positive for rash.   Psychiatric/Behavioral: Negative for dysphoric mood.       /72 Comment: ISH BP TODAY  Pulse 72   Temp 97.6 °F (36.4 °C) (Oral)   Resp 16   Ht 162.6 cm (64\")   Wt 71.7 kg (158 lb)   SpO2 96%   BMI 27.12 kg/m²     Objective   Physical Exam  Musculoskeletal:        Legs:       Comments: Erythema and mild warmth; the left calf is swollen > right and taught to touch         Assessment & Plan   Diagnoses and all orders for this visit:    1. Leg edema, left (Primary)  -     Duplex Venous Lower Extremity - Left CAR; Future    2. Cellulitis of left lower extremity  -     doxycycline (VIBRAMYICN) 100 MG tablet; Take 1 tablet by mouth 2 (Two) Times a Day.  Dispense: 20 tablet; Refill: 0    Pt can't get her U/S done until tomorrow at 1pm, so gave samples of Eliquis 5mg BID to start tonight until results back.           "

## 2023-01-19 ENCOUNTER — HOSPITAL ENCOUNTER (OUTPATIENT)
Dept: CARDIOLOGY | Facility: HOSPITAL | Age: 83
Discharge: HOME | End: 2023-01-19
Admitting: FAMILY MEDICINE
Payer: MEDICARE

## 2023-01-19 DIAGNOSIS — R60.0 LEG EDEMA, LEFT: ICD-10-CM

## 2023-01-19 LAB
BH CV LOW VAS LEFT COMMON FEMORAL SPONT: 1
BH CV LOW VAS LEFT LESSER SAPH VESSEL: 1
BH CV LOWER VASCULAR LEFT COMMON FEMORAL AUGMENT: NORMAL
BH CV LOWER VASCULAR LEFT COMMON FEMORAL COMPETENT: NORMAL
BH CV LOWER VASCULAR LEFT COMMON FEMORAL COMPRESS: NORMAL
BH CV LOWER VASCULAR LEFT COMMON FEMORAL PHASIC: NORMAL
BH CV LOWER VASCULAR LEFT COMMON FEMORAL SPONT: NORMAL
BH CV LOWER VASCULAR LEFT COMMON FEMORAL THROMBUS: NORMAL
BH CV LOWER VASCULAR LEFT DISTAL FEMORAL COMPRESS: NORMAL
BH CV LOWER VASCULAR LEFT GASTRONEMIUS COMPRESS: NORMAL
BH CV LOWER VASCULAR LEFT GREATER SAPH AK COMPRESS: NORMAL
BH CV LOWER VASCULAR LEFT GREATER SAPH BK COMPRESS: NORMAL
BH CV LOWER VASCULAR LEFT LESSER SAPH COMPRESS: NORMAL
BH CV LOWER VASCULAR LEFT LESSER SAPH THROMBUS: NORMAL
BH CV LOWER VASCULAR LEFT MID FEMORAL AUGMENT: NORMAL
BH CV LOWER VASCULAR LEFT MID FEMORAL COMPETENT: NORMAL
BH CV LOWER VASCULAR LEFT MID FEMORAL COMPRESS: NORMAL
BH CV LOWER VASCULAR LEFT MID FEMORAL PHASIC: NORMAL
BH CV LOWER VASCULAR LEFT MID FEMORAL SPONT: NORMAL
BH CV LOWER VASCULAR LEFT PERONEAL COMPRESS: NORMAL
BH CV LOWER VASCULAR LEFT POPLITEAL AUGMENT: NORMAL
BH CV LOWER VASCULAR LEFT POPLITEAL COMPETENT: NORMAL
BH CV LOWER VASCULAR LEFT POPLITEAL COMPRESS: NORMAL
BH CV LOWER VASCULAR LEFT POPLITEAL PHASIC: NORMAL
BH CV LOWER VASCULAR LEFT POPLITEAL SPONT: NORMAL
BH CV LOWER VASCULAR LEFT POSTERIOR TIBIAL COMPRESS: NORMAL
BH CV LOWER VASCULAR LEFT PROFUNDA FEMORAL COMPRESS: NORMAL
BH CV LOWER VASCULAR LEFT PROXIMAL FEMORAL COMPRESS: NORMAL
BH CV LOWER VASCULAR LEFT SAPHENOFEMORAL JUNCTION COMPRESS: NORMAL
BH CV LOWER VASCULAR RIGHT COMMON FEMORAL AUGMENT: NORMAL
BH CV LOWER VASCULAR RIGHT COMMON FEMORAL COMPETENT: NORMAL
BH CV LOWER VASCULAR RIGHT COMMON FEMORAL COMPRESS: NORMAL
BH CV LOWER VASCULAR RIGHT COMMON FEMORAL PHASIC: NORMAL
BH CV LOWER VASCULAR RIGHT COMMON FEMORAL SPONT: NORMAL
BH CV POP FLUID COLLECT LEFT: 1
MAXIMAL PREDICTED HEART RATE: 137 BPM
STRESS TARGET HR: 116 BPM

## 2023-01-19 PROCEDURE — 93971 EXTREMITY STUDY: CPT

## 2023-01-19 NOTE — PROGRESS NOTES
Today's preliminary for left lower extremity venous is negative for DVT. A preliminary was called to Dr. Cowart. He said to let the patient know she is negative and good to go.

## 2023-01-29 ENCOUNTER — DOCUMENTATION (OUTPATIENT)
Dept: FAMILY MEDICINE CLINIC | Facility: CLINIC | Age: 83
End: 2023-01-29
Payer: MEDICARE

## 2023-01-29 ENCOUNTER — TELEMEDICINE (OUTPATIENT)
Dept: FAMILY MEDICINE CLINIC | Facility: TELEHEALTH | Age: 83
End: 2023-01-29
Payer: MEDICARE

## 2023-01-29 DIAGNOSIS — L27.0 DRUG RASH: Primary | ICD-10-CM

## 2023-01-29 PROCEDURE — 99213 OFFICE O/P EST LOW 20 MIN: CPT | Performed by: NURSE PRACTITIONER

## 2023-01-29 RX ORDER — PREDNISONE 10 MG/1
TABLET ORAL DAILY
Qty: 21 EACH | Refills: 0 | Status: SHIPPED | OUTPATIENT
Start: 2023-01-29 | End: 2023-02-04

## 2023-01-29 NOTE — PROGRESS NOTES
Subjective   Chief Complaint   Patient presents with   • Rash       Ange Johnson is a 82 y.o. female.     History of Present Illness  Spoke with patient's daughter on the phone.  Patient has been taking doxycycline for possible cellulitis.  Two days ago she developed a rash on her lower legs, upper feet, axilla and the top of her back, this was a few days after being on antibiotics. Daughter thinks the rash is possibly an allergic reaction to the doxycycline she stopped the medicine 2 days ago when the rash began.  She has been treating symptoms with hydrocortisone, Zyrtec and Benadryl with no significant relief.  The rash is very pruritic and is not improving.  She is requesting possible steroids.  Daughter states she spoke with patient's PCP and they do not think she needs additional antibiotics at this time.  Denies fever, N/V, shortness of breath, wheezing, swelling of the lips tongue or throat.  Rash  This is a new problem. Episode onset: 2 days. The problem has been gradually worsening since onset. The rash is diffuse. The rash is characterized by redness and itchiness. She was exposed to a new medication. Pertinent negatives include no anorexia, congestion, cough, diarrhea, eye pain, facial edema, fatigue, fever, joint pain, nail changes, rhinorrhea, shortness of breath, sore throat or vomiting. Treatments tried: Hydrocortisone cream, Zyrtec and Benadryl. The treatment provided no relief.        Allergies   Allergen Reactions   • Codeine Nausea Only   • Hydrocodone-Acetaminophen Nausea Only and Other (See Comments)     Percocet and Vicodin; vertigo   • Meperidine Nausea Only and Nausea And Vomiting   • Morphine And Related Nausea Only   • Oxycodone-Acetaminophen Nausea Only   • Oxycodone-Acetaminophen Nausea Only and Other (See Comments)     dilzziness   • Oxycodone-Aspirin Nausea Only and Other (See Comments)     vertigo   • Nickel Rash   • Penicillins Hives   • Shrimp Rash   • Sulfa Antibiotics Hives        Past Medical History:   Diagnosis Date   • Anemia in stage 3 chronic kidney disease (HCC) 2016   • Asthma    • Atherosclerosis of both carotid arteries     plaque without stenosis,    • Bone spur of acromioclavicular joint    • CAD (coronary artery disease)     2020: 90% dLM, 60% pLAD, 90% mLAD, 90% D1, 90% mLCx, 50-60% OM1, 50% pRCA; s/p CABG x 5 (LIMA-LAD, SVG-PDA, SVG-OM1, SVG-OM2, SVG-D1)   • Chronic diastolic (congestive) heart failure ([facility]) 2021   • Chronic venous insufficiency    • CKD (chronic kidney disease) stage 3, GFR 30-59 ml/min ([facility])    • Essential hypertension    • Gout    • Grief reaction       2010 after 15 foot fall off ladder   • H/O cataract    • Heel spur    • History of tendinitis    • Hyperlipidemia    • Hyperparathyroidism ([facility])    • Hyperthyroidism    • Hypothyroidism, acquired    • Non-toxic goiter    • Osteoarthritis    • Pneumonia    • Postoperative atrial fibrillation ([facility])     after CABG   • Proteinuria    • Thrombophlebitis leg     after CABG   • Type 2 diabetes mellitus with neurological manifestations ([facility])    • Vitamin D deficiency        Past Surgical History:   Procedure Laterality Date   • BREAST EXCISIONAL BIOPSY Left     Dr. Ybarra benign   • BREAST EXCISIONAL BIOPSY Bilateral     benign   • CARDIAC CATHETERIZATION N/A 2/10/2020    Procedure: Left heart cath without LV gram;  Surgeon: Emerson Deras MD;  Location: [facility] CATH INVASIVE LOCATION;  Service: Cardiovascular;  Laterality: N/A;   • CARDIAC CATHETERIZATION N/A 2/10/2020    Procedure: Coronary angiography;  Surgeon: Emerson Deras MD;  Location:  [facility] CATH INVASIVE LOCATION;  Service: Cardiovascular;  Laterality: N/A;   • CATARACT EXTRACTION Left 2010   • CATARACT EXTRACTION Right 2010   • CORONARY ARTERY BYPASS GRAFT N/A 2020    Procedure: MIDLINE STERNOTOMY, CORONARY ARTERY BYPASS GRAFTING X  5 USING LEFT INTERNAL MAMMARY ARTERY AND LEFT  ENDOSCOPICALLY HARVESTED GREATER SAPHENOUS VEIN, INTRAOP KERWIN, PRP;  Surgeon: Jr Anthony Rai MD;  Location: [facility] OR;  Service: Cardiothoracic;  Laterality: N/A;   • HYSTERECTOMY  1988    Dr. Quinn Peña   • OOPHORECTOMY      late 40's   • THYROIDECTOMY Right 12/8/2016    Procedure: right PARATHYROID EXCISION OF NODULE and right thyroid lobectomy and bilateral exploration.;  Surgeon: Tasneem Montelongo MD;  Location: [facility];  Service:        Social History     Socioeconomic History   • Marital status:    • Number of children: 4   Tobacco Use   • Smoking status: Never   • Smokeless tobacco: Never   Vaping Use   • Vaping Use: Never used   Substance and Sexual Activity   • Alcohol use: Not Currently     Comment: Caffeine use: Decaf   • Drug use: No   • Sexual activity: Defer       Family History   Problem Relation Age of Onset   • Diabetes Sister    • Diabetes Brother    • Hypertension Brother    • Kidney disease Brother    • Cervical cancer Mother    • Heart disease Sister    • Neuropathy Sister    • Diabetes Sister    • Breast cancer Neg Hx          Current Outpatient Medications:   •  aspirin (aspirin) 81 MG EC tablet, Take 1 tablet by mouth Daily., Disp: 90 tablet, Rfl: 3  •  atorvastatin (LIPITOR) 40 MG tablet, Take 1 tablet by mouth Every Night., Disp: 90 tablet, Rfl: 1  •  bumetanide (BUMEX) 2 MG tablet, Take 2 mg by mouth 2 (Two) Times a Day., Disp: , Rfl:   •  carBAMazepine (CARBATROL) 300 MG 12 hr capsule, Take 1 capsule by mouth Every 12 (Twelve) Hours., Disp: 180 capsule, Rfl: 3  •  cephalexin (KEFLEX) 500 MG capsule, , Disp: , Rfl:   •  cetirizine (zyrTEC) 10 MG tablet, Take 10 mg by mouth Every Night., Disp: , Rfl:   •  cloNIDine (CATAPRES) 0.1 MG tablet, TAKE ONE TABLET BY MOUTH EVERY 12 HOURS, Disp: 180 tablet, Rfl: 3  •  Contour Next Test test strip, TEST TWO TIMES A DAY, Disp: 100 each, Rfl: 0  •  doxazosin (CARDURA) 1 MG tablet, Take 2 tablets by mouth., Disp: , Rfl:   •   doxazosin (CARDURA) 2 MG tablet, Take 2 mg by mouth., Disp: , Rfl:   •  febuxostat (Uloric) 40 MG tablet, Take 1 tablet by mouth Daily., Disp: 90 tablet, Rfl: 3  •  ferrous sulfate 325 (65 FE) MG tablet, Take 1 tablet by mouth daily., Disp: , Rfl:   •  Glucagon (Gvoke HypoPen 2-Pack) 1 MG/0.2ML solution auto-injector, Inject 1 mg under the skin into the appropriate area as directed As Needed (Hypoglycemia)., Disp: 0.4 mL, Rfl: 1  •  hydrALAZINE (APRESOLINE) 100 MG tablet, Take 100 mg by mouth 3 (Three) Times a Day., Disp: , Rfl:   •  Insulin Lispro, 1 Unit Dial, (HumaLOG KwikPen) 100 UNIT/ML solution pen-injector, Inject 5 Units under the skin into the appropriate area as directed 3 (Three) Times a Day With Meals., Disp: 15 mL, Rfl: 0  •  Insulin Pen Needle (BD PEN NEEDLE ISELA U/F) 32G X 4 MM misc, Use to inject 4 time daily, Disp: 200 each, Rfl: 5  •  metOLazone (ZAROXOLYN) 2.5 MG tablet, 3 (Three) Times a Week., Disp: , Rfl:   •  metoprolol tartrate (LOPRESSOR) 25 MG tablet, TAKE 1/2 TABLET BY MOUTH EVERY 12 HOURS, Disp: 90 tablet, Rfl: 3  •  montelukast (SINGULAIR) 10 MG tablet, 10 mg daily., Disp: , Rfl:   •  potassium chloride (MICRO-K) 10 MEQ CR capsule, Take 10 mEq by mouth Daily., Disp: , Rfl:   •  predniSONE (DELTASONE) 10 MG (21) dose pack, Take  by mouth Daily for 6 days., Disp: 21 each, Rfl: 0  •  Synthroid 125 MCG tablet, Take 1 tablet by mouth Daily., Disp: 90 tablet, Rfl: 1  •  Toujeo Max SoloStar 300 UNIT/ML solution pen-injector injection, INJECT 60 UNITS UNDER THE SKIN AS DIRECTED DAILY, Disp: 18 mL, Rfl: 0  •  vitamin B-12 (CYANOCOBALAMIN) 1000 MCG tablet, Take 1 tablet by mouth daily., Disp: , Rfl:   •  Vitamin E 268 MG (400 UNIT) capsule, Take  by mouth., Disp: , Rfl:       Review of Systems   Constitutional: Negative for chills, diaphoresis, fatigue and fever.   HENT: Negative for congestion, rhinorrhea and sore throat.    Eyes: Negative for pain.   Respiratory: Negative for cough and shortness  of breath.    Gastrointestinal: Negative for anorexia, diarrhea and vomiting.   Musculoskeletal: Negative for joint pain.   Skin: Positive for rash. Negative for nail changes.   Neurological: Negative for headache.        There were no vitals filed for this visit.    Objective   Physical Exam  Constitutional:       Comments: Telephone visit   HENT:      Mouth/Throat:      Lips: Pink.   Pulmonary:      Effort: Pulmonary effort is normal.   Skin:     Findings: Erythema and rash present. Rash is macular and papular.          Neurological:      Mental Status: She is alert and oriented to person, place, and time.          Procedures     Assessment & Plan   Diagnoses and all orders for this visit:    1. Drug rash (Primary)  -     predniSONE (DELTASONE) 10 MG (21) dose pack; Take  by mouth Daily for 6 days.  Dispense: 21 each; Refill: 0      You may continue hydrocortisone topically to the affected areas along with antihistamines of choice.  Monitor your blood sugar closely while on steroids, this medication can raise your blood sugar levels.  Common side effects of steroids can be increased heart rate and flushing of the face amongst others, if side effects become bothersome you may stop the steroids and continue topical treatment.    If symptoms worsen or do not improve follow up with your PCP or visit your nearest Urgent Care Center or ER.      PLAN: Discussed dosing, side effects, recommended other symptomatic care.  Patient should follow up with primary care provider, Urgent Care or ER if symptoms worsen, fail to resolve or other symptoms need attention. Patient/family agree to the above.         TAYO Hills     The use of a video visit has been reviewed with the patient and verbal informed consent has been obtained. Myself and Ange Johnson participated in this visit. The patient is located at [address] [place] [address]. I am located in [place], [place]. Mychart and Zoom were utilized.        This  visit was performed via Telehealth.  This patient has been instructed to follow-up with their primary care provider if their symptoms worsen or the treatment provided does not resolve their illness.

## 2023-01-29 NOTE — PROGRESS NOTES
Patient's daughter called on her behalf.  Patient had been on Keflex and recently was switched to doxycycline due to cellulitis of the foot and leg.  Over the past couple of days she has developed an itchy rash all over.  She has stopped the doxycycline.  I instructed the patient to set up a virtual visit with urgent care to see if she needed steroid treatment.  She currently takes antihistamines daily.  No shortness of breath repor michelle.

## 2023-01-29 NOTE — PATIENT INSTRUCTIONS
You may continue hydrocortisone topically to the affected areas along with antihistamines of choice.  Monitor your blood sugar closely while on steroids, this medication can raise your blood sugar levels.  Common side effects of steroids can be increased heart rate and flushing of the face amongst others, if side effects become bothersome you may stop the steroids and continue topical treatment.    If symptoms worsen or do not improve follow up with your PCP or visit your nearest Urgent Care Center or ER.

## 2023-02-01 ENCOUNTER — OFFICE VISIT (OUTPATIENT)
Dept: CARDIOLOGY | Facility: CLINIC | Age: 83
End: 2023-02-01
Payer: MEDICARE

## 2023-02-01 VITALS
SYSTOLIC BLOOD PRESSURE: 140 MMHG | BODY MASS INDEX: 26.43 KG/M2 | HEART RATE: 63 BPM | DIASTOLIC BLOOD PRESSURE: 60 MMHG | HEIGHT: 64 IN | WEIGHT: 154.8 LBS

## 2023-02-01 DIAGNOSIS — N18.4 STAGE 4 CHRONIC KIDNEY DISEASE: ICD-10-CM

## 2023-02-01 DIAGNOSIS — E78.2 MIXED HYPERLIPIDEMIA: ICD-10-CM

## 2023-02-01 DIAGNOSIS — I25.10 CORONARY ARTERY DISEASE INVOLVING NATIVE CORONARY ARTERY OF NATIVE HEART WITHOUT ANGINA PECTORIS: Primary | ICD-10-CM

## 2023-02-01 DIAGNOSIS — I50.32 CHRONIC DIASTOLIC (CONGESTIVE) HEART FAILURE: ICD-10-CM

## 2023-02-01 DIAGNOSIS — I10 ESSENTIAL HYPERTENSION: ICD-10-CM

## 2023-02-01 DIAGNOSIS — I82.512 CHRONIC DEEP VEIN THROMBOSIS (DVT) OF FEMORAL VEIN OF LEFT LOWER EXTREMITY: ICD-10-CM

## 2023-02-01 DIAGNOSIS — I87.2 VENOUS STASIS DERMATITIS OF BOTH LOWER EXTREMITIES: ICD-10-CM

## 2023-02-01 PROCEDURE — 93000 ELECTROCARDIOGRAM COMPLETE: CPT | Performed by: INTERNAL MEDICINE

## 2023-02-01 PROCEDURE — 99214 OFFICE O/P EST MOD 30 MIN: CPT | Performed by: INTERNAL MEDICINE

## 2023-02-01 NOTE — PROGRESS NOTES
"Date of Office Visit: 23  Encounter Provider: Victor Hugo Ng MD  Place of Service: [facility] CARDIOLOGY  Patient Name: Ange Johnson  :1940    Chief Complaint   Patient presents with   • Congestive Heart Failure   • Coronary Artery Disease   :   HPI:     Ms. Johnson is 82 y.o. and presents today to follow up. I have reviewed prior notes and there are no changes except for any new updates described below. I have also reviewed any information entered into the medical record by the patient or by ancillary staff.     I initially evaluated her in 2016 for preoperative risk assessment prior to thyroid surgery.  She reported chronic exertional dyspnea at that time.  She had a soft systolic murmur.  An echo showed normal LVSF and aortic sclerosis without stenosis.  I did not feel that she needed stress testing as her dyspnea was stable and long-standing.  Her EKG showed diffuse nonspecific ST abnormalities which were old.    I then saw her again in 2020 for dyspnea and chest pain which she described as \"heartburn.\"  An echo was normal, but a perfusion stress was abnormal/high-risk.  She underwent coronary angiography which revealed multivessel CAD, and underwent 5V CABG in 2020.  Her post-operative course was unremarkable other than an episode of atrial fibrillation and lower extremity superficial thrombophlebitis. She was placed on renally dosed apixaban as well as amiodarone (which were later stopped).     She presented in 2021 with increasing dyspnea and edema. An echo revealed normal LVSF (EF 55-60%), grade 2 diastolic dysfunction, mild-moderate TR/PHTN. I increased her diuretics.  She presented in 2021 with increased fatigue and dyspnea; a perfusion stress test was performed. Per my interpretation it showed a small amount of anterolateral ischemia; given her severe CKD, medical therapy was recommended.     In 2022, she presented with " severe lower extremity edema, marked weight gain, dyspnea, and fatigue. An echo again revealed normal LVSF, grade 2 diastolic dysfunction, aortic sclerosis without stenosis, moderate TR, and moderately severe PHTN, RVSP 55mm Hg. She was bradycardic.  Dr Ram and I made a lot of medication changes. Additionally, her daughter started to give her protein drinks as she was malnourished. She is doing so much better. She still has edema but it's profoundly improved; her left leg has a lot of scaling and redness. She recently started oral steroids for this.     She had a venous recently; she has a chronic left femoral DVT.       Past Medical History:   Diagnosis Date   • Anemia in stage 3 chronic kidney disease (HCC) 2016   • Asthma    • Atherosclerosis of both carotid arteries     plaque without stenosis,    • Bone spur of acromioclavicular joint    • CAD (coronary artery disease)     2020: 90% dLM, 60% pLAD, 90% mLAD, 90% D1, 90% mLCx, 50-60% OM1, 50% pRCA; s/p CABG x 5 (LIMA-LAD, SVG-PDA, SVG-OM1, SVG-OM2, SVG-D1)   • Chronic diastolic (congestive) heart failure ([facility]) 2021   • Chronic venous insufficiency    • CKD (chronic kidney disease) stage 3, GFR 30-59 ml/min ([facility])    • Essential hypertension    • Gout    • Grief reaction       2010 after 15 foot fall off ladder   • H/O cataract    • Heel spur    • History of tendinitis    • Hyperlipidemia    • Hyperparathyroidism ([facility])    • Hyperthyroidism    • Hypothyroidism, acquired    • Non-toxic goiter    • Osteoarthritis    • Pneumonia    • Postoperative atrial fibrillation (HCC)     after CABG   • Proteinuria    • Thrombophlebitis leg     after CABG   • Type 2 diabetes mellitus with neurological manifestations ([facility])    • Vitamin D deficiency        Past Surgical History:   Procedure Laterality Date   • BREAST EXCISIONAL BIOPSY Left     Dr. Ybarra benign   • BREAST EXCISIONAL BIOPSY Bilateral     benign   • CARDIAC  CATHETERIZATION N/A 2/10/2020    Procedure: Left heart cath without LV gram;  Surgeon: Emerson Deras MD;  Location: [facility] CATH INVASIVE LOCATION;  Service: Cardiovascular;  Laterality: N/A;   • CARDIAC CATHETERIZATION N/A 2/10/2020    Procedure: Coronary angiography;  Surgeon: Emerson Deras MD;  [facility]ASIVE LOC[facility];  Service: Cardiovascular;  Laterality: N/A;   • CATARACT EXTRACTION Left 02/03/2010   • CATARACT EXTRACTION Right 04/21/2010   • CORONARY ARTERY BYPASS GRAFT N/A 2/12/2020    Procedure: MIDLINE STERNOTOMY, CORONARY ARTERY BYPASS GRAFTING X  5 USING LEFT INTERNAL MAMMARY ARTERY AND LEFT ENDOSCOPICALLY HARVESTED GREATER SAPHENOUS VEIN, INTRAOP KERWIN, PRP;  Surgeon: Jr Anthony Rai MD;  Location: [facility] MAIN OR;  Service: Cardiothoracic;  Laterality: N/A;   • HYSTERECTOMY  1988    Dr. Quinn Peña   • OOPHORECTOMY      late 40's   • THYROIDECTOMY Right 12/8/2016    Procedure: right PARATHYROID EXCISION OF NODULE and right thyroid lobectomy and bilateral exploration.;  Surgeon: Tasneem Montelongo MD;  Location: [facility] OR [facility];  Service:        Social History     Socioeconomic History   • Marital status:    • Number of children: 4   Tobacco Use   • Smoking status: Never   • Smokeless tobacco: Never   Vaping Use   • Vaping Use: Never used   Substance and Sexual Activity   • Alcohol use: Not Currently     Comment: Caffeine use: Decaf   • Drug use: No   • Sexual activity: Defer       Family History   Problem Relation Age of Onset   • Diabetes Sister    • Diabetes Brother    • Hypertension Brother    • Kidney disease Brother    • Cervical cancer Mother    • Heart disease Sister    • Neuropathy Sister    • Diabetes Sister    • Breast cancer Neg Hx        Review of Systems   Constitutional: Positive for malaise/fatigue and weight loss.   Cardiovascular: Positive for dyspnea on exertion and leg swelling.   Skin: Positive for color change and rash.   Musculoskeletal: Positive  for joint pain and myalgias.   Neurological: Positive for excessive daytime sleepiness and dizziness.   All other systems reviewed and are negative.      Allergies   Allergen Reactions   • Codeine Nausea Only   • Hydrocodone-Acetaminophen Nausea Only and Other (See Comments)     Percocet and Vicodin; vertigo   • Meperidine Nausea Only and Nausea And Vomiting   • Morphine And Related Nausea Only   • Oxycodone-Acetaminophen Nausea Only   • Oxycodone-Acetaminophen Nausea Only and Other (See Comments)     dilzziness   • Oxycodone-Aspirin Nausea Only and Other (See Comments)     vertigo   • Nickel Rash   • Penicillins Hives   • Shrimp Rash   • Sulfa Antibiotics Hives       Current Outpatient Medications:   •  aspirin (aspirin) 81 MG EC tablet, Take 1 tablet by mouth Daily., Disp: 90 tablet, Rfl: 3  •  atorvastatin (LIPITOR) 40 MG tablet, Take 1 tablet by mouth Every Night., Disp: 90 tablet, Rfl: 1  •  bumetanide (BUMEX) 2 MG tablet, Take 2 mg by mouth 2 (Two) Times a Day., Disp: , Rfl:   •  carBAMazepine (CARBATROL) 300 MG 12 hr capsule, Take 1 capsule by mouth Every 12 (Twelve) Hours., Disp: 180 capsule, Rfl: 3  •  cetirizine (zyrTEC) 10 MG tablet, Take 10 mg by mouth Every Night., Disp: , Rfl:   •  Contour Next Test test strip, TEST TWO TIMES A DAY, Disp: 100 each, Rfl: 0  •  doxazosin (CARDURA) 2 MG tablet, Take 2 mg by mouth., Disp: , Rfl:   •  febuxostat (Uloric) 40 MG tablet, Take 1 tablet by mouth Daily., Disp: 90 tablet, Rfl: 3  •  ferrous sulfate 325 (65 FE) MG tablet, Take 1 tablet by mouth daily., Disp: , Rfl:   •  Glucagon (Gvoke HypoPen 2-Pack) 1 MG/0.2ML solution auto-injector, Inject 1 mg under the skin into the appropriate area as directed As Needed (Hypoglycemia)., Disp: 0.4 mL, Rfl: 1  •  hydrALAZINE (APRESOLINE) 100 MG tablet, Take 100 mg by mouth 3 (Three) Times a Day., Disp: , Rfl:   •  Insulin Lispro, 1 Unit Dial, (HumaLOG KwikPen) 100 UNIT/ML solution pen-injector, Inject 5 Units under the skin  "into the appropriate area as directed 3 (Three) Times a Day With Meals., Disp: 15 mL, Rfl: 0  •  Insulin Pen Needle (BD PEN NEEDLE ISELA U/F) 32G X 4 MM misc, Use to inject 4 time daily, Disp: 200 each, Rfl: 5  •  montelukast (SINGULAIR) 10 MG tablet, 10 mg daily., Disp: , Rfl:   •  potassium chloride (MICRO-K) 10 MEQ CR capsule, Take 10 mEq by mouth Daily., Disp: , Rfl:   •  predniSONE (DELTASONE) 10 MG (21) dose pack, Take  by mouth Daily for 6 days., Disp: 21 each, Rfl: 0  •  Synthroid 125 MCG tablet, Take 1 tablet by mouth Daily., Disp: 90 tablet, Rfl: 1  •  Toujeo Max SoloStar 300 UNIT/ML solution pen-injector injection, INJECT 60 UNITS UNDER THE SKIN AS DIRECTED DAILY, Disp: 18 mL, Rfl: 0  •  vitamin B-12 (CYANOCOBALAMIN) 1000 MCG tablet, Take 1 tablet by mouth daily., Disp: , Rfl:   •  Vitamin E 268 MG (400 UNIT) capsule, Take  by mouth., Disp: , Rfl:   •  triamcinolone (KENALOG) 0.1 % ointment, Apply 1 application topically to the appropriate area as directed 2 (Two) Times a Day. One tub of ointment, Disp: 453.6 g, Rfl: 1      Objective:      Vitals:    02/01/23 1510 02/01/23 1530   BP: 172/60 140/60   BP Location: Left arm    Pulse: 63    Weight: 70.2 kg (154 lb 12.8 oz)    Height: 162.6 cm (64\")      Body mass index is 26.57 kg/m².    Physical Exam  Constitutional:       Appearance: Normal appearance.   HENT:      Head: Normocephalic.      Nose: Nose normal.      Mouth/Throat:      Comments: Masked  Eyes:      Conjunctiva/sclera: Conjunctivae normal.   Neck:      Vascular: No JVD.   Cardiovascular:      Rate and Rhythm: Normal rate and regular rhythm.      Pulses: Normal pulses.      Heart sounds: Murmur heard.    Systolic murmur is present with a grade of 2/6 at the upper left sternal border.  Pulmonary:      Effort: Pulmonary effort is normal.      Breath sounds: Normal breath sounds.   Abdominal:      Palpations: Abdomen is soft.      Tenderness: There is no abdominal tenderness.   Musculoskeletal:       "   General: Swelling (trace RLE edema, 1+ LLE, + stasis change with scaling on the left, generalized erythema) present.   Skin:     General: Skin is warm and dry.   Neurological:      General: No focal deficit present.      Mental Status: She is alert and oriented to person, place, and time.   Psychiatric:         Mood and Affect: Mood normal.         Behavior: Behavior normal.           ECG 12 Lead    Date/Time: 2/1/2023 3:47 PM  Performed by: Victor Hugo Ng MD  Authorized by: Victor Hugo Ng MD   Comparison: compared with previous ECG   Similar to previous ECG  Rhythm: sinus rhythm  Conduction: right bundle branch block  ST Segments: ST segments normal  T Waves: T waves normal  QRS axis: normal  Other: no other findings    Clinical impression: abnormal EKG              Assessment:       Diagnosis Plan   1. Coronary artery disease involving native coronary artery of native heart without angina pectoris        2. Mixed hyperlipidemia        3. Chronic diastolic (congestive) heart failure (HCC)        4. Essential hypertension        5. Stage 4 chronic kidney disease (HCC)        6. Venous stasis dermatitis of both lower extremities        7. Chronic deep vein thrombosis (DVT) of femoral vein of left lower extremity (HCC)             Plan:     1.  Coronary Artery Disease  Assessment  • The patient has no angina    Subjective - Objective  • There is a history of previous coronary artery bypass graft 2/2020  • Current antiplatelet therapy includes aspirin 81 mg    She had a stress test in 2021 that reported anterolateral ischemia, but it was not a large defect. Given her significant CKD, I feel we should continue with medical therapy only. Unfortunately, she does not tolerate amlodipine due to leg swelling, and her heart rate is too low for beta-blocker.    2/3/4/5.  Thankfully, she has made profound improvement in the last 3 months.  Her weight is down 40 pounds from October.  While some of that is muscle mass, a lot of  it was edema.  She will remain on her current doses of bumetanide, doxazosin, and hydralazine.  As above, she cannot be on an AV vimal blocker due to low heart rate, and she does not tolerate amlodipine due to leg swelling.  Her goal systolic blood pressure is in the 140s, and she is at goal today.    6.  She has bilateral venous stasis, with known venous insufficiency.  The left leg is worse than the right due to a chronic DVT in the left common femoral vein.  She does not require anticoagulation, and honestly, I feel that the risk of anticoagulation outweighs the benefit.  I have went ahead and prescribed triamcinolone ointment to be used on that leg as she does have a lot of scaling and some redness and I think she will benefit from this.    She is now enrolled with [facility], which I think is very appropriate.     Sincerely,       Victor Hugo Ng MD

## 2023-06-06 ENCOUNTER — OFFICE VISIT (OUTPATIENT)
Dept: CARDIOLOGY | Facility: CLINIC | Age: 83
End: 2023-06-06
Payer: MEDICARE

## 2023-06-06 VITALS
HEART RATE: 84 BPM | WEIGHT: 164 LBS | HEIGHT: 64 IN | OXYGEN SATURATION: 92 % | DIASTOLIC BLOOD PRESSURE: 70 MMHG | SYSTOLIC BLOOD PRESSURE: 150 MMHG | BODY MASS INDEX: 28 KG/M2

## 2023-06-06 DIAGNOSIS — I65.23 CAROTID ARTERY PLAQUE, BILATERAL: Primary | ICD-10-CM

## 2023-06-06 DIAGNOSIS — I87.2 VENOUS STASIS DERMATITIS OF BOTH LOWER EXTREMITIES: ICD-10-CM

## 2023-06-06 DIAGNOSIS — I82.512 CHRONIC DEEP VEIN THROMBOSIS (DVT) OF FEMORAL VEIN OF LEFT LOWER EXTREMITY: ICD-10-CM

## 2023-06-06 DIAGNOSIS — I25.10 CORONARY ARTERY DISEASE INVOLVING NATIVE CORONARY ARTERY OF NATIVE HEART WITHOUT ANGINA PECTORIS: ICD-10-CM

## 2023-06-06 DIAGNOSIS — N18.4 STAGE 4 CHRONIC KIDNEY DISEASE: ICD-10-CM

## 2023-06-06 PROCEDURE — 3078F DIAST BP <80 MM HG: CPT | Performed by: NURSE PRACTITIONER

## 2023-06-06 PROCEDURE — 3077F SYST BP >= 140 MM HG: CPT | Performed by: NURSE PRACTITIONER

## 2023-06-06 NOTE — PROGRESS NOTES
Date of Office Visit: 23  Encounter Provider: TAYO Carter  Place of Service: [facility] CARDIOLOGY  Patient Name: Ange Johnson  :1940    Chief Complaint   Patient presents with    Coronary artery disease involving native coronary artery of    Essential hypertension    Chronic diastolic (congestive) heart failure    Hyperlipidemia    Follow-up   :     HPI: Ange Johnson is a 82 y.o. female  with  coronary artery disease status post coronary artery bypass grafting x 5 in 2020,chronic kidney disease, aortic valve sclerosis, S/P CABG induced afib and placed on renal dosed AC and Amio, which both have since been discontinued.     She is followed by DR. Ng.  I will visit with her in follow-up today who reviewed her medical record.    She had an echocardiogram 2021  showing normal left ventricular systolic function, grade 2 diastolic dysfunction as well as mild pulmonary hypertension.  She had aortic valve sclerosis with mild to moderate tricuspid regurgitation.  She was in volume overload.  She received IV diureticwith 2 mg IV bumetanide.  Her serum creatinine was at 1.96 and proBNP was elevated at 4000.  Torsemide was increased to 50 mg twice daily approximately 2022.   She has small size, moderately severe area of ischemia in the anterior wall on perfusion stress test 2021.  It was decided to treat her medically due to stage IV kidney disease  In 2022 she began having symptoms of fluid overload.  On 2020 to torsemide 50 mg once daily was switched to Bumex 2 mg twice daily.  Bumetanide was increased to 4 mg twice daily on 2022.  Amlodipine was decreased from 10 mg to 5 mg due to edema and patient was started on Cardura 2022.  On 10/3/2022.  She had significant hypervolemia and was given 4 mg IV Bumex in our cardiac evaluation clinic.  Metoprolol was stopped due to bradycardia.  She had significant lower extremity swelling.   "Amlodipine was ultimately stopped by her nephrologist and Cardura was increased to 2 mg daily.  Metolazone was added and eventually decreased to PRN.    She presents today for 4-month reassessment.  She ride stationary bike up to 20 minutes on occasion.  She stays active with family functions.  Here recently she has been able to even dance at her daughter's wedding.  She has no chest pain tightness or pressure.  She denies shortness of breath.  Her swelling has been low to minimal.  She has been receiving infusions for anemia.  She has no near-syncope or syncope.  She reportedly feels cold natured but that is not new.  Patient and daughter both states she has been doing well.              Allergies   Allergen Reactions    Codeine Nausea Only    Hydrocodone-Acetaminophen Nausea Only and Other (See Comments)     Percocet and Vicodin; vertigo    Meperidine Nausea Only and Nausea And Vomiting    Morphine And Related Nausea Only    Oxycodone-Acetaminophen Nausea Only    Oxycodone-Acetaminophen Nausea Only and Other (See Comments)     dilzziness    Oxycodone-Aspirin Nausea Only and Other (See Comments)     vertigo    Nickel Rash    Penicillins Hives    Shrimp Rash    Sulfa Antibiotics Hives           Family and social history reviewed.     ROS  All other systems were reviewed and are negative          Objective:     Vitals:    06/06/23 1335   BP: 150/70   BP Location: Left arm   Patient Position: Sitting   Pulse: 84   SpO2: 92%   Weight: 74.4 kg (164 lb)   Height: 162.6 cm (64\")     Body mass index is 28.15 kg/m².    PHYSICAL EXAM:  Pulmonary:      Breath sounds: Examination of the right-lower field reveals decreased breath sounds. Examination of the left-lower field reveals decreased breath sounds. Decreased breath sounds present.   Cardiovascular:      Normal rate. Regular rhythm.      Murmurs: There is a systolic murmur.      Comments: Carotid bruit  R >L  Left leg venous stasis skin color changes >right  Edema:     " Ankle: bilateral trace edema of the ankle.      Procedures      Current Outpatient Medications   Medication Sig Dispense Refill    aspirin (aspirin) 81 MG EC tablet Take 1 tablet by mouth Daily. 90 tablet 3    atorvastatin (LIPITOR) 40 MG tablet Take 1 tablet by mouth Every Night. 90 tablet 1    bumetanide (BUMEX) 2 MG tablet Take 1 tablet by mouth 2 (Two) Times a Day.      carBAMazepine (CARBATROL) 300 MG 12 hr capsule Take 1 capsule by mouth Every 12 (Twelve) Hours. 180 capsule 3    cetirizine (zyrTEC) 10 MG tablet Take 1 tablet by mouth Every Night.      Contour Next Test test strip TEST TWO TIMES A  each 0    doxazosin (CARDURA) 2 MG tablet Take 1 tablet by mouth.      febuxostat (Uloric) 40 MG tablet Take 1 tablet by mouth Daily. 90 tablet 3    ferrous sulfate 325 (65 FE) MG tablet Take 1 tablet by mouth Daily.      Glucagon (Gvoke HypoPen 2-Pack) 1 MG/0.2ML solution auto-injector Inject 1 mg under the skin into the appropriate area as directed As Needed (Hypoglycemia). 0.4 mL 1    hydrALAZINE (APRESOLINE) 100 MG tablet Take 1 tablet by mouth 3 (Three) Times a Day.      Insulin Lispro, 1 Unit Dial, (HumaLOG KwikPen) 100 UNIT/ML solution pen-injector Inject 5 Units under the skin into the appropriate area as directed 3 (Three) Times a Day With Meals. 15 mL 0    Insulin Pen Needle (BD PEN NEEDLE ISELA U/F) 32G X 4 MM misc Use to inject 4 time daily 200 each 5    montelukast (SINGULAIR) 10 MG tablet 1 tablet Daily.      potassium chloride (MICRO-K) 10 MEQ CR capsule Take 1 capsule by mouth Daily.      Synthroid 125 MCG tablet Take 1 tablet by mouth Daily. 90 tablet 1    Toujeo Max SoloStar 300 UNIT/ML solution pen-injector injection INJECT 60 UNITS UNDER THE SKIN AS DIRECTED DAILY 18 mL 0    triamcinolone (KENALOG) 0.1 % ointment Apply 1 application topically to the appropriate area as directed 2 (Two) Times a Day. One tub of ointment 453.6 g 1    vitamin B-12 (CYANOCOBALAMIN) 1000 MCG tablet Take 1 tablet  by mouth Daily.      Vitamin E 268 MG (400 UNIT) capsule Take  by mouth.       No current facility-administered medications for this visit.     Assessment:       Diagnosis Plan   1. Carotid artery plaque, bilateral  Duplex Carotid Ultrasound CAR           No orders of the defined types were placed in this encounter.        Plan:         1.  82-year-old female with chronic diastolic congestive heart failure.  She also has RV dysfunction on echo 10/2022. She is euvolemic and diuretic adjusted by nephrology.   2.  Coronary artery disease status post coronary bypass grafting x5 02/2020.  She had an abnormal perfusion stress test July 2021 but has been treated medically.   - no angina. She is tolerating stationary bike up to 20 minutes   3.  Hypertension blood pressure appears adequate and is normally lower reportedly. Amlodipine made her swell. Her pulse is too low for beta blocker  4.  Stage IV chronic kidney disease-following closely with Dr. Ram  5.  Anemia of chronic disease.  Her last hemoglobin was 9.6 Saturday. She is receiving infusions   6.  Aortic valve sclerosis-mild murmur left upper sternal border  7.  Chronic right bundle branch block-no changes in EKG.  8.  Carotid artery plaque without significant stenosis bilateral on duplex 2/2020.  plan for repeat duplex given mild carotid bruit  9. HLD- continue to take lipitor 40 mg daily   10-type 2 diabetes-well controlled on subcu insulin.  Last A1c 5.2. she follows with endocrinology  11-hypothyroidism, on Synthroid per PCP.   12. Chronic LLE DVT and thrombophlebitis on duplex 01/2023      Call with questions or concerns.   Follow up in 6 months with Dr. Ng            It has been a pleasure to participate in this patient's care.      Thank you,  TAYO Carter      **I used Dragon to dictate this note:**

## 2023-08-25 DIAGNOSIS — E89.0 POSTOPERATIVE HYPOTHYROIDISM: ICD-10-CM

## 2023-08-25 DIAGNOSIS — Z79.4 TYPE 2 DIABETES MELLITUS WITH DIABETIC POLYNEUROPATHY, WITH LONG-TERM CURRENT USE OF INSULIN: ICD-10-CM

## 2023-08-25 DIAGNOSIS — E11.42 TYPE 2 DIABETES MELLITUS WITH DIABETIC POLYNEUROPATHY, WITH LONG-TERM CURRENT USE OF INSULIN: ICD-10-CM

## 2023-08-25 DIAGNOSIS — E78.2 MIXED HYPERLIPIDEMIA: ICD-10-CM

## 2023-08-28 RX ORDER — ATORVASTATIN CALCIUM 40 MG/1
TABLET, FILM COATED ORAL
Qty: 90 TABLET | Refills: 1 | Status: SHIPPED | OUTPATIENT
Start: 2023-08-28

## 2023-11-15 ENCOUNTER — TRANSCRIBE ORDERS (OUTPATIENT)
Dept: ADMINISTRATIVE | Facility: HOSPITAL | Age: 83
End: 2023-11-15
Payer: MEDICARE

## 2023-11-15 ENCOUNTER — TRANSCRIBE ORDERS (OUTPATIENT)
Dept: FAMILY MEDICINE CLINIC | Facility: CLINIC | Age: 83
End: 2023-11-15
Payer: MEDICARE

## 2023-11-15 DIAGNOSIS — Z12.31 VISIT FOR SCREENING MAMMOGRAM: Primary | ICD-10-CM

## 2023-12-04 NOTE — PROGRESS NOTES
RM:________     PCP: Quinn Washington MD    : 1940  AGE: 83 y.o.  EST PATIENT     REASON FOR VISIT/  CC:        BP Readings from Last 3 Encounters:   23 126/70   23 128/84   23 150/70      Wt Readings from Last 3 Encounters:   23 72.5 kg (159 lb 12.8 oz)   23 74.4 kg (164 lb)   23 74.4 kg (164 lb)        WT: ____________ BP: __________L __________R HR______    CHEST PAIN: _____________    SOA: _____________PALPS: _______________     LIGHTHEADED: ___________FATIGUE: ________________ EDEMA __________    ALLERGIES:Codeine, Hydrocodone-acetaminophen, Meperidine, Morphine and related, Oxycodone-acetaminophen, Oxycodone-acetaminophen, Oxycodone-aspirin, Doxycycline, Nickel, Penicillins, Shrimp, and Sulfa antibiotics SMOKING HISTORY:  Social History     Tobacco Use    Smoking status: Never    Smokeless tobacco: Never   Vaping Use    Vaping Use: Never used   Substance Use Topics    Alcohol use: Not Currently     Comment: Caffeine use: Decaf    Drug use: No     CAFFEINE USE_________________  ALCOHOL ______________________

## 2023-12-11 ENCOUNTER — OFFICE VISIT (OUTPATIENT)
Dept: CARDIOLOGY | Facility: CLINIC | Age: 83
End: 2023-12-11
Payer: MEDICARE

## 2023-12-11 VITALS
SYSTOLIC BLOOD PRESSURE: 120 MMHG | BODY MASS INDEX: 25.33 KG/M2 | HEIGHT: 64 IN | DIASTOLIC BLOOD PRESSURE: 60 MMHG | WEIGHT: 148.4 LBS | HEART RATE: 58 BPM

## 2023-12-11 DIAGNOSIS — I10 ESSENTIAL HYPERTENSION: ICD-10-CM

## 2023-12-11 DIAGNOSIS — E78.2 MIXED HYPERLIPIDEMIA: ICD-10-CM

## 2023-12-11 DIAGNOSIS — I97.89 POSTOPERATIVE ATRIAL FIBRILLATION: ICD-10-CM

## 2023-12-11 DIAGNOSIS — I25.10 CORONARY ARTERY DISEASE INVOLVING NATIVE CORONARY ARTERY OF NATIVE HEART WITHOUT ANGINA PECTORIS: Primary | ICD-10-CM

## 2023-12-11 DIAGNOSIS — I50.32 CHRONIC DIASTOLIC (CONGESTIVE) HEART FAILURE: ICD-10-CM

## 2023-12-11 DIAGNOSIS — I48.91 POSTOPERATIVE ATRIAL FIBRILLATION: ICD-10-CM

## 2023-12-11 DIAGNOSIS — N18.4 STAGE 4 CHRONIC KIDNEY DISEASE: ICD-10-CM

## 2023-12-11 PROCEDURE — 1159F MED LIST DOCD IN RCRD: CPT | Performed by: INTERNAL MEDICINE

## 2023-12-11 PROCEDURE — 3074F SYST BP LT 130 MM HG: CPT | Performed by: INTERNAL MEDICINE

## 2023-12-11 PROCEDURE — 3078F DIAST BP <80 MM HG: CPT | Performed by: INTERNAL MEDICINE

## 2023-12-11 PROCEDURE — 1160F RVW MEDS BY RX/DR IN RCRD: CPT | Performed by: INTERNAL MEDICINE

## 2023-12-11 PROCEDURE — 99214 OFFICE O/P EST MOD 30 MIN: CPT | Performed by: INTERNAL MEDICINE

## 2023-12-11 PROCEDURE — 93000 ELECTROCARDIOGRAM COMPLETE: CPT | Performed by: INTERNAL MEDICINE

## 2023-12-11 NOTE — PROGRESS NOTES
"Date of Office Visit: 23  Encounter Provider: Victor Hugo Ng MD  Place of Service: [facility] CARDIOLOGY  Patient Name: Ange Johnson  :1940    Chief Complaint   Patient presents with    Congestive Heart Failure    Coronary Artery Disease   :   HPI:     Ms. Johnson is 83 y.o. and presents today to follow up. I have reviewed prior notes and there are no changes except for any new updates described below. I have also reviewed any information entered into the medical record by the patient or by ancillary staff.     I initially evaluated her in 2016 for preoperative risk assessment prior to thyroid surgery.  She reported chronic exertional dyspnea at that time.  She had a soft systolic murmur.  An echo showed normal LVSF and aortic sclerosis without stenosis.  I did not feel that she needed stress testing as her dyspnea was stable and long-standing.  Her EKG showed diffuse nonspecific ST abnormalities which were old.    I then saw her again in 2020 for dyspnea and chest pain which she described as \"heartburn.\"  An echo was normal, but a perfusion stress was abnormal/high-risk.  She underwent coronary angiography which revealed multivessel CAD, and underwent 5V CABG in 2020.  Her post-operative course was unremarkable other than an episode of atrial fibrillation and lower extremity superficial thrombophlebitis. She was placed on renally dosed apixaban as well as amiodarone (which were later stopped).     She presented in 2021 with increasing dyspnea and edema. An echo revealed normal LVSF (EF 55-60%), grade 2 diastolic dysfunction, mild-moderate TR/PHTN. I increased her diuretics.  She presented in 2021 with increased fatigue and dyspnea; a perfusion stress test was performed. Per my interpretation it showed a small amount of anterolateral ischemia; given her severe CKD, medical therapy was recommended.     In 2022, she presented with " severe lower extremity edema, marked weight gain, dyspnea, and fatigue. An echo again revealed normal LVSF, grade 2 diastolic dysfunction, aortic sclerosis without stenosis, moderate TR, and moderately severe PHTN, RVSP 55mm Hg. She was bradycardic.  Dr Ram and I made a lot of medication changes. Additionally, her daughter started to give her protein drinks as she was malnourished. She is doing so much better. She still has edema but it's profoundly improved.    She had a venous duplex in 2023; it showed a chronic left femoral DVT. Carotid dopplers in 2023 showed no significant disease.      Past Medical History:   Diagnosis Date    Anemia in stage 3 chronic kidney disease 2016    Asthma     Atherosclerosis of both carotid arteries     plaque without stenosis,     Bone spur of acromioclavicular joint     CAD (coronary artery disease)     2020: 90% dLM, 60% pLAD, 90% mLAD, 90% D1, 90% mLCx, 50-60% OM1, 50% pRCA; s/p CABG x 5 (LIMA-LAD, SVG-PDA, SVG-OM1, SVG-OM2, SVG-D1)    Chronic diastolic (congestive) heart failure 2021    Chronic venous insufficiency     CKD (chronic kidney disease) stage 3, GFR 30-59 ml/min     Deep vein thrombosis     Essential hypertension     Gout     Grief reaction       2010 after 15 foot fall off ladder    H/O cataract     Heart murmur     Heel spur     History of tendinitis     Hyperlipidemia     Hyperparathyroidism     Hyperthyroidism     Hypothyroidism, acquired     Non-toxic goiter     Osteoarthritis     Pneumonia     Postoperative atrial fibrillation     after CABG    Proteinuria     Thrombophlebitis leg     after CABG    Type 2 diabetes mellitus with neurological manifestations     Vitamin D deficiency        Past Surgical History:   Procedure Laterality Date    BREAST EXCISIONAL BIOPSY Left     Dr. Ybarra benign    BREAST EXCISIONAL BIOPSY Bilateral     benign    CARDIAC CATHETERIZATION N/A 2/10/2020    Procedure: Left  heart cath without LV gram;  Surgeon: Emerson Deras MD;  Location: [facility] CATH INVASIVE LOCATION;  Service: Cardiovascular;  Laterality: N/A;    CARDIAC CATHETERIZATION N/A 2/10/2020    Procedure: Coronary angiography;  Surgeon: Emerson Deras MD;  Location: [facility] CATH INVASIVE LOCATION;  Service: Cardiovascular;  Laterality: N/A;    CATARACT EXTRACTION Left 02/03/2010    CATARACT EXTRACTION Right 04/21/2010    CORONARY ARTERY BYPASS GRAFT N/A 2/12/2020    Procedure: MIDLINE STERNOTOMY, CORONARY ARTERY BYPASS GRAFTING X  5 USING LEFT INTERNAL MAMMARY ARTERY AND LEFT ENDOSCOPICALLY HARVESTED GREATER SAPHENOUS VEIN, INTRAOP KERWIN, PRP;  Surgeon: Jr Anthony Rai MD;  Location: [facility] MAIN OR;  Service: Cardiothoracic;  Laterality: N/A;    HYSTERECTOMY  1988    Dr. Quinn Peña    OOPHORECTOMY      late 40's    THYROIDECTOMY Right 12/8/2016    Procedure: right PARATHYROID EXCISION OF NODULE and right thyroid lobectomy and bilateral exploration.;  Surgeon: Tasneem Montelongo MD;  Location: [facility] OR [facility];  Service:        Social History     Socioeconomic History    Marital status:     Number of children: 4   Tobacco Use    Smoking status: Never     Passive exposure: Never    Smokeless tobacco: Never   Vaping Use    Vaping Use: Never used   Substance and Sexual Activity    Alcohol use: Not Currently     Comment: Caffeine use: Decaf    Drug use: No    Sexual activity: Not Currently       Family History   Problem Relation Age of Onset    Diabetes Sister     Diabetes Brother     Hypertension Brother     Kidney disease Brother     Cervical cancer Mother     Heart disease Sister     Neuropathy Sister     Diabetes Sister     Breast cancer Neg Hx        Review of Systems   Constitutional: Positive for malaise/fatigue and weight loss.   Cardiovascular:  Positive for dyspnea on exertion and leg swelling.   Skin:  Positive for color change and rash.   Musculoskeletal:  Positive for joint pain and myalgias.    Neurological:  Positive for excessive daytime sleepiness and dizziness.   All other systems reviewed and are negative.      Allergies   Allergen Reactions    Codeine Nausea Only    Hydrocodone-Acetaminophen Nausea Only and Other (See Comments)     Percocet and Vicodin; vertigo    Meperidine Nausea Only and Nausea And Vomiting    Morphine And Related Nausea Only    Oxycodone-Acetaminophen Nausea Only    Oxycodone-Acetaminophen Nausea Only and Other (See Comments)     dilzziness    Oxycodone-Aspirin Nausea Only and Other (See Comments)     vertigo    Doxycycline Rash    Nickel Rash    Penicillins Hives    Shrimp Rash    Sulfa Antibiotics Hives       Current Outpatient Medications:     aspirin (aspirin) 81 MG EC tablet, Take 1 tablet by mouth Daily., Disp: 90 tablet, Rfl: 3    atorvastatin (LIPITOR) 40 MG tablet, TAKE ONE TABLET BY MOUTH ONCE NIGHTLY, Disp: 90 tablet, Rfl: 1    carBAMazepine (CARBATROL) 300 MG 12 hr capsule, Take 1 capsule by mouth Every 12 (Twelve) Hours., Disp: 180 capsule, Rfl: 3    cetirizine (zyrTEC) 10 MG tablet, Take 1 tablet by mouth Every Night., Disp: , Rfl:     Contour Next Test test strip, TEST TWO TIMES A DAY, Disp: 100 each, Rfl: 0    febuxostat (Uloric) 40 MG tablet, Take 1 tablet by mouth Daily., Disp: 90 tablet, Rfl: 3    ferrous sulfate 325 (65 FE) MG tablet, Take 1 tablet by mouth Daily., Disp: , Rfl:     Glucagon (Gvoke HypoPen 2-Pack) 1 MG/0.2ML solution auto-injector, Inject 1 mg under the skin into the appropriate area as directed As Needed (Hypoglycemia)., Disp: 0.4 mL, Rfl: 1    hydrALAZINE (APRESOLINE) 100 MG tablet, Take 1 tablet by mouth 2 (Two) Times a Day., Disp: , Rfl:     metOLazone (ZAROXOLYN) 2.5 MG tablet, Take As Directed., Disp: , Rfl:     montelukast (SINGULAIR) 10 MG tablet, 1 tablet Daily., Disp: , Rfl:     NIFEdipine XL (PROCARDIA XL) 60 MG 24 hr tablet, Daily., Disp: , Rfl:     potassium chloride (MICRO-K) 10 MEQ CR capsule, Take 1 capsule by mouth Daily.,  "Disp: , Rfl:     Synthroid 125 MCG tablet, Take 1 tablet by mouth Daily., Disp: 90 tablet, Rfl: 1    torsemide (DEMADEX) 100 MG tablet, Take 1 tablet by mouth 2 (Two) Times a Day., Disp: , Rfl:     Norah Florentino SoloStar 300 UNIT/ML solution pen-injector injection, INJECT 60 UNITS UNDER THE SKIN AS DIRECTED DAILY (Patient taking differently: As Needed.), Disp: 18 mL, Rfl: 0    triamcinolone (KENALOG) 0.1 % ointment, Apply 1 application topically to the appropriate area as directed 2 (Two) Times a Day. One tub of ointment, Disp: 453.6 g, Rfl: 1    vitamin B-12 (CYANOCOBALAMIN) 1000 MCG tablet, Take 1 tablet by mouth Daily., Disp: , Rfl:     Vitamin E 268 MG (400 UNIT) capsule, Take  by mouth., Disp: , Rfl:     doxazosin (CARDURA) 2 MG tablet, Take 1 tablet by mouth., Disp: , Rfl:     Insulin Pen Needle (BD PEN NEEDLE ISELA U/F) 32G X 4 MM misc, Use to inject 4 time daily, Disp: 200 each, Rfl: 5      Objective:      Vitals:    12/11/23 1401   BP: 120/60   BP Location: Left arm   Pulse: 58   Weight: 67.3 kg (148 lb 6.4 oz)   Height: 162.6 cm (64\")     Body mass index is 25.47 kg/m².    Physical Exam  Constitutional:       Appearance: Normal appearance.   HENT:      Head: Normocephalic.      Nose: Nose normal.      Mouth/Throat:      Comments: Masked  Eyes:      Conjunctiva/sclera: Conjunctivae normal.   Neck:      Vascular: No JVD.   Cardiovascular:      Rate and Rhythm: Normal rate and regular rhythm.      Pulses: Normal pulses.      Heart sounds: Murmur heard.      Systolic murmur is present with a grade of 2/6 at the upper left sternal border.   Pulmonary:      Effort: Pulmonary effort is normal.      Breath sounds: Normal breath sounds.   Abdominal:      Palpations: Abdomen is soft.      Tenderness: There is no abdominal tenderness.   Musculoskeletal:         General: Swelling (trace RLE edema, 1+ LLE, + stasis change with scaling on the left, generalized erythema) present.   Skin:     General: Skin is warm and dry. "   Neurological:      General: No focal deficit present.      Mental Status: She is alert and oriented to person, place, and time.   Psychiatric:         Mood and Affect: Mood normal.         Behavior: Behavior normal.           ECG 12 Lead    Date/Time: 12/11/2023 3:52 PM  Performed by: Victor Hugo Ng MD    Authorized by: Victor Hugo Ng MD  Comparison: compared with previous ECG   Similar to previous ECG  Rhythm: sinus rhythm  Conduction: right bundle branch block and 1st degree AV block  ST Segments: ST segments normal  T Waves: T waves normal  Other: no other findings    Clinical impression: abnormal EKG            Assessment:       Diagnosis Plan   1. Coronary artery disease involving native coronary artery of native heart without angina pectoris        2. Mixed hyperlipidemia        3. Postoperative atrial fibrillation        4. Chronic diastolic (congestive) heart failure        5. Essential hypertension        6. Stage 4 chronic kidney disease             Plan:     1/2.  Coronary Artery Disease  Assessment   The patient has no angina    Subjective - Objective   There is a history of previous coronary artery bypass graft  2/2020   Current antiplatelet therapy includes aspirin 81 mg    She had a stress test in 2021 that reported anterolateral ischemia, but it was not a large defect. Given her significant CKD, I feel we should continue with medical therapy only. She is on nifedipine. Her heart rate is too low for beta-blocker. She is on atorvastatin.    3. There has been no evidence of recurrence.    4/5/6.  Thankfully, she has made profound improvement. Dr Yo follows her very closely. She will remain on her current doses of torsemide and hydralazine and nifedipine. He has also prescribed PRN metolazone.       Sincerely,       Victor Hugo Ng MD

## 2023-12-14 ENCOUNTER — HOSPITAL ENCOUNTER (OUTPATIENT)
Dept: MAMMOGRAPHY | Facility: HOSPITAL | Age: 83
Discharge: HOME | End: 2023-12-14
Admitting: FAMILY MEDICINE
Payer: MEDICARE

## 2023-12-14 DIAGNOSIS — Z12.31 VISIT FOR SCREENING MAMMOGRAM: ICD-10-CM

## 2023-12-14 PROCEDURE — 77067 SCR MAMMO BI INCL CAD: CPT

## 2023-12-29 ENCOUNTER — TELEPHONE (OUTPATIENT)
Dept: FAMILY MEDICINE CLINIC | Facility: CLINIC | Age: 83
End: 2023-12-29
Payer: MEDICARE

## 2023-12-29 NOTE — TELEPHONE ENCOUNTER
Called patient's daughter to speak about her medication Carbamazepine ER and if it needed to be refilled. Left vm. If it does need to be refilled she will need an appointment.

## 2024-01-03 RX ORDER — LEVOTHYROXINE SODIUM 125 MCG
125 TABLET ORAL DAILY
Qty: 30 TABLET | Refills: 0 | Status: SHIPPED | OUTPATIENT
Start: 2024-01-03

## 2024-01-03 NOTE — TELEPHONE ENCOUNTER
Rx Refill Note  Requested Prescriptions     Pending Prescriptions Disp Refills    Synthroid 125 MCG tablet [Pharmacy Med Name: SYNTHROID 125 MCG TABLET] 90 tablet 1     Sig: TAKE ONE TABLET BY MOUTH DAILY      Last office visit with prescribing clinician: 7/6/2023   Last telemedicine visit with prescribing clinician: Visit date not found   Next office visit with prescribing clinician: 1/8/2024                         Would you like a call back once the refill request has been completed: [] Yes [] No    If the office needs to give you a call back, can they leave a voicemail: [] Yes [] No    Kati Knott  01/03/24, 13:20 EST

## 2024-02-23 DIAGNOSIS — E89.0 POSTOPERATIVE HYPOTHYROIDISM: ICD-10-CM

## 2024-02-23 DIAGNOSIS — Z79.4 TYPE 2 DIABETES MELLITUS WITH DIABETIC POLYNEUROPATHY, WITH LONG-TERM CURRENT USE OF INSULIN: ICD-10-CM

## 2024-02-23 DIAGNOSIS — E78.2 MIXED HYPERLIPIDEMIA: ICD-10-CM

## 2024-02-23 DIAGNOSIS — E11.42 TYPE 2 DIABETES MELLITUS WITH DIABETIC POLYNEUROPATHY, WITH LONG-TERM CURRENT USE OF INSULIN: ICD-10-CM

## 2024-02-23 RX ORDER — ATORVASTATIN CALCIUM 40 MG/1
TABLET, FILM COATED ORAL
Qty: 90 TABLET | Refills: 0 | Status: SHIPPED | OUTPATIENT
Start: 2024-02-23

## 2024-02-23 NOTE — TELEPHONE ENCOUNTER
Rx Refill Note  Requested Prescriptions     Pending Prescriptions Disp Refills    atorvastatin (LIPITOR) 40 MG tablet [Pharmacy Med Name: ATORVASTATIN 40 MG TABLET] 90 tablet 1     Sig: TAKE ONE TABLET BY MOUTH ONCE NIGHTLY      Last office visit with prescribing clinician: 7/6/2023   Last telemedicine visit with prescribing clinician: Visit date not found   Next office visit with prescribing clinician: 3/7/2024                         Would you like a call back once the refill request has been completed: [] Yes [] No    If the office needs to give you a call back, can they leave a voicemail: [] Yes [] No    Kati Knott  02/23/24, 08:03 EST

## 2024-06-05 NOTE — PROGRESS NOTES
Bilateral lower extremity venous doppler completed, Prelim Rt negative and Lt acute superficial calf vein thrombus given to Olga Bernal, JOEL and Pt sent home and to be called in morning.  
DISCHARGE

## 2024-06-06 ENCOUNTER — TELEPHONE (OUTPATIENT)
Dept: CARDIOLOGY | Facility: CLINIC | Age: 84
End: 2024-06-06
Payer: MEDICARE

## 2024-06-06 NOTE — TELEPHONE ENCOUNTER
“Please be informed that patient has passed. Patient has been marked  in the system. The date of death is: 24.    Caller: Daphne Dasilva    Relationship: Emergency Contact    Best call back number: [phone]    Did the patient have surgery within 30 days of their passing (Y/N): N

## (undated) DEVICE — Device

## (undated) DEVICE — CATH VENT MIV RADL PIG ST TIP 5F 110CM

## (undated) DEVICE — GLV SURG BIOGEL M LTX PF 7 1/2

## (undated) DEVICE — ROTATING SURGICAL PUNCHES, 1 PER POUCH: Brand: A&E MEDICAL / ROTATING SURGICAL PUNCHES

## (undated) DEVICE — CATH DIAG IMPULSE FR4 5F 100CM

## (undated) DEVICE — SENSR CERBRL O2 PK/2

## (undated) DEVICE — CATH DIAG IMPULSE ARMOD 5F 100CM

## (undated) DEVICE — CLAMP INSERT: Brand: STEALTH® CLAMP INSERT

## (undated) DEVICE — SUT VIC 0 CT1 CR8 27IN JJ41G

## (undated) DEVICE — SOL IRR H2O BTL 1000ML STRL

## (undated) DEVICE — DRP SLUSH WARMR MACH CIR 44X44IN

## (undated) DEVICE — LP VESL MAXI 2.5X1MM RED 2PK

## (undated) DEVICE — DRSNG WND GEL FIBR OPTICELL AG PLS W/SLV LF 4X5IN  STRL

## (undated) DEVICE — SYS PERFUS SEP PLATLT W TIPS CUST

## (undated) DEVICE — TBG INSUFFLATION LUER LOCK: Brand: MEDLINE INDUSTRIES, INC.

## (undated) DEVICE — SOL IRR NACL 0.9PCT BT 1000ML

## (undated) DEVICE — PK ATS CUST W CARDIOTOMY RESEVOIR

## (undated) DEVICE — HEMOCONCENTRATOR PERFUS LPS06

## (undated) DEVICE — SYR LUERLOK 20CC BX/50

## (undated) DEVICE — LOU OPEN HEART DR POLLOCK: Brand: MEDLINE INDUSTRIES, INC.

## (undated) DEVICE — KT MANIFLD CARDIAC

## (undated) DEVICE — HARMONIC SYNERGY DISSECTING HOOK WITH TORQUE WRENCH. FOR USE WITH BLUE HAND PIECE ONLY: Brand: HARMONIC SYNERGY

## (undated) DEVICE — PK HEART OPN 40

## (undated) DEVICE — SUT PROLN MO.5 7/0 DBLARM BV175 6 2X30 BX/12

## (undated) DEVICE — PK CATH CARD 40

## (undated) DEVICE — TOWEL,OR,DSP,ST,WHITE,DLX,4/PK,20PK/CS: Brand: MEDLINE

## (undated) DEVICE — GLV SURG TRIUMPH CLASSIC PF LTX 7.5 STRL

## (undated) DEVICE — CVR PROB 96IN LF STRL

## (undated) DEVICE — BLOWER/MISTER AXIOUS OPCAB W/TBG

## (undated) DEVICE — BIOPATCH™ ANTIMICROBIAL DRESSING WITH CHLORHEXIDINE GLUCONATE IS A HYDROPHILLIC POLYURETHANE ABSORPTIVE FOAM WITH CHLORHEXIDINE GLUCONATE (CHG) WHICH INHIBITS BACTERIAL GROWTH UNDER THE DRESSING. THE DRESSING IS INTENDED TO BE USED TO ABSORB EXUDATE, COVER A WOUND CAUSED BY VASCULAR AND NONVASCULAR PERCUTANEOUS MEDICAL DEVICES DURING SURGERY, AS WELL AS REDUCE LOCAL INFECTION AND COLONIZATION OF MICROORGANISMS.: Brand: BIOPATCH

## (undated) DEVICE — 28 FR RIGHT ANGLE – SOFT PVC CATHETER: Brand: PVC THORACIC CATHETERS

## (undated) DEVICE — OASIS DRAIN, DUAL, IN-LINE, ATS COMPATIBLE: Brand: OASIS

## (undated) DEVICE — ST. SORBAVIEW ULTIMATE IJ SYSTEM A,C: Brand: CENTURION

## (undated) DEVICE — DRSNG SURESITE WNDW 2.38X2.75

## (undated) DEVICE — CANN ART EOPA 3D NV W/CONN 20F

## (undated) DEVICE — DRAIN,WOUND,ROUND,24FR,5/16",FULL-FLUTED: Brand: MEDLINE

## (undated) DEVICE — SOL ISO/ALC RUB 70PCT 4OZ

## (undated) DEVICE — VASOVIEW HEMOPRO: Brand: VASOVIEW HEMOPRO

## (undated) DEVICE — SUT SILK 0 CT1 CR8 18IN C021D

## (undated) DEVICE — GLV SURG BIOGEL LTX PF 6 1/2

## (undated) DEVICE — GLIDESHEATH SLENDER STAINLESS STEEL KIT: Brand: GLIDESHEATH SLENDER

## (undated) DEVICE — PK PERFUS CUST W/CARDIOPLEGIA

## (undated) DEVICE — Device: Brand: LEVEL 1

## (undated) DEVICE — NEEDLE, QUINCKE, 20GX3.5": Brand: MEDLINE

## (undated) DEVICE — ADHS SKIN DERMABOND TOP ADVANCED

## (undated) DEVICE — BLAKE SILICONE DRAINS CARDIO CONNECTOR 2:1: Brand: BLAKE

## (undated) DEVICE — GW EMR FIX EXCHG J STD .035 3MM 260CM

## (undated) DEVICE — CATH DIAG IMPULSE FL3.5 5F 100CM